# Patient Record
Sex: FEMALE | Race: WHITE | Employment: OTHER | URBAN - METROPOLITAN AREA
[De-identification: names, ages, dates, MRNs, and addresses within clinical notes are randomized per-mention and may not be internally consistent; named-entity substitution may affect disease eponyms.]

---

## 2017-01-02 ENCOUNTER — HOSPITAL ENCOUNTER (OUTPATIENT)
Dept: RADIATION ONCOLOGY | Age: 67
Discharge: HOME | End: 2017-01-02
Payer: MEDICARE

## 2017-01-02 PROCEDURE — 77385 HC IMRT TRMT DLVR SMPL: CPT

## 2017-01-02 NOTE — PROGRESS NOTES
Patient: Francine Alvarenga MRN: [id number]  SSN: [id number]    YOB: 1950  Age: 74 y.o. Sex: female      DIAGNOSIS: Right breast infiltrating ductal carcinoma, high grade, 100% ER, NM 3% and HER2 was negative by IHC, [id number] with positive paratracheal lymph node. [id number].      PREVIOUS TREATMENT:  1) 4 cycles ddAC followed by 12 weekly taxol cycles. 2) 9/14/16: Mastectomy and axillary dissection. Tissue expanders in place. TREATMENT SITE:  Right breast and lymph nodes. DOSE and FRACTIONATION:  24/25 fractions, 4800 cGy of 5000 cGy, 0/5 boost.     INTERVAL HISTORY:  Francine Alvarenga is a 74 y.o. female being treated for breast cancer. She is doing well without complaints through week 2. Week 3 having some dysphagia but better week 4. Also with walking pna and getting abx from PCP. No new issues week 5. OBJECTIVE:  Mild follicular rash. There were no vitals taken for this visit. Lab Results   Component Value Date/Time    Sodium 140 12/19/2016 01:04 PM    Potassium 3.6 12/19/2016 01:04 PM    Chloride 103 12/19/2016 01:04 PM    CO2 30 12/19/2016 01:04 PM    Anion gap 7 12/19/2016 01:04 PM    Glucose 140 12/19/2016 01:04 PM    BUN 12 12/19/2016 01:04 PM    Creatinine 0.74 12/19/2016 01:04 PM    GFR est AA >60 12/19/2016 01:04 PM    GFR est non-AA >60 12/19/2016 01:04 PM    Calcium 8.6 12/19/2016 01:04 PM    Magnesium 2.3 07/29/2016 02:06 PM    Albumin 3.1 12/19/2016 01:04 PM    Protein, total 6.9 12/19/2016 01:04 PM    Globulin 3.8 12/19/2016 01:04 PM    A-G Ratio 0.8 12/19/2016 01:04 PM    AST 18 12/19/2016 01:04 PM    ALT 14 12/19/2016 01:04 PM     Lab Results   Component Value Date/Time    WBC 4.9 12/19/2016 01:04 PM    HGB 11.6 12/19/2016 01:04 PM    HCT 35.4 12/19/2016 01:04 PM    PLATELET 758 29/11/7132 01:04 PM       ASSESSMENT and PLAN:  Francine Alvarenga is tolerating radiation as anticipated for the current dose and fraction. Aquaphor week 1. Abx from PCP for PNA.   We will continue on as planned with another treatment visit anticipated next week.         Marquetta Bernheim, MD   January 2, 2017

## 2017-01-03 ENCOUNTER — HOSPITAL ENCOUNTER (OUTPATIENT)
Dept: RADIATION ONCOLOGY | Age: 67
Discharge: HOME | End: 2017-01-03
Payer: MEDICARE

## 2017-01-03 PROCEDURE — 77300 RADIATION THERAPY DOSE PLAN: CPT

## 2017-01-03 PROCEDURE — 77385 HC IMRT TRMT DLVR SMPL: CPT

## 2017-01-03 PROCEDURE — 77280 THER RAD SIMULAJ FIELD SMPL: CPT

## 2017-01-03 PROCEDURE — 77336 RADIATION PHYSICS CONSULT: CPT

## 2017-01-04 ENCOUNTER — HOSPITAL ENCOUNTER (OUTPATIENT)
Dept: RADIATION ONCOLOGY | Age: 67
Discharge: HOME | End: 2017-01-04
Payer: MEDICARE

## 2017-01-04 PROCEDURE — 77385 HC IMRT TRMT DLVR SMPL: CPT

## 2017-01-05 ENCOUNTER — HOSPITAL ENCOUNTER (OUTPATIENT)
Dept: RADIATION ONCOLOGY | Age: 67
Discharge: HOME | End: 2017-01-05
Payer: MEDICARE

## 2017-01-05 PROCEDURE — 77385 HC IMRT TRMT DLVR SMPL: CPT

## 2017-01-06 ENCOUNTER — HOSPITAL ENCOUNTER (OUTPATIENT)
Dept: RADIATION ONCOLOGY | Age: 67
Discharge: HOME | End: 2017-01-06
Payer: MEDICARE

## 2017-01-06 PROCEDURE — 77385 HC IMRT TRMT DLVR SMPL: CPT

## 2017-01-09 ENCOUNTER — HOSPITAL ENCOUNTER (OUTPATIENT)
Dept: RADIATION ONCOLOGY | Age: 67
Discharge: HOME | End: 2017-01-09
Payer: MEDICARE

## 2017-01-09 PROCEDURE — 77385 HC IMRT TRMT DLVR SMPL: CPT

## 2017-01-09 NOTE — PROGRESS NOTES
Patient: Francine Alvarenga MRN: [id number]  SSN: [id number]    YOB: 1950  Age: 74 y.o. Sex: female      DIAGNOSIS: Right breast infiltrating ductal carcinoma, high grade, 100% ER, [place] 3% and HER2 was negative by IHC, [id number] with positive paratracheal lymph node. [id number].      PREVIOUS TREATMENT:  1) 4 cycles ddAC followed by 12 weekly taxol cycles. 2) 9/14/16: Mastectomy and axillary dissection. Tissue expanders in place. TREATMENT SITE:  Right breast and lymph nodes. DOSE and FRACTIONATION:  25/25 fractions, 5000 cGy of 5000 cGy, 4/5 boost, 800 cGy of 1000 cGy planned. .     INTERVAL HISTORY:  Francine Alvaernga is a 74 y.o. female being treated for breast cancer. She is doing well without complaints through week 2. Week 3 having some dysphagia but better week 4. Also with walking pna and getting abx from PCP. No new issues week 5-6    OBJECTIVE:  Mild follicular rash through week 4, final week with brisk erythema. There were no vitals taken for this visit.     Lab Results   Component Value Date/Time    Sodium 140 12/19/2016 01:04 PM    Potassium 3.6 12/19/2016 01:04 PM    Chloride 103 12/19/2016 01:04 PM    CO2 30 12/19/2016 01:04 PM    Anion gap 7 12/19/2016 01:04 PM    Glucose 140 12/19/2016 01:04 PM    BUN 12 12/19/2016 01:04 PM    Creatinine 0.74 12/19/2016 01:04 PM    GFR est AA >60 12/19/2016 01:04 PM    GFR est non-AA >60 12/19/2016 01:04 PM    Calcium 8.6 12/19/2016 01:04 PM    Magnesium 2.3 07/29/2016 02:06 PM    Albumin 3.1 12/19/2016 01:04 PM    Protein, total 6.9 12/19/2016 01:04 PM    Globulin 3.8 12/19/2016 01:04 PM    A-G Ratio 0.8 12/19/2016 01:04 PM    AST 18 12/19/2016 01:04 PM    ALT 14 12/19/2016 01:04 PM     Lab Results   Component Value Date/Time    WBC 4.9 12/19/2016 01:04 PM    HGB 11.6 12/19/2016 01:04 PM    HCT 35.4 12/19/2016 01:04 PM    PLATELET 697 43/40/9995 01:04 PM       ASSESSMENT and PLAN:  Francine Alvarenga is tolerating radiation as anticipated for the current dose and fraction. Aquaphor week 1. Abx from PCP for PNA. We will continue on as planned with with treatment to be completed tomorrow. Will plan for 1 month follow up.        Floyd Gan MD   January 9, 2017

## 2017-01-10 ENCOUNTER — HOSPITAL ENCOUNTER (OUTPATIENT)
Dept: RADIATION ONCOLOGY | Age: 67
Discharge: HOME | End: 2017-01-10
Payer: MEDICARE

## 2017-01-10 PROCEDURE — 77336 RADIATION PHYSICS CONSULT: CPT

## 2017-01-10 PROCEDURE — 77385 HC IMRT TRMT DLVR SMPL: CPT

## 2017-02-09 ENCOUNTER — HOSPITAL ENCOUNTER (OUTPATIENT)
Dept: RADIATION ONCOLOGY | Age: 67
Discharge: HOME | End: 2017-02-09
Payer: MEDICARE

## 2017-02-09 VITALS
WEIGHT: 132.6 LBS | BODY MASS INDEX: 24.25 KG/M2 | DIASTOLIC BLOOD PRESSURE: 64 MMHG | OXYGEN SATURATION: 97 % | SYSTOLIC BLOOD PRESSURE: 135 MMHG | HEART RATE: 102 BPM

## 2017-02-09 DIAGNOSIS — C50.911 MALIGNANT NEOPLASM OF RIGHT FEMALE BREAST, UNSPECIFIED SITE OF BREAST: Primary | ICD-10-CM

## 2017-02-09 PROCEDURE — 99211 OFF/OP EST MAY X REQ PHY/QHP: CPT

## 2017-02-09 NOTE — PROGRESS NOTES
Patient: Cj Verdin MRN: [id number]  SSN: [id number]    YOB: 1950  Age: 74 y.o. Sex: female      Other Providers:  Modesto Polanco MD    CHIEF COMPLAINT: Breast cancer    DIAGNOSIS: Right breast infiltrating ductal carcinoma, high grade, 100% ER, MI 3% and HER2 was negative by IHC, [id number] with positive paratracheal lymph node. [id number]. PREVIOUS TREATMENT:  1) 4 cycles ddAC followed by 12 weekly taxol cycles. 2) 9/14/16:  Mastectomy and axillary dissection. Tissue expanders in place. 3) 01/10/17: Radiation completed. She received treatment to the right breast and lymph nodes with 25 fractions of 5000     cGy, 5 boost, of 1000 cGy planned. 4) Arimidex    HISTORY OF PRESENT ILLNESS:  Cj Verdin is a 74 y.o. female seen by Dr. Mackenzie Nj on 10/20/2016 at the request of Dr. Anna Marie Saenz. She originally presented in December 2015 when she noted an increased redness and swelling in her right breast, with a masslike density. She had underwent a clinical breast exam in September that was normal.  Mammogram and ultrasound showed a mass that was indistinct but suspicious, and skin thickening also suspicious for malignant involvement. She went for biopsy which showed infiltrating ductal carcinoma, high grade, but with 100% ER expression, MI 3% and HER2 was negative by IHC. MRI showed the tumor to be extensive, with enhancement spanning over 12 cm, with confirmation of skin thickening and a low axillary lymph node that was not identified by ultrasound. She was referred to Dr. Ryan Hawley who felt that she would be appropriate for neoadjuvant chemotherapy due to the large size and the skin involvement. Dr. Anna Marie Saenz recommended PET/CT and port placement prior to potentially starting dose dense AC followed by weekly Taxol. Unfortunately, in addition to the locally advanced disease, there was also evidence of a paratracheal lymph node which was indeterminate by imaging.  Chemotherapy was postponed and EBUS for diagnostic purposes was performed which returned as positive for malignant cells, also ER/OH positive and HER2 negative confirming stage IV disease. With such a small amount of oligometastasis, Dr. Leilani Cintron recommended that she proceed with chemotherapy. She completed 4 cycles of ddAC with excellent disease response, tolerated 12 cycles of weekly Taxol exceptionally well according to the record. Imaging post-chemotherapy showed at least partial response in the breast and complete response in the axillary and mediastinal lymph nodes. Her case was discussed in tumor board who recommended mastectomy followed by chest wall and axillary radiotherapy. Her tumor locally had a nice response with only a 1.5 cm primary noted, but there were 10 positive lymph nodes with TULIO present.   She is getting serial expansions with Dr. Ana Allan. INTERVAL HISTORY: She returns today for follow up, one month after competing radiation therapy for her right breast cancer. Overall she tolerated treatment well. She had moderate erythema which has since healed. She has good energy. She is recovering from pneumonia and is doing well. Her cough is much improved. She is scheduled for breast reconstruction in June or July. She started the Arimidex and is tolerating fairly well. OBSTETRIC HISTORY:    Menarche at the age of 13.    G4,P2. Oral Contraceptive Pills:  5 years in her 17's. Hormone Replacement Therapy:  Premarin for 15 years    PAST MEDICAL HISTORY:    Past Medical History   Diagnosis Date    Abnormal CT of the chest     Adverse effect of anesthesia      bp dropped with dual lung/gallbladder surgery    Asthma       no acute attacks recently.  uses advair daily    Breast cancer ([facility] [address] 2016     R breast, s/p mastectomy, chemo, reconstruction    Depression      Hypercholesteremia     Hypothyroidism        The patient denies history of collagen vascular diseases, pacemaker insertion, prior radiation or prior chemotherapy predating her breast cancer diagnosis. PAST SURGICAL HISTORY:   Past Surgical History   Procedure Laterality Date    Hx cholecystectomy      Hx bladder repair      Hx rectocele repair      Hx cystocele repair      Hx breast biopsy Right 2/5/2016     SKIN BIOPSY RIGHT BREAST performed by Re Burnett MD at [address] [place] [address] Pr chest surgery procedure unlisted       R lung biopsy along with cholecystectomy    Hx vascular access       port left chest wall    Hx breast reconstruction Bilateral 9/14/2016     BILATERAL BREAST RECONSTRUCTION performed by Sudha Mcdonough MD at [address] [place] [address] Hx breast reconstruction Bilateral 9/14/2016     BILATERAL BREAST TISSUE EXPANDER INSERTION performed by Sudha Mcdonough MD at [address] [place] [address] Hx mastectomy      Hx mastectomy      Hx hysterectomy       fibroids    Hx colonoscopy       2006       MEDICATIONS:     Current Outpatient Prescriptions:     letrozole (FEMARA) 2.5 mg tablet, Take 1 Tab by mouth daily. , Disp: 30 Tab, Rfl: 2    amoxicillin-clavulanate (AUGMENTIN) 500-125 mg per tablet, 1 bid, Disp: 14 Tab, Rfl: 0    palbociclib (IBRANCE) 125 mg cap, Take 125 mg by mouth daily. Take daily for 3 weeks then take one week off., Disp: 21 Tab, Rfl: 5    doxycycline (ADOXA) 100 mg tablet, Take 100 mg by mouth two (2) times a day., Disp: , Rfl:     ALPRAZolam (XANAX) 0.5 mg tablet, 1 bid to qid prn  Indications: ANXIETY, sleep, Disp: 120 Tab, Rfl: 5    fluticasone-salmeterol (ADVAIR DISKUS) 250-50 mcg/dose diskus inhaler, Take 1 Puff by inhalation every twelve (12) hours. Indications: INTRINSIC ASTHMA, Disp: 1 Inhaler, Rfl: 12    DULoxetine (CYMBALTA) 60 mg capsule, Take 1 Cap by mouth daily. , Disp: 30 Cap, Rfl: 12    levothyroxine (SYNTHROID) 75 mcg tablet, Take 1 Tab by mouth Daily (before breakfast).  Indications: hypothyroidism, Disp: 30 Tab, Rfl: 12    albuterol (VENTOLIN) 90 mcg/Actuation inhaler, Take 2 Puffs by inhalation every six (6) hours as needed. Indications: bring on the dos., Disp: , Rfl:     ALLERGIES:   Allergies   Allergen Reactions    Prednisone Unknown (comments)     \"psychological reaction\"       SOCIAL HISTORY:   Social History     Social History    Marital status:      Spouse name: N/A    Number of children: N/A    Years of education: N/A     Occupational History    Not on file.      Social History Main Topics    Smoking status: Never Smoker    Smokeless tobacco: Never Used    Alcohol use No    Drug use: No    Sexual activity: Not on file     Other Topics Concern    Not on file     Social History Narrative       FAMILY HISTORY:   Family History   Problem Relation Age of Onset    Hypertension Mother     Cancer Mother 79     Breast cancer at age 79    Diabetes Mother      Type 2    Other Father      alzheimers    Cancer Maternal Aunt      Breast cancer after age 72    Cancer Maternal Aunt      Breast cancer at age 39   Miranda Jefferson Allergic Rhinitis Neg Hx     Allergy-severe Neg Hx     Amblyopia Neg Hx     Anemia Neg Hx     Anesth Problems Neg Hx     Angioedema Neg Hx     Anxiety Neg Hx     Arrhythmia Neg Hx     Arthritis-rheumatoid Neg Hx     Ataxia Neg Hx     Atopy Neg Hx     Bipolar Disorder Neg Hx     Blindness Neg Hx     Breast Cancer Neg Hx     Breast Problems Neg Hx     Broken Bones Neg Hx     Cataract Neg Hx     Celiac Disease Neg Hx     Childhood heart surgery Neg Hx     Childhood resp disease Neg Hx     Chorea Neg Hx     Clotting Disorder Neg Hx     Collagen Dis Neg Hx     Colon Cancer Neg Hx     Colon Polyps Neg Hx     COPD Neg Hx     Coronary Artery Disease Neg Hx     Crohn's Disease Neg Hx     Cystic Fibrosis Neg Hx     Delayed Awakening Neg Hx     Dementia Neg Hx     Depression Neg Hx     Dislocations Neg Hx     Downs Syndrome Neg Hx     Drug Abuse Neg Hx     Eclampsia Neg Hx     Eczema Neg Hx     Emergence Delirium Neg Hx     Emphysema Neg Hx     Fainting Neg Hx     Genitourinary () Neg Hx     Glaucoma Neg Hx     Heart Attack Neg Hx     Heart defect Neg Hx     Heart Failure Neg Hx     Heart Surgery Neg Hx     Hemachromatosis Neg Hx     Herpes Neg Hx     High Cholesterol Neg Hx     HIV/AIDS Neg Hx     Immunodeficiency Neg Hx     Infertility Neg Hx     Inflammatory Bowel Dz Neg Hx     Kidney Disease Neg Hx     Liver Disease Neg Hx     Macular Degen Neg Hx     Malignant Hyperthermia Neg Hx     MS Neg Hx     Neuropathy Neg Hx     NF Neg Hx     Osteoporosis Neg Hx     Ovarian Cancer Neg Hx     Pacemaker Neg Hx     Panic disorder Neg Hx     Parkinsonism Neg Hx     Post-op Cognitive Dysfunction Neg Hx     Post-op Nausea/Vomiting Neg Hx     Prematurity Neg Hx      Labor Neg Hx     Pseudocholinesterase Deficiency Neg Hx     Psoriasis Neg Hx     Psychotic Disorder Neg Hx     Rashes/Skin Problems Neg Hx     Retinal Detachment Neg Hx     Rh Incompatibility Neg Hx     Schizophrenia Neg Hx     Seizures Neg Hx     Severe Sprains Neg Hx     Sickle Cell Anemia Neg Hx     Sickle Cell Trait Neg Hx     SIDS Neg Hx     SLE Neg Hx     Spont Ab Neg Hx     Stomach Cancer Neg Hx     Strabismus Neg Hx     Substance Abuse Neg Hx     Sudden Death Neg Hx     Suicide Neg Hx     Thyroid Disease Neg Hx     Tuberculosis Neg Hx     Ulcerative Colitis Neg Hx     Urticaria Neg Hx     Lang's Disease Neg Hx        REVIEW OF SYSTEMS: Please see the completed review of systems sheet in the chart that I have reviewed today. PHYSICAL EXAMINATION:   ECOG Performance status 0  VITAL SIGNS:   Visit Vitals    /64 (BP 1 Location: Left arm, BP Patient Position: Sitting)    Pulse (!) 102    Wt 132 lb 9.6 oz (60.1 kg)    SpO2 97%    BMI 24.25 kg/m2        GENERAL: The patient is well-developed, ambulatory, alert and in no acute distress. HEENT: Head is normocephalic, atraumatic. NECK: Neck is supple with no masses.  CARDIOVASCULAR: Heart is regular rate and rhythm. RESPIRATORY: Lungs are clear to auscultation. There is normal respiratory effort. LYMPHATIC: There is no cervical, supraclavicular or axillary lymphadenopathy bilaterally. BREASTS: Examination of the unaffected breast reveals no dominant nodules or masses. Reconstructed right breast. Skin changes secondary to radiation have healed    PATHOLOGY:  2/18/16:  DIAGNOSIS  2R Lymph Node, Fine Needle Aspiration:  MALIGNANT CELLS PRESENT. Cell block: Rare malignant cells. 9/14/16:     DIAGNOSIS   A: ADDITIONAL AXILLARY CONTENTS: MACROMETASTATIC CARCINOMA TO   9 OF 11 LYMPH NODES WITH EXTRACAPSULAR EXTENSION AND EXTENSIVE LYMPHOVASCULAR INVASION. B: RIGHT BREAST: MICROSCOPIC FOCI OF INFILTRATING DUCT CARCINOMA, INTERMEDIATE GRADE (MODERATELY DIFFERENTIATED), THE MAJORITY OF WHICH APPEAR TO REPRESENT LYMPHOVASCULAR INVASION. SIZE OF TUMOR IS INDETERMINATE BUT IS UP TO 1.5 X 0.8 CM ON SLIDE ON MULTIPLE SLIDES. MARGINS OF RESECTION ARE NEGATIVE FOR MALIGNANT TUMOR. IN SITU COMPONENT IS NOT IDENTIFIED. SEE COMMENT. C: RIGHT AXILLA: MACROMETASTATIC CARCINOMA TO 1 OF 1 LYMPH NODES WITH EXTRACAPSULAR EXTENSION AND EXTENSIVE LYMPHOVASCULAR INVASION. Comment   The morphology of tumor in this specimen is similar to treated carcinoma in prior biopsy, , which has estrogen receptors of 100%, progesterone receptors of 3% and Her-2 negative 1+. If clinically indicated, receptor studies can be repeated in this material (see also [id number])     LABORATORY: none today    RADIOLOGY:  I have personally reviewed the imaging and agree with the reports below. NUCLEAR MEDICINE WHOLE BODY CT / PET- FDG Study.     INDICATION: Malignant neoplasm of the right breast, extensive infiltrating  ductal carcinoma, initial staging exam.     COMPARISONS: None.      TECHNIQUE: Radiopharmaceutical: 17.1 mCi F18-FDG IV. This is a whole-body  PET- FDG study performed on a dedicated full- ring scanner.  Low dose,  nondiagnostic CT axial source images are also acquired for PET attenuation  correction and are utilized for PET-CT fusion. The patient underwent adequate  fasting of at least 4 hours. Blood glucose at the time of tracer administration  is 88 mg/dl, which is adequate for imaging. Approximately 90 minutes after  administration of the radiopharmaceutical, imaging was initiated covering the  skull to the thighs.      FINDINGS:   HEAD AND NECK: No abnormal hypermetabolic activity. Normal physiologic activity  in the brain and muscular activity in the tongue and symmetric activity in the  larynx, probably from combination.     CHEST: Small hypermetabolic right axillary lymph nodes are present. Ill-defined  stranding mass in the right breast is hypermetabolic. This extends to the  subareolar focus. There are hypermetabolic subpectoral nodes. The larger mass  SUV max is 6.5. There is a hypermetabolic right paratracheal lymph node. No  hypermetabolic pulmonary nodules. There is some scarring and micronodules in the  lungs which are not hypermetabolic.     ABDOMEN: Normal physiologic activity in the GI tract and  tract. No abnormal  hypermetabolic activity. Cholecystectomy clips are present.     PELVIS: No hypermetabolic adenopathy. No hypermetabolic bony lesions; mild DJD. Patient is status post hysterectomy.     IMPRESSION: Hypermetabolic right breast mass which includes a hypermetabolic  subareolar focus, hypermetabolic right axillary and subpectoral lymph nodes. There is a hypermetabolic paratracheal node on the right. No hypermetabolic  disease outside the chest.  Mri Breast Bi W Wo Cont    Result Date: 7/28/2016  MRI BILATERAL BREASTS WITHOUT AND WITH CONTRAST, 7/28/2016. CLINICAL HISTORY:  Restaging breast cancer status post chemotherapy.  Technique: Multiplanar multisequence imaging of the breast were performed prior to and following the uneventful intravenous administration of 12 mL of Magnevist. Postcontrast imaging was performed using a dynamic technique. Images were reviewed using the [employer]. Sequences obtained: Sagittal T2, axial STIR, axial T1, and axial fat-saturated T1-weighted images prior to and following administration of contrast. Comparison studies: Breast MRI 1/19/2016. FINDINGS: Susceptibility artifact from what appears to be venous port is now seen in the upper, posterior left breast. The breasts are composed of heterogeneous fibroglandular elements. No enlarged axillary lymph nodes are seen. That the prior enlarged right lower axillary lymph node is felt to be seen on image 142 significantly decreased in size now measuring 0.6 cm when measured using a similar technique as the prior MRI study. No enlarged internal mammary lymph nodes are seen. Evaluation of the lungs is limited by  MRI imaging. However, no obvious pulmonary masses are seen. No significant pleural effusions are seen. The liver is obscured by cardiac motion artifact and only partially visualized. However, no focal signal abnormalities are seen prior to, or following administration of contrast to suggest a possible hepatic lesion. Following the administration of intravenous contrast, normal enhancement is seen of the heart and vascular structures of the breasts. The prior abnormal enhancement throughout the outer right breast is significantly improved. Minimal residual nonmasslike enhancement persists in the outer right breast best appreciated on axial subtraction image 210, and sagittal reconstructed postcontrast image 45. Which measures 6 cm AP x 2.6 cm wide x 4.3 cm craniocaudal. The additional enhancement of the right nipple is no longer appreciated. However, some minimal residual asymmetric enhancement of the right pectoralis muscle is once again seen best appreciated on axial subtraction image 225.  Moderate right breast skin thickening is seen which is not felt to be significantly changed from the prior study. IMPRESSION: 1. Response to treatment given that there is significant improvement in the outer right breast masslike enhancement, decreased size of the right axillary lymph node, resolution of the right nipple enhancement, and decreased enhancement of the right pectoralis muscle. Given the persistent enhancement in the outer right breast as well as involving the right pectoralis muscle, some residual active tumor at these levels cannot be excluded. No evidence for disease progression is seen. BI-RADS Assessment Category 6: Known Biopsy Proven Malignancy     F-18 FDG PET/CT Imaging.      Indication: restaging breast cancer, chemo completed, 77 years Female restaging  right breast cancer diagnosed in February, chemotherapy 1 week ago, history of  cholecystectomy and hysterectomy     Comparison: Whole-body PET/CT February 04, 2016     Radiopharmaceutical: 17.2 mCi of F-18 FDG.    Technique: Delayed PET post oral contrast CT images from skull base to mid  thighs were performed. Patient received 10 cc of Gastroview for oral contrast.  F-18 FDG PET/CT has limited sensitivity for low grade malignancies, small tumor  deposits, mucin producing neoplasms.     Findings: There has been interval significant decrease in soft tissue density in  the lateral right breast and decreased FDG uptake, now maximum SUV of 1.4,  consistent with treatment response. There has also been interval resolution of  the previously visualized right axillary sagar uptake. Interval resolution of  previously visualized mediastinal sagar FDG uptake, consistent with treatment  response. Scattered small bilateral peripheral pulmonary nodules measuring up  to 4 mm in the left upper lobe and 5 mm in the right middle lobe appear  unchanged, nonspecific.  Persistent small dermal based lesion in the midline  posterior back at the T9 level posterior to the T9 spinous process, associated  with new mild FDG uptake, maximum SUV of 1.9, this may represent new  inflammation or infection of a dermal or epidermal lesion. There is physiologic  distribution of FDG in head and neck region, abdomen and pelvis. No focal  uptake identified in the bone. Non diagnostic CT demonstrates grossly  unremarkable heart and mediastinum as well as liver, spleen, adrenals, kidneys,  pancreas and bowel.     Impression:   1. Overall findings consistent with interval treatment response with decreased  soft tissue density and FDG uptake in the lateral right breast, and resolution  of right axillary and mediastinal sagar FDG uptake. 2. New mild FDG uptake in a small dermal-based lesion in the midline posterior  back, may represent new inflammation or infection of a dermal or epidermal  lesion. 3. Other chronic nonspecific findings as above. IMPRESSION:  Leonard Rosario is a 74 y.o. female with right breast infiltrating ductal carcinoma, high grade, 100% ER, [place] 3% and HER2 was negative by IHC, [id number] with positive paratracheal lymph node. [id number]. Stage IV breast cancer by virtue of a single paratracheal lymph node which was confirmed pathologically. She is s/p right breast mastectomy and axillary dissection with tissue expanders in place. She is scheduled for reconstruction in June or July. She is currently on Arimidex and to add Ibrance under the direction of Dr. Hilda Saucedo. She is recovering as anticipated from radiation of her right chest wall and lymph nodes. Plan:  1)  Schedule left breast mammogram for July 2017. We will see her shortly there after  2)  AI under the direction of Dr. Hilda Saucedo. Continue close follow up  3) I spent 25 minutes in the care of Ms. Jhonatan Jerez today, over 50% of which was in direct counseling and ongoing management of her breast cancer. All questions were answered to the best of my ability.       Yoselyn Kidd NP  02/09/17      Portions of this note were copied from prior encounters and reviewed for accuracy, currency, and represent documentation and tasks completed during this encounter. I verify and attest these portions to be unchanged from prior visits.

## 2017-02-09 NOTE — NURSE NAVIGATOR
Pt here for one month f/u for right breast cancer. S/p right mastectomy on 9-14-16. S/p chemo, RT.  RT end 1-10-17. Taking Femara. F/u with Dr. Claudia Farias on 2-14-17.     Kristyn Gil RN

## 2017-02-14 ENCOUNTER — HOSPITAL ENCOUNTER (OUTPATIENT)
Dept: LAB | Age: 67
Discharge: HOME | End: 2017-02-14
Payer: MEDICARE

## 2017-02-14 DIAGNOSIS — C50.911 MALIGNANT NEOPLASM OF RIGHT FEMALE BREAST, UNSPECIFIED SITE OF BREAST: ICD-10-CM

## 2017-02-14 LAB
ALBUMIN SERPL BCP-MCNC: 3.2 G/DL (ref 3.2–4.6)
ALBUMIN/GLOB SERPL: 0.8 {RATIO} (ref 1.2–3.5)
ALP SERPL-CCNC: 177 U/L (ref 50–136)
ALT SERPL-CCNC: 18 U/L (ref 12–65)
ANION GAP BLD CALC-SCNC: 8 MMOL/L (ref 7–16)
AST SERPL W P-5'-P-CCNC: 21 U/L (ref 15–37)
BASOPHILS # BLD AUTO: 0 K/UL (ref 0–0.2)
BASOPHILS # BLD: 1 % (ref 0–2)
BILIRUB SERPL-MCNC: 0.5 MG/DL (ref 0.2–1.1)
BUN SERPL-MCNC: 9 MG/DL (ref 8–23)
CALCIUM SERPL-MCNC: 8.8 MG/DL (ref 8.3–10.4)
CHLORIDE SERPL-SCNC: 100 MMOL/L (ref 98–107)
CO2 SERPL-SCNC: 29 MMOL/L (ref 23–32)
CREAT SERPL-MCNC: 0.76 MG/DL (ref 0.6–1)
DIFFERENTIAL METHOD BLD: ABNORMAL
EOSINOPHIL # BLD: 0.2 K/UL (ref 0–0.8)
EOSINOPHIL NFR BLD: 3 % (ref 0.5–7.8)
ERYTHROCYTE [DISTWIDTH] IN BLOOD BY AUTOMATED COUNT: 13.9 % (ref 11.9–14.6)
GLOBULIN SER CALC-MCNC: 4.2 G/DL (ref 2.3–3.5)
GLUCOSE SERPL-MCNC: 74 MG/DL (ref 65–100)
HCT VFR BLD AUTO: 37.2 % (ref 35.8–46.3)
HGB BLD-MCNC: 12.5 G/DL (ref 11.7–15.4)
LYMPHOCYTES # BLD AUTO: 26 % (ref 13–44)
LYMPHOCYTES # BLD: 2.1 K/UL (ref 0.5–4.6)
MCH RBC QN AUTO: 31.6 PG (ref 26.1–32.9)
MCHC RBC AUTO-ENTMCNC: 33.6 G/DL (ref 31.4–35)
MCV RBC AUTO: 94.2 FL (ref 79.6–97.8)
MONOCYTES # BLD: 0.8 K/UL (ref 0.1–1.3)
MONOCYTES NFR BLD AUTO: 10 % (ref 4–12)
NEUTS SEG # BLD: 4.9 K/UL (ref 1.7–8.2)
NEUTS SEG NFR BLD AUTO: 60 % (ref 43–78)
NRBC # BLD: 0 K/UL (ref 0–0.2)
NRBC BLD-RTO: 0 PER 100 WBC (ref 0–2)
PLATELET # BLD AUTO: 197 K/UL (ref 150–450)
PMV BLD AUTO: 9.4 FL (ref 10.8–14.1)
POTASSIUM SERPL-SCNC: 3.7 MMOL/L (ref 3.5–5.1)
PROT SERPL-MCNC: 7.4 G/DL (ref 6.3–8.2)
RBC # BLD AUTO: 3.95 M/UL (ref 4.05–5.25)
SODIUM SERPL-SCNC: 137 MMOL/L (ref 136–145)
WBC # BLD AUTO: 8 K/UL (ref 4.3–11.1)

## 2017-02-14 PROCEDURE — 85025 COMPLETE CBC W/AUTO DIFF WBC: CPT | Performed by: INTERNAL MEDICINE

## 2017-02-14 PROCEDURE — 80053 COMPREHEN METABOLIC PANEL: CPT | Performed by: INTERNAL MEDICINE

## 2017-04-04 ENCOUNTER — HOSPITAL ENCOUNTER (OUTPATIENT)
Dept: INFUSION THERAPY | Age: 67
Discharge: HOME | End: 2017-04-04
Payer: MEDICARE

## 2017-04-04 ENCOUNTER — PATIENT OUTREACH (OUTPATIENT)
Dept: CASE MANAGEMENT | Age: 67
End: 2017-04-04

## 2017-04-04 ENCOUNTER — HOSPITAL ENCOUNTER (OUTPATIENT)
Dept: GENERAL RADIOLOGY | Age: 67
Discharge: HOME | DRG: 193 | End: 2017-04-04
Payer: MEDICARE

## 2017-04-04 ENCOUNTER — HOSPITAL ENCOUNTER (OUTPATIENT)
Dept: LAB | Age: 67
Discharge: HOME | DRG: 193 | End: 2017-04-04
Payer: MEDICARE

## 2017-04-04 VITALS
HEART RATE: 77 BPM | OXYGEN SATURATION: 96 % | RESPIRATION RATE: 18 BRPM | SYSTOLIC BLOOD PRESSURE: 107 MMHG | DIASTOLIC BLOOD PRESSURE: 64 MMHG

## 2017-04-04 DIAGNOSIS — R59.0 MEDIASTINAL ADENOPATHY: ICD-10-CM

## 2017-04-04 DIAGNOSIS — R05.9 COUGH: ICD-10-CM

## 2017-04-04 DIAGNOSIS — C50.911 MALIGNANT NEOPLASM OF RIGHT FEMALE BREAST, UNSPECIFIED SITE OF BREAST: ICD-10-CM

## 2017-04-04 DIAGNOSIS — R06.02 SHORTNESS OF BREATH: ICD-10-CM

## 2017-04-04 LAB
ALBUMIN SERPL BCP-MCNC: 2.4 G/DL (ref 3.2–4.6)
ALBUMIN/GLOB SERPL: 0.5 {RATIO} (ref 1.2–3.5)
ALP SERPL-CCNC: 189 U/L (ref 50–136)
ALT SERPL-CCNC: 38 U/L (ref 12–65)
ANION GAP BLD CALC-SCNC: 11 MMOL/L (ref 7–16)
AST SERPL W P-5'-P-CCNC: 65 U/L (ref 15–37)
BASOPHILS # BLD AUTO: 0 K/UL (ref 0–0.2)
BASOPHILS # BLD: 0 % (ref 0–2)
BILIRUB SERPL-MCNC: 0.4 MG/DL (ref 0.2–1.1)
BUN SERPL-MCNC: 11 MG/DL (ref 8–23)
CALCIUM SERPL-MCNC: 8.6 MG/DL (ref 8.3–10.4)
CHLORIDE SERPL-SCNC: 99 MMOL/L (ref 98–107)
CO2 SERPL-SCNC: 26 MMOL/L (ref 23–32)
CREAT SERPL-MCNC: 1.1 MG/DL (ref 0.6–1)
DIFFERENTIAL METHOD BLD: ABNORMAL
EOSINOPHIL # BLD: 0 K/UL (ref 0–0.8)
EOSINOPHIL NFR BLD: 2 % (ref 0.5–7.8)
ERYTHROCYTE [DISTWIDTH] IN BLOOD BY AUTOMATED COUNT: 16.6 % (ref 11.9–14.6)
GLOBULIN SER CALC-MCNC: 4.5 G/DL (ref 2.3–3.5)
GLUCOSE SERPL-MCNC: 163 MG/DL (ref 65–100)
HCT VFR BLD AUTO: 30.7 % (ref 35.8–46.3)
HGB BLD-MCNC: 10.4 G/DL (ref 11.7–15.4)
LYMPHOCYTES # BLD AUTO: 46 % (ref 13–44)
LYMPHOCYTES # BLD: 0.6 K/UL (ref 0.5–4.6)
MCH RBC QN AUTO: 30.5 PG (ref 26.1–32.9)
MCHC RBC AUTO-ENTMCNC: 33.9 G/DL (ref 31.4–35)
MCV RBC AUTO: 90 FL (ref 79.6–97.8)
MONOCYTES # BLD: 0.1 K/UL (ref 0.1–1.3)
MONOCYTES NFR BLD AUTO: 5 % (ref 4–12)
NEUTS SEG # BLD: 0.6 K/UL (ref 1.7–8.2)
NEUTS SEG NFR BLD AUTO: 47 % (ref 43–78)
NRBC # BLD: 0.01 K/UL (ref 0–0.2)
PLATELET # BLD AUTO: 34 K/UL (ref 150–450)
PLATELET COMMENTS,PCOM: ABNORMAL
PMV BLD AUTO: 11 FL (ref 10.8–14.1)
POTASSIUM SERPL-SCNC: 3.6 MMOL/L (ref 3.5–5.1)
PROT SERPL-MCNC: 6.9 G/DL (ref 6.3–8.2)
RBC # BLD AUTO: 3.41 M/UL (ref 4.05–5.25)
RBC MORPH BLD: ABNORMAL
SODIUM SERPL-SCNC: 136 MMOL/L (ref 136–145)
WBC # BLD AUTO: 1.3 K/UL (ref 4.3–11.1)
WBC MORPH BLD: ABNORMAL

## 2017-04-04 PROCEDURE — 96360 HYDRATION IV INFUSION INIT: CPT

## 2017-04-04 PROCEDURE — 74011250636 HC RX REV CODE- 250/636: Performed by: INTERNAL MEDICINE

## 2017-04-04 PROCEDURE — 77030003560 HC NDL HUBR BARD -A

## 2017-04-04 RX ORDER — HEPARIN 100 UNIT/ML
500 SYRINGE INTRAVENOUS AS NEEDED
Status: DISPENSED | OUTPATIENT
Start: 2017-04-04 | End: 2017-04-05

## 2017-04-04 RX ORDER — SODIUM CHLORIDE 0.9 % (FLUSH) 0.9 %
10 SYRINGE (ML) INJECTION EVERY 8 HOURS
Status: DISCONTINUED | OUTPATIENT
Start: 2017-04-04 | End: 2017-04-08 | Stop reason: HOSPADM

## 2017-04-04 RX ADMIN — Medication 10 ML: at 14:50

## 2017-04-04 RX ADMIN — SODIUM CHLORIDE, PRESERVATIVE FREE 500 UNITS: 5 INJECTION INTRAVENOUS at 16:18

## 2017-04-04 RX ADMIN — SODIUM CHLORIDE 2000 ML: 900 INJECTION, SOLUTION INTRAVENOUS at 14:55

## 2017-04-04 RX ADMIN — Medication 10 ML: at 16:18

## 2017-04-04 NOTE — ACP (ADVANCE CARE PLANNING)
4/4/2017 Saw the patient with Dr Shari Del Valle. She has taken one 21 day cycle of Ibrance. She appears very pale, she has fallen and her blood counts are quite low. She has had two episodes, one being this morning, of \"blacking out\". She states she has no warning when this will happen and she wakes up when she \"hits the floor\". We will lower her Ibrance to 75 mg and ask her to wait until she is feeling better to restart. We will give a liter of IVF and do a CXR today as she does have abnormal lung sounds upon physical exam.  We will have her return on Friday for a recheck. Will continue to follow.

## 2017-04-04 NOTE — PROGRESS NOTES
Arrived to the [facility]. 1 liter NS completed. Any issues or concerns during appointment: After 1 liter NS, pt coughing more, wheezes and crackles noted in bases of lungs. Received ok to hold second liter and send pt on for scheduled CXR. Patient aware of next MD appointment on 4/7 at 1315. Discharged in [place] [address]. Raf Dixon

## 2017-04-07 ENCOUNTER — PATIENT OUTREACH (OUTPATIENT)
Dept: CASE MANAGEMENT | Age: 67
End: 2017-04-07

## 2017-04-07 ENCOUNTER — APPOINTMENT (OUTPATIENT)
Dept: GENERAL RADIOLOGY | Age: 67
DRG: 193 | End: 2017-04-07
Attending: NURSE PRACTITIONER
Payer: MEDICARE

## 2017-04-07 ENCOUNTER — HOSPITAL ENCOUNTER (OUTPATIENT)
Dept: LAB | Age: 67
Discharge: HOME | DRG: 193 | End: 2017-04-07
Payer: MEDICARE

## 2017-04-07 ENCOUNTER — HOSPITAL ENCOUNTER (INPATIENT)
Age: 67
LOS: 4 days | Discharge: HOME | DRG: 193 | End: 2017-04-11
Attending: INTERNAL MEDICINE | Admitting: INTERNAL MEDICINE
Payer: MEDICARE

## 2017-04-07 DIAGNOSIS — C50.911 MALIGNANT NEOPLASM OF RIGHT FEMALE BREAST, UNSPECIFIED SITE OF BREAST: ICD-10-CM

## 2017-04-07 PROBLEM — R00.0 TACHYCARDIA: Status: ACTIVE | Noted: 2017-04-07

## 2017-04-07 PROBLEM — I95.0 IDIOPATHIC HYPOTENSION: Status: ACTIVE | Noted: 2017-04-07

## 2017-04-07 PROBLEM — R06.02 SHORTNESS OF BREATH: Status: ACTIVE | Noted: 2017-04-07

## 2017-04-07 PROBLEM — R65.10 SIRS (SYSTEMIC INFLAMMATORY RESPONSE SYNDROME) (HCC): Status: ACTIVE | Noted: 2017-04-07

## 2017-04-07 PROBLEM — D61.810 PANCYTOPENIA DUE TO CHEMOTHERAPY (HCC): Status: ACTIVE | Noted: 2017-04-07

## 2017-04-07 PROBLEM — R05.9 COUGH: Status: ACTIVE | Noted: 2017-04-07

## 2017-04-07 PROBLEM — E87.6 HYPOKALEMIA: Status: ACTIVE | Noted: 2017-04-07

## 2017-04-07 LAB
ALBUMIN SERPL BCP-MCNC: 2.4 G/DL (ref 3.2–4.6)
ALBUMIN/GLOB SERPL: 0.6 {RATIO} (ref 1.2–3.5)
ALP SERPL-CCNC: 180 U/L (ref 50–136)
ALT SERPL-CCNC: 27 U/L (ref 12–65)
ANION GAP BLD CALC-SCNC: 10 MMOL/L (ref 7–16)
AST SERPL W P-5'-P-CCNC: 36 U/L (ref 15–37)
BASOPHILS # BLD AUTO: 0 K/UL (ref 0–0.2)
BASOPHILS # BLD: 1 % (ref 0–2)
BILIRUB SERPL-MCNC: 0.5 MG/DL (ref 0.2–1.1)
BUN SERPL-MCNC: 9 MG/DL (ref 8–23)
CALCIUM SERPL-MCNC: 8.5 MG/DL (ref 8.3–10.4)
CHLORIDE SERPL-SCNC: 100 MMOL/L (ref 98–107)
CO2 SERPL-SCNC: 26 MMOL/L (ref 23–32)
CREAT SERPL-MCNC: 0.9 MG/DL (ref 0.6–1)
DIFFERENTIAL METHOD BLD: ABNORMAL
EOSINOPHIL # BLD: 0 K/UL (ref 0–0.8)
EOSINOPHIL NFR BLD: 1 % (ref 0.5–7.8)
ERYTHROCYTE [DISTWIDTH] IN BLOOD BY AUTOMATED COUNT: 16.7 % (ref 11.9–14.6)
GLOBULIN SER CALC-MCNC: 4.3 G/DL (ref 2.3–3.5)
GLUCOSE SERPL-MCNC: 113 MG/DL (ref 65–100)
HCT VFR BLD AUTO: 29.4 % (ref 35.8–46.3)
HGB BLD-MCNC: 10 G/DL (ref 11.7–15.4)
LACTATE SERPL-SCNC: 2.9 MMOL/L (ref 0.4–2)
LYMPHOCYTES # BLD AUTO: 50 % (ref 13–44)
LYMPHOCYTES # BLD: 1.1 K/UL (ref 0.5–4.6)
MCH RBC QN AUTO: 30.6 PG (ref 26.1–32.9)
MCHC RBC AUTO-ENTMCNC: 34 G/DL (ref 31.4–35)
MCV RBC AUTO: 89.9 FL (ref 79.6–97.8)
MONOCYTES # BLD: 0.2 K/UL (ref 0.1–1.3)
MONOCYTES NFR BLD AUTO: 10 % (ref 4–12)
NEUTS SEG # BLD: 0.8 K/UL (ref 1.7–8.2)
NEUTS SEG NFR BLD AUTO: 38 % (ref 43–78)
NRBC # BLD: 0.01 K/UL (ref 0–0.2)
PLATELET # BLD AUTO: 49 K/UL (ref 150–450)
PMV BLD AUTO: 11.1 FL (ref 10.8–14.1)
POTASSIUM SERPL-SCNC: 3.3 MMOL/L (ref 3.5–5.1)
PROCALCITONIN SERPL-MCNC: 0.1 NG/ML
PROT SERPL-MCNC: 6.7 G/DL (ref 6.3–8.2)
RBC # BLD AUTO: 3.27 M/UL (ref 4.05–5.25)
SODIUM SERPL-SCNC: 136 MMOL/L (ref 136–145)
WBC # BLD AUTO: 2.1 K/UL (ref 4.3–11.1)

## 2017-04-07 PROCEDURE — 94640 AIRWAY INHALATION TREATMENT: CPT

## 2017-04-07 PROCEDURE — 74011000250 HC RX REV CODE- 250: Performed by: NURSE PRACTITIONER

## 2017-04-07 PROCEDURE — 94760 N-INVAS EAR/PLS OXIMETRY 1: CPT

## 2017-04-07 PROCEDURE — 83605 ASSAY OF LACTIC ACID: CPT | Performed by: NURSE PRACTITIONER

## 2017-04-07 PROCEDURE — 36415 COLL VENOUS BLD VENIPUNCTURE: CPT | Performed by: NURSE PRACTITIONER

## 2017-04-07 PROCEDURE — 87086 URINE CULTURE/COLONY COUNT: CPT | Performed by: NURSE PRACTITIONER

## 2017-04-07 PROCEDURE — 65270000029 HC RM PRIVATE

## 2017-04-07 PROCEDURE — 36591 DRAW BLOOD OFF VENOUS DEVICE: CPT

## 2017-04-07 PROCEDURE — 87040 BLOOD CULTURE FOR BACTERIA: CPT | Performed by: NURSE PRACTITIONER

## 2017-04-07 PROCEDURE — 84145 PROCALCITONIN (PCT): CPT | Performed by: NURSE PRACTITIONER

## 2017-04-07 PROCEDURE — 74011250636 HC RX REV CODE- 250/636: Performed by: INTERNAL MEDICINE

## 2017-04-07 PROCEDURE — 71010 XR CHEST PORT: CPT

## 2017-04-07 PROCEDURE — 74011000258 HC RX REV CODE- 258: Performed by: INTERNAL MEDICINE

## 2017-04-07 PROCEDURE — 74011250636 HC RX REV CODE- 250/636: Performed by: NURSE PRACTITIONER

## 2017-04-07 RX ORDER — LETROZOLE 2.5 MG/1
2.5 TABLET, FILM COATED ORAL DAILY
Status: DISCONTINUED | OUTPATIENT
Start: 2017-04-08 | End: 2017-04-11 | Stop reason: HOSPADM

## 2017-04-07 RX ORDER — VANCOMYCIN HYDROCHLORIDE
1250 EVERY 24 HOURS
Status: DISCONTINUED | OUTPATIENT
Start: 2017-04-07 | End: 2017-04-11

## 2017-04-07 RX ORDER — POTASSIUM CHLORIDE 29.8 MG/ML
40 INJECTION INTRAVENOUS ONCE
Status: COMPLETED | OUTPATIENT
Start: 2017-04-07 | End: 2017-04-07

## 2017-04-07 RX ORDER — SODIUM CHLORIDE 9 MG/ML
75 INJECTION, SOLUTION INTRAVENOUS CONTINUOUS
Status: DISCONTINUED | OUTPATIENT
Start: 2017-04-07 | End: 2017-04-11 | Stop reason: HOSPADM

## 2017-04-07 RX ORDER — LEVOTHYROXINE SODIUM 75 UG/1
75 TABLET ORAL
Status: DISCONTINUED | OUTPATIENT
Start: 2017-04-08 | End: 2017-04-11 | Stop reason: HOSPADM

## 2017-04-07 RX ORDER — ENOXAPARIN SODIUM 100 MG/ML
40 INJECTION SUBCUTANEOUS EVERY 24 HOURS
Status: DISCONTINUED | OUTPATIENT
Start: 2017-04-07 | End: 2017-04-07

## 2017-04-07 RX ORDER — BUDESONIDE 0.5 MG/2ML
500 INHALANT ORAL
Status: DISCONTINUED | OUTPATIENT
Start: 2017-04-07 | End: 2017-04-11 | Stop reason: HOSPADM

## 2017-04-07 RX ORDER — SODIUM CHLORIDE 9 MG/ML
1000 INJECTION, SOLUTION INTRAVENOUS ONCE
Status: COMPLETED | OUTPATIENT
Start: 2017-04-07 | End: 2017-04-07

## 2017-04-07 RX ORDER — DULOXETIN HYDROCHLORIDE 30 MG/1
60 CAPSULE, DELAYED RELEASE ORAL DAILY
Status: DISCONTINUED | OUTPATIENT
Start: 2017-04-08 | End: 2017-04-11 | Stop reason: HOSPADM

## 2017-04-07 RX ORDER — IPRATROPIUM BROMIDE AND ALBUTEROL SULFATE 2.5; .5 MG/3ML; MG/3ML
3 SOLUTION RESPIRATORY (INHALATION)
Status: DISCONTINUED | OUTPATIENT
Start: 2017-04-07 | End: 2017-04-09

## 2017-04-07 RX ORDER — ALPRAZOLAM 0.5 MG/1
0.5 TABLET ORAL
Status: DISCONTINUED | OUTPATIENT
Start: 2017-04-07 | End: 2017-04-11 | Stop reason: HOSPADM

## 2017-04-07 RX ADMIN — BUDESONIDE 500 MCG: 0.5 INHALANT RESPIRATORY (INHALATION) at 21:07

## 2017-04-07 RX ADMIN — IPRATROPIUM BROMIDE AND ALBUTEROL SULFATE 3 ML: 2.5; .5 SOLUTION RESPIRATORY (INHALATION) at 21:07

## 2017-04-07 RX ADMIN — POTASSIUM CHLORIDE 40 MEQ: 29.8 INJECTION, SOLUTION INTRAVENOUS at 18:14

## 2017-04-07 RX ADMIN — IPRATROPIUM BROMIDE AND ALBUTEROL SULFATE 3 ML: 2.5; .5 SOLUTION RESPIRATORY (INHALATION) at 16:04

## 2017-04-07 RX ADMIN — VANCOMYCIN HYDROCHLORIDE 1250 MG: 10 INJECTION, POWDER, LYOPHILIZED, FOR SOLUTION INTRAVENOUS at 16:37

## 2017-04-07 RX ADMIN — PIPERACILLIN SODIUM,TAZOBACTAM SODIUM 4.5 G: 4; .5 INJECTION, POWDER, FOR SOLUTION INTRAVENOUS at 16:37

## 2017-04-07 RX ADMIN — SODIUM CHLORIDE 1000 ML: 900 INJECTION, SOLUTION INTRAVENOUS at 16:13

## 2017-04-07 RX ADMIN — PIPERACILLIN SODIUM,TAZOBACTAM SODIUM 4.5 G: 4; .5 INJECTION, POWDER, FOR SOLUTION INTRAVENOUS at 23:37

## 2017-04-07 RX ADMIN — IPRATROPIUM BROMIDE AND ALBUTEROL SULFATE 3 ML: 2.5; .5 SOLUTION RESPIRATORY (INHALATION) at 23:44

## 2017-04-07 NOTE — PROGRESS NOTES
Pt a direct admit from [facility]. Report received from [address], [facility], [address] [place] [address]. Dual skin assessment performed by Susu Serrano RN and this nurse. Healing bruise to left shin s/t recent fall. No breakdown noted.

## 2017-04-07 NOTE — PROGRESS NOTES
Pharmacokinetic Consult to Pharmacist    Jr Hughes is a 74 y.o. female being treated for sepsis likely due to HCAP with vancomycin and pip-tazo. Lab Results   Component Value Date/Time    BUN 9 04/07/2017 12:51 PM    Creatinine 0.90 04/07/2017 12:51 PM    WBC 2.1 04/07/2017 12:51 PM      Estimated Creatinine Clearance: 47.3 mL/min (based on Cr of 0.9). CULTURES:  4/7  Urine Cx:  - Pending    Blood Cx:  - Pending    Day 1 of vancomycin. Goal trough is 15-20. Vancomycin dose initiated at 1250 mg IV q24h. Plan to check a steady state level as indicated. Pharmacy will continue to follow. Please call with any questions.     Thank you,  Homero Salas, PharmD  Clinical Pharmacist  [phone]

## 2017-04-07 NOTE — PROGRESS NOTES
END OF SHIFT NOTE:    Intake/Output      Voiding: YES  Catheter: NO  Drain:   Shant-Plummer Drain 09/14/16 Right; Inner Breast (Active)       Shant-Plummer Drain 09/14/16 Right; Outer Breast (Active)               Stool:  0 occurrences. Stool Assessment  Stool Color: Kam Hem (04/07/17 1911)  Stool Appearance: Formed (04/07/17 1911)  Stool Amount: Small (04/07/17 1911)  Stool Source/Status: Rectum (04/07/17 1911)    Emesis:  0 occurrences. VITAL SIGNS  Patient Vitals for the past 12 hrs:   Temp Pulse Resp BP SpO2   04/07/17 1942 99 °F (37.2 °C) (!) 103 18 105/49 94 %   04/07/17 1607 - - - - 100 %   04/07/17 1452 97.5 °F (36.4 °C) 78 20 136/69 99 %       Pain Assessment  Pain 1  Pain Scale 1: Numeric (0 - 10) (04/07/17 1549)  Pain Intensity 1: 0 (04/07/17 1549)  Patient Stated Pain Goal: 0 (04/07/17 1549)    Ambulating  Yes    Additional Information: n/a. Shift report given to oncoming nurse at the bedside.     Valdez Arce RN

## 2017-04-07 NOTE — IP AVS SNAPSHOT
[facility] 
 
 
 [address] [place] [address] Road [address] [place] [address] 
[phone] Patient: Reinaldo Mcnamara MRN: [id number] :1950 You are allergic to the following Allergen Reactions Prednisone Unknown (comments) \"psychological reaction\" Recent Documentation Weight Breastfeeding? BMI OB Status Smoking Status 57.2 kg No 23.05 kg/m2 Hysterectomy Never Smoker Unresulted Labs Order Current Status CULTURE, BLOOD Preliminary result CULTURE, BLOOD Preliminary result Emergency Contacts Name Discharge Info Relation Home Work Mobile Dol Rojelio DISCHARGE CAREGIVER [3] Spouse [3] 31 [phone] 29 About your hospitalization You were admitted on:  2017 You last received care in the:  [address] You were discharged on:  2017 Unit phone number:  [phone] Why you were hospitalized Your primary diagnosis was:  Sirs (Systemic Inflammatory Response Syndrome) (Hcc) Your diagnoses also included: Malignant Neoplasm Of Right Female Breast (Hcc), Idiopathic Hypotension, Tachycardia, Shortness Of Breath, Hypokalemia, Cough, Pancytopenia Due To Chemotherapy (Hcc) Providers Seen During Your Hospitalizations Provider Role Specialty Primary office phone Alessandra Galvez MD Attending Provider Hematology and Oncology [phone] Your Primary Care Physician (PCP) Primary Care Physician Office Phone Office Fax Janet Harrison [phone] Follow-up Information Follow up With Details Comments Contact Info Elzbieta Thapa MD   [address] [place] [id number] 
[phone] Alessandra Galvez MD On 2017  patient will see np, per xiao. ann marie labs 9:45 and appt 10:15 [id number]  [facility] [id number] [phone] Your Appointments Monday April 17, 2017  9:45 AM EDT  
LAB with [address]  
18[address] [facility] OUTREACH INSURANCE [facility] [phone] [facility]  
[phone] Monday April 17, 2017 10:15 AM EDT HOSPITAL FOLLOW-UP with RENAE VILLASEÑOR NP1 Waqar [facility] CHERRI/ Cj Venegasesias [address] [place] [id number]  
[phone] Current Discharge Medication List  
  
START taking these medications Dose & Instructions Dispensing Information Comments Morning Noon Evening Bedtime  
 levoFLOXacin 750 mg tablet Commonly known as:  Rella Gene Dose:  750 mg Take 1 Tab by mouth daily for 7 days. Quantity:  7 Tab Refills:  0 CONTINUE these medications which have NOT CHANGED Dose & Instructions Dispensing Information Comments Morning Noon Evening Bedtime ALPRAZolam 0.5 mg tablet Commonly known as:  Cristina Gil Notes to Patient:  As needed and directed 1 bid to qid prn  Indications: ANXIETY, sleep Quantity:  120 Tab Refills:  5 DULoxetine 60 mg capsule Commonly known as:  CYMBALTA Dose:  60 mg Take 1 Cap by mouth daily. Quantity:  30 Cap Refills:  12  
     
  
   
   
   
  
 fluticasone-salmeterol 250-50 mcg/dose diskus inhaler Commonly known as:  ADVAIR DISKUS Dose:  1 Puff Take 1 Puff by inhalation every twelve (12) hours. Indications: INTRINSIC ASTHMA Quantity:  1 Inhaler Refills:  12  
     
  
   
   
  
   
  
 letrozole 2.5 mg tablet Commonly known as:  [facility] Dose:  2.5 mg Take 1 Tab by mouth daily. Quantity:  30 Tab Refills:  2  
     
  
   
   
   
  
 levothyroxine 75 mcg tablet Commonly known as:  SYNTHROID Dose:  75 mcg Take 1 Tab by mouth Daily (before breakfast). Indications: hypothyroidism Quantity:  30 Tab Refills:  12 VENTOLIN 90 mcg/actuation inhaler Generic drug:  albuterol Notes to Patient:  As needed and directed Dose:  2 Puff Take 2 Puffs by inhalation every six (6) hours as needed. Indications: bring on the dos. Refills:  0 STOP taking these medications   
 azithromycin 250 mg tablet Commonly known as:  ZITHROMAX  
   
  
 doxycycline 100 mg tablet Commonly known as:  ADOXA  
   
  
 palbociclib 125 mg Cap Commonly known as:  Lourena Iha your doctor about these medications Dose & Instructions Dispensing Information Comments Morning Noon Evening Bedtime  
 amoxicillin-clavulanate 500-125 mg per tablet Commonly known as:  AUGMENTIN  
   
 1 bid Quantity:  14 Tab Refills:  0 Where to Get Your Medications These medications were sent to [address] [place], [place] [address]e, [address] [facility] [address] Phone:  [phone]  
  levoFLOXacin 750 mg tablet Discharge Instructions DISCHARGE SUMMARY from Nurse The following personal items are in your possession at time of discharge: 
 
Dental Appliances: None Visual Aid: Glasses, With patient Home Medications: None Jewelry: Earrings, Deangelo Mateo, Watch Clothing: Shirt, Sweater, Pants, Undergarments, Footwear Other Valuables: William Bess, Sent home PATIENT INSTRUCTIONS: 
 
After general anesthesia or intravenous sedation, for 24 hours or while taking prescription Narcotics: · Limit your activities · Do not drive and operate hazardous machinery · Do not make important personal or business decisions · Do  not drink alcoholic beverages · If you have not urinated within 8 hours after discharge, please contact your surgeon on call. Report the following to your surgeon: 
· Excessive pain, swelling, redness or odor of or around the surgical area · Temperature over 100.5 · Nausea and vomiting lasting longer than 4 hours or if unable to take medications · Any signs of decreased circulation or nerve impairment to extremity: change in color, persistent  numbness, tingling, coldness or increase pain · Any questions What to do at Home: 
Recommended activity: Activity as tolerated, per MD instructions If you experience any of the following symptoms fever > 100.5, nausea, vomiting, pain, chest pain, shortness of breath please follow up with MD. 
 
 
*  Please give a list of your current medications to your Primary Care Provider. *  Please update this list whenever your medications are discontinued, doses are 
    changed, or new medications (including over-the-counter products) are added. *  Please carry medication information at all times in case of emergency situations. These are general instructions for a healthy lifestyle: No smoking/ No tobacco products/ Avoid exposure to second hand smoke Surgeon General's Warning:  Quitting smoking now greatly reduces serious risk to your health. Obesity, smoking, and sedentary lifestyle greatly increases your risk for illness A healthy diet, regular physical exercise & weight monitoring are important for maintaining a healthy lifestyle You may be retaining fluid if you have a history of heart failure or if you experience any of the following symptoms:  Weight gain of 3 pounds or more overnight or 5 pounds in a week, increased swelling in our hands or feet or shortness of breath while lying flat in bed. Please call your doctor as soon as you notice any of these symptoms; do not wait until your next office visit. Recognize signs and symptoms of STROKE: 
 
F-face looks uneven A-arms unable to move or move unevenly S-speech slurred or non-existent T-time-call 911 as soon as signs and symptoms begin-DO NOT go Back to bed or wait to see if you get better-TIME IS BRAIN. Warning Signs of HEART ATTACK Call 911 if you have these symptoms: 
? Chest discomfort. Most heart attacks involve discomfort in the center of the chest that lasts more than a few minutes, or that goes away and comes back. It can feel like uncomfortable pressure, squeezing, fullness, or pain. ? Discomfort in other areas of the upper body. Symptoms can include pain or discomfort in one or both arms, the back, neck, jaw, or stomach. ? Shortness of breath with or without chest discomfort. ? Other signs may include breaking out in a cold sweat, nausea, or lightheadedness. Don't wait more than five minutes to call [address]! Fast action can save your life. Calling 911 is almost always the fastest way to get lifesaving treatment. Emergency Medical Services staff can begin treatment when they arrive  up to an hour sooner than if someone gets to the hospital by car. The discharge information has been reviewed with the patient. The patient verbalized understanding. Discharge medications reviewed with the patient and appropriate educational materials and side effects teaching were provided. Sepsis: Care Instructions Your Care Instructions Sepsis is an infection that has spread throughout your body. It is a life-threatening condition and often causes extremely low blood pressure. This can lead to problems with many different organs. The cause of sepsis is not always clear, but it can happen as part of a long-term or sudden illness. Sometimes even a mild illness can lead to sepsis. Follow-up care is a key part of your treatment and safety. Be sure to make and go to all appointments, and call your doctor if you are having problems. Its also a good idea to know your test results and keep a list of the medicines you take. How can you care for yourself at home? · If your doctor prescribed antibiotics, take them as directed.  Do not stop taking them just because you feel better. You need to take the full course of antibiotics. · Drink plenty of fluids, enough so that your urine is light yellow or clear like water. Choose water or caffeine-free clear liquids until you feel better. If you have kidney, heart, or liver disease and have to limit fluids, talk with your doctor before you increase your fluid intake. You can try rehydration drinks, such as Gatorade or Powerade. · Do not drink alcohol. · Eat a healthy diet. Include fruits, vegetables, and whole grains in your diet every day. · Walking is an easy way to get exercise. Gradually increase the amount you walk every day. Make sure your doctor knows that you are starting an exercise program. 
· Do not smoke or use other tobacco products. If you need help quitting, talk to your doctor about stop-smoking programs and medicines. These can increase your chances of quitting for good. When should you call for help? Call 911 anytime you think you may need emergency care. For example, call if: 
· You passed out (lost consciousness). Call your doctor now or seek immediate medical care if: 
· You have a fever or chills. · You have cool, pale, or clammy skin. · You are dizzy or lightheaded, or you feel like you may faint. · You have any new symptoms, such as a cough, pain in one part of your body, or urinary problems. Watch closely for changes in your health, and be sure to contact your doctor if: 
· You do not get better as expected. Where can you learn more? Go to http://edyta-tremaine.info/. Enter X381 in the search box to learn more about \"Sepsis: Care Instructions. \" Current as of: May 27, 2016 Content Version: 11.2 © 3603-7686 [employer]. Care instructions adapted under license by [employer] (which disclaims liability or warranty for this information).  If you have questions about a medical condition or this instruction, always ask your healthcare professional. [place] [address] any warranty or liability for your use of this information. Discharge Orders None ACO Transitions of Care Introducing [employer] [address] Teodora Rincon offers a voluntary care coordination program to provide high quality service and care to [facility] fee-for-service beneficiaries. Margarita Tiwari was designed to help you enhance your health and well-being through the following services: ? Transitions of Care  support for individuals who are transitioning from one care setting to another (example: Hospital to home). ? Chronic and Complex Care Coordination  support for individuals and caregivers of those with serious or chronic illnesses or with more than one chronic (ongoing) condition and those who take a number of different medications. If you meet specific medical criteria, a [address] [place] [address] Rd may call you directly to coordinate your care with your primary care physician and your other care providers. For questions about the [facility] programs, please, contact your physicians office. For general questions or additional information about Accountable Care Organizations: 
Please visit www.medicare.gov/acos. html or call 1-800-MEDICARE (3-829.893.9661) TTY users should call 8-787.620.5708. [employer] Announcement We are excited to announce that we are making your provider's discharge notes available to you in [employer]. You will see these notes when they are completed and signed by the physician that discharged you from your recent hospital stay. If you have any questions or concerns about any information you see in [employer], please call the Health Information Department where you were seen or reach out to your Primary Care Provider for more information about your plan of care. Introducing [facility] HE[facility] SERVICES! Dear Deloise Schwab: 
Thank you for requesting a [employer] account. Our records indicate that you already have an active [employer] account. You can access your account anytime at [email or web address] Did you know that you can access your hospital and ER discharge instructions at any time in [employer]? You can also review all of your test results from your hospital stay or ER visit. Additional Information If you have questions, please visit the Frequently Asked Questions section of the [employer] website at [email or web address] Remember, [employer] is NOT to be used for urgent needs. For medical emergencies, dial 911. Now available from your iPhone and Android! General Information Please provide this summary of care documentation to your next provider. Patient Signature:  ____________________________________________________________ Date:  ____________________________________________________________  
  
Greg Cons Provider Signature:  ____________________________________________________________ Date:  ____________________________________________________________

## 2017-04-07 NOTE — H&P
Inpatient Hematology / Oncology History and Physical    Reason for Asmission:  breast cancer  pneumonia  SIRS    History of Present Illness:  Ms. Oscar Myers is a 74 y.o. female admitted on 04/07/17 with a primary diagnosis of SIRS (likely PNA). She has a history of metastatic breast cancer: high grade, %, ER 3%, HER2 negative. She completed 4 cycles of ddAC and 12 cycles of Taxol with excellent tolerance. Imaging showed partial response in the breast and complete response in the axillary and mediastinal lymph nodes. She was referred for mastectomy, which showed clear partial response with tumor only 1.5 cm in the breast, but axillary dissection still showed 10 of 12 lymph nodes involved with disease. She then was recommended to undergo chest wall and axillary radiotherapy which she completed. Most recently she started [place]. She was in last week for follow up regarding her first cycle of Ibrance and had increased fatigue, nausea, poor taste, cough, dyspnea, and orthostasis - she passed out 3 times when going from lying to standing. She had profound neutropenia and thrombocytopenia. We decided to hold Ibrance. for recovery. Chest xray was negative for pneumonia but she has continued to worsen over the past week despite being on azithromycin. She is hypotensive, tachycardic, and extremely dyspneic. CURB 65 score of 3 which suggests hospital admission. Review of Systems:  Constitutional + chills, decreased appetite, fatigue, weakness. Denies fever, weight loss, night sweats. HEENT Denies trauma, blurry vision, hearing loss, ear pain, nosebleeds, sore throat, neck pain and ear discharge. Skin Denies lesions or rashes. Lungs + cough, dyspnea, sputum production, wheezing. Denies hemoptysis. Cardiovascular Denies chest pain, palpitations, or lower extremity edema. Gastrointestinal Denies nausea, vomiting, changes in bowel habits, bloody or black stools, abdominal pain.  Denies dysuria, frequency or hesitancy of urination. Neuro + dizziness. Denies headaches, visual changes or ataxia. Denies tingling, tremors, sensory change, speech change, focal weakness or headaches. Hematology Denies easy bruising or bleeding, denies gingival bleeding or epistaxis. Endo Denies heat/cold intolerance, denies diabetes or thyroid abnormalities. MSK Denies back pain, arthralgias, myalgias or frequent falls. Psychiatric/Behavioral Denies depression and substance abuse. The patient is not nervous/anxious. Allergies   Allergen Reactions    Prednisone Unknown (comments)     \"psychological reaction\"     Past Medical History:   Diagnosis Date    Abnormal CT of the chest     Adverse effect of anesthesia     bp dropped with dual lung/gallbladder surgery    Asthma      no acute attacks recently.  uses advair daily    Breast cancer ([facility] [address] 2016    R breast, s/p mastectomy, chemo, reconstruction    Depression      Hypercholesteremia     Hypothyroidism      Past Surgical History:   Procedure Laterality Date    CHEST SURGERY PROCEDURE UNLISTED      R lung biopsy along with cholecystectomy    HX BLADDER REPAIR      HX BREAST BIOPSY Right 2/5/2016    SKIN BIOPSY RIGHT BREAST performed by Amada Osorio MD at [address] [place] [address] HX BREAST RECONSTRUCTION Bilateral 9/14/2016    BILATERAL BREAST RECONSTRUCTION performed by Avila Lin MD at [facility] MAIN OR    HX BREAST RECONSTRUCTION Bilateral 9/14/2016    BILATERAL BREAST TISSUE EXPANDER INSERTION performed by Avila Lin MD at [facility] MAIN OR    HX CHOLECYSTECTOMY      HX COLONOSCOPY      2006    HX CYSTOCELE REPAIR      HX HYSTERECTOMY      fibroids    HX MASTECTOMY      HX MASTECTOMY      HX RECTOCELE REPAIR      HX VASCULAR ACCESS      port left chest wall     Family History   Problem Relation Age of Onset    Hypertension Mother     Cancer Mother 79     Breast cancer at age 79    Diabetes Mother      Type 2    Other Father      alzheimers    Cancer Maternal Aunt      Breast cancer after age 72    Cancer Maternal Aunt      Breast cancer at age 39   [facility] Allergic Rhinitis Neg Hx     Allergy-severe Neg Hx     Amblyopia Neg Hx     Anemia Neg Hx     Anesth Problems Neg Hx     Angioedema Neg Hx     Anxiety Neg Hx     Arrhythmia Neg Hx     Arthritis-rheumatoid Neg Hx     Ataxia Neg Hx     Atopy Neg Hx     Bipolar Disorder Neg Hx     Blindness Neg Hx     Breast Cancer Neg Hx     Breast Problems Neg Hx     Broken Bones Neg Hx     Cataract Neg Hx     Celiac Disease Neg Hx     Childhood heart surgery Neg Hx     Childhood resp disease Neg Hx     Chorea Neg Hx     Clotting Disorder Neg Hx     Collagen Dis Neg Hx     Colon Cancer Neg Hx     Colon Polyps Neg Hx     COPD Neg Hx     Coronary Artery Disease Neg Hx     Crohn's Disease Neg Hx     Cystic Fibrosis Neg Hx     Delayed Awakening Neg Hx     Dementia Neg Hx     Depression Neg Hx     Dislocations Neg Hx     Downs Syndrome Neg Hx     Drug Abuse Neg Hx     Eclampsia Neg Hx     Eczema Neg Hx     Emergence Delirium Neg Hx     Emphysema Neg Hx     Fainting Neg Hx     Genitourinary () Neg Hx     Glaucoma Neg Hx     Heart Attack Neg Hx     Heart defect Neg Hx     Heart Failure Neg Hx     Heart Surgery Neg Hx     Hemachromatosis Neg Hx     Herpes Neg Hx     High Cholesterol Neg Hx     HIV/AIDS Neg Hx     Immunodeficiency Neg Hx     Infertility Neg Hx     Inflammatory Bowel Dz Neg Hx     Kidney Disease Neg Hx     Liver Disease Neg Hx     Macular Degen Neg Hx     Malignant Hyperthermia Neg Hx     MS Neg Hx     Neuropathy Neg Hx     NF Neg Hx     Osteoporosis Neg Hx     Ovarian Cancer Neg Hx     Pacemaker Neg Hx     Panic disorder Neg Hx     Parkinsonism Neg Hx     Post-op Cognitive Dysfunction Neg Hx     Post-op Nausea/Vomiting Neg Hx     Prematurity Neg Hx      Labor Neg Hx     Pseudocholinesterase Deficiency Neg Hx     Psoriasis Neg Hx     Psychotic Disorder Neg Hx     Rashes/Skin Problems Neg Hx     Retinal Detachment Neg Hx     Rh Incompatibility Neg Hx     Schizophrenia Neg Hx     Seizures Neg Hx     Severe Sprains Neg Hx     Sickle Cell Anemia Neg Hx     Sickle Cell Trait Neg Hx     SIDS Neg Hx     SLE Neg Hx     Spont Ab Neg Hx     Stomach Cancer Neg Hx     Strabismus Neg Hx     Substance Abuse Neg Hx     Sudden Death Neg Hx     Suicide Neg Hx     Thyroid Disease Neg Hx     Tuberculosis Neg Hx     Ulcerative Colitis Neg Hx     Urticaria Neg Hx     Lang's Disease Neg Hx      Social History     Social History    Marital status:      Spouse name: N/A    Number of children: N/A    Years of education: N/A     Occupational History    Not on file. Social History Main Topics    Smoking status: Never Smoker    Smokeless tobacco: Never Used    Alcohol use No    Drug use: No    Sexual activity: Not on file     Other Topics Concern    Not on file     Social History Narrative     Current Outpatient Prescriptions   Medication Sig Dispense Refill    azithromycin (ZITHROMAX) 250 mg tablet Take 1 Tab by mouth See Admin Instructions for 5 days. 6 Tab 0    letrozole (FEMARA) 2.5 mg tablet Take 1 Tab by mouth daily. 30 Tab 2    amoxicillin-clavulanate (AUGMENTIN) 500-125 mg per tablet 1 bid 14 Tab 0    palbociclib (IBRANCE) 125 mg cap Take 125 mg by mouth daily. Take daily for 3 weeks then take one week off. 21 Tab 5    doxycycline (ADOXA) 100 mg tablet Take 100 mg by mouth two (2) times a day.  ALPRAZolam (XANAX) 0.5 mg tablet 1 bid to qid prn  Indications: ANXIETY, sleep 120 Tab 5    fluticasone-salmeterol (ADVAIR DISKUS) 250-50 mcg/dose diskus inhaler Take 1 Puff by inhalation every twelve (12) hours. Indications: INTRINSIC ASTHMA 1 Inhaler 12    DULoxetine (CYMBALTA) 60 mg capsule Take 1 Cap by mouth daily.  30 Cap 12    levothyroxine (SYNTHROID) 75 mcg tablet Take 1 Tab by mouth Daily (before breakfast). Indications: hypothyroidism 30 Tab 12    albuterol (VENTOLIN) 90 mcg/Actuation inhaler Take 2 Puffs by inhalation every six (6) hours as needed. Indications: bring on the dos. Facility-Administered Medications Ordered in Other Encounters   Medication Dose Route Frequency Provider Last Rate Last Dose    central line flush (saline) syringe 10 mL  10 mL InterCATHeter Q8H Mary Alice Kelley MD   10 mL at 04/04/17 1618       OBJECTIVE:  BP sitting 93/55, P 103, R 36, T 97.5  BP standing 89/48, P 119        Physical Exam:  Constitutional: Well developed, well nourished female in mild respiratory distress, sitting comfortably on the exam table. HEENT: Normocephalic and atraumatic. Oropharynx is clear, mucous membranes are moist.  Extraocular muscles are intact. Sclerae anicteric. Neck supplet. Lymph node   No palpable submandibular, cervical, supraclavicular, axillary lymph nodes. Skin Warm and dry. No bruising and no rash noted. No erythema. + pallor. Respiratory Lungs with rales/wheezes bilaterally. Dyspnea with some use of  accessory muscle use. CVS Tachycardic rate, regular rhythm and normal S1 and S2. No murmurs, gallops, or rubs. Abdomen Soft, nontender and nondistended, normoactive bowel sounds. No palpable mass. No hepatosplenomegaly. Neuro Grossly nonfocal with no obvious sensory or motor deficits. MSK Normal range of motion in general.  No edema and no tenderness. Psych Fatiuged mood and affect.         Labs:    Recent Results (from the past 24 hour(s))   CBC WITH AUTOMATED DIFF    Collection Time: 04/07/17 12:51 PM   Result Value Ref Range    WBC 2.1 (L) 4.3 - 11.1 K/uL    RBC 3.27 (L) 4.05 - 5.25 M/uL    HGB 10.0 (L) 11.7 - 15.4 g/dL    HCT 29.4 (L) 35.8 - 46.3 %    MCV 89.9 79.6 - 97.8 FL    MCH 30.6 26.1 - 32.9 PG    MCHC 34.0 31.4 - 35.0 g/dL    RDW 16.7 (H) 11.9 - 14.6 %    PLATELET 49 (L) 428 - 450 K/uL    MPV 11.1 10.8 - 14.1 FL    ABSOLUTE NRBC 0.01 0.0 - 0.2 K/uL    DF AUTOMATED      NEUTROPHILS 38 (L) 43 - 78 %    LYMPHOCYTES 50 (H) 13 - 44 %    MONOCYTES 10 4.0 - 12.0 %    EOSINOPHILS 1 0.5 - 7.8 %    BASOPHILS 1 0.0 - 2.0 %    ABS. NEUTROPHILS 0.8 (L) 1.7 - 8.2 K/UL    ABS. LYMPHOCYTES 1.1 0.5 - 4.6 K/UL    ABS. MONOCYTES 0.2 0.1 - 1.3 K/UL    ABS. EOSINOPHILS 0.0 0.0 - 0.8 K/UL    ABS. BASOPHILS 0.0 0.0 - 0.2 K/UL   METABOLIC PANEL, COMPREHENSIVE    Collection Time: 04/07/17 12:51 PM   Result Value Ref Range    Sodium 136 136 - 145 mmol/L    Potassium 3.3 (L) 3.5 - 5.1 mmol/L    Chloride 100 98 - 107 mmol/L    CO2 26 23 - 32 mmol/L    Anion gap 10 7 - 16 mmol/L    Glucose 113 (H) 65 - 100 mg/dL    BUN 9 8 - 23 MG/DL    Creatinine 0.90 0.6 - 1.0 MG/DL    GFR est AA >60 >60 ml/min/1.73m2    GFR est non-AA >60 >60 ml/min/1.73m2    Calcium 8.5 8.3 - 10.4 MG/DL    Bilirubin, total 0.5 0.2 - 1.1 MG/DL    ALT (SGPT) 27 12 - 65 U/L    AST (SGOT) 36 15 - 37 U/L    Alk.  phosphatase 180 (H) 50 - 136 U/L    Protein, total 6.7 6.3 - 8.2 g/dL    Albumin 2.4 (L) 3.2 - 4.6 g/dL    Globulin 4.3 (H) 2.3 - 3.5 g/dL    A-G Ratio 0.6 (L) 1.2 - 3.5         Imaging:  pending    ASSESSMENT:  Problem List  Date Reviewed: 4/4/2017          Codes Class Noted    * (Principal)SIRS (systemic inflammatory response syndrome) ([facility] ICD-10-CM: R65.10  ICD-9-CM: 995.90  4/7/2017        Idiopathic hypotension ICD-10-CM: I95.0  ICD-9-CM: 458.9  4/7/2017        Tachycardia ICD-10-CM: R00.0  ICD-9-CM: 785.0  4/7/2017        Shortness of breath ICD-10-CM: R06.02  ICD-9-CM: 786.05  4/7/2017        Hypokalemia ICD-10-CM: E87.6  ICD-9-CM: 276.8  4/7/2017        Cough ICD-10-CM: R05  ICD-9-CM: 786.2  4/7/2017        Pancytopenia due to chemotherapy [facility]) ICD-10-CM: Q77.447  ICD-9-CM: 284.11  4/7/2017        Status post right mastectomy ICD-10-CM: Z90.11  ICD-9-CM: V45.71  9/14/2016        S/P breast reconstruction, bilateral ICD-10-CM: R58.324  ICD-9-CM: V43.82  9/14/2016 Mediastinal adenopathy ICD-10-CM: R59.0  ICD-9-CM: 785.6  2/18/2016        Malignant neoplasm of right female breast [facility]) ICD-10-CM: C50.911  ICD-9-CM: 174.9  1/25/2016        Acquired hypothyroidism ICD-10-CM: E03.9  ICD-9-CM: 244.9  11/4/2015        Asthma ICD-10-CM: J45.909  ICD-9-CM: 493.90  11/4/2015        Insomnia ICD-10-CM: G47.00  ICD-9-CM: 780.52  11/4/2015        Depression ICD-10-CM: F32.9  ICD-9-CM: 974  11/4/2015                PLAN:  - Metastatic Breast Cancer  * Hold Ibrance. Continue Femara. - SIRS (likely PNA)  * Procalcitonin, lactic acid, pan-culture, chest xray  * Zosyn and Vancomycin   * Duo-nebs every 4 hours for wheezing/cough     - Pancytopenia related to Chemotherapy  * Hold off on G-CSF. Counts should recover on it's own. - Hypokalemia  * Give 40 meq potassium chloride IV       Lab studies and imaging studies  were personally reviewed. Pertinent old records were reviewed.           Hardy Rincon NP   [facility] Hematology Oncology  [address] [place] [address]  Office : [phone]  Fax : [phone]

## 2017-04-08 LAB
BASOPHILS # BLD AUTO: 0 K/UL (ref 0–0.2)
BASOPHILS # BLD: 1 % (ref 0–2)
DIFFERENTIAL METHOD BLD: ABNORMAL
EOSINOPHIL # BLD: 0 K/UL (ref 0–0.8)
EOSINOPHIL NFR BLD: 1 % (ref 0.5–7.8)
ERYTHROCYTE [DISTWIDTH] IN BLOOD BY AUTOMATED COUNT: 17.1 % (ref 11.9–14.6)
HCT VFR BLD AUTO: 24.3 % (ref 35.8–46.3)
HGB BLD-MCNC: 8.3 G/DL (ref 11.7–15.4)
IMM GRANULOCYTES # BLD: 0 K/UL (ref 0–0.5)
IMM GRANULOCYTES NFR BLD AUTO: 0.6 % (ref 0–5)
LACTATE SERPL-SCNC: 1.4 MMOL/L (ref 0.4–2)
LYMPHOCYTES # BLD AUTO: 31 % (ref 13–44)
LYMPHOCYTES # BLD: 0.5 K/UL (ref 0.5–4.6)
MCH RBC QN AUTO: 30.7 PG (ref 26.1–32.9)
MCHC RBC AUTO-ENTMCNC: 34.2 G/DL (ref 31.4–35)
MCV RBC AUTO: 90 FL (ref 79.6–97.8)
MONOCYTES # BLD: 0.3 K/UL (ref 0.1–1.3)
MONOCYTES NFR BLD AUTO: 15 % (ref 4–12)
NEUTS SEG # BLD: 0.9 K/UL (ref 1.7–8.2)
NEUTS SEG NFR BLD AUTO: 51 % (ref 43–78)
PLATELET # BLD AUTO: 40 K/UL (ref 150–450)
PMV BLD AUTO: 10.3 FL (ref 10.8–14.1)
POTASSIUM SERPL-SCNC: 3.9 MMOL/L (ref 3.5–5.1)
RBC # BLD AUTO: 2.7 M/UL (ref 4.05–5.25)
WBC # BLD AUTO: 1.7 K/UL (ref 4.3–11.1)

## 2017-04-08 PROCEDURE — 94760 N-INVAS EAR/PLS OXIMETRY 1: CPT

## 2017-04-08 PROCEDURE — 65270000029 HC RM PRIVATE

## 2017-04-08 PROCEDURE — 94640 AIRWAY INHALATION TREATMENT: CPT

## 2017-04-08 PROCEDURE — 84132 ASSAY OF SERUM POTASSIUM: CPT | Performed by: NURSE PRACTITIONER

## 2017-04-08 PROCEDURE — 74011250636 HC RX REV CODE- 250/636: Performed by: NURSE PRACTITIONER

## 2017-04-08 PROCEDURE — 74011250637 HC RX REV CODE- 250/637: Performed by: NURSE PRACTITIONER

## 2017-04-08 PROCEDURE — 85025 COMPLETE CBC W/AUTO DIFF WBC: CPT | Performed by: NURSE PRACTITIONER

## 2017-04-08 PROCEDURE — 83605 ASSAY OF LACTIC ACID: CPT | Performed by: NURSE PRACTITIONER

## 2017-04-08 PROCEDURE — 74011000258 HC RX REV CODE- 258: Performed by: INTERNAL MEDICINE

## 2017-04-08 PROCEDURE — 74011250636 HC RX REV CODE- 250/636: Performed by: INTERNAL MEDICINE

## 2017-04-08 PROCEDURE — 74011000250 HC RX REV CODE- 250: Performed by: NURSE PRACTITIONER

## 2017-04-08 RX ADMIN — IPRATROPIUM BROMIDE AND ALBUTEROL SULFATE 3 ML: 2.5; .5 SOLUTION RESPIRATORY (INHALATION) at 04:28

## 2017-04-08 RX ADMIN — DULOXETINE HYDROCHLORIDE 60 MG: 30 CAPSULE, DELAYED RELEASE ORAL at 08:00

## 2017-04-08 RX ADMIN — VANCOMYCIN HYDROCHLORIDE 1250 MG: 10 INJECTION, POWDER, LYOPHILIZED, FOR SOLUTION INTRAVENOUS at 16:05

## 2017-04-08 RX ADMIN — BUDESONIDE 500 MCG: 0.5 INHALANT RESPIRATORY (INHALATION) at 21:00

## 2017-04-08 RX ADMIN — BUDESONIDE 500 MCG: 0.5 INHALANT RESPIRATORY (INHALATION) at 08:34

## 2017-04-08 RX ADMIN — IPRATROPIUM BROMIDE AND ALBUTEROL SULFATE 3 ML: 2.5; .5 SOLUTION RESPIRATORY (INHALATION) at 08:34

## 2017-04-08 RX ADMIN — PIPERACILLIN SODIUM,TAZOBACTAM SODIUM 4.5 G: 4; .5 INJECTION, POWDER, FOR SOLUTION INTRAVENOUS at 23:36

## 2017-04-08 RX ADMIN — LEVOTHYROXINE SODIUM 75 MCG: 75 TABLET ORAL at 05:34

## 2017-04-08 RX ADMIN — PIPERACILLIN SODIUM,TAZOBACTAM SODIUM 4.5 G: 4; .5 INJECTION, POWDER, FOR SOLUTION INTRAVENOUS at 16:03

## 2017-04-08 RX ADMIN — PIPERACILLIN SODIUM,TAZOBACTAM SODIUM 4.5 G: 4; .5 INJECTION, POWDER, FOR SOLUTION INTRAVENOUS at 08:01

## 2017-04-08 RX ADMIN — SODIUM CHLORIDE 75 ML/HR: 900 INJECTION, SOLUTION INTRAVENOUS at 13:49

## 2017-04-08 RX ADMIN — LETROZOLE 2.5 MG: 2.5 TABLET, FILM COATED ORAL at 10:13

## 2017-04-08 RX ADMIN — IPRATROPIUM BROMIDE AND ALBUTEROL SULFATE 3 ML: 2.5; .5 SOLUTION RESPIRATORY (INHALATION) at 21:00

## 2017-04-08 NOTE — PROGRESS NOTES
Inpatient Hematology / Oncology Progress Note      Admission Date: 2017  2:55 PM  Reason for Admission/Hospital Course: breast cancer  pneumonia  SIRS  SIRS (systemic inflammatory response syndrome) (HCC)      24 Hour Events:  SOB improving  Afebrile  VS stable  On nebs  Still wheezing      ROS:  Constitutional: egative for fever, chills, +weakness, +malaise, +fatigue. CV: negative for chest pain, palpitations, edema. Respiratory: Positive for dyspnea, cough, wheezing. GI: Negative for nausea, abdominal pain, diarrhea. 10 point review of systems is otherwise negative with the exception of the elements mentioned above in the HPI. Allergies   Allergen Reactions    Prednisone Unknown (comments)     \"psychological reaction\"       OBJECTIVE:  Patient Vitals for the past 8 hrs:   BP Temp Pulse Resp SpO2 Weight   17 0835 - - - - 95 % -   17 0527 - - - - - 126 lb (57.2 kg)   17 0429 - - - - 92 % -   17 0336 111/51 99.2 °F (37.3 °C) (!) 108 18 92 % -     Temp (24hrs), Av.3 °F (36.8 °C), Min:97.5 °F (36.4 °C), Max:99.2 °F (37.3 °C)         Physical Exam:  Constitutional: Well developed, well nourished female in no acute distress, sitting comfortably in the hospital bed. HEENT: Normocephalic and atraumatic. Oropharynx is clear, mucous membranes are moist.. Extraocular muscles are intact. Sclerae anicteric. Neck supple. Lymph node   No palpable submandibular, cervical, supraclavicular, axillary or inguinal lymph nodes. Skin Warm and dry. No bruising and no rash noted. No erythema. No pallor. Respiratory Lungs with rales/wheezes bilaterally. Dyspnea with some use of  accessory muscle use. CVS Normal rate, regular rhythm and normal S1 and S2. No murmurs, gallops, or rubs. Abdomen Soft, nontender and nondistended, normoactive bowel sounds. No palpable mass. No hepatosplenomegaly. Neuro Grossly nonfocal with no obvious sensory or motor deficits.    MSK Normal range of motion in general.  No edema and no tenderness. Psych Appropriate mood and affect.         Labs:    Recent Labs      04/08/17   0340  04/07/17   1251   WBC  1.7*  2.1*   RBC  2.70*  3.27*   HGB  8.3*  10.0*   HCT  24.3*  29.4*   MCV  90.0  89.9   MCH  30.7  30.6   MCHC  34.2  34.0   RDW  17.1*  16.7*   PLT  40*  49*   GRANS  51  38*   LYMPH  31  50*   MONOS  15*  10   EOS  1  1   BASOS  1  1   IG  0.6   --    DF  AUTOMATED  AUTOMATED   ANEU  0.9*  0.8*   ABL  0.5  1.1   ABM  0.3  0.2   EMI  0.0  0.0   ABB  0.0  0.0   AIG  0.0   --       Recent Labs      04/08/17   0340  04/07/17   1251   NA   --   136   K  3.9  3.3*   CL   --   100   CO2   --   26   AGAP   --   10   GLU   --   113*   BUN   --   9   CREA   --   0.90   GFRAA   --   >60   GFRNA   --   >60   CA   --   8.5   SGOT   --   36   AP   --   180*   TP   --   6.7   ALB   --   2.4*   GLOB   --   4.3*   AGRAT   --   0.6*         Imaging:      ASSESSMENT:    Problem List  Date Reviewed: 4/4/2017          Codes Class Noted    * (Principal)SIRS (systemic inflammatory response syndrome) (HCC) ICD-10-CM: R65.10  ICD-9-CM: 995.90  4/7/2017        Idiopathic hypotension ICD-10-CM: I95.0  ICD-9-CM: 458.9  4/7/2017        Tachycardia ICD-10-CM: R00.0  ICD-9-CM: 785.0  4/7/2017        Shortness of breath ICD-10-CM: R06.02  ICD-9-CM: 786.05  4/7/2017        Hypokalemia ICD-10-CM: E87.6  ICD-9-CM: 276.8  4/7/2017        Cough ICD-10-CM: R05  ICD-9-CM: 786.2  4/7/2017        Pancytopenia due to chemotherapy [facility]) ICD-10-CM: Z72.820  ICD-9-CM: 284.11  4/7/2017        Status post right mastectomy ICD-10-CM: Z90.11  ICD-9-CM: V45.71  9/14/2016        S/P breast reconstruction, bilateral ICD-10-CM: Z98.890  ICD-9-CM: V43.82  9/14/2016        Mediastinal adenopathy ICD-10-CM: R59.0  ICD-9-CM: 785.6  2/18/2016        Malignant neoplasm of right female breast [facility]) ICD-10-CM: C50.911  ICD-9-CM: 174.9  1/25/2016        Acquired hypothyroidism ICD-10-CM: E03.9  ICD-9-CM: 244.9  11/4/2015        Asthma ICD-10-CM: J45.909  ICD-9-CM: 493.90  11/4/2015        Insomnia ICD-10-CM: G47.00  ICD-9-CM: 780.52  11/4/2015        Depression ICD-10-CM: F32.9  ICD-9-CM: 347  11/4/2015                PLAN:    - Metastatic Breast Cancer  * Hold Ibrance. Continue Femara.      - SIRS (likely PNA)  * Procalcitonin, lactic acid, pan-culture, chest xray  * Zosyn and Vancomycin   * Duo-nebs every 4 hours for wheezing/cough   4/8 SOB improving     - Pancytopenia related to Chemotherapy  * Hold off on G-CSF. Counts should recover on it's own. 4/8 ANC 0.9     - Hypokalemia  * Give 40 meq potassium chloride IV   4/8 K+ 3.9              Jaylin Hernandez NP   [address]  Office : [phone]  Fax : [phone]       Attending Addendum:  I personally evaluated the patient with Jaylin Pipes N.P.,  and agree with the assessment, findings and plan as documented. Appears stable, heart regular without murmur, lungs clear, abdomen benign. Clinically feels much improved. Continue current care as outlined above. ANC improving.                María Lea MD  [address]  Office : [phone]  Fax : [phone]

## 2017-04-08 NOTE — PROGRESS NOTES
Pt. Alert and oriented x4. Able to make needs known. Family present. No unresolved concerns at this time.

## 2017-04-09 LAB
BACTERIA SPEC CULT: NORMAL
BASOPHILS # BLD AUTO: 0 K/UL (ref 0–0.2)
BASOPHILS # BLD: 1 % (ref 0–2)
DIFFERENTIAL METHOD BLD: ABNORMAL
EOSINOPHIL # BLD: 0 K/UL (ref 0–0.8)
EOSINOPHIL NFR BLD: 1 % (ref 0.5–7.8)
ERYTHROCYTE [DISTWIDTH] IN BLOOD BY AUTOMATED COUNT: 17.6 % (ref 11.9–14.6)
HCT VFR BLD AUTO: 24.2 % (ref 35.8–46.3)
HGB BLD-MCNC: 8 G/DL (ref 11.7–15.4)
IMM GRANULOCYTES # BLD: 0 K/UL (ref 0–0.5)
IMM GRANULOCYTES NFR BLD AUTO: 0 % (ref 0–5)
LYMPHOCYTES # BLD AUTO: 26 % (ref 13–44)
LYMPHOCYTES # BLD: 0.4 K/UL (ref 0.5–4.6)
MCH RBC QN AUTO: 30.2 PG (ref 26.1–32.9)
MCHC RBC AUTO-ENTMCNC: 33.1 G/DL (ref 31.4–35)
MCV RBC AUTO: 91.3 FL (ref 79.6–97.8)
MONOCYTES # BLD: 0.4 K/UL (ref 0.1–1.3)
MONOCYTES NFR BLD AUTO: 22 % (ref 4–12)
NEUTS SEG # BLD: 0.9 K/UL (ref 1.7–8.2)
NEUTS SEG NFR BLD AUTO: 50 % (ref 43–78)
PLATELET # BLD AUTO: 52 K/UL (ref 150–450)
PMV BLD AUTO: 11.1 FL (ref 10.8–14.1)
RBC # BLD AUTO: 2.65 M/UL (ref 4.05–5.25)
SERVICE CMNT-IMP: NORMAL
WBC # BLD AUTO: 1.7 K/UL (ref 4.3–11.1)

## 2017-04-09 PROCEDURE — 74011000250 HC RX REV CODE- 250: Performed by: NURSE PRACTITIONER

## 2017-04-09 PROCEDURE — 74011250637 HC RX REV CODE- 250/637: Performed by: NURSE PRACTITIONER

## 2017-04-09 PROCEDURE — 74011000250 HC RX REV CODE- 250: Performed by: INTERNAL MEDICINE

## 2017-04-09 PROCEDURE — 94760 N-INVAS EAR/PLS OXIMETRY 1: CPT

## 2017-04-09 PROCEDURE — 74011000258 HC RX REV CODE- 258: Performed by: INTERNAL MEDICINE

## 2017-04-09 PROCEDURE — 74011250636 HC RX REV CODE- 250/636: Performed by: NURSE PRACTITIONER

## 2017-04-09 PROCEDURE — 85025 COMPLETE CBC W/AUTO DIFF WBC: CPT | Performed by: NURSE PRACTITIONER

## 2017-04-09 PROCEDURE — 94640 AIRWAY INHALATION TREATMENT: CPT

## 2017-04-09 PROCEDURE — 65270000029 HC RM PRIVATE

## 2017-04-09 PROCEDURE — 74011250636 HC RX REV CODE- 250/636: Performed by: INTERNAL MEDICINE

## 2017-04-09 RX ORDER — IPRATROPIUM BROMIDE AND ALBUTEROL SULFATE 2.5; .5 MG/3ML; MG/3ML
3 SOLUTION RESPIRATORY (INHALATION)
Status: DISCONTINUED | OUTPATIENT
Start: 2017-04-09 | End: 2017-04-11 | Stop reason: HOSPADM

## 2017-04-09 RX ORDER — GUAIFENESIN 600 MG/1
1200 TABLET, EXTENDED RELEASE ORAL EVERY 12 HOURS
Status: DISCONTINUED | OUTPATIENT
Start: 2017-04-09 | End: 2017-04-11 | Stop reason: HOSPADM

## 2017-04-09 RX ADMIN — DULOXETINE HYDROCHLORIDE 60 MG: 30 CAPSULE, DELAYED RELEASE ORAL at 09:16

## 2017-04-09 RX ADMIN — LETROZOLE 2.5 MG: 2.5 TABLET, FILM COATED ORAL at 09:32

## 2017-04-09 RX ADMIN — BUDESONIDE 500 MCG: 0.5 INHALANT RESPIRATORY (INHALATION) at 19:51

## 2017-04-09 RX ADMIN — GUAIFENESIN 1200 MG: 600 TABLET, EXTENDED RELEASE ORAL at 13:01

## 2017-04-09 RX ADMIN — SODIUM CHLORIDE 75 ML/HR: 900 INJECTION, SOLUTION INTRAVENOUS at 10:34

## 2017-04-09 RX ADMIN — PIPERACILLIN SODIUM,TAZOBACTAM SODIUM 4.5 G: 4; .5 INJECTION, POWDER, FOR SOLUTION INTRAVENOUS at 07:45

## 2017-04-09 RX ADMIN — VANCOMYCIN HYDROCHLORIDE 1250 MG: 10 INJECTION, POWDER, LYOPHILIZED, FOR SOLUTION INTRAVENOUS at 15:59

## 2017-04-09 RX ADMIN — IPRATROPIUM BROMIDE AND ALBUTEROL SULFATE 3 ML: 2.5; .5 SOLUTION RESPIRATORY (INHALATION) at 09:12

## 2017-04-09 RX ADMIN — BUDESONIDE 500 MCG: 0.5 INHALANT RESPIRATORY (INHALATION) at 09:13

## 2017-04-09 RX ADMIN — IPRATROPIUM BROMIDE AND ALBUTEROL SULFATE 3 ML: 2.5; .5 SOLUTION RESPIRATORY (INHALATION) at 19:51

## 2017-04-09 RX ADMIN — PIPERACILLIN SODIUM,TAZOBACTAM SODIUM 4.5 G: 4; .5 INJECTION, POWDER, FOR SOLUTION INTRAVENOUS at 15:59

## 2017-04-09 RX ADMIN — IPRATROPIUM BROMIDE AND ALBUTEROL SULFATE 3 ML: 2.5; .5 SOLUTION RESPIRATORY (INHALATION) at 01:57

## 2017-04-09 RX ADMIN — GUAIFENESIN 1200 MG: 600 TABLET, EXTENDED RELEASE ORAL at 20:51

## 2017-04-09 RX ADMIN — SODIUM CHLORIDE 75 ML/HR: 900 INJECTION, SOLUTION INTRAVENOUS at 22:01

## 2017-04-09 RX ADMIN — LEVOTHYROXINE SODIUM 75 MCG: 75 TABLET ORAL at 06:37

## 2017-04-09 NOTE — PROGRESS NOTES
Problem: Nutrition Deficit  Goal: *Optimize nutritional status  Nutrition  Reason for assessment: Referral received from nursing admission Malnutrition Screening Tool for recently lost 14-23# without trying and eating poorly due to decreased appetite. Assessment:   Diet order(s): 4-7:Regular, 4-8:Ensure Enlive tid  Food/Nutrition Patient History:  Pt seen in company of . Pt reprtos she weighed 141 prior to breast cancer diagnosis and treatment. She started had chemo March-June 2016, then XRT in June, then surgery in September and lost about 10# during those treatments. She started on Ibrance in February and was on it for 21 days and has been off it for almost 2 weeks now. She had nausea and decreased appetite with this treatment and lost another 7#, down to 124#. She was eating 25-50% of her usual intake during that time. Since admission she says her appetite has returned and she has gained 3#. She received her first bottle of Ensure Enlive at lunch today but di not open it as she does not feel the need for a nutrtional supplement now that her appetite has returned. She says she has been eating between 1/3 and 100% of the meals since admission. Anthropometrics: height 5'2\",  Weight: 57.7 kg (127 lb 3.2 oz), Weight Source: Standing scale (comment), Body mass index is 23.27 kg/(m^2). BMI class of normal weight.  Weight hx per EMR ( Based on connect care functionality, RD cannot know if these weight are actual versus stated):    WT / BMI 4/4/2017 2/14/2017 2/9/2017 1/18/2017 1/11/2017   WEIGHT 124 lb 131 lb 132 lb 9.6 oz 134 lb 139 lb       WT / BMI 12/19/2016 11/3/2016 10/20/2016 10/17/2016 9/22/2016   WEIGHT 137 lb 138 lb 136 lb 8 oz 138 lb 135 lb       WT / BMI 9/14/2016 9/7/2016 8/4/2016 7/29/2016 7/28/2016   WEIGHT 135 lb 7 oz 135 lb 7 oz 133 lb 133 lb 132 lb       WT / BMI 7/12/2016 7/12/2016 7/5/2016 6/28/2016 6/21/2016   WEIGHT 131 lb 131 lb 133 lb 11.2 oz 134 lb 8 oz 130 lb       WT / BMI 6/14/2016 6/7/2016 5/31/2016 5/24/2016 5/17/2016   WEIGHT 138 lb 138 lb 12.8 oz 137 lb 1.6 oz 136 lb 138 lb 9.6 oz       WT / BMI 5/10/2016 5/10/2016 5/4/2016 5/3/2016 5/3/2016   WEIGHT 138 lb 138 lb 137 lb 9.6 oz 137 lb 137 lb       WT / BMI 4/26/2016 4/12/2016 3/29/2016 3/15/2016   WEIGHT 138 lb 138 lb 14.4 oz 139 lb 137 lb 12.8 oz       WT / BMI 3/15/2016 3/8/2016 3/1/2016 2/18/2016   WEIGHT 137 lb 12.8 oz 136 lb 11.2 oz 137 lb 12.8 oz 136 lb   9 % change in wt within 1 year and 3.1% change within ~ 1 month both c/w clinically insignicant change. Macronutrient needs:  EER:  2901-9807 kcal /day (25-30 kcal/kg ABW)  EPR:  60-75 grams protein/day (1.2-1.5 grams/kg IBW)  Intake/Comparative Standards: Average intake for past 2 day(s)/2 recorded meal(s): 38%. This potentially meets ~59% of kcal and ~60% of protein needs. Based on pt sated intake of average of ~65% of meals, this meets ~100% of kcal and 100% of protein needs     Nutrition Diagnosis: No diagnosis at this time     Intervention:  Meals and snacks: Continue current diet. Nutrition Supplement Therapy: Discontinue Ensure Enlive.       Sawyer Lopes, [address]

## 2017-04-09 NOTE — PROGRESS NOTES
END OF SHIFT NOTE:    Intake/Output  04/09 0701 - 04/09 1900  In: 3162 [I.V.:1441]  Out: 1600 [Urine:1600]   Voiding: YES  Catheter: NO  Drain:   Shant-Plummer Drain 09/14/16 Right; Inner Breast (Active)       Shant-Plummer Drain 09/14/16 Right; Outer Breast (Active)               Stool:  0 occurrences. Stool Assessment  Stool Color: Messi Matsu (04/07/17 1911)  Stool Appearance: Formed (04/07/17 1911)  Stool Amount: Small (04/07/17 1911)  Stool Source/Status: Rectum (04/07/17 1911)    Emesis:  0 occurrences. VITAL SIGNS  Patient Vitals for the past 12 hrs:   Temp Pulse Resp BP SpO2   04/09/17 1535 98.1 °F (36.7 °C) (!) 105 18 134/79 95 %   04/09/17 1137 98.1 °F (36.7 °C) 98 18 108/48 92 %   04/09/17 0913 - - - - 96 %   04/09/17 0818 98.6 °F (37 °C) 93 18 119/55 93 %       Pain Assessment  Pain 1  Pain Scale 1: Numeric (0 - 10) (04/09/17 0730)  Pain Intensity 1: 0 (04/09/17 0730)  Patient Stated Pain Goal: 0 (04/08/17 0730)    Ambulating  Yes    Additional Information:     Shift report will be given to oncoming nurse at the bedside.     Jenn Espinosa RN

## 2017-04-09 NOTE — PROGRESS NOTES
Pt decided to take nebulizer treatment at this time. Pt states she no longer going to bring her home inhalers and will take hospital breathing treatments instead.

## 2017-04-10 LAB
ALBUMIN SERPL BCP-MCNC: 2 G/DL (ref 3.2–4.6)
ALBUMIN/GLOB SERPL: 0.5 {RATIO} (ref 1.2–3.5)
ALP SERPL-CCNC: 166 U/L (ref 50–136)
ALT SERPL-CCNC: 37 U/L (ref 12–65)
ANION GAP BLD CALC-SCNC: 9 MMOL/L (ref 7–16)
AST SERPL W P-5'-P-CCNC: 54 U/L (ref 15–37)
BASOPHILS # BLD AUTO: 0 K/UL (ref 0–0.2)
BASOPHILS # BLD: 2 % (ref 0–2)
BILIRUB SERPL-MCNC: 0.5 MG/DL (ref 0.2–1.1)
BUN SERPL-MCNC: 5 MG/DL (ref 8–23)
CALCIUM SERPL-MCNC: 8.1 MG/DL (ref 8.3–10.4)
CHLORIDE SERPL-SCNC: 106 MMOL/L (ref 98–107)
CO2 SERPL-SCNC: 27 MMOL/L (ref 21–32)
CREAT SERPL-MCNC: 0.59 MG/DL (ref 0.6–1)
DIFFERENTIAL METHOD BLD: ABNORMAL
EOSINOPHIL # BLD: 0 K/UL (ref 0–0.8)
EOSINOPHIL NFR BLD: 1 % (ref 0.5–7.8)
ERYTHROCYTE [DISTWIDTH] IN BLOOD BY AUTOMATED COUNT: 18.1 % (ref 11.9–14.6)
GLOBULIN SER CALC-MCNC: 3.8 G/DL (ref 2.3–3.5)
GLUCOSE SERPL-MCNC: 87 MG/DL (ref 65–100)
HCT VFR BLD AUTO: 25.2 % (ref 35.8–46.3)
HGB BLD-MCNC: 8.3 G/DL (ref 11.7–15.4)
IMM GRANULOCYTES # BLD: 0 K/UL (ref 0–0.5)
IMM GRANULOCYTES NFR BLD AUTO: 0.5 % (ref 0–5)
LYMPHOCYTES # BLD AUTO: 25 % (ref 13–44)
LYMPHOCYTES # BLD: 0.5 K/UL (ref 0.5–4.6)
MCH RBC QN AUTO: 30.1 PG (ref 26.1–32.9)
MCHC RBC AUTO-ENTMCNC: 32.9 G/DL (ref 31.4–35)
MCV RBC AUTO: 91.3 FL (ref 79.6–97.8)
MONOCYTES # BLD: 0.4 K/UL (ref 0.1–1.3)
MONOCYTES NFR BLD AUTO: 20 % (ref 4–12)
NEUTS SEG # BLD: 1 K/UL (ref 1.7–8.2)
NEUTS SEG NFR BLD AUTO: 52 % (ref 43–78)
PLATELET # BLD AUTO: 57 K/UL (ref 150–450)
PMV BLD AUTO: 10.4 FL (ref 10.8–14.1)
POTASSIUM SERPL-SCNC: 3.8 MMOL/L (ref 3.5–5.1)
PROT SERPL-MCNC: 5.8 G/DL (ref 6.3–8.2)
RBC # BLD AUTO: 2.76 M/UL (ref 4.05–5.25)
SODIUM SERPL-SCNC: 142 MMOL/L (ref 136–145)
VANCOMYCIN TROUGH SERPL-MCNC: 6.1 UG/ML (ref 5–20)
WBC # BLD AUTO: 2.1 K/UL (ref 4.3–11.1)

## 2017-04-10 PROCEDURE — 74011000250 HC RX REV CODE- 250: Performed by: NURSE PRACTITIONER

## 2017-04-10 PROCEDURE — 74011250637 HC RX REV CODE- 250/637: Performed by: NURSE PRACTITIONER

## 2017-04-10 PROCEDURE — 36591 DRAW BLOOD OFF VENOUS DEVICE: CPT

## 2017-04-10 PROCEDURE — 74011250636 HC RX REV CODE- 250/636: Performed by: INTERNAL MEDICINE

## 2017-04-10 PROCEDURE — 94640 AIRWAY INHALATION TREATMENT: CPT

## 2017-04-10 PROCEDURE — 85025 COMPLETE CBC W/AUTO DIFF WBC: CPT | Performed by: NURSE PRACTITIONER

## 2017-04-10 PROCEDURE — 65270000029 HC RM PRIVATE

## 2017-04-10 PROCEDURE — 80053 COMPREHEN METABOLIC PANEL: CPT | Performed by: NURSE PRACTITIONER

## 2017-04-10 PROCEDURE — 74011000250 HC RX REV CODE- 250: Performed by: INTERNAL MEDICINE

## 2017-04-10 PROCEDURE — 77010033678 HC OXYGEN DAILY

## 2017-04-10 PROCEDURE — 94760 N-INVAS EAR/PLS OXIMETRY 1: CPT

## 2017-04-10 PROCEDURE — 74011250636 HC RX REV CODE- 250/636: Performed by: NURSE PRACTITIONER

## 2017-04-10 PROCEDURE — 80202 ASSAY OF VANCOMYCIN: CPT | Performed by: INTERNAL MEDICINE

## 2017-04-10 PROCEDURE — 74011000258 HC RX REV CODE- 258: Performed by: INTERNAL MEDICINE

## 2017-04-10 RX ADMIN — GUAIFENESIN 1200 MG: 600 TABLET, EXTENDED RELEASE ORAL at 09:19

## 2017-04-10 RX ADMIN — PIPERACILLIN SODIUM,TAZOBACTAM SODIUM 4.5 G: 4; .5 INJECTION, POWDER, FOR SOLUTION INTRAVENOUS at 23:35

## 2017-04-10 RX ADMIN — IPRATROPIUM BROMIDE AND ALBUTEROL SULFATE 3 ML: 2.5; .5 SOLUTION RESPIRATORY (INHALATION) at 07:09

## 2017-04-10 RX ADMIN — BUDESONIDE 500 MCG: 0.5 INHALANT RESPIRATORY (INHALATION) at 21:06

## 2017-04-10 RX ADMIN — PIPERACILLIN SODIUM,TAZOBACTAM SODIUM 4.5 G: 4; .5 INJECTION, POWDER, FOR SOLUTION INTRAVENOUS at 00:27

## 2017-04-10 RX ADMIN — SODIUM CHLORIDE 75 ML/HR: 900 INJECTION, SOLUTION INTRAVENOUS at 11:59

## 2017-04-10 RX ADMIN — DULOXETINE HYDROCHLORIDE 60 MG: 30 CAPSULE, DELAYED RELEASE ORAL at 09:19

## 2017-04-10 RX ADMIN — SODIUM CHLORIDE 75 ML/HR: 900 INJECTION, SOLUTION INTRAVENOUS at 20:18

## 2017-04-10 RX ADMIN — IPRATROPIUM BROMIDE AND ALBUTEROL SULFATE 3 ML: 2.5; .5 SOLUTION RESPIRATORY (INHALATION) at 21:06

## 2017-04-10 RX ADMIN — VANCOMYCIN HYDROCHLORIDE 1250 MG: 10 INJECTION, POWDER, LYOPHILIZED, FOR SOLUTION INTRAVENOUS at 15:35

## 2017-04-10 RX ADMIN — BUDESONIDE 500 MCG: 0.5 INHALANT RESPIRATORY (INHALATION) at 07:09

## 2017-04-10 RX ADMIN — LETROZOLE 2.5 MG: 2.5 TABLET, FILM COATED ORAL at 08:31

## 2017-04-10 RX ADMIN — GUAIFENESIN 1200 MG: 600 TABLET, EXTENDED RELEASE ORAL at 21:55

## 2017-04-10 RX ADMIN — PIPERACILLIN SODIUM,TAZOBACTAM SODIUM 4.5 G: 4; .5 INJECTION, POWDER, FOR SOLUTION INTRAVENOUS at 07:37

## 2017-04-10 RX ADMIN — PIPERACILLIN SODIUM,TAZOBACTAM SODIUM 4.5 G: 4; .5 INJECTION, POWDER, FOR SOLUTION INTRAVENOUS at 15:34

## 2017-04-10 RX ADMIN — LEVOTHYROXINE SODIUM 75 MCG: 75 TABLET ORAL at 05:25

## 2017-04-10 NOTE — ACP (ADVANCE CARE PLANNING)
Presented paperwork to patient and . Jesse Peer she is going home tomorrow. Will review information. Contact information provided.   Signed by chaplain Jelani

## 2017-04-10 NOTE — PROGRESS NOTES
END OF SHIFT NOTE:    Intake/Output  04/10 0701 - 04/10 1900  In: 8010 [P.O.:120; I.V.:1366]  Out: 1300 [Urine:1300]   Voiding: YES  Catheter: NO  Drain:   Shant-Plummer Drain 09/14/16 Right; Inner Breast (Active)       Shant-Plummer Drain 09/14/16 Right; Outer Breast (Active)               Stool:  0 occurrences. Stool Assessment  Stool Color: Veronia Stowell (04/07/17 1911)  Stool Appearance: Formed (04/07/17 1911)  Stool Amount: Small (04/07/17 1911)  Stool Source/Status: Rectum (04/07/17 1911)    Emesis:  0 occurrences. VITAL SIGNS  Patient Vitals for the past 12 hrs:   Temp Pulse Resp BP SpO2   04/10/17 1622 98.1 °F (36.7 °C) 95 18 147/72 98 %   04/10/17 1101 98.2 °F (36.8 °C) 99 18 106/64 93 %   04/10/17 0820 98.6 °F (37 °C) (!) 114 18 120/63 95 %   04/10/17 0711 - - - - 93 %       Pain Assessment  Pain 1  Pain Scale 1: Numeric (0 - 10) (04/10/17 1622)  Pain Intensity 1: 0 (04/10/17 1622)  Patient Stated Pain Goal: 0 (04/10/17 1622)    Ambulating  Yes    Additional Information:     Shift report will be given to oncoming nurse at the bedside.     Yao Arias RN

## 2017-04-10 NOTE — PROGRESS NOTES
Inpatient Hematology / Oncology Progress Note      Admission Date: 2017  2:55 PM  Reason for Admission/Hospital Course: breast cancer  pneumonia  SIRS  SIRS (systemic inflammatory response syndrome) ([facility])      24 Hour Events:  SOB improving  Afebrile, VSS  Cough continues-nonproductive  Hopefully discharge tomorrow      ROS:  Constitutional: egative for fever, chills, +weakness, +malaise, +fatigue. CV: negative for chest pain, palpitations, edema. Respiratory: Positive for dyspnea, cough, wheezing. GI: Negative for nausea, abdominal pain, diarrhea. 10 point review of systems is otherwise negative with the exception of the elements mentioned above in the HPI. Allergies   Allergen Reactions    Prednisone Unknown (comments)     \"psychological reaction\"       OBJECTIVE:  Patient Vitals for the past 8 hrs:   BP Temp Pulse Resp SpO2   04/10/17 1101 106/64 98.2 °F (36.8 °C) 99 18 93 %   04/10/17 0820 120/63 98.6 °F (37 °C) (!) 114 18 95 %   04/10/17 0711 - - - - 93 %     Temp (24hrs), Av.6 °F (37 °C), Min:98.1 °F (36.7 °C), Max:99 °F (37.2 °C)    04/10 0701 - 04/10 1900  In: 376 [P.O.:120; I.V.:256]  Out: 800 [Urine:800]    Physical Exam:  Constitutional: Well developed, well nourished female in no acute distress, sitting comfortably in the bedside chair. HEENT: Normocephalic and atraumatic. Oropharynx is clear, mucous membranes are moist.. Extraocular muscles are intact. Sclerae anicteric. Lymph node   Deferred. Skin Warm and dry. No bruising and no rash noted. No erythema. No pallor. Respiratory Lungs are congested bilaterally. No use of accessory muscle use. CVS Normal rate, regular rhythm and normal S1 and S2. No murmurs, gallops, or rubs. Abdomen Soft, nontender and nondistended, normoactive bowel sounds. Neuro Grossly nonfocal with no obvious sensory or motor deficits. MSK Normal range of motion in general.  No edema and no tenderness.    Psych Appropriate mood and affect.         Labs:      Recent Labs      04/10/17   0353  04/09/17   0342  04/08/17   0340   WBC  2.1*  1.7*  1.7*   RBC  2.76*  2.65*  2.70*   HGB  8.3*  8.0*  8.3*   HCT  25.2*  24.2*  24.3*   MCV  91.3  91.3  90.0   MCH  30.1  30.2  30.7   MCHC  32.9  33.1  34.2   RDW  18.1*  17.6*  17.1*   PLT  57*  52*  40*   GRANS  52  50  51   LYMPH  25  26  31   MONOS  20*  22*  15*   EOS  1  1  1   BASOS  2  1  1   IG  0.5  0.0  0.6   DF  AUTOMATED  AUTOMATED  AUTOMATED   ANEU  1.0*  0.9*  0.9*   ABL  0.5  0.4*  0.5   ABM  0.4  0.4  0.3   EMI  0.0  0.0  0.0   ABB  0.0  0.0  0.0   AIG  0.0  0.0  0.0        Recent Labs      04/10/17   0353  04/08/17   0340  04/07/17   1251   NA  142   --   136   K  3.8  3.9  3.3*   CL  106   --   100   CO2  27   --   26   AGAP  9   --   10   GLU  87   --   113*   BUN  5*   --   9   CREA  0.59*   --   0.90   GFRAA  >60   --   >60   GFRNA  >60   --   >60   CA  8.1*   --   8.5   SGOT  54*   --   36   AP  166*   --   180*   TP  5.8*   --   6.7   ALB  2.0*   --   2.4*   GLOB  3.8*   --   4.3*   AGRAT  0.5*   --   0.6*         Imaging:      ASSESSMENT:    Problem List  Date Reviewed: 4/4/2017          Codes Class Noted    * (Principal)SIRS (systemic inflammatory response syndrome) (HCC) ICD-10-CM: R65.10  ICD-9-CM: 995.90  4/7/2017        Idiopathic hypotension ICD-10-CM: I95.0  ICD-9-CM: 458.9  4/7/2017        Tachycardia ICD-10-CM: R00.0  ICD-9-CM: 785.0  4/7/2017        Shortness of breath ICD-10-CM: R06.02  ICD-9-CM: 786.05  4/7/2017        Hypokalemia ICD-10-CM: E87.6  ICD-9-CM: 276.8  4/7/2017        Cough ICD-10-CM: R05  ICD-9-CM: 786.2  4/7/2017        Pancytopenia due to chemotherapy [facility]) ICD-10-CM: P05.810  ICD-9-CM: 284.11  4/7/2017        Status post right mastectomy ICD-10-CM: Z90.11  ICD-9-CM: V45.71  9/14/2016        S/P breast reconstruction, bilateral ICD-10-CM: Z98.890  ICD-9-CM: V43.82  9/14/2016        Mediastinal adenopathy ICD-10-CM: R59.0  ICD-9-CM: 785.6  2/18/2016 Malignant neoplasm of right female breast [facility]) ICD-10-CM: C50.911  ICD-9-CM: 174.9  1/25/2016        Acquired hypothyroidism ICD-10-CM: E03.9  ICD-9-CM: 244.9  11/4/2015        Asthma ICD-10-CM: J45.909  ICD-9-CM: 493.90  11/4/2015        Insomnia ICD-10-CM: G47.00  ICD-9-CM: 780.52  11/4/2015        Depression ICD-10-CM: F32.9  ICD-9-CM: 613  11/4/2015                PLAN:    - Metastatic Breast Cancer  * Hold Ibrance. Continue Femara.      - SIRS (likely PNA)  * Procalcitonin, lactic acid, pan-culture, chest xray  * Zosyn and Vancomycin   * Duo-nebs every 4 hours for wheezing/cough   4/8 SOB improving  4/9 add mucinex for cough, continue zosyn, vanc, nebs.    - Pancytopenia related to Chemotherapy  * Hold off on G-CSF. Counts should recover on it's own. 4/8 ANC 0.9  4/9 ANC 1.0  Hgb 8.3, Plt 537K     - Hypokalemia  * Give 40 meq potassium chloride IV   4/10 K+ 3.8    Hopefully she will be able to be discharged tomorrow. She may benefit from home nebulizer as well as allergy medication. [facility]  [address] [place] [address]  Office : [phone]  Fax : [phone]       Attending Addendum:  I personally evaluated the patient with Marcy Calvo, N.P.,  and agree with the assessment, findings and plan as documented. She may be able to go home tomorrow.               Brigid Pillai MD  [address]  Office : [phone]  Fax : [phone]

## 2017-04-11 VITALS
WEIGHT: 126 LBS | RESPIRATION RATE: 20 BRPM | OXYGEN SATURATION: 94 % | TEMPERATURE: 98 F | BODY MASS INDEX: 23.05 KG/M2 | SYSTOLIC BLOOD PRESSURE: 126 MMHG | DIASTOLIC BLOOD PRESSURE: 55 MMHG | HEART RATE: 79 BPM

## 2017-04-11 LAB
BASOPHILS # BLD AUTO: 0 K/UL (ref 0–0.2)
BASOPHILS # BLD: 1 % (ref 0–2)
DIFFERENTIAL METHOD BLD: ABNORMAL
EOSINOPHIL # BLD: 0 K/UL (ref 0–0.8)
EOSINOPHIL NFR BLD: 1 % (ref 0.5–7.8)
ERYTHROCYTE [DISTWIDTH] IN BLOOD BY AUTOMATED COUNT: 18.5 % (ref 11.9–14.6)
HCT VFR BLD AUTO: 29.3 % (ref 35.8–46.3)
HGB BLD-MCNC: 9.8 G/DL (ref 11.7–15.4)
IMM GRANULOCYTES # BLD: 0 K/UL (ref 0–0.5)
IMM GRANULOCYTES NFR BLD AUTO: 0.6 % (ref 0–5)
LYMPHOCYTES # BLD AUTO: 27 % (ref 13–44)
LYMPHOCYTES # BLD: 0.5 K/UL (ref 0.5–4.6)
MCH RBC QN AUTO: 30.7 PG (ref 26.1–32.9)
MCHC RBC AUTO-ENTMCNC: 33.4 G/DL (ref 31.4–35)
MCV RBC AUTO: 91.8 FL (ref 79.6–97.8)
MONOCYTES # BLD: 0.3 K/UL (ref 0.1–1.3)
MONOCYTES NFR BLD AUTO: 19 % (ref 4–12)
NEUTS SEG # BLD: 0.9 K/UL (ref 1.7–8.2)
NEUTS SEG NFR BLD AUTO: 51 % (ref 43–78)
PLATELET # BLD AUTO: 67 K/UL (ref 150–450)
PMV BLD AUTO: 11.7 FL (ref 10.8–14.1)
RBC # BLD AUTO: 3.19 M/UL (ref 4.05–5.25)
WBC # BLD AUTO: 2.1 K/UL (ref 4.3–11.1)

## 2017-04-11 PROCEDURE — 94760 N-INVAS EAR/PLS OXIMETRY 1: CPT

## 2017-04-11 PROCEDURE — 74011000250 HC RX REV CODE- 250: Performed by: INTERNAL MEDICINE

## 2017-04-11 PROCEDURE — 74011250637 HC RX REV CODE- 250/637: Performed by: NURSE PRACTITIONER

## 2017-04-11 PROCEDURE — 74011000250 HC RX REV CODE- 250: Performed by: NURSE PRACTITIONER

## 2017-04-11 PROCEDURE — 74011000258 HC RX REV CODE- 258: Performed by: INTERNAL MEDICINE

## 2017-04-11 PROCEDURE — 74011250636 HC RX REV CODE- 250/636: Performed by: INTERNAL MEDICINE

## 2017-04-11 PROCEDURE — 85025 COMPLETE CBC W/AUTO DIFF WBC: CPT | Performed by: NURSE PRACTITIONER

## 2017-04-11 PROCEDURE — 74011250636 HC RX REV CODE- 250/636: Performed by: NURSE PRACTITIONER

## 2017-04-11 PROCEDURE — 94640 AIRWAY INHALATION TREATMENT: CPT

## 2017-04-11 RX ORDER — HEPARIN 100 UNIT/ML
300 SYRINGE INTRAVENOUS
Status: COMPLETED | OUTPATIENT
Start: 2017-04-11 | End: 2017-04-11

## 2017-04-11 RX ORDER — LEVOFLOXACIN 750 MG/1
750 TABLET ORAL DAILY
Qty: 7 TAB | Refills: 0 | Status: SHIPPED | OUTPATIENT
Start: 2017-04-11 | End: 2017-04-18

## 2017-04-11 RX ADMIN — GUAIFENESIN 1200 MG: 600 TABLET, EXTENDED RELEASE ORAL at 08:59

## 2017-04-11 RX ADMIN — BUDESONIDE 500 MCG: 0.5 INHALANT RESPIRATORY (INHALATION) at 07:14

## 2017-04-11 RX ADMIN — Medication 300 UNITS: at 14:00

## 2017-04-11 RX ADMIN — DULOXETINE HYDROCHLORIDE 60 MG: 30 CAPSULE, DELAYED RELEASE ORAL at 08:59

## 2017-04-11 RX ADMIN — VANCOMYCIN HYDROCHLORIDE 1000 MG: 1 INJECTION, POWDER, LYOPHILIZED, FOR SOLUTION INTRAVENOUS at 03:40

## 2017-04-11 RX ADMIN — PIPERACILLIN SODIUM,TAZOBACTAM SODIUM 4.5 G: 4; .5 INJECTION, POWDER, FOR SOLUTION INTRAVENOUS at 07:30

## 2017-04-11 RX ADMIN — LEVOTHYROXINE SODIUM 75 MCG: 75 TABLET ORAL at 05:39

## 2017-04-11 RX ADMIN — TBO-FILGRASTIM 300 MCG: 300 INJECTION, SOLUTION SUBCUTANEOUS at 13:34

## 2017-04-11 RX ADMIN — IPRATROPIUM BROMIDE AND ALBUTEROL SULFATE 3 ML: 2.5; .5 SOLUTION RESPIRATORY (INHALATION) at 07:14

## 2017-04-11 RX ADMIN — LETROZOLE 2.5 MG: 2.5 TABLET, FILM COATED ORAL at 10:15

## 2017-04-11 NOTE — DISCHARGE INSTRUCTIONS
DISCHARGE SUMMARY from Nurse    The following personal items are in your possession at time of discharge:    Dental Appliances: None  Visual Aid: Glasses, With patient     Home Medications: None  Jewelry: Earrings, Ring, Watch  Clothing: Shirt, Sweater, Pants, Undergarments, Footwear  Other Valuables: Purse, Cell Phone, Sent home             PATIENT INSTRUCTIONS:    After general anesthesia or intravenous sedation, for 24 hours or while taking prescription Narcotics:  · Limit your activities  · Do not drive and operate hazardous machinery  · Do not make important personal or business decisions  · Do  not drink alcoholic beverages  · If you have not urinated within 8 hours after discharge, please contact your surgeon on call. Report the following to your surgeon:  · Excessive pain, swelling, redness or odor of or around the surgical area  · Temperature over 100.5  · Nausea and vomiting lasting longer than 4 hours or if unable to take medications  · Any signs of decreased circulation or nerve impairment to extremity: change in color, persistent  numbness, tingling, coldness or increase pain  · Any questions        What to do at Home:  Recommended activity: Activity as tolerated, per MD instructions    If you experience any of the following symptoms fever > 100.5, nausea, vomiting, pain, chest pain, shortness of breath please follow up with MD.      *  Please give a list of your current medications to your Primary Care Provider. *  Please update this list whenever your medications are discontinued, doses are      changed, or new medications (including over-the-counter products) are added. *  Please carry medication information at all times in case of emergency situations.           These are general instructions for a healthy lifestyle:    No smoking/ No tobacco products/ Avoid exposure to second hand smoke    Surgeon General's Warning:  Quitting smoking now greatly reduces serious risk to your health. Obesity, smoking, and sedentary lifestyle greatly increases your risk for illness    A healthy diet, regular physical exercise & weight monitoring are important for maintaining a healthy lifestyle    You may be retaining fluid if you have a history of heart failure or if you experience any of the following symptoms:  Weight gain of 3 pounds or more overnight or 5 pounds in a week, increased swelling in our hands or feet or shortness of breath while lying flat in bed. Please call your doctor as soon as you notice any of these symptoms; do not wait until your next office visit. Recognize signs and symptoms of STROKE:    F-face looks uneven    A-arms unable to move or move unevenly    S-speech slurred or non-existent    T-time-call 911 as soon as signs and symptoms begin-DO NOT go       Back to bed or wait to see if you get better-TIME IS BRAIN. Warning Signs of HEART ATTACK     Call 911 if you have these symptoms:   Chest discomfort. Most heart attacks involve discomfort in the center of the chest that lasts more than a few minutes, or that goes away and comes back. It can feel like uncomfortable pressure, squeezing, fullness, or pain.  Discomfort in other areas of the upper body. Symptoms can include pain or discomfort in one or both arms, the back, neck, jaw, or stomach.  Shortness of breath with or without chest discomfort.  Other signs may include breaking out in a cold sweat, nausea, or lightheadedness. Don't wait more than five minutes to call 911 - MINUTES MATTER! Fast action can save your life. Calling 911 is almost always the fastest way to get lifesaving treatment. Emergency Medical Services staff can begin treatment when they arrive -- up to an hour sooner than if someone gets to the hospital by car. The discharge information has been reviewed with the patient. The patient verbalized understanding.     Discharge medications reviewed with the patient and appropriate educational materials and side effects teaching were provided. Sepsis: Care Instructions  Your Care Instructions  Sepsis is an infection that has spread throughout your body. It is a life-threatening condition and often causes extremely low blood pressure. This can lead to problems with many different organs. The cause of sepsis is not always clear, but it can happen as part of a long-term or sudden illness. Sometimes even a mild illness can lead to sepsis. Follow-up care is a key part of your treatment and safety. Be sure to make and go to all appointments, and call your doctor if you are having problems. Its also a good idea to know your test results and keep a list of the medicines you take. How can you care for yourself at home? · If your doctor prescribed antibiotics, take them as directed. Do not stop taking them just because you feel better. You need to take the full course of antibiotics. · Drink plenty of fluids, enough so that your urine is light yellow or clear like water. Choose water or caffeine-free clear liquids until you feel better. If you have kidney, heart, or liver disease and have to limit fluids, talk with your doctor before you increase your fluid intake. You can try rehydration drinks, such as Gatorade or Powerade. · Do not drink alcohol. · Eat a healthy diet. Include fruits, vegetables, and whole grains in your diet every day. · Walking is an easy way to get exercise. Gradually increase the amount you walk every day. Make sure your doctor knows that you are starting an exercise program.  · Do not smoke or use other tobacco products. If you need help quitting, talk to your doctor about stop-smoking programs and medicines. These can increase your chances of quitting for good. When should you call for help? Call 911 anytime you think you may need emergency care. For example, call if:  · You passed out (lost consciousness).   Call your doctor now or seek immediate medical care if:  · You have a fever or chills. · You have cool, pale, or clammy skin. · You are dizzy or lightheaded, or you feel like you may faint. · You have any new symptoms, such as a cough, pain in one part of your body, or urinary problems. Watch closely for changes in your health, and be sure to contact your doctor if:  · You do not get better as expected. Where can you learn more? Go to http://edyta-tremaine.info/. Enter L991 in the search box to learn more about \"Sepsis: Care Instructions. \"  Current as of: May 27, 2016  Content Version: 11.2  © 5925-8630 [employer]. Care instructions adapted under license by [employer] (which disclaims liability or warranty for this information). If you have questions about a medical condition or this instruction, always ask your healthcare professional. [address] any warranty or liability for your use of this information.

## 2017-04-11 NOTE — PROGRESS NOTES
Pharmacokinetic Consult to Pharmacist    Jr Hughes is a 74 y.o. female being treated for sepsis likely due to HCAP with vancomycin and pip-tazo. Weight: 57.2 kg (126 lb)  Lab Results   Component Value Date/Time    BUN 5 04/10/2017 03:53 AM    Creatinine 0.59 04/10/2017 03:53 AM    WBC 2.1 04/11/2017 03:45 AM    Procalcitonin 0.1 04/07/2017 04:10 PM      Estimated Creatinine Clearance: 72.2 mL/min (based on Cr of 0.59). Lab Results   Component Value Date/Time    Vancomycin,trough 6.1 04/10/2017 03:15 PM       Day 5 of vancomycin. Goal trough is 15-20. Dosing increased to 1g q12h. Will continue to follow patient. Thank you,  Vinh Williamson, Pharm. D.   Clinical Pharmacist  [phone]

## 2017-04-11 NOTE — DISCHARGE SUMMARY
[facility]: In Patient Hematology / Oncology Discharge Summary Note    Patient ID:  Beverly Lin  [id number]  47 y.o.  1950    Admit Date: 4/7/2017    Discharge Date: 4/11/2017    Admission Diagnoses: breast cancer  pneumonia  SIRS  SIRS (systemic inflammatory response syndrome) ([facility]    Discharge Diagnoses:  Principal Diagnosis: SIRS (systemic inflammatory response syndrome) ([facility]  Principal Problem:    SIRS (systemic inflammatory response syndrome) ([facility] (4/7/2017)    Active Problems:    Malignant neoplasm of right female breast ([facility] (1/25/2016)      Idiopathic hypotension (4/7/2017)      Tachycardia (4/7/2017)      Shortness of breath (4/7/2017)      Hypokalemia (4/7/2017)      Cough (4/7/2017)      Pancytopenia due to chemotherapy [facility]) (4/7/2017)        Hospital Course:  Ms. Ana Miller is a 74 y.o. female was admitted on 04/07/17 with a primary diagnosis of SIRS (likely PNA).    She has a history of metastatic breast cancer: high grade, %, ER 3%, HER2 negative. She completed 4 cycles of ddAC and 12 cycles of Taxol with excellent tolerance. Imaging showed partial response in the breast and complete response in the axillary and mediastinal lymph nodes. She was referred for mastectomy, which showed clear partial response with tumor only 1.5 cm in the breast, but axillary dissection still showed 10 of 12 lymph nodes involved with disease. She then was recommended to undergo chest wall and axillary radiotherapy which she completed. Most recently she started [place].     She was in clinic last week for follow up regarding her first cycle of Ibrance and had increased fatigue, nausea, poor taste, cough, dyspnea, and orthostasis - she passed out 3 times when going from lying to standing. She had profound neutropenia and thrombocytopenia. We decided to hold Ibrance. for recovery.  Chest xray was negative for pneumonia but she continued to worsen over the past week despite being on azithromycin. She is hypotensive, tachycardic, and extremely dyspneic. CURB 65 score of 3 which suggested hospital admission. She was started on Zosyn and Vanc, Duo-nebs q 4 hours, and mucinex. She is feeling much better and is ready for discharge. She will need follow up with Dr. Bernie Cervantes.   Oscar Later:    None    Significant Diagnostic Studies:     Allergies   Allergen Reactions    Prednisone Unknown (comments)     \"psychological reaction\"       OBJECTIVE:  Patient Vitals for the past 8 hrs:   BP Temp Pulse Resp SpO2 Weight   17 0720 125/56 98.2 °F (36.8 °C) 76 20 95 % -   17 0715 - - - - 95 % -   17 0348 - - - - - 126 lb (57.2 kg)   17 034 114/63 98.3 °F (36.8 °C) (!) 103 20 94 % -     Temp (24hrs), Av.6 °F (37 °C), Min:98.1 °F (36.7 °C), Max:99.3 °F (37.4 °C)         Physical Exam:  Constitutional: Oriented to person, place, and time. Well-developed and well-nourished. HEENT: Normocephalic and atraumatic. Oropharynx is clear and moist.   Conjunctivae and EOM are normal. No scleral icterus. Lymph node   Deferred   Skin Warm and dry. No bruising and no rash noted. No erythema. No pallor. Respiratory Effort normal and breath sounds normal.  No respiratory distress. No wheezes. No rales. No tenderness. CVS Normal rate, regular rhythm and normal heart sounds. Exam reveals no gallop, no friction and no rub. No murmur heard. Abdomen Soft. Bowel sounds are normal. Exhibits no distension. There is no tenderness. There is no rebound and no guarding. Neuro  Exhibits normal muscle tone   MSK Normal range of motion. No edema and no tenderness.    Psych Normal mood, affect, behavior, judgment and thought content      Labs:  Recent Labs      17   0345  04/10/17   0353  17   0342   WBC  2.1*  2.1*  1.7*   RBC  3.19*  2.76*  2.65*   HGB  9.8*  8.3*  8.0*   HCT  29.3*  25.2*  24.2*   MCV  91.8  91.3  91.3   MCH  30.7  30.1  30.2   MCHC  33.4  32.9  33.1   RDW 18.5*  18.1*  17.6*   PLT  67*  57*  52*   GRANS  51  52  50   LYMPH  27  25  26   MONOS  19*  20*  22*   EOS  1  1  1   BASOS  1  2  1   IG  0.6  0.5  0.0   DF  AUTOMATED  AUTOMATED  AUTOMATED   ANEU  0.9*  1.0*  0.9*   ABL  0.5  0.5  0.4*   ABM  0.3  0.4  0.4   EMI  0.0  0.0  0.0   ABB  0.0  0.0  0.0   AIG  0.0  0.0  0.0    Recent Labs      04/10/17   0353   NA  142   K  3.8   CL  106   CO2  27   AGAP  9   GLU  87   BUN  5*   CREA  0.59*   GFRAA  >60   GFRNA  >60   CA  8.1*   SGOT  54*   AP  166*   TP  5.8*   ALB  2.0*   GLOB  3.8*   AGRAT  0.5*       ASSESSMENT:    Principal Problem:    SIRS (systemic inflammatory response syndrome) (HCC) (4/7/2017)    Active Problems:    [facility] ([facility] (1/25/2016)      Idiopathic hypotension (4/7/2017)      Tachycardia (4/7/2017)      Shortness of breath (4/7/2017)      Hypokalemia (4/7/2017)      Cough (4/7/2017)      Pancytopenia due to chemotherapy ([facility] (4/7/2017)      Current Discharge Medication List      START taking these medications    Details   levoFLOXacin (LEVAQUIN) 750 mg tablet Take 1 Tab by mouth daily for 7 days. Qty: 7 Tab, Refills: 0         CONTINUE these medications which have NOT CHANGED    Details   letrozole (FEMARA) 2.5 mg tablet Take 1 Tab by mouth daily. Qty: 30 Tab, Refills: 2      ALPRAZolam (XANAX) 0.5 mg tablet 1 bid to qid prn  Indications: ANXIETY, sleep  Qty: 120 Tab, Refills: 5      fluticasone-salmeterol (ADVAIR DISKUS) 250-50 mcg/dose diskus inhaler Take 1 Puff by inhalation every twelve (12) hours. Indications: INTRINSIC ASTHMA  Qty: 1 Inhaler, Refills: 12      DULoxetine (CYMBALTA) 60 mg capsule Take 1 Cap by mouth daily. Qty: 30 Cap, Refills: 12      levothyroxine (SYNTHROID) 75 mcg tablet Take 1 Tab by mouth Daily (before breakfast). Indications: hypothyroidism  Qty: 30 Tab, Refills: 12      albuterol (VENTOLIN) 90 mcg/Actuation inhaler Take 2 Puffs by inhalation every six (6) hours as needed. Indications: bring on the dos. STOP taking these medications       azithromycin (ZITHROMAX) 250 mg tablet Comments:   Reason for Stopping:         palbociclib (IBRANCE) 125 mg cap Comments:   Reason for Stopping:         doxycycline (ADOXA) 100 mg tablet Comments:   Reason for Stopping:               PLAN:    Follow-up Appointments   Procedures    FOLLOW UP VISIT Appointment in: 3 - 5 Days Please make follow up appointment with Dr. Destiny Dunn. Please make follow up appointment with Dr. Destiny Dunn. Standing Status:   Standing     Number of Occurrences:   1     Order Specific Question:   Appointment in     Answer:   3 - 5 Days     Time spent discharging the patient was 33 minutes. Antonio Valencia NP  [facility] [address]  Office : [phone]  Fax : [phone]        Attending Addendum:  I personally evaluated the patient with Antonio Valencia, N.P.,  and agree with the assessment, findings and plan as documented. Appears stable, she will be discharged on PO ABX.               Manuela Aragon MD  [address] [place] [address]  Office : [phone]  Fax : [phone]

## 2017-04-12 ENCOUNTER — PATIENT OUTREACH (OUTPATIENT)
Dept: CASE MANAGEMENT | Age: 67
End: 2017-04-12

## 2017-04-12 LAB
BACTERIA SPEC CULT: NORMAL
BACTERIA SPEC CULT: NORMAL
SERVICE CMNT-IMP: NORMAL
SERVICE CMNT-IMP: NORMAL

## 2017-04-13 ENCOUNTER — PATIENT OUTREACH (OUTPATIENT)
Dept: CASE MANAGEMENT | Age: 67
End: 2017-04-13

## 2017-04-20 ENCOUNTER — PATIENT OUTREACH (OUTPATIENT)
Dept: CASE MANAGEMENT | Age: 67
End: 2017-04-20

## 2017-04-21 ENCOUNTER — HOSPITAL ENCOUNTER (OUTPATIENT)
Dept: LAB | Age: 67
Discharge: HOME | End: 2017-04-21
Payer: MEDICARE

## 2017-04-21 DIAGNOSIS — C50.911 MALIGNANT NEOPLASM OF RIGHT FEMALE BREAST, UNSPECIFIED SITE OF BREAST: ICD-10-CM

## 2017-04-21 LAB
ALBUMIN SERPL BCP-MCNC: 2.6 G/DL (ref 3.2–4.6)
ALBUMIN/GLOB SERPL: 0.7 {RATIO} (ref 1.2–3.5)
ALP SERPL-CCNC: 147 U/L (ref 50–136)
ALT SERPL-CCNC: 22 U/L (ref 12–65)
ANION GAP BLD CALC-SCNC: 7 MMOL/L (ref 7–16)
AST SERPL W P-5'-P-CCNC: 38 U/L (ref 15–37)
BASOPHILS # BLD AUTO: 0.1 K/UL (ref 0–0.2)
BASOPHILS # BLD: 1 % (ref 0–2)
BILIRUB SERPL-MCNC: 0.2 MG/DL (ref 0.2–1.1)
BUN SERPL-MCNC: 9 MG/DL (ref 8–23)
CALCIUM SERPL-MCNC: 8.3 MG/DL (ref 8.3–10.4)
CHLORIDE SERPL-SCNC: 105 MMOL/L (ref 98–107)
CO2 SERPL-SCNC: 27 MMOL/L (ref 21–32)
CREAT SERPL-MCNC: 0.74 MG/DL (ref 0.6–1)
DIFFERENTIAL METHOD BLD: ABNORMAL
EOSINOPHIL # BLD: 0.1 K/UL (ref 0–0.8)
EOSINOPHIL NFR BLD: 2 % (ref 0.5–7.8)
ERYTHROCYTE [DISTWIDTH] IN BLOOD BY AUTOMATED COUNT: 21.2 % (ref 11.9–14.6)
GLOBULIN SER CALC-MCNC: 3.8 G/DL (ref 2.3–3.5)
GLUCOSE SERPL-MCNC: 114 MG/DL (ref 65–100)
HCT VFR BLD AUTO: 28.8 % (ref 35.8–46.3)
HGB BLD-MCNC: 9.3 G/DL (ref 11.7–15.4)
LYMPHOCYTES # BLD AUTO: 20 % (ref 13–44)
LYMPHOCYTES # BLD: 1.5 K/UL (ref 0.5–4.6)
MCH RBC QN AUTO: 31.6 PG (ref 26.1–32.9)
MCHC RBC AUTO-ENTMCNC: 32.3 G/DL (ref 31.4–35)
MCV RBC AUTO: 98 FL (ref 79.6–97.8)
MONOCYTES # BLD: 0.5 K/UL (ref 0.1–1.3)
MONOCYTES NFR BLD AUTO: 8 % (ref 4–12)
NEUTS SEG # BLD: 5.1 K/UL (ref 1.7–8.2)
NEUTS SEG NFR BLD AUTO: 70 % (ref 43–78)
NRBC # BLD: 0 K/UL (ref 0–0.2)
PLATELET # BLD AUTO: 250 K/UL (ref 150–450)
PMV BLD AUTO: 9.9 FL (ref 10.8–14.1)
POTASSIUM SERPL-SCNC: 4.1 MMOL/L (ref 3.5–5.1)
PROT SERPL-MCNC: 6.4 G/DL (ref 6.3–8.2)
RBC # BLD AUTO: 2.94 M/UL (ref 4.05–5.25)
SODIUM SERPL-SCNC: 139 MMOL/L (ref 136–145)
WBC # BLD AUTO: 7.2 K/UL (ref 4.3–11.1)

## 2017-04-21 PROCEDURE — 85025 COMPLETE CBC W/AUTO DIFF WBC: CPT | Performed by: NURSE PRACTITIONER

## 2017-04-21 PROCEDURE — 80053 COMPREHEN METABOLIC PANEL: CPT | Performed by: NURSE PRACTITIONER

## 2017-05-05 ENCOUNTER — HOSPITAL ENCOUNTER (OUTPATIENT)
Dept: LAB | Age: 67
Discharge: HOME | End: 2017-05-05
Payer: MEDICARE

## 2017-05-05 ENCOUNTER — PATIENT OUTREACH (OUTPATIENT)
Dept: CASE MANAGEMENT | Age: 67
End: 2017-05-05

## 2017-05-05 DIAGNOSIS — C50.911 MALIGNANT NEOPLASM OF RIGHT FEMALE BREAST, UNSPECIFIED SITE OF BREAST: ICD-10-CM

## 2017-05-05 LAB
ALBUMIN SERPL BCP-MCNC: 3.1 G/DL (ref 3.2–4.6)
ALBUMIN/GLOB SERPL: 0.7 {RATIO} (ref 1.2–3.5)
ALP SERPL-CCNC: 150 U/L (ref 50–136)
ALT SERPL-CCNC: 22 U/L (ref 12–65)
ANION GAP BLD CALC-SCNC: 7 MMOL/L (ref 7–16)
AST SERPL W P-5'-P-CCNC: 32 U/L (ref 15–37)
BASOPHILS # BLD AUTO: 0.1 K/UL (ref 0–0.2)
BASOPHILS # BLD: 1 % (ref 0–2)
BILIRUB SERPL-MCNC: 0.4 MG/DL (ref 0.2–1.1)
BUN SERPL-MCNC: 11 MG/DL (ref 8–23)
CALCIUM SERPL-MCNC: 9.2 MG/DL (ref 8.3–10.4)
CHLORIDE SERPL-SCNC: 103 MMOL/L (ref 98–107)
CO2 SERPL-SCNC: 28 MMOL/L (ref 21–32)
CREAT SERPL-MCNC: 0.76 MG/DL (ref 0.6–1)
DIFFERENTIAL METHOD BLD: ABNORMAL
EOSINOPHIL # BLD: 0.2 K/UL (ref 0–0.8)
EOSINOPHIL NFR BLD: 3 % (ref 0.5–7.8)
ERYTHROCYTE [DISTWIDTH] IN BLOOD BY AUTOMATED COUNT: 18.2 % (ref 11.9–14.6)
GLOBULIN SER CALC-MCNC: 4.2 G/DL (ref 2.3–3.5)
GLUCOSE SERPL-MCNC: 105 MG/DL (ref 65–100)
HCT VFR BLD AUTO: 36.6 % (ref 35.8–46.3)
HGB BLD-MCNC: 12 G/DL (ref 11.7–15.4)
LYMPHOCYTES # BLD AUTO: 19 % (ref 13–44)
LYMPHOCYTES # BLD: 1.4 K/UL (ref 0.5–4.6)
MCH RBC QN AUTO: 31.5 PG (ref 26.1–32.9)
MCHC RBC AUTO-ENTMCNC: 32.8 G/DL (ref 31.4–35)
MCV RBC AUTO: 96.1 FL (ref 79.6–97.8)
MONOCYTES # BLD: 0.9 K/UL (ref 0.1–1.3)
MONOCYTES NFR BLD AUTO: 12 % (ref 4–12)
NEUTS SEG # BLD: 5 K/UL (ref 1.7–8.2)
NEUTS SEG NFR BLD AUTO: 66 % (ref 43–78)
NRBC # BLD: 0 K/UL (ref 0–0.2)
PLATELET # BLD AUTO: 233 K/UL (ref 150–450)
PMV BLD AUTO: 9.9 FL (ref 10.8–14.1)
POTASSIUM SERPL-SCNC: 4.1 MMOL/L (ref 3.5–5.1)
PROT SERPL-MCNC: 7.3 G/DL (ref 6.3–8.2)
RBC # BLD AUTO: 3.81 M/UL (ref 4.05–5.25)
SODIUM SERPL-SCNC: 138 MMOL/L (ref 136–145)
WBC # BLD AUTO: 7.6 K/UL (ref 4.3–11.1)

## 2017-05-05 PROCEDURE — 85025 COMPLETE CBC W/AUTO DIFF WBC: CPT | Performed by: NURSE PRACTITIONER

## 2017-05-05 PROCEDURE — 80053 COMPREHEN METABOLIC PANEL: CPT | Performed by: NURSE PRACTITIONER

## 2017-05-05 NOTE — PROGRESS NOTES
5/5/2017 Saw the patient with Ronnie Mcdaniel NP. She is doing well and ready to start Ibrance back at 75mg. Will continue to follow.

## 2017-06-12 ENCOUNTER — HOSPITAL ENCOUNTER (OUTPATIENT)
Dept: LAB | Age: 67
Discharge: HOME | End: 2017-06-12
Payer: MEDICARE

## 2017-06-12 PROBLEM — C50.211 MALIGNANT NEOPLASM OF UPPER-INNER QUADRANT OF RIGHT FEMALE BREAST (HCC): Status: ACTIVE | Noted: 2017-06-12

## 2017-06-12 LAB
ALBUMIN SERPL BCP-MCNC: 2.9 G/DL (ref 3.2–4.6)
ALBUMIN/GLOB SERPL: 0.6 {RATIO} (ref 1.2–3.5)
ALP SERPL-CCNC: 144 U/L (ref 50–136)
ALT SERPL-CCNC: 22 U/L (ref 12–65)
ANION GAP BLD CALC-SCNC: 9 MMOL/L (ref 7–16)
AST SERPL W P-5'-P-CCNC: 29 U/L (ref 15–37)
BASOPHILS # BLD AUTO: 0 K/UL (ref 0–0.2)
BASOPHILS # BLD: 1 % (ref 0–2)
BILIRUB SERPL-MCNC: 0.2 MG/DL (ref 0.2–1.1)
BUN SERPL-MCNC: 14 MG/DL (ref 8–23)
CALCIUM SERPL-MCNC: 9 MG/DL (ref 8.3–10.4)
CHLORIDE SERPL-SCNC: 105 MMOL/L (ref 98–107)
CO2 SERPL-SCNC: 25 MMOL/L (ref 21–32)
CREAT SERPL-MCNC: 0.57 MG/DL (ref 0.6–1)
DIFFERENTIAL METHOD BLD: ABNORMAL
EOSINOPHIL # BLD: 0.2 K/UL (ref 0–0.8)
EOSINOPHIL NFR BLD: 3 % (ref 0.5–7.8)
ERYTHROCYTE [DISTWIDTH] IN BLOOD BY AUTOMATED COUNT: 14.4 % (ref 11.9–14.6)
GLOBULIN SER CALC-MCNC: 4.5 G/DL (ref 2.3–3.5)
GLUCOSE SERPL-MCNC: 90 MG/DL (ref 65–100)
HCT VFR BLD AUTO: 32.3 % (ref 35.8–46.3)
HGB BLD-MCNC: 10.7 G/DL (ref 11.7–15.4)
LYMPHOCYTES # BLD AUTO: 17 % (ref 13–44)
LYMPHOCYTES # BLD: 1.1 K/UL (ref 0.5–4.6)
MCH RBC QN AUTO: 31 PG (ref 26.1–32.9)
MCHC RBC AUTO-ENTMCNC: 33.1 G/DL (ref 31.4–35)
MCV RBC AUTO: 93.6 FL (ref 79.6–97.8)
MONOCYTES # BLD: 0.4 K/UL (ref 0.1–1.3)
MONOCYTES NFR BLD AUTO: 6 % (ref 4–12)
NEUTS SEG # BLD: 4.8 K/UL (ref 1.7–8.2)
NEUTS SEG NFR BLD AUTO: 73 % (ref 43–78)
NRBC # BLD: 0 K/UL (ref 0–0.2)
PLATELET # BLD AUTO: 220 K/UL (ref 150–450)
PMV BLD AUTO: 9.4 FL (ref 10.8–14.1)
POTASSIUM SERPL-SCNC: 4.2 MMOL/L (ref 3.5–5.1)
PROT SERPL-MCNC: 7.4 G/DL (ref 6.3–8.2)
RBC # BLD AUTO: 3.45 M/UL (ref 4.05–5.25)
SODIUM SERPL-SCNC: 139 MMOL/L (ref 136–145)
WBC # BLD AUTO: 6.6 K/UL (ref 4.3–11.1)

## 2017-06-12 PROCEDURE — 80053 COMPREHEN METABOLIC PANEL: CPT | Performed by: INTERNAL MEDICINE

## 2017-06-12 PROCEDURE — 85025 COMPLETE CBC W/AUTO DIFF WBC: CPT | Performed by: INTERNAL MEDICINE

## 2017-06-13 ENCOUNTER — PATIENT OUTREACH (OUTPATIENT)
Dept: CASE MANAGEMENT | Age: 67
End: 2017-06-13

## 2017-06-13 PROBLEM — R22.31 LUMP OF SKIN OF RIGHT UPPER EXTREMITY: Status: ACTIVE | Noted: 2017-06-13

## 2017-06-13 NOTE — PROGRESS NOTES
I saw pt on 6-12-17 with Dr Woody Quinonez. She has not started Jennifer because chemo company has called with wrong 125mg dose instead of 75mg dose. We have resent corrected 75mg dose. Pt will also receive Ultrasound of right arm for nodules.  Pt will return in 2-3 weeks-nurse navigation will follow

## 2017-06-15 ENCOUNTER — HOSPITAL ENCOUNTER (OUTPATIENT)
Dept: ULTRASOUND IMAGING | Age: 67
Discharge: HOME | End: 2017-06-15
Attending: INTERNAL MEDICINE
Payer: MEDICARE

## 2017-06-15 DIAGNOSIS — C50.211 MALIGNANT NEOPLASM OF UPPER-INNER QUADRANT OF RIGHT FEMALE BREAST (HCC): ICD-10-CM

## 2017-06-15 DIAGNOSIS — R22.31 LUMP OF SKIN OF RIGHT UPPER EXTREMITY: ICD-10-CM

## 2017-06-15 PROCEDURE — 76881 US COMPL JOINT R-T W/IMG: CPT

## 2017-06-28 ENCOUNTER — HOSPITAL ENCOUNTER (OUTPATIENT)
Dept: LAB | Age: 67
Discharge: HOME | End: 2017-06-28
Payer: MEDICARE

## 2017-06-28 DIAGNOSIS — C50.911 MALIGNANT NEOPLASM OF RIGHT FEMALE BREAST, UNSPECIFIED SITE OF BREAST: ICD-10-CM

## 2017-06-28 LAB
ALBUMIN SERPL BCP-MCNC: 2.8 G/DL (ref 3.2–4.6)
ALBUMIN/GLOB SERPL: 0.6 {RATIO} (ref 1.2–3.5)
ALP SERPL-CCNC: 152 U/L (ref 50–136)
ALT SERPL-CCNC: 25 U/L (ref 12–65)
ANION GAP BLD CALC-SCNC: 7 MMOL/L (ref 7–16)
AST SERPL W P-5'-P-CCNC: 31 U/L (ref 15–37)
BASOPHILS # BLD AUTO: 0 K/UL (ref 0–0.2)
BASOPHILS # BLD: 0 % (ref 0–2)
BILIRUB SERPL-MCNC: 0.3 MG/DL (ref 0.2–1.1)
BUN SERPL-MCNC: 17 MG/DL (ref 8–23)
CALCIUM SERPL-MCNC: 8.6 MG/DL (ref 8.3–10.4)
CHLORIDE SERPL-SCNC: 107 MMOL/L (ref 98–107)
CO2 SERPL-SCNC: 27 MMOL/L (ref 21–32)
CREAT SERPL-MCNC: 0.88 MG/DL (ref 0.6–1)
DIFFERENTIAL METHOD BLD: ABNORMAL
EOSINOPHIL # BLD: 0.1 K/UL (ref 0–0.8)
EOSINOPHIL NFR BLD: 1 % (ref 0.5–7.8)
ERYTHROCYTE [DISTWIDTH] IN BLOOD BY AUTOMATED COUNT: 14.2 % (ref 11.9–14.6)
GLOBULIN SER CALC-MCNC: 4.4 G/DL (ref 2.3–3.5)
GLUCOSE SERPL-MCNC: 104 MG/DL (ref 65–100)
HCT VFR BLD AUTO: 31.9 % (ref 35.8–46.3)
HGB BLD-MCNC: 10.5 G/DL (ref 11.7–15.4)
LYMPHOCYTES # BLD AUTO: 21 % (ref 13–44)
LYMPHOCYTES # BLD: 1 K/UL (ref 0.5–4.6)
MCH RBC QN AUTO: 30.7 PG (ref 26.1–32.9)
MCHC RBC AUTO-ENTMCNC: 32.9 G/DL (ref 31.4–35)
MCV RBC AUTO: 93.3 FL (ref 79.6–97.8)
MONOCYTES # BLD: 0.1 K/UL (ref 0.1–1.3)
MONOCYTES NFR BLD AUTO: 2 % (ref 4–12)
NEUTS SEG # BLD: 3.5 K/UL (ref 1.7–8.2)
NEUTS SEG NFR BLD AUTO: 75 % (ref 43–78)
NRBC # BLD: 0 K/UL (ref 0–0.2)
PLATELET # BLD AUTO: 245 K/UL (ref 150–450)
PMV BLD AUTO: 9.5 FL (ref 10.8–14.1)
POTASSIUM SERPL-SCNC: 4.2 MMOL/L (ref 3.5–5.1)
PROT SERPL-MCNC: 7.2 G/DL (ref 6.3–8.2)
RBC # BLD AUTO: 3.42 M/UL (ref 4.05–5.25)
SODIUM SERPL-SCNC: 141 MMOL/L (ref 136–145)
WBC # BLD AUTO: 4.6 K/UL (ref 4.3–11.1)

## 2017-06-28 PROCEDURE — 80053 COMPREHEN METABOLIC PANEL: CPT | Performed by: INTERNAL MEDICINE

## 2017-06-28 PROCEDURE — 85025 COMPLETE CBC W/AUTO DIFF WBC: CPT | Performed by: INTERNAL MEDICINE

## 2017-07-19 ENCOUNTER — HOSPITAL ENCOUNTER (OUTPATIENT)
Dept: MAMMOGRAPHY | Age: 67
Discharge: HOME | End: 2017-07-19
Attending: RADIOLOGY
Payer: MEDICARE

## 2017-07-19 DIAGNOSIS — C50.911 MALIGNANT NEOPLASM OF RIGHT FEMALE BREAST, UNSPECIFIED SITE OF BREAST: ICD-10-CM

## 2017-07-19 PROCEDURE — 77065 DX MAMMO INCL CAD UNI: CPT

## 2017-07-21 ENCOUNTER — HOSPITAL ENCOUNTER (OUTPATIENT)
Dept: RADIATION ONCOLOGY | Age: 67
Discharge: HOME | End: 2017-07-21
Payer: MEDICARE

## 2017-07-21 VITALS
WEIGHT: 129.6 LBS | DIASTOLIC BLOOD PRESSURE: 66 MMHG | OXYGEN SATURATION: 97 % | SYSTOLIC BLOOD PRESSURE: 128 MMHG | RESPIRATION RATE: 18 BRPM | HEART RATE: 107 BPM | TEMPERATURE: 98.8 F | BODY MASS INDEX: 23.7 KG/M2

## 2017-07-21 PROCEDURE — 99211 OFF/OP EST MAY X REQ PHY/QHP: CPT

## 2017-07-21 NOTE — PROGRESS NOTES
Patient: Betsy Renee MRN: [id number]  SSN: [id number]    YOB: 1950  Age: 74 y.o. Sex: female      Other Providers:  Dave Caba MD    CHIEF COMPLAINT: Breast cancer    DIAGNOSIS: Right breast infiltrating ductal carcinoma, high grade, 100% ER, NY 3% and HER2 was negative by IHC, [id number] with positive paratracheal lymph node. [id number]. PREVIOUS TREATMENT:  1) 4 cycles ddAC followed by 12 weekly taxol cycles. 2) 9/14/16:  Mastectomy and axillary dissection. Tissue expanders in place. 3) 01/10/17: Radiation completed. She received treatment to the right breast and lymph nodes with 25 fractions of 5000     cGy, 5 boost, of 1000 cGy planned. 4) Arimidex    HISTORY OF PRESENT ILLNESS:  Betsy Renee is a 74 y.o. female seen by Dr. Teddy Everett on 10/20/2016 at the request of Dr. June Paige. She originally presented in December 2015 when she noted an increased redness and swelling in her right breast, with a masslike density. She had underwent a clinical breast exam in September that was normal.  Mammogram and ultrasound showed a mass that was indistinct but suspicious, and skin thickening also suspicious for malignant involvement. She went for biopsy which showed infiltrating ductal carcinoma, high grade, but with 100% ER expression, NY 3% and HER2 was negative by IHC. MRI showed the tumor to be extensive, with enhancement spanning over 12 cm, with confirmation of skin thickening and a low axillary lymph node that was not identified by ultrasound. She was referred to Dr. John Lam who felt that she would be appropriate for neoadjuvant chemotherapy due to the large size and the skin involvement. Dr. June Paige recommended PET/CT and port placement prior to potentially starting dose dense AC followed by weekly Taxol. Unfortunately, in addition to the locally advanced disease, there was also evidence of a paratracheal lymph node which was indeterminate by imaging.  Chemotherapy was postponed and EBUS for diagnostic purposes was performed which returned as positive for malignant cells, also ER/NV positive and HER2 negative confirming stage IV disease. With such a small amount of oligometastasis, Dr. Christine Menjivar recommended that she proceed with chemotherapy. She completed 4 cycles of ddAC with excellent disease response, tolerated 12 cycles of weekly Taxol exceptionally well according to the record. Imaging post-chemotherapy showed at least partial response in the breast and complete response in the axillary and mediastinal lymph nodes. Her case was discussed in tumor board who recommended mastectomy followed by chest wall and axillary radiotherapy. Her tumor locally had a nice response with only a 1.5 cm primary noted, but there were 10 positive lymph nodes with TULIO present.   She is getting serial expansions with Dr. Drake Mena. INTERVAL HISTORY: She returns today for follow up, 6 months after competing radiation therapy for her right breast cancer. Overall she tolerated treatment well. Mammogram done 07/19/17 was negative for malignancy. She continues with tanning of the skin and occasional \"shooting\" type pains that are not too bothersome. She is scheduled for breast reconstruction in September. Since she was last seen, Julisa Fonseca was added in addition to the [facility]  for the high volume sagar disease still present after chemotherapy, under the direction of medical oncology, Dr. Christine Menjivar. Her energy continues to improve. OBSTETRIC HISTORY:    Menarche at the age of 13.    G4,P2. Oral Contraceptive Pills:  5 years in her 17's. Hormone Replacement Therapy:  Premarin for 15 years    PAST MEDICAL HISTORY:    Past Medical History:   Diagnosis Date    Abnormal CT of the chest     Adverse effect of anesthesia     bp dropped with dual lung/gallbladder surgery    Asthma      no acute attacks recently.  uses advair daily    [facility]    R breast, s/p mastectomy, chemo, reconstruction    Depression  Hypercholesteremia     Hypothyroidism     Ill-defined condition     granular lung disease since 2000       The patient denies history of collagen vascular diseases, pacemaker insertion, prior radiation or prior chemotherapy predating her breast cancer diagnosis. PAST SURGICAL HISTORY:   Past Surgical History:   Procedure Laterality Date    CHEST SURGERY PROCEDURE UNLISTED      R lung biopsy along with cholecystectomy    HX BLADDER REPAIR      HX BREAST BIOPSY Right 2/5/2016    SKIN BIOPSY RIGHT BREAST performed by Aletha Conn MD at [address] [place] [address] HX BREAST RECONSTRUCTION Bilateral 9/14/2016    BILATERAL BREAST RECONSTRUCTION performed by Jenn Hogan MD at [facility] MAIN OR    HX BREAST RECONSTRUCTION Bilateral 9/14/2016    BILATERAL BREAST TISSUE EXPANDER INSERTION performed by Jenn Hogan MD at [facility] MAIN OR    HX CHOLECYSTECTOMY      HX COLONOSCOPY      2006    HX CYSTOCELE REPAIR      HX HYSTERECTOMY      fibroids    HX MASTECTOMY      HX MASTECTOMY      HX RECTOCELE REPAIR      HX VASCULAR ACCESS      port left chest wall       MEDICATIONS:     Current Outpatient Prescriptions:     ALPRAZolam (XANAX) 0.5 mg tablet, 1 bid to qid prn  Indications: anxiety, sleep, Disp: 120 Tab, Rfl: 5    palbociclib (IBRANCE) 75 mg cap, Take 75 mg by mouth daily. For 3 weeks on and 1 week off., Disp: 21 Cap, Rfl: 3    letrozole (FEMARA) 2.5 mg tablet, Take 1 Tab by mouth daily. , Disp: 30 Tab, Rfl: 6    fluticasone-salmeterol (ADVAIR DISKUS) 250-50 mcg/dose diskus inhaler, Take 1 Puff by inhalation every twelve (12) hours. Indications: INTRINSIC ASTHMA, Disp: 1 Inhaler, Rfl: 12    DULoxetine (CYMBALTA) 60 mg capsule, Take 1 Cap by mouth daily. , Disp: 30 Cap, Rfl: 12    levothyroxine (SYNTHROID) 75 mcg tablet, Take 1 Tab by mouth Daily (before breakfast).  Indications: hypothyroidism, Disp: 30 Tab, Rfl: 12    albuterol (VENTOLIN) 90 mcg/Actuation inhaler, Take 2 Puffs by inhalation every six (6) hours as needed. Indications: bring on the dos., Disp: , Rfl:     ALLERGIES:   Allergies   Allergen Reactions    Prednisone Unknown (comments)     \"psychological reaction\"       SOCIAL HISTORY:   Social History     Social History    Marital status:      Spouse name: N/A    Number of children: N/A    Years of education: N/A     Occupational History    Not on file.      Social History Main Topics    Smoking status: Never Smoker    Smokeless tobacco: Never Used    Alcohol use No    Drug use: No    Sexual activity: Not on file     Other Topics Concern    Not on file     Social History Narrative       FAMILY HISTORY:   Family History   Problem Relation Age of Onset    Hypertension Mother     Cancer Mother 79     Breast cancer at age 79    Diabetes Mother      Type 2    Other Father      alzheimers    Cancer Maternal Aunt      Breast cancer after age 72    Cancer Maternal Aunt      Breast cancer at age 39   Altagracia Fare Allergic Rhinitis Neg Hx     Allergy-severe Neg Hx     Amblyopia Neg Hx     Anemia Neg Hx     Anesth Problems Neg Hx     Angioedema Neg Hx     Anxiety Neg Hx     Arrhythmia Neg Hx     Arthritis-rheumatoid Neg Hx     Ataxia Neg Hx     Atopy Neg Hx     Bipolar Disorder Neg Hx     Blindness Neg Hx     Breast Cancer Neg Hx     Breast Problems Neg Hx     Broken Bones Neg Hx     Cataract Neg Hx     Celiac Disease Neg Hx     Childhood heart surgery Neg Hx     Childhood resp disease Neg Hx     Chorea Neg Hx     Clotting Disorder Neg Hx     Collagen Dis Neg Hx     Colon Cancer Neg Hx     Colon Polyps Neg Hx     COPD Neg Hx     Coronary Artery Disease Neg Hx     Crohn's Disease Neg Hx     Cystic Fibrosis Neg Hx     Delayed Awakening Neg Hx     Dementia Neg Hx     Depression Neg Hx     Dislocations Neg Hx     Downs Syndrome Neg Hx     Drug Abuse Neg Hx     Eclampsia Neg Hx     Eczema Neg Hx     Emergence Delirium Neg Hx     Emphysema Neg Hx     Fainting Neg Hx     Genitourinary () Neg Hx     Glaucoma Neg Hx     Heart Attack Neg Hx     Heart defect Neg Hx     Heart Failure Neg Hx     Heart Surgery Neg Hx     Hemachromatosis Neg Hx     Herpes Neg Hx     High Cholesterol Neg Hx     HIV/AIDS Neg Hx     Immunodeficiency Neg Hx     Infertility Neg Hx     Inflammatory Bowel Dz Neg Hx     Kidney Disease Neg Hx     Liver Disease Neg Hx     Macular Degen Neg Hx     Malignant Hyperthermia Neg Hx     MS Neg Hx     Neuropathy Neg Hx     NF Neg Hx     Osteoporosis Neg Hx     Ovarian Cancer Neg Hx     Pacemaker Neg Hx     Panic disorder Neg Hx     Parkinsonism Neg Hx     Post-op Cognitive Dysfunction Neg Hx     Post-op Nausea/Vomiting Neg Hx     Prematurity Neg Hx      Labor Neg Hx     Pseudocholinesterase Deficiency Neg Hx     Psoriasis Neg Hx     Psychotic Disorder Neg Hx     Rashes/Skin Problems Neg Hx     Retinal Detachment Neg Hx     Rh Incompatibility Neg Hx     Schizophrenia Neg Hx     Seizures Neg Hx     Severe Sprains Neg Hx     Sickle Cell Anemia Neg Hx     Sickle Cell Trait Neg Hx     SIDS Neg Hx     SLE Neg Hx     Spont Ab Neg Hx     Stomach Cancer Neg Hx     Strabismus Neg Hx     Substance Abuse Neg Hx     Sudden Death Neg Hx     Suicide Neg Hx     Thyroid Disease Neg Hx     Tuberculosis Neg Hx     Ulcerative Colitis Neg Hx     Urticaria Neg Hx     Lang's Disease Neg Hx        REVIEW OF SYSTEMS: Please see the completed review of systems sheet in the chart that I have reviewed today. PHYSICAL EXAMINATION:   ECOG Performance status 0  VITAL SIGNS:   Visit Vitals    /66    Pulse (!) 107    Temp 98.8 °F (37.1 °C)    Resp 18    Wt 129 lb 9.6 oz (58.8 kg)    SpO2 97%    BMI 23.7 kg/m2        GENERAL: The patient is well-developed, ambulatory, alert and in no acute distress. HEENT: Head is normocephalic, atraumatic. NECK: Neck is supple with no masses.  CARDIOVASCULAR: Heart rate tachy at 108, regular rhythm. RESPIRATORY: Lungs are clear to auscultation. There is normal respiratory effort. LYMPHATIC: There is no cervical, supraclavicular or axillary lymphadenopathy bilaterally. BREASTS: Examination of the reconstructed right breast. Skin with tanning. PATHOLOGY:  2/18/16:  DIAGNOSIS  2R Lymph Node, Fine Needle Aspiration:  MALIGNANT CELLS PRESENT. Cell block: Rare malignant cells      9/14/16:     DIAGNOSIS   A: ADDITIONAL AXILLARY CONTENTS: MACROMETASTATIC CARCINOMA TO   9 OF 11 LYMPH NODES WITH EXTRACAPSULAR EXTENSION AND EXTENSIVE LYMPHOVASCULAR INVASION. B: RIGHT BREAST: MICROSCOPIC FOCI OF INFILTRATING DUCT CARCINOMA, INTERMEDIATE GRADE (MODERATELY DIFFERENTIATED), THE MAJORITY OF WHICH APPEAR TO REPRESENT LYMPHOVASCULAR INVASION. SIZE OF TUMOR IS INDETERMINATE BUT IS UP TO 1.5 X 0.8 CM ON SLIDE ON MULTIPLE SLIDES. MARGINS OF RESECTION ARE NEGATIVE FOR MALIGNANT TUMOR. IN SITU COMPONENT IS NOT IDENTIFIED. SEE COMMENT. C: RIGHT AXILLA: MACROMETASTATIC CARCINOMA TO 1 OF 1 LYMPH NODES WITH EXTRACAPSULAR EXTENSION AND EXTENSIVE LYMPHOVASCULAR INVASION. Comment   The morphology of tumor in this specimen is similar to treated carcinoma in prior biopsy, , which has estrogen receptors of 100%, progesterone receptors of 3% and Her-2 negative 1+. If clinically indicated, receptor studies can be repeated in this material (see also [id number])     LABORATORY: none today    RADIOLOGY:    LEFT BREAST DIAGNOSTIC MAMMOGRAM, 7/19/2017.     CLINICAL HISTORY: History of right breast cancer status post right mastectomy. No current left breast complaints.     COMPARISON STUDY: Left breast screening mammogram 1/8/2016, and 5/22/2008.     FINDINGS:     Straight mediolateral view of the left breast as well as CC, and MLO views of  the left breast are submitted for evaluation. The left breast is composed of  scattered fibroglandular elements.  A left node is partially visualized in the  left axilla which shows no obvious worrisome features as visualized. No evolving  skin thickening, or nipple retraction is seen. No evolving worrisome clustered  calcifications are seen. No evolving asymmetry, mass, or architectural changes  are seen of the left breast.    IMPRESSION:  1. No evolving worrisome changes of the left breast to suggest malignancy. Therefore, routine follow-up is recommended with left breast screening mammogram  in one year unless clinically indicated sooner. A reminder letter will be  scheduled.     BI-RADS Assessment Category 2: Benign finding(s). IMPRESSION:  Gurpreet Wahl is a 74 y.o. female with right breast infiltrating ductal carcinoma, high grade, 100% ER, HI 3% and HER2 was negative by IHC, [id number] with positive paratracheal lymph node. [id number]. Stage IV breast cancer by virtue of a single paratracheal lymph node which was confirmed pathologically. She is s/p right breast mastectomy and axillary dissection with tissue expanders in place. She completed radiation therapy of the right chest wall and draining lymphatics, 01/10/17. She is recovering as anticipated from radiation therapy. She is scheduled for reconstruction in September. Since she was last seen, Hilraia Tariq was added in addition to the [facility]  for the high volume sagar disease still present after chemotherapy, under the direction of medical oncology, Dr. Shaun Muñiz. Mammogram done 07/19/17, is negative for malignancy. Plan:  1)  Annual left breast mammogram, July 2018. 2)  AI under the direction of Dr. Shaun Muñiz. Continue close follow up  3)  I will see her every 6 months x2 years, then yearly generally - follow up 6 months  4) I spent 20 minutes in the care of Ms. Sasha Narayanan today, over 50% of which was in direct counseling and ongoing       management of her breast cancer. All questions were answered to the best of my ability.       Jr Scanlon NP  07/21/17      Portions of this note were copied from prior encounters and reviewed for accuracy, currency, and represent documentation and tasks completed during this encounter. I verify and attest these portions to be unchanged from prior visits.

## 2017-07-21 NOTE — PROGRESS NOTES
Pt here today for FUP post RT to the right breast which ended 1/2017. Pt has no c/o today. Pt is taking Femara as ordered. Her 7/19/17 Mammogram was negative.

## 2017-07-26 ENCOUNTER — HOSPITAL ENCOUNTER (OUTPATIENT)
Dept: LAB | Age: 67
Discharge: HOME | End: 2017-07-26
Payer: MEDICARE

## 2017-07-26 DIAGNOSIS — C50.211 MALIGNANT NEOPLASM OF UPPER-INNER QUADRANT OF RIGHT FEMALE BREAST (HCC): ICD-10-CM

## 2017-07-26 LAB
ALBUMIN SERPL BCP-MCNC: 2.9 G/DL (ref 3.2–4.6)
ALBUMIN/GLOB SERPL: 0.7 {RATIO} (ref 1.2–3.5)
ALP SERPL-CCNC: 153 U/L (ref 50–136)
ALT SERPL-CCNC: 18 U/L (ref 12–65)
ANION GAP BLD CALC-SCNC: 6 MMOL/L (ref 7–16)
AST SERPL W P-5'-P-CCNC: 20 U/L (ref 15–37)
BASOPHILS # BLD AUTO: 0.1 K/UL (ref 0–0.2)
BASOPHILS # BLD: 2 % (ref 0–2)
BILIRUB SERPL-MCNC: 0.2 MG/DL (ref 0.2–1.1)
BUN SERPL-MCNC: 15 MG/DL (ref 8–23)
CALCIUM SERPL-MCNC: 8.4 MG/DL (ref 8.3–10.4)
CHLORIDE SERPL-SCNC: 103 MMOL/L (ref 98–107)
CO2 SERPL-SCNC: 27 MMOL/L (ref 21–32)
CREAT SERPL-MCNC: 1.1 MG/DL (ref 0.6–1)
DIFFERENTIAL METHOD BLD: ABNORMAL
EOSINOPHIL # BLD: 0 K/UL (ref 0–0.8)
EOSINOPHIL NFR BLD: 1 % (ref 0.5–7.8)
ERYTHROCYTE [DISTWIDTH] IN BLOOD BY AUTOMATED COUNT: 16.4 % (ref 11.9–14.6)
GLOBULIN SER CALC-MCNC: 4.4 G/DL (ref 2.3–3.5)
GLUCOSE SERPL-MCNC: 116 MG/DL (ref 65–100)
HCT VFR BLD AUTO: 29.6 % (ref 35.8–46.3)
HGB BLD-MCNC: 10.1 G/DL (ref 11.7–15.4)
LYMPHOCYTES # BLD AUTO: 28 % (ref 13–44)
LYMPHOCYTES # BLD: 1.1 K/UL (ref 0.5–4.6)
MCH RBC QN AUTO: 31.5 PG (ref 26.1–32.9)
MCHC RBC AUTO-ENTMCNC: 34.1 G/DL (ref 31.4–35)
MCV RBC AUTO: 92.2 FL (ref 79.6–97.8)
MONOCYTES # BLD: 0.1 K/UL (ref 0.1–1.3)
MONOCYTES NFR BLD AUTO: 3 % (ref 4–12)
NEUTS SEG # BLD: 2.5 K/UL (ref 1.7–8.2)
NEUTS SEG NFR BLD AUTO: 66 % (ref 43–78)
NRBC # BLD: 0 K/UL (ref 0–0.2)
PLATELET # BLD AUTO: 271 K/UL (ref 150–450)
PMV BLD AUTO: 9.5 FL (ref 10.8–14.1)
POTASSIUM SERPL-SCNC: 3.6 MMOL/L (ref 3.5–5.1)
PROT SERPL-MCNC: 7.3 G/DL (ref 6.3–8.2)
RBC # BLD AUTO: 3.21 M/UL (ref 4.05–5.25)
SODIUM SERPL-SCNC: 136 MMOL/L (ref 136–145)
WBC # BLD AUTO: 3.9 K/UL (ref 4.3–11.1)

## 2017-07-26 PROCEDURE — 80053 COMPREHEN METABOLIC PANEL: CPT | Performed by: INTERNAL MEDICINE

## 2017-07-26 PROCEDURE — 85025 COMPLETE CBC W/AUTO DIFF WBC: CPT | Performed by: INTERNAL MEDICINE

## 2017-08-11 ENCOUNTER — HOSPITAL ENCOUNTER (OUTPATIENT)
Dept: PET IMAGING | Age: 67
Discharge: HOME | End: 2017-08-11
Payer: MEDICARE

## 2017-08-11 DIAGNOSIS — C50.211 MALIGNANT NEOPLASM OF UPPER-INNER QUADRANT OF RIGHT FEMALE BREAST (HCC): ICD-10-CM

## 2017-08-11 PROCEDURE — 74011636320 HC RX REV CODE- 636/320: Performed by: INTERNAL MEDICINE

## 2017-08-11 PROCEDURE — A9552 F18 FDG: HCPCS

## 2017-08-11 RX ADMIN — DIATRIZOATE MEGLUMINE AND DIATRIZOATE SODIUM 10 ML: 660; 100 LIQUID ORAL; RECTAL at 11:29

## 2017-08-23 ENCOUNTER — PATIENT OUTREACH (OUTPATIENT)
Dept: CASE MANAGEMENT | Age: 67
End: 2017-08-23

## 2017-08-23 ENCOUNTER — HOSPITAL ENCOUNTER (OUTPATIENT)
Dept: LAB | Age: 67
Discharge: HOME | End: 2017-08-23
Payer: MEDICARE

## 2017-08-23 DIAGNOSIS — C50.211 MALIGNANT NEOPLASM OF UPPER-INNER QUADRANT OF RIGHT FEMALE BREAST (HCC): ICD-10-CM

## 2017-08-23 LAB
ALBUMIN SERPL-MCNC: 2.9 G/DL (ref 3.2–4.6)
ALBUMIN/GLOB SERPL: 0.7 {RATIO} (ref 1.2–3.5)
ALP SERPL-CCNC: 146 U/L (ref 50–136)
ALT SERPL-CCNC: 18 U/L (ref 12–65)
ANION GAP SERPL CALC-SCNC: 6 MMOL/L (ref 7–16)
AST SERPL-CCNC: 21 U/L (ref 15–37)
BASOPHILS # BLD: 0.1 K/UL (ref 0–0.2)
BASOPHILS NFR BLD: 2 % (ref 0–2)
BILIRUB SERPL-MCNC: 0.3 MG/DL (ref 0.2–1.1)
BUN SERPL-MCNC: 13 MG/DL (ref 8–23)
CALCIUM SERPL-MCNC: 8.4 MG/DL (ref 8.3–10.4)
CHLORIDE SERPL-SCNC: 104 MMOL/L (ref 98–107)
CO2 SERPL-SCNC: 27 MMOL/L (ref 21–32)
CREAT SERPL-MCNC: 0.84 MG/DL (ref 0.6–1)
DIFFERENTIAL METHOD BLD: ABNORMAL
EOSINOPHIL # BLD: 0 K/UL (ref 0–0.8)
EOSINOPHIL NFR BLD: 1 % (ref 0.5–7.8)
ERYTHROCYTE [DISTWIDTH] IN BLOOD BY AUTOMATED COUNT: 19.9 % (ref 11.9–14.6)
GLOBULIN SER CALC-MCNC: 4.4 G/DL (ref 2.3–3.5)
GLUCOSE SERPL-MCNC: 114 MG/DL (ref 65–100)
HCT VFR BLD AUTO: 29.6 % (ref 35.8–46.3)
HGB BLD-MCNC: 10.1 G/DL (ref 11.7–15.4)
LYMPHOCYTES # BLD: 1 K/UL (ref 0.5–4.6)
LYMPHOCYTES NFR BLD: 26 % (ref 13–44)
MCH RBC QN AUTO: 33.1 PG (ref 26.1–32.9)
MCHC RBC AUTO-ENTMCNC: 34.1 G/DL (ref 31.4–35)
MCV RBC AUTO: 97 FL (ref 79.6–97.8)
MONOCYTES # BLD: 0.2 K/UL (ref 0.1–1.3)
MONOCYTES NFR BLD: 5 % (ref 4–12)
NEUTS SEG # BLD: 2.6 K/UL (ref 1.7–8.2)
NEUTS SEG NFR BLD: 67 % (ref 43–78)
NRBC # BLD: 0 K/UL (ref 0–0.2)
PLATELET # BLD AUTO: 214 K/UL (ref 150–450)
PMV BLD AUTO: 10.4 FL (ref 10.8–14.1)
POTASSIUM SERPL-SCNC: 4 MMOL/L (ref 3.5–5.1)
PROT SERPL-MCNC: 7.3 G/DL (ref 6.3–8.2)
RBC # BLD AUTO: 3.05 M/UL (ref 4.05–5.25)
SODIUM SERPL-SCNC: 137 MMOL/L (ref 136–145)
WBC # BLD AUTO: 3.9 K/UL (ref 4.3–11.1)

## 2017-08-23 PROCEDURE — 85025 COMPLETE CBC W/AUTO DIFF WBC: CPT | Performed by: INTERNAL MEDICINE

## 2017-08-23 PROCEDURE — 80053 COMPREHEN METABOLIC PANEL: CPT | Performed by: INTERNAL MEDICINE

## 2017-08-23 NOTE — PROGRESS NOTES
8/23/17 saw pt today with Dr. Bernabe Adma for follow up breast cancer and PET review. PET looks good. Tolerating ibrance well. Will continue current treatment plan. PO intake is good. Follow up in 3 months with NP and 6 months with PET and Dyar. Encouraged to call with any concerns. Navigation will continue to follow.

## 2017-09-06 ENCOUNTER — HOSPITAL ENCOUNTER (OUTPATIENT)
Dept: SURGERY | Age: 67
Discharge: HOME | End: 2017-09-06

## 2017-09-06 VITALS
RESPIRATION RATE: 16 BRPM | TEMPERATURE: 95.7 F | BODY MASS INDEX: 23.85 KG/M2 | OXYGEN SATURATION: 98 % | HEART RATE: 79 BPM | WEIGHT: 129.6 LBS | HEIGHT: 62 IN | DIASTOLIC BLOOD PRESSURE: 71 MMHG | SYSTOLIC BLOOD PRESSURE: 121 MMHG

## 2017-09-06 NOTE — PERIOP NOTES
Patient verified name, , and surgery as listed in [facility]. Type 2 surgery, PAT walk in assessment complete. Labs per surgeon: no orders. Labs per anesthesia protocol: hgb; results in cc from 17 within anesthesia guidelines. EKG: none needed per anesthesia guidelines. Hibiclens and instructions given per hospital policy. Patient provided with and instructed on educational handouts including Guide to Surgery, Pain Management, Hand Hygiene, Blood Transfusion Education, and [facility] Brochure. Patient answered medical/surgical history questions at their best of ability. All prior to admission medications documented in [facility]. Original medication prescription bottle not visualized during patient appointment. Patient instructed to hold all vitamins 7 days prior to surgery and NSAIDS 5 days prior to surgery, patient verbalized understanding. Medications to be held: none    Patient instructed to continue previous medications as prescribed prior to surgery and to take the following medications the day of surgery according to anesthesia guidelines with a small sip of water: inhalers, synthroid, xanax if needed. Patient teach back successful and patient demonstrates knowledge of instructions.

## 2017-09-17 ENCOUNTER — ANESTHESIA EVENT (OUTPATIENT)
Dept: SURGERY | Age: 67
DRG: 585 | End: 2017-09-17
Payer: MEDICARE

## 2017-09-17 RX ORDER — SODIUM CHLORIDE 0.9 % (FLUSH) 0.9 %
5-10 SYRINGE (ML) INJECTION EVERY 8 HOURS
Status: CANCELLED | OUTPATIENT
Start: 2017-09-17

## 2017-09-17 RX ORDER — SODIUM CHLORIDE 0.9 % (FLUSH) 0.9 %
5-10 SYRINGE (ML) INJECTION AS NEEDED
Status: CANCELLED | OUTPATIENT
Start: 2017-09-17

## 2017-09-18 ENCOUNTER — HOSPITAL ENCOUNTER (INPATIENT)
Age: 67
LOS: 1 days | Discharge: HOME | DRG: 585 | End: 2017-09-19
Attending: PLASTIC SURGERY | Admitting: PLASTIC SURGERY
Payer: MEDICARE

## 2017-09-18 ENCOUNTER — ANESTHESIA (OUTPATIENT)
Dept: SURGERY | Age: 67
DRG: 585 | End: 2017-09-18
Payer: MEDICARE

## 2017-09-18 PROBLEM — C50.919 BREAST CANCER (HCC): Status: ACTIVE | Noted: 2017-09-18

## 2017-09-18 PROCEDURE — C1789 PROSTHESIS, BREAST, IMP: HCPCS | Performed by: PLASTIC SURGERY

## 2017-09-18 PROCEDURE — 77030011266 HC ELECTRD BLD INSL COVD -A: Performed by: PLASTIC SURGERY

## 2017-09-18 PROCEDURE — 76010000175 HC OR TIME 4 TO 4.5 HR INTENSV-TIER 1: Performed by: PLASTIC SURGERY

## 2017-09-18 PROCEDURE — 76060000039 HC ANESTHESIA 4 TO 4.5 HR: Performed by: PLASTIC SURGERY

## 2017-09-18 PROCEDURE — 74011000258 HC RX REV CODE- 258: Performed by: PLASTIC SURGERY

## 2017-09-18 PROCEDURE — 74011250636 HC RX REV CODE- 250/636

## 2017-09-18 PROCEDURE — 77030008703 HC TU ET UNCUF COVD -A: Performed by: ANESTHESIOLOGY

## 2017-09-18 PROCEDURE — 77030016570 HC BLNKT BAIR HGGR 3M -B: Performed by: ANESTHESIOLOGY

## 2017-09-18 PROCEDURE — 77030032490 HC SLV COMPR SCD KNE COVD -B

## 2017-09-18 PROCEDURE — 94760 N-INVAS EAR/PLS OXIMETRY 1: CPT

## 2017-09-18 PROCEDURE — 77030034849: Performed by: PLASTIC SURGERY

## 2017-09-18 PROCEDURE — 74011250636 HC RX REV CODE- 250/636: Performed by: ANESTHESIOLOGY

## 2017-09-18 PROCEDURE — 74011250636 HC RX REV CODE- 250/636: Performed by: PLASTIC SURGERY

## 2017-09-18 PROCEDURE — 77030013567 HC DRN WND RESERV BARD -A: Performed by: PLASTIC SURGERY

## 2017-09-18 PROCEDURE — 74011000250 HC RX REV CODE- 250: Performed by: ANESTHESIOLOGY

## 2017-09-18 PROCEDURE — 0HRT0JZ REPLACEMENT OF RIGHT BREAST WITH SYNTHETIC SUBSTITUTE, OPEN APPROACH: ICD-10-PCS | Performed by: PLASTIC SURGERY

## 2017-09-18 PROCEDURE — 74011000250 HC RX REV CODE- 250

## 2017-09-18 PROCEDURE — 99218 HC RM OBSERVATION: CPT

## 2017-09-18 PROCEDURE — 77030031139 HC SUT VCRL2 J&J -A: Performed by: PLASTIC SURGERY

## 2017-09-18 PROCEDURE — 77030011825 HC SUPP SURG PSTOP S2SG -B: Performed by: PLASTIC SURGERY

## 2017-09-18 PROCEDURE — 77030020782 HC GWN BAIR PAWS FLX 3M -B: Performed by: ANESTHESIOLOGY

## 2017-09-18 PROCEDURE — 77030026227 HC FUNL KELLR 2 PCH ALGN -C: Performed by: PLASTIC SURGERY

## 2017-09-18 PROCEDURE — 74011000250 HC RX REV CODE- 250: Performed by: PLASTIC SURGERY

## 2017-09-18 PROCEDURE — 77030008477 HC STYL SATN SLP COVD -A: Performed by: ANESTHESIOLOGY

## 2017-09-18 PROCEDURE — 0KXF0ZZ TRANSFER RIGHT TRUNK MUSCLE, OPEN APPROACH: ICD-10-PCS | Performed by: PLASTIC SURGERY

## 2017-09-18 PROCEDURE — 77030005325: Performed by: PLASTIC SURGERY

## 2017-09-18 PROCEDURE — 77030011640 HC PAD GRND REM COVD -A: Performed by: PLASTIC SURGERY

## 2017-09-18 PROCEDURE — 77030002933 HC SUT MCRYL J&J -A: Performed by: PLASTIC SURGERY

## 2017-09-18 PROCEDURE — 77030002916 HC SUT ETHLN J&J -A: Performed by: PLASTIC SURGERY

## 2017-09-18 PROCEDURE — 77030032490 HC SLV COMPR SCD KNE COVD -B: Performed by: PLASTIC SURGERY

## 2017-09-18 PROCEDURE — 77030012406 HC DRN WND PENRS BARD -A: Performed by: PLASTIC SURGERY

## 2017-09-18 PROCEDURE — 76210000001 HC OR PH I REC 2.5 TO 3 HR: Performed by: PLASTIC SURGERY

## 2017-09-18 PROCEDURE — 0H0U0ZZ ALTERATION OF LEFT BREAST, OPEN APPROACH: ICD-10-PCS | Performed by: PLASTIC SURGERY

## 2017-09-18 PROCEDURE — 74011250637 HC RX REV CODE- 250/637: Performed by: PLASTIC SURGERY

## 2017-09-18 PROCEDURE — 77030008467 HC STPLR SKN COVD -B: Performed by: PLASTIC SURGERY

## 2017-09-18 PROCEDURE — 77010033678 HC OXYGEN DAILY

## 2017-09-18 PROCEDURE — 77030018836 HC SOL IRR NACL ICUM -A: Performed by: PLASTIC SURGERY

## 2017-09-18 PROCEDURE — 0HPT0NZ REMOVAL OF TISSUE EXPANDER FROM RIGHT BREAST, OPEN APPROACH: ICD-10-PCS | Performed by: PLASTIC SURGERY

## 2017-09-18 DEVICE — SMOOTH ROUND ULTRA HIGH PROFILE SILICONE GEL-FILLED BREAST IMPLANT, 430CC  SMOOTH ROUND SILICONE
Type: IMPLANTABLE DEVICE | Site: BREAST | Status: FUNCTIONAL
Brand: MENTOR MEMORYGEL BREAST IMPLANT

## 2017-09-18 RX ORDER — MORPHINE SULFATE 2 MG/ML
2 INJECTION, SOLUTION INTRAMUSCULAR; INTRAVENOUS
Status: DISCONTINUED | OUTPATIENT
Start: 2017-09-18 | End: 2017-09-19 | Stop reason: HOSPADM

## 2017-09-18 RX ORDER — NALOXONE HYDROCHLORIDE 0.4 MG/ML
0.4 INJECTION, SOLUTION INTRAMUSCULAR; INTRAVENOUS; SUBCUTANEOUS AS NEEDED
Status: DISCONTINUED | OUTPATIENT
Start: 2017-09-18 | End: 2017-09-19 | Stop reason: HOSPADM

## 2017-09-18 RX ORDER — BACITRACIN ZINC 500 UNIT/G
OINTMENT (GRAM) TOPICAL AS NEEDED
Status: DISCONTINUED | OUTPATIENT
Start: 2017-09-18 | End: 2017-09-18 | Stop reason: HOSPADM

## 2017-09-18 RX ORDER — BUPIVACAINE HYDROCHLORIDE 5 MG/ML
INJECTION, SOLUTION EPIDURAL; INTRACAUDAL AS NEEDED
Status: DISCONTINUED | OUTPATIENT
Start: 2017-09-18 | End: 2017-09-18 | Stop reason: HOSPADM

## 2017-09-18 RX ORDER — CEFAZOLIN SODIUM IN 0.9 % NACL 2 G/50 ML
2 INTRAVENOUS SOLUTION, PIGGYBACK (ML) INTRAVENOUS ONCE
Status: COMPLETED | OUTPATIENT
Start: 2017-09-18 | End: 2017-09-18

## 2017-09-18 RX ORDER — HYDROMORPHONE HYDROCHLORIDE 2 MG/ML
0.5 INJECTION, SOLUTION INTRAMUSCULAR; INTRAVENOUS; SUBCUTANEOUS
Status: DISCONTINUED | OUTPATIENT
Start: 2017-09-18 | End: 2017-09-18 | Stop reason: HOSPADM

## 2017-09-18 RX ORDER — LEVOTHYROXINE SODIUM 75 UG/1
75 TABLET ORAL
Status: DISCONTINUED | OUTPATIENT
Start: 2017-09-19 | End: 2017-09-19 | Stop reason: HOSPADM

## 2017-09-18 RX ORDER — DEXAMETHASONE SODIUM PHOSPHATE 4 MG/ML
INJECTION, SOLUTION INTRA-ARTICULAR; INTRALESIONAL; INTRAMUSCULAR; INTRAVENOUS; SOFT TISSUE AS NEEDED
Status: DISCONTINUED | OUTPATIENT
Start: 2017-09-18 | End: 2017-09-18 | Stop reason: HOSPADM

## 2017-09-18 RX ORDER — LIDOCAINE HYDROCHLORIDE 20 MG/ML
INJECTION, SOLUTION EPIDURAL; INFILTRATION; INTRACAUDAL; PERINEURAL AS NEEDED
Status: DISCONTINUED | OUTPATIENT
Start: 2017-09-18 | End: 2017-09-18 | Stop reason: HOSPADM

## 2017-09-18 RX ORDER — DIPHENHYDRAMINE HYDROCHLORIDE 50 MG/ML
12.5 INJECTION, SOLUTION INTRAMUSCULAR; INTRAVENOUS
Status: DISCONTINUED | OUTPATIENT
Start: 2017-09-18 | End: 2017-09-19 | Stop reason: HOSPADM

## 2017-09-18 RX ORDER — SODIUM CHLORIDE, SODIUM LACTATE, POTASSIUM CHLORIDE, CALCIUM CHLORIDE 600; 310; 30; 20 MG/100ML; MG/100ML; MG/100ML; MG/100ML
100 INJECTION, SOLUTION INTRAVENOUS CONTINUOUS
Status: DISCONTINUED | OUTPATIENT
Start: 2017-09-18 | End: 2017-09-18 | Stop reason: HOSPADM

## 2017-09-18 RX ORDER — BACITRACIN 50000 [IU]/1
INJECTION, POWDER, FOR SOLUTION INTRAMUSCULAR AS NEEDED
Status: DISCONTINUED | OUTPATIENT
Start: 2017-09-18 | End: 2017-09-18 | Stop reason: HOSPADM

## 2017-09-18 RX ORDER — SODIUM CHLORIDE 0.9 % (FLUSH) 0.9 %
5-10 SYRINGE (ML) INJECTION EVERY 8 HOURS
Status: DISCONTINUED | OUTPATIENT
Start: 2017-09-18 | End: 2017-09-19 | Stop reason: HOSPADM

## 2017-09-18 RX ORDER — HYDROCODONE BITARTRATE AND ACETAMINOPHEN 7.5; 325 MG/1; MG/1
2 TABLET ORAL
Status: DISCONTINUED | OUTPATIENT
Start: 2017-09-18 | End: 2017-09-19 | Stop reason: HOSPADM

## 2017-09-18 RX ORDER — DULOXETIN HYDROCHLORIDE 60 MG/1
60 CAPSULE, DELAYED RELEASE ORAL
Status: DISCONTINUED | OUTPATIENT
Start: 2017-09-18 | End: 2017-09-19 | Stop reason: HOSPADM

## 2017-09-18 RX ORDER — ENOXAPARIN SODIUM 100 MG/ML
40 INJECTION SUBCUTANEOUS EVERY 24 HOURS
Status: DISCONTINUED | OUTPATIENT
Start: 2017-09-18 | End: 2017-09-19 | Stop reason: HOSPADM

## 2017-09-18 RX ORDER — MIDAZOLAM HYDROCHLORIDE 1 MG/ML
2 INJECTION, SOLUTION INTRAMUSCULAR; INTRAVENOUS
Status: DISCONTINUED | OUTPATIENT
Start: 2017-09-18 | End: 2017-09-18 | Stop reason: HOSPADM

## 2017-09-18 RX ORDER — ROCURONIUM BROMIDE 10 MG/ML
INJECTION, SOLUTION INTRAVENOUS AS NEEDED
Status: DISCONTINUED | OUTPATIENT
Start: 2017-09-18 | End: 2017-09-18 | Stop reason: HOSPADM

## 2017-09-18 RX ORDER — DEXTROSE, SODIUM CHLORIDE, AND POTASSIUM CHLORIDE 5; .45; .15 G/100ML; G/100ML; G/100ML
75 INJECTION INTRAVENOUS CONTINUOUS
Status: DISCONTINUED | OUTPATIENT
Start: 2017-09-18 | End: 2017-09-19 | Stop reason: HOSPADM

## 2017-09-18 RX ORDER — ONDANSETRON 2 MG/ML
4 INJECTION INTRAMUSCULAR; INTRAVENOUS
Status: DISCONTINUED | OUTPATIENT
Start: 2017-09-18 | End: 2017-09-19 | Stop reason: HOSPADM

## 2017-09-18 RX ORDER — SODIUM CHLORIDE 0.9 % (FLUSH) 0.9 %
5-10 SYRINGE (ML) INJECTION AS NEEDED
Status: DISCONTINUED | OUTPATIENT
Start: 2017-09-18 | End: 2017-09-19 | Stop reason: HOSPADM

## 2017-09-18 RX ORDER — ONDANSETRON 2 MG/ML
INJECTION INTRAMUSCULAR; INTRAVENOUS AS NEEDED
Status: DISCONTINUED | OUTPATIENT
Start: 2017-09-18 | End: 2017-09-18 | Stop reason: HOSPADM

## 2017-09-18 RX ORDER — ALBUTEROL SULFATE 90 UG/1
2 AEROSOL, METERED RESPIRATORY (INHALATION)
Status: DISCONTINUED | OUTPATIENT
Start: 2017-09-18 | End: 2017-09-19 | Stop reason: HOSPADM

## 2017-09-18 RX ORDER — FLUMAZENIL 0.1 MG/ML
0.2 INJECTION INTRAVENOUS
Status: DISCONTINUED | OUTPATIENT
Start: 2017-09-18 | End: 2017-09-18 | Stop reason: HOSPADM

## 2017-09-18 RX ORDER — CEFAZOLIN SODIUM 1 G/3ML
1 INJECTION, POWDER, FOR SOLUTION INTRAMUSCULAR; INTRAVENOUS EVERY 8 HOURS
Status: DISCONTINUED | OUTPATIENT
Start: 2017-09-18 | End: 2017-09-18 | Stop reason: SDUPTHER

## 2017-09-18 RX ORDER — CYCLOBENZAPRINE HCL 10 MG
10 TABLET ORAL 3 TIMES DAILY
Status: DISCONTINUED | OUTPATIENT
Start: 2017-09-18 | End: 2017-09-19 | Stop reason: HOSPADM

## 2017-09-18 RX ORDER — LIDOCAINE HYDROCHLORIDE 10 MG/ML
0.1 INJECTION INFILTRATION; PERINEURAL AS NEEDED
Status: DISCONTINUED | OUTPATIENT
Start: 2017-09-18 | End: 2017-09-18 | Stop reason: HOSPADM

## 2017-09-18 RX ORDER — NALOXONE HYDROCHLORIDE 0.4 MG/ML
0.1 INJECTION, SOLUTION INTRAMUSCULAR; INTRAVENOUS; SUBCUTANEOUS
Status: DISCONTINUED | OUTPATIENT
Start: 2017-09-18 | End: 2017-09-18 | Stop reason: HOSPADM

## 2017-09-18 RX ORDER — ACETAMINOPHEN 10 MG/ML
INJECTION, SOLUTION INTRAVENOUS AS NEEDED
Status: DISCONTINUED | OUTPATIENT
Start: 2017-09-18 | End: 2017-09-18 | Stop reason: HOSPADM

## 2017-09-18 RX ORDER — PROPOFOL 10 MG/ML
INJECTION, EMULSION INTRAVENOUS AS NEEDED
Status: DISCONTINUED | OUTPATIENT
Start: 2017-09-18 | End: 2017-09-18 | Stop reason: HOSPADM

## 2017-09-18 RX ORDER — NALBUPHINE HYDROCHLORIDE 10 MG/ML
5 INJECTION, SOLUTION INTRAMUSCULAR; INTRAVENOUS; SUBCUTANEOUS
Status: DISCONTINUED | OUTPATIENT
Start: 2017-09-18 | End: 2017-09-18 | Stop reason: HOSPADM

## 2017-09-18 RX ORDER — FENTANYL CITRATE 50 UG/ML
INJECTION, SOLUTION INTRAMUSCULAR; INTRAVENOUS AS NEEDED
Status: DISCONTINUED | OUTPATIENT
Start: 2017-09-18 | End: 2017-09-18 | Stop reason: HOSPADM

## 2017-09-18 RX ORDER — OXYCODONE HYDROCHLORIDE 5 MG/1
5 TABLET ORAL
Status: DISCONTINUED | OUTPATIENT
Start: 2017-09-18 | End: 2017-09-18 | Stop reason: HOSPADM

## 2017-09-18 RX ORDER — SODIUM CHLORIDE 0.9 % (FLUSH) 0.9 %
5-10 SYRINGE (ML) INJECTION AS NEEDED
Status: DISCONTINUED | OUTPATIENT
Start: 2017-09-18 | End: 2017-09-18 | Stop reason: HOSPADM

## 2017-09-18 RX ADMIN — CEFAZOLIN SODIUM 1 G: 1 INJECTION, POWDER, FOR SOLUTION INTRAMUSCULAR; INTRAVENOUS at 18:47

## 2017-09-18 RX ADMIN — FENTANYL CITRATE 50 MCG: 50 INJECTION, SOLUTION INTRAMUSCULAR; INTRAVENOUS at 08:00

## 2017-09-18 RX ADMIN — ROCURONIUM BROMIDE 20 MG: 10 INJECTION, SOLUTION INTRAVENOUS at 09:29

## 2017-09-18 RX ADMIN — DEXTROSE MONOHYDRATE, SODIUM CHLORIDE, AND POTASSIUM CHLORIDE 75 ML/HR: 50; 4.5; 1.49 INJECTION, SOLUTION INTRAVENOUS at 18:36

## 2017-09-18 RX ADMIN — CEFAZOLIN 2 G: 1 INJECTION, POWDER, FOR SOLUTION INTRAMUSCULAR; INTRAVENOUS; PARENTERAL at 07:54

## 2017-09-18 RX ADMIN — ONDANSETRON 4 MG: 2 INJECTION INTRAMUSCULAR; INTRAVENOUS at 08:45

## 2017-09-18 RX ADMIN — FENTANYL CITRATE 25 MCG: 50 INJECTION, SOLUTION INTRAMUSCULAR; INTRAVENOUS at 10:19

## 2017-09-18 RX ADMIN — LIDOCAINE HYDROCHLORIDE 0.1 ML: 10 INJECTION, SOLUTION INFILTRATION; PERINEURAL at 06:53

## 2017-09-18 RX ADMIN — DEXAMETHASONE SODIUM PHOSPHATE 4 MG: 4 INJECTION, SOLUTION INTRA-ARTICULAR; INTRALESIONAL; INTRAMUSCULAR; INTRAVENOUS; SOFT TISSUE at 08:45

## 2017-09-18 RX ADMIN — LIDOCAINE HYDROCHLORIDE 60 MG: 20 INJECTION, SOLUTION EPIDURAL; INFILTRATION; INTRACAUDAL; PERINEURAL at 08:00

## 2017-09-18 RX ADMIN — CEFAZOLIN SODIUM 1 G: 1 INJECTION, POWDER, FOR SOLUTION INTRAMUSCULAR; INTRAVENOUS at 23:49

## 2017-09-18 RX ADMIN — CYCLOBENZAPRINE HYDROCHLORIDE 10 MG: 10 TABLET, FILM COATED ORAL at 18:34

## 2017-09-18 RX ADMIN — ENOXAPARIN SODIUM 40 MG: 40 INJECTION SUBCUTANEOUS at 22:17

## 2017-09-18 RX ADMIN — FENTANYL CITRATE 25 MCG: 50 INJECTION, SOLUTION INTRAMUSCULAR; INTRAVENOUS at 10:55

## 2017-09-18 RX ADMIN — CYCLOBENZAPRINE HYDROCHLORIDE 10 MG: 10 TABLET, FILM COATED ORAL at 22:17

## 2017-09-18 RX ADMIN — FENTANYL CITRATE 25 MCG: 50 INJECTION, SOLUTION INTRAMUSCULAR; INTRAVENOUS at 11:07

## 2017-09-18 RX ADMIN — SODIUM CHLORIDE, SODIUM LACTATE, POTASSIUM CHLORIDE, AND CALCIUM CHLORIDE: 600; 310; 30; 20 INJECTION, SOLUTION INTRAVENOUS at 09:00

## 2017-09-18 RX ADMIN — FENTANYL CITRATE 50 MCG: 50 INJECTION, SOLUTION INTRAMUSCULAR; INTRAVENOUS at 08:10

## 2017-09-18 RX ADMIN — PROPOFOL 150 MG: 10 INJECTION, EMULSION INTRAVENOUS at 08:00

## 2017-09-18 RX ADMIN — Medication 5 ML: at 22:17

## 2017-09-18 RX ADMIN — ACETAMINOPHEN 1000 MG: 10 INJECTION, SOLUTION INTRAVENOUS at 11:34

## 2017-09-18 RX ADMIN — HYDROMORPHONE HYDROCHLORIDE 0.5 MG: 2 INJECTION, SOLUTION INTRAMUSCULAR; INTRAVENOUS; SUBCUTANEOUS at 12:43

## 2017-09-18 RX ADMIN — FENTANYL CITRATE 25 MCG: 50 INJECTION, SOLUTION INTRAMUSCULAR; INTRAVENOUS at 09:29

## 2017-09-18 RX ADMIN — DULOXETINE HYDROCHLORIDE 60 MG: 60 CAPSULE, DELAYED RELEASE ORAL at 22:17

## 2017-09-18 RX ADMIN — SODIUM CHLORIDE, SODIUM LACTATE, POTASSIUM CHLORIDE, AND CALCIUM CHLORIDE 100 ML/HR: 600; 310; 30; 20 INJECTION, SOLUTION INTRAVENOUS at 06:53

## 2017-09-18 RX ADMIN — HYDROMORPHONE HYDROCHLORIDE 0.5 MG: 2 INJECTION, SOLUTION INTRAMUSCULAR; INTRAVENOUS; SUBCUTANEOUS at 12:32

## 2017-09-18 RX ADMIN — ROCURONIUM BROMIDE 50 MG: 10 INJECTION, SOLUTION INTRAVENOUS at 08:00

## 2017-09-18 NOTE — ANESTHESIA POSTPROCEDURE EVALUATION
Post-Anesthesia Evaluation and Assessment    Patient: Edwina Sweeney MRN: [id number]  SSN: [id number]    YOB: 1950  Age: 74 y.o. Sex: female       Cardiovascular Function/Vital Signs  Visit Vitals    /61    Pulse 95    Temp 36.5 °C (97.7 °F)    Resp 16    Ht 5' 2\" (1.575 m)    Wt 58.8 kg (129 lb 9.6 oz)    SpO2 99%    BMI 23.7 kg/m2       Patient is status post general anesthesia for Procedure(s):  RIGHT BREAST RECONSTRUCTION  LATISSIMUS DORSI FLAP  LEFT BREAST MASTOPEXY FOR SYMMETRY  Right  PLACEMENT OF PERMANENT IMPLANTS. Nausea/Vomiting: None    Postoperative hydration reviewed and adequate. Pain:  Pain Scale 1: Visual (09/18/17 1325)  Pain Intensity 1: 0 (09/18/17 1325)   Managed    Neurological Status:   Neuro (WDL): Within Defined Limits (09/18/17 1325)  Neuro  Neurologic State: Alert (09/18/17 1325)  Cognition: Follows commands (09/18/17 1325)  LUE Motor Response: Purposeful (09/18/17 1255)  LLE Motor Response: Purposeful (09/18/17 1255)  RUE Motor Response: Purposeful (09/18/17 1255)  RLE Motor Response: Purposeful (09/18/17 1255)   At baseline    Mental Status and Level of Consciousness: Arousable    Pulmonary Status:   O2 Device: Nasal cannula (09/18/17 1255)   Adequate oxygenation and airway patent    Complications related to anesthesia: None    Post-anesthesia assessment completed.  No concerns    Signed By: Candy Martinez MD     September 18, 2017

## 2017-09-18 NOTE — ANESTHESIA PREPROCEDURE EVALUATION
Anesthetic History   No history of anesthetic complications            Review of Systems / Medical History  Nursing notes reviewed and pertinent labs reviewed    Pulmonary            Asthma : well controlled       Neuro/Psych         Psychiatric history (Depression)     Cardiovascular              Hyperlipidemia (Diet controlled)    Exercise tolerance: >4 METS  Comments: Denies recent CP, SOB or Palpitations    Echo done in Feb 2017 prior to initiation of chemo showed EF 60-65%, mild AI, mild MR.   GI/Hepatic/Renal  Within defined limits              Endo/Other        Cancer (Breast cancer) and anemia     Other Findings            Physical Exam    Airway  Mallampati: II  TM Distance: > 6 cm  Neck ROM: normal range of motion   Mouth opening: Normal     Cardiovascular  Regular rate and rhythm,  S1 and S2 normal,  no murmur, click, rub, or gallop             Dental  No notable dental hx       Pulmonary  Breath sounds clear to auscultation               Abdominal  GI exam deferred       Other Findings            Anesthetic Plan    ASA: 2  Anesthesia type: general          Induction: Intravenous  Anesthetic plan and risks discussed with: Patient

## 2017-09-18 NOTE — PERIOP NOTES
TRANSFER - OUT REPORT:    Verbal report given to [employer], RN on Orly Silence  being transferred to [facility] for routine progression of care       Report consisted of patients Situation, Background, Assessment and   Recommendations(SBAR). Information from the following report(s) SBAR, Kardex, OR Summary, Intake/Output and MAR was reviewed with the receiving nurse. Lines:   Venous Access Device Port 02/05/16 Upper chest (subclavicular area), left (Active)       Peripheral IV 09/18/17 Left Wrist (Active)   Site Assessment Clean, dry, & intact 9/18/2017  1:45 PM   Phlebitis Assessment 0 9/18/2017  1:45 PM   Infiltration Assessment 0 9/18/2017  1:45 PM   Dressing Status Clean, dry, & intact 9/18/2017  1:45 PM   Dressing Type Tape;Transparent 9/18/2017  1:45 PM   Hub Color/Line Status Infusing;Patent 9/18/2017  1:45 PM       Subcutaneous Infusion Line 09/14/16 Right Other (comment) (Active)       Subcutaneous Infusion Line 09/18/17 Right Abdomen (Active)   Site Assessment Clean, dry, & intact 9/18/2017  1:45 PM   Dressing Status Clean, dry, & intact 9/18/2017  9:08 AM   Dressing Type Tape;Other (comments) 9/18/2017  9:08 AM        Opportunity for questions and clarification was provided.       Patient transported with:   O2 @ 3 liters

## 2017-09-18 NOTE — BRIEF OP NOTE
BRIEF OPERATIVE NOTE    Date of Procedure: 9/18/2017   Preoperative Diagnosis: Malignant neoplasm of right female breast, unspecified site of breast ([facility] [C50.911]  Postoperative Diagnosis: Malignant neoplasm of right female breast, unspecified site of [facility] ([facility] [C50.911]    Procedure(s):  RIGHT BREAST RECONSTRUCTION  LATISSIMUS DORSI FLAP  LEFT BREAST MASTOPEXY FOR SYMMETRY  Right  PLACEMENT OF PERMANENT IMPLANTS  Surgeon(s) and Role:     * Dany Willoughby MD - Primary         Assistant Staff:       Surgical Staff:  Circ-1: Valente Butler RN  Circ-Relief: Freddie Blanco RN  Scrub Tech-1: Adrián Marion  Scrub Tech-2: Titus Logan  Event Time In   Incision Start 4638   Incision Close      Anesthesia: General   Estimated Blood Loss: 75cc  Specimens: * No specimens in log *   Findings: none   Complications: none  Implants:   Implant Name Type Inv.  Item Serial No.  Lot No. LRB No. Used Action   IMPL BRST GEL SR ULTR HI 430ML -- COHESIVE I - [id number]   IMPL BRST GEL SR ULTR HI 430ML -- COHESIVE I [id number] [employer] [id number] Right 1 Implanted

## 2017-09-18 NOTE — ROUTINE PROCESS
Report from [facility] RN on Mrs. Blanco Seen who is being admitted to the unit for routine progression of care. ... Report on Situation,Background,Assessment,and Recommendation 9SBAR). .. The following report also given,,,,OR summaery,,,Intake and Output,,,MAR's,,,Accordion. ... Opportunity for questions and for clarifications also provided by reporting nurse. ..... Kris Breen

## 2017-09-18 NOTE — IP AVS SNAPSHOT
Marquise Big 
 
 
 [address] [place] [address] 
[phone] Patient: Ryan Barbour MRN: [id number] :1950 You are allergic to the following Allergen Reactions Prednisone Unknown (comments) \"psychological reaction\" Recent Documentation Height Weight BMI OB Status Smoking Status 1.575 m 58.8 kg 23.7 kg/m2 Hysterectomy Never Smoker Emergency Contacts Name Discharge Info Relation Home Work Mobile Christen Brothers DISCHARGE CAREGIVER [3] Spouse [3] [phone] About your hospitalization You were admitted on:  2017 [facility] You were discharged on:  2017 Unit phone number:  [phone] Why you were hospitalized Your primary diagnosis was:  Not on File Your diagnoses also included:  Breast Cancer (Hcc), S/P Breast Reconstruction, Right Providers Seen During Your Hospitalizations Provider Role Specialty Primary office phone Mt Glasgow MD Attending Provider Plastic Surgery [phone] Your Primary Care Physician (PCP) Primary Care Physician Office Phone Office Fax Harriet Corona [phone] Follow-up Information Follow up With Details Comments Contact Info Cathie Park MD   [address] [place] [address] 
[phone] Mt Glasgow MD On 2017 10:30am [address] [facility] [address] [facility] [address] 
[phone] Current Discharge Medication List  
  
CONTINUE these medications which have CHANGED Dose & Instructions Dispensing Information Comments Morning Noon Evening Bedtime DULoxetine 60 mg capsule Commonly known as:  CYMBALTA What changed:  when to take this Your last dose was:     
   
Your next dose is:    
   
   
 Dose:  60 mg  
 Take 1 Cap by mouth daily. Quantity:  30 Cap Refills:  12  
     
   
   
   
  
 fluticasone-salmeterol 250-50 mcg/dose diskus inhaler Commonly known as:  ADVAIR DISKUS What changed:  when to take this Your last dose was: Your next dose is:    
   
   
 Dose:  1 Puff Take 1 Puff by inhalation every twelve (12) hours. Indications: INTRINSIC ASTHMA Quantity:  1 Inhaler Refills:  12  
     
   
   
   
  
 letrozole 2.5 mg tablet Commonly known as:  [facility] What changed:  when to take this Your last dose was: Your next dose is:    
   
   
 Dose:  2.5 mg Take 1 Tab by mouth daily. Quantity:  30 Tab Refills:  6  
     
   
   
   
  
 palbociclib 75 mg Cap Commonly known as:  [employer] What changed:   
- when to take this 
- additional instructions Your last dose was: Your next dose is:    
   
   
 Dose:  75 mg Take 75 mg by mouth daily. For 3 weeks on and 1 week off. Quantity:  21 Cap Refills:  3 CONTINUE these medications which have NOT CHANGED Dose & Instructions Dispensing Information Comments Morning Noon Evening Bedtime ALPRAZolam 0.5 mg tablet Commonly known as:  Sayra Brown Your last dose was: Your next dose is:    
   
   
 1 bid to qid prn  Indications: anxiety, sleep Quantity:  120 Tab Refills:  5  
     
   
   
   
  
 levothyroxine 75 mcg tablet Commonly known as:  SYNTHROID Your last dose was: Your next dose is:    
   
   
 Dose:  75 mcg Take 1 Tab by mouth Daily (before breakfast). Indications: hypothyroidism Quantity:  30 Tab Refills:  12 VENTOLIN 90 mcg/actuation inhaler Generic drug:  albuterol Your last dose was: Your next dose is:    
   
   
 Dose:  2 Puff Take 2 Puffs by inhalation every six (6) hours as needed. Indications: bring on the dos. Refills:  0 Discharge Instructions DISCHARGE SUMMARY from Nurse The following personal items are in your possession at time of discharge: 
 
Dental Appliances: None Visual Aid: Glasses, At bedside PATIENT INSTRUCTIONS: 
 
After general anesthesia or intravenous sedation, for 24 hours or while taking prescription Narcotics: · Limit your activities · Do not drive and operate hazardous machinery · Do not make important personal or business decisions · Do  not drink alcoholic beverages · If you have not urinated within 8 hours after discharge, please contact your surgeon on call. Report the following to your surgeon: 
· Excessive pain, swelling, redness or odor of or around the surgical area · Temperature over 100.5 · Nausea and vomiting lasting longer than 4 hours or if unable to take medications · Any signs of decreased circulation or nerve impairment to extremity: change in color, persistent  numbness, tingling, coldness or increase pain · Any questions What to do at Home: 
Recommended activity: Activity as tolerated, per MD 
 
If you experience any of the following symptoms fever>101, pain unrelieved with medication, nausea/vomiting, shortness of breath, dizziness/fainting, chest pain. , please follow up with your doctor. *  Please give a list of your current medications to your Primary Care Provider. *  Please update this list whenever your medications are discontinued, doses are 
    changed, or new medications (including over-the-counter products) are added. *  Please carry medication information at all times in case of emergency situations. These are general instructions for a healthy lifestyle: No smoking/ No tobacco products/ Avoid exposure to second hand smoke Surgeon General's Warning:  Quitting smoking now greatly reduces serious risk to your health. Obesity, smoking, and sedentary lifestyle greatly increases your risk for illness A healthy diet, regular physical exercise & weight monitoring are important for maintaining a healthy lifestyle You may be retaining fluid if you have a history of heart failure or if you experience any of the following symptoms:  Weight gain of 3 pounds or more overnight or 5 pounds in a week, increased swelling in our hands or feet or shortness of breath while lying flat in bed. Please call your doctor as soon as you notice any of these symptoms; do not wait until your next office visit. Recognize signs and symptoms of STROKE: 
 
F-face looks uneven A-arms unable to move or move unevenly S-speech slurred or non-existent T-time-call 911 as soon as signs and symptoms begin-DO NOT go Back to bed or wait to see if you get better-TIME IS BRAIN. Warning Signs of HEART ATTACK Call 911 if you have these symptoms: 
? Chest discomfort. Most heart attacks involve discomfort in the center of the chest that lasts more than a few minutes, or that goes away and comes back. It can feel like uncomfortable pressure, squeezing, fullness, or pain. ? Discomfort in other areas of the upper body. Symptoms can include pain or discomfort in one or both arms, the back, neck, jaw, or stomach. ? Shortness of breath with or without chest discomfort. ? Other signs may include breaking out in a cold sweat, nausea, or lightheadedness. Don't wait more than five minutes to call [address]! Fast action can save your life. Calling 911 is almost always the fastest way to get lifesaving treatment. Emergency Medical Services staff can begin treatment when they arrive  up to an hour sooner than if someone gets to the hospital by car. The discharge information has been reviewed with the patient. The patient verbalized understanding. Discharge medications reviewed with the patient and appropriate educational materials and side effects teaching were provided. Preventing Lymphedema After Treatment for Breast Cancer: Care Instructions Your Care Instructions Lymphedema is a buildup of fluid in the soft tissues of the body. It can happen in the arm after breast cancer surgery to remove lymph nodes. If there are few or no lymph nodes, fluid can build up in the arm. It can also happen if the lymph system in an arm has been damaged. Infection, tumors, and scar tissue from radiation therapy to the armpit area also can cause fluid to build up. You may be able to avoid lymphedema or keep it under control by following the tips below. Make sure that you take good care of the skin on your arm and hand. Your skin acts as a barrier to keep out bacteria and prevent infection. It is also important not to overuse the muscles in the arm. And don't expose your arm to very hot or cold temperatures. Lymphedema can happen soon after breast cancer treatment. Or it may happen many years later. It may affect only part of your arm or hand. In some cases, it affects all of the arm. Make sure to follow these precautions even after you finish treatment. Do not ignore tightness or swelling in or around your arm or hand. You are less likely to have long-term problems if you get these symptoms treated right away. Follow-up care is a key part of your treatment and safety. Be sure to make and go to all appointments, and call your doctor if you are having problems. It's also a good idea to know your test results and keep a list of the medicines you take. How can you care for yourself at home? Skin care · Keep your arm, hand, and armpit clean. Use a mild soap that does not dry out your skin. · Moisturize your skin often. · Take good care of the skin around your fingernails. Do not bite or cut your cuticles. · Ask your doctor how to handle any cuts, scratches, insect bites, or other injuries you may get.  
· Use sunscreen and insect repellent outdoors to protect your skin from sunburn and insect bites. · Use an electric razor to shave your armpit. It's less likely to cut or irritate your skin. Activity · Don't wear clothing or jewelry that is tight on your arm or hand. Your doctor may advise you not to wear a watch or rings on the affected hand. · Wear gloves when you do activities that could hurt the skin on your fingers or hand. Wear them when you garden, do yard work, wash dishes, and clean with chemicals. Use oven mitts when you handle hot food. · Do not have blood drawn from the arm on the side of the lymph node surgery. Do not get injections (shots) or have an IV put in the affected arm. · Do not allow a blood pressure cuff to be placed on that arm. If you are in the hospital, make sure you tell your nurse and other hospital staff about your condition. · Do not expose your arm to very hot or very cold temperatures. For example, do not use hot tubs, saunas, or steam rooms. Do not use a heating pad or cold pack on that arm or shoulder. · Rest your arm often when you do repeated movements, such as vacuum, scrub, or mop. · Try to use your other arm to carry heavy things, such as grocery bags. Avoid shoulder straps when you carry a briefcase or purse. Carry your purse on your good arm. · Ask your doctor about wearing a compression sleeve and glove (gauntlet). Your doctor may want you to wear these when you exercise or when you fly in an airplane. They can help keep fluid from pooling in your arm and hand. Exercise · Ask your doctor about exercises for your arm and hand. Your doctor may recommend that you see a physical therapist. This person can teach you how to do self-massage to move fluid out of your arm. · Check with your doctor before you start exercises that use the arm. This includes tennis, rowing, or weight lifting. Your doctor can help you find an activity level that is right for you. When should you call for help? Call your doctor now or seek immediate medical care if: 
· You have signs of infection, such as: 
¨ Increased pain, swelling, warmth, or redness in your arm or hand. ¨ Red streaks leading from the area of lymph node surgery or radiation. ¨ Pus draining from a cut or scrape in your skin on your arm or hand. ¨ A fever. Watch closely for changes in your health, and be sure to contact your doctor if: 
· You have a feeling of tightness or swelling in or around your arm or hand. · You have pain, aching, weakness, or a \"pins and needles\" feeling in your arm or hand. · Your rings, watches, or bracelets feel tight, but you have not gained weight. · You notice that one arm looks larger than the other. · You cannot bend your fingers, wrist, or elbow as much as usual. 
Where can you learn more? Go to http://edyta-tremaine.info/. Enter G546 in the search box to learn more about \"Preventing Lymphedema After Treatment for Breast Cancer: Care Instructions. \" Current as of: July 26, 2016 Content Version: 11.3 © 6379-4483 [employer]. Care instructions adapted under license by [employer] (which disclaims liability or warranty for this information). If you have questions about a medical condition or this instruction, always ask your healthcare professional. [place] [address] any warranty or liability for your use of this information. Discharge Orders None ACO Transitions of Care Introducing [employer] [address] Teodora Sergey offers a voluntary care coordination program to provide high quality service and care to [facility] fee-for-service beneficiaries. Dewey Solomon was designed to help you enhance your health and well-being through the following services: ? Transitions of Care  support for individuals who are transitioning from one care setting to another (example: Hospital to home). ? Chronic and Complex Care Coordination  support for individuals and caregivers of those with serious or chronic illnesses or with more than one chronic (ongoing) condition and those who take a number of different medications. If you meet specific medical criteria, a [facility] [address] may call you directly to coordinate your care with your primary care physician and your other care providers. For questions about the [facility] programs, please, contact your physicians office. For general questions or additional information about Accountable Care Organizations: 
Please visit www.medicare.gov/acos. html or call 1-800-MEDICARE (1-151.241.6205) TTY users should call 2-300.966.7997. Introducing [facility]! Dear Eliel Escalera: 
Thank you for requesting a [employer] account. Our records indicate that you already have an active [employer] account. You can access your account anytime at [email or web address] Did you know that you can access your hospital and ER discharge instructions at any time in [employer]? You can also review all of your test results from your hospital stay or ER visit. Additional Information If you have questions, please visit the Frequently Asked Questions section of the [employer] website at [email or web address] Remember, [employer] is NOT to be used for urgent needs. For medical emergencies, dial 911. Now available from your iPhone and Android! General Information Please provide this summary of care documentation to your next provider. Patient Signature:  ____________________________________________________________ Date:  ____________________________________________________________  
  
Teodoro Waldrop Provider Signature:  ____________________________________________________________ Date:  ____________________________________________________________

## 2017-09-19 VITALS
HEART RATE: 97 BPM | HEIGHT: 62 IN | WEIGHT: 129.6 LBS | OXYGEN SATURATION: 93 % | TEMPERATURE: 99.9 F | SYSTOLIC BLOOD PRESSURE: 119 MMHG | BODY MASS INDEX: 23.85 KG/M2 | RESPIRATION RATE: 18 BRPM | DIASTOLIC BLOOD PRESSURE: 47 MMHG

## 2017-09-19 PROBLEM — Z98.890 S/P BREAST RECONSTRUCTION, RIGHT: Status: ACTIVE | Noted: 2017-09-19

## 2017-09-19 PROCEDURE — 74011250636 HC RX REV CODE- 250/636: Performed by: PLASTIC SURGERY

## 2017-09-19 PROCEDURE — 99218 HC RM OBSERVATION: CPT

## 2017-09-19 PROCEDURE — 65270000029 HC RM PRIVATE

## 2017-09-19 PROCEDURE — 74011000258 HC RX REV CODE- 258: Performed by: PLASTIC SURGERY

## 2017-09-19 PROCEDURE — 74011250637 HC RX REV CODE- 250/637: Performed by: PLASTIC SURGERY

## 2017-09-19 RX ADMIN — CYCLOBENZAPRINE HYDROCHLORIDE 10 MG: 10 TABLET, FILM COATED ORAL at 09:20

## 2017-09-19 RX ADMIN — DEXTROSE MONOHYDRATE, SODIUM CHLORIDE, AND POTASSIUM CHLORIDE 75 ML/HR: 50; 4.5; 1.49 INJECTION, SOLUTION INTRAVENOUS at 05:45

## 2017-09-19 RX ADMIN — Medication 10 ML: at 05:45

## 2017-09-19 RX ADMIN — CEFAZOLIN SODIUM 1 G: 1 INJECTION, POWDER, FOR SOLUTION INTRAMUSCULAR; INTRAVENOUS at 09:20

## 2017-09-19 RX ADMIN — HYDROCODONE BITARTRATE AND ACETAMINOPHEN 2 TABLET: 7.5; 325 TABLET ORAL at 09:26

## 2017-09-19 RX ADMIN — LEVOTHYROXINE SODIUM 75 MCG: 75 TABLET ORAL at 05:45

## 2017-09-19 RX ADMIN — HYDROCODONE BITARTRATE AND ACETAMINOPHEN 2 TABLET: 7.5; 325 TABLET ORAL at 05:45

## 2017-09-19 NOTE — PROGRESS NOTES
Admitted to the unit per hospital bed,,awake,alert,verbally responsive and appears to be in no distress,,assessment comleted,,dressings on chest area supported with surgical bra,,has three TAMMI drains,charged and patent,marked 1-2-3,, number one has about 10 ml.noted,,other drains are empty,,serosanguinous drainage,,also has a pain pouch,,IV line patent on left wrist area,intact and patent,,pain level=0/10 as stated,,family members plus patient oriented to room, equipments,call bell system,and menu ordering. ...

## 2017-09-19 NOTE — OP NOTES
[facility]   OPERATIVE REPORT       Name:  Brad Ash   MR#:  [id number]   :  1950   Account #:  [de-identified]   Date of Adm:  2017       DATE OF PROCEDURE: 2017    PREOPERATIVE DIAGNOSIS: History of right breast cancer. POSTOPERATIVE DIAGNOSIS: History of right breast cancer. PROCEDURE: Right removal of tissue expander and reconstruction   with a latissimus flap and permanent gel implant, and left   breast mastopexy for symmetry. SURGEON: Blake De Leon MD    ANESTHESIA: General.    SPECIMENS: None. ESTIMATED BLOOD LOSS: 75 mL. COMPLICATIONS: None. INDICATIONS: Ms. Sofya Florez [facility] status post mastectomy, as well as   postoperative radiation therapy. She has severe contracture and   thinned, scarred skin, requiring a latissimus flap, in addition   to a permanent implant for reconstruction on that side. DESCRIPTION OF PROCEDURE: After informed consent was obtained   from the patient, she was taken to the operating room and placed   in the left lateral decubitus position. A large skin island was   taken from the back and oriented transversely to follow   approximately her bra line. The lower incision was made first   with a 10 blade, followed by Bovie cautery and a prefascial   dissection was carried out on top of the latissimus muscle. I   then reassessed my upper marks to make sure we could close her   adequately, and then that incision was made as well. The   latissimus muscle was then elevated from inferolateral to   superolateral, carefully teasing the latissimus muscle away from   the surrounding tissue. Once isolated, I dopplered the pedicle,   which had a very good strong signal, and helped guide my fascial   release, as to not injure this pedicle. I next dissected a   tunnel from the back dissection to the anterior breast   dissection underneath the arm.  The flap was then rotated into   that location and two 10 flat TAMMI drains, as well as 2 pain pump   catheters were placed and secured with 4-0 nylon stitches. The   incision was then closed in 3 layers with 2-0 Vicryl in the deep   tissue, 3-0 Vicryl in the subdermal layer, and a running 4-0   Monocryl subcuticular stitch. Wounds were dressed with Xeroform,   bacitracin, gauze, ABD pads. She was then flipped to the supine   position to complete the reconstruction. An elliptical excision   oriented obliquely was then made on the right breast, excising   the radiated, thinned, scarred skin. She did have a seroma   around the expander, and then the expander was deflated and   removed. An incision was made laterally to connect the tunnels   and then the scar tissue, which was quite dense and thick around   it, was also released. A partial capsulectomy was done as well,   again trying to release some of the constricted capsule that was   limiting the shape and reconstruction. Next, then the latissimus flap and muscle were rotated into the   breast area, and a couple of different sizers were used to help   determine the best size and shape for the patient. Ultimately, a   430 mL ultra high profile gel implant from [employer] seemed to fit   the best. This was a smooth round device. The latissimus muscle   was also sewn to the anterior surface of the breast skin,   pectoralis muscle, technically underneath the pectoralis muscle,   and tacked in location for extra padding and shape. Next, then   she was brought to an upright position and tailor tacked on the   left side. This seemed to have an excellent volume match with   the right breast, without using an implant. She was placed back   in the supine position and deepithelialized, and the skin flaps   were elevated medially, laterally, and superiorly, and then she   was re-tacked in the inverted T fashion.  I brought her back to   an upright position and examined her for symmetry, and really   felt she had excellent symmetry without an implant on that left side. She was placed back in the supine position and the left   breast was closed with 3-0 Vicryl and 4-0 Monocryl along the   transverse limb. The nipple-areola complex was marked out about   5 cm above the inframammary fold with a 38 mm cookie cutter, and   then the areola was delivered through that circular incision. She was closed with 4-0 Vicryl and 5-0 Monocryl around the   areola and the vertical limb. On the right side, the pocket was   prepared with antibiotic irrigation, and a 10 flat TAMMI drain was   placed and secured with a 4-0 nylon stitch. Her sizer was   removed and then a permanent implant was placed. This was a 430   mL smooth round ultra high profile gel implant from [employer]. Next, then along the inframammary fold, the latissimus was inset   in 3 layers with 2-0 Vicryl from the muscle to the deep   inframammary tissue and then 3-0 Vicryl in the subdermal layer   and a running 4-0 Monocryl subcuticular stitch. Superiorly, she   had 2 layers of 3-0 Vicryl and 4-0 Monocryl. the patient was   then dressed with Xeroform, bacitracin, gauze, ABD pads, and a   loose fitting bra. She tolerated the procedure very well, was   escorted to Recovery in stable condition.         MD Isaac Smith   D:  09/19/2017   09:57   T:  09/19/2017   10:25   Job #:  [id number]

## 2017-09-19 NOTE — PROGRESS NOTES
Patient is alert and oriented x4. Able to verbalize needs. Resting quietly with no distress noted. Dry dressing to left and right breast and right back side is dry and intact. TAMMI drains x3 patent with serosanguinous drainage noted. Surgical bra in place. Pain pouch in place. Neurovascular and peripheral vascular checks WNL. Voiding clear yellow urine. No c/o pain at this time. Bed low and locked. Call light within reach. Instructed to call for assistance. Patient verbalizes understanding. Will monitor.

## 2017-09-19 NOTE — DISCHARGE INSTRUCTIONS
DISCHARGE SUMMARY from Nurse    The following personal items are in your possession at time of discharge:    Dental Appliances: None  Visual Aid: Glasses, At bedside                            PATIENT INSTRUCTIONS:    After general anesthesia or intravenous sedation, for 24 hours or while taking prescription Narcotics:  · Limit your activities  · Do not drive and operate hazardous machinery  · Do not make important personal or business decisions  · Do  not drink alcoholic beverages  · If you have not urinated within 8 hours after discharge, please contact your surgeon on call. Report the following to your surgeon:  · Excessive pain, swelling, redness or odor of or around the surgical area  · Temperature over 100.5  · Nausea and vomiting lasting longer than 4 hours or if unable to take medications  · Any signs of decreased circulation or nerve impairment to extremity: change in color, persistent  numbness, tingling, coldness or increase pain  · Any questions        What to do at Home:  Recommended activity: Activity as tolerated, per MD    If you experience any of the following symptoms fever>101, pain unrelieved with medication, nausea/vomiting, shortness of breath, dizziness/fainting, chest pain. , please follow up with your doctor. *  Please give a list of your current medications to your Primary Care Provider. *  Please update this list whenever your medications are discontinued, doses are      changed, or new medications (including over-the-counter products) are added. *  Please carry medication information at all times in case of emergency situations. These are general instructions for a healthy lifestyle:    No smoking/ No tobacco products/ Avoid exposure to second hand smoke    Surgeon General's Warning:  Quitting smoking now greatly reduces serious risk to your health.     Obesity, smoking, and sedentary lifestyle greatly increases your risk for illness    A healthy diet, regular physical exercise & weight monitoring are important for maintaining a healthy lifestyle    You may be retaining fluid if you have a history of heart failure or if you experience any of the following symptoms:  Weight gain of 3 pounds or more overnight or 5 pounds in a week, increased swelling in our hands or feet or shortness of breath while lying flat in bed. Please call your doctor as soon as you notice any of these symptoms; do not wait until your next office visit. Recognize signs and symptoms of STROKE:    F-face looks uneven    A-arms unable to move or move unevenly    S-speech slurred or non-existent    T-time-call 911 as soon as signs and symptoms begin-DO NOT go       Back to bed or wait to see if you get better-TIME IS BRAIN. Warning Signs of HEART ATTACK     Call 911 if you have these symptoms:   Chest discomfort. Most heart attacks involve discomfort in the center of the chest that lasts more than a few minutes, or that goes away and comes back. It can feel like uncomfortable pressure, squeezing, fullness, or pain.  Discomfort in other areas of the upper body. Symptoms can include pain or discomfort in one or both arms, the back, neck, jaw, or stomach.  Shortness of breath with or without chest discomfort.  Other signs may include breaking out in a cold sweat, nausea, or lightheadedness. Don't wait more than five minutes to call 911 - MINUTES MATTER! Fast action can save your life. Calling 911 is almost always the fastest way to get lifesaving treatment. Emergency Medical Services staff can begin treatment when they arrive -- up to an hour sooner than if someone gets to the hospital by car. The discharge information has been reviewed with the patient. The patient verbalized understanding. Discharge medications reviewed with the patient and appropriate educational materials and side effects teaching were provided.             Preventing Lymphedema After Treatment for Breast Cancer: Care Instructions  Your Care Instructions    Lymphedema is a buildup of fluid in the soft tissues of the body. It can happen in the arm after breast cancer surgery to remove lymph nodes. If there are few or no lymph nodes, fluid can build up in the arm. It can also happen if the lymph system in an arm has been damaged. Infection, tumors, and scar tissue from radiation therapy to the armpit area also can cause fluid to build up. You may be able to avoid lymphedema or keep it under control by following the tips below. Make sure that you take good care of the skin on your arm and hand. Your skin acts as a barrier to keep out bacteria and prevent infection. It is also important not to overuse the muscles in the arm. And don't expose your arm to very hot or cold temperatures. Lymphedema can happen soon after breast cancer treatment. Or it may happen many years later. It may affect only part of your arm or hand. In some cases, it affects all of the arm. Make sure to follow these precautions even after you finish treatment. Do not ignore tightness or swelling in or around your arm or hand. You are less likely to have long-term problems if you get these symptoms treated right away. Follow-up care is a key part of your treatment and safety. Be sure to make and go to all appointments, and call your doctor if you are having problems. It's also a good idea to know your test results and keep a list of the medicines you take. How can you care for yourself at home? Skin care  · Keep your arm, hand, and armpit clean. Use a mild soap that does not dry out your skin. · Moisturize your skin often. · Take good care of the skin around your fingernails. Do not bite or cut your cuticles. · Ask your doctor how to handle any cuts, scratches, insect bites, or other injuries you may get. · Use sunscreen and insect repellent outdoors to protect your skin from sunburn and insect bites. · Use an electric razor to shave your armpit. It's less likely to cut or irritate your skin. Activity  · Don't wear clothing or jewelry that is tight on your arm or hand. Your doctor may advise you not to wear a watch or rings on the affected hand. · Wear gloves when you do activities that could hurt the skin on your fingers or hand. Wear them when you garden, do yard work, wash dishes, and clean with chemicals. Use oven mitts when you handle hot food. · Do not have blood drawn from the arm on the side of the lymph node surgery. Do not get injections (shots) or have an IV put in the affected arm. · Do not allow a blood pressure cuff to be placed on that arm. If you are in the hospital, make sure you tell your nurse and other hospital staff about your condition. · Do not expose your arm to very hot or very cold temperatures. For example, do not use hot tubs, saunas, or steam rooms. Do not use a heating pad or cold pack on that arm or shoulder. · Rest your arm often when you do repeated movements, such as vacuum, scrub, or mop. · Try to use your other arm to carry heavy things, such as grocery bags. Avoid shoulder straps when you carry a briefcase or purse. Carry your purse on your good arm. · Ask your doctor about wearing a compression sleeve and glove (gauntlet). Your doctor may want you to wear these when you exercise or when you fly in an airplane. They can help keep fluid from pooling in your arm and hand. Exercise  · Ask your doctor about exercises for your arm and hand. Your doctor may recommend that you see a physical therapist. This person can teach you how to do self-massage to move fluid out of your arm. · Check with your doctor before you start exercises that use the arm. This includes tennis, rowing, or weight lifting. Your doctor can help you find an activity level that is right for you. When should you call for help?   Call your doctor now or seek immediate medical care if:  · You have signs of infection, such as:  ¨ Increased pain, swelling, warmth, or redness in your arm or hand. ¨ Red streaks leading from the area of lymph node surgery or radiation. ¨ Pus draining from a cut or scrape in your skin on your arm or hand. ¨ A fever. Watch closely for changes in your health, and be sure to contact your doctor if:  · You have a feeling of tightness or swelling in or around your arm or hand. · You have pain, aching, weakness, or a \"pins and needles\" feeling in your arm or hand. · Your rings, watches, or bracelets feel tight, but you have not gained weight. · You notice that one arm looks larger than the other. · You cannot bend your fingers, wrist, or elbow as much as usual.  Where can you learn more? Go to http://edyta-tremaine.info/. Enter G546 in the search box to learn more about \"Preventing Lymphedema After Treatment for Breast Cancer: Care Instructions. \"  Current as of: July 26, 2016  Content Version: 11.3  © 7098-8072 [employer]. Care instructions adapted under license by [employer] (which disclaims liability or warranty for this information). If you have questions about a medical condition or this instruction, always ask your healthcare professional. [place] [address] any warranty or liability for your use of this information.

## 2017-09-19 NOTE — PROGRESS NOTES
Patient doing great today. Pain well controlled. Eating well. Concerned about ambulation. Flap well vascularized and left breast healing well. Back donor site is dry. Dressing changed. Will ask nurses to ambulate with her--if confident she can be discharged. Follow up my office on Friday.

## 2017-10-11 ENCOUNTER — HOSPITAL ENCOUNTER (INPATIENT)
Age: 67
LOS: 14 days | Discharge: NURSING, REHAB OR LONG-TERM CARE | DRG: 907 | End: 2017-10-25
Attending: EMERGENCY MEDICINE | Admitting: INTERNAL MEDICINE
Payer: MEDICARE

## 2017-10-11 ENCOUNTER — APPOINTMENT (OUTPATIENT)
Dept: GENERAL RADIOLOGY | Age: 67
DRG: 907 | End: 2017-10-11
Attending: EMERGENCY MEDICINE
Payer: MEDICARE

## 2017-10-11 DIAGNOSIS — D61.818 PANCYTOPENIA (HCC): ICD-10-CM

## 2017-10-11 DIAGNOSIS — D69.6 THROMBOCYTOPENIA (HCC): ICD-10-CM

## 2017-10-11 DIAGNOSIS — C50.919 MALIGNANT NEOPLASM OF BREAST IN FEMALE, ESTROGEN RECEPTOR POSITIVE, UNSPECIFIED LATERALITY, UNSPECIFIED SITE OF BREAST (HCC): ICD-10-CM

## 2017-10-11 DIAGNOSIS — R00.0 TACHYCARDIA: ICD-10-CM

## 2017-10-11 DIAGNOSIS — Z17.0 MALIGNANT NEOPLASM OF BREAST IN FEMALE, ESTROGEN RECEPTOR POSITIVE, UNSPECIFIED LATERALITY, UNSPECIFIED SITE OF BREAST (HCC): ICD-10-CM

## 2017-10-11 DIAGNOSIS — A41.9 SEPTIC SHOCK (HCC): Primary | ICD-10-CM

## 2017-10-11 DIAGNOSIS — Z90.13 STATUS POST BILATERAL MASTECTOMY: ICD-10-CM

## 2017-10-11 DIAGNOSIS — N17.9 AKI (ACUTE KIDNEY INJURY) (HCC): ICD-10-CM

## 2017-10-11 DIAGNOSIS — R65.21 SEPTIC SHOCK (HCC): Primary | ICD-10-CM

## 2017-10-11 PROBLEM — R19.7 DIARRHEA: Status: ACTIVE | Noted: 2017-10-11

## 2017-10-11 PROBLEM — E83.42 HYPOMAGNESEMIA: Status: ACTIVE | Noted: 2017-10-11

## 2017-10-11 LAB
ALBUMIN SERPL-MCNC: 1.8 G/DL (ref 3.2–4.6)
ALBUMIN/GLOB SERPL: 0.5 {RATIO} (ref 1.2–3.5)
ALP SERPL-CCNC: 70 U/L (ref 50–136)
ALT SERPL-CCNC: 23 U/L (ref 12–65)
ANION GAP SERPL CALC-SCNC: 12 MMOL/L (ref 7–16)
APPEARANCE UR: ABNORMAL
AST SERPL-CCNC: 37 U/L (ref 15–37)
ATRIAL RATE: 90 BPM
BACTERIA URNS QL MICRO: ABNORMAL /HPF
BILIRUB SERPL-MCNC: 0.4 MG/DL (ref 0.2–1.1)
BILIRUB UR QL: ABNORMAL
BNP SERPL-MCNC: 466 PG/ML
BUN SERPL-MCNC: 33 MG/DL (ref 8–23)
CALCIUM SERPL-MCNC: 6.7 MG/DL (ref 8.3–10.4)
CALCULATED P AXIS, ECG09: 55 DEGREES
CALCULATED R AXIS, ECG10: 1 DEGREES
CALCULATED T AXIS, ECG11: 46 DEGREES
CASTS URNS QL MICRO: ABNORMAL /LPF
CHLORIDE SERPL-SCNC: 95 MMOL/L (ref 98–107)
CO2 SERPL-SCNC: 21 MMOL/L (ref 21–32)
COLOR UR: ABNORMAL
CREAT SERPL-MCNC: 2.31 MG/DL (ref 0.6–1)
CRYSTALS URNS QL MICRO: ABNORMAL /LPF
DIAGNOSIS, 93000: NORMAL
DIFFERENTIAL METHOD BLD: ABNORMAL
EPI CELLS #/AREA URNS HPF: ABNORMAL /HPF
ERYTHROCYTE [DISTWIDTH] IN BLOOD BY AUTOMATED COUNT: 16.4 % (ref 11.9–14.6)
GLOBULIN SER CALC-MCNC: 3.4 G/DL (ref 2.3–3.5)
GLUCOSE SERPL-MCNC: 111 MG/DL (ref 65–100)
GLUCOSE UR STRIP.AUTO-MCNC: NEGATIVE MG/DL
HCT VFR BLD AUTO: 21.4 % (ref 35.8–46.3)
HGB BLD-MCNC: 7.1 G/DL (ref 11.7–15.4)
HGB UR QL STRIP: NEGATIVE
KETONES UR QL STRIP.AUTO: ABNORMAL MG/DL
LACTATE BLD-SCNC: 3.6 MMOL/L (ref 0.5–1.9)
LACTATE BLD-SCNC: 4.5 MMOL/L (ref 0.5–1.9)
LEUKOCYTE ESTERASE UR QL STRIP.AUTO: NEGATIVE
LYMPHOCYTES # BLD: 0.5 K/UL (ref 0.5–4.6)
LYMPHOCYTES NFR BLD MANUAL: 17 % (ref 16–44)
MAGNESIUM SERPL-MCNC: 1.3 MG/DL (ref 1.8–2.4)
MCH RBC QN AUTO: 32.7 PG (ref 26.1–32.9)
MCHC RBC AUTO-ENTMCNC: 33.2 G/DL (ref 31.4–35)
MCV RBC AUTO: 98.6 FL (ref 79.6–97.8)
METAMYELOCYTES NFR BLD MANUAL: 1 %
MONOCYTES # BLD: 0.5 K/UL (ref 0.1–1.3)
MONOCYTES NFR BLD MANUAL: 19 % (ref 3–9)
NEUTS BAND NFR BLD MANUAL: 8 % (ref 0–6)
NEUTS SEG # BLD: 1.8 K/UL (ref 1.7–8.2)
NEUTS SEG NFR BLD MANUAL: 55 % (ref 47–75)
NITRITE UR QL STRIP.AUTO: NEGATIVE
P-R INTERVAL, ECG05: 138 MS
PH UR STRIP: 5.5 [PH] (ref 5–9)
PHOSPHATE SERPL-MCNC: 4.2 MG/DL (ref 2.3–3.7)
PLATELET # BLD AUTO: 78 K/UL (ref 150–450)
PLATELET COMMENTS,PCOM: ABNORMAL
PMV BLD AUTO: 12.4 FL (ref 10.8–14.1)
POTASSIUM SERPL-SCNC: 3.4 MMOL/L (ref 3.5–5.1)
PROCALCITONIN SERPL-MCNC: 25.7 NG/ML
PROT SERPL-MCNC: 5.2 G/DL (ref 6.3–8.2)
PROT UR STRIP-MCNC: 30 MG/DL
Q-T INTERVAL, ECG07: 390 MS
QRS DURATION, ECG06: 82 MS
QTC CALCULATION (BEZET), ECG08: 477 MS
RBC # BLD AUTO: 2.17 M/UL (ref 4.05–5.25)
RBC #/AREA URNS HPF: ABNORMAL /HPF
RBC MORPH BLD: ABNORMAL
SODIUM SERPL-SCNC: 128 MMOL/L (ref 136–145)
SP GR UR REFRACTOMETRY: 1.02 (ref 1–1.02)
TROPONIN I BLD-MCNC: 0 NG/ML (ref 0.02–0.05)
TSH SERPL DL<=0.005 MIU/L-ACNC: 8.29 UIU/ML (ref 0.36–3.74)
UROBILINOGEN UR QL STRIP.AUTO: 1 EU/DL (ref 0.2–1)
VENTRICULAR RATE, ECG03: 90 BPM
WBC # BLD AUTO: 2.8 K/UL (ref 4.3–11.1)
WBC MORPH BLD: ABNORMAL
WBC URNS QL MICRO: ABNORMAL /HPF

## 2017-10-11 PROCEDURE — 71010 XR CHEST PORT: CPT

## 2017-10-11 PROCEDURE — 87205 SMEAR GRAM STAIN: CPT | Performed by: INTERNAL MEDICINE

## 2017-10-11 PROCEDURE — 86900 BLOOD TYPING SEROLOGIC ABO: CPT | Performed by: EMERGENCY MEDICINE

## 2017-10-11 PROCEDURE — 87086 URINE CULTURE/COLONY COUNT: CPT | Performed by: INTERNAL MEDICINE

## 2017-10-11 PROCEDURE — 83735 ASSAY OF MAGNESIUM: CPT | Performed by: EMERGENCY MEDICINE

## 2017-10-11 PROCEDURE — 86923 COMPATIBILITY TEST ELECTRIC: CPT | Performed by: EMERGENCY MEDICINE

## 2017-10-11 PROCEDURE — 83605 ASSAY OF LACTIC ACID: CPT

## 2017-10-11 PROCEDURE — 99285 EMERGENCY DEPT VISIT HI MDM: CPT | Performed by: EMERGENCY MEDICINE

## 2017-10-11 PROCEDURE — 74011250637 HC RX REV CODE- 250/637: Performed by: INTERNAL MEDICINE

## 2017-10-11 PROCEDURE — 84484 ASSAY OF TROPONIN QUANT: CPT

## 2017-10-11 PROCEDURE — 84145 PROCALCITONIN (PCT): CPT | Performed by: EMERGENCY MEDICINE

## 2017-10-11 PROCEDURE — 74011250636 HC RX REV CODE- 250/636: Performed by: EMERGENCY MEDICINE

## 2017-10-11 PROCEDURE — 36430 TRANSFUSION BLD/BLD COMPNT: CPT

## 2017-10-11 PROCEDURE — 74011250636 HC RX REV CODE- 250/636: Performed by: INTERNAL MEDICINE

## 2017-10-11 PROCEDURE — 74011000258 HC RX REV CODE- 258: Performed by: EMERGENCY MEDICINE

## 2017-10-11 PROCEDURE — 65270000029 HC RM PRIVATE

## 2017-10-11 PROCEDURE — 87077 CULTURE AEROBIC IDENTIFY: CPT | Performed by: INTERNAL MEDICINE

## 2017-10-11 PROCEDURE — 74011000258 HC RX REV CODE- 258: Performed by: INTERNAL MEDICINE

## 2017-10-11 PROCEDURE — 77030021907 HC KT URIN FOL O&M -A

## 2017-10-11 PROCEDURE — 77030032490 HC SLV COMPR SCD KNE COVD -B

## 2017-10-11 PROCEDURE — 87040 BLOOD CULTURE FOR BACTERIA: CPT | Performed by: EMERGENCY MEDICINE

## 2017-10-11 PROCEDURE — 0HPT0JZ REMOVAL OF SYNTHETIC SUBSTITUTE FROM RIGHT BREAST, OPEN APPROACH: ICD-10-PCS | Performed by: PLASTIC SURGERY

## 2017-10-11 PROCEDURE — 84443 ASSAY THYROID STIM HORMONE: CPT | Performed by: INTERNAL MEDICINE

## 2017-10-11 PROCEDURE — 85025 COMPLETE CBC W/AUTO DIFF WBC: CPT | Performed by: EMERGENCY MEDICINE

## 2017-10-11 PROCEDURE — 81001 URINALYSIS AUTO W/SCOPE: CPT | Performed by: EMERGENCY MEDICINE

## 2017-10-11 PROCEDURE — 86644 CMV ANTIBODY: CPT | Performed by: EMERGENCY MEDICINE

## 2017-10-11 PROCEDURE — 93005 ELECTROCARDIOGRAM TRACING: CPT | Performed by: EMERGENCY MEDICINE

## 2017-10-11 PROCEDURE — 77030013131 HC IV BLD ST ICUM -A

## 2017-10-11 PROCEDURE — 84100 ASSAY OF PHOSPHORUS: CPT | Performed by: EMERGENCY MEDICINE

## 2017-10-11 PROCEDURE — 96365 THER/PROPH/DIAG IV INF INIT: CPT | Performed by: EMERGENCY MEDICINE

## 2017-10-11 PROCEDURE — 96361 HYDRATE IV INFUSION ADD-ON: CPT | Performed by: EMERGENCY MEDICINE

## 2017-10-11 PROCEDURE — P9016 RBC LEUKOCYTES REDUCED: HCPCS | Performed by: EMERGENCY MEDICINE

## 2017-10-11 PROCEDURE — 77030027138 HC INCENT SPIROMETER -A

## 2017-10-11 PROCEDURE — 80053 COMPREHEN METABOLIC PANEL: CPT | Performed by: EMERGENCY MEDICINE

## 2017-10-11 PROCEDURE — 30243N1 TRANSFUSION OF NONAUTOLOGOUS RED BLOOD CELLS INTO CENTRAL VEIN, PERCUTANEOUS APPROACH: ICD-10-PCS | Performed by: INTERNAL MEDICINE

## 2017-10-11 PROCEDURE — 83880 ASSAY OF NATRIURETIC PEPTIDE: CPT | Performed by: EMERGENCY MEDICINE

## 2017-10-11 PROCEDURE — 87186 SC STD MICRODIL/AGAR DIL: CPT | Performed by: INTERNAL MEDICINE

## 2017-10-11 RX ORDER — LEVOTHYROXINE SODIUM 75 UG/1
75 TABLET ORAL
Status: DISCONTINUED | OUTPATIENT
Start: 2017-10-12 | End: 2017-10-12

## 2017-10-11 RX ORDER — CIPROFLOXACIN 2 MG/ML
400 INJECTION, SOLUTION INTRAVENOUS EVERY 8 HOURS
Status: DISCONTINUED | OUTPATIENT
Start: 2017-10-11 | End: 2017-10-11

## 2017-10-11 RX ORDER — IPRATROPIUM BROMIDE AND ALBUTEROL SULFATE 2.5; .5 MG/3ML; MG/3ML
3 SOLUTION RESPIRATORY (INHALATION)
Status: DISCONTINUED | OUTPATIENT
Start: 2017-10-11 | End: 2017-10-13

## 2017-10-11 RX ORDER — ACETAMINOPHEN 325 MG/1
650 TABLET ORAL
Status: DISCONTINUED | OUTPATIENT
Start: 2017-10-11 | End: 2017-10-25 | Stop reason: HOSPADM

## 2017-10-11 RX ORDER — DULOXETIN HYDROCHLORIDE 60 MG/1
60 CAPSULE, DELAYED RELEASE ORAL
Status: DISCONTINUED | OUTPATIENT
Start: 2017-10-11 | End: 2017-10-25 | Stop reason: HOSPADM

## 2017-10-11 RX ORDER — SODIUM CHLORIDE 0.9 % (FLUSH) 0.9 %
5-10 SYRINGE (ML) INJECTION AS NEEDED
Status: DISCONTINUED | OUTPATIENT
Start: 2017-10-11 | End: 2017-10-25 | Stop reason: HOSPADM

## 2017-10-11 RX ORDER — CIPROFLOXACIN 2 MG/ML
400 INJECTION, SOLUTION INTRAVENOUS EVERY 8 HOURS
Status: DISCONTINUED | OUTPATIENT
Start: 2017-10-11 | End: 2017-10-12

## 2017-10-11 RX ORDER — SODIUM CHLORIDE 0.9 % (FLUSH) 0.9 %
5-10 SYRINGE (ML) INJECTION EVERY 8 HOURS
Status: DISCONTINUED | OUTPATIENT
Start: 2017-10-11 | End: 2017-10-25 | Stop reason: HOSPADM

## 2017-10-11 RX ORDER — ALPRAZOLAM 0.5 MG/1
0.5 TABLET ORAL
Status: DISCONTINUED | OUTPATIENT
Start: 2017-10-11 | End: 2017-10-25 | Stop reason: HOSPADM

## 2017-10-11 RX ORDER — SODIUM CHLORIDE 9 MG/ML
125 INJECTION, SOLUTION INTRAVENOUS CONTINUOUS
Status: DISCONTINUED | OUTPATIENT
Start: 2017-10-11 | End: 2017-10-13

## 2017-10-11 RX ORDER — POTASSIUM CHLORIDE 20MEQ/15ML
20 LIQUID (ML) ORAL DAILY
Status: DISCONTINUED | OUTPATIENT
Start: 2017-10-12 | End: 2017-10-12

## 2017-10-11 RX ORDER — SODIUM CHLORIDE 9 MG/ML
250 INJECTION, SOLUTION INTRAVENOUS AS NEEDED
Status: DISCONTINUED | OUTPATIENT
Start: 2017-10-11 | End: 2017-10-25 | Stop reason: HOSPADM

## 2017-10-11 RX ORDER — VANCOMYCIN HYDROCHLORIDE
1250 ONCE
Status: COMPLETED | OUTPATIENT
Start: 2017-10-11 | End: 2017-10-14

## 2017-10-11 RX ORDER — MAGNESIUM SULFATE HEPTAHYDRATE 40 MG/ML
2 INJECTION, SOLUTION INTRAVENOUS
Status: COMPLETED | OUTPATIENT
Start: 2017-10-11 | End: 2017-10-14

## 2017-10-11 RX ADMIN — SODIUM CHLORIDE 125 ML/HR: 900 INJECTION, SOLUTION INTRAVENOUS at 20:42

## 2017-10-11 RX ADMIN — MAGNESIUM SULFATE HEPTAHYDRATE 2 G: 40 INJECTION, SOLUTION INTRAVENOUS at 18:36

## 2017-10-11 RX ADMIN — CIPROFLOXACIN 400 MG: 2 INJECTION, SOLUTION INTRAVENOUS at 14:10

## 2017-10-11 RX ADMIN — PIPERACILLIN SODIUM,TAZOBACTAM SODIUM 4.5 G: 4; .5 INJECTION, POWDER, FOR SOLUTION INTRAVENOUS at 13:22

## 2017-10-11 RX ADMIN — MAGNESIUM SULFATE HEPTAHYDRATE 2 G: 40 INJECTION, SOLUTION INTRAVENOUS at 17:21

## 2017-10-11 RX ADMIN — SODIUM CHLORIDE 1755 ML: 900 INJECTION, SOLUTION INTRAVENOUS at 11:50

## 2017-10-11 RX ADMIN — Medication 10 ML: at 18:38

## 2017-10-11 RX ADMIN — CIPROFLOXACIN 400 MG: 2 INJECTION, SOLUTION INTRAVENOUS at 21:39

## 2017-10-11 RX ADMIN — DULOXETINE HYDROCHLORIDE 60 MG: 60 CAPSULE, DELAYED RELEASE ORAL at 21:39

## 2017-10-11 RX ADMIN — VANCOMYCIN HYDROCHLORIDE 1250 MG: 10 INJECTION, POWDER, LYOPHILIZED, FOR SOLUTION INTRAVENOUS at 14:10

## 2017-10-11 RX ADMIN — Medication 10 ML: at 21:39

## 2017-10-11 RX ADMIN — PIPERACILLIN SODIUM,TAZOBACTAM SODIUM 4.5 G: 4; .5 INJECTION, POWDER, FOR SOLUTION INTRAVENOUS at 22:46

## 2017-10-11 NOTE — ED PROVIDER NOTES
HPI Comments: 38-year-old female with history of breast cancer status post mastectomy 2 weeks ago [facility] from [place] [address] Surgery w/ hypotension at office. Pt was found to have SBP 60. Pt was being seen in office for seeping wound to right upper back that had been present for ~1 week; Dr. Dimitrios White states seroma and not infected. Per EMS, BP 96/54, HR 86, . Patient states over the past several days she has had generalized weakness and fever of 100.7 F w/ diarrhea at home on Monday. States fever resolved. Denies chest pain, shortness of breath, abdominal pain, nausea, vomiting, constipation, cough, headache, neck pain, dysuria, hematuria.  states that 2 nights ago he noticed patient had intermittent confusion that ultimately resolved after short time period. States she has been eating and drinking at normal baseline. Pt received 1L NS IVF bolus in route. Patient is a 74 y.o. female presenting with hypotension. The history is provided by the patient, the EMS personnel and the spouse. No  was used. Hypotension    This is a new problem. The current episode started 1 to 2 hours ago. The problem has not changed since onset. Associated symptoms include weakness. Pertinent negatives include no somnolence, no seizures, no unresponsiveness, no delusions and no numbness. Mental status baseline is normal.  Her past medical history does not include seizures, CVA, hypertension, COPD, head trauma or heart disease. Past Medical History:   Diagnosis Date    Abnormal CT of the chest     Adverse effect of anesthesia     bp dropped with dual lung/gallbladder surgery    Asthma      no acute attacks recently.  uses advair daily    Breast cancer ([facility] [address] 2016    R breast, s/p mastectomy, chemo, reconstruction    Depression      Hypercholesteremia     Controlled with diet     Hypothyroidism     Ill-defined condition     granular lung disease resolved 2002       Past Surgical History: Procedure Laterality Date    CHEST SURGERY PROCEDURE UNLISTED      R lung biopsy     HX BLADDER REPAIR      HX BREAST BIOPSY Right 2/5/2016    SKIN BIOPSY RIGHT BREAST performed by Oh Croley MD at [facility] MA[facility] OR    HX BREAST RECONSTRUCTION Bilateral 9/14/2016    BILATERAL BREAST RECONSTRUCTION performed by eTresa Gonzalez MD at [facility] MAIN OR    HX BREAST RECONSTRUCTION Bilateral 9/14/2016    BILATERAL BREAST TISSUE EXPANDER INSERTION performed by Teresa Gonzalez MD at [facility] MAIN OR    HX BREAST RECONSTRUCTION Right 9/18/2017    RIGHT BREAST RECONSTRUCTION performed by Teresa Gonzalez MD at [address] HX BREAST RECONSTRUCTION Right 9/18/2017    Right  PLACEMENT OF PERMANENT IMPLANTS [facility] [address] [facility] BREAST REDUCTION Left 9/18/2017    LEFT BREAST MASTOPEXY FOR SYMMETRY [facility] [address] [facility] HX COLONOSCOPY      2006    HX CYSTOCELE REPAIR      HX HYSTERECTOMY      fibroids    HX LAP CHOLECYSTECTOMY  2000    HX MASTECTOMY Right     HX RECTOCELE REPAIR      HX VASCULAR ACCESS      port left chest wall         Family History:   Problem Relation Age of Onset    Hypertension Mother     Cancer Mother 79     Breast cancer at age 79    Diabetes Mother      Type 2    Other Father      alzheimers    Cancer Maternal Aunt      Breast cancer after age 72    Cancer Maternal Aunt      Breast cancer at age 39   [address] [facility] Allergic Rhinitis Neg Hx     Allergy-severe Neg Hx     Amblyopia Neg Hx     Anemia Neg Hx     Anesth Problems Neg Hx     Angioedema Neg Hx     Anxiety Neg Hx     Arrhythmia Neg Hx     Arthritis-rheumatoid Neg Hx     Ataxia Neg Hx     Atopy Neg Hx     Bipolar Disorder Neg Hx     Blindness Neg Hx     Breast Cancer Neg Hx     Breast Problems Neg Hx     Broken Bones Neg Hx     Cataract Neg Hx     Celiac Disease Neg Hx     Childhood heart surgery Neg Hx     Childhood resp disease Neg Hx  Chorea Neg Hx     Clotting Disorder Neg Hx     Collagen Dis Neg Hx     Colon Cancer Neg Hx     Colon Polyps Neg Hx     COPD Neg Hx     Coronary Artery Disease Neg Hx     Crohn's Disease Neg Hx     Cystic Fibrosis Neg Hx     Delayed Awakening Neg Hx     Dementia Neg Hx     Depression Neg Hx     Dislocations Neg Hx     Downs Syndrome Neg Hx     Drug Abuse Neg Hx     Eclampsia Neg Hx     Eczema Neg Hx     Emergence Delirium Neg Hx     Emphysema Neg Hx     Fainting Neg Hx     Genitourinary () Neg Hx     Glaucoma Neg Hx     Heart Attack Neg Hx     Heart defect Neg Hx     Heart Failure Neg Hx     Heart Surgery Neg Hx     Hemachromatosis Neg Hx     Herpes Neg Hx     High Cholesterol Neg Hx     HIV/AIDS Neg Hx     Immunodeficiency Neg Hx     Infertility Neg Hx     Inflammatory Bowel Dz Neg Hx     Kidney Disease Neg Hx     Liver Disease Neg Hx     Macular Degen Neg Hx     Malignant Hyperthermia Neg Hx     MS Neg Hx     Neuropathy Neg Hx     NF Neg Hx     Osteoporosis Neg Hx     Ovarian Cancer Neg Hx     Pacemaker Neg Hx     Panic disorder Neg Hx     Parkinsonism Neg Hx     Post-op Cognitive Dysfunction Neg Hx     Post-op Nausea/Vomiting Neg Hx     Prematurity Neg Hx      Labor Neg Hx     Pseudocholinesterase Deficiency Neg Hx     Psoriasis Neg Hx     Psychotic Disorder Neg Hx     Rashes/Skin Problems Neg Hx     Retinal Detachment Neg Hx     Rh Incompatibility Neg Hx     Schizophrenia Neg Hx     Seizures Neg Hx     Severe Sprains Neg Hx     Sickle Cell Anemia Neg Hx     Sickle Cell Trait Neg Hx     SIDS Neg Hx     SLE Neg Hx     Spont Ab Neg Hx     Stomach Cancer Neg Hx     Strabismus Neg Hx     Substance Abuse Neg Hx     Sudden Death Neg Hx     Suicide Neg Hx     Thyroid Disease Neg Hx     Tuberculosis Neg Hx     Ulcerative Colitis Neg Hx     Urticaria Neg Hx     Lang's Disease Neg Hx        Social History     Social History    Marital status:      Spouse name: N/A    Number of children: N/A    Years of education: N/A     Occupational History    Not on file. Social History Main Topics    Smoking status: Never Smoker    Smokeless tobacco: Never Used    Alcohol use No    Drug use: No    Sexual activity: Not on file     Other Topics Concern    Not on file     Social History Narrative         ALLERGIES: Prednisone    Review of Systems   Constitutional: Positive for fatigue and fever. Negative for chills. HENT: Negative for congestion, rhinorrhea and trouble swallowing. Eyes: Negative for pain and visual disturbance. Respiratory: Negative for cough and shortness of breath. Cardiovascular: Negative for chest pain, palpitations and leg swelling. Gastrointestinal: Positive for diarrhea. Negative for abdominal distention, abdominal pain, anal bleeding, blood in stool, nausea and vomiting. Genitourinary: Negative for dysuria, flank pain, hematuria, pelvic pain and vaginal discharge. Musculoskeletal: Negative for back pain, gait problem, joint swelling, neck pain and neck stiffness. Skin: Positive for wound. Negative for pallor and rash. Neurological: Positive for weakness. Negative for dizziness, seizures, syncope, numbness and headaches. Vitals:    10/11/17 1238 10/11/17 1301 10/11/17 1304 10/11/17 1305   BP: (!) 73/48 (!) 82/48 (!) 84/48 (!) 84/49   Pulse: 92 95 95 96   Resp: 25 21 30 12   Temp:       SpO2: 99% 99% 99% 95%   Weight:       Height:                Physical Exam   Constitutional: She is oriented to person, place, and time. She appears well-developed and well-nourished. No distress. HENT:   Head: Normocephalic and atraumatic. Mouth/Throat: Oropharynx is clear and moist. No oropharyngeal exudate. Eyes: Conjunctivae and EOM are normal. Pupils are equal, round, and reactive to light. Neck: Normal range of motion. No JVD present. No tracheal deviation present.    Cardiovascular: Normal rate, regular rhythm, normal heart sounds and intact distal pulses. No murmur heard. Radial pulses 2+ and equal bilaterally    Pulmonary/Chest: Effort normal and breath sounds normal. No respiratory distress. She has no wheezes. She has no rales. She exhibits no tenderness. Port present to L chest    Abdominal: Soft. Bowel sounds are normal. She exhibits no distension and no mass. There is no tenderness. There is no rebound and no guarding. Musculoskeletal: Normal range of motion. She exhibits no edema, tenderness or deformity. Neurological: She is alert and oriented to person, place, and time. No cranial nerve deficit. Coordination normal.   Strength 5/5 throughout. Normal sensory exam.    Skin: Skin is warm and dry. No rash noted. No erythema. No pallor. Wound present to right scapular region packed with gauze; no surrounding erythema or evidence of cellulitis. Serosanguinous drainage present on gauze. Mastectomy site c/d/i no surrounding cellulitis. Psychiatric: She has a normal mood and affect. Her behavior is normal.   Nursing note and vitals reviewed. MDM  Number of Diagnoses or Management Options  DANNIE (acute kidney injury) [facility]): new and requires workup  Septic shock [facility]): new and requires workup  Status post bilateral mastectomy:    Thrombocytopenia ([facility] [address]): new and requires workup     Amount and/or Complexity of Data Reviewed  Clinical lab tests: ordered and reviewed  Tests in the radiology section of CPT®: ordered and reviewed  Tests in the medicine section of CPT®: ordered and reviewed  Review and summarize past medical records: yes  Discuss the patient with other providers: yes  Independent visualization of images, tracings, or specimens: yes    Risk of Complications, Morbidity, and/or Mortality  Presenting problems: moderate  Diagnostic procedures: moderate  Management options: moderate    Critical Care  Total time providing critical care: 30-74 minutes    Patient Progress  Patient progress: stable    ED Course   Comment By Time   CXR IMPRESSION: Mild bibasilar infiltrates. No dense consolidation. [facility] MD [place] 10/11 4379   Dr. Jaren schmitt. Hospitalist consulted. Request Heme/Onc consultation. Pt given 30 cc/kg IVF bolus. Broad spectrum ABx ordered to cover likely PNA. [address] MD 10/11 7959       EKG  Date/Time: 10/11/2017 12:37 PM  Performed by: Cesar Rob  Authorized by: Janneth Mosley     ECG reviewed by ED Physician in the absence of a cardiologist: yes    Rate:     ECG rate:  90    ECG rate assessment: normal    Rhythm:     Rhythm: sinus rhythm    Ectopy:     Ectopy: none    QRS:     QRS axis:  Normal    QRS intervals:  Normal  Conduction:     Conduction: normal    ST segments:     ST segments:  Normal  T waves:     T waves: normal          ===================================================================  This patient is critically ill and there is a high probability of of imminent or life threatening deterioration in the patient's condition without immediate management. The nature of the patient's clinical problem is: Septic Shock     I have spent 35 minutes in direct patient care, documentation, review of labs/xrays/old records, discussion with Family, Staff, Nursing . The time involved in the performance of separately reportable procedures was not counted toward critical care time.      [facility] MD [place]; 10/11/2017 @1:54 PM  ===================================================================            ===================================================================  CARDIOPULMONARY ASSESSMENT (Lactate >4 or Septic Shock)    Visit Vitals    BP (!) 84/49    Pulse 96    Temp 97.5 °F (36.4 °C)    Resp 12    Ht 5' 2\" (1.575 m)    Wt 58.5 kg (129 lb)    SpO2 95%    BMI 23.59 kg/m2       --Lungs - breath sounds clear and equal bilaterally  --Heart -  Normal Sinus Rhythm   --Capillary Refill - <3 seconds  --Peripheral Pulses (Radial, DP, PT)-  2+  --Skin: Appropriate for Ethnicity  --Skin Exam -- normal, no cyanosis, jaundice, pallor or bruising    1:54 PM, 10/11/2017, Adam Esparza MD  ===================================================================

## 2017-10-11 NOTE — PROGRESS NOTES
TRANSFER - IN REPORT:    Verbal report received from [facility] on [facility]  being received from ED for urgent transfer      Report consisted of patients Situation, Background, Assessment and   Recommendations(SBAR). Information from the following report(s) SBAR, Kardex, Procedure Summary, Intake/Output, MAR, Recent Results, Med Rec Status and Cardiac Rhythm NSR was reviewed with the receiving nurse. Opportunity for questions and clarification was provided. Assessment completed upon patients arrival to unit and care assumed.

## 2017-10-11 NOTE — PROGRESS NOTES
Dual skin assessment per 2 RN's - incisions over both breasts and one on right upper back. No redness on buttocks or heels.

## 2017-10-11 NOTE — ED TRIAGE NOTES
Pt sent over from [facility] Surgery. Had B mastectomy x2 weeks ago and now c/o seeping wound on rt upper back x1 week now. Office staff states BP was 60/-, BP per ems was 96/54, HR 86, , NRS .

## 2017-10-11 NOTE — H&P
HOSPITALIST H&P/CONSULT  NAME:  Juan Berumen   Age:  74 y.o.  :   1950   MRN:   [id number]  PCP: Shasha Coates MD  Consulting MD:  Treatment Team: Attending Provider: Rachelle Ramírez DO  HPI:   Patient 11D with pmhx of breast CA s/p RTX completed in 2017, currently on oral chemo (femara and ibrance) s/p right breast reconstruction with flap and left breast mastectomy on  [facility] for hypotension/ fatigue/ malaise. Was seen in Dr Ibis Tovar office today due to these complaints along with concern for drainage from flap site. Dr Baldo Styles reportedly okay with appearance of op site but was concerned regarding SBP in 60's at office. Brought to ED via EMS. ER workup notable for pancytopenia, Acute renal failure, hypotension (sbp in 80's). Hospitalist called for admission. Patient states that she has been feeling poorly 2 days ago with temp of 101 and profuse diarrhea x 2 days. Diarrhea has since resolved but she still feels light-headed and fatigued. Denies nausea/vomiting, hematochezia, chest pain or pressure. No sick contacts. Complete ROS done and is as stated in HPI or otherwise negative  Past Medical History:   Diagnosis Date    Abnormal CT of the chest     Adverse effect of anesthesia     bp dropped with dual lung/gallbladder surgery    Asthma      no acute attacks recently.  uses advair daily    Breast cancer ([facility] [address]     R breast, s/p mastectomy, chemo, reconstruction    Depression      Hypercholesteremia     Controlled with diet     Hypothyroidism     Ill-defined condition     granular lung disease resolved       Past Surgical History:   Procedure Laterality Date    CHEST SURGERY PROCEDURE UNLISTED      R lung biopsy     HX BLADDER REPAIR      HX BREAST BIOPSY Right 2016    SKIN BIOPSY RIGHT BREAST performed by Ronit Subramanian MD at [address] [place] [address] HX BREAST RECONSTRUCTION Bilateral 2016    BILATERAL BREAST RECONSTRUCTION performed by Greta Hamm Danita Lawson MD at [address] [place] [address] HX BREAST RECONSTRUCTION Bilateral 2016    BILATERAL BREAST TISSUE EXPANDER INSERTION performed by Shmuel Earl MD at [address] [place] [address] HX BREAST RECONSTRUCTION Right 2017    RIGHT BREAST RECONSTRUCTION performed by Shmuel Earl MD at [address] [place] [address] HX BREAST RECONSTRUCTION Right 2017    Right  PLACEMENT OF PERMANENT IMPLANTS performed by Shmuel Earl MD at [address] [place] [address] HX BREAST REDUCTION Left 2017    LEFT BREAST MASTOPEXY FOR SYMMETRY performed by Shmuel Earl MD at [address] [place] [address] HX COLONOSCOPY      2006    HX CYSTOCELE REPAIR      HX HYSTERECTOMY      fibroids    HX LAP CHOLECYSTECTOMY  2000    HX MASTECTOMY Right     HX RECTOCELE REPAIR      HX VASCULAR ACCESS      port left chest wall      Prior to Admission Medications   Prescriptions Last Dose Informant Patient Reported? Taking? ALPRAZolam (XANAX) 0.5 mg tablet   No No   Si bid to qid prn  Indications: anxiety, sleep   DULoxetine (CYMBALTA) 60 mg capsule   No No   Sig: Take 1 Cap by mouth daily. Patient taking differently: Take 60 mg by mouth nightly. IBRANCE 75 mg cap   No No   Sig: TAKE ONE CAPSULE (75 MG) BY MOUTH DAILY WITH FOOD FOR 3 WEEKS ON, THENONE WEEK OFF. SWALLOW WHOLE. DO NOT TAKE IF CAPSULES ARE BROKEN OR CR   albuterol (VENTOLIN) 90 mcg/Actuation inhaler   Yes No   Sig: Take 2 Puffs by inhalation every six (6) hours as needed. Indications: bring on the dos. fluticasone-salmeterol (ADVAIR DISKUS) 250-50 mcg/dose diskus inhaler   No No   Sig: Take 1 Puff by inhalation every twelve (12) hours. Indications: INTRINSIC ASTHMA   Patient taking differently: Take 1 Puff by inhalation daily. Indications: INTRINSIC ASTHMA   letrozole (FEMARA) 2.5 mg tablet   No No   Sig: Take 1 Tab by mouth daily. Patient taking differently: Take 2.5 mg by mouth nightly.    levothyroxine (SYNTHROID) 75 mcg tablet   No No   Sig: Take 1 Tab by mouth Daily (before breakfast).  Indications: hypothyroidism      Facility-Administered Medications: None     Allergies   Allergen Reactions    Prednisone Unknown (comments)     \"psychological reaction\"      Social History   Substance Use Topics    Smoking status: Never Smoker    Smokeless tobacco: Never Used    Alcohol use No      Family History   Problem Relation Age of Onset    Hypertension Mother     Cancer Mother 79     Breast cancer at age 79    Diabetes Mother      Type 2    Other Father      alzheimers    Cancer Maternal Aunt      Breast cancer after age 72    Cancer Maternal Aunt      Breast cancer at age 39   [facility] Allergic Rhinitis Neg Hx     Allergy-severe Neg Hx     Amblyopia Neg Hx     Anemia Neg Hx     Anesth Problems Neg Hx     Angioedema Neg Hx     Anxiety Neg Hx     Arrhythmia Neg Hx     Arthritis-rheumatoid Neg Hx     Ataxia Neg Hx     Atopy Neg Hx     Bipolar Disorder Neg Hx     Blindness Neg Hx     Breast Cancer Neg Hx     Breast Problems Neg Hx     Broken Bones Neg Hx     Cataract Neg Hx     Celiac Disease Neg Hx     Childhood heart surgery Neg Hx     Childhood resp disease Neg Hx     Chorea Neg Hx     Clotting Disorder Neg Hx     Collagen Dis Neg Hx     Colon Cancer Neg Hx     Colon Polyps Neg Hx     COPD Neg Hx     Coronary Artery Disease Neg Hx     Crohn's Disease Neg Hx     Cystic Fibrosis Neg Hx     Delayed Awakening Neg Hx     Dementia Neg Hx     Depression Neg Hx     Dislocations Neg Hx     Downs Syndrome Neg Hx     Drug Abuse Neg Hx     Eclampsia Neg Hx     Eczema Neg Hx     Emergence Delirium Neg Hx     Emphysema Neg Hx     Fainting Neg Hx     Genitourinary () Neg Hx     Glaucoma Neg Hx     Heart Attack Neg Hx     Heart defect Neg Hx     Heart Failure Neg Hx     Heart Surgery Neg Hx     Hemachromatosis Neg Hx     Herpes Neg Hx     High Cholesterol Neg Hx     HIV/AIDS Neg Hx     Immunodeficiency Neg Hx     Infertility Neg Hx     Inflammatory Bowel Dz Neg Hx     Kidney Disease Neg Hx     Liver Disease Neg Hx     Macular Degen Neg Hx     Malignant Hyperthermia Neg Hx     MS Neg Hx     Neuropathy Neg Hx     NF Neg Hx     Osteoporosis Neg Hx     Ovarian Cancer Neg Hx     Pacemaker Neg Hx     Panic disorder Neg Hx     Parkinsonism Neg Hx     Post-op Cognitive Dysfunction Neg Hx     Post-op Nausea/Vomiting Neg Hx     Prematurity Neg Hx      Labor Neg Hx     Pseudocholinesterase Deficiency Neg Hx     Psoriasis Neg Hx     Psychotic Disorder Neg Hx     Rashes/Skin Problems Neg Hx     Retinal Detachment Neg Hx     Rh Incompatibility Neg Hx     Schizophrenia Neg Hx     Seizures Neg Hx     Severe Sprains Neg Hx     Sickle Cell Anemia Neg Hx     Sickle Cell Trait Neg Hx     SIDS Neg Hx     SLE Neg Hx     Spont Ab Neg Hx     Stomach Cancer Neg Hx     Strabismus Neg Hx     Substance Abuse Neg Hx     Sudden Death Neg Hx     Suicide Neg Hx     Thyroid Disease Neg Hx     Tuberculosis Neg Hx     Ulcerative Colitis Neg Hx     Urticaria Neg Hx     Lang's Disease Neg Hx       Objective:     Visit Vitals    BP (!) 81/49    Pulse 92    Temp 97.5 °F (36.4 °C)    Resp 19    Ht 5' 2\" (1.575 m)    Wt 58.5 kg (129 lb)    SpO2 99%    BMI 23.59 kg/m2      Temp (24hrs), Av.5 °F (36.4 °C), Min:97.5 °F (36.4 °C), Max:97.5 °F (36.4 °C)    Oxygen Therapy  O2 Sat (%): 99 % (10/11/17 1458)  Pulse via Oximetry: 93 beats per minute (10/11/17 1458)  O2 Device: Room air (10/11/17 1143)  Physical Exam:  General:    Awake, alert no distress. Lungs:   Clear to auscultation bilaterally. No Wheezing or Rhonchi. No rales. Heart:   Regular rate and rhythm,  no murmur, rub or gallop. Abdomen:   Soft, non-tender. Not distended. Bowel sounds normal.   Extremities: No cyanosis. No edema. No clubbing  Skin:     Texture, turgor normal. No rashes or lesions.   Not Jaundiced  Neurologic: Alert and oriented x 3, no focal deficits Back:  Thin but foul smelling sero-sanguinous drainage from flap site on right upper back  Data Review:   Recent Results (from the past 24 hour(s))   CBC WITH AUTOMATED DIFF    Collection Time: 10/11/17 12:14 PM   Result Value Ref Range    WBC 2.8 (L) 4.3 - 11.1 K/uL    RBC 2.17 (L) 4.05 - 5.25 M/uL    HGB 7.1 (L) 11.7 - 15.4 g/dL    HCT 21.4 (L) 35.8 - 46.3 %    MCV 98.6 (H) 79.6 - 97.8 FL    MCH 32.7 26.1 - 32.9 PG    MCHC 33.2 31.4 - 35.0 g/dL    RDW 16.4 (H) 11.9 - 14.6 %    PLATELET 78 (L) 276 - 450 K/uL    MPV 12.4 10.8 - 14.1 FL    NEUTROPHILS 55 47 - 75 %    BAND NEUTROPHILS 8 (H) 0 - 6 %    LYMPHOCYTES 17 16 - 44 %    MONOCYTES 19 (H) 3 - 9 %    METAMYELOCYTES 1 %    ABS. NEUTROPHILS 1.8 1.7 - 8.2 K/UL    ABS. LYMPHOCYTES 0.5 0.5 - 4.6 K/UL    ABS. MONOCYTES 0.5 0.1 - 1.3 K/UL    RBC COMMENTS SLIGHT  ANISOCYTOSIS + POIKILOCYTOSIS        WBC COMMENTS OCCASIONAL      PLATELET COMMENTS DECREASED      DF MANUAL     PROCALCITONIN    Collection Time: 10/11/17 12:14 PM   Result Value Ref Range    Procalcitonin 01.2 ng/mL   METABOLIC PANEL, COMPREHENSIVE    Collection Time: 10/11/17 12:14 PM   Result Value Ref Range    Sodium 128 (L) 136 - 145 mmol/L    Potassium 3.4 (L) 3.5 - 5.1 mmol/L    Chloride 95 (L) 98 - 107 mmol/L    CO2 21 21 - 32 mmol/L    Anion gap 12 7 - 16 mmol/L    Glucose 111 (H) 65 - 100 mg/dL    BUN 33 (H) 8 - 23 MG/DL    Creatinine 2.31 (H) 0.6 - 1.0 MG/DL    GFR est AA 27 (L) >60 ml/min/1.73m2    GFR est non-AA 22 (L) >60 ml/min/1.73m2    Calcium 6.7 (L) 8.3 - 10.4 MG/DL    Bilirubin, total 0.4 0.2 - 1.1 MG/DL    ALT (SGPT) 23 12 - 65 U/L    AST (SGOT) 37 15 - 37 U/L    Alk.  phosphatase 70 50 - 136 U/L    Protein, total 5.2 (L) 6.3 - 8.2 g/dL    Albumin 1.8 (L) 3.2 - 4.6 g/dL    Globulin 3.4 2.3 - 3.5 g/dL    A-G Ratio 0.5 (L) 1.2 - 3.5     BNP    Collection Time: 10/11/17 12:14 PM   Result Value Ref Range     pg/mL   MAGNESIUM    Collection Time: 10/11/17 12:14 PM   Result Value Ref Range    Magnesium 1.3 (LL) 1.8 - 2.4 mg/dL   PHOSPHORUS    Collection Time: 10/11/17 12:14 PM   Result Value Ref Range    Phosphorus 4.2 (H) 2.3 - 3.7 MG/DL   POC LACTIC ACID    Collection Time: 10/11/17 12:22 PM   Result Value Ref Range    Lactic Acid (POC) 4.5 (H) 0.5 - 1.9 mmol/L   POC TROPONIN-I    Collection Time: 10/11/17 12:22 PM   Result Value Ref Range    Troponin-I (POC) 0 (L) 0.02 - 0.05 ng/ml   EKG, 12 LEAD, INITIAL    Collection Time: 10/11/17 12:30 PM   Result Value Ref Range    Ventricular Rate 90 BPM    Atrial Rate 90 BPM    P-R Interval 138 ms    QRS Duration 82 ms    Q-T Interval 390 ms    QTC Calculation (Bezet) 477 ms    Calculated P Axis 55 degrees    Calculated R Axis 1 degrees    Calculated T Axis 46 degrees    Diagnosis       Normal sinus rhythm  Low voltage QRS  Borderline ECG  When compared with ECG of 11-OCT-2017 10:57,  GA interval has decreased  Questionable change in QRS duration  Confirmed by ELO NEWMAN (), Leni Leo ([id number]) on 10/11/2017 1:58:46 PM     URINALYSIS W/ RFLX MICROSCOPIC    Collection Time: 10/11/17 12:55 PM   Result Value Ref Range    Color RISHI      Appearance TURBID      Specific gravity 1.016 1.001 - 1.023      pH (UA) 5.5 5.0 - 9.0      Protein 30 (A) NEG mg/dL    Glucose NEGATIVE  mg/dL    Ketone TRACE (A) NEG mg/dL    Bilirubin SMALL (A) NEG      Blood NEGATIVE  NEG      Urobilinogen 1.0 0.2 - 1.0 EU/dL    Nitrites NEGATIVE  NEG      Leukocyte Esterase NEGATIVE  NEG      WBC 3-5 0 /hpf    RBC 3-5 0 /hpf    Epithelial cells 0-3 0 /hpf    Bacteria 2+ (H) 0 /hpf    Casts HYALINE 0 /lpf    Crystals, urine AMORPHOUS 0 /LPF   POC LACTIC ACID    Collection Time: 10/11/17  3:35 PM   Result Value Ref Range    Lactic Acid (POC) 3.6 (H) 0.5 - 1.9 mmol/L     Imaging /Procedures /Studies   Final result (Exam End: 10/11/2017 12:21 PM) Open        Study Result      Chest portable     CLINICAL INDICATION: Sepsis; history of breast cancer     COMPARISON: PET 8/11/2017, chest radiography 4/7/2017     TECHNIQUE: single AP portable view chest at 12:20 PM upright      FINDINGS:  There are mild linear and groundglass opacities in the bases, left  slightly greater than right. There is no evidence of dense consolidation,  pneumothorax, pleural effusion or pulmonary edema. The mediastinal and hilar  contours are within normal limits given technique. Again noted are right  axillary surgical clips and right breast implant. The left portacatheter remains  in place.        IMPRESSION  IMPRESSION: Mild bibasilar infiltrates. No dense consolidation. Assessment and Plan: Active Hospital Problems    Diagnosis Date Noted    Acute renal failure (ARF) ([facility] [address] 10/11/2017    Pancytopenia ([facility] [address] 10/11/2017    Hypomagnesemia 10/11/2017    Diarrhea 10/11/2017    Sepsis ([facility] [address] 10/11/2017    Breast cancer ([facility] [address] 09/18/2017    Hypokalemia 04/07/2017    Acquired hypothyroidism 11/04/2015     A/p  - Sepsis - defined by hypotension, elevated lactic acid and very high procal suggesting bacterial source. IVF, IV antbx initiated. Blood/urine cx. CXR reviewed  - Hypotension- large volume IVF, pressors prn MAP >60. Reportedly SBP in low 100's at baselin.  - S/p Reconstructive breast sx- large amnt of foul smelling drainage w/o localized erythema or induration. Will add fluid for culture. Cont atbx coverage.  - Pancytopenia - likely d/t chemotherapy Ibrance (not re-ordered on admit). Consult heme/onc. Transfuse 2 units prbc for hgb 7 and symptomatic  - acute renal failure - monitor with volume repletion  - Hypomag/Hypokalemia - replete. Repeat in Am  - Hypothyroidism - repeat TSH    Code Status: full code    Anticipated discharge: 3-4 days.      Signed By: Jammie Tavares DO     October 11, 2017

## 2017-10-11 NOTE — PROGRESS NOTES
Pharmacokinetic Consult to Pharmacist    Antonina Prajapati is a 74 y.o. female being treated for HCAP with Vancomycin and Cipro. @[facility](96)@  @[facility](02)@  Lab Results   Component Value Date/Time    BUN 33 10/11/2017 12:14 PM    Creatinine 2.31 10/11/2017 12:14 PM    WBC 2.8 10/11/2017 12:14 PM    Procalcitonin 25.7 10/11/2017 12:14 PM    Lactic acid 1.4 04/08/2017 03:40 AM    Lactic Acid (POC) 3.6 10/11/2017 03:35 PM      Estimated Creatinine Clearance: 18.4 mL/min (based on Cr of 2.31). CULTURES:  All Micro Results       Procedure Component Value Units Date/Time      C. DIFFICILE/EPI PCR [[id number]]     Order Status:  Sent Specimen:  Stool     CULTURE, BLOOD [[id number]] Collected:  10/11/17 1214    Order Status:  Completed Specimen:  Blood from Blood Updated:  10/11/17 1233    CULTURE, BLOOD [[id number]] Collected:  10/11/17 1214    Order Status:  Completed Specimen:  Blood from Blood Updated:  10/11/17 1233              Day 1 of vancomycin. Goal trough is 15 - 20. Vancomycin dose initiated at 1250 mg today. Will order daily random levels. Will continue to follow patient.       Thank you,  Louie KeitaD, BCPS

## 2017-10-12 LAB
ANION GAP SERPL CALC-SCNC: 9 MMOL/L (ref 7–16)
BUN SERPL-MCNC: 26 MG/DL (ref 8–23)
CALCIUM SERPL-MCNC: 6.6 MG/DL (ref 8.3–10.4)
CHLORIDE SERPL-SCNC: 97 MMOL/L (ref 98–107)
CO2 SERPL-SCNC: 21 MMOL/L (ref 21–32)
CREAT SERPL-MCNC: 1.58 MG/DL (ref 0.6–1)
ERYTHROCYTE [DISTWIDTH] IN BLOOD BY AUTOMATED COUNT: 17 % (ref 11.9–14.6)
GLUCOSE SERPL-MCNC: 107 MG/DL (ref 65–100)
HCT VFR BLD AUTO: 27 % (ref 35.8–46.3)
HCT VFR BLD AUTO: 30.5 % (ref 35.8–46.3)
HGB BLD-MCNC: 10.4 G/DL (ref 11.7–15.4)
HGB BLD-MCNC: 8.9 G/DL (ref 11.7–15.4)
LACTATE SERPL-SCNC: 2.4 MMOL/L (ref 0.4–2)
MAGNESIUM SERPL-MCNC: 2.9 MG/DL (ref 1.8–2.4)
MCH RBC QN AUTO: 31.6 PG (ref 26.1–32.9)
MCHC RBC AUTO-ENTMCNC: 33 G/DL (ref 31.4–35)
MCV RBC AUTO: 95.7 FL (ref 79.6–97.8)
PLATELET # BLD AUTO: 81 K/UL (ref 150–450)
PMV BLD AUTO: 11.5 FL (ref 10.8–14.1)
POTASSIUM SERPL-SCNC: 3.3 MMOL/L (ref 3.5–5.1)
PROCALCITONIN SERPL-MCNC: 14.8 NG/ML
RBC # BLD AUTO: 2.82 M/UL (ref 4.05–5.25)
SODIUM SERPL-SCNC: 127 MMOL/L (ref 136–145)
WBC # BLD AUTO: 1.4 K/UL (ref 4.3–11.1)

## 2017-10-12 PROCEDURE — 83605 ASSAY OF LACTIC ACID: CPT | Performed by: INTERNAL MEDICINE

## 2017-10-12 PROCEDURE — 74011250636 HC RX REV CODE- 250/636: Performed by: EMERGENCY MEDICINE

## 2017-10-12 PROCEDURE — 84145 PROCALCITONIN (PCT): CPT | Performed by: INTERNAL MEDICINE

## 2017-10-12 PROCEDURE — 94664 DEMO&/EVAL PT USE INHALER: CPT

## 2017-10-12 PROCEDURE — 83735 ASSAY OF MAGNESIUM: CPT | Performed by: INTERNAL MEDICINE

## 2017-10-12 PROCEDURE — 74750000023 HC WOUND THERAPY

## 2017-10-12 PROCEDURE — P9016 RBC LEUKOCYTES REDUCED: HCPCS | Performed by: EMERGENCY MEDICINE

## 2017-10-12 PROCEDURE — 36430 TRANSFUSION BLD/BLD COMPNT: CPT

## 2017-10-12 PROCEDURE — 94640 AIRWAY INHALATION TREATMENT: CPT

## 2017-10-12 PROCEDURE — 74011250637 HC RX REV CODE- 250/637: Performed by: INTERNAL MEDICINE

## 2017-10-12 PROCEDURE — 85027 COMPLETE CBC AUTOMATED: CPT | Performed by: INTERNAL MEDICINE

## 2017-10-12 PROCEDURE — 77030019952 HC CANSTR VAC ASST KCON -B

## 2017-10-12 PROCEDURE — 74011000258 HC RX REV CODE- 258: Performed by: INTERNAL MEDICINE

## 2017-10-12 PROCEDURE — 77030019934 HC DRSG VAC ASST KCON -B

## 2017-10-12 PROCEDURE — 99223 1ST HOSP IP/OBS HIGH 75: CPT | Performed by: INTERNAL MEDICINE

## 2017-10-12 PROCEDURE — 65610000001 HC ROOM ICU GENERAL

## 2017-10-12 PROCEDURE — 80048 BASIC METABOLIC PNL TOTAL CA: CPT | Performed by: INTERNAL MEDICINE

## 2017-10-12 PROCEDURE — 85018 HEMOGLOBIN: CPT | Performed by: INTERNAL MEDICINE

## 2017-10-12 PROCEDURE — 97605 NEG PRS WND THER DME<=50SQCM: CPT

## 2017-10-12 PROCEDURE — 74011000250 HC RX REV CODE- 250: Performed by: INTERNAL MEDICINE

## 2017-10-12 PROCEDURE — 74011250636 HC RX REV CODE- 250/636: Performed by: INTERNAL MEDICINE

## 2017-10-12 PROCEDURE — 77030013131 HC IV BLD ST ICUM -A

## 2017-10-12 RX ORDER — LEVOTHYROXINE SODIUM 100 UG/1
100 TABLET ORAL
Status: DISCONTINUED | OUTPATIENT
Start: 2017-10-13 | End: 2017-10-15

## 2017-10-12 RX ORDER — CIPROFLOXACIN 2 MG/ML
400 INJECTION, SOLUTION INTRAVENOUS EVERY 8 HOURS
Status: DISCONTINUED | OUTPATIENT
Start: 2017-10-12 | End: 2017-10-13

## 2017-10-12 RX ORDER — IPRATROPIUM BROMIDE AND ALBUTEROL SULFATE 2.5; .5 MG/3ML; MG/3ML
3 SOLUTION RESPIRATORY (INHALATION)
Status: DISCONTINUED | OUTPATIENT
Start: 2017-10-12 | End: 2017-10-13

## 2017-10-12 RX ORDER — DIPHENHYDRAMINE HCL 25 MG
25 CAPSULE ORAL ONCE
Status: COMPLETED | OUTPATIENT
Start: 2017-10-12 | End: 2017-10-12

## 2017-10-12 RX ORDER — POTASSIUM CHLORIDE 20 MEQ/1
40 TABLET, EXTENDED RELEASE ORAL EVERY 4 HOURS
Status: COMPLETED | OUTPATIENT
Start: 2017-10-12 | End: 2017-10-12

## 2017-10-12 RX ADMIN — VANCOMYCIN HYDROCHLORIDE 1000 MG: 1 INJECTION, POWDER, LYOPHILIZED, FOR SOLUTION INTRAVENOUS at 13:39

## 2017-10-12 RX ADMIN — ACETAMINOPHEN 650 MG: 325 TABLET, FILM COATED ORAL at 20:10

## 2017-10-12 RX ADMIN — IPRATROPIUM BROMIDE AND ALBUTEROL SULFATE 3 ML: .5; 3 SOLUTION RESPIRATORY (INHALATION) at 13:57

## 2017-10-12 RX ADMIN — CEFEPIME HYDROCHLORIDE 2 G: 2 INJECTION, POWDER, FOR SOLUTION INTRAVENOUS at 16:20

## 2017-10-12 RX ADMIN — SODIUM CHLORIDE 125 ML/HR: 900 INJECTION, SOLUTION INTRAVENOUS at 07:34

## 2017-10-12 RX ADMIN — SODIUM CHLORIDE 125 ML/HR: 900 INJECTION, SOLUTION INTRAVENOUS at 12:00

## 2017-10-12 RX ADMIN — PIPERACILLIN SODIUM,TAZOBACTAM SODIUM 4.5 G: 4; .5 INJECTION, POWDER, FOR SOLUTION INTRAVENOUS at 05:38

## 2017-10-12 RX ADMIN — IPRATROPIUM BROMIDE AND ALBUTEROL SULFATE 3 ML: .5; 3 SOLUTION RESPIRATORY (INHALATION) at 19:32

## 2017-10-12 RX ADMIN — PIPERACILLIN SODIUM,TAZOBACTAM SODIUM 4.5 G: 4; .5 INJECTION, POWDER, FOR SOLUTION INTRAVENOUS at 13:38

## 2017-10-12 RX ADMIN — Medication 10 ML: at 22:52

## 2017-10-12 RX ADMIN — DIPHENHYDRAMINE HYDROCHLORIDE 25 MG: 25 CAPSULE ORAL at 02:00

## 2017-10-12 RX ADMIN — Medication 10 ML: at 13:41

## 2017-10-12 RX ADMIN — DULOXETINE HYDROCHLORIDE 60 MG: 60 CAPSULE, DELAYED RELEASE ORAL at 22:52

## 2017-10-12 RX ADMIN — ACETAMINOPHEN 650 MG: 325 TABLET, FILM COATED ORAL at 00:26

## 2017-10-12 RX ADMIN — POTASSIUM CHLORIDE 40 MEQ: 20 TABLET, EXTENDED RELEASE ORAL at 03:54

## 2017-10-12 RX ADMIN — POTASSIUM CHLORIDE 40 MEQ: 20 TABLET, EXTENDED RELEASE ORAL at 07:34

## 2017-10-12 RX ADMIN — CIPROFLOXACIN 400 MG: 2 INJECTION, SOLUTION INTRAVENOUS at 22:52

## 2017-10-12 RX ADMIN — Medication 10 ML: at 05:39

## 2017-10-12 RX ADMIN — CIPROFLOXACIN 400 MG: 2 INJECTION, SOLUTION INTRAVENOUS at 15:02

## 2017-10-12 RX ADMIN — CIPROFLOXACIN 400 MG: 2 INJECTION, SOLUTION INTRAVENOUS at 05:38

## 2017-10-12 RX ADMIN — LEVOTHYROXINE SODIUM 75 MCG: 75 TABLET ORAL at 07:34

## 2017-10-12 NOTE — PROGRESS NOTES
Pt had a fever post blood transfusion, had a 4 beat run of VTach, and some redness noted to chest. Pt does not complain of any itching or shortness of breath. Pt was covered with numerous blankets and room was warm during transfusion so blankets were removed and room cooled. After 40 minutes the patient still had a temperature of 100.2. Dr. Layla Doe was updated on patient condition and order received to draw morning labs at 1am and do not transfuse 2nd unit of blood until lab results are back. Pt also given prn tylenol. Will continue to monitor pt.

## 2017-10-12 NOTE — WOUND CARE
Patient seen for wound VAC to right back wound. It was pale pink with small loose slough. Noted saturated old dressing serosanguinous. Light pink to area but patient had been laying on her back. Discussed wound VAC therapy with patient and family friend at bedside. Answered all questions. Home VAC clinical form completed and given to case management team. Wound VAC placed without difficulty and tracted to right side to prevent patient from laying on tubing.

## 2017-10-12 NOTE — PROGRESS NOTES
Bedside shift report received from Danica Christine, [facility], [facility]. Pt resting in bed with no visible signs of acute distress. Pt is A&Ox4, eyes focus and track, follows commands. Breath sounds clear and diminished in the bases, symmetrical chest expansion on room air. NSR on monitor, S1/S2 auscultated. Bowel sounds active and abdomen intact. Dual skin assessment completed with Danica Christine RN. Pt has wound under R breast and on R upper back, and incision from bilateral mastectomy. Lines: L Chest port, L AC  Drips: NS @ 125 mL/hr  Drains: sage    Pt educated on incentive spirometer and verbalized understanding. Will continue to monitor pt.

## 2017-10-12 NOTE — PROGRESS NOTES
Pharmacokinetic Consult to Pharmacist    Norma Denice is a 74 y.o. female being treated for possible MDR pneumonia with Vancomycin, Zosyn and Cipro. @[facility](51)@  @[facility](40)@  Lab Results   Component Value Date/Time    BUN 26 10/12/2017 12:55 AM    Creatinine 1.58 10/12/2017 12:55 AM    WBC 1.4 10/12/2017 12:55 AM    Procalcitonin 14.8 10/12/2017 12:55 AM    Lactic acid 2.4 10/12/2017 12:55 AM    Lactic Acid (POC) 3.6 10/11/2017 03:35 PM      Estimated Creatinine Clearance: 30.4 mL/min (based on Cr of 1.58). CULTURES:  All Micro Results       Procedure Component Value Units Date/Time      CULTURE, [place]on De[place] STAIN [[id number]] Collected:  10/11/17 1730    Order Status:  Completed Specimen:  Drainage Updated:  10/12/17 0933     Special Requests: --        RIGHT  UPPER  BACK       GRAM STAIN NO WBCS SEEN                 MODERATE GRAM POSITIVE COCCI     Culture result: NO GROWTH 1 DAY       CULTURE, BLOOD [[id number]] Collected:  10/11/17 1214    Order Status:  Completed Specimen:  Blood from Blood Updated:  10/12/17 0912     Special Requests: PORT        Culture result: NO GROWTH AFTER 20 HOURS       CULTURE, BLOOD [[id number]] Collected:  10/11/17 1214    Order Status:  Completed Specimen:  Blood from Blood Updated:  10/12/17 0912     Special Requests: --        LEFT  Antecubital       Culture result: NO GROWTH AFTER 20 HOURS       CULTURE, URINE [[id number]] Collected:  10/11/17 1735    Order Status:  Completed Specimen:  Urine from Clean catch Updated:  10/12/17 0735     Special Requests: NO SPECIAL REQUESTS        Culture result:         NO GROWTH AFTER SHORT PERIOD OF INCUBATION. FURTHER RESULTS TO FOLLOW AFTER OVERNIGHT INCUBATION.     CULTURE, BODY FLUID Sana Pena [[id number]] Collected:  10/11/17 1735    Order Status:  Canceled Specimen:  Miscellaneous Fluid     CULTURE, BLOOD [[id number]] Collected:  10/11/17 1615    Order Status:  Canceled Specimen:  Blood from Blood     CULTURE, BLOOD [[id number]] Collected:  10/11/17 1615    Order Status:  Canceled Specimen:  Blood from Blood     C. DIFFICILE/EPI PCR [[id number]]     Order Status:  Sent Specimen:  Stool               Lab Results   Component Value Date/Time    Vancomycin,trough 6.1 04/10/2017 03:15 PM       Day 2 of vancomycin. Goal trough is 15 - 20. Will change Vancomycin 1 gram IV every 24 hours. Will continue to follow patient.       Thank you,  Cayden KeitaD, BCPS

## 2017-10-12 NOTE — PROGRESS NOTES
Ate poorly at dinner. Use incentive spirometer up to 1000. Denies pain. Nursing report to [facility].

## 2017-10-12 NOTE — PROGRESS NOTES
Brent reviewed chart and spoke with wound care Rn this am. She informed pt to be placed on a wound vac and will need one for home use. Rn began filling out paperwork. Await Md signature. Order form and clinical to be faxed once signature obtained. Anticipate pt needing home health care for supervision and dressing changes with vac. Ref placed with [address] for Rn.  Brent will cont to follow and assist. Norma Neely

## 2017-10-12 NOTE — CONSULTS
[facility] Hematology & Oncology        Inpatient Hematology / Oncology Consult Note    Reason for Consult:  Acute renal failure (ARF) [facility])  Referring Physician:  Lo Bowles DO    History of Present Illness:  Mrs. Kaley Traylor is a 74year old patient with PMH of breast cancer follow by Dr. Sandi Reyna s/p RTX February 2017, currently on femara and ibrance. She is sp right breast reconstruction with flap and left breast mastectomy on 9/18. She was seen in Dr. Ardath Hodgkins office prior to admission with concerns for hypotension, fatigue and malaise as well as drainage from flap site. Dr Dong Garzon reportedly okay with appearance of op site but was concerned regarding SBP in 60's at office. She was brought to ED via EMS. ER workup notable for pancytopenia, acute renal failure and hypotension. Patient stated that she had been feeling poorly 2 days prior with temp of 101 along with profuse diarrhea x 2 days. Diarrhea has since resolved. We are consulted for pancytopenia. Review of Systems:  Constitutional Denies fever, chills, weight loss, appetite changes, fatigue, night sweats. HEENT Denies trauma, blurry vision, hearing loss, ear pain, nosebleeds, sore throat, neck pain and ear discharge. Skin Denies lesions or rashes. Lungs Denies dyspnea, cough, sputum production or hemoptysis. Cardiovascular Denies chest pain, palpitations, or lower extremity edema. Gastrointestinal Denies nausea, vomiting, changes in bowel habits, bloody or black stools, abdominal pain.  Denies dysuria, frequency or hesitancy of urination. Neuro Denies headaches, visual changes or ataxia. Denies dizziness, tingling, tremors, sensory change, speech change, focal weakness or headaches. Hematology Denies easy bruising or bleeding, denies gingival bleeding or epistaxis. Endo Denies heat/cold intolerance, denies diabetes or thyroid abnormalities. MSK Denies back pain, arthralgias, myalgias or frequent falls. Psychiatric/Behavioral Denies depression and substance abuse. The patient is not nervous/anxious. Allergies   Allergen Reactions    Prednisone Unknown (comments)     \"psychological reaction\"     Past Medical History:   Diagnosis Date    Abnormal CT of the chest     Adverse effect of anesthesia     bp dropped with dual lung/gallbladder surgery    Asthma      no acute attacks recently.  uses advair daily    Breast cancer ([address] 2016    R breast, s/p mastectomy, chemo, reconstruction    Depression      Hypercholesteremia     Controlled with diet     Hypothyroidism     Ill-defined condition     granular lung disease resolved 2002     Past Surgical History:   Procedure Laterality Date    CHEST SURGERY PROCEDURE UNLISTED      R lung biopsy     HX BLADDER REPAIR      HX BREAST BIOPSY Right 2/5/2016    SKIN BIOPSY RIGHT BREAST performed by Henrietta Box MD at [address] HX BREAST RECONSTRUCTION Bilateral 9/14/2016    BILATERAL BREAST RECONSTRUCTION performed by Peterson Rodriguez MD at [address] HX BREAST RECONSTRUCTION Bilateral 9/14/2016    BILATERAL BREAST TISSUE EXPANDER INSERTION performed by Peterson Rodriguez MD at [facility] MAIN OR    HX BREAST RECONSTRUCTION Right 9/18/2017    RIGHT BREAST RECONSTRUCTION performed by Peterson Rodriguez MD at [address] HX BREAST RECONSTRUCTION Right 9/18/2017    Right  PLACEMENT OF PERMANENT IMPLANTS performed by Peterson Rodriguez MD at [address] HX BREAST REDUCTION Left 9/18/2017    LEFT BREAST MASTOPEXY FOR SYMMETRY performed by Peterson Rodriguez MD at [address] HX COLONOSCOPY      2006    HX CYSTOCELE REPAIR      HX HYSTERECTOMY      fibroids    HX LAP CHOLECYSTECTOMY  2000    HX MASTECTOMY Right     HX RECTOCELE REPAIR      HX VASCULAR ACCESS      port left chest wall     Family History   Problem Relation Age of Onset    Hypertension Mother     Cancer Mother 79     Breast cancer at age 79    Diabetes Mother      Type 2    Other Father      alzheimers    Cancer Maternal Aunt      Breast cancer after age 69    Cancer Maternal Aunt      Breast cancer at age 39   Aundria Dadds Allergic Rhinitis Neg Hx     Allergy-severe Neg Hx     Amblyopia Neg Hx     Anemia Neg Hx     Anesth Problems Neg Hx     Angioedema Neg Hx     Anxiety Neg Hx     Arrhythmia Neg Hx     Arthritis-rheumatoid Neg Hx     Ataxia Neg Hx     Atopy Neg Hx     Bipolar Disorder Neg Hx     Blindness Neg Hx     Breast Cancer Neg Hx     Breast Problems Neg Hx     Broken Bones Neg Hx     Cataract Neg Hx     Celiac Disease Neg Hx     Childhood heart surgery Neg Hx     Childhood resp disease Neg Hx     Chorea Neg Hx     Clotting Disorder Neg Hx     Collagen Dis Neg Hx     Colon Cancer Neg Hx     Colon Polyps Neg Hx     COPD Neg Hx     Coronary Artery Disease Neg Hx     Crohn's Disease Neg Hx     Cystic Fibrosis Neg Hx     Delayed Awakening Neg Hx     Dementia Neg Hx     Depression Neg Hx     Dislocations Neg Hx     Downs Syndrome Neg Hx     Drug Abuse Neg Hx     Eclampsia Neg Hx     Eczema Neg Hx     Emergence Delirium Neg Hx     Emphysema Neg Hx     Fainting Neg Hx     Genitourinary () Neg Hx     Glaucoma Neg Hx     Heart Attack Neg Hx     Heart defect Neg Hx     Heart Failure Neg Hx     Heart Surgery Neg Hx     Hemachromatosis Neg Hx     Herpes Neg Hx     High Cholesterol Neg Hx     HIV/AIDS Neg Hx     Immunodeficiency Neg Hx     Infertility Neg Hx     Inflammatory Bowel Dz Neg Hx     Kidney Disease Neg Hx     Liver Disease Neg Hx     Macular Degen Neg Hx     Malignant Hyperthermia Neg Hx     MS Neg Hx     Neuropathy Neg Hx     NF Neg Hx     Osteoporosis Neg Hx     Ovarian Cancer Neg Hx     Pacemaker Neg Hx     Panic disorder Neg Hx     Parkinsonism Neg Hx     Post-op Cognitive Dysfunction Neg Hx     Post-op Nausea/Vomiting Neg Hx     Prematurity Neg Hx      Labor Neg Hx     Pseudocholinesterase Deficiency Neg Hx     Psoriasis Neg Hx     Psychotic Disorder Neg Hx     Rashes/Skin Problems Neg Hx     Retinal Detachment Neg Hx     Rh Incompatibility Neg Hx     Schizophrenia Neg Hx     Seizures Neg Hx     Severe Sprains Neg Hx     Sickle Cell Anemia Neg Hx     Sickle Cell Trait Neg Hx     SIDS Neg Hx     SLE Neg Hx     Spont Ab Neg Hx     Stomach Cancer Neg Hx     Strabismus Neg Hx     Substance Abuse Neg Hx     Sudden Death Neg Hx     Suicide Neg Hx     Thyroid Disease Neg Hx     Tuberculosis Neg Hx     Ulcerative Colitis Neg Hx     Urticaria Neg Hx     Lang's Disease Neg Hx      Social History     Social History    Marital status:      Spouse name: N/A    Number of children: N/A    Years of education: N/A     Occupational History    Not on file.      Social History Main Topics    Smoking status: Never Smoker    Smokeless tobacco: Never Used    Alcohol use No    Drug use: No    Sexual activity: Not on file     Other Topics Concern    Not on file     Social History Narrative     Current Facility-Administered Medications   Medication Dose Route Frequency Provider Last Rate Last Dose    [START ON 10/13/2017] levothyroxine (SYNTHROID) tablet 100 mcg  100 mcg Oral ACB Earnest Cindy, DO        ciprofloxacin (CIPRO) 400 mg IVPB (premix)  400 mg IntraVENous Q8H Earnest Cindy, DO        vancomycin (VANCOCIN) 1,000 mg in 0.9% sodium chloride (MBP/ADV) 250 mL  1,000 mg IntraVENous Q24H Earnest Cindy,  mL/hr at 10/12/17 1339 1,000 mg at 10/12/17 1339    albuterol-ipratropium (DUO-NEB) 2.5 MG-0.5 MG/3 ML  3 mL Nebulization Q6H RT Earnest Cindy, DO        sodium chloride (NS) flush 5-10 mL  5-10 mL IntraVENous PRN Adam Ames MD        ALPRAZolam Kathy Kanner) tablet 0.5 mg  0.5 mg Oral TID PRN Earnest Cindy, DO        DULoxetine (CYMBALTA) capsule 60 mg  60 mg Oral QHS Earnest Cindy, DO   60 mg at 10/11/17 2139    sodium chloride (NS) flush 5-10 mL  5-10 mL IntraVENous Q8H Lo Goo, DO   10 mL at 10/12/17 1341    sodium chloride (NS) flush 5-10 mL  5-10 mL IntraVENous PRN Lo Goo, DO        acetaminophen (TYLENOL) tablet 650 mg  650 mg Oral Q4H PRN Lo Goo, DO   650 mg at 10/12/17 0026    0.9% sodium chloride infusion  125 mL/hr IntraVENous CONTINUOUS Lo Goo,  mL/hr at 10/12/17 1200 125 mL/hr at 10/12/17 1200    albuterol-ipratropium (DUO-NEB) 2.5 MG-0.5 MG/3 ML  3 mL Nebulization Q4H PRN Lo Goo, DO   3 mL at 10/12/17 1357    0.9% sodium chloride infusion 250 mL  250 mL IntraVENous PRN Lo Goo, DO        piperacillin-tazobactam (ZOSYN) 4.5 g in 0.9% sodium chloride (MBP/ADV) 100 mL  4.5 g IntraVENous Q8H Lo Goo, DO 25 mL/hr at 10/12/17 1338 4.5 g at 10/12/17 1338       OBJECTIVE:  Patient Vitals for the past 8 hrs:   BP Temp Pulse Resp SpO2   10/12/17 1357 - - - - 96 %   10/12/17 1223 - - 90 23 96 %   10/12/17 1222 95/53 - - - -   10/12/17 1152 93/53 - 87 (!) 32 96 %   10/12/17 1123 - - 85 18 94 %   10/12/17 1122 94/65 - - - -   10/12/17 1059 - - 91 23 96 %   10/12/17 1053 - - 90 18 95 %   10/12/17 1052 103/53 - - - -   10/12/17 1051 - - 88 14 96 %   10/12/17 1050 - - 92 26 96 %   10/12/17 1022 (!) 89/54 - - - -   10/12/17 1021 - - 91 18 97 %   10/12/17 0952 92/45 - 88 29 92 %   10/12/17 0922 105/50 - 88 23 97 %   10/12/17 0852 119/62 - 94 27 94 %   10/12/17 0822 (!) 86/49 - - - -   10/12/17 0820 - - 89 19 97 %   10/12/17 0752 (!) 89/47 97.6 °F (36.4 °C) 84 23 96 %   10/12/17 0722 (!) 87/49 - 85 22 94 %   10/12/17 0656 (!) 84/50 - 81 18 95 %     Temp (24hrs), Av.2 °F (36.8 °C), Min:97.1 °F (36.2 °C), Max:100.2 °F (37.9 °C)         Physical Exam:  Constitutional: Well developed, well nourished female in no acute distress, sitting comfortably in the hospital bed. HEENT: Normocephalic and atraumatic.  Oropharynx is clear, mucous membranes are moist. Pupils are equal, round, and reactive to light. Extraocular muscles are intact. Sclerae anicteric. Neck supple without JVD. No thyromegaly present. Lymph node   No palpable submandibular, cervical, supraclavicular, axillary or inguinal lymph nodes. Skin Warm and dry. No bruising and no rash noted. No erythema. No pallor. Respiratory Lungs are clear to auscultation bilaterally without wheezes, rales or rhonchi, normal air exchange without accessory muscle use. CVS Normal rate, regular rhythm and normal S1 and S2. No murmurs, gallops, or rubs. Abdomen Soft, nontender and nondistended, normoactive bowel sounds. No palpable mass. No hepatosplenomegaly. Neuro Grossly nonfocal with no obvious sensory or motor deficits. MSK Normal range of motion in general.  No edema and no tenderness. Psych Appropriate mood and affect. Labs:    Recent Results (from the past 24 hour(s))   TYPE & SCREEN    Collection Time: 10/11/17  3:29 PM   Result Value Ref Range    Crossmatch Expiration 10/14/2017     ABO/Rh(D) A POSITIVE     Antibody screen NEG     Unit number [id number]     Blood component type  LR AS5     Unit division 00     Status of unit TRANSFUSED     Crossmatch result Compatible     Unit number [id number]     Blood component type [facility] AS5     Unit division 00     Status of unit ISSUED     Crossmatch result Compatible    POC LACTIC ACID    Collection Time: 10/11/17  3:35 PM   Result Value Ref Range    Lactic Acid (POC) 3.6 (H) 0.5 - 1.9 mmol/L   CULTURE, WOUND W GRAM STAIN    Collection Time: 10/11/17  5:30 PM   Result Value Ref Range    Special Requests: RIGHT  UPPER  BACK        GRAM STAIN NO WBCS SEEN      GRAM STAIN MODERATE GRAM POSITIVE COCCI      Culture result: NO GROWTH 1 DAY     CULTURE, URINE    Collection Time: 10/11/17  5:35 PM   Result Value Ref Range    Special Requests: NO SPECIAL REQUESTS      Culture result:        NO GROWTH AFTER SHORT PERIOD OF INCUBATION.  FURTHER RESULTS TO FOLLOW AFTER OVERNIGHT INCUBATION.    METABOLIC PANEL, BASIC    Collection Time: 10/12/17 12:55 AM   Result Value Ref Range    Sodium 127 (L) 136 - 145 mmol/L    Potassium 3.3 (L) 3.5 - 5.1 mmol/L    Chloride 97 (L) 98 - 107 mmol/L    CO2 21 21 - 32 mmol/L    Anion gap 9 7 - 16 mmol/L    Glucose 107 (H) 65 - 100 mg/dL    BUN 26 (H) 8 - 23 MG/DL    Creatinine 1.58 (H) 0.6 - 1.0 MG/DL    GFR est AA 42 (L) >60 ml/min/1.73m2    GFR est non-AA 35 (L) >60 ml/min/1.73m2    Calcium 6.6 (L) 8.3 - 10.4 MG/DL   CBC W/O DIFF    Collection Time: 10/12/17 12:55 AM   Result Value Ref Range    WBC 1.4 (LL) 4.3 - 11.1 K/uL    RBC 2.82 (L) 4.05 - 5.25 M/uL    HGB 8.9 (L) 11.7 - 15.4 g/dL    HCT 27.0 (L) 35.8 - 46.3 %    MCV 95.7 79.6 - 97.8 FL    MCH 31.6 26.1 - 32.9 PG    MCHC 33.0 31.4 - 35.0 g/dL    RDW 17.0 (H) 11.9 - 14.6 %    PLATELET 81 (L) 246 - 450 K/uL    MPV 11.5 10.8 - 14.1 FL   PROCALCITONIN    Collection Time: 10/12/17 12:55 AM   Result Value Ref Range    Procalcitonin 14.8 ng/mL   LACTIC ACID    Collection Time: 10/12/17 12:55 AM   Result Value Ref Range    Lactic acid 2.4 (HH) 0.4 - 2.0 MMOL/L   MAGNESIUM    Collection Time: 10/12/17 12:55 AM   Result Value Ref Range    Magnesium 2.9 (H) 1.8 - 2.4 mg/dL   HGB & HCT    Collection Time: 10/12/17  9:51 AM   Result Value Ref Range    HGB 10.4 (L) 11.7 - 15.4 g/dL    HCT 30.5 (L) 35.8 - 46.3 %       Imaging:  [unfilled]    ASSESSMENT:  Problem List  Date Reviewed: 7/26/2017          Codes Class Noted    Acute renal failure (ARF) (HCC) ICD-10-CM: N17.9  ICD-9-CM: 584.9  10/11/2017        Pancytopenia ([facility] ICD-10-CM: D78.858  ICD-9-CM: 284.19  10/11/2017        Hypomagnesemia ICD-10-CM: M27.35  ICD-9-CM: 275.2  10/11/2017        Diarrhea ICD-10-CM: R19.7  ICD-9-CM: 787.91  10/11/2017        Sepsis ([facility] ICD-10-CM: A41.9  ICD-9-CM: 038.9, 995.91  10/11/2017        S/P breast reconstruction, right ICD-10-CM: O13.030  ICD-9-CM: V43.82  9/19/2017        Breast cancer [facility]) ICD-10-CM: C50.919  ICD-9-CM: 174.9  9/18/2017        Lump of skin of right upper extremity ICD-10-CM: R22.31  ICD-9-CM: 782.2  6/13/2017        Malignant neoplasm of upper-inner quadrant of right female breast ([facility] ICD-10-CM: C50.211  ICD-9-CM: 174.2  6/12/2017        SIRS (systemic inflammatory response syndrome) ([facility] ICD-10-CM: R65.10  ICD-9-CM: 995.90  4/7/2017        Idiopathic hypotension ICD-10-CM: I95.0  ICD-9-CM: 458.9  4/7/2017        Tachycardia ICD-10-CM: R00.0  ICD-9-CM: 785.0  4/7/2017        Shortness of breath ICD-10-CM: R06.02  ICD-9-CM: 786.05  4/7/2017        Hypokalemia ICD-10-CM: E87.6  ICD-9-CM: 276.8  4/7/2017        Cough ICD-10-CM: R05  ICD-9-CM: 786.2  4/7/2017        Pancytopenia due to chemotherapy [facility]) ICD-10-CM: D61.810  ICD-9-CM: 284.11  4/7/2017        Status post right mastectomy ICD-10-CM: Z90.11  ICD-9-CM: V45.71  9/14/2016        S/P breast reconstruction, bilateral ICD-10-CM: Z98.890  ICD-9-CM: V43.82  9/14/2016        Mediastinal adenopathy ICD-10-CM: R59.0  ICD-9-CM: 785.6  2/18/2016        Malignant neoplasm of right female breast [facility]) ICD-10-CM: C50.911  ICD-9-CM: 174.9  1/25/2016        Acquired hypothyroidism ICD-10-CM: E03.9  ICD-9-CM: 244.9  11/4/2015        Asthma ICD-10-CM: J45.909  ICD-9-CM: 493.90  11/4/2015        Insomnia ICD-10-CM: G47.00  ICD-9-CM: 780.52  11/4/2015        Depression ICD-10-CM: F32.9  ICD-9-CM: 706  11/4/2015            Pancytopenia including neutropenia and thrombocytopenia, likely secondary related to sepsis, as well as worsened possibly by medications such as zosyn    RECOMMENDATIONS:  - Change zosyn to cefepime.   - Peripheral smear  - Hold Ibrance. - Consider daily granix (5mcg/kg) for ANC below 500.   - m\Management of sepsis per primary team.     Please have patient follow up with Dr. Sandi Reyna one week from discharge. Lab studies and imaging studies were personally reviewed. Pertinent old records were reviewed.     Thank you for allowing us to participate in the care of Ms. Kaley Traylor.           Mecca Das NP   [facility] Hematology & Oncology  [address] [place] [address]  Office : [phone]  Fax : [phone]

## 2017-10-12 NOTE — PROGRESS NOTES
[address]  Face to Face Encounter    Patients Name: Marylu Hurtado    YOB: 1950    Ordering Physician: Dr. Angelica Bazzi    Primary Diagnosis: Acute renal failure (ARF) [facility])    Date of Face to Face:   10/12/2017                                  Face to Face Encounter findings are related to primary reason for home care:   yes. 1. I certify that the patient needs intermittent care as follows: skilled nursing care:  wound care    2. I certify that this patient is homebound, that is: 1) patient requires the use of a none device, special transportation, or assistance of another to leave the home; or 2) patient's condition makes leaving the home medically contraindicated; and 3) patient has a normal inability to leave the home and leaving the home requires considerable and taxing effort. Patient may leave the home for infrequent and short duration for medical reasons, and occasional absences for non-medical reasons. Homebound status is due to the following functional limitations: Patient currently under activity restrictions secondary to recent surgical procedure, this hinders their ability to safely leave the home. 3. I certify that this patient is under my care and that I, or a nurse practitioner or  [id number], or clinical nurse specialist, or certified nurse midwife, working with me, had a Face-to-Face Encounter that meets the physician Face-to-Face Encounter requirements. The following are the clinical findings from the [address] [place] [address] encounter that support the need for skilled services and is a summary of the encounter: See medical records    See attached progess note      Patrice Gregg LMSW  10/12/2017      THE FOLLOWING TO BE COMPLETED BY THE COMMUNITY PHYSICIAN:    I concur with the findings described above from the [facility] encounter that this patient is homebound and in need of a skilled service.     Certifying Physician: _____________________________________      Printed Certifying Physician Name: _____________________________________    Date: _________________

## 2017-10-12 NOTE — PROGRESS NOTES
Pt. Wheezing. C/o SOB. Dr. Marisela Sarmiento called. Breathing tx. Ordered. Pt. In sitting position in the bed.

## 2017-10-12 NOTE — PROGRESS NOTES
Hospitalist Progress Note    10/12/2017  Admit Date: 10/11/2017 11:41 AM   NAME: Francine Alvarenga   :  1950   MRN:  [id number]   Attending: Rosemary Sosa DO  PCP:  Rachael Jefferson MD    SUBJECTIVE:   42P with pmhx of breast CA s/p RTX completed in 2017, currently on oral chemo (femara and ibrance) s/p right breast reconstruction with flap and left breast mastectomy on  [facility] for hypotension/ fatigue/ malaise along with 2 days of profuse diarrhea. Was seen in Dr David Mendiola office day of admit due to these complaints along with concern for drainage from flap site. Dr Abraham Hinojosa reportedly okay with appearance of op site but was concerned regarding SBP in 60's at office. Brought to ED via EMS. ER workup notable for pancytopenia, Acute renal failure, hypotension (sbp in 80's). Hospitalist called for admission. 10-12 - thinks she is feeling a little better.  at bedside. Review of Systems negative with exception of pertinent positives noted above  PHYSICAL EXAM     Visit Vitals    BP 92/50    Pulse 85    Temp 97.8 °F (36.6 °C)    Resp 26    Ht 5' 2\" (1.575 m)    Wt 66.3 kg (146 lb 2.6 oz)    SpO2 94%    Breastfeeding No    BMI 26.73 kg/m2      Temp (24hrs), Av.2 °F (36.8 °C), Min:97.1 °F (36.2 °C), Max:100.2 °F (37.9 °C)    Oxygen Therapy  O2 Sat (%): 94 % (10/12/17 0622)  Pulse via Oximetry: 86 beats per minute (10/12/17 06)  O2 Device: Room air (10/11/17 1906)    Intake/Output Summary (Last 24 hours) at 10/12/17 0853  Last data filed at 10/12/17 0611   Gross per 24 hour   Intake           3791.4 ml   Output             1725 ml   Net           2066.4 ml      General: No acute distress    Lungs:  CTA Bilaterally.    Heart:  Regular rate and rhythm,  No murmur, rub, or gallop  Abdomen: Soft, Non distended, Non tender, Positive bowel sounds  Extremities: No cyanosis, clubbing or edema  Neurologic:  No focal deficits  Back  Soaked pad with thin but foul smelling serosanguinous drainage    ASSESSMENT      Active Hospital Problems    Diagnosis Date Noted    Acute renal failure (ARF) ([facility] 10/11/2017    Pancytopenia ([facility] 10/11/2017    Hypomagnesemia 10/11/2017    Diarrhea 10/11/2017    Sepsis ([facility] 10/11/2017    Breast cancer ([facility] 09/18/2017    Hypokalemia 04/07/2017    Acquired hypothyroidism 11/04/2015       A/p  - Sepsis - clinically improved. procal down-trending. Blood/urine cx pending. CXR reviewed  - Hypotension- large volume IVF, pressors prn MAP >60. Reportedly SBP in low 100's at baseline.  - S/p Reconstructive breast sx- large amnt of foul smelling drainage w/o localized erythema or induration. Fluid pending for culture. Cont atbx coverage. Consult wound care/ plastics  - Pancytopenia - likely d/t chemotherapy Ibrance (not re-ordered on admit). Consult heme/onc. S/p 2 units prbc 10/11  - acute renal failure - improved, cont ivf  - Hypothyroidism - tsh high, increase synthroid.  Repeat in 4 weeks       DVT Prophylaxis: scds    Signed By: Lo Bowles DO     October 12, 2017

## 2017-10-13 ENCOUNTER — HOME HEALTH ADMISSION (OUTPATIENT)
Dept: HOME HEALTH SERVICES | Facility: HOME HEALTH | Age: 67
End: 2017-10-13

## 2017-10-13 ENCOUNTER — APPOINTMENT (OUTPATIENT)
Dept: GENERAL RADIOLOGY | Age: 67
DRG: 907 | End: 2017-10-13
Attending: INTERNAL MEDICINE
Payer: MEDICARE

## 2017-10-13 LAB
ABO + RH BLD: NORMAL
ANION GAP SERPL CALC-SCNC: 9 MMOL/L (ref 7–16)
BASOPHILS # BLD: 0 K/UL (ref 0–0.2)
BASOPHILS NFR BLD MANUAL: 1 % (ref 0–2)
BLD PROD TYP BPU: NORMAL
BLD PROD TYP BPU: NORMAL
BLOOD GROUP ANTIBODIES SERPL: NORMAL
BPU ID: NORMAL
BPU ID: NORMAL
BUN SERPL-MCNC: 17 MG/DL (ref 8–23)
CALCIUM SERPL-MCNC: 7.1 MG/DL (ref 8.3–10.4)
CHLORIDE SERPL-SCNC: 110 MMOL/L (ref 98–107)
CO2 SERPL-SCNC: 20 MMOL/L (ref 21–32)
CREAT SERPL-MCNC: 0.99 MG/DL (ref 0.6–1)
CROSSMATCH RESULT,%XM: NORMAL
CROSSMATCH RESULT,%XM: NORMAL
DIFFERENTIAL METHOD BLD: ABNORMAL
EOSINOPHIL # BLD: 0.1 K/UL (ref 0–0.8)
EOSINOPHIL NFR BLD MANUAL: 7 % (ref 1–8)
ERYTHROCYTE [DISTWIDTH] IN BLOOD BY AUTOMATED COUNT: 18.6 % (ref 11.9–14.6)
GLUCOSE SERPL-MCNC: 108 MG/DL (ref 65–100)
HCT VFR BLD AUTO: 28 % (ref 35.8–46.3)
HGB BLD-MCNC: 9.5 G/DL (ref 11.7–15.4)
LACTATE SERPL-SCNC: 3.6 MMOL/L (ref 0.4–2)
LYMPHOCYTES # BLD: 0.4 K/UL (ref 0.5–4.6)
LYMPHOCYTES NFR BLD MANUAL: 21 % (ref 16–44)
MCH RBC QN AUTO: 31.4 PG (ref 26.1–32.9)
MCHC RBC AUTO-ENTMCNC: 33.9 G/DL (ref 31.4–35)
MCV RBC AUTO: 92.4 FL (ref 79.6–97.8)
METAMYELOCYTES NFR BLD MANUAL: 2 %
MONOCYTES # BLD: 0.3 K/UL (ref 0.1–1.3)
MONOCYTES NFR BLD MANUAL: 14 % (ref 3–9)
NEUTS BAND NFR BLD MANUAL: 5 % (ref 0–6)
NEUTS SEG # BLD: 1.2 K/UL (ref 1.7–8.2)
NEUTS SEG NFR BLD MANUAL: 50 % (ref 47–75)
PLATELET # BLD AUTO: 63 K/UL (ref 150–450)
PLATELET COMMENTS,PCOM: ABNORMAL
PMV BLD AUTO: 11.4 FL (ref 10.8–14.1)
POTASSIUM SERPL-SCNC: 3.7 MMOL/L (ref 3.5–5.1)
PROCALCITONIN SERPL-MCNC: 7 NG/ML
RBC # BLD AUTO: 3.03 M/UL (ref 4.05–5.25)
RBC MORPH BLD: ABNORMAL
SODIUM SERPL-SCNC: 139 MMOL/L (ref 136–145)
SPECIMEN EXP DATE BLD: NORMAL
STATUS OF UNIT,%ST: NORMAL
STATUS OF UNIT,%ST: NORMAL
UNIT DIVISION, %UDIV: 0
UNIT DIVISION, %UDIV: 0
WBC # BLD AUTO: 2 K/UL (ref 4.3–11.1)
WBC MORPH BLD: ABNORMAL

## 2017-10-13 PROCEDURE — 85025 COMPLETE CBC W/AUTO DIFF WBC: CPT | Performed by: INTERNAL MEDICINE

## 2017-10-13 PROCEDURE — 74011000258 HC RX REV CODE- 258: Performed by: INTERNAL MEDICINE

## 2017-10-13 PROCEDURE — 77030027138 HC INCENT SPIROMETER -A

## 2017-10-13 PROCEDURE — 86580 TB INTRADERMAL TEST: CPT | Performed by: INTERNAL MEDICINE

## 2017-10-13 PROCEDURE — 77030019952 HC CANSTR VAC ASST KCON -B

## 2017-10-13 PROCEDURE — 74011000250 HC RX REV CODE- 250: Performed by: INTERNAL MEDICINE

## 2017-10-13 PROCEDURE — 97605 NEG PRS WND THER DME<=50SQCM: CPT

## 2017-10-13 PROCEDURE — 65270000029 HC RM PRIVATE

## 2017-10-13 PROCEDURE — 74011250636 HC RX REV CODE- 250/636: Performed by: INTERNAL MEDICINE

## 2017-10-13 PROCEDURE — 74011000302 HC RX REV CODE- 302: Performed by: INTERNAL MEDICINE

## 2017-10-13 PROCEDURE — 71010 XR CHEST SNGL V: CPT

## 2017-10-13 PROCEDURE — 83605 ASSAY OF LACTIC ACID: CPT | Performed by: INTERNAL MEDICINE

## 2017-10-13 PROCEDURE — 74750000023 HC WOUND THERAPY

## 2017-10-13 PROCEDURE — 77030019934 HC DRSG VAC ASST KCON -B

## 2017-10-13 PROCEDURE — 74011250637 HC RX REV CODE- 250/637: Performed by: INTERNAL MEDICINE

## 2017-10-13 PROCEDURE — 84145 PROCALCITONIN (PCT): CPT | Performed by: INTERNAL MEDICINE

## 2017-10-13 PROCEDURE — 80048 BASIC METABOLIC PNL TOTAL CA: CPT | Performed by: INTERNAL MEDICINE

## 2017-10-13 PROCEDURE — 99233 SBSQ HOSP IP/OBS HIGH 50: CPT | Performed by: INTERNAL MEDICINE

## 2017-10-13 PROCEDURE — 94640 AIRWAY INHALATION TREATMENT: CPT

## 2017-10-13 RX ORDER — IPRATROPIUM BROMIDE AND ALBUTEROL SULFATE 2.5; .5 MG/3ML; MG/3ML
3 SOLUTION RESPIRATORY (INHALATION)
Status: DISCONTINUED | OUTPATIENT
Start: 2017-10-13 | End: 2017-10-13 | Stop reason: ALTCHOICE

## 2017-10-13 RX ORDER — ALBUTEROL SULFATE 0.83 MG/ML
2.5 SOLUTION RESPIRATORY (INHALATION)
Status: DISCONTINUED | OUTPATIENT
Start: 2017-10-13 | End: 2017-10-16

## 2017-10-13 RX ORDER — BUDESONIDE 0.5 MG/2ML
500 INHALANT ORAL
Status: DISCONTINUED | OUTPATIENT
Start: 2017-10-13 | End: 2017-10-14

## 2017-10-13 RX ORDER — FUROSEMIDE 10 MG/ML
40 INJECTION INTRAMUSCULAR; INTRAVENOUS ONCE
Status: COMPLETED | OUTPATIENT
Start: 2017-10-13 | End: 2017-10-13

## 2017-10-13 RX ADMIN — TUBERCULIN PURIFIED PROTEIN DERIVATIVE 5 UNITS: 5 INJECTION, SOLUTION INTRADERMAL at 15:09

## 2017-10-13 RX ADMIN — CEFEPIME HYDROCHLORIDE 2 G: 2 INJECTION, POWDER, FOR SOLUTION INTRAVENOUS at 18:18

## 2017-10-13 RX ADMIN — IPRATROPIUM BROMIDE AND ALBUTEROL SULFATE 3 ML: .5; 3 SOLUTION RESPIRATORY (INHALATION) at 01:00

## 2017-10-13 RX ADMIN — LEVOTHYROXINE SODIUM 100 MCG: 100 TABLET ORAL at 08:36

## 2017-10-13 RX ADMIN — CIPROFLOXACIN 400 MG: 2 INJECTION, SOLUTION INTRAVENOUS at 08:32

## 2017-10-13 RX ADMIN — IPRATROPIUM BROMIDE AND ALBUTEROL SULFATE 3 ML: .5; 3 SOLUTION RESPIRATORY (INHALATION) at 08:04

## 2017-10-13 RX ADMIN — Medication 10 ML: at 21:25

## 2017-10-13 RX ADMIN — CEFEPIME HYDROCHLORIDE 2 G: 2 INJECTION, POWDER, FOR SOLUTION INTRAVENOUS at 09:51

## 2017-10-13 RX ADMIN — ALBUTEROL SULFATE 2.5 MG: 2.5 SOLUTION RESPIRATORY (INHALATION) at 15:23

## 2017-10-13 RX ADMIN — SODIUM CHLORIDE 125 ML/HR: 900 INJECTION, SOLUTION INTRAVENOUS at 05:53

## 2017-10-13 RX ADMIN — FUROSEMIDE 40 MG: 10 INJECTION, SOLUTION INTRAMUSCULAR; INTRAVENOUS at 15:12

## 2017-10-13 RX ADMIN — ALBUTEROL SULFATE 2.5 MG: 2.5 SOLUTION RESPIRATORY (INHALATION) at 21:57

## 2017-10-13 RX ADMIN — BUDESONIDE 500 MCG: 0.5 INHALANT RESPIRATORY (INHALATION) at 21:58

## 2017-10-13 RX ADMIN — Medication 10 ML: at 15:18

## 2017-10-13 RX ADMIN — VANCOMYCIN HYDROCHLORIDE 1000 MG: 1 INJECTION, POWDER, LYOPHILIZED, FOR SOLUTION INTRAVENOUS at 15:17

## 2017-10-13 RX ADMIN — METHYLPREDNISOLONE SODIUM SUCCINATE 40 MG: 40 INJECTION, POWDER, FOR SOLUTION INTRAMUSCULAR; INTRAVENOUS at 13:24

## 2017-10-13 RX ADMIN — Medication 10 ML: at 06:00

## 2017-10-13 RX ADMIN — CEFEPIME HYDROCHLORIDE 2 G: 2 INJECTION, POWDER, FOR SOLUTION INTRAVENOUS at 00:15

## 2017-10-13 RX ADMIN — ACETAMINOPHEN 650 MG: 325 TABLET, FILM COATED ORAL at 22:11

## 2017-10-13 NOTE — PROGRESS NOTES
[facility] - INPATIENT  Face to Face Encounter    Patients Name: Hetal Loredo    YOB: 1950    Ordering Physician: Maryellen Garcia    Primary Diagnosis: Acute renal failure (ARF) [facility])    Date of Face to Face:   10/13/2017                                  Face to Face Encounter findings are related to primary reason for home care:   yes. 1. I certify that the patient needs intermittent care as follows: skilled nursing care:  wound care    2. I certify that this patient is homebound, that is: 1) patient requires the use of a none device, special transportation, or assistance of another to leave the home; or 2) patient's condition makes leaving the home medically contraindicated; and 3) patient has a normal inability to leave the home and leaving the home requires considerable and taxing effort. Patient may leave the home for infrequent and short duration for medical reasons, and occasional absences for non-medical reasons. Homebound status is due to the following functional limitations: Patient currently under activity restrictions secondary to recent surgical procedure, this hinders their ability to safely leave the home. 3. I certify that this patient is under my care and that I, or a nurse practitioner or  [id number], or clinical nurse specialist, or certified nurse midwife, working with me, had a Face-to-Face Encounter that meets the physician Face-to-Face Encounter requirements. The following are the clinical findings from the [address] [place] [address] encounter that support the need for skilled services and is a summary of the encounter:progress notes    See attached progess note      NEL Pacheco  10/13/2017      THE FOLLOWING TO BE COMPLETED BY THE COMMUNITY PHYSICIAN:    I concur with the findings described above from the [facility] encounter that this patient is homebound and in need of a skilled service.     Certifying Physician: _____________________________________      Printed Certifying Physician Name: _____________________________________    Date: _________________

## 2017-10-13 NOTE — PROGRESS NOTES
TRANSFER - IN REPORT:    Verbal report received from Walter (name) on Toño Johnson  being received from ICU (unit) for routine progression of care      Report consisted of patients Situation, Background, Assessment and   Recommendations(SBAR). Information from the following report(s) SBAR, Kardex and Intake/Output was reviewed with the receiving nurse. Opportunity for questions and clarification was provided. Assessment completed upon patients arrival to unit and care assumed.

## 2017-10-13 NOTE — PROGRESS NOTES
Pt arrived to the floor and admission assessment completed. Pt alert and oriented X4, denies any pain at this time. Respirations even and unlabored, breath sounds diminished bilaterally. Skin warm and dry, bilateral SCD's on, wound vac in place and draining to right back. Port in place, capped. S1S2 auscultated with regular rhythm. Fountain in place, draining clear, yellow urine. Bed low and locked, call light within reach, pt advised to call if assistance is needed.

## 2017-10-13 NOTE — PROGRESS NOTES
[facility] Pt resting in bed with family at bedside. Pt is A&Ox4, follows commands, eyes track and focus. Breath sounds clear, diminished in bases with some wheezing on R side; symmetrical chest expansion on room air. Sinus tach on monitor, S1/S2 auscultated. Bowel sounds active, abdomen intact and semi-soft. Skin flushed with wound under R breast, incision to L and R breast, and wound vac present on R Upper back. Lines: 20G L AC, L port  Drips: NS @ 125 mL/hr  Drains: sage    Will continue to monitor pt.

## 2017-10-13 NOTE — PROGRESS NOTES
Hospitalist Progress Note    10/13/2017  Admit Date: 10/11/2017 11:41 AM   NAME: Deep Rendon   :  1950   MRN:  [id number]   Attending: Yan Arnold DO  PCP:  Stephanie Hernandez MD    SUBJECTIVE:   8H with pmhx of breast CA s/p RTX completed in 2017, currently on oral chemo (femara and ibrance) s/p right breast reconstruction with flap and left breast mastectomy on  [facility] for hypotension/ fatigue/ malaise along with 2 days of profuse diarrhea. Was seen in Dr Kris Ruelas office day of admit due to these complaints along with concern for drainage from flap site. SBP in office in 60's. Brought to ED via EMS. ER workup notable for pancytopenia, Acute renal failure, hypotension (sbp in 80's). Hospitalist called for admission. Started on broad spectrum IV atbx/IVf. BP improved. Oral chemo held. Oncology consulted for pancytopenia. Plastic recommended wound vac placed 10/12.     10-13 - feeling better today. Had some dyspnea and wheezing o/n and today.        Review of Systems negative with exception of pertinent positives noted above  PHYSICAL EXAM     Visit Vitals    /65    Pulse (!) 101    Temp 98.3 °F (36.8 °C)    Resp 30    Ht 5' 2\" (1.575 m)    Wt 66.3 kg (146 lb 2.6 oz)    SpO2 96%    Breastfeeding No    BMI 26.73 kg/m2      Temp (24hrs), Av.5 °F (36.9 °C), Min:97.9 °F (36.6 °C), Max:99.7 °F (37.6 °C)    Oxygen Therapy  O2 Sat (%): 96 % (10/13/17 1222)  Pulse via Oximetry: 102 beats per minute (10/13/17 1222)  O2 Device: Room air (10/13/17 0804)    Intake/Output Summary (Last 24 hours) at 10/13/17 1355  Last data filed at 10/13/17 1117   Gross per 24 hour   Intake             4985 ml   Output             5450 ml   Net             -465 ml      General: No acute distress    Lungs:  Diffuse wheezing, minimal bibasilar crackles   Heart:  Regular rate and rhythm,  No murmur, rub, or gallop  Abdomen: Soft, Non distended, Non tender, Positive bowel sounds  Extremities: No cyanosis, clubbing or edema  Neurologic:  No focal deficits  Back  Soaked pad with thin but foul smelling serosanguinous drainage    ASSESSMENT      Active Hospital Problems    Diagnosis Date Noted    Acute renal failure (ARF) ([facility] 10/11/2017    Pancytopenia ([facility] [facility] 10/11/2017    Hypomagnesemia 10/11/2017    Diarrhea 10/11/2017    Sepsis ([facility] 10/11/2017    [facility] 09/18/2017    Hypokalemia 04/07/2017    Acquired hypothyroidism 11/04/2015       A/p  - Sepsis - clinically improved. procal down-trending. Blood/urine cx NGTD. Would continue broad spectrum iv atbx today then likely de-escalate tomorrow. - Wheezing - likely asthma exacerbation w/ hx. Cont nebs, add IV steroids  - S/p Reconstructive breast sx- Wound vac placed  - Pancytopenia -  d/t chemotherapy Ibrance (not re-ordered on admit). Heme/onc has seen. S/p 2 units prbc 10/11  - ARF - resolved  - Hypothyroidism - tsh high, increased synthroid. Repeat in 4 weeks       DVT Prophylaxis: scds    Dispo - transfer to floor. Assess with pt/ot. Place ppd.  Cm notified - anticipate need for STR when medically ready (2-3 days?)    Signed By: Cordelia Mayes DO     October 13, 2017

## 2017-10-13 NOTE — PROGRESS NOTES
[facility]: Inpatient Hematology / Oncology Progress Note    Admission Date: 10/11/2017 11:41 AM    SUBJECTIVE:  No significant events overnight. No current complaints other than ongoing fatigue. Afebrile. ROS:  Constitutional: Positive for weakness, malaise, fatigue. CV: Negative for chest pain, palpitations, edema. Respiratory: Negative for dyspnea, cough, wheezing. GI: Negative for nausea, abdominal pain, diarrhea. 10 point review of systems is otherwise negative with the exception of the elements mentioned above in the HPI.      Allergies   Allergen Reactions    Prednisone Unknown (comments)     \"psychological reaction\"       OBJECTIVE:  Patient Vitals for the past 8 hrs:   BP Temp Pulse Resp SpO2   10/13/17 1604 - 97.9 °F (36.6 °C) - - -   10/13/17 1522 132/56 - - - -   10/13/17 1452 117/59 - (!) 106 22 96 %   10/13/17 1430 - - (!) 105 21 96 %   10/13/17 1422 117/53 - (!) 106 26 97 %   10/13/17 1400 - - (!) 111 (!) 37 95 %   10/13/17 1352 121/63 - (!) 111 (!) 33 95 %   10/13/17 1330 - - (!) 108 27 96 %   10/13/17 1322 141/65 - (!) 110 26 97 %   10/13/17 1300 - - (!) 117 (!) 31 96 %   10/13/17 1252 148/57 - (!) 116 28 96 %   10/13/17 1230 - - 99 28 96 %   10/13/17 1222 128/65 - (!) 101 30 96 %   10/13/17 1200 - - 100 25 96 %   10/13/17 1152 122/61 - 100 16 95 %   10/13/17 1130 - - (!) 104 21 94 %   10/13/17 1122 121/64 - (!) 101 (!) 34 95 %   10/13/17 1116 - 98.3 °F (36.8 °C) - - -   10/13/17 1052 124/55 - (!) 104 (!) 36 95 %   10/13/17 1030 - - 99 (!) 34 96 %   10/13/17 1022 116/61 - (!) 104 27 96 %   10/13/17 1000 - - (!) 103 23 97 %   10/13/17 0952 (!) 127/95 - - - -   10/13/17 0922 129/57 - 100 (!) 44 98 %     Temp (24hrs), Av.4 °F (36.9 °C), Min:97.9 °F (36.6 °C), Max:99.7 °F (37.6 °C)    10/13 0701 - 10/13 1900  In: -   Out: 4100 [Urine:4100]    Physical Exam:  Constitutional: Well developed, well nourished female in no acute distress, sitting comfortably in the hospital bed. Appears fatigued. HEENT: Normocephalic and atraumatic. Oropharynx is clear, mucous membranes are moist.  Sclerae anicteric. Neck supple without JVD. No thyromegaly present. Lymph node   No palpable submandibular, cervical, supraclavicular, axillary or inguinal lymph nodes. Skin Warm and dry. No bruising and no rash noted. No erythema. No pallor. Respiratory Lungs are clear to auscultation bilaterally without wheezes, rales or rhonchi, normal air exchange without accessory muscle use. CVS Normal rate, regular rhythm and normal S1 and S2. No murmurs, gallops, or rubs. Abdomen Soft, nontender and nondistended, normoactive bowel sounds. No palpable mass. No hepatosplenomegaly. Neuro Grossly nonfocal with no obvious sensory or motor deficits. MSK Normal range of motion in general.  No edema and no tenderness. Psych Appropriate mood and affect.         Labs:    Recent Labs      10/13/17   0410  10/12/17   0951  10/12/17   0055  10/11/17   1214   WBC  2.0*   --   1.4*  2.8*   RBC  3.03*   --   2.82*  2.17*   HGB  9.5*  10.4*  8.9*  7.1*   HCT  28.0*  30.5*  27.0*  21.4*   MCV  92.4   --   95.7  98.6*   MCH  31.4   --   31.6  32.7   MCHC  33.9   --   33.0  33.2   RDW  18.6*   --   17.0*  16.4*   PLT  63*   --   81*  78*   GRANS  50   --    --   55   LYMPH  21   --    --   17   MONOS  14*   --    --   19*   EOS  7   --    --    --    BASOS  1   --    --    --    DF  AUTOMATED   --    --   MANUAL   ANEU  1.2*   --    --   1.8   ABL  0.4*   --    --   0.5   ABM  0.3   --    --   0.5   EMI  0.1   --    --    --    ABB  0.0   --    --    --       Recent Labs      10/13/17   0410  10/12/17   0055  10/11/17   1214   NA  139  127*  128*   K  3.7  3.3*  3.4*   CL  110*  97*  95*   CO2  20*  21  21   AGAP  9  9  12   GLU  108*  107*  111*   BUN  17  26*  33*   CREA  0.99  1.58*  2.31*   GFRAA  >60  42*  27*   GFRNA  59*  35*  22*   CA  7.1*  6.6*  6.7*   SGOT   --    --   37   AP   --    -- 70   TP   --    --   5.2*   ALB   --    --   1.8*   GLOB   --    --   3.4   AGRAT   --    --   0.5*   MG   --   2.9*  1.3*   PHOS   --    --   4.2*       Imaging:      ASSESSMENT:    Active Problems:    Acquired hypothyroidism (11/4/2015)      Hypokalemia (4/7/2017)      Breast cancer ([facility] (9/18/2017)      Acute renal failure (ARF) ([address] (10/11/2017)      Pancytopenia ([address] (10/11/2017)      Hypomagnesemia (10/11/2017)      Diarrhea (10/11/2017)      Sepsis ([facility] (10/11/2017)        PLAN:    73 y/o with history of stage IV breast cancer, now ISABEL. Completed chemo, radiation, now on endocrine therapy with letrozole/palbociclib. Developed hypotension in plastics office, brought to ED and admitted for DANNIE and sepsis. PCT improved, now afebrile. WBC/ANC 2.0/1.2, not unexpected to be low from palbo but unlikely to be main culprit for sepsis. No indication for G-CSF at this point, but hold palbo. OK to resume letrozole from our standpoint. Continue abx per primary team, wound vac. We will follow with you.            [facility]  [address] [place] [address]  Office : [phone]  Fax : [phone]

## 2017-10-13 NOTE — PROGRESS NOTES
TRANSFER - OUT REPORT:    Verbal report given to Milagros Phillips RN(name) on Cj Verdin  being transferred to [facility](unit) for routine progression of care       Report consisted of patients Situation, Background, Assessment and   Recommendations(SBAR). Information from the following report(s) SBAR, Kardex, Procedure Summary, Intake/Output, MAR and Recent Results was reviewed with the receiving nurse. Lines:   Venous Access Device [place] 10/11/17 Upper chest (subclavicular area), left (Active)   Central Line Being Utilized Yes 10/13/2017  3:40 PM   Criteria for Appropriate Use Hemodynamically unstable, requiring monitoring lines, vasopressors, or volume resuscitation 10/13/2017  3:40 PM   Site Assessment Clean, dry, & intact 10/13/2017  3:40 PM   Date of Last Dressing Change 10/11/17 10/13/2017  3:40 PM   Dressing Status Clean, dry, & intact 10/13/2017  3:40 PM   Dressing Type Disk with Chlorhexadine gluconate (CHG); Transparent 10/13/2017  3:40 PM   Date Accessed (Medial Site) 10/11/17 10/13/2017  3:40 PM   Positive Blood Return (Medial Site) Yes 10/13/2017  3:40 PM       Subcutaneous Infusion Line 09/14/16 Right Other (comment) (Active)       Subcutaneous Infusion Line 09/18/17 Right Abdomen (Active)        Opportunity for questions and clarification was provided.

## 2017-10-13 NOTE — CDMP QUERY
Please clarify if this patient is being treated/managed for these two problems:     #1  Infection following a surgical procedure  in the setting of sepsis with hypotension, heavy staph on wound culture obtained from surgical flap site, being treated with iv antibiotics, wound consult, Plastic surgery consult. =>Other Explanation of clinical findings  =>Unable to Determine (no explanation of clinical findings)    The medical record reflects the following:    Risk Factors: Post op right breast reconstruction with flap and left breast mastectomy     Clinical Indicators: Sepsis, Wound culture from flap  Site with heavy staph    Treatment: IV antibiotics, Wound consult for wound vac, Plastics consult. #2   Hyponatremia in the same setting with Na levels 128, 127 treated with iv fluids. =>Other Explanation of clinical findings  =>Unable to Determine (no explanation of clinical findings)    The medical record reflects the following:    Risk Factors: Postop op patient with Sepsis    Clinical Indicators: Sodium levels 128, 127 on the first two days of admission    Treatment: IV fluids. Please clarify and document your clinical opinion in the progress notes and discharge summary including the definitive and/or presumptive diagnosis, (suspected or probable), related to the above clinical findings. Please include clinical findings supporting your diagnosis.     Thanks,  Dayana Buenrostro RN, [address] Documentation Management Program  [phone]

## 2017-10-13 NOTE — PROGRESS NOTES
Patient wheezing bilaterally. Complaining of SOB. Duoneb ineffective. Dr. Meggan Sarmiento notified. Orders received for IV steroids. Will continue to monitor closely.

## 2017-10-13 NOTE — WOUND CARE
Wound on back, wound vac dressing change, suture in center of wound is loose and no longer tied, removed, wound is 3.5x4x1 with tunnel of 5cm at 3 o'clock, 2cm at 9 o'clock. Wound VAC tract pad bridged to right side to avoid laying on tubing. Wound vac cannister changed. Will monitor.

## 2017-10-14 LAB
ANION GAP SERPL CALC-SCNC: 8 MMOL/L (ref 7–16)
BACTERIA SPEC CULT: ABNORMAL
BACTERIA SPEC CULT: NORMAL
BUN SERPL-MCNC: 16 MG/DL (ref 8–23)
CALCIUM SERPL-MCNC: 8.3 MG/DL (ref 8.3–10.4)
CHLORIDE SERPL-SCNC: 108 MMOL/L (ref 98–107)
CO2 SERPL-SCNC: 25 MMOL/L (ref 21–32)
CREAT SERPL-MCNC: 0.87 MG/DL (ref 0.6–1)
ERYTHROCYTE [DISTWIDTH] IN BLOOD BY AUTOMATED COUNT: 18.5 % (ref 11.9–14.6)
GLUCOSE SERPL-MCNC: 156 MG/DL (ref 65–100)
GRAM STN SPEC: ABNORMAL
GRAM STN SPEC: ABNORMAL
HCT VFR BLD AUTO: 29.4 % (ref 35.8–46.3)
HGB BLD-MCNC: 10.2 G/DL (ref 11.7–15.4)
LACTATE SERPL-SCNC: 1.8 MMOL/L (ref 0.4–2)
MCH RBC QN AUTO: 31.8 PG (ref 26.1–32.9)
MCHC RBC AUTO-ENTMCNC: 34.7 G/DL (ref 31.4–35)
MCV RBC AUTO: 91.6 FL (ref 79.6–97.8)
MM INDURATION POC: 0 MM (ref 0–5)
MM INDURATION POC: 0 MM (ref 0–5)
PATH REV BLD -IMP: NORMAL
PLATELET # BLD AUTO: 72 K/UL (ref 150–450)
PMV BLD AUTO: 12.6 FL (ref 10.8–14.1)
POTASSIUM SERPL-SCNC: 3.7 MMOL/L (ref 3.5–5.1)
PPD POC: NORMAL NEGATIVE
PPD POC: NORMAL NEGATIVE
RBC # BLD AUTO: 3.21 M/UL (ref 4.05–5.25)
SERVICE CMNT-IMP: ABNORMAL
SERVICE CMNT-IMP: NORMAL
SODIUM SERPL-SCNC: 141 MMOL/L (ref 136–145)
WBC # BLD AUTO: 4.4 K/UL (ref 4.3–11.1)

## 2017-10-14 PROCEDURE — 74011000250 HC RX REV CODE- 250: Performed by: INTERNAL MEDICINE

## 2017-10-14 PROCEDURE — 74011000258 HC RX REV CODE- 258: Performed by: INTERNAL MEDICINE

## 2017-10-14 PROCEDURE — 74011250636 HC RX REV CODE- 250/636: Performed by: INTERNAL MEDICINE

## 2017-10-14 PROCEDURE — 74011250637 HC RX REV CODE- 250/637: Performed by: INTERNAL MEDICINE

## 2017-10-14 PROCEDURE — 83605 ASSAY OF LACTIC ACID: CPT | Performed by: INTERNAL MEDICINE

## 2017-10-14 PROCEDURE — 85027 COMPLETE CBC AUTOMATED: CPT | Performed by: INTERNAL MEDICINE

## 2017-10-14 PROCEDURE — 77030011256 HC DRSG MEPILEX <16IN NO BORD MOLN -A

## 2017-10-14 PROCEDURE — 65270000029 HC RM PRIVATE

## 2017-10-14 PROCEDURE — 80048 BASIC METABOLIC PNL TOTAL CA: CPT | Performed by: INTERNAL MEDICINE

## 2017-10-14 PROCEDURE — 94760 N-INVAS EAR/PLS OXIMETRY 1: CPT

## 2017-10-14 PROCEDURE — 94640 AIRWAY INHALATION TREATMENT: CPT

## 2017-10-14 RX ADMIN — VANCOMYCIN HYDROCHLORIDE 1000 MG: 1 INJECTION, POWDER, LYOPHILIZED, FOR SOLUTION INTRAVENOUS at 22:34

## 2017-10-14 RX ADMIN — DULOXETINE HYDROCHLORIDE 60 MG: 60 CAPSULE, DELAYED RELEASE ORAL at 00:56

## 2017-10-14 RX ADMIN — LEVOTHYROXINE SODIUM 100 MCG: 100 TABLET ORAL at 08:43

## 2017-10-14 RX ADMIN — Medication 10 ML: at 14:42

## 2017-10-14 RX ADMIN — DULOXETINE HYDROCHLORIDE 60 MG: 60 CAPSULE, DELAYED RELEASE ORAL at 22:36

## 2017-10-14 RX ADMIN — CEFEPIME HYDROCHLORIDE 2 G: 2 INJECTION, POWDER, FOR SOLUTION INTRAVENOUS at 02:49

## 2017-10-14 RX ADMIN — CEFEPIME HYDROCHLORIDE 2 G: 2 INJECTION, POWDER, FOR SOLUTION INTRAVENOUS at 17:20

## 2017-10-14 RX ADMIN — CEFEPIME HYDROCHLORIDE 2 G: 2 INJECTION, POWDER, FOR SOLUTION INTRAVENOUS at 09:48

## 2017-10-14 RX ADMIN — ACETAMINOPHEN 650 MG: 325 TABLET, FILM COATED ORAL at 23:32

## 2017-10-14 RX ADMIN — ALBUTEROL SULFATE 2.5 MG: 2.5 SOLUTION RESPIRATORY (INHALATION) at 16:53

## 2017-10-14 RX ADMIN — Medication 10 ML: at 04:35

## 2017-10-14 RX ADMIN — Medication 10 ML: at 06:30

## 2017-10-14 RX ADMIN — Medication 10 ML: at 22:40

## 2017-10-14 RX ADMIN — ALBUTEROL SULFATE 2.5 MG: 2.5 SOLUTION RESPIRATORY (INHALATION) at 12:46

## 2017-10-14 RX ADMIN — VANCOMYCIN HYDROCHLORIDE 1000 MG: 1 INJECTION, POWDER, LYOPHILIZED, FOR SOLUTION INTRAVENOUS at 08:53

## 2017-10-14 NOTE — PROGRESS NOTES
Assessment done via doc flow sheet. Pt resting quietly in bed, alert & oriented x3, resp easy & regular, diminished breath sounds, denies pain. Wound vac to back intact, no leaks noted. Bed low & locked, sdie rails x3 up with call light within reach, pt instructed to call for assistance as needed.

## 2017-10-14 NOTE — PROGRESS NOTES
Tylenol 650 mg given PO for c/o pain rated \"3\" with movement. abd dsgs clean, dry, intact. Right upper back wound with dsg clean, dry, intact to wound vac. Assessment noted. Family member at side. AP 88, irregular, lungs sounds with crackles auscultated.

## 2017-10-14 NOTE — PROGRESS NOTES
Resting quietly, resp even, unlab with no c/o during bedside report given to Marie Smith RN. No distress noted.

## 2017-10-14 NOTE — PROGRESS NOTES
Progress Note    Patient: Alison Agarwal MRN: [id number]  SSN: [id number]    YOB: 1950  Age: 69 y.o. Sex: female      Admit Date: 10/11/2017    LOS: 3 days     Subjective:     66F with pmhx of breast CA s/p RTX completed in Feb 2017, currently on oral chemo (femara and ibrance) s/p right breast reconstruction with flap and left breast mastectomy on 9/18 [facility] for hypotension/ fatigue/ malaise along with 2 days of profuse diarrhea.      Was seen in Dr Bennett Ann office day of admit due to these complaints along with concern for drainage from flap site. SBP in office in 60's. Brought to ED via EMS. ER workup notable for pancytopenia, Acute renal failure, hypotension (sbp in 80's). Hospitalist called for admission. Started on broad spectrum IV atbx/IVf. BP improved. Oral chemo held. Oncology consulted for pancytopenia. Plastic recommended wound vac placed 10/12.      10-13 - feeling better today. Had some dyspnea and wheezing o/n and today.    10/14: patient feels well, no shortness of breath, son is at the bedside        Review of Systems negative with exception of pertinent positives noted above    Objective:     Vitals:    10/13/17 2158 10/13/17 2334 10/14/17 0440 10/14/17 0727   BP:  119/67 118/66 138/62   Pulse:  (!) 104 88 70   Resp:  24 20 20   Temp:  97.9 °F (36.6 °C) 97.5 °F (36.4 °C) 96 °F (35.6 °C)   SpO2: 97% 95% 96% 97%   Weight:       Height:            Intake and Output:  Current Shift:    Last three shifts: 10/12 1901 - 10/14 0700  In: 2219 [I.V.:2219]  Out: 7950 [Urine:7950]    Physical Exam:   General appearance: NAD, conversant  Neck: Trachea midline   Lungs: CTA, with normal respiratory effort and no intercostal retractions  CV: S1,S2 present, no added murmurs  Abdomen: Soft, non-tender; no masses   Extremities: No peripheral edema or extremity lymphadenopathy  Skin: Normal temperature, turgor and texture; wound vac in place  Psych: Appropriate affect, alert and oriented to person, place and time    Lab/Data Review: All lab results for the last 24 hours reviewed.      Assessment:     Active Problems:    Acquired hypothyroidism (11/4/2015)      Hypokalemia (4/7/2017)      Breast cancer ([facility] (9/18/2017)      Acute renal failure (ARF) ([facility] (10/11/2017)      Pancytopenia ([facility] (10/11/2017)      Hypomagnesemia (10/11/2017)      Diarrhea (10/11/2017)      Sepsis ([facility] (10/11/2017)        Plan:     A/p    #Sepsis of unkown origin in immunocompromised host  #Asthma exacerbation  #Hx of Breast CA s/p reconstruction, on oral chemo,   #Pan cytopenia  #ARF  #Hypothyroidism    -continue with broad spectrum abx  -asthma exacerbation has resolved  -Onc following for pancytpoenia, lbrance not reordered on admit, s/p 2 unit prbc on 10/11  -ARF resolved  -tsh was elevated, synthroid was increased, will repat in 1 month    DVT Prophylaxis: scds     Dispo - tT/OT follow anticipate SNF palcement, CM aware    Signed By: Fei Hernandez MD     October 14, 2017

## 2017-10-14 NOTE — PROGRESS NOTES
Continues to arouse easily at intervals with no c/o. Bilat breast incisions remain intact, right back wound clean, dry, intact to wound vac at 125 mm suction. Urine clear in sage cath. No distress.

## 2017-10-14 NOTE — PROGRESS NOTES
Resting quietly, resp even, unlab with no c/o during report received from Rubens Argueta RN. No distress noted.

## 2017-10-14 NOTE — PROGRESS NOTES
Abrasions noted to left upper back. Soft silicone square applied. Pt encouraged to turn from side to side. Wound vac to back  intact.

## 2017-10-15 ENCOUNTER — APPOINTMENT (OUTPATIENT)
Dept: CT IMAGING | Age: 67
DRG: 907 | End: 2017-10-15
Attending: HOSPITALIST
Payer: MEDICARE

## 2017-10-15 LAB
ANION GAP SERPL CALC-SCNC: 7 MMOL/L (ref 7–16)
BUN SERPL-MCNC: 16 MG/DL (ref 8–23)
CALCIUM SERPL-MCNC: 7.5 MG/DL (ref 8.3–10.4)
CHLORIDE SERPL-SCNC: 106 MMOL/L (ref 98–107)
CO2 SERPL-SCNC: 25 MMOL/L (ref 21–32)
CREAT SERPL-MCNC: 0.97 MG/DL (ref 0.6–1)
D DIMER PPP FEU-MCNC: 3.1 UG/ML(FEU)
ERYTHROCYTE [DISTWIDTH] IN BLOOD BY AUTOMATED COUNT: 19.1 % (ref 11.9–14.6)
GLUCOSE SERPL-MCNC: 106 MG/DL (ref 65–100)
HCT VFR BLD AUTO: 31.2 % (ref 35.8–46.3)
HGB BLD-MCNC: 10.5 G/DL (ref 11.7–15.4)
MAGNESIUM SERPL-MCNC: 1.5 MG/DL (ref 1.8–2.4)
MCH RBC QN AUTO: 31.4 PG (ref 26.1–32.9)
MCHC RBC AUTO-ENTMCNC: 33.7 G/DL (ref 31.4–35)
MCV RBC AUTO: 93.4 FL (ref 79.6–97.8)
PLATELET # BLD AUTO: 80 K/UL (ref 150–450)
PMV BLD AUTO: 10.7 FL (ref 10.8–14.1)
POTASSIUM SERPL-SCNC: 3.3 MMOL/L (ref 3.5–5.1)
RBC # BLD AUTO: 3.34 M/UL (ref 4.05–5.25)
SODIUM SERPL-SCNC: 138 MMOL/L (ref 136–145)
VANCOMYCIN TROUGH SERPL-MCNC: 16 UG/ML (ref 5–20)
WBC # BLD AUTO: 7.9 K/UL (ref 4.3–11.1)

## 2017-10-15 PROCEDURE — C8929 TTE W OR WO FOL WCON,DOPPLER: HCPCS

## 2017-10-15 PROCEDURE — 74011636320 HC RX REV CODE- 636/320: Performed by: INTERNAL MEDICINE

## 2017-10-15 PROCEDURE — 74011250637 HC RX REV CODE- 250/637: Performed by: HOSPITALIST

## 2017-10-15 PROCEDURE — 94640 AIRWAY INHALATION TREATMENT: CPT

## 2017-10-15 PROCEDURE — 71260 CT THORAX DX C+: CPT

## 2017-10-15 PROCEDURE — 94760 N-INVAS EAR/PLS OXIMETRY 1: CPT

## 2017-10-15 PROCEDURE — 83735 ASSAY OF MAGNESIUM: CPT | Performed by: INTERNAL MEDICINE

## 2017-10-15 PROCEDURE — 74011000250 HC RX REV CODE- 250

## 2017-10-15 PROCEDURE — 74011000258 HC RX REV CODE- 258: Performed by: HOSPITALIST

## 2017-10-15 PROCEDURE — 80202 ASSAY OF VANCOMYCIN: CPT | Performed by: INTERNAL MEDICINE

## 2017-10-15 PROCEDURE — 85027 COMPLETE CBC AUTOMATED: CPT | Performed by: INTERNAL MEDICINE

## 2017-10-15 PROCEDURE — 74011250636 HC RX REV CODE- 250/636: Performed by: INTERNAL MEDICINE

## 2017-10-15 PROCEDURE — 80048 BASIC METABOLIC PNL TOTAL CA: CPT | Performed by: INTERNAL MEDICINE

## 2017-10-15 PROCEDURE — 85379 FIBRIN DEGRADATION QUANT: CPT | Performed by: INTERNAL MEDICINE

## 2017-10-15 PROCEDURE — 99232 SBSQ HOSP IP/OBS MODERATE 35: CPT | Performed by: INTERNAL MEDICINE

## 2017-10-15 PROCEDURE — 74011250636 HC RX REV CODE- 250/636: Performed by: HOSPITALIST

## 2017-10-15 PROCEDURE — 74011250637 HC RX REV CODE- 250/637: Performed by: INTERNAL MEDICINE

## 2017-10-15 PROCEDURE — 74011000250 HC RX REV CODE- 250: Performed by: INTERNAL MEDICINE

## 2017-10-15 PROCEDURE — 74011000258 HC RX REV CODE- 258: Performed by: INTERNAL MEDICINE

## 2017-10-15 PROCEDURE — 65610000001 HC ROOM ICU GENERAL

## 2017-10-15 PROCEDURE — 74011000250 HC RX REV CODE- 250: Performed by: HOSPITALIST

## 2017-10-15 RX ORDER — LEVOTHYROXINE SODIUM 75 UG/1
75 TABLET ORAL
Status: DISCONTINUED | OUTPATIENT
Start: 2017-10-16 | End: 2017-10-17

## 2017-10-15 RX ORDER — METOPROLOL TARTRATE 5 MG/5ML
INJECTION INTRAVENOUS
Status: COMPLETED
Start: 2017-10-15 | End: 2017-10-15

## 2017-10-15 RX ORDER — METOPROLOL TARTRATE 5 MG/5ML
2.5 INJECTION INTRAVENOUS ONCE
Status: COMPLETED | OUTPATIENT
Start: 2017-10-15 | End: 2017-10-15

## 2017-10-15 RX ORDER — POTASSIUM CHLORIDE 20 MEQ/1
40 TABLET, EXTENDED RELEASE ORAL
Status: COMPLETED | OUTPATIENT
Start: 2017-10-15 | End: 2017-10-15

## 2017-10-15 RX ORDER — MAGNESIUM SULFATE 1 G/100ML
1 INJECTION INTRAVENOUS ONCE
Status: COMPLETED | OUTPATIENT
Start: 2017-10-15 | End: 2017-10-19

## 2017-10-15 RX ORDER — HYDROCODONE BITARTRATE AND ACETAMINOPHEN 5; 325 MG/1; MG/1
1 TABLET ORAL
Status: DISCONTINUED | OUTPATIENT
Start: 2017-10-15 | End: 2017-10-25 | Stop reason: HOSPADM

## 2017-10-15 RX ORDER — SODIUM CHLORIDE 0.9 % (FLUSH) 0.9 %
10 SYRINGE (ML) INJECTION
Status: COMPLETED | OUTPATIENT
Start: 2017-10-15 | End: 2017-10-15

## 2017-10-15 RX ORDER — METOPROLOL TARTRATE 5 MG/5ML
5 INJECTION INTRAVENOUS ONCE
Status: COMPLETED | OUTPATIENT
Start: 2017-10-15 | End: 2017-10-15

## 2017-10-15 RX ORDER — METOPROLOL TARTRATE 25 MG/1
25 TABLET, FILM COATED ORAL 2 TIMES DAILY
Status: DISCONTINUED | OUTPATIENT
Start: 2017-10-15 | End: 2017-10-17

## 2017-10-15 RX ORDER — LETROZOLE 2.5 MG/1
2.5 TABLET, FILM COATED ORAL
Status: DISCONTINUED | OUTPATIENT
Start: 2017-10-15 | End: 2017-10-25 | Stop reason: HOSPADM

## 2017-10-15 RX ADMIN — METOPROLOL TARTRATE 5 MG: 5 INJECTION INTRAVENOUS at 15:41

## 2017-10-15 RX ADMIN — LEVOTHYROXINE SODIUM 100 MCG: 100 TABLET ORAL at 07:54

## 2017-10-15 RX ADMIN — CEFEPIME HYDROCHLORIDE 2 G: 2 INJECTION, POWDER, FOR SOLUTION INTRAVENOUS at 18:56

## 2017-10-15 RX ADMIN — MAGNESIUM SULFATE HEPTAHYDRATE 1 G: 1 INJECTION, SOLUTION INTRAVENOUS at 15:58

## 2017-10-15 RX ADMIN — ALPRAZOLAM 0.5 MG: 0.5 TABLET ORAL at 21:12

## 2017-10-15 RX ADMIN — METOPROLOL TARTRATE 2.5 MG: 5 INJECTION INTRAVENOUS at 11:14

## 2017-10-15 RX ADMIN — SODIUM CHLORIDE 100 ML: 900 INJECTION, SOLUTION INTRAVENOUS at 18:42

## 2017-10-15 RX ADMIN — DULOXETINE HYDROCHLORIDE 60 MG: 60 CAPSULE, DELAYED RELEASE ORAL at 21:12

## 2017-10-15 RX ADMIN — POTASSIUM CHLORIDE 40 MEQ: 20 TABLET, EXTENDED RELEASE ORAL at 15:58

## 2017-10-15 RX ADMIN — IOPAMIDOL 100 ML: 755 INJECTION, SOLUTION INTRAVENOUS at 18:42

## 2017-10-15 RX ADMIN — ALBUTEROL SULFATE 2.5 MG: 2.5 SOLUTION RESPIRATORY (INHALATION) at 08:58

## 2017-10-15 RX ADMIN — Medication 10 ML: at 04:28

## 2017-10-15 RX ADMIN — Medication 10 ML: at 18:42

## 2017-10-15 RX ADMIN — SODIUM CHLORIDE 500 ML: 450 INJECTION, SOLUTION INTRAVENOUS at 15:48

## 2017-10-15 RX ADMIN — ALBUTEROL SULFATE 2.5 MG: 2.5 SOLUTION RESPIRATORY (INHALATION) at 20:05

## 2017-10-15 RX ADMIN — VANCOMYCIN HYDROCHLORIDE 1000 MG: 1 INJECTION, POWDER, LYOPHILIZED, FOR SOLUTION INTRAVENOUS at 07:55

## 2017-10-15 RX ADMIN — METOPROLOL TARTRATE 25 MG: 25 TABLET ORAL at 14:16

## 2017-10-15 RX ADMIN — CEFEPIME HYDROCHLORIDE 2 G: 2 INJECTION, POWDER, FOR SOLUTION INTRAVENOUS at 09:14

## 2017-10-15 RX ADMIN — VANCOMYCIN HYDROCHLORIDE 1000 MG: 1 INJECTION, POWDER, LYOPHILIZED, FOR SOLUTION INTRAVENOUS at 21:12

## 2017-10-15 RX ADMIN — METOPROLOL TARTRATE 25 MG: 25 TABLET ORAL at 18:57

## 2017-10-15 RX ADMIN — Medication 10 ML: at 07:55

## 2017-10-15 RX ADMIN — HYDROCODONE BITARTRATE AND ACETAMINOPHEN 1 TABLET: 5; 325 TABLET ORAL at 04:25

## 2017-10-15 RX ADMIN — Medication 10 ML: at 14:17

## 2017-10-15 RX ADMIN — CEFEPIME HYDROCHLORIDE 2 G: 2 INJECTION, POWDER, FOR SOLUTION INTRAVENOUS at 03:51

## 2017-10-15 NOTE — PROGRESS NOTES
5 mg IV lopressor administered one time via telephone orders from Dr. Bola Martinez. Will closely monitor patient's HR and BP. Patient remains asymptomatic and BP stable. 500 ml 1/2NS bolus administered one time per Dr. Yelena Victor.

## 2017-10-15 NOTE — CONSULTS
[facility] Cardiology/Electrophyiology Consult                Date of  Admission: 10/11/2017 11:41 AM         CC/Reason for consult: Tachycardia      Mo Talley is a 69 y.o. female admitted for Acute renal failure (ARF) ([facility] [address] Patient with pmhx of breast CA s/p RTX completed in Feb 2017, currently on oral chemo (femara and ibrance) s/p right breast reconstruction with flap and left breast mastectomy on 9/18 presented with hypotension/ fatigue/ malaise. Was seen in Dr George Ponce office due to these complaints along with concern for drainage from flap site. Dr Eduarda Jacob reportedly okay with appearance of op site but was concerned regarding SBP in 60's at office. Brought to ED via EMS. ER workup notable for pancytopenia, Acute renal failure, hypotension (sbp in 80's). Hospitalist admitted for management. Today on rounds tech noted HR>180bpm. EKG confirmed tachycardia, showed what appeared to be an A. Tach. Was treated with iv lopressor and rate decreased to <100. Patient denies CP, SOB or tachycardia.      Patient Active Problem List   Diagnosis Code    Acquired hypothyroidism E03.9    Asthma J45.909    Insomnia G47.00    Depression F32.9    Malignant neoplasm of right female breast ([facility] [address] C50.911    Mediastinal adenopathy R59.0    Status post right mastectomy Z90.11    S/P breast reconstruction, bilateral Z98.890    SIRS (systemic inflammatory response syndrome) (HCC) R65.10    Idiopathic hypotension I95.0    Tachycardia R00.0    Shortness of breath R06.02    Hypokalemia E87.6    Cough R05    Pancytopenia due to chemotherapy ([facility] [address] D61.810    Malignant neoplasm of upper-inner quadrant of right female breast (HCC) C50.211    Lump of skin of right upper extremity R22.31    Breast cancer (HCC) C50.919    S/P breast reconstruction, right Z98.890    Acute renal failure (ARF) (HCC) N17.9    Pancytopenia (HCC) D61.818    Hypomagnesemia E83.42    Diarrhea R19.7    Sepsis (HCC) A41.9       Past Medical History:   Diagnosis Date    Abnormal CT of the chest     Adverse effect of anesthesia     bp dropped with dual lung/gallbladder surgery    Asthma      no acute attacks recently.  uses advair daily    Breast cancer ([address] 2016    R breast, s/p mastectomy, chemo, reconstruction    Depression      Hypercholesteremia     Controlled with diet     Hypothyroidism     Ill-defined condition     granular lung disease resolved 2002      Past Surgical History:   Procedure Laterality Date    CHEST SURGERY PROCEDURE UNLISTED      R lung biopsy     HX BLADDER REPAIR      HX BREAST BIOPSY Right 2/5/2016    SKIN BIOPSY RIGHT BREAST performed by Elisa Gifford MD at [address] [place] [address] HX BREAST RECONSTRUCTION Bilateral 9/14/2016    BILATERAL BREAST RECONSTRUCTION performed by Alejandra Murray MD at [address] [place] [address] HX BREAST RECONSTRUCTION Bilateral 9/14/2016    BILATERAL BREAST TISSUE EXPANDER INSERTION performed by Alejandra Murray MD at [facility] MAIN OR    HX BREAST RECONSTRUCTION Right 9/18/2017    RIGHT BREAST RECONSTRUCTION performed by Alejandra Murray MD at [address] [place] [address] HX BREAST RECONSTRUCTION Right 9/18/2017    Right  PLACEMENT OF PERMANENT IMPLANTS performed by Alejandra Murray MD at [address] [place] [address] HX BREAST REDUCTION Left 9/18/2017    LEFT BREAST MASTOPEXY FOR SYMMETRY performed by Alejandra Murray MD at [address] [place] [address] HX COLONOSCOPY      2006    HX CYSTOCELE REPAIR      HX HYSTERECTOMY      fibroids    HX LAP CHOLECYSTECTOMY  2000    HX MASTECTOMY Right     HX RECTOCELE REPAIR      HX VASCULAR ACCESS      port left chest wall     Allergies   Allergen Reactions    Prednisone Unknown (comments)     \"psychological reaction\"      Family History   Problem Relation Age of Onset    Hypertension Mother     Cancer Mother 79     Breast cancer at age 79    Diabetes Mother      Type 2    Other Father      alzheimers    Cancer Maternal Aunt      Breast cancer after age 72   [facility] Cancer Maternal Aunt      Breast cancer at age 37   [facility] Allergic Rhinitis Neg Hx     Allergy-severe Neg Hx     Amblyopia Neg Hx     Anemia Neg Hx     Anesth Problems Neg Hx     Angioedema Neg Hx     Anxiety Neg Hx     Arrhythmia Neg Hx     Arthritis-rheumatoid Neg Hx     Ataxia Neg Hx     Atopy Neg Hx     Bipolar Disorder Neg Hx     Blindness Neg Hx     Breast Cancer Neg Hx     Breast Problems Neg Hx     Broken Bones Neg Hx     Cataract Neg Hx     Celiac Disease Neg Hx     Childhood heart surgery Neg Hx     Childhood resp disease Neg Hx     Chorea Neg Hx     Clotting Disorder Neg Hx     Collagen Dis Neg Hx     Colon Cancer Neg Hx     Colon Polyps Neg Hx     COPD Neg Hx     Coronary Artery Disease Neg Hx     Crohn's Disease Neg Hx     Cystic Fibrosis Neg Hx     Delayed Awakening Neg Hx     Dementia Neg Hx     Depression Neg Hx     Dislocations Neg Hx     Downs Syndrome Neg Hx     Drug Abuse Neg Hx     Eclampsia Neg Hx     Eczema Neg Hx     Emergence Delirium Neg Hx     Emphysema Neg Hx     Fainting Neg Hx     Genitourinary () Neg Hx     Glaucoma Neg Hx     Heart Attack Neg Hx     Heart defect Neg Hx     Heart Failure Neg Hx     Heart Surgery Neg Hx     Hemachromatosis Neg Hx     Herpes Neg Hx     High Cholesterol Neg Hx     HIV/AIDS Neg Hx     Immunodeficiency Neg Hx     Infertility Neg Hx     Inflammatory Bowel Dz Neg Hx     Kidney Disease Neg Hx     Liver Disease Neg Hx     Macular Degen Neg Hx     Malignant Hyperthermia Neg Hx     MS Neg Hx     Neuropathy Neg Hx     NF Neg Hx     Osteoporosis Neg Hx     Ovarian Cancer Neg Hx     Pacemaker Neg Hx     Panic disorder Neg Hx     Parkinsonism Neg Hx     Post-op Cognitive Dysfunction Neg Hx     Post-op Nausea/Vomiting Neg Hx     Prematurity Neg Hx      Labor Neg Hx     Pseudocholinesterase Deficiency Neg Hx     Psoriasis Neg Hx     Psychotic Disorder Neg Hx     Rashes/Skin Problems Neg Hx  Retinal Detachment Neg Hx     Rh Incompatibility Neg Hx     Schizophrenia Neg Hx     Seizures Neg Hx     Severe Sprains Neg Hx     Sickle Cell Anemia Neg Hx     Sickle Cell Trait Neg Hx     SIDS Neg Hx     SLE Neg Hx     Spont Ab Neg Hx     Stomach Cancer Neg Hx     Strabismus Neg Hx     Substance Abuse Neg Hx     Sudden Death Neg Hx     Suicide Neg Hx     Thyroid Disease Neg Hx     Tuberculosis Neg Hx     Ulcerative Colitis Neg Hx     Urticaria Neg Hx     Lang's Disease Neg Hx         Current Facility-Administered Medications   Medication Dose Route Frequency    HYDROcodone-acetaminophen (NORCO) 5-325 mg per tablet 1 Tab  1 Tab Oral Q6H PRN    letrozole (FEMARA) tablet 2.5 mg (Patient Supplied)  2.5 mg Oral QHS    VANCOMYCIN INFORMATION NOTE   Other PRN    [START ON 10/16/2017] levothyroxine (SYNTHROID) tablet 75 mcg  75 mcg Oral ACB    vancomycin (VANCOCIN) 1,000 mg in 0.9% sodium chloride (MBP/ADV) 250 mL  1,000 mg IntraVENous Q12H    albuterol (PROVENTIL VENTOLIN) nebulizer solution 2.5 mg  2.5 mg Nebulization Q4H RT    cefepime (MAXIPIME) 2 g in 0.9% sodium chloride (MBP/ADV) 100 mL  2 g IntraVENous Q8H    sodium chloride (NS) flush 5-10 mL  5-10 mL IntraVENous PRN    ALPRAZolam (XANAX) tablet 0.5 mg  0.5 mg Oral TID PRN    DULoxetine (CYMBALTA) capsule 60 mg  60 mg Oral QHS    sodium chloride (NS) flush 5-10 mL  5-10 mL IntraVENous Q8H    sodium chloride (NS) flush 5-10 mL  5-10 mL IntraVENous PRN    acetaminophen (TYLENOL) tablet 650 mg  650 mg Oral Q4H PRN    0.9% sodium chloride infusion 250 mL  250 mL IntraVENous PRN       Review of Symptoms:  General: no recent weight loss/gain, weakness, fatigue, fever or chills  Skin: no rashes, lumps, or other skin changes  HEENT: no headache, dizziness, lightheadedness, vision changes, hearing changes, tinnitus, vertigo, sinus pressure/pain, bleeding gums, sore throat, or hoarseness  Neck: no swollen glands, goiter, pain or stiffness  Respiratory: no cough, sputum, hemoptysis, dyspnea, wheezing  Cardiovascular: no chest pain or discomfort, palpitations, dyspnea, orthopnea, paroxysmal nocturnal dyspnea, peripheral edema  Gastrointestinal: no trouble swallowing, heartburn, change of appetite, nausea, change in bowel habits, pain with defecation, rectal bleeding or black/tarry stools, hemorrhoids, constipation, diarrhea, abdominal pain, jaundice, liver or gallbladder problems  Urinary: no frequency, urgency , hematuria, burning/pain with urination, recent flank pain, polyuria, nocturia, or difficulty urinating  Genital: no vaginal or pelvic infections  Peripheral Vascular: no claudication, leg cramps, prior DVTs, swelling of calves, legs, or feet, color change, or swelling with redness or tenderness  Musculoskeletal: no muscle or joint pain/stiffness, joint swelling, erythema of joints, or back pain  Psychiatric: no depression, mental disorders, or excessive stress  Neurological: no sensory or motor loss, seizures, syncope, tremors, numbness, tingling, no changes in mood, attention, or speech, no changes in orientation, memory, insight, or judgment. no headache, dizziness, vertigo. Hematologic: no anemia, easy bruising or bleeding  Endocrine: no thyroid problems, heat or cold intolerance, excessive sweating, polyuria, polydipsia, or history of diabetes. Physical Exam  Vitals:    10/15/17 1040 10/15/17 1125 10/15/17 1203 10/15/17 1233   BP: 109/71 120/74 126/61 127/61   Pulse: (!) 186 (!) 168 99 95   Resp: 24 20 20 18   Temp: 98.6 °F (37 °C)      SpO2: 94% 96% 98% 98%   Weight:       Height:           Physical Exam:  General appearance - Alert, well appearing, and in no distress   Mental status - Affect appropriate to mood. Eyes - Sclerae anicteric,  ENMT - Hearing grossly normal bilaterally, Dental hygiene good. Neck - Carotids upstroke normal bilaterally, no bruits, no JVD.   Resp - Clear to auscultation, no wheezes, rales or rhonchi, symmetric air entry. Heart - Normal rate, regular rhythm, normal S1, S2, no murmurs, rubs, clicks or gallops. GI - Soft, nontender, nondistended, no masses or organomegaly. Neurological - Grossly intact - normal speech, no focal findings  Musculoskeletal - No joint tenderness, deformity or swelling, no muscular tenderness noted. Extremities - Peripheral pulses normal, no pedal edema, no clubbing or cyanosis. Skin - Normal coloration and turgor. Psych -  oriented to person, place, and time. Labs:   Recent Labs      10/14/17   0624  10/13/17   0410   NA  141  139   K  3.7  3.7   BUN  16  17   CREA  0.87  0.99   GLU  156*  108*   WBC  4.4  2.0*   HGB  10.2*  9.5*   HCT  29.4*  28.0*   PLT  72*  63*        Assessment:      Principal Problem:    Pancytopenia ([address] (10/11/2017)    Active Problems:    Acquired hypothyroidism (11/4/2015)      Hypokalemia (4/7/2017)      Breast cancer ([address] (9/18/2017)      Acute renal failure (ARF) ([address] (10/11/2017)      Hypomagnesemia (10/11/2017)      Diarrhea (10/11/2017)      Sepsis ([address] (10/11/2017)           Plan:   1. Tachycardia - First time episode - Currently, undefined. Appears to be an A. Tach that now has resolved. Patient is now on monitor that should help with further diagnosis. Will start therapy with metoprolol 25mg BID. Will start work up with ECHO and check d-dimer. Thank you very much for this referral. We appreciate the opportunity to participate in this patient's care. We will follow along with above stated plan. Ze Diana. Melissa Frias M.D., F.A.C.C, F.H.R.S.   Cardiology/Electrophysiology

## 2017-10-15 NOTE — PROGRESS NOTES
Assessment complete. Chart reviewed. Patient A&Ox4. Lung sounds diminished with crackles upper and lower bilat, on 2LNC. NSR/ST. BP stable. Radial, pedal, and post tibial pulses palpable and +2. Bowel sounds active. Fountain draining clear yellow urine. Moves all extremities. Small abrasions noted to upper back with allevyns in place, Wound vac to R mid back c/d/i and patent/draining. Scar to right breast from total mastectomy and reconstruction. Small scars to left breast from lift, per patient. VSS.  NAD

## 2017-10-15 NOTE — PROGRESS NOTES
Resting quietly, resp even, unlab. Eyes closed with relaxed facial expression during bedside report received from Aditi Victor RN. No distress noted.

## 2017-10-15 NOTE — PROGRESS NOTES
Patient arrived to ICU, transferred to bed, placed on monitor, and bathed.      Dual skin assessment with this RN and Panchito Hurtado RN: small abrasions noted to upper back with allevyns in place, Wound vac to R mid back c/d/i and patent/draining. Scar to right breast from total mastectomy and reconstruction. Small scars to left breast from lift, per patient.

## 2017-10-15 NOTE — PROGRESS NOTES
Continues to rest quietly, resp even, unlab, awake with no c/o. Assessment noted. Bilat breast incisions clean, dry, intact. Right upper back wound with dsg intact to wound vac to 125 cm suction. Two mepilex dsgs clean, dry to back over abrasions. Head of bed elevated. Right chest port intact, dsg intact, site clear. No distress.

## 2017-10-15 NOTE — PROGRESS NOTES
Continues to rest quietly, resp even, unlab with no distress during bedside report given to Nicolas Montes RN.

## 2017-10-15 NOTE — PROGRESS NOTES
Bedside and Verbal shift change report given to Nj (oncoming nurse) by Tyson Hunt (offgoing nurse). Report included the following information SBAR, Kardex, ED Summary, Procedure Summary, Intake/Output, MAR, Recent Results and Cardiac Rhythm NSR. Patient turned and skin assessed.

## 2017-10-15 NOTE — PROGRESS NOTES
TRANSFER - OUT REPORT:    Verbal report given to Nory SARGENT(name) on Cj Vedrin  being transferred to ICU(unit) for change in patient condition( SVT)       Report consisted of patients Situation, Background, Assessment and   Recommendations(SBAR). Information from the following report(s) SBAR and Kardex was reviewed with the receiving nurse. Lines:   Venous Access Device [place] 10/11/17 Upper chest (subclavicular area), left (Active)   Central Line Being Utilized Yes 10/15/2017  8:01 AM   Criteria for Appropriate Use Hemodynamically unstable, requiring monitoring lines, vasopressors, or volume resuscitation 10/15/2017  8:01 AM   Site Assessment Clean, dry, & intact 10/15/2017  8:01 AM   Date of Last Dressing Change 10/11/17 10/15/2017  8:01 AM   Dressing Status Clean, dry, & intact 10/15/2017  8:01 AM   Dressing Type Disk with Chlorhexadine gluconate (CHG) 10/15/2017  8:01 AM   Date Accessed (Medial Site) 10/11/17 10/15/2017  8:01 AM   Positive Blood Return (Medial Site) Yes 10/15/2017  8:01 AM   Alcohol Cap Used No 10/15/2017  8:01 AM      Patient Vitals for the past 12 hrs:   Temp Pulse Resp BP SpO2   10/15/17 1125 - (!) 168 20 120/74 96 %   10/15/17 1040 98.6 °F (37 °C) (!) 186 24 109/71 94 %   10/15/17 0859 - - - - 93 %   10/15/17 0733 97.9 °F (36.6 °C) 92 24 133/75 94 %   10/15/17 0302 98.9 °F (37.2 °C) 91 20 128/77 94 %     Opportunity for questions and clarification was provided.       Patient transported with:   O2 @ 2 liters

## 2017-10-15 NOTE — PROGRESS NOTES
TRANSFER - IN REPORT:    Verbal report received from [facility] (name) on Buelah Gilford  being received from [facility] (unit) for change in patient condition(SVT/ST)      Report consisted of patients Situation, Background, Assessment and   Recommendations(SBAR). Information from the following report(s) SBAR, Kardex, ED Summary, [facility] and Cardiac Rhythm ST was reviewed with the receiving nurse. Opportunity for questions and clarification was provided. Assessment completed upon patients arrival to unit and care assumed.

## 2017-10-15 NOTE — PROGRESS NOTES
Awoke. Headache continues, reported to Dr. Hira Edmonds, pt's ineffective pain relief; orders received.

## 2017-10-15 NOTE — PROGRESS NOTES
Patient quietly sleeping. Patient woke up and stated that she didn't want her breathing treatment now or \"any other time throughout the night\". She does not want to be woken up at all. Breath sounds clear and unlabored. O2 sat 96% on room air. Patient appears in no distress at this time and urged to call if she awakens and would like a treatment.

## 2017-10-15 NOTE — PROGRESS NOTES
PT eval on hold until tomorrow. Pt tachycardia and the are awaiting results EKG at this time. We will check on patient tomorrow.     Dary Newman, PT

## 2017-10-15 NOTE — PROGRESS NOTES
Assessment done via doc flow sheet. Pt resting quietly in bed, alert & oriented x3, resp easy & regular, diminished breath sounds. Wound vac to back intact, no leaks noted draining serous output @ 110 ml level in canister. Fountain patent, draining clear, yellow urine. Bed low & locked, sdie rails x3 up with call light within reach, pt instructed to call for assistance as needed.

## 2017-10-15 NOTE — PROGRESS NOTES
Patient's HR sustaining 160-170s. Monitor not crossing over vitals to connect care at this time. BP stable, patient asymptomatic. Dr. Edgar uDnn paged.

## 2017-10-15 NOTE — PROGRESS NOTES
MEWS score 5 at this time. Patient resting in bed with heart rate of 168, blood pressure 120/74. Patient seems calm. 02/3l via nasal cannula applied by respiratory. Monitor started. Orders for ICU bed. Staying with patient until we get ICU bed . Patient calling family to let them know of the planned move.

## 2017-10-15 NOTE — PROGRESS NOTES
Progress Note    Patient: Hetal Loredo MRN: [id number]  SSN: [id number]    YOB: 1950  Age: 69 y.o. Sex: female      Admit Date: 10/11/2017    LOS: 4 days     Subjective:     66F with pmhx of breast CA s/p RTX completed in Feb 2017, currently on oral chemo (femara and ibrance) s/p right breast reconstruction with flap and left breast mastectomy on 9/18 [facility] for hypotension/ fatigue/ malaise along with 2 days of profuse diarrhea.      Was seen in Dr Jared Reeves office day of admit due to these complaints along with concern for drainage from flap site. SBP in office in 60's. Brought to ED via EMS. ER workup notable for pancytopenia, Acute renal failure, hypotension (sbp in 80's). Hospitalist called for admission. Started on broad spectrum IV atbx/IVf. BP improved. Oral chemo held. Oncology consulted for pancytopenia. Plastic recommended wound vac placed 10/12.      10-13 - feeling better today. Had some dyspnea and wheezing o/n and today.    10/14: patient feels well, no shortness of breath, son is at the bedside  10-15: pt seen and examined in bed   NAD  About to eat breakfast but states that she fells bad, she is no longer  Neutropenic   plts have increased        Review of Systems negative with exception of pertinent positives noted above    Objective:     Vitals:    10/14/17 2355 10/15/17 0302 10/15/17 0733 10/15/17 0859   BP: 129/73 128/77 133/75    Pulse: 89 91 92    Resp: 20 20 24    Temp: 96.2 °F (35.7 °C) 98.9 °F (37.2 °C) 97.9 °F (36.6 °C)    SpO2: 98% 94% 94% 93%   Weight:       Height:            Intake and Output:  Current Shift: 10/15 0701 - 10/15 1900  In: -   Out: 600 [Urine:600]  Last three shifts: 10/13 1901 - 10/15 0700  In: -   Out: 2450 [Urine:2450]    Physical Exam:   General appearance: NAD, conversant  Neck: Trachea midline   Lungs: CTA, with normal respiratory effort and no intercostal retractions  CV: S1,S2 present, no added murmurs  Abdomen: Soft, non-tender; no masses   Extremities: No peripheral edema or extremity lymphadenopathy  Skin: Normal temperature, turgor and texture; wound vac in place  Psych: Appropriate affect, alert and oriented to person, place and time    Lab/Data Review: All lab results for the last 24 hours reviewed.      Assessment:     Principal Problem:    Pancytopenia ([address] (10/11/2017)    Active Problems:    Acquired hypothyroidism (11/4/2015)      Hypokalemia (4/7/2017)      Breast cancer ([facility] [address] (9/18/2017)      Acute renal failure (ARF) ([facility] [address] (10/11/2017)      Hypomagnesemia (10/11/2017)      Diarrhea (10/11/2017)      Sepsis ([facility] [address] (10/11/2017)        Plan:     A/p    #Sepsis of unkown origin in immunocompromised host  #Asthma exacerbation  #Hx of Breast CA s/p reconstruction, on oral chemo hormone therapy  #ARF  #Hypothyroidism    Pancytopenia improving  -continue with broad spectrum abx for a total dose of 10 days  -asthma exacerbation has resolved  -Onc following for pancytopenia, signed off  lbrance not reordered on admit,   s/p 2 unit prbc on 10/11  -ARF resolved  -tsh was elevated, check  T4 levels will need opt evaluation    1108 called by RN about  Tachycardia   EKG  Obtained showed  HR > 180  Lopressor 2.5 mg ordered   Echocardiogram ordered  Consult cardiology     DVT Prophylaxis: scds     Dispo PT/OT follow anticipate SNF palcement, CM aware        Signed By: Dora Jay MD     October 15, 2017

## 2017-10-15 NOTE — PROGRESS NOTES
[facility]: Inpatient Hematology / Oncology Progress Note    Admission Date: 10/11/2017 11:41 AM    SUBJECTIVE:  No significant events overnight. Wound vac in place. No other complaints other than ongoing fatigue. Afebrile. ROS:  Constitutional: Positive for weakness, malaise, fatigue. CV: Negative for chest pain, palpitations, edema. Respiratory: Negative for dyspnea, cough, wheezing. GI: Negative for nausea, abdominal pain, diarrhea. 10 point review of systems is otherwise negative with the exception of the elements mentioned above in the HPI. Allergies   Allergen Reactions    Prednisone Unknown (comments)     \"psychological reaction\"       OBJECTIVE:  Patient Vitals for the past 8 hrs:   BP Temp Pulse Resp SpO2   10/15/17 0859 - - - - 93 %   10/15/17 0733 133/75 97.9 °F (36.6 °C) 92 24 94 %   10/15/17 0302 128/77 98.9 °F (37.2 °C) 91 20 94 %     Temp (24hrs), Av.7 °F (36.5 °C), Min:96.2 °F (35.7 °C), Max:98.9 °F (37.2 °C)    10/15 0701 - 10/15 1900  In: -   Out: 600 [Urine:600]    Physical Exam:  Constitutional: Well developed, well nourished female in no acute distress, sitting comfortably in the hospital bed. Appears fatigued. HEENT: Normocephalic and atraumatic. Oropharynx is clear, mucous membranes are moist.  Sclerae anicteric. Neck supple without JVD. No thyromegaly present. Lymph node   No palpable submandibular, cervical, supraclavicular lymph nodes. Skin Warm and dry. Wound vac in place over back, wound not examined. No erythema. No pallor. Respiratory Lungs are clear to auscultation bilaterally without wheezes, rales or rhonchi, normal air exchange without accessory muscle use. CVS Normal rate, regular rhythm and normal S1 and S2. No murmurs, gallops, or rubs. Abdomen Soft, nontender and nondistended, normoactive bowel sounds. No palpable mass. No hepatosplenomegaly. Neuro Grossly nonfocal with no obvious sensory or motor deficits. MSK Normal range of motion in general.  No edema and no tenderness. Psych Appropriate mood and affect. Labs:      Recent Labs      10/14/17   0624  10/13/17   0410  10/12/17   0951   WBC  4.4  2.0*   --    RBC  3.21*  3.03*   --    HGB  10.2*  9.5*  10.4*   HCT  29.4*  28.0*  30.5*   MCV  91.6  92.4   --    MCH  31.8  31.4   --    MCHC  34.7  33.9   --    RDW  18.5*  18.6*   --    PLT  72*  63*   --    GRANS   --   50   --    LYMPH   --   21   --    MONOS   --   14*   --    EOS   --   7   --    BASOS   --   1   --    DF   --   AUTOMATED   --    ANEU   --   1.2*   --    ABL   --   0.4*   --    ABM   --   0.3   --    EMI   --   0.1   --    ABB   --   0.0   --         Recent Labs      10/14/17   0624  10/13/17   0410   NA  141  139   K  3.7  3.7   CL  108*  110*   CO2  25  20*   AGAP  8  9   GLU  156*  108*   BUN  16  17   CREA  0.87  0.99   GFRAA  >60  >60   GFRNA  >60  59*   CA  8.3  7.1*       Imaging:      ASSESSMENT:    Active Problems:    Acquired hypothyroidism (11/4/2015)      Hypokalemia (4/7/2017)      Breast cancer ([facility] [address] (9/18/2017)      Acute renal failure (ARF) ([address] (10/11/2017)      Pancytopenia ([address] (10/11/2017)      Hypomagnesemia (10/11/2017)      Diarrhea (10/11/2017)      Sepsis ([facility] [address] (10/11/2017)        PLAN:    75 y/o with history of stage IV breast cancer, now ISABEL. Completed chemo, radiation, now on endocrine therapy with letrozole/palbociclib. Developed hypotension in plastics office, brought to ED and admitted for DANNIE and sepsis. PCT improved, now afebrile. WBC 4.4 improving after stopping palbo. No indication for G-CSF at this point, but continue to hold palbo. Platelets also slowly improving. OK to resume letrozole from our standpoint. Wound culture shows MRSA, blood cultures remain negative. Continue abx per primary team, wound vac. We will follow from a distance, please call with any urgent questions.            MD [facility]  [address] [place] [address]  Office : [phone]  Fax : [phone]

## 2017-10-16 LAB
ANION GAP SERPL CALC-SCNC: 3 MMOL/L (ref 7–16)
BUN SERPL-MCNC: 13 MG/DL (ref 8–23)
CALCIUM SERPL-MCNC: 7.6 MG/DL (ref 8.3–10.4)
CHLORIDE SERPL-SCNC: 101 MMOL/L (ref 98–107)
CO2 SERPL-SCNC: 28 MMOL/L (ref 21–32)
CREAT SERPL-MCNC: 0.76 MG/DL (ref 0.6–1)
DIFFERENTIAL METHOD BLD: ABNORMAL
EOSINOPHIL # BLD: 0.3 K/UL (ref 0–0.8)
EOSINOPHIL NFR BLD MANUAL: 3 % (ref 1–8)
ERYTHROCYTE [DISTWIDTH] IN BLOOD BY AUTOMATED COUNT: 19 % (ref 11.9–14.6)
GLUCOSE SERPL-MCNC: 89 MG/DL (ref 65–100)
HCT VFR BLD AUTO: 33.3 % (ref 35.8–46.3)
HGB BLD-MCNC: 10.9 G/DL (ref 11.7–15.4)
LYMPHOCYTES # BLD: 1.7 K/UL (ref 0.5–4.6)
LYMPHOCYTES NFR BLD MANUAL: 18 % (ref 16–44)
MCH RBC QN AUTO: 31.1 PG (ref 26.1–32.9)
MCHC RBC AUTO-ENTMCNC: 32.7 G/DL (ref 31.4–35)
MCV RBC AUTO: 94.9 FL (ref 79.6–97.8)
MONOCYTES # BLD: 1.1 K/UL (ref 0.1–1.3)
MONOCYTES NFR BLD MANUAL: 12 % (ref 3–9)
NEUTS SEG # BLD: 6.2 K/UL (ref 1.7–8.2)
NEUTS SEG NFR BLD MANUAL: 67 % (ref 47–75)
PLATELET # BLD AUTO: 91 K/UL (ref 150–450)
PLATELET COMMENTS,PCOM: ABNORMAL
PMV BLD AUTO: 12 FL (ref 10.8–14.1)
POTASSIUM SERPL-SCNC: 4.2 MMOL/L (ref 3.5–5.1)
RBC # BLD AUTO: 3.51 M/UL (ref 4.05–5.25)
RBC MORPH BLD: ABNORMAL
SODIUM SERPL-SCNC: 132 MMOL/L (ref 136–145)
T4 FREE SERPL-MCNC: 1.3 NG/DL (ref 0.78–1.46)
WBC # BLD AUTO: 9.3 K/UL (ref 4.3–11.1)
WBC MORPH BLD: ABNORMAL

## 2017-10-16 PROCEDURE — 65610000001 HC ROOM ICU GENERAL

## 2017-10-16 PROCEDURE — 77010033678 HC OXYGEN DAILY

## 2017-10-16 PROCEDURE — 74011000258 HC RX REV CODE- 258: Performed by: INTERNAL MEDICINE

## 2017-10-16 PROCEDURE — 74750000023 HC WOUND THERAPY

## 2017-10-16 PROCEDURE — 74011250637 HC RX REV CODE- 250/637: Performed by: HOSPITALIST

## 2017-10-16 PROCEDURE — 94760 N-INVAS EAR/PLS OXIMETRY 1: CPT

## 2017-10-16 PROCEDURE — 74011250637 HC RX REV CODE- 250/637: Performed by: INTERNAL MEDICINE

## 2017-10-16 PROCEDURE — 85025 COMPLETE CBC W/AUTO DIFF WBC: CPT | Performed by: HOSPITALIST

## 2017-10-16 PROCEDURE — 74011000250 HC RX REV CODE- 250: Performed by: INTERNAL MEDICINE

## 2017-10-16 PROCEDURE — 74011250636 HC RX REV CODE- 250/636: Performed by: HOSPITALIST

## 2017-10-16 PROCEDURE — 74011250636 HC RX REV CODE- 250/636: Performed by: INTERNAL MEDICINE

## 2017-10-16 PROCEDURE — 36415 COLL VENOUS BLD VENIPUNCTURE: CPT | Performed by: HOSPITALIST

## 2017-10-16 PROCEDURE — 77030011256 HC DRSG MEPILEX <16IN NO BORD MOLN -A

## 2017-10-16 PROCEDURE — 77030019934 HC DRSG VAC ASST KCON -B

## 2017-10-16 PROCEDURE — 97162 PT EVAL MOD COMPLEX 30 MIN: CPT

## 2017-10-16 PROCEDURE — 97605 NEG PRS WND THER DME<=50SQCM: CPT

## 2017-10-16 PROCEDURE — 94640 AIRWAY INHALATION TREATMENT: CPT

## 2017-10-16 PROCEDURE — 84439 ASSAY OF FREE THYROXINE: CPT | Performed by: HOSPITALIST

## 2017-10-16 PROCEDURE — 77030019952 HC CANSTR VAC ASST KCON -B

## 2017-10-16 PROCEDURE — 80048 BASIC METABOLIC PNL TOTAL CA: CPT | Performed by: HOSPITALIST

## 2017-10-16 RX ORDER — ALBUTEROL SULFATE 0.83 MG/ML
2.5 SOLUTION RESPIRATORY (INHALATION)
Status: DISCONTINUED | OUTPATIENT
Start: 2017-10-16 | End: 2017-10-17

## 2017-10-16 RX ORDER — FUROSEMIDE 10 MG/ML
20 INJECTION INTRAMUSCULAR; INTRAVENOUS EVERY 12 HOURS
Status: DISCONTINUED | OUTPATIENT
Start: 2017-10-16 | End: 2017-10-17

## 2017-10-16 RX ADMIN — CEFEPIME HYDROCHLORIDE 2 G: 2 INJECTION, POWDER, FOR SOLUTION INTRAVENOUS at 17:46

## 2017-10-16 RX ADMIN — Medication 10 ML: at 22:00

## 2017-10-16 RX ADMIN — ALBUTEROL SULFATE 2.5 MG: 2.5 SOLUTION RESPIRATORY (INHALATION) at 07:39

## 2017-10-16 RX ADMIN — Medication 10 ML: at 14:10

## 2017-10-16 RX ADMIN — DULOXETINE HYDROCHLORIDE 60 MG: 60 CAPSULE, DELAYED RELEASE ORAL at 21:44

## 2017-10-16 RX ADMIN — LEVOTHYROXINE SODIUM 75 MCG: 75 TABLET ORAL at 08:11

## 2017-10-16 RX ADMIN — METOPROLOL TARTRATE 25 MG: 25 TABLET ORAL at 08:11

## 2017-10-16 RX ADMIN — VANCOMYCIN HYDROCHLORIDE 1000 MG: 1 INJECTION, POWDER, LYOPHILIZED, FOR SOLUTION INTRAVENOUS at 21:45

## 2017-10-16 RX ADMIN — VANCOMYCIN HYDROCHLORIDE 1000 MG: 1 INJECTION, POWDER, LYOPHILIZED, FOR SOLUTION INTRAVENOUS at 09:26

## 2017-10-16 RX ADMIN — METOPROLOL TARTRATE 25 MG: 25 TABLET ORAL at 17:46

## 2017-10-16 RX ADMIN — ALBUTEROL SULFATE 2.5 MG: 2.5 SOLUTION RESPIRATORY (INHALATION) at 19:57

## 2017-10-16 RX ADMIN — FUROSEMIDE 20 MG: 10 INJECTION, SOLUTION INTRAMUSCULAR; INTRAVENOUS at 21:45

## 2017-10-16 RX ADMIN — CEFEPIME HYDROCHLORIDE 2 G: 2 INJECTION, POWDER, FOR SOLUTION INTRAVENOUS at 11:12

## 2017-10-16 RX ADMIN — ALBUTEROL SULFATE 2.5 MG: 2.5 SOLUTION RESPIRATORY (INHALATION) at 13:16

## 2017-10-16 RX ADMIN — FUROSEMIDE 20 MG: 10 INJECTION, SOLUTION INTRAMUSCULAR; INTRAVENOUS at 09:26

## 2017-10-16 RX ADMIN — CEFEPIME HYDROCHLORIDE 2 G: 2 INJECTION, POWDER, FOR SOLUTION INTRAVENOUS at 02:06

## 2017-10-16 RX ADMIN — ALBUTEROL SULFATE 2.5 MG: 2.5 SOLUTION RESPIRATORY (INHALATION) at 03:44

## 2017-10-16 NOTE — PROGRESS NOTES
Respiratory Care Services     Policy Number: -[id number]    Title: Patient Care Assessment Program    Effective Date: 01/1999    Revised Date: 05/2014    Reviewed Date: 06/2013/ 03/2015, 03/2016, 06/2017     Overview  In an effort to improve quality and reduce costs of respiratory care at [facility], the Respiratory Department has developed a number of Patient Care Protocols. These protocols have been developed according to Vlad 3 and are utilized for those patients who are ordered respiratory therapy using therapeutic indications and standardized approaches for accomplishing objectives. Patient Care Protocols are intended to improve care by:   Defining the indications and standards of care agreed upon by the Pulmonary Medicine and [address] [place] [address] of [facility].  Training respiratory care practitioners to apply those criteria to individual patients and modify therapy as indicated by the protocols.  Documenting the indication and care plan as part of the initial ordering process.  Tapering or discontinuing treatments once the indication for therapy changes. The Patient Care Protocols shall be universally applied throughout the hospital as determined by   the Pulmonary Medicine and [address] [place] [address].     Rationale for Patient Care Assessment Protocols:   Continuous Quality Improvement   Cost containment   Standardization of care   Enhanced continuity of care   Utilization review   Timely intervention    The following patient care assessment protocols have been developed:   Aerosolized Medication    Bronchial Hygiene    Oxygen Weaning Protocol   Volume Expansion/Secretion Clearance Non-Vented   Ventilator Weaning Protocol   CVRU Fast Track Weaning Protocol   Asthma Treatment Protocol ER   Pediatric Asthma Treatment Protocol ER   Alpha-1 Antitrypsin Deficiency Protocol   Prone Positioning Protocol    The Director of [address] [place] [address] oversees the Patient Care Assessment Program. The Clinical/ is responsible for protocol development and training. The Supervisor is responsible for implementation and  activities. Each patient with an order for respiratory treatments will receive an evaluation. All Registered Respiratory Care Practitioners (RCPs) will perform the evaluations. The same evaluation tool will be utilized for all initial and follow-up assessments. If the patient does not meet criteria for ordered therapy, the therapy will be discontinued. If the patient demonstrates an adverse response to initially ordered therapy, the therapy will be discontinued and the physician will be contacted. Specific physician's orders that deviate from protocols and are deemed \"inappropriate\" or \"unsafe\" will be addressed with ordering physician and/or medical director as required. Patients of [facility] and [facility] will not be assessed for the first 24 hours as the Medical Director of [address] [place] [address] has requested that changes in therapy should not be made during that time frame. Respiratory Patient Care Assessment Protocols                           I.  Policy: In an effort to provide quality patient care and effective utilization of services, physicians who order respiratory therapy will have their patients treated via the protocols established (see attached). All Registered [address] (RCPs) will complete the initial assessment. This assessment will indicate patient needs, and the care plan developed for the patient and will performed within 24 hours of admission. Frequency of the therapy will be set according to the results of the respiratory therapy evaluation and frequency guidelines policy.  Reassessment will be continued done every 48 hours and more frequently as needed for the individual patient. II. Purpose:     A. To provide a process that will allow for ongoing assessment and care plan modification for patients receiving respiratory services based on both objective and or subjective patient responses to interventions. This process of protocol utilization will assist in patient care progression while eliminating the need for the physician to continually update respiratory therapy orders. B. To assure continuity of respiratory care that meets [facility] Clinical Practice Guidelines. III. Initiation:  Implement Respiratory Care Protocols for patients who are ordered by physician          to receive respiratory therapy procedures or for ventilator management. IV. Protocol:  A. Upon receiving an order for therapy the RCP will review the patients EMR (electronic medical record) for all pertinent information includin. Physicians order for therapy  2. Patient history and physical examination  3. Physician progress notes  4. Diagnostic. X-rays, PFTs, arterial blood gases etc.      B. The RCP will perform a respiratory assessment in the following manner:  1. Perform hand hygiene per hospital policy utilizing Standard Precautions for all patients and following transmission-based isolation as indicated per policy. 2. Identify patient via ID bracelet verifying patient name and birth date. 3. General observations: color, pattern and effort of breathing, chest expansion, (symmetrical and bilateral), level of consciousness and the ability to ambulate. 4. The RCP will assess patients cough ability and determine if Nasotracheal suctioning is needed. If patient is unable to produce sputum, at that time, the RCP should question the patient with regard to their sputum: production, color consistency, frequency and amount.   5. Auscultation: Using a stethoscope, the RCP will listen and note quality of breath sounds and presence or absence of adventitious breath sounds in all lung fields, both anteriorly and posteriorly. 6. Upon completing the EMR review and physical assessment, the RCP will document findings in the RT Assessment section of the EMR. The score level will be provided and will be used to determine the frequency of therapy. V. Indications:   A. Indications for Bronchial Hygiene Protocol will include:  1. Potential for or presence of atelectasis. 2. Need for hydration and removal of retained secretions. 3. Need for improvement of cough effectiveness. 4. Presence of conditions associated with disorder of pulmonary clearance:  a. Cystic fibrosis  b. Bronchiectasis  B. Indications for Aerosolized Medication(s) Protocol should include:  1. Treatment of bronchospasm/wheezing  2. Improvement of mucociliary clearance  3. Treatment of stridor  4. History of Asthma or COPD             C.  Indications for Oxygen Therapy Protocol should include:  1. Documented hypoxemia  2. Severe trauma  3. Acute myocardial infarction  4. Short-term therapy (e.g. post anesthesia recovery)    VI. Maintenance:    A. Timely patient assessment is an integral part of this protocol therefore the following will be applied:  1. All non- critical care patients will be evaluated upon receiving initial respiratory care orders within 24 hours and re-evaluated within 48 hours (or more as needed). 2. Orders requesting a Respiratory Consult will be responded to in the following manner:  a. In patient emergency situations, the RCP assigned to the floor will respond immediately to the patient, provide an initial respiratory assessment, and contact the patients physician as necessary for appropriate orders. b. In non-emergent situations, the RCP assigned to the floor will respond to the patient within 90 minutes and provide an initial respiratory assessment and contact patients physician as necessary for appropriate orders. c. An RCP will provide a comprehensive assessment as soon as possible.   3. Upon completion of an evaluation, the RCP will complete documentation in the patients EMR in the RT Assessment section. 4. The RCP who completes the assessment will document orders for therapy in the orders section of the patients EMR selecting new order. Next, per protocol should be selected indicating it is a protocol order and sign orders should be selected to complete the process. 5. The Pharmacy and Therapeutics (P&T) Committee has mandated that the medication Xopenex may be changed to unit dose albuterol without an order, except for those patients receiving Xopenex due to cardiac arrhythmias. The dosage for these patients should be 0.63 mg. and may be changed from 1.25 mg. to 0.63 mg per P & T Committee by the RCP completing the assessment. 6. Patients who are not experiencing cardiac arrhythmias, and are ordered Xopenex and Atrovent may be changed to Duoneb. VII. Safety:        A. The following safety issues shall be monitored:  1. The RCP will perform hand hygiene per hospital policy utilizing Standard Precautions for all patients and following transmission-based isolation as indicated per hospital policy. 2. The RCP must exercise professional judgment in classifying the patient for frequency of therapy. 3. Appropriate classification of the patient will require an evaluation utilizing the Therapy Assessment Protocol Guidelines. 4. The RCP will confer with the physician concerning the care of the patient at any time questions or problems arise. 5. If during therapy, the patient exhibits no improvement or deterioration in clinical status the RCP will notify the physician and the patients nurse. VIII. Interventions:   A. The patients nurse is responsible concerning all items related to his/her care. Ongoing communication with nursing is essential to successful protocol management. B. The RCP recognizes the value of the team approach in meeting the patients needs.  Nursing input regarding the patients pulmonary condition will be sought as needed. IX. Reportable conditions: The RCP will inform the physician if:  1. There are acute changes in patients respiratory status. 2. The therapist is unable to determine appropriate care plan upon assessment. 3. The patient fails to reach therapeutic objective. 4. A change or additional medication is needed. X.  Patient Education:    A. Patient will receive instruction on the followin. The treatment modality, including objectives and proper technique of therapy  2. Respiratory medications  B. Documentation shall occur in the patient education section of the patients EMR. XI. Documentation: Record all findings as described above in the patients EMR. Related Protocols: A. Aerosolized Medication Protocol  B. Bronchial Hygiene  C. Oxygen Protocol   D. Volume Expansion/Secretion Clearance  E. Ventilator Weaning Protocols    References:  N   Joint Commission Consolidated David Standard   L    Respiratory Care Department Policy, Procedure and Protocol Guideline Manual, , OZ Gonzalez. L  Therapist Driven Respiratory Care Protocols  A Practitioners Guide for Criteria-Based Respiratory Care by Jersey Jefferson M.D., and OZ Garza RRT. L  The rationale for therapist-driven protocols: an update. Respiratory Care 1998; [facility] Guidelines.

## 2017-10-16 NOTE — PROGRESS NOTES
Problem: Mobility Impaired (Adult and Pediatric)  Goal: *Acute Goals and Plan of Care (Insert Text)  1 WEEK GOALS :  (1.)Ms. Terri Wright will move from supine to sit and sit to supine with STAND BY ASSIST within 7 day(s). (2.)Ms. Terri Wright will transfer from bed to chair and chair to bed with STAND BY ASSIST using the least restrictive device within 7 day(s). (3.)Ms. Terri Wright will ambulate with STAND BY ASSIST for  feet with the least restrictive device within 7 day(s). 4) Pt independent with UE & LE AROM HEP with written guidelines. ________________________________________________________________________________________________      PHYSICAL THERAPY: INITIAL ASSESSMENT, TREATMENT DAY: DAY OF ASSESSMENT, AM 10/16/2017  INPATIENT: Hospital Day: 6  [place] / Product Type: Medicare /      NAME/AGE/GENDER: Elin Caballero is a 69 y.o. female  PRIMARY DIAGNOSIS: Acute renal failure (ARF) ([facility] [address] Pancytopenia ([facility] [address] Pancytopenia ([facility] [address]        ICD-10: Treatment Diagnosis:       · Generalized Muscle Weakness (M62.81)  · Other lack of cordination (R27.8)  · Difficulty in walking, Not elsewhere classified (R26.2)  · Other abnormalities of gait and mobility (R26.89)   Precaution/Allergies:  Prednisone       ASSESSMENT:      Ms. Terri Wright [facility] severe general weakness & impaired functional mobility. This pt will benefit from follow up therapy to help restore her to a functional level prior to returning home. She will likely need assist at home on discharge, if none available, pt may need an in-pt rehab stay to continue her work toward independence      This section established at most recent assessment   PROBLEM LIST (Impairments causing functional limitations):  1. Decreased Strength  2. Decreased ADL/Functional Activities  3. Decreased Transfer Abilities  4. Decreased Ambulation Ability/Technique  5. Decreased Balance  6. Decreased Activity Tolerance  7.  Increased Shortness of Breath  8. Decreased Flexibility/Joint Mobility  9. Decreased [place] with Home Exercise Program    INTERVENTIONS PLANNED: (Benefits and precautions of physical therapy have been discussed with the patient.)  1. Balance Exercise  2. Bed Mobility  3. Gait Training  4. Therapeutic Activites  5. Therapeutic Exercise/Strengthening  6. Transfer Training      TREATMENT PLAN: Frequency/Duration: daily for duration of hospital stay  Rehabilitation Potential For Stated Goals: GOOD      RECOMMENDED REHABILITATION/EQUIPMENT: (at time of discharge pending progress): Due to the probability of continued deficits (see above) this patient will likely need continued skilled physical therapy after discharge. Equipment:   · to be deterrmined                   HISTORY:   History of Present Injury/Illness (Reason for Referral):  Patient 23S with pmhx of breast CA s/p RTX completed in Feb 2017, currently on oral chemo (femara and ibrance) s/p right breast reconstruction with flap and left breast mastectomy on 9/18 [facility] for hypotension/ fatigue/ malaise. Was seen in Dr Dean Oates office today due to these complaints along with concern for drainage from flap site. Dr Pavel Florian reportedly okay with appearance of op site but was concerned regarding SBP in 60's at office. Brought to ED via EMS. ER workup notable for pancytopenia, Acute renal failure, hypotension (sbp in 80's). Hospitalist called for admission. Patient states that she has been feeling poorly 2 days ago with temp of 101 and profuse diarrhea x 2 days. Diarrhea has since resolved but she still feels light-headed and fatigued. Denies nausea/vomiting, hematochezia, chest pain or pressure. No sick contacts. Past Medical History/Comorbidities:   Ms. Gavin Oro  has a past medical history of Abnormal CT of the chest; Adverse effect of anesthesia; Asthma ( ); Breast cancer ([facility] [address] (2016); Depression ( ); Hypercholesteremia; Hypothyroidism; and Ill-defined condition.  She also has no past medical history of Difficult intubation; Malignant hyperthermia due to anesthesia; Nausea & vomiting; Other ill-defined conditions(799.89); or Pseudocholinesterase deficiency. Ms. Polina Ambrocio  has a past surgical history that includes bladder repair; rectocele repair; cystocele repair; breast biopsy (Right, 2/5/2016); chest surgery procedure unlisted; vascular access; breast reconstruction (Bilateral, 9/14/2016); breast reconstruction (Bilateral, 9/14/2016); mastectomy (Right); hysterectomy; colonoscopy; lap cholecystectomy (2000); breast reconstruction (Right, 9/18/2017); breast reduction (Left, 9/18/2017); and breast reconstruction (Right, 9/18/2017). Social History/Living Environment:   Home Environment: Private residence  # Steps to Enter: 3  Rails to Enter: No  One/Two Story Residence: One story  Living Alone: No (but alone during the day)  Support Systems: Spouse/Significant Other/Partner  Patient Expects to be Discharged to[de-identified] Private residence  Current DME Used/Available at Home:  (none)  Prior Level of Function/Work/Activity:  Pt was independent without an assistive device prior tot his admission  Personal Factors: Other factors that influence how disability is experienced by the patient:  Current & PMH   Number of Personal Factors/Comorbidities that affect the Plan of Care: 3+: HIGH COMPLEXITY   EXAMINATION:   Most Recent Physical Functioning:   Gross Assessment:  AROM: Generally decreased, functional (all limbs & core)  Strength: Generally decreased, functional (all limbs & core)  Coordination: Generally decreased, functional (all limbs & core)               Posture:     Balance:  Sitting: Intact; Without support  Standing: Impaired; With support (walker) Bed Mobility:  Supine to Sit: Minimum assistance  Sit to Supine:  (NT)  Scooting: Minimum assistance  Wheelchair Mobility:     Transfers:  Sit to Stand: Minimum assistance  Stand to Sit: Minimum assistance  Bed to Chair: Minimum assistance (with walker)  Gait:     Speed/Benita: Shuffled; Slow  Step Length: Left shortened;Right shortened  Gait Abnormalities: Decreased step clearance;Shuffling gait;Trunk sway increased  Distance (ft): 10 Feet (ft)  Assistive Device: Walker, rolling  Ambulation - Level of Assistance: Minimal assistance   Functional Mobility:         Gait/Ambulation:  min        Transfers:  min        Bed Mobility:  min   Body Structures Involved:  1. Lungs  2. Metabolic  3. Muscles Body Functions Affected:  1. Movement Related  2. Metobolic/Endocrine Activities and Participation Affected:  1. General Tasks and Demands  2. Mobility   Number of elements that affect the Plan of Care: 4+: HIGH COMPLEXITY   CLINICAL PRESENTATION:   Presentation: Evolving clinical presentation with changing clinical characteristics: MODERATE COMPLEXITY   CLINICAL DECISION MAKIN [facility] Inpatient Short Form  How much difficulty does the patient currently have. .. Unable A Lot A Little None   1. Turning over in bed (including adjusting bedclothes, sheets and blankets)? [ ] 1   [ ] 2   [X] 3   [ ] 4   2. Sitting down on and standing up from a chair with arms ( e.g., wheelchair, bedside commode, etc.)   [ ] 1   [ ] 2   [X] 3   [ ] 4   3. Moving from lying on back to sitting on the side of the bed? [ ] 1   [ ] 2   [X] 3   [ ] 4   How much help from another person does the patient currently need. .. Total A Lot A Little None   4. Moving to and from a bed to a chair (including a wheelchair)? [ ] 1   [ ] 2   [X] 3   [ ] 4   5. Need to walk in hospital room? [ ] 1   [ ] 2   [X] 3   [ ] 4   6. Climbing 3-5 steps with a railing? [X] 1   [ ] 2   [ ] 3   [ ] 4   © , Trustees of [address] [place] [address] [address] [address], under license to [employer].  All rights reserved    Score:  Initial: 16 Most Recent: X (Date: -- )     Interpretation of Tool:  Re[facility] activities that are increasingly more difficult (i.e. Bed mobility, Transfers, Gait).       Score 24 23 22-20 19-15 14-10 9-7 6       Modifier CH CI CJ CK CL CM CN         · Mobility - Walking and Moving Around:               - CURRENT STATUS:    CK - 40%-59% impaired, limited or restricted               - GOAL STATUS:           CJ - 20%-39% impaired, limited or restricted               - D/C STATUS:                       ---------------To be determined---------------  [place] / [place] / Product Type: Medicare /       Medical Necessity:     · Patient is expected to demonstrate progress in strength, range of motion, balance, coordination and functional technique to decrease assistance required with bed mobility, transfers & gait. Reason for Services/Other Comments:  · Patient continues to require skilled intervention due to pt not safe with functional mobility. Use of outcome tool(s) and clinical judgement create a POC that gives a: Questionable prediction of patient's progress: MODERATE COMPLEXITY                 TREATMENT:   (In addition to Assessment/Re-Assessment sessions the following treatments were rendered)   Pre-treatment Symptoms/Complaints:  Feeling weak  Pain: Initial: visual scale  Pain Intensity 1: 0  Post Session:  0/10      Assessment/Reassessment only, no treatment provided today     Braces/Orthotics/Lines/Etc:   · IV  · sage catheter  · icu monitors  · O2 Device: Room air  Treatment/Session Assessment:    · Response to Treatment:  Tolerated well  · Interdisciplinary Collaboration:  · Registered Nurse  · Rehabilitation Attendant  · After treatment position/precautions:  · Up in chair  · Bed/Chair-wheels locked  · Call light within reach  · RN notified  · Compliance with Program/Exercises: Will assess as treatment progresses. · Recommendations/Intent for next treatment session: \"Next visit will focus on reduction in assistance provided\".   Total Treatment Duration:  PT Patient Time In/Time Out  Time In: 0820  Time Out: 408 [facility], PT

## 2017-10-16 NOTE — WOUND CARE
Right back wound dressing change and wound vac reapplied. Noted wound vac with leak over night and wet to dry applied this am secondary to being unable to seal leak. Wound is 3.5x4x1 with tunnel of 5cm 7-10 o'clock, 15cm from 2-4 o'clock, there is slough visible in base this slough is increased from last assessment, concern this wound may need surgical wash out. Will continue to monitor.

## 2017-10-16 NOTE — PROGRESS NOTES
Problem: Nutrition Deficit  Goal: *Optimize nutritional status  Nutrition  Reason for assessment:  Length of stay day 5. Assessment:   Diet order(s): Regular  Food/Nutrition Patient History:  Pt presented to ED with hypotension, fatigue / malaise and diarrhea X 2 days prior to admission. Past medical history notable for breast cancer s/p RXT completed in Feb. 2017, currently on oral chemo. Pt s/p right breast reconstruction with flap and left breast mastectomy 9/18. Pt endorses good po intake prior to admission; usually eats 2 meals per day; breakfast and supper and has a snack for lunch; prepares own meals and goes out to eat. No nutrition risk factors identified on nursing admission malnutrition screening tool. Pt states she had diarrhea today and has been eating \"light\". Pt has wound vac to right back. Anthropometrics:Height: 5' 2\" (157.5 cm),  Weight: 66.3 kg (146 lb 2.6 oz), Weight Source: Bed, Body mass index is 26.73 kg/(m^2). BMI class of normal range for Older American Women. Macronutrient needs:  EER:  1593-9839 kcal /day (25-30 kcal/kg BW)  EPR:  60-75 grams protein/day (1.2-1.5 grams/kg IBW)  Intake/Comparative Standards: 5 recorded intake with average intake of 45%. Pt potentially meets ~ 50% estimated kcal and protein needs. needs     Nutrition Diagnosis: Inadequate oral intake r/t decreased ability to consume sufficient oral intake as evidenced by estimates of insufficient intake of energy or high quality protein from diet when compared to requirements. Intervention:  Meals and snacks: Continue current diet. Nutrition Supplement Therapy: Patient declines at this time. Discharge nutrition plan: Too soon to determine. Coordination of Nutrition Care:  Interdisciplinary Rounds.      Kandace Whitt, [address] [place] [address], [facility] [place] [address], MPH  [phone]

## 2017-10-16 NOTE — PROGRESS NOTES
Pt is being more cooperative and nicer this morning. She took her 4 a.m. Breathing treatment without difficulty and is beginning to sound much better. Wheezing seems to have subsided.

## 2017-10-16 NOTE — PROGRESS NOTES
Pt. Up in chair. Stands with assistance. Having frequent formed bowel movements. Contacted wound care concerning the dressing leaking on night shift.

## 2017-10-16 NOTE — PROGRESS NOTES
[facility] does not have a bed. Pt receives a bed offer to [facility] which pt accepts. Tete aware pt needs a wound vac. Per [facility] pt was approved for wound vac but [facility] will actually deliver vac. RANCHO received a call from [employer] with [address]  ( [phone] ) who states he will deliver vac by noon on Wed. RANCHO spoke with pt who request referral to [facility], [facility] and [facility]. Pt prefers a pvt room. SW made referral via CC. Will await bed offer. SW called [facility] who state referral is still being processed. RANCHO spoke with spouse ( pt asleep) to discuss STR. Spouse agrees pt is very weak and will benefit from STR. Per spouse pt will not be on her oral chemo tx till she recovers from this admission & is stronger. Spouse states pt would be interested in the [facility] for rehab but prefers that I speak with pt on Tues. RANCHO called I to f/u on wound vac order from Friday. Per [facility] they can not provide wound vac due to pt's Medicare coverage and her zip code. Per [facility] we need to fax referral to [facility] ( [phone]  Fax is [phone] ). RANCHO spoke with wound care RN Barrington Solomon who confirmed they do use this company. RANCHO faxed referral to [facility].

## 2017-10-16 NOTE — PROGRESS NOTES
Pt refused aerosol Tx at this time, stating \"Not today\".  at bedside. Pt audibly wheezing. RN notified.

## 2017-10-16 NOTE — PROGRESS NOTES
Bedside report received from [facility]. Wound assessed as well as the function of the wound vac and it is draining approriatly at this time.

## 2017-10-16 NOTE — PROGRESS NOTES
Meds given to pt. She is refusing her Femara stating the oncologist told her to not take this med anymore. She requested xanax 1mg but informed her the order was . 5 mg x3 day. Pt states \"that is not what I take\"  I explained this is what I have and asked if she would like that. Pt took it and stated she has her own with her.   I did ask that she not take any meds without our distributing them to her

## 2017-10-16 NOTE — PROGRESS NOTES
Progress Note    Patient: Som Romero MRN: [id number]  SSN: [id number]    YOB: 1950  Age: 69 y.o. Sex: female      Admit Date: 10/11/2017    LOS: 5 days     Subjective:     66F with pmhx of breast CA s/p RTX completed in Feb 2017, currently on oral chemo (femara and ibrance) s/p right breast reconstruction with flap and left breast mastectomy on 9/18 [facility] for hypotension/ fatigue/ malaise along with 2 days of profuse diarrhea.      Was seen in Dr Barrington Zuleta office day of admit due to these complaints along with concern for drainage from flap site. SBP in office in 60's. Brought to ED via EMS. ER workup notable for pancytopenia, Acute renal failure, hypotension (sbp in 80's). Hospitalist called for admission. Started on broad spectrum IV atbx/IVf. BP improved. Oral chemo held. Oncology consulted for pancytopenia. Plastic recommended wound vac placed 10/12.      10-16: seen at bedside  Able to get from bed to chair with help of PT. \"feeling better. \"  No chest pain, abdominal pain, nausea or vomiting, fever, chills. Redness around right breast and upper chest slightly better.          Review of Systems negative with exception of pertinent positives noted above    Objective:     Vitals:    10/16/17 0344 10/16/17 0404 10/16/17 0504 10/16/17 0604   BP:  133/61 138/61 144/62   Pulse: 95 (!) 101 100 100   Resp:  28 29 28   Temp:       SpO2: 94% 94% 94% 95%   Weight:       Height:            Intake and Output:  Current Shift:    Last three shifts: 10/14 1901 - 10/16 0700  In: 600 [P.O.:250;  I.V.:350]  Out: 6040 [Urine:5950; Drains:90]    Physical Exam:   General appearance: NAD, conversant  Neck: Trachea midline   Lungs: CTA, with normal respiratory effort and no intercostal retractions  CV: S1,S2 present, no added murmurs  Abdomen: Soft, non-tender; no masses   Extremities: No peripheral edema or extremity lymphadenopathy  Skin: Normal temperature, turgor and texture; wound vac in place, erythema of right upper anterior chest and breast.  Psych: Appropriate affect, alert and oriented to person, place and time  CNS: GCS 15, no motor or sensory deficits, CN 2-12 intact    Lab/Data Review: All lab results for the last 24 hours reviewed. Assessment:     Principal Problem:    Pancytopenia ([address] (10/11/2017)    Active Problems:    Acquired hypothyroidism (11/4/2015)      Hypokalemia (4/7/2017)      Breast cancer ([facility] [address] (9/18/2017)      Acute renal failure (ARF) ([facility] [address] (10/11/2017)      Hypomagnesemia (10/11/2017)      Diarrhea (10/11/2017)      Sepsis ([facility] [address] (10/11/2017)        Plan:     A/p  # New systolic congestive HF; compensated with EF 45-50%  #Sepsis of unkown origin in immunocompromised host  #Asthma exacerbation  #Hx of Breast CA s/p reconstruction, on oral chemo hormone therapy  #ARF  #Hypothyroidism    Pancytopenia improving  -continue with broad spectrum abx for a total dose of 10 days  -asthma exacerbation has resolved  -Onc following for pancytopenia, signed off  lbrance not reordered on admit. Continue with letrozole. s/p 2 unit prbc on 10/11 with Hb 10.9  -ARF resolved, creatinine 0.76 (baseline)  - continue levothyroxine.  - in view of systolic CHF, started on IV lasix in view of CT chest finding of bilateral effusions with suspicion of pulmonary edema. Will f/u with Cardiology for further recommendations. Tachycardia slightly better with metoprolol 25 mg BID. High risk with opioids on board.       DVT Prophylaxis: scds     Dispo PT/OT follow anticipate SNF palcement, CM aware        Signed By: Mackenzie Mccall MD     October 16, 2017

## 2017-10-16 NOTE — PROGRESS NOTES
Walked into pt room to draw blood and immediately was chewed out by the patient for disturbing her. \"One goes out another comes in\"  I immediately reminded her she in in ICU for care. I would be in here every hr checking on her and giving meds. I explained to her resp will come to give her treatments and she could refuse all treatment and we could move her back to the floor. I was in need of a vanco level and was going to draw blood. I informed her she has meds timed for varies time during the night and I would try to not bother her but she is in the ICU for a reason.  Pt got quiet and said nothing else

## 2017-10-16 NOTE — PROGRESS NOTES
[facility] CARDIOLOGY PROGRESS NOTE           10/16/2017 7:51 PM    Admit Date: 10/11/2017      Subjective: In SR. Has no complaints. ROS:  Cardiovascular:  As noted above    Objective:      Vitals:    10/16/17 1525 10/16/17 1604 10/16/17 1704 10/16/17 1804   BP: 128/70 125/59 119/62 126/63   Pulse: (!) 102 96 100 96   Resp: (!) 31 15 (!) 34 25   Temp:  98.4 °F (36.9 °C)     SpO2: 95% 94% 95% 93%   Weight:       Height:           Physical Exam:  General-No Acute Distress  Neck- supple, no JVD  CV- regular rate and rhythm no MRG  Lung- clear bilaterally  Abd- soft, nontender, nondistended  Ext- no edema bilaterally. Skin- warm and dry    Data Review:   Recent Labs      10/16/17   0454  10/15/17   1405   NA  132*  138   K  4.2  3.3*   MG   --   1.5*   BUN  13  16   CREA  0.76  0.97   GLU  89  106*   WBC  9.3  7.9   HGB  10.9*  10.5*   HCT  33.3*  31.2*   PLT  91*  80*       Assessment/Plan:     Principal Problem:    Pancytopenia ([facility] [address] (10/11/2017)      Active Problems:    Acquired hypothyroidism (11/4/2015)      Hypokalemia (4/7/2017)      Breast cancer ([facility] [address] (9/18/2017)      Acute renal failure (ARF) ([facility] [address] (10/11/2017)      Hypomagnesemia (10/11/2017)      Diarrhea (10/11/2017)      Sepsis ([facility] [address] (10/11/2017)    Currently in SR. Appears run of atach with no significant sx. Noted K at 3.3/Mg at 1.5 yesterday. Maintain K>4, Mg >2. Echo with mild LV dysfunction but done with HR ~170. Plan limited echo as outpt to reassess EF in SR. If persistent LV dysfunction, will need meds addressed. Continue lopressor.       Adilene Laguna MD  10/16/2017 7:51 PM

## 2017-10-16 NOTE — PROGRESS NOTES
Wound care given-wound vac sponge moistened with normal saline. Pulled out gently. Wound bed red with some slough tissue. Moist 4 x 4 gauze packed into the wound, covered with ABD pad and tape. Pt. Guru. Procedure well.

## 2017-10-16 NOTE — PROGRESS NOTES
Wound vac has not worked this shift. Air leak detected. Supervisor has reinforced dsg still detecting leak . machine turned off due to agitation  Of pt and . Pt wheezing audibly but refuses to allow me to call for her  treatment

## 2017-10-17 LAB
ANION GAP SERPL CALC-SCNC: 3 MMOL/L (ref 7–16)
BACTERIA SPEC CULT: NORMAL
BACTERIA SPEC CULT: NORMAL
BUN SERPL-MCNC: 11 MG/DL (ref 8–23)
CALCIUM SERPL-MCNC: 7.7 MG/DL (ref 8.3–10.4)
CHLORIDE SERPL-SCNC: 98 MMOL/L (ref 98–107)
CO2 SERPL-SCNC: 33 MMOL/L (ref 21–32)
CREAT SERPL-MCNC: 0.84 MG/DL (ref 0.6–1)
ERYTHROCYTE [DISTWIDTH] IN BLOOD BY AUTOMATED COUNT: 18.3 % (ref 11.9–14.6)
GLUCOSE SERPL-MCNC: 120 MG/DL (ref 65–100)
HCT VFR BLD AUTO: 34.3 % (ref 35.8–46.3)
HGB BLD-MCNC: 11.2 G/DL (ref 11.7–15.4)
MAGNESIUM SERPL-MCNC: 1.3 MG/DL (ref 1.8–2.4)
MCH RBC QN AUTO: 31.1 PG (ref 26.1–32.9)
MCHC RBC AUTO-ENTMCNC: 32.7 G/DL (ref 31.4–35)
MCV RBC AUTO: 95.3 FL (ref 79.6–97.8)
PLATELET # BLD AUTO: 115 K/UL (ref 150–450)
PMV BLD AUTO: 11.6 FL (ref 10.8–14.1)
POTASSIUM SERPL-SCNC: 3.4 MMOL/L (ref 3.5–5.1)
RBC # BLD AUTO: 3.6 M/UL (ref 4.05–5.25)
SERVICE CMNT-IMP: NORMAL
SERVICE CMNT-IMP: NORMAL
SODIUM SERPL-SCNC: 134 MMOL/L (ref 136–145)
WBC # BLD AUTO: 10 K/UL (ref 4.3–11.1)

## 2017-10-17 PROCEDURE — 83735 ASSAY OF MAGNESIUM: CPT | Performed by: INTERNAL MEDICINE

## 2017-10-17 PROCEDURE — 74011250637 HC RX REV CODE- 250/637: Performed by: PHYSICIAN ASSISTANT

## 2017-10-17 PROCEDURE — 74011250636 HC RX REV CODE- 250/636: Performed by: HOSPITALIST

## 2017-10-17 PROCEDURE — 74750000023 HC WOUND THERAPY

## 2017-10-17 PROCEDURE — 74011250636 HC RX REV CODE- 250/636: Performed by: INTERNAL MEDICINE

## 2017-10-17 PROCEDURE — 74011250637 HC RX REV CODE- 250/637: Performed by: INTERNAL MEDICINE

## 2017-10-17 PROCEDURE — 74011000258 HC RX REV CODE- 258: Performed by: INTERNAL MEDICINE

## 2017-10-17 PROCEDURE — 74011000258 HC RX REV CODE- 258: Performed by: HOSPITALIST

## 2017-10-17 PROCEDURE — 94760 N-INVAS EAR/PLS OXIMETRY 1: CPT

## 2017-10-17 PROCEDURE — 74011000250 HC RX REV CODE- 250: Performed by: INTERNAL MEDICINE

## 2017-10-17 PROCEDURE — 74011000250 HC RX REV CODE- 250

## 2017-10-17 PROCEDURE — 94640 AIRWAY INHALATION TREATMENT: CPT

## 2017-10-17 PROCEDURE — 74011250637 HC RX REV CODE- 250/637: Performed by: HOSPITALIST

## 2017-10-17 PROCEDURE — 85027 COMPLETE CBC AUTOMATED: CPT | Performed by: HOSPITALIST

## 2017-10-17 PROCEDURE — 80048 BASIC METABOLIC PNL TOTAL CA: CPT | Performed by: HOSPITALIST

## 2017-10-17 PROCEDURE — 65610000001 HC ROOM ICU GENERAL

## 2017-10-17 RX ORDER — LANOLIN ALCOHOL/MO/W.PET/CERES
400 CREAM (GRAM) TOPICAL 2 TIMES DAILY
Status: DISCONTINUED | OUTPATIENT
Start: 2017-10-17 | End: 2017-10-25 | Stop reason: HOSPADM

## 2017-10-17 RX ORDER — METOPROLOL TARTRATE 5 MG/5ML
2.5 INJECTION INTRAVENOUS
Status: DISCONTINUED | OUTPATIENT
Start: 2017-10-17 | End: 2017-10-25 | Stop reason: HOSPADM

## 2017-10-17 RX ORDER — MAGNESIUM SULFATE HEPTAHYDRATE 40 MG/ML
2 INJECTION, SOLUTION INTRAVENOUS ONCE
Status: COMPLETED | OUTPATIENT
Start: 2017-10-17 | End: 2017-10-19

## 2017-10-17 RX ORDER — CLINDAMYCIN HYDROCHLORIDE 150 MG/1
600 CAPSULE ORAL EVERY 8 HOURS
Status: DISCONTINUED | OUTPATIENT
Start: 2017-10-17 | End: 2017-10-19

## 2017-10-17 RX ORDER — ALBUTEROL SULFATE 0.83 MG/ML
2.5 SOLUTION RESPIRATORY (INHALATION)
Status: DISCONTINUED | OUTPATIENT
Start: 2017-10-17 | End: 2017-10-17

## 2017-10-17 RX ORDER — SAME BUTANEDISULFONATE/BETAINE 400-600 MG
500 POWDER IN PACKET (EA) ORAL 2 TIMES DAILY
Status: DISCONTINUED | OUTPATIENT
Start: 2017-10-17 | End: 2017-10-25 | Stop reason: HOSPADM

## 2017-10-17 RX ORDER — METOPROLOL TARTRATE 50 MG/1
50 TABLET ORAL 2 TIMES DAILY
Status: DISCONTINUED | OUTPATIENT
Start: 2017-10-17 | End: 2017-10-25 | Stop reason: HOSPADM

## 2017-10-17 RX ORDER — ALBUTEROL SULFATE 0.83 MG/ML
2.5 SOLUTION RESPIRATORY (INHALATION)
Status: DISCONTINUED | OUTPATIENT
Start: 2017-10-17 | End: 2017-10-25 | Stop reason: HOSPADM

## 2017-10-17 RX ORDER — METOPROLOL TARTRATE 5 MG/5ML
INJECTION INTRAVENOUS
Status: COMPLETED
Start: 2017-10-17 | End: 2017-10-17

## 2017-10-17 RX ORDER — FUROSEMIDE 20 MG/1
20 TABLET ORAL DAILY
Status: DISCONTINUED | OUTPATIENT
Start: 2017-10-17 | End: 2017-10-25 | Stop reason: HOSPADM

## 2017-10-17 RX ORDER — METOPROLOL TARTRATE 25 MG/1
25 TABLET, FILM COATED ORAL ONCE
Status: COMPLETED | OUTPATIENT
Start: 2017-10-17 | End: 2017-10-17

## 2017-10-17 RX ORDER — LEVOTHYROXINE SODIUM 100 UG/1
100 TABLET ORAL
Status: DISCONTINUED | OUTPATIENT
Start: 2017-10-18 | End: 2017-10-25 | Stop reason: HOSPADM

## 2017-10-17 RX ORDER — METOPROLOL TARTRATE 5 MG/5ML
INJECTION INTRAVENOUS
Status: ACTIVE
Start: 2017-10-17 | End: 2017-10-17

## 2017-10-17 RX ORDER — METOPROLOL TARTRATE 5 MG/5ML
5 INJECTION INTRAVENOUS
Status: COMPLETED | OUTPATIENT
Start: 2017-10-17 | End: 2017-10-17

## 2017-10-17 RX ADMIN — LEVOTHYROXINE SODIUM 75 MCG: 75 TABLET ORAL at 09:03

## 2017-10-17 RX ADMIN — METOPROLOL TARTRATE 2.5 MG: 5 INJECTION INTRAVENOUS at 19:20

## 2017-10-17 RX ADMIN — CLINDAMYCIN HYDROCHLORIDE 600 MG: 150 CAPSULE ORAL at 18:01

## 2017-10-17 RX ADMIN — METOPROLOL TARTRATE 25 MG: 25 TABLET ORAL at 11:48

## 2017-10-17 RX ADMIN — MAGNESIUM SULFATE IN WATER 2 G: 40 INJECTION, SOLUTION INTRAVENOUS at 06:15

## 2017-10-17 RX ADMIN — FUROSEMIDE 20 MG: 20 TABLET ORAL at 09:03

## 2017-10-17 RX ADMIN — METOPROLOL TARTRATE 25 MG: 25 TABLET ORAL at 09:04

## 2017-10-17 RX ADMIN — CEFEPIME HYDROCHLORIDE 2 G: 2 INJECTION, POWDER, FOR SOLUTION INTRAVENOUS at 11:48

## 2017-10-17 RX ADMIN — METOPROLOL TARTRATE 50 MG: 50 TABLET ORAL at 20:30

## 2017-10-17 RX ADMIN — METOPROLOL TARTRATE 5 MG: 5 INJECTION INTRAVENOUS at 05:15

## 2017-10-17 RX ADMIN — CEFEPIME HYDROCHLORIDE 2 G: 2 INJECTION, POWDER, FOR SOLUTION INTRAVENOUS at 18:00

## 2017-10-17 RX ADMIN — ALBUTEROL SULFATE 2.5 MG: 2.5 SOLUTION RESPIRATORY (INHALATION) at 08:30

## 2017-10-17 RX ADMIN — CLINDAMYCIN HYDROCHLORIDE 600 MG: 150 CAPSULE ORAL at 09:04

## 2017-10-17 RX ADMIN — MAGNESIUM SULFATE HEPTAHYDRATE 3 G: 500 INJECTION, SOLUTION INTRAMUSCULAR; INTRAVENOUS at 09:49

## 2017-10-17 RX ADMIN — Medication 400 MG: at 09:03

## 2017-10-17 RX ADMIN — Medication 10 ML: at 21:24

## 2017-10-17 RX ADMIN — Medication 500 MG: at 18:01

## 2017-10-17 RX ADMIN — HYDROCODONE BITARTRATE AND ACETAMINOPHEN 1 TABLET: 5; 325 TABLET ORAL at 21:24

## 2017-10-17 RX ADMIN — Medication 500 MG: at 09:24

## 2017-10-17 RX ADMIN — Medication 10 ML: at 09:48

## 2017-10-17 RX ADMIN — Medication 10 ML: at 06:00

## 2017-10-17 RX ADMIN — Medication 400 MG: at 18:01

## 2017-10-17 RX ADMIN — CEFEPIME HYDROCHLORIDE 2 G: 2 INJECTION, POWDER, FOR SOLUTION INTRAVENOUS at 01:54

## 2017-10-17 RX ADMIN — DULOXETINE HYDROCHLORIDE 60 MG: 60 CAPSULE, DELAYED RELEASE ORAL at 21:24

## 2017-10-17 RX ADMIN — Medication 5 ML: at 14:00

## 2017-10-17 NOTE — PROGRESS NOTES
Progress Note    Patient: Mo Talley MRN: [id number]  SSN: [id number]    YOB: 1950  Age: 69 y.o. Sex: female      Admit Date: 10/11/2017    LOS: 6 days     Subjective:     66F with pmhx of breast CA s/p RTX completed in Feb 2017, currently on oral chemo (femara and ibrance) s/p right breast reconstruction with flap and left breast mastectomy on 9/18 [facility] for hypotension/ fatigue/ malaise along with 2 days of profuse diarrhea.      Was seen in Dr George Ponce office day of admit due to these complaints along with concern for drainage from flap site. SBP in office in 60's. Brought to ED via EMS. ER workup notable for pancytopenia, Acute renal failure, hypotension (sbp in 80's). Hospitalist called for admission. Started on broad spectrum IV atbx/IVf. BP improved. Oral chemo held. Oncology consulted for pancytopenia. Plastic recommended wound vac placed 10/12.      10-17:  Feeling better. No SOB, palpitations, fever, pain, nausea or vomiting. Pt's  present at bedside. Discussed about plan of STR on discharge.   Both pt and her  are in agreement with this.          Review of Systems negative with exception of pertinent positives noted above    Objective:     Vitals:    10/17/17 0304 10/17/17 0400 10/17/17 0404 10/17/17 0515   BP: 131/61  132/64 137/73   Pulse: 95  90 (!) 150   Resp: 23  8    Temp:  99.4 °F (37.4 °C)     SpO2: 92%  94%    Weight:       Height:            Intake and Output:  Current Shift:    Last three shifts: 10/15 1901 - 10/17 0700  In: 1660 [P.O.:460; I.V.:1200]  Out: 9400 [Urine:9350; Drains:50]    Physical Exam:   General appearance: NAD, conversant  Neck: Trachea midline   Lungs: CTA, with normal respiratory effort and no intercostal retractions  CV: S1,S2 present, no added murmurs  Abdomen: Soft, non-tender; no masses   Extremities: No peripheral edema or extremity lymphadenopathy  Skin: Normal temperature, turgor and texture; wound vac in place, erythema of right upper anterior chest and breast.  Psych: Appropriate affect, alert and oriented to person, place and time  CNS: GCS 15, no motor or sensory deficits, CN 2-12 intact    Lab/Data Review: All lab results for the last 24 hours reviewed. Assessment:     Principal Problem:    Pancytopenia ([address] (10/11/2017)    Active Problems:    Acquired hypothyroidism (11/4/2015)      Hypokalemia (4/7/2017)      Breast cancer ([facility] (9/18/2017)      Acute renal failure (ARF) ([facility] (10/11/2017)      Hypomagnesemia (10/11/2017)      Diarrhea (10/11/2017)      Sepsis ([facility] (10/11/2017)        Plan:     A/p  # New systolic congestive HF; compensated with EF 45-50%  #Sepsis of unkown origin in immunocompromised host  #Asthma exacerbation  #Hx of Breast CA s/p reconstruction, on oral chemo hormone therapy  #ARF  #Hypothyroidism    - Magnesium replaced, will recheck in AM.   - Vancomycin switched to Clindamycin, continue Cefepime for now. Will complete total of 10 days. -Asthma exacerbation has resolved  -Onc following for pancytopenia, signed off  lbrance not reordered on admit. Continue with letrozole. s/p 2 unit prbc on 10/11 with Hb 10.9. WBC improved from 2.0 to 9.3  -ARF resolved, creatinine 0.76 (baseline)  - Continue levothyroxine, increased dose from 75 to 100 mcg in view of elevated TSH.  - In view of systolic CHF, continue oral lasix in view of CT chest finding of bilateral effusions with suspicion of pulmonary edema.   - HR is still fluctuating, suboptimally controlled. Continue with metoprolol for now. Could also be a side effect of albuterol nebs. - Wound care on board, wound vac in place. 100 cc purulent fluid noted. High risk with opioids on board.       DVT Prophylaxis: scds     Dispo PT/OT follow anticipate SNF palcement, CM aware        Signed By: Serg Simon MD     October 17, 2017

## 2017-10-17 NOTE — PROGRESS NOTES
5mg iv lopressor given slow push and at 5:22 am pt converted back to normal sinus rhythm. Pt was never symptomatic.  /75 HR 81.   Continuing to monitor

## 2017-10-17 NOTE — PROGRESS NOTES
Pt went from HR of 90's to 158 and appeared as atrial tach. Checked bp and was 120/75. Pt showing no symptoms of distress. Dr. Eder Griffith paged for critical mag and this rhythm. Awaiting return call.

## 2017-10-17 NOTE — PROGRESS NOTES
Attempted physical therapy this am.  Pt. Concerned about HR and did not want to get up now. Will check back if time allows.

## 2017-10-17 NOTE — PROGRESS NOTES
[facility] CARDIOLOGY PROGRESS NOTE           10/17/2017 11:14 AM    Admit Date: 10/11/2017         Subjective: Patient denies any chest pain, dyspnea or palpitations. She was unaware of recurrent atrial tachycardia this morning.      ROS:  Cardiovascular:  As noted above    Objective:      Vitals:    10/17/17 0830 10/17/17 0904 10/17/17 1004 10/17/17 1104   BP:  121/58 128/72 123/57   Pulse:  (!) 106 92 90   Resp:  25 24 23   Temp:       SpO2: 97% 94% 93% 97%   Weight:       Height:           On telemetry: sinus rhythm, intermittent atrial tachycardia       Physical Exam:  General: Well Developed, Well Nourished, No Acute Distress, Alert & Oriented x 3, Appropriate mood  Neck: supple, no JVD  Heart: S1S2 with RRR  Lungs: Clear throughout auscultation bilaterally  Abd: soft, nontender, nondistended, with good bowel sounds  Ext: no edema bilaterally  Skin: warm and dry      Data Review:   Recent Labs      10/17/17   0425  10/16/17   0454  10/15/17   1405   NA   --   132*  138   K   --   4.2  3.3*   MG  1.3*   --   1.5*   BUN   --   13  16   CREA   --   0.76  0.97   GLU   --   89  106*   WBC   --   9.3  7.9   HGB   --   10.9*  10.5*   HCT   --   33.3*  31.2*   PLT   --   91*  80*       Assessment/Plan:     Principal Problem:    Pancytopenia (HCC) (10/11/2017)    WBC improved after palbociclib stopped    Active Problems:    Acquired hypothyroidism (11/4/2015)  On synthroid      Hypokalemia (4/7/2017)    resolved      Breast cancer ([address] (9/18/2017)  Completed chemo, radiation, now on endocrine therapy      Acute renal failure (ARF) ([address] (10/11/2017)    resolved      Hypomagnesemia (10/11/2017)  Replacing, monitor      Diarrhea (10/11/2017)    Per primary      Sepsis ([address] (10/11/2017)  Wound culture with MRSA, blood cultures and urine culture neg      Atrial tachycardia  Pt had brief recurrent atrial tachycardia this morning, she was asymptomatic, will increase Lopressor and monitor, continue to replace electrolytes as needed      Carley Pace PA-C  10/17/2017 11:14 AM    Patient seen and examined. Agree with above note. Metoprolol increased to 50 mg BID. Will continue medical therapy for now. She can be moved to regular room tomorrow. She has normal LV function and no history of CAD or cp, CHF. Prognosis is good.         Juanita Zhang MD

## 2017-10-18 ENCOUNTER — APPOINTMENT (OUTPATIENT)
Dept: GENERAL RADIOLOGY | Age: 67
DRG: 907 | End: 2017-10-18
Attending: INTERNAL MEDICINE
Payer: MEDICARE

## 2017-10-18 LAB
ANION GAP SERPL CALC-SCNC: 0 MMOL/L (ref 7–16)
BUN SERPL-MCNC: 17 MG/DL (ref 8–23)
CALCIUM SERPL-MCNC: 7.5 MG/DL (ref 8.3–10.4)
CHLORIDE SERPL-SCNC: 100 MMOL/L (ref 98–107)
CO2 SERPL-SCNC: 34 MMOL/L (ref 21–32)
CREAT SERPL-MCNC: 0.75 MG/DL (ref 0.6–1)
ERYTHROCYTE [DISTWIDTH] IN BLOOD BY AUTOMATED COUNT: 18.3 % (ref 11.9–14.6)
GLUCOSE SERPL-MCNC: 94 MG/DL (ref 65–100)
HCT VFR BLD AUTO: 33 % (ref 35.8–46.3)
HGB BLD-MCNC: 10.8 G/DL (ref 11.7–15.4)
MAGNESIUM SERPL-MCNC: 2.4 MG/DL (ref 1.8–2.4)
MCH RBC QN AUTO: 31.1 PG (ref 26.1–32.9)
MCHC RBC AUTO-ENTMCNC: 32.7 G/DL (ref 31.4–35)
MCV RBC AUTO: 95.1 FL (ref 79.6–97.8)
PLATELET # BLD AUTO: 105 K/UL (ref 150–450)
PMV BLD AUTO: 11.9 FL (ref 10.8–14.1)
POTASSIUM SERPL-SCNC: 3.6 MMOL/L (ref 3.5–5.1)
RBC # BLD AUTO: 3.47 M/UL (ref 4.05–5.25)
SODIUM SERPL-SCNC: 134 MMOL/L (ref 136–145)
WBC # BLD AUTO: 8.4 K/UL (ref 4.3–11.1)

## 2017-10-18 PROCEDURE — 77030032490 HC SLV COMPR SCD KNE COVD -B

## 2017-10-18 PROCEDURE — 83735 ASSAY OF MAGNESIUM: CPT | Performed by: HOSPITALIST

## 2017-10-18 PROCEDURE — 74011250637 HC RX REV CODE- 250/637: Performed by: INTERNAL MEDICINE

## 2017-10-18 PROCEDURE — 74750000023 HC WOUND THERAPY

## 2017-10-18 PROCEDURE — 97530 THERAPEUTIC ACTIVITIES: CPT

## 2017-10-18 PROCEDURE — 85027 COMPLETE CBC AUTOMATED: CPT | Performed by: HOSPITALIST

## 2017-10-18 PROCEDURE — 36592 COLLECT BLOOD FROM PICC: CPT

## 2017-10-18 PROCEDURE — 77030019934 HC DRSG VAC ASST KCON -B

## 2017-10-18 PROCEDURE — 74011250636 HC RX REV CODE- 250/636: Performed by: INTERNAL MEDICINE

## 2017-10-18 PROCEDURE — 74011000258 HC RX REV CODE- 258: Performed by: INTERNAL MEDICINE

## 2017-10-18 PROCEDURE — 80048 BASIC METABOLIC PNL TOTAL CA: CPT | Performed by: HOSPITALIST

## 2017-10-18 PROCEDURE — 65270000029 HC RM PRIVATE

## 2017-10-18 PROCEDURE — 74011250637 HC RX REV CODE- 250/637: Performed by: HOSPITALIST

## 2017-10-18 PROCEDURE — 71010 XR CHEST SNGL V: CPT

## 2017-10-18 PROCEDURE — 97605 NEG PRS WND THER DME<=50SQCM: CPT

## 2017-10-18 PROCEDURE — 74011250637 HC RX REV CODE- 250/637: Performed by: PHYSICIAN ASSISTANT

## 2017-10-18 RX ORDER — DILTIAZEM HYDROCHLORIDE 180 MG/1
180 CAPSULE, COATED, EXTENDED RELEASE ORAL DAILY
Status: DISCONTINUED | OUTPATIENT
Start: 2017-10-18 | End: 2017-10-25 | Stop reason: HOSPADM

## 2017-10-18 RX ADMIN — Medication 400 MG: at 17:40

## 2017-10-18 RX ADMIN — METOPROLOL TARTRATE 50 MG: 50 TABLET ORAL at 09:41

## 2017-10-18 RX ADMIN — ACETAMINOPHEN 650 MG: 325 TABLET, FILM COATED ORAL at 19:20

## 2017-10-18 RX ADMIN — Medication 500 MG: at 17:40

## 2017-10-18 RX ADMIN — CLINDAMYCIN HYDROCHLORIDE 600 MG: 150 CAPSULE ORAL at 17:41

## 2017-10-18 RX ADMIN — Medication 10 ML: at 12:36

## 2017-10-18 RX ADMIN — Medication 400 MG: at 09:42

## 2017-10-18 RX ADMIN — HYDROCODONE BITARTRATE AND ACETAMINOPHEN 1 TABLET: 5; 325 TABLET ORAL at 12:07

## 2017-10-18 RX ADMIN — CLINDAMYCIN HYDROCHLORIDE 600 MG: 150 CAPSULE ORAL at 10:02

## 2017-10-18 RX ADMIN — Medication 500 MG: at 09:41

## 2017-10-18 RX ADMIN — FUROSEMIDE 20 MG: 20 TABLET ORAL at 09:41

## 2017-10-18 RX ADMIN — CLINDAMYCIN HYDROCHLORIDE 600 MG: 150 CAPSULE ORAL at 01:22

## 2017-10-18 RX ADMIN — DULOXETINE HYDROCHLORIDE 60 MG: 60 CAPSULE, DELAYED RELEASE ORAL at 21:58

## 2017-10-18 RX ADMIN — Medication 10 ML: at 06:01

## 2017-10-18 RX ADMIN — CEFEPIME HYDROCHLORIDE 2 G: 2 INJECTION, POWDER, FOR SOLUTION INTRAVENOUS at 06:00

## 2017-10-18 RX ADMIN — Medication 10 ML: at 21:58

## 2017-10-18 RX ADMIN — HYDROCODONE BITARTRATE AND ACETAMINOPHEN 1 TABLET: 5; 325 TABLET ORAL at 04:09

## 2017-10-18 RX ADMIN — LEVOTHYROXINE SODIUM 100 MCG: 100 TABLET ORAL at 07:27

## 2017-10-18 RX ADMIN — DILTIAZEM HYDROCHLORIDE 180 MG: 180 CAPSULE, COATED, EXTENDED RELEASE ORAL at 12:36

## 2017-10-18 NOTE — PROGRESS NOTES
Purposeful hourly rounding completed for each hour pt was in my care. No acute signs of distress noted at any time.

## 2017-10-18 NOTE — INTERDISCIPLINARY ROUNDS
Interdisciplinary team rounds were held 10/18/2017 with the following team members:Nursing, Physician and Respiratory Therapy and the patient. Plan of care discussed. See clinical pathway and/or care plan for interventions and desired outcomes.

## 2017-10-18 NOTE — PROGRESS NOTES
Patients HR sustaining at 160. Minor associated CP. Denies SOB. Alert and appropriate. . Dr. Marguerite Pickard notified of current situation. 2.5 mg Lopressor IVP ordered. Will continue to monitor closely.

## 2017-10-18 NOTE — WOUND CARE
Patient seen for back wound VAC placement. Dressing removed last night and saline soak placed. It however dried enough that drainage was accumulating inside wound tracts and when gauze removed, large liquid yellow drainage came out. Wound base (visible portion) has very thin yellow layer with pink starting to show through. Able to pack tracts of wound with sponge without report of pain. 1 piece packed and 1 layer on top. Patient wanted to have tract pad over wound and not moved to side today. Home vac in room but patient report she will be going to rehab. Discussed with Trilby Cabot case management. Home VAC needs to be picked up by I. Answered all patient and  questions. Will follow up on Friday if she remains in acute care.

## 2017-10-18 NOTE — PROGRESS NOTES
TRANSFER - OUT REPORT:    Verbal report given to Pilar(name) on Marylu Hurtado  being transferred to [facility](unit) for routine progression of care       Report consisted of patients Situation, Background, Assessment and   Recommendations(SBAR). Information from the following report(s) SBAR, Kardex, Procedure Summary, Intake/Output, MAR, Accordion, Recent Results, Med Rec Status, Cardiac Rhythm SR and Alarm Parameters  was reviewed with the receiving nurse. Opportunity for questions and clarification was provided.       Patient transported with:   [employer]

## 2017-10-18 NOTE — PROGRESS NOTES
Received pt from ICU in stable condition. Pt in bed with  at bedside. Wound vac to upper back in place & functional;  draining serous fluid. Resp even & unlabored on room air. Oriented to room, call light & meals. No needs voiced; bed low & locked; will continue to monitor.

## 2017-10-18 NOTE — PROGRESS NOTES
Problem: Mobility Impaired (Adult and Pediatric)  Goal: *Acute Goals and Plan of Care (Insert Text)  1 WEEK GOALS :  (1.)Ms. Sherry Cam will move from supine to sit and sit to supine with STAND BY ASSIST within 7 day(s). (2.)Ms. Sherry Cam will transfer from bed to chair and chair to bed with STAND BY ASSIST using the least restrictive device within 7 day(s). (3.)Ms. Sherry Cam will ambulate with STAND BY ASSIST for  feet with the least restrictive device within 7 day(s). 4) Pt independent with UE & LE AROM HEP with written guidelines. ________________________________________________________________________________________________      PHYSICAL THERAPY: Daily Note, Treatment Day: 1st and AM 10/18/2017  INPATIENT: Hospital Day: 8  [place] / Product Type: Medicare /      NAME/AGE/GENDER: Brady Garcia is a 69 y.o. female  PRIMARY DIAGNOSIS: Acute renal failure (ARF) ([facility] Pancytopenia ([facility] Pancytopenia ([facility]        ICD-10: Treatment Diagnosis:       · Generalized Muscle Weakness (M62.81)  · Other lack of cordination (R27.8)  · Difficulty in walking, Not elsewhere classified (R26.2)  · Other abnormalities of gait and mobility (R26.89)   Precaution/Allergies:  Prednisone       ASSESSMENT:      Ms. Sherry Cam [facility] severe general weakness & impaired functional mobility. This pt will benefit from follow up therapy to help restore her to a functional level prior to returning home. Pt. Reports doing well this am.  Pt. Got up out of bed and took a few steps with walker with assistance to the chair. HR stable throughout. No complaints from patient. No exercises as patient got a phone call and visitors waiting to come in. She plans on rehab. This section established at most recent assessment   PROBLEM LIST (Impairments causing functional limitations):  1. Decreased Strength  2. Decreased ADL/Functional Activities  3. Decreased Transfer Abilities  4.  Decreased Ambulation Ability/Technique  5. Decreased Balance  6. Decreased Activity Tolerance  7. Increased Shortness of Breath  8. Decreased Flexibility/Joint Mobility  9. Decreased [place] with Home Exercise Program    INTERVENTIONS PLANNED: (Benefits and precautions of physical therapy have been discussed with the patient.)  1. Balance Exercise  2. Bed Mobility  3. Gait Training  4. Therapeutic Activites  5. Therapeutic Exercise/Strengthening  6. Transfer Training      TREATMENT PLAN: Frequency/Duration: daily for duration of hospital stay  Rehabilitation Potential For Stated Goals: GOOD      RECOMMENDED REHABILITATION/EQUIPMENT: (at time of discharge pending progress): Due to the probability of continued deficits (see above) this patient will likely need continued skilled physical therapy after discharge. Equipment:   · to be deterrmined                   HISTORY:   History of Present Injury/Illness (Reason for Referral):  Patient 49Z with pmhx of breast CA s/p RTX completed in Feb 2017, currently on oral chemo (femara and ibrance) s/p right breast reconstruction with flap and left breast mastectomy on 9/18 [facility] for hypotension/ fatigue/ malaise. Was seen in Dr Deepali Villaseñor office today due to these complaints along with concern for drainage from flap site. Dr Edy Nielsen reportedly okay with appearance of op site but was concerned regarding SBP in 60's at office. Brought to ED via EMS. ER workup notable for pancytopenia, Acute renal failure, hypotension (sbp in 80's). Hospitalist called for admission. Patient states that she has been feeling poorly 2 days ago with temp of 101 and profuse diarrhea x 2 days. Diarrhea has since resolved but she still feels light-headed and fatigued. Denies nausea/vomiting, hematochezia, chest pain or pressure. No sick contacts. Past Medical History/Comorbidities:   Ms. Vivienne Hu  has a past medical history of Abnormal CT of the chest; Adverse effect of anesthesia;  Asthma ( ); Breast cancer ([facility] [address] (2016); Depression ( ); Hypercholesteremia; Hypothyroidism; and Ill-defined condition. She also has no past medical history of Difficult intubation; Malignant hyperthermia due to anesthesia; Nausea & vomiting; Other ill-defined conditions(799.89); or Pseudocholinesterase deficiency. Ms. Chantell Mathis  has a past surgical history that includes bladder repair; rectocele repair; cystocele repair; breast biopsy (Right, 2/5/2016); chest surgery procedure unlisted; vascular access; breast reconstruction (Bilateral, 9/14/2016); breast reconstruction (Bilateral, 9/14/2016); mastectomy (Right); hysterectomy; colonoscopy; lap cholecystectomy (2000); breast reconstruction (Right, 9/18/2017); breast reduction (Left, 9/18/2017); and breast reconstruction (Right, 9/18/2017). Social History/Living Environment:   Home Environment: Private residence  # Steps to Enter: 3  Rails to Enter: No  One/Two Story Residence: One story  Living Alone: No (but alone during the day)  Support Systems: Spouse/Significant Other/Partner  Patient Expects to be Discharged to[de-identified] Private residence  Current DME Used/Available at Home:  (none)  Prior Level of Function/Work/Activity:  Pt was independent without an assistive device prior tot his admission  Personal Factors: Other factors that influence how disability is experienced by the patient:  Current & PMH   Number of Personal Factors/Comorbidities that affect the Plan of Care: 3+: HIGH COMPLEXITY   EXAMINATION:   Most Recent Physical Functioning:   Gross Assessment:                  Posture:     Balance:  Sitting: Intact  Standing: Pull to stand; With support Bed Mobility:  Supine to Sit: Minimum assistance  Wheelchair Mobility:     Transfers:  Sit to Stand: Minimum assistance  Stand to Sit: Contact guard assistance  Bed to Chair: Minimum assistance  Duration: 15 Minutes  Gait:     Speed/Benita: Shuffled; Slow  Step Length: Left shortened;Right shortened  Gait Abnormalities: Decreased step clearance  Distance (ft): 5 Feet (ft)  Assistive Device: Walker, rolling  Ambulation - Level of Assistance: Contact guard assistance;Minimal assistance   Functional Mobility:         Gait/Ambulation:  min        Transfers:  min        Bed Mobility:  min   Body Structures Involved:  1. Lungs  2. Metabolic  3. Muscles Body Functions Affected:  1. Movement Related  2. Metobolic/Endocrine Activities and Participation Affected:  1. General Tasks and Demands  2. Mobility   Number of elements that affect the Plan of Care: 4+: HIGH COMPLEXITY   CLINICAL PRESENTATION:   Presentation: Evolving clinical presentation with changing clinical characteristics: MODERATE COMPLEXITY   CLINICAL DECISION MAKIN [facility] Mobility Inpatient Short Form  How much difficulty does the patient currently have. .. Unable A Lot A Little None   1. Turning over in bed (including adjusting bedclothes, sheets and blankets)? [ ] 1   [ ] 2   [X] 3   [ ] 4   2. Sitting down on and standing up from a chair with arms ( e.g., wheelchair, bedside commode, etc.)   [ ] 1   [ ] 2   [X] 3   [ ] 4   3. Moving from lying on back to sitting on the side of the bed? [ ] 1   [ ] 2   [X] 3   [ ] 4   How much help from another person does the patient currently need. .. Total A Lot A Little None   4. Moving to and from a bed to a chair (including a wheelchair)? [ ] 1   [ ] 2   [X] 3   [ ] 4   5. Need to walk in hospital room? [ ] 1   [ ] 2   [X] 3   [ ] 4   6. Climbing 3-5 steps with a railing? [X] 1   [ ] 2   [ ] 3   [ ] 4   © , Trustees of [address] [place] [address] [address] [id number], under license to [employer]. All rights reserved    Score:  Initial: 16 Most Recent: X (Date: -- )     Interpretation of Tool:  Re[facility] activities that are increasingly more difficult (i.e. Bed mobility, Transfers, Gait).        Score 24 23 22-20 19-15 14-10 9-7 6       Modifier CH CI CJ CK CL CM CN         · Mobility - Walking and Moving Around:  - CURRENT STATUS:    CK - 40%-59% impaired, limited or restricted               - GOAL STATUS:           CJ - 20%-39% impaired, limited or restricted               - D/C STATUS:                       ---------------To be determined---------------  [place] / Product Type: Medicare /       Medical Necessity:     · Patient is expected to demonstrate progress in strength, range of motion, balance, coordination and functional technique to decrease assistance required with bed mobility, transfers & gait. Reason for Services/Other Comments:  · Patient continues to require skilled intervention due to pt not safe with functional mobility. Use of outcome tool(s) and clinical judgement create a POC that gives a: Questionable prediction of patient's progress: MODERATE COMPLEXITY                 TREATMENT:   (In addition to Assessment/Re-Assessment sessions the following treatments were rendered)   Pre-treatment Symptoms/Complaints:  No complaints  Pain: Initial: visual scale     Post Session:  0/10      Therapeutic Activity: (  15 Minutes ):  Therapeutic activities including Bed transfers, Chair transfers and gait to improve mobility, strength and balance. Required minimal   to promote static and dynamic balance in standing. Braces/Orthotics/Lines/Etc:   · IV, sage catheter and icu monitors  Treatment/Session Assessment:    · Response to Treatment:  Did fine  · Interdisciplinary Collaboration:  · Registered Nurse  · Rehabilitation Attendant  · After treatment position/precautions:  · Up in chair, Bed/Chair-wheels locked, Bed in low position, Call light within reach, RN notified and Visitors at bedside  · Compliance with Program/Exercises: Will assess as treatment progresses. · Recommendations/Intent for next treatment session: \"Next visit will focus on reduction in assistance provided\".   Total Treatment Duration:PT Patient Time In/Time Out  Time In: 1015  Time Out: [address]

## 2017-10-18 NOTE — PROGRESS NOTES
Wound vac therapy continues to read blockage, no kinks or visible blockage in tubing. Contacted [facility] RN and will remove wound vac and change to wet to dry dressing and to notify MD tomorrow. PRN pain medication given pre-dressing change.

## 2017-10-18 NOTE — PROGRESS NOTES
TRANSFER - IN REPORT:    Verbal report received from [address] (name) on Juan Berumen  being received from ICU (unit) for routine progression of care      Report consisted of patients Situation, Background, Assessment and   Recommendations(SBAR). Information from the following report(s) SBAR, Kardex, Intake/Output, MAR and Recent Results was reviewed with the receiving nurse. Opportunity for questions and clarification was provided. Assessment completed upon patients arrival to unit and care assumed.

## 2017-10-18 NOTE — PROGRESS NOTES
Bedside and Verbal shift change report given to [address] (oncoming nurse) by Soham Oropeza (offgoing nurse). Report included the following information SBAR, Kardex, ED Summary, OR Summary, Procedure Summary, Intake/Output, MAR, Accordion, Recent Results, Med Rec Status, Cardiac Rhythm SR and Alarm Parameters .

## 2017-10-18 NOTE — PROGRESS NOTES
Bedside verbal report received from [facility] Tachycardia resolved, now in NSR with no ectopy noted. Wound vac reading obstruction. Will attempt to troubleshoot and resume therapy.

## 2017-10-18 NOTE — PROGRESS NOTES
Bedside and Verbal shift change report given to [address]  (oncoming nurse) by Bharat Matos  (offgoing nurse). Report included the following information SBAR, Kardex, Intake/Output, MAR and Recent Results.

## 2017-10-18 NOTE — PROGRESS NOTES
Progress Note    Patient: Gail Marcum MRN: [id number]  SSN: [id number]    YOB: 1950  Age: 69 y.o. Sex: female      Admit Date: 10/11/2017    LOS: 7 days     Subjective:     66F with pmhx of breast CA s/p RTX completed in Feb 2017, currently on oral chemo (femara and ibrance) s/p right breast reconstruction with flap and left breast mastectomy on 9/18 presented with for hypotension along with 2 days of profuse diarrhea. Upon arrival to ER she was noted with DANNIE. Started on vancomycin and zosyn, wound culture so far growing MRSA. Oral chemo held. Oncology consulted for pancytopenia. Plastic recommended wound vac placed 10/12, but was non-functional and eventually was removed. Developed atrial tachycardia, improved after lopressor was given. Currently NSR, on oral lopressor and started on cardizem po today. Assessed by cardiology already. Her CXR is looking better, no pulmonary edema. Lasix overlapped to oral this morning. On my assessment today she was found alert, in no distress.  next to her, questions answered.  Denied nausea, vomiting, chest pain, sob, abdominal pain, diarrhea, back pain.      Review of Systems negative with exception of pertinent positives noted above    Objective:     Vitals:    10/18/17 1303 10/18/17 1333 10/18/17 1403 10/18/17 1433   BP: 106/43 106/47 102/46 96/44   Pulse: 75 77 77 77   Resp: 19 18 17 17   Temp:       SpO2: 94% 94% 94% 93%   Weight:       Height:            Intake and Output:  Current Shift:    Last three shifts: 10/16 1901 - 10/18 0700  In: 860 [P.O.:360; I.V.:500]  Out: 6250 [Urine:6200; Drains:50]    Physical Exam:   General appearance: NAD, conversant, oriented x 3  Neck: Trachea midline   Lungs: CTA, with normal respiratory effort and no intercostal retractions  CV: S1,S2 present, no added murmurs  Abdomen: Soft, non-tender; no masses   Extremities: No peripheral edema or extremity lymphadenopathy  Skin: Normal temperature, turgor and texture; erythema of right upper posterior chest- abundant secretion noted from gauze. Psych: Appropriate affect, alert and oriented to person, place and time  CNS: GCS 15, no motor or sensory deficits, CN 2-12 intact    Lab/Data Review: All lab results for the last 24 hours reviewed. Assessment:     Principal Problem:    Pancytopenia ([address] (10/11/2017)    Active Problems:    Acquired hypothyroidism (11/4/2015)      Hypokalemia (4/7/2017)      Breast cancer ([address] (9/18/2017)      Acute renal failure (ARF) ([address] (10/11/2017)      Hypomagnesemia (10/11/2017)      Diarrhea (10/11/2017)      Sepsis ([address] (10/11/2017)        Plan:     1. New systolic acute congestive HF with EF 45-50%- signs of congestion resolved-CXR looking better- overlapped to oral lasix today- cardiology on board   2. Sepsis of unkown origin in immunocompromised host - possible related to purulent cellulitis of upper back- continue clindamycin po, discontinue cefepime today since she has MRSA ( to complete a total of 10 days (7/10)- wound care- plastic surgery consult-   3. Atrial tachycardia: possible due to New onset HF- started on cardizem po today-   4. Asthma exacerbation: continue duonebs-   5. Hx of Breast CA s/p reconstruction, on oral chemo hormone therapy-   6. ARF: resolved   7. Hypothyroidism- continue synthroid   8.  Pancytopenia: seen by hematology-oncology- received 2 prbc    DVT Prophylaxis: scds    Transfer to medical floors today      Dispo PT/OT follow anticipate SNF palcement, CM aware        Signed By: Janee Lozano MD     October 18, 2017

## 2017-10-18 NOTE — PROGRESS NOTES
Patient converted to NSR after 2.5 mg Lopressor slow IVP. HR 90. NAD. Will continue to monitor closely.

## 2017-10-18 NOTE — PROGRESS NOTES
[facility] CARDIOLOGY PROGRESS NOTE           10/18/2017 11:45 AM    Admit Date: 10/11/2017      Subjective:   She had another episode of SVT - atrial tachycardia - last evening. Will add Cardizem. She is stable to move out of ICU.     ROS:  Cardiovascular:  As noted above    Objective:      Vitals:    10/18/17 0903 10/18/17 0933 10/18/17 0941 10/18/17 1003   BP: 99/42 93/45  152/66   Pulse: 80 78 80 80   Resp: 13 14  16   Temp:       SpO2: 92% 93%  97%   Weight:       Height:           Physical Exam:  General-No Acute Distress  Neck- supple, no JVD  CV- regular rate and rhythm no MRG  Lung- clear bilaterally  Abd- soft, nontender, nondistended  Ext- no edema bilaterally. Skin- warm and dry    Data Review:   Recent Labs      10/18/17   0417  10/17/17   1050  10/17/17   0425   NA  134*  134*   --    K  3.6  3.4*   --    MG  2.4   --   1.3*   BUN  17  11   --    CREA  0.75  0.84   --    GLU  94  120*   --    WBC  8.4  10.0   --    HGB  10.8*  11.2*   --    HCT  33.0*  34.3*   --    PLT  105*  115*   --        Assessment/Plan:     Principal Problem:    Pancytopenia ([address] (10/11/2017)      Active Problems:    Acquired hypothyroidism (11/4/2015)          Hypokalemia (4/7/2017) - resolved. Breast cancer ([facility] (9/18/2017)        Hypomagnesemia (10/11/2017) - resolved. Diarrhea (10/11/2017)         Sepsis ([address] (10/11/2017)           Continue present meds - Cardizem  mg daily added today. Will see in office for OP F./U. Will be on standby, please call   As needed.       Reuben Sweeney MD  10/18/2017 11:45 AM

## 2017-10-19 ENCOUNTER — APPOINTMENT (OUTPATIENT)
Dept: MAMMOGRAPHY | Age: 67
DRG: 907 | End: 2017-10-19
Attending: INTERNAL MEDICINE
Payer: MEDICARE

## 2017-10-19 ENCOUNTER — ANESTHESIA EVENT (OUTPATIENT)
Dept: SURGERY | Age: 67
DRG: 907 | End: 2017-10-19
Payer: MEDICARE

## 2017-10-19 ENCOUNTER — APPOINTMENT (OUTPATIENT)
Dept: ULTRASOUND IMAGING | Age: 67
DRG: 907 | End: 2017-10-19
Attending: HOSPITALIST
Payer: MEDICARE

## 2017-10-19 LAB
ANION GAP SERPL CALC-SCNC: 4 MMOL/L (ref 7–16)
ATRIAL RATE: 78 BPM
BUN SERPL-MCNC: 15 MG/DL (ref 8–23)
CALCIUM SERPL-MCNC: 7.4 MG/DL (ref 8.3–10.4)
CALCULATED P AXIS, ECG09: 58 DEGREES
CALCULATED R AXIS, ECG10: -35 DEGREES
CALCULATED T AXIS, ECG11: 28 DEGREES
CHLORIDE SERPL-SCNC: 99 MMOL/L (ref 98–107)
CO2 SERPL-SCNC: 32 MMOL/L (ref 21–32)
CREAT SERPL-MCNC: 0.68 MG/DL (ref 0.6–1)
D DIMER PPP FEU-MCNC: 2.8 UG/ML(FEU)
DIAGNOSIS, 93000: NORMAL
ERYTHROCYTE [DISTWIDTH] IN BLOOD BY AUTOMATED COUNT: 17.7 % (ref 11.9–14.6)
GLUCOSE SERPL-MCNC: 92 MG/DL (ref 65–100)
HCT VFR BLD AUTO: 32 % (ref 35.8–46.3)
HGB BLD-MCNC: 10.7 G/DL (ref 11.7–15.4)
MAGNESIUM SERPL-MCNC: 1.9 MG/DL (ref 1.8–2.4)
MCH RBC QN AUTO: 31.7 PG (ref 26.1–32.9)
MCHC RBC AUTO-ENTMCNC: 33.4 G/DL (ref 31.4–35)
MCV RBC AUTO: 94.7 FL (ref 79.6–97.8)
P-R INTERVAL, ECG05: 172 MS
PLATELET # BLD AUTO: 122 K/UL (ref 150–450)
PMV BLD AUTO: 11.7 FL (ref 10.8–14.1)
POTASSIUM SERPL-SCNC: 4.1 MMOL/L (ref 3.5–5.1)
Q-T INTERVAL, ECG07: 382 MS
QRS DURATION, ECG06: 82 MS
QTC CALCULATION (BEZET), ECG08: 435 MS
RBC # BLD AUTO: 3.38 M/UL (ref 4.05–5.25)
SODIUM SERPL-SCNC: 135 MMOL/L (ref 136–145)
TROPONIN I SERPL-MCNC: <0.04 NG/ML (ref 0.02–0.05)
TROPONIN I SERPL-MCNC: <0.04 NG/ML (ref 0.02–0.05)
VENTRICULAR RATE, ECG03: 78 BPM
WBC # BLD AUTO: 7.4 K/UL (ref 4.3–11.1)

## 2017-10-19 PROCEDURE — 99232 SBSQ HOSP IP/OBS MODERATE 35: CPT | Performed by: INTERNAL MEDICINE

## 2017-10-19 PROCEDURE — 77030012935 HC DRSG AQUACEL BMS -B

## 2017-10-19 PROCEDURE — 80048 BASIC METABOLIC PNL TOTAL CA: CPT | Performed by: HOSPITALIST

## 2017-10-19 PROCEDURE — 74750000023 HC WOUND THERAPY

## 2017-10-19 PROCEDURE — 84484 ASSAY OF TROPONIN QUANT: CPT | Performed by: HOSPITALIST

## 2017-10-19 PROCEDURE — 74011000250 HC RX REV CODE- 250: Performed by: INTERNAL MEDICINE

## 2017-10-19 PROCEDURE — 74011250637 HC RX REV CODE- 250/637: Performed by: PHYSICIAN ASSISTANT

## 2017-10-19 PROCEDURE — 85379 FIBRIN DEGRADATION QUANT: CPT | Performed by: HOSPITALIST

## 2017-10-19 PROCEDURE — 74011250637 HC RX REV CODE- 250/637: Performed by: HOSPITALIST

## 2017-10-19 PROCEDURE — 74011250637 HC RX REV CODE- 250/637: Performed by: INTERNAL MEDICINE

## 2017-10-19 PROCEDURE — 76642 ULTRASOUND BREAST LIMITED: CPT

## 2017-10-19 PROCEDURE — 93970 EXTREMITY STUDY: CPT

## 2017-10-19 PROCEDURE — 87040 BLOOD CULTURE FOR BACTERIA: CPT | Performed by: INTERNAL MEDICINE

## 2017-10-19 PROCEDURE — 36415 COLL VENOUS BLD VENIPUNCTURE: CPT | Performed by: HOSPITALIST

## 2017-10-19 PROCEDURE — 74011000258 HC RX REV CODE- 258: Performed by: INTERNAL MEDICINE

## 2017-10-19 PROCEDURE — 87040 BLOOD CULTURE FOR BACTERIA: CPT | Performed by: HOSPITALIST

## 2017-10-19 PROCEDURE — 65270000029 HC RM PRIVATE

## 2017-10-19 PROCEDURE — 74011000250 HC RX REV CODE- 250: Performed by: PLASTIC SURGERY

## 2017-10-19 PROCEDURE — 83735 ASSAY OF MAGNESIUM: CPT | Performed by: HOSPITALIST

## 2017-10-19 PROCEDURE — 93005 ELECTROCARDIOGRAM TRACING: CPT | Performed by: HOSPITALIST

## 2017-10-19 PROCEDURE — 85027 COMPLETE CBC AUTOMATED: CPT | Performed by: HOSPITALIST

## 2017-10-19 PROCEDURE — 74011250636 HC RX REV CODE- 250/636: Performed by: HOSPITALIST

## 2017-10-19 RX ORDER — HYDROMORPHONE HYDROCHLORIDE 1 MG/ML
0.5 INJECTION, SOLUTION INTRAMUSCULAR; INTRAVENOUS; SUBCUTANEOUS
Status: DISCONTINUED | OUTPATIENT
Start: 2017-10-19 | End: 2017-10-25 | Stop reason: HOSPADM

## 2017-10-19 RX ORDER — FAMOTIDINE 20 MG/1
20 TABLET, FILM COATED ORAL DAILY
Status: DISCONTINUED | OUTPATIENT
Start: 2017-10-19 | End: 2017-10-19

## 2017-10-19 RX ORDER — NALOXONE HYDROCHLORIDE 0.4 MG/ML
0.4 INJECTION, SOLUTION INTRAMUSCULAR; INTRAVENOUS; SUBCUTANEOUS AS NEEDED
Status: DISCONTINUED | OUTPATIENT
Start: 2017-10-19 | End: 2017-10-25 | Stop reason: HOSPADM

## 2017-10-19 RX ORDER — HYDROCODONE BITARTRATE AND ACETAMINOPHEN 10; 325 MG/1; MG/1
1 TABLET ORAL
Status: DISCONTINUED | OUTPATIENT
Start: 2017-10-19 | End: 2017-10-19

## 2017-10-19 RX ORDER — SODIUM CHLORIDE 9 MG/ML
75 INJECTION, SOLUTION INTRAVENOUS CONTINUOUS
Status: DISCONTINUED | OUTPATIENT
Start: 2017-10-19 | End: 2017-10-19

## 2017-10-19 RX ORDER — FAMOTIDINE 20 MG/1
20 TABLET, FILM COATED ORAL 2 TIMES DAILY
Status: DISCONTINUED | OUTPATIENT
Start: 2017-10-19 | End: 2017-10-22

## 2017-10-19 RX ORDER — BACITRACIN ZINC 500 UNIT/G
OINTMENT (GRAM) TOPICAL DAILY
Status: DISCONTINUED | OUTPATIENT
Start: 2017-10-19 | End: 2017-10-25 | Stop reason: HOSPADM

## 2017-10-19 RX ADMIN — Medication 500 MG: at 07:50

## 2017-10-19 RX ADMIN — FAMOTIDINE 20 MG: 20 TABLET ORAL at 17:28

## 2017-10-19 RX ADMIN — METOPROLOL TARTRATE 50 MG: 50 TABLET ORAL at 22:35

## 2017-10-19 RX ADMIN — DILTIAZEM HYDROCHLORIDE 180 MG: 180 CAPSULE, COATED, EXTENDED RELEASE ORAL at 07:51

## 2017-10-19 RX ADMIN — Medication 5 ML: at 22:38

## 2017-10-19 RX ADMIN — ALPRAZOLAM 0.5 MG: 0.5 TABLET ORAL at 03:48

## 2017-10-19 RX ADMIN — FAMOTIDINE 20 MG: 20 TABLET ORAL at 04:06

## 2017-10-19 RX ADMIN — Medication 10 ML: at 15:10

## 2017-10-19 RX ADMIN — ALPRAZOLAM 0.5 MG: 0.5 TABLET ORAL at 22:56

## 2017-10-19 RX ADMIN — Medication: at 15:11

## 2017-10-19 RX ADMIN — LEVOTHYROXINE SODIUM 100 MCG: 100 TABLET ORAL at 07:51

## 2017-10-19 RX ADMIN — DULOXETINE HYDROCHLORIDE 60 MG: 60 CAPSULE, DELAYED RELEASE ORAL at 22:35

## 2017-10-19 RX ADMIN — HYDROCODONE BITARTRATE AND ACETAMINOPHEN 1 TABLET: 5; 325 TABLET ORAL at 10:41

## 2017-10-19 RX ADMIN — SODIUM CHLORIDE 75 ML/HR: 900 INJECTION, SOLUTION INTRAVENOUS at 03:48

## 2017-10-19 RX ADMIN — Medication 500 MG: at 17:28

## 2017-10-19 RX ADMIN — Medication 400 MG: at 17:28

## 2017-10-19 RX ADMIN — SODIUM CHLORIDE 600 MG: 900 INJECTION, SOLUTION INTRAVENOUS at 09:27

## 2017-10-19 RX ADMIN — Medication 400 MG: at 07:50

## 2017-10-19 RX ADMIN — FUROSEMIDE 20 MG: 20 TABLET ORAL at 07:49

## 2017-10-19 RX ADMIN — ACETAMINOPHEN 650 MG: 325 TABLET, FILM COATED ORAL at 20:48

## 2017-10-19 RX ADMIN — METOPROLOL TARTRATE 50 MG: 50 TABLET ORAL at 07:51

## 2017-10-19 RX ADMIN — FAMOTIDINE 20 MG: 20 TABLET ORAL at 07:50

## 2017-10-19 RX ADMIN — CLINDAMYCIN HYDROCHLORIDE 600 MG: 150 CAPSULE ORAL at 01:39

## 2017-10-19 RX ADMIN — SODIUM CHLORIDE 600 MG: 900 INJECTION, SOLUTION INTRAVENOUS at 17:28

## 2017-10-19 NOTE — CONSULTS
Infectious Disease Consult    Today's Date: 10/19/2017   Admit Date: 10/11/2017    Impression:   · Breast cancer s/p reconstruction surgery radiation and now oral chemotherapy; on hold  · Post-op flap infection with cx growing MRSA: would not treat with Clindamycin but with Vancomycin   · Fever  · Pancytopenia  · Diarrhea  · Sepsis  · DANNIE resolved     Plan:   · Would recommend discontinuation of Clindamycin and initiation of Vancomycin   · (note original consult canceled after I had already seen patient; will therefore not plan to see patient again or place any orders)    Anti-infectives:     Inpat Anti-Infectives     Start     Ordered Stop    10/17/17 0900  clindamycin (CLEOCIN) capsule 600 mg  600 mg,   Oral,   EVERY 8 HOURS      10/17/17 0824 --    10/17/17 0841  magic mouthwash (EMMETT) oral suspension 10 mL  10 mL,   Swish and Spit,   EVERY 4 HOURS AS NEEDED      10/17/17 0841 --          Subjective:   Date of Consultation:  October 19, 2017  Referring Physician: Dr. Kerrie Strange    Patient is a 69 y.o. female with  Past medical history of breast CA s/p RTX completed in Feb 2017, currently on oral chemo (Femara/Ibrance) s/p right breast reconstruction with flap and left breast mastectomy on 9/18 presented with for hypotension along with 2 days of profuse diarrhea. DANNIE noted on arrival to ER. Started on Vancomycin and Zosyn, wound culture so far growing MRSA. Oral chemo on hold at this time. Oncology consulted for pancytopenia. Plastics recommended wound vac placed 10/12, but was non-functional and eventually was removed. Developed atrial tachycardia, improved after Lopressor. Assessed by cardiology. Her CXR yesterday with no acute changes; no lobar consolidation. Lungs are grossly clear. Right breast implant is present. Left-sided chest port remains. Blood cultures obtained 10/11/17 negative growth. Wd drainage is growing MRSA. Urine cx negative. Low grade fevers noted since admission. DANNIE has now resolved. Currently on oral Clindamycin 600 mg Q 8 hrs; started 10/17/17. She has also been in IV Vancomycin, Zosyn, Cefepime, and Cipro this admission. ID is asked to evaluate this patient for abx recommendations regarding post-op wd infection and fevers in host with immunosuppression. Patient scheduled for implant removal in am 10/20. Patient notes drainage from left back wound since surgery which increased after drain placed.           Patient Active Problem List   Diagnosis Code    Acquired hypothyroidism E03.9    Asthma J45.909    Insomnia G47.00    Depression F32.9    Malignant neoplasm of right female breast ([facility] C50.911    Mediastinal adenopathy R59.0    Status post right mastectomy Z90.11    S/P breast reconstruction, bilateral Z98.890    SIRS (systemic inflammatory response syndrome) ([facility]) R65.10    Idiopathic hypotension I95.0    Tachycardia R00.0    Shortness of breath R06.02    Hypokalemia E87.6    Cough R05    Pancytopenia due to chemotherapy ([facility] D61.810    Malignant neoplasm of upper-inner quadrant of right female breast (HCC) C50.211    Lump of skin of right upper extremity R22.31    Breast cancer (HCC) C50.919    S/P breast reconstruction, right Z98.890    Acute renal failure (ARF) (HCC) N17.9    Pancytopenia (HCC) D61.818    Hypomagnesemia E83.42    Diarrhea R19.7    Sepsis (HCC) A41.9     Past Medical History:   Diagnosis Date    Abnormal CT of the chest     Adverse effect of anesthesia     bp dropped with dual lung/gallbladder surgery    Asthma      no acute attacks recently.  uses advair daily    Breast cancer ([facility] 2016    R breast, s/p mastectomy, chemo, reconstruction    Depression      Hypercholesteremia     Controlled with diet     Hypothyroidism     Ill-defined condition     granular lung disease resolved 2002      Family History   Problem Relation Age of Onset    Hypertension Mother     Cancer Mother 79     Breast cancer at age 79    Diabetes Mother      Type 2    Other Father      alzheimers    Cancer Maternal Aunt      Breast cancer after age 72    Cancer Maternal Aunt      Breast cancer at age 39   Alyne Andry Allergic Rhinitis Neg Hx     Allergy-severe Neg Hx     Amblyopia Neg Hx     Anemia Neg Hx     Anesth Problems Neg Hx     Angioedema Neg Hx     Anxiety Neg Hx     Arrhythmia Neg Hx     Arthritis-rheumatoid Neg Hx     Ataxia Neg Hx     Atopy Neg Hx     Bipolar Disorder Neg Hx     Blindness Neg Hx     Breast Cancer Neg Hx     Breast Problems Neg Hx     Broken Bones Neg Hx     Cataract Neg Hx     Celiac Disease Neg Hx     Childhood heart surgery Neg Hx     Childhood resp disease Neg Hx     Chorea Neg Hx     Clotting Disorder Neg Hx     Collagen Dis Neg Hx     Colon Cancer Neg Hx     Colon Polyps Neg Hx     COPD Neg Hx     Coronary Artery Disease Neg Hx     Crohn's Disease Neg Hx     Cystic Fibrosis Neg Hx     Delayed Awakening Neg Hx     Dementia Neg Hx     Depression Neg Hx     Dislocations Neg Hx     Downs Syndrome Neg Hx     Drug Abuse Neg Hx     Eclampsia Neg Hx     Eczema Neg Hx     Emergence Delirium Neg Hx     Emphysema Neg Hx     Fainting Neg Hx     Genitourinary () Neg Hx     Glaucoma Neg Hx     Heart Attack Neg Hx     Heart defect Neg Hx     Heart Failure Neg Hx     Heart Surgery Neg Hx     Hemachromatosis Neg Hx     Herpes Neg Hx     High Cholesterol Neg Hx     HIV/AIDS Neg Hx     Immunodeficiency Neg Hx     Infertility Neg Hx     Inflammatory Bowel Dz Neg Hx     Kidney Disease Neg Hx     Liver Disease Neg Hx     Macular Degen Neg Hx     Malignant Hyperthermia Neg Hx     MS Neg Hx     Neuropathy Neg Hx     NF Neg Hx     Osteoporosis Neg Hx     Ovarian Cancer Neg Hx     Pacemaker Neg Hx     Panic disorder Neg Hx     Parkinsonism Neg Hx     Post-op Cognitive Dysfunction Neg Hx     Post-op Nausea/Vomiting Neg Hx     Prematurity Neg Hx      Labor Neg Hx     Pseudocholinesterase Deficiency Neg Hx     Psoriasis Neg Hx     Psychotic Disorder Neg Hx     Rashes/Skin Problems Neg Hx     Retinal Detachment Neg Hx     Rh Incompatibility Neg Hx     Schizophrenia Neg Hx     Seizures Neg Hx     Severe Sprains Neg Hx     Sickle Cell Anemia Neg Hx     Sickle Cell Trait Neg Hx     SIDS Neg Hx     SLE Neg Hx     Spont Ab Neg Hx     Stomach Cancer Neg Hx     Strabismus Neg Hx     Substance Abuse Neg Hx     Sudden Death Neg Hx     Suicide Neg Hx     Thyroid Disease Neg Hx     Tuberculosis Neg Hx     Ulcerative Colitis Neg Hx     Urticaria Neg Hx     Lang's Disease Neg Hx       Social History   Substance Use Topics    Smoking status: Never Smoker    Smokeless tobacco: Never Used    Alcohol use No     Past Surgical History:   Procedure Laterality Date    CHEST SURGERY PROCEDURE UNLISTED      R lung biopsy     HX BLADDER REPAIR      HX BREAST BIOPSY Right 2/5/2016    SKIN BIOPSY RIGHT BREAST performed by [facility] Drew [facility] at [facility] MA[facility] OR    HX BREAST RECONSTRUCTION Bilateral 9/14/2016    BILATERAL BREAST RECONSTRUCTION performed by Jaquelin Cano MD at [facility] MAIN OR    HX BREAST RECONSTRUCTION Bilateral 9/14/2016    BILATERAL BREAST TISSUE EXPANDER INSERTION performed by Jaquelin Cano MD at [facility] MAIN OR    HX BREAST RECONSTRUCTION Right 9/18/2017    RIGHT BREAST RECONSTRUCTION performed by Jaquelin Cano MD at [address] HX BREAST RECONSTRUCTION Right 9/18/2017    Right  PLACEMENT OF PERMANENT IMPLANTS performed by Jaquelin Cano MD at [address] HX BREAST REDUCTION Left 9/18/2017    LEFT BREAST MASTOPEXY FOR SYMMETRY [facility] at [facility] MAIN OR    HX COLONOSCOPY      2006    HX CYSTOCELE REPAIR      HX HYSTERECTOMY      fibroids    HX LAP CHOLECYSTECTOMY  2000    HX MASTECTOMY Right     HX RECTOCELE REPAIR      HX VASCULAR ACCESS      port left chest wall      Prior to Admission medications    Medication Sig Start Date End Date Taking? Authorizing Provider   ALPRAZolam Alexandrea Moose) 0.5 mg tablet 1 bid to qid prn  Indications: anxiety, sleep 17  Yes Otoniel Tran MD   fluticasone-salmeterol (ADVAIR DISKUS) 250-50 mcg/dose diskus inhaler Take 1 Puff by inhalation every twelve (12) hours. Indications: INTRINSIC ASTHMA  Patient taking differently: Take 1 Puff by inhalation daily. Indications: INTRINSIC ASTHMA 11/3/16  Yes Otoniel Tran MD   DULoxetine (CYMBALTA) 60 mg capsule Take 1 Cap by mouth daily. Patient taking differently: Take 60 mg by mouth nightly. 11/3/16  Yes Otoniel Tran MD   levothyroxine (SYNTHROID) 75 mcg tablet Take 1 Tab by mouth Daily (before breakfast). Indications: hypothyroidism 11/3/16  Yes Otoniel Tran MD   albuterol (VENTOLIN) 90 mcg/Actuation inhaler Take 2 Puffs by inhalation every six (6) hours as needed. Indications: bring on the dos. Yes Historical Provider   IBRANCE 75 mg cap TAKE ONE CAPSULE (75 MG) BY MOUTH DAILY WITH FOOD FOR 3 WEEKS ON, THENONE WEEK OFF. SWALLOW WHOLE. DO NOT TAKE IF CAPSULES ARE BROKEN OR CR 10/10/17   David Zarate MD   [facility]) 2.5 mg tablet Take 1 Tab by mouth daily. Patient taking differently: Take 2.5 mg by mouth nightly. 17   David Zarate MD       Allergies   Allergen Reactions    Prednisone Unknown (comments)     \"psychological reaction\"        Review of Systems:  A comprehensive review of systems was negative except for that written in the History of Present Illness.     Objective:     Visit Vitals    /54 (BP 1 Location: Left arm, BP Patient Position: At rest;Head of bed elevated (Comment degrees))    Pulse 88    Temp 99.4 °F (37.4 °C)    Resp 18    Ht 5' 2\" (1.575 m)    Wt 68.2 kg (150 lb 5.7 oz)    SpO2 93%    Breastfeeding No    BMI 27.5 kg/m2     Temp (24hrs), Av °F (37.2 °C), Min:98 °F (36.7 °C), Max:100.5 °F (38.1 °C)       Lines:  Central Venous Catheter: left sc port non-tender           Physical Exam:    General:  Alert, cooperative, well noursished, well developed, appears stated age   Eyes:  Sclera anicteric. Pupils equally round and reactive to light. Mouth/Throat: Mucous membranes normal, oral pharynx clear   Neck: Supple   Lungs:   Clear to auscultation bilaterally, good effort   CV:  Regular rate and rhythm,no murmur, click, rub or gallop   Abdomen:   Soft, non-tender. bowel sounds normal. non-distended   Extremities: No cyanosis or edema   Skin: Left breast with erythema, warmth medial half extending from right breast; right breast with dense inflammation and erythema; dressing over central drainage site   Lymph nodes: Cervical and supraclavicular normal   Musculoskeletal: No swelling or deformity   Lines/Devices:  Intact, no erythema, drainage or tenderness   Psych: Alert and oriented, normal mood affect given the setting         Data Review:     CBC:Recent Labs      10/19/17   0318  10/18/17   0417  10/17/17   1050   WBC  7.4  8.4  10.0   HGB  10.7*  10.8*  11.2*   HCT  32.0*  33.0*  34.3*   PLT  122*  105*  115*       BMP:  Recent Labs      10/19/17   0318  10/18/17   0417  10/17/17   1050   CREA  0.68  0.75  0.84   BUN  15  17  11   NA  135*  134*  134*   K  4.1  3.6  3.4*   CL  99  100  98   CO2  32  34*  33*   AGAP  4*  0*  3*   GLU  92  94  120*       LFTS:  No results for input(s): TBILI, ALT, SGOT, AP, TP, ALB in the last 72 hours.     Microbiology:     All Micro Results     Procedure Component Value Units Date/Time    CULTURE, BLOOD [[id number]] Collected:  10/11/17 1214    Order Status:  Completed Specimen:  Blood from Blood Updated:  10/17/17 1010     Special Requests: PORT        Culture result: NO GROWTH 6 DAYS       CULTURE, BLOOD [[id number]] Collected:  10/11/17 1214    Order Status:  Completed Specimen:  Blood from Blood Updated:  10/17/17 1010     Special Requests: --        LEFT  Antecubital       Culture result: NO GROWTH 6 DAYS       CULTURE, WOUND W Verito Antoni STAIN [[id number]]  (Abnormal)  (Susceptibility) Collected:  10/11/17 1730    Order Status:  Completed Specimen:  Drainage Updated:  10/14/17 1013     Special Requests: --        RIGHT  UPPER  BACK       GRAM STAIN NO WBCS SEEN                 MODERATE GRAM POSITIVE COCCI     Culture result:         HEAVY **METHICILLIN RESISTANT STAPHYLOCOCCUS AUREUS** Use Rifampin only in conjunction with another antimicrobial agent. Do not use as Monotherapy. (A)    CULTURE, URINE [[id number]] Collected:  10/11/17 1735    Order Status:  Completed Specimen:  Urine from Clean catch Updated:  10/14/17 0618     Special Requests: NO SPECIAL REQUESTS        Culture result: NO GROWTH 2 DAYS       CULTURE, BODY FLUID Evette Cordia STAIN [[id number]] Collected:  10/11/17 1735    Order Status:  Canceled Specimen:  Miscellaneous Fluid     CULTURE, BLOOD [[id number]] Collected:  10/11/17 1615    Order Status:  Canceled Specimen:  Blood from Blood     CULTURE, BLOOD [[id number]] Collected:  10/11/17 1615    Order Status:  Canceled Specimen:  Blood from Blood     C. DIFFICILE/EPI PCR [[id number]] Collected:  10/11/17 1315    Order Status:  Canceled Specimen:  Stool           Imaging:   CXR (-) change    10/15 CT chest  IMPRESSION:   1. No evidence for pulmonary embolism. 2. Bilateral pleural effusions, right greater than left and superimposed on  previously seen chronic lung changes are bilateral moderately confluent  reticular and hazy, almost groundglass lung densities. This should be correlated  for pulmonary edema versus pneumonia.   2. Right breast/chest post surgery changes evident    Signed By: Heber Caldwell NP     October 19, 2017

## 2017-10-19 NOTE — PROGRESS NOTES
Called by RN because Ms. Leona Bowen complains of middle chest pain for approx 1h. States that she is unable to get a deep expiration. Had some low grade fever overnight 100.5F . Some pain with palpation. In synus rhythm, although on BB and Ca blockers. EKG w/o acute pathology. Will check Trop I. R breast withy erythema. Wound vac in place. CTA chest 10/15 w/o PE. ROSETTA Ryan present as chaperone. Will consider US for evaluation of underlying abscess. Consider ID input.      Evelia Merlin, MD  10/19/17

## 2017-10-19 NOTE — PROGRESS NOTES
Assessment completed and documented. Respirations even and unlabored. Wound vac intact. Bowel sounds hypoactive but abdomen is soft. No complaints of pain. Currently resting in bed. Will continue to monitor with hourly rounding.

## 2017-10-19 NOTE — PROGRESS NOTES
Just examined patient's back and breast wounds. I think the back does not appear infected but still producing a lot of fluid. The right breast is cellulitic and spreading to the left. I think the right implant needs to come out to clear the infection. Patient understands.   Will post her for tomorrow for implant removal.

## 2017-10-19 NOTE — PROGRESS NOTES
Patient complained of chest pain. HR regular and within normal limits. Yisel NEWMAN. Ordered troponin and EKG. Will continue to monitor patient.

## 2017-10-19 NOTE — PROGRESS NOTES
Assessment done via doc flow sheet. PPt resting in bed, resp easy & regular, lungs CTA, denies chest pain at this time. Right breast red and firm, tender to touch. Wound  to upper back with track pad and dressing intact, small amount of serous drainage noted to canister. Wound vac turned on and blockage warning noted. Wound vac turned off. Fountain patent, draining clear, yellow urine. Call light within reach, pt instructed to call for assistance as needed.  at bedside.

## 2017-10-19 NOTE — PROGRESS NOTES
Call placed to Wound Care nurse Fortino Givens  as instructed by MD regarding warning \"Blockaged\" on wound suction. Jewel Olivo will be coming to check on it.

## 2017-10-19 NOTE — PROGRESS NOTES
Progress Note    Patient: Enmanuel Gomez MRN: [id number]  SSN: [id number]    YOB: 1950  Age: 69 y.o. Sex: female      Admit Date: 10/11/2017    LOS: 8 days     Subjective:   Patient assessed this morning, found at bedside. Overnight she complained of right sided chest pain, troponin negative, D- dimer still elevated (elevated since admission ). She has developed right breast cellulitis with purulent secretion, likely source of fever and pain. Plastic surgery contacted and will be coming by later today. US ordered to assess for collection. Wound vac has not been working properly and serial wound care changes may be needed at this moment. Denied nausea, vomiting, abdominal pain, diarrhea.      Review of Systems negative with exception of pertinent positives noted above    Objective:     Vitals:    10/18/17 2140 10/18/17 2355 10/19/17 0239 10/19/17 0713   BP:  101/47 115/61 123/54   Pulse:  74 79 88   Resp:  18 18 18   Temp: 99 °F (37.2 °C) 98 °F (36.7 °C) 98.2 °F (36.8 °C) 99.4 °F (37.4 °C)   SpO2:  93% 93% 93%   Weight:       Height:            Intake and Output:  Current Shift: 10/19 0701 - 10/19 1900  In: -   Out: 600 [Urine:600]  Last three shifts: 10/17 1901 - 10/19 0700  In: 710 [P.O.:360; I.V.:100]  Out: 2800 [Urine:2800]    Physical Exam:   General appearance: NAD, conversant, oriented x 3  Neck: Trachea midline   Lungs: CTA, with normal respiratory effort and no intercostal retractions  CV: S1,S2 present, no added murmurs  Abdomen: Soft, non-tender; no masses   Extremities: No peripheral edema or extremity lymphadenopathy  Skin: Normal temperature, turgor and texture; erythema of right upper posterior chest- abundant secretion noted from gauze. Right breast fluctuation, erythema- pain on palpation   Psych: Appropriate affect, alert and oriented to person, place and time  CNS: GCS 15, no motor or sensory deficits, CN 2-12 intact    Lab/Data Review:   All lab results for the last 24 hours reviewed. Assessment:     Principal Problem:    Pancytopenia ([facility] [address] (10/11/2017)    Active Problems:    Acquired hypothyroidism (11/4/2015)      Hypokalemia (4/7/2017)      Breast cancer ([address] (9/18/2017)      Acute renal failure (ARF) ([address] (10/11/2017)      Hypomagnesemia (10/11/2017)      Diarrhea (10/11/2017)      Sepsis ([facility] [address] (10/11/2017)        Plan:     1. Sepsis in immunocompromised host, likely due to soft tissue infection, purulent cellulitis - Noted with right breast erythema, fever, pain: overlapped to iv clindamycin (8/10)- positive MRSA- wound care- plastic surgery consulted- ID consult- monitor cbc daily- patient may need I&D of right breast- US pending     2. New systolic acute congestive HF with EF 45-50%- signs of congestion resolved-CXR looking better- on oral lasix - cardiology on board     3. Atrial tachycardia: possible due to New onset HF-  on cardizem po-     4. Asthma exacerbation: continue duonebs-     5. Hx of Breast CA s/p reconstruction, on oral chemo hormone therapy-     6. Hypothyroidism- continue synthroid     7.  Pancytopenia: seen by hematology-oncology- received 2 prbc- improving     Remove sage cath today     DVT Prophylaxis: scds    Dispo PT/OT follow anticipate SNF placement        Signed By: Yuriy Jurado MD     October 19, 2017

## 2017-10-19 NOTE — PROGRESS NOTES
Clarified with MD informing him that previously wet to dry dressings have been done when wound vac was malfunctioning and he affirmed that for now he just wanted the wound vac turned off until surgery can come and evaluate patient. Will continue to monitor.

## 2017-10-19 NOTE — PROGRESS NOTES
Patient's wound vac still not working. Yisel NEWMAN and asked him what he wanted done and he said to turn the wound vac off until surgery could see the patient today. Will continue to monitor patient.

## 2017-10-19 NOTE — PROGRESS NOTES
Critical D Dimer received from [facility] of 2.80. MD paged and made aware.

## 2017-10-19 NOTE — PROGRESS NOTES
Noted that patient's right breast was inflamed and hard and had an oozing wound that the patient says was not there before. MD has been made aware and has examined patient. Nursing supervisor is also aware and is coming to help assess the wound vac which is reading blockage and not working correctly. Will continue to monitor patient.

## 2017-10-19 NOTE — PROGRESS NOTES
[facility]: Inpatient Hematology / Oncology Progress Note    Admission Date: 10/11/2017 11:41 AM    SUBJECTIVE:  No significant events overnight. Slowly improving. Wound vac in place. Afebrile. ROS:  Constitutional: Positive for weakness, malaise, fatigue. CV: Negative for chest pain, palpitations, edema. Respiratory: Negative for dyspnea, cough, wheezing. GI: Negative for nausea, abdominal pain, diarrhea. 10 point review of systems is otherwise negative with the exception of the elements mentioned above in the HPI. Allergies   Allergen Reactions    Prednisone Unknown (comments)     \"psychological reaction\"       OBJECTIVE:  Patient Vitals for the past 8 hrs:   BP Temp Pulse Resp SpO2   10/19/17 0713 123/54 99.4 °F (37.4 °C) 88 18 93 %     Temp (24hrs), Av °F (37.2 °C), Min:98 °F (36.7 °C), Max:100.5 °F (38.1 °C)    10/19 0701 - 10/19 1900  In: -   Out: 600 [Urine:600]    Physical Exam:  Constitutional: Well developed, well nourished female in no acute distress, sitting comfortably in the hospital bed. Appears more energetic than last assessment. HEENT: Normocephalic and atraumatic. Oropharynx is clear, mucous membranes are moist.  Sclerae anicteric. Neck supple without JVD. No thyromegaly present. Lymph node   No palpable submandibular, cervical, supraclavicular lymph nodes. Skin Warm and dry. Wound vac in place over back, wound not examined. No erythema. No pallor. Respiratory Lungs are clear to auscultation bilaterally without wheezes, rales or rhonchi, normal air exchange without accessory muscle use. CVS Normal rate, regular rhythm and normal S1 and S2. No murmurs, gallops, or rubs. Abdomen Soft, nontender and nondistended, normoactive bowel sounds. No palpable mass. No hepatosplenomegaly. Neuro Grossly nonfocal with no obvious sensory or motor deficits. MSK Normal range of motion in general.  No edema and no tenderness.    Psych Appropriate mood and affect. Labs:      Recent Labs      10/19/17   0318  10/18/17   0417  10/17/17   1050   WBC  7.4  8.4  10.0   RBC  3.38*  3.47*  3.60*   HGB  10.7*  10.8*  11.2*   HCT  32.0*  33.0*  34.3*   MCV  94.7  95.1  95.3   MCH  31.7  31.1  31.1   MCHC  33.4  32.7  32.7   RDW  17.7*  18.3*  18.3*   PLT  122*  105*  115*        Recent Labs      10/19/17   0318  10/18/17   0417  10/17/17   1050  10/17/17   0425   NA  135*  134*  134*   --    K  4.1  3.6  3.4*   --    CL  99  100  98   --    CO2  32  34*  33*   --    AGAP  4*  0*  3*   --    GLU  92  94  120*   --    BUN  15  17  11   --    CREA  0.68  0.75  0.84   --    GFRAA  >60  >60  >60   --    GFRNA  >60  >60  >60   --    CA  7.4*  7.5*  7.7*   --    MG  1.9  2.4   --   1.3*       Imaging:      ASSESSMENT:    Principal Problem:    Pancytopenia ([facility] [address] [address] (10/11/2017)    Active Problems:    Acquired hypothyroidism (11/4/2015)      Hypokalemia (4/7/2017)      Breast cancer ([facility] [address] (9/18/2017)      Acute renal failure (ARF) ([facility] [address] (10/11/2017)      Hypomagnesemia (10/11/2017)      Diarrhea (10/11/2017)      Sepsis ([facility] [address] (10/11/2017)        PLAN:    75 y/o with history of stage IV breast cancer, now ISABEL. Completed chemo, radiation, now on endocrine therapy with letrozole/palbociclib. Developed hypotension in plastics office, brought to ED and admitted for DANNIE and sepsis. PCT improved, now afebrile. WBC normalized after stopping palbo. No indication for G-CSF at this point. Continue to hold palbo. Platelets also improved. OK to resume letrozole from our standpoint. Continue abx per primary team, wound vac. We will sign off, please call with any urgent questions. Arrange f/u in oncology office after d/c.           MD [facility]  [address] [place] [address]  Office : [phone]  Fax : [phone]

## 2017-10-19 NOTE — WOUND CARE
Patient seen for back wound, her wound VAC again stopped suctioning due to thick drainage (adipose debriding?). This is the 3rd time this week. Removed VAC for now and started Aquacell Ag packing and ABD to help manage drainage. Discussed with patient and primary nurse. Plastic surgeon to see patient for evaluation. Wound team will be available if need further. Answered all questions.

## 2017-10-19 NOTE — PROGRESS NOTES
Physical therapy. Spoke with RN who stated to wait on therapy as patient to get ultrasound to LE's. Will check back as time allows.

## 2017-10-19 NOTE — ANESTHESIA PREPROCEDURE EVALUATION
Anesthetic History   No history of anesthetic complications            Review of Systems / Medical History  Patient summary reviewed, nursing notes reviewed and pertinent labs reviewed    Pulmonary          Smoker  Asthma : well controlled       Neuro/Psych         Psychiatric history (Depression)     Cardiovascular              Hyperlipidemia (Diet controlled)    Exercise tolerance: >4 METS  Comments: Denies recent CP, SOB or Palpitations    Post-chemo ECHO showed EF 45% with mild MR   GI/Hepatic/Renal  Within defined limits              Endo/Other      Hypothyroidism: well controlled  Cancer (Breast cancer) and anemia     Other Findings   Comments: Infection         Physical Exam    Airway  Mallampati: II  TM Distance: > 6 cm  Neck ROM: normal range of motion   Mouth opening: Normal     Cardiovascular  Regular rate and rhythm,  S1 and S2 normal,  no murmur, click, rub, or gallop  Rhythm: regular  Rate: normal         Dental  No notable dental hx       Pulmonary  Breath sounds clear to auscultation               Abdominal  GI exam deferred       Other Findings            Anesthetic Plan    ASA: 3  Anesthesia type: general          Induction: Intravenous  Anesthetic plan and risks discussed with: Patient and Spouse      Patient with sepsis from recent breast implant

## 2017-10-20 ENCOUNTER — ANESTHESIA (OUTPATIENT)
Dept: SURGERY | Age: 67
DRG: 907 | End: 2017-10-20
Payer: MEDICARE

## 2017-10-20 LAB
ANION GAP SERPL CALC-SCNC: 4 MMOL/L (ref 7–16)
BUN SERPL-MCNC: 13 MG/DL (ref 8–23)
CALCIUM SERPL-MCNC: 8 MG/DL (ref 8.3–10.4)
CHLORIDE SERPL-SCNC: 100 MMOL/L (ref 98–107)
CO2 SERPL-SCNC: 32 MMOL/L (ref 21–32)
CREAT SERPL-MCNC: 0.81 MG/DL (ref 0.6–1)
ERYTHROCYTE [DISTWIDTH] IN BLOOD BY AUTOMATED COUNT: 17.7 % (ref 11.9–14.6)
GLUCOSE SERPL-MCNC: 80 MG/DL (ref 65–100)
HCT VFR BLD AUTO: 32.4 % (ref 35.8–46.3)
HGB BLD-MCNC: 10.5 G/DL (ref 11.7–15.4)
MAGNESIUM SERPL-MCNC: 2 MG/DL (ref 1.8–2.4)
MCH RBC QN AUTO: 31.1 PG (ref 26.1–32.9)
MCHC RBC AUTO-ENTMCNC: 32.4 G/DL (ref 31.4–35)
MCV RBC AUTO: 95.9 FL (ref 79.6–97.8)
PLATELET # BLD AUTO: 141 K/UL (ref 150–450)
PMV BLD AUTO: 12.4 FL (ref 10.8–14.1)
POTASSIUM SERPL-SCNC: 4.3 MMOL/L (ref 3.5–5.1)
RBC # BLD AUTO: 3.38 M/UL (ref 4.05–5.25)
SODIUM SERPL-SCNC: 136 MMOL/L (ref 136–145)
WBC # BLD AUTO: 6.6 K/UL (ref 4.3–11.1)

## 2017-10-20 PROCEDURE — 74011250637 HC RX REV CODE- 250/637: Performed by: INTERNAL MEDICINE

## 2017-10-20 PROCEDURE — 77030011266 HC ELECTRD BLD INSL COVD -A: Performed by: PLASTIC SURGERY

## 2017-10-20 PROCEDURE — 77030012406 HC DRN WND PENRS BARD -A: Performed by: PLASTIC SURGERY

## 2017-10-20 PROCEDURE — 77030020782 HC GWN BAIR PAWS FLX 3M -B: Performed by: ANESTHESIOLOGY

## 2017-10-20 PROCEDURE — 65270000029 HC RM PRIVATE

## 2017-10-20 PROCEDURE — 74011250636 HC RX REV CODE- 250/636: Performed by: ANESTHESIOLOGY

## 2017-10-20 PROCEDURE — 74011250637 HC RX REV CODE- 250/637: Performed by: HOSPITALIST

## 2017-10-20 PROCEDURE — 74011250636 HC RX REV CODE- 250/636: Performed by: INTERNAL MEDICINE

## 2017-10-20 PROCEDURE — 77030031139 HC SUT VCRL2 J&J -A: Performed by: PLASTIC SURGERY

## 2017-10-20 PROCEDURE — 87077 CULTURE AEROBIC IDENTIFY: CPT | Performed by: PLASTIC SURGERY

## 2017-10-20 PROCEDURE — 76210000016 HC OR PH I REC 1 TO 1.5 HR: Performed by: PLASTIC SURGERY

## 2017-10-20 PROCEDURE — 77030011256 HC DRSG MEPILEX <16IN NO BORD MOLN -A

## 2017-10-20 PROCEDURE — 87075 CULTR BACTERIA EXCEPT BLOOD: CPT | Performed by: PLASTIC SURGERY

## 2017-10-20 PROCEDURE — 77030011640 HC PAD GRND REM COVD -A: Performed by: PLASTIC SURGERY

## 2017-10-20 PROCEDURE — 74011000250 HC RX REV CODE- 250: Performed by: PLASTIC SURGERY

## 2017-10-20 PROCEDURE — 85027 COMPLETE CBC AUTOMATED: CPT | Performed by: HOSPITALIST

## 2017-10-20 PROCEDURE — 80048 BASIC METABOLIC PNL TOTAL CA: CPT | Performed by: HOSPITALIST

## 2017-10-20 PROCEDURE — 77030011265 HC ELECTRD BLD HEX COVD -A: Performed by: PLASTIC SURGERY

## 2017-10-20 PROCEDURE — 77030008703 HC TU ET UNCUF COVD -A: Performed by: ANESTHESIOLOGY

## 2017-10-20 PROCEDURE — 74011000250 HC RX REV CODE- 250: Performed by: INTERNAL MEDICINE

## 2017-10-20 PROCEDURE — 77030013567 HC DRN WND RESERV BARD -A: Performed by: PLASTIC SURGERY

## 2017-10-20 PROCEDURE — 74011250636 HC RX REV CODE- 250/636

## 2017-10-20 PROCEDURE — 74011000258 HC RX REV CODE- 258: Performed by: INTERNAL MEDICINE

## 2017-10-20 PROCEDURE — 87186 SC STD MICRODIL/AGAR DIL: CPT | Performed by: PLASTIC SURGERY

## 2017-10-20 PROCEDURE — 77030018836 HC SOL IRR NACL ICUM -A: Performed by: PLASTIC SURGERY

## 2017-10-20 PROCEDURE — 77030008477 HC STYL SATN SLP COVD -A: Performed by: ANESTHESIOLOGY

## 2017-10-20 PROCEDURE — 74011250637 HC RX REV CODE- 250/637

## 2017-10-20 PROCEDURE — 87205 SMEAR GRAM STAIN: CPT | Performed by: PLASTIC SURGERY

## 2017-10-20 PROCEDURE — 74011000250 HC RX REV CODE- 250

## 2017-10-20 PROCEDURE — 76010000138 HC OR TIME 0.5 TO 1 HR: Performed by: PLASTIC SURGERY

## 2017-10-20 PROCEDURE — 74011250636 HC RX REV CODE- 250/636: Performed by: PLASTIC SURGERY

## 2017-10-20 PROCEDURE — 83735 ASSAY OF MAGNESIUM: CPT | Performed by: HOSPITALIST

## 2017-10-20 PROCEDURE — 76060000032 HC ANESTHESIA 0.5 TO 1 HR: Performed by: PLASTIC SURGERY

## 2017-10-20 RX ORDER — SODIUM CHLORIDE, SODIUM LACTATE, POTASSIUM CHLORIDE, CALCIUM CHLORIDE 600; 310; 30; 20 MG/100ML; MG/100ML; MG/100ML; MG/100ML
1000 INJECTION, SOLUTION INTRAVENOUS CONTINUOUS
Status: DISCONTINUED | OUTPATIENT
Start: 2017-10-20 | End: 2017-10-20 | Stop reason: HOSPADM

## 2017-10-20 RX ORDER — ALBUTEROL SULFATE 0.83 MG/ML
2.5 SOLUTION RESPIRATORY (INHALATION) AS NEEDED
Status: DISCONTINUED | OUTPATIENT
Start: 2017-10-20 | End: 2017-10-20 | Stop reason: HOSPADM

## 2017-10-20 RX ORDER — MIDAZOLAM HYDROCHLORIDE 1 MG/ML
2 INJECTION, SOLUTION INTRAMUSCULAR; INTRAVENOUS
Status: DISCONTINUED | OUTPATIENT
Start: 2017-10-20 | End: 2017-10-20 | Stop reason: HOSPADM

## 2017-10-20 RX ORDER — ONDANSETRON 2 MG/ML
4 INJECTION INTRAMUSCULAR; INTRAVENOUS
Status: DISCONTINUED | OUTPATIENT
Start: 2017-10-20 | End: 2017-10-20 | Stop reason: HOSPADM

## 2017-10-20 RX ORDER — SODIUM CHLORIDE 0.9 % (FLUSH) 0.9 %
5-10 SYRINGE (ML) INJECTION EVERY 8 HOURS
Status: DISCONTINUED | OUTPATIENT
Start: 2017-10-20 | End: 2017-10-20 | Stop reason: HOSPADM

## 2017-10-20 RX ORDER — FENTANYL CITRATE 50 UG/ML
INJECTION, SOLUTION INTRAMUSCULAR; INTRAVENOUS AS NEEDED
Status: DISCONTINUED | OUTPATIENT
Start: 2017-10-20 | End: 2017-10-20 | Stop reason: HOSPADM

## 2017-10-20 RX ORDER — VANCOMYCIN HYDROCHLORIDE 1 G/20ML
INJECTION, POWDER, LYOPHILIZED, FOR SOLUTION INTRAVENOUS AS NEEDED
Status: DISCONTINUED | OUTPATIENT
Start: 2017-10-20 | End: 2017-10-20 | Stop reason: HOSPADM

## 2017-10-20 RX ORDER — SODIUM CHLORIDE 0.9 % (FLUSH) 0.9 %
5-10 SYRINGE (ML) INJECTION AS NEEDED
Status: DISCONTINUED | OUTPATIENT
Start: 2017-10-20 | End: 2017-10-20 | Stop reason: HOSPADM

## 2017-10-20 RX ORDER — ROCURONIUM BROMIDE 10 MG/ML
INJECTION, SOLUTION INTRAVENOUS AS NEEDED
Status: DISCONTINUED | OUTPATIENT
Start: 2017-10-20 | End: 2017-10-20 | Stop reason: HOSPADM

## 2017-10-20 RX ORDER — LIDOCAINE HYDROCHLORIDE 10 MG/ML
0.1 INJECTION INFILTRATION; PERINEURAL AS NEEDED
Status: DISCONTINUED | OUTPATIENT
Start: 2017-10-20 | End: 2017-10-20 | Stop reason: HOSPADM

## 2017-10-20 RX ORDER — ENOXAPARIN SODIUM 100 MG/ML
40 INJECTION SUBCUTANEOUS EVERY 24 HOURS
Status: DISCONTINUED | OUTPATIENT
Start: 2017-10-21 | End: 2017-10-25 | Stop reason: HOSPADM

## 2017-10-20 RX ORDER — ONDANSETRON 2 MG/ML
INJECTION INTRAMUSCULAR; INTRAVENOUS AS NEEDED
Status: DISCONTINUED | OUTPATIENT
Start: 2017-10-20 | End: 2017-10-20 | Stop reason: HOSPADM

## 2017-10-20 RX ORDER — BACITRACIN ZINC 500 UNIT/G
OINTMENT (GRAM) TOPICAL AS NEEDED
Status: DISCONTINUED | OUTPATIENT
Start: 2017-10-20 | End: 2017-10-20 | Stop reason: HOSPADM

## 2017-10-20 RX ORDER — PROPOFOL 10 MG/ML
INJECTION, EMULSION INTRAVENOUS AS NEEDED
Status: DISCONTINUED | OUTPATIENT
Start: 2017-10-20 | End: 2017-10-20 | Stop reason: HOSPADM

## 2017-10-20 RX ORDER — SUCCINYLCHOLINE CHLORIDE 20 MG/ML
INJECTION INTRAMUSCULAR; INTRAVENOUS AS NEEDED
Status: DISCONTINUED | OUTPATIENT
Start: 2017-10-20 | End: 2017-10-20 | Stop reason: HOSPADM

## 2017-10-20 RX ORDER — HYDROMORPHONE HYDROCHLORIDE 2 MG/ML
0.5 INJECTION, SOLUTION INTRAMUSCULAR; INTRAVENOUS; SUBCUTANEOUS
Status: DISCONTINUED | OUTPATIENT
Start: 2017-10-20 | End: 2017-10-20 | Stop reason: HOSPADM

## 2017-10-20 RX ORDER — LIDOCAINE HYDROCHLORIDE 20 MG/ML
INJECTION, SOLUTION EPIDURAL; INFILTRATION; INTRACAUDAL; PERINEURAL AS NEEDED
Status: DISCONTINUED | OUTPATIENT
Start: 2017-10-20 | End: 2017-10-20 | Stop reason: HOSPADM

## 2017-10-20 RX ORDER — ACETAMINOPHEN 500 MG
1000 TABLET ORAL ONCE
Status: DISCONTINUED | OUTPATIENT
Start: 2017-10-20 | End: 2017-10-20 | Stop reason: HOSPADM

## 2017-10-20 RX ORDER — BUPIVACAINE HYDROCHLORIDE 5 MG/ML
INJECTION, SOLUTION EPIDURAL; INTRACAUDAL AS NEEDED
Status: DISCONTINUED | OUTPATIENT
Start: 2017-10-20 | End: 2017-10-20 | Stop reason: HOSPADM

## 2017-10-20 RX ORDER — ALBUTEROL SULFATE 90 UG/1
AEROSOL, METERED RESPIRATORY (INHALATION) AS NEEDED
Status: DISCONTINUED | OUTPATIENT
Start: 2017-10-20 | End: 2017-10-20 | Stop reason: HOSPADM

## 2017-10-20 RX ADMIN — SODIUM CHLORIDE 600 MG: 900 INJECTION, SOLUTION INTRAVENOUS at 09:15

## 2017-10-20 RX ADMIN — FAMOTIDINE 20 MG: 20 TABLET ORAL at 11:51

## 2017-10-20 RX ADMIN — FAMOTIDINE 20 MG: 20 TABLET ORAL at 09:15

## 2017-10-20 RX ADMIN — HYDROCODONE BITARTRATE AND ACETAMINOPHEN 1 TABLET: 5; 325 TABLET ORAL at 14:36

## 2017-10-20 RX ADMIN — PROPOFOL 150 MG: 10 INJECTION, EMULSION INTRAVENOUS at 12:13

## 2017-10-20 RX ADMIN — Medication 500 MG: at 17:21

## 2017-10-20 RX ADMIN — Medication 10 ML: at 22:26

## 2017-10-20 RX ADMIN — HYDROMORPHONE HYDROCHLORIDE 0.5 MG: 2 INJECTION, SOLUTION INTRAMUSCULAR; INTRAVENOUS; SUBCUTANEOUS at 13:39

## 2017-10-20 RX ADMIN — ONDANSETRON 4 MG: 2 INJECTION INTRAMUSCULAR; INTRAVENOUS at 12:29

## 2017-10-20 RX ADMIN — SODIUM CHLORIDE 600 MG: 900 INJECTION, SOLUTION INTRAVENOUS at 17:21

## 2017-10-20 RX ADMIN — ALBUTEROL SULFATE 3 PUFF: 90 AEROSOL, METERED RESPIRATORY (INHALATION) at 12:38

## 2017-10-20 RX ADMIN — FAMOTIDINE 20 MG: 20 TABLET ORAL at 17:21

## 2017-10-20 RX ADMIN — Medication 10 ML: at 06:00

## 2017-10-20 RX ADMIN — VANCOMYCIN HYDROCHLORIDE 1000 MG: 1 INJECTION, POWDER, LYOPHILIZED, FOR SOLUTION INTRAVENOUS at 22:25

## 2017-10-20 RX ADMIN — ROCURONIUM BROMIDE 5 MG: 10 INJECTION, SOLUTION INTRAVENOUS at 12:13

## 2017-10-20 RX ADMIN — HYDROMORPHONE HYDROCHLORIDE 0.5 MG: 2 INJECTION, SOLUTION INTRAMUSCULAR; INTRAVENOUS; SUBCUTANEOUS at 13:24

## 2017-10-20 RX ADMIN — DILTIAZEM HYDROCHLORIDE 180 MG: 180 CAPSULE, COATED, EXTENDED RELEASE ORAL at 09:15

## 2017-10-20 RX ADMIN — LEVOTHYROXINE SODIUM 100 MCG: 100 TABLET ORAL at 09:15

## 2017-10-20 RX ADMIN — SODIUM CHLORIDE 600 MG: 900 INJECTION, SOLUTION INTRAVENOUS at 01:55

## 2017-10-20 RX ADMIN — SUCCINYLCHOLINE CHLORIDE 160 MG: 20 INJECTION INTRAMUSCULAR; INTRAVENOUS at 12:13

## 2017-10-20 RX ADMIN — LIDOCAINE HYDROCHLORIDE 60 MG: 20 INJECTION, SOLUTION EPIDURAL; INFILTRATION; INTRACAUDAL; PERINEURAL at 12:13

## 2017-10-20 RX ADMIN — FENTANYL CITRATE 100 MCG: 50 INJECTION, SOLUTION INTRAMUSCULAR; INTRAVENOUS at 12:12

## 2017-10-20 RX ADMIN — Medication 400 MG: at 17:21

## 2017-10-20 RX ADMIN — DULOXETINE HYDROCHLORIDE 60 MG: 60 CAPSULE, DELAYED RELEASE ORAL at 22:24

## 2017-10-20 RX ADMIN — ALPRAZOLAM 0.5 MG: 0.5 TABLET ORAL at 22:37

## 2017-10-20 RX ADMIN — SODIUM CHLORIDE, SODIUM LACTATE, POTASSIUM CHLORIDE, AND CALCIUM CHLORIDE: 600; 310; 30; 20 INJECTION, SOLUTION INTRAVENOUS at 10:45

## 2017-10-20 NOTE — PROGRESS NOTES
Bedside report given to [facility] RN. Pt in bed resting no noted distress.

## 2017-10-20 NOTE — ANESTHESIA POSTPROCEDURE EVALUATION
Post-Anesthesia Evaluation and Assessment    Patient: Garrick Hinojosa MRN: [id number]  SSN: [id number]    YOB: 1950  Age: 69 y.o. Sex: female       Cardiovascular Function/Vital Signs  Visit Vitals    /49    Pulse 74    Temp 37.3 °C (99.1 °F)    Resp 16    Ht 5' 2\" (1.575 m)    Wt 68.2 kg (150 lb 5.7 oz)    SpO2 96%    Breastfeeding No    BMI 27.5 kg/m2       Patient is status post general anesthesia for Procedure(s):  BREAST IMPLANT REMOVAL . Nausea/Vomiting: None    Postoperative hydration reviewed and adequate. Pain:  Pain Scale 1: Visual (10/20/17 1347)  Pain Intensity 1: 0 (10/20/17 1347)   Managed    Neurological Status:   Neuro (WDL): Within Defined Limits (10/20/17 1347)  Neuro  Neurologic State: Alert (10/20/17 1347)  Orientation Level: Oriented X4 (10/20/17 1347)  Cognition: Follows commands (10/20/17 0915)  Speech: Clear (10/20/17 0915)  LUE Motor Response: Purposeful;Spontaneous  (10/18/17 0727)  LLE Motor Response: Spontaneous ; Purposeful (10/18/17 0727)  RUE Motor Response: Purposeful;Spontaneous  (10/18/17 0727)  RLE Motor Response: Purposeful;Spontaneous  (10/18/17 0727)   At baseline    Mental Status and Level of Consciousness: Arousable    Pulmonary Status:   O2 Device: Nasal cannula (10/20/17 1347)   Adequate oxygenation and airway patent    Complications related to anesthesia: None    Post-anesthesia assessment completed. No concerns. Doing well.      Signed By: Agustin Marquez MD     October 20, 2017

## 2017-10-20 NOTE — PROGRESS NOTES
Bedside report received from [address] [place] [address]. Pt in bed resting with  at side, no noted distress.

## 2017-10-20 NOTE — BRIEF OP NOTE
BRIEF OPERATIVE NOTE    Date of Procedure: 10/20/2017   Preoperative Diagnosis: History of breast cancer [Z85.3]  Cellulitis of chest wall [L03.313]  Postoperative Diagnosis: History of breast cancer [Z85.3]  Cellulitis of chest wall [L03.313]    Procedure(s):  BREAST IMPLANT REMOVAL , Irrigation and Debridement  Surgeon(s) and Role:     * Marge Garibay MD - Primary         Assistant Staff:       Surgical Staff:  Circ-1: Suyapa Peters RN  Scrub Tech-1: Ellen Gill  Event Time In   Incision Start 1225   Incision Close 1251     Anesthesia: General   Estimated Blood Loss: 20cc  Specimens:   ID Type Source Tests Collected by Time Destination   1 : RIGHT BREAST Wound Breast CULTURE, ANAEROBIC, CULTURE, WOUND W GRAM STAIN Marge Garibay MD 10/20/2017 1229 Microbiology      Findings: see dictation   Complications: see dictation  Implants: * No implants in log *

## 2017-10-20 NOTE — PROGRESS NOTES
Patient's temperature increased again. Gave tylenol and called MD. He stated to order blood cultures and have one drawn peripherally and one off of the port.

## 2017-10-20 NOTE — PROGRESS NOTES
Assessment completed and documented. Dressings intact. Respirations even and unlabored. Alert and oriented. No complaints of pain. Abdomen soft. Will continue to monitor with hourly rounding.

## 2017-10-20 NOTE — PROGRESS NOTES
Pt back from surgery,  at side. Pt with dressing to right breast, TAMMI drain to R breast noted--TAMMI charged and draining. Pt denies needs.

## 2017-10-20 NOTE — WOUND CARE
Noted plastic surgeon evaluation and plan for surgery. Wound team will hold services for now and available if surgeon requests.

## 2017-10-20 NOTE — PROGRESS NOTES
Nurse reports that pt is on bedrest and will be going for surgery later today. PT will check on pt tomorrow.

## 2017-10-20 NOTE — PERIOP NOTES
TRANSFER - OUT REPORT:    Verbal report given to Mehdi Bean RN on Francine lAvarenga  being transferred to [phone] for routine post - op       Report consisted of patients Situation, Background, Assessment and   Recommendations(SBAR). Information from the following report(s) OR Summary, Procedure Summary, Intake/Output and MAR was reviewed with the receiving nurse. Opportunity for questions and clarification was provided.       Patient transported with:   O2 @ 2 liters

## 2017-10-20 NOTE — PROGRESS NOTES
TRANSFER - IN REPORT:    Verbal report received from [employer] RN(name) on Rosalea Velvet  being received from [employer] (unit) for routine post - op      Report consisted of patients Situation, Background, Assessment and   Recommendations(SBAR). Information from the following report(s) SBAR, OR Summary, Procedure Summary, Intake/Output, MAR and Recent Results was reviewed with the receiving nurse. Opportunity for questions and clarification was provided.

## 2017-10-20 NOTE — PERIOP NOTES
TRANSFER - IN REPORT:    Verbal report received from [address] [place] [address] on Christopher Relic  being received from 3rd floor med surg (unit) for routine progression of care      Report consisted of patients Situation, Background, Assessment and   Recommendations(SBAR). Information from the following report(s) SBAR, Procedure Summary, MAR and Recent Results was reviewed with the receiving nurse. Opportunity for questions and clarification was provided. Assessment completed upon patients arrival to unit and care assumed.

## 2017-10-21 LAB
ANION GAP SERPL CALC-SCNC: 5 MMOL/L (ref 7–16)
BUN SERPL-MCNC: 12 MG/DL (ref 8–23)
CALCIUM SERPL-MCNC: 7.7 MG/DL (ref 8.3–10.4)
CHLORIDE SERPL-SCNC: 100 MMOL/L (ref 98–107)
CO2 SERPL-SCNC: 29 MMOL/L (ref 21–32)
CREAT SERPL-MCNC: 0.74 MG/DL (ref 0.6–1)
GLUCOSE SERPL-MCNC: 95 MG/DL (ref 65–100)
MAGNESIUM SERPL-MCNC: 2 MG/DL (ref 1.8–2.4)
POTASSIUM SERPL-SCNC: 4.5 MMOL/L (ref 3.5–5.1)
SODIUM SERPL-SCNC: 134 MMOL/L (ref 136–145)

## 2017-10-21 PROCEDURE — 74011250637 HC RX REV CODE- 250/637: Performed by: PHYSICIAN ASSISTANT

## 2017-10-21 PROCEDURE — 74011250636 HC RX REV CODE- 250/636: Performed by: INTERNAL MEDICINE

## 2017-10-21 PROCEDURE — 80048 BASIC METABOLIC PNL TOTAL CA: CPT | Performed by: INTERNAL MEDICINE

## 2017-10-21 PROCEDURE — 36591 DRAW BLOOD OFF VENOUS DEVICE: CPT

## 2017-10-21 PROCEDURE — 65270000029 HC RM PRIVATE

## 2017-10-21 PROCEDURE — 74011250637 HC RX REV CODE- 250/637: Performed by: INTERNAL MEDICINE

## 2017-10-21 PROCEDURE — 74011250637 HC RX REV CODE- 250/637: Performed by: HOSPITALIST

## 2017-10-21 PROCEDURE — 83735 ASSAY OF MAGNESIUM: CPT | Performed by: PLASTIC SURGERY

## 2017-10-21 RX ADMIN — Medication 400 MG: at 17:11

## 2017-10-21 RX ADMIN — Medication 500 MG: at 08:06

## 2017-10-21 RX ADMIN — Medication 500 MG: at 17:11

## 2017-10-21 RX ADMIN — VANCOMYCIN HYDROCHLORIDE 1000 MG: 1 INJECTION, POWDER, LYOPHILIZED, FOR SOLUTION INTRAVENOUS at 20:45

## 2017-10-21 RX ADMIN — FAMOTIDINE 20 MG: 20 TABLET ORAL at 08:07

## 2017-10-21 RX ADMIN — Medication: at 08:08

## 2017-10-21 RX ADMIN — Medication 10 ML: at 23:01

## 2017-10-21 RX ADMIN — LEVOTHYROXINE SODIUM 100 MCG: 100 TABLET ORAL at 08:07

## 2017-10-21 RX ADMIN — VANCOMYCIN HYDROCHLORIDE 1000 MG: 1 INJECTION, POWDER, LYOPHILIZED, FOR SOLUTION INTRAVENOUS at 08:07

## 2017-10-21 RX ADMIN — Medication 10 ML: at 20:43

## 2017-10-21 RX ADMIN — ENOXAPARIN SODIUM 40 MG: 40 INJECTION SUBCUTANEOUS at 05:46

## 2017-10-21 RX ADMIN — DULOXETINE HYDROCHLORIDE 60 MG: 60 CAPSULE, DELAYED RELEASE ORAL at 22:59

## 2017-10-21 RX ADMIN — DILTIAZEM HYDROCHLORIDE 180 MG: 180 CAPSULE, COATED, EXTENDED RELEASE ORAL at 08:06

## 2017-10-21 RX ADMIN — FUROSEMIDE 20 MG: 20 TABLET ORAL at 08:07

## 2017-10-21 RX ADMIN — Medication 400 MG: at 08:06

## 2017-10-21 RX ADMIN — FAMOTIDINE 20 MG: 20 TABLET ORAL at 17:11

## 2017-10-21 RX ADMIN — Medication 10 ML: at 05:46

## 2017-10-21 RX ADMIN — ALPRAZOLAM 0.5 MG: 0.5 TABLET ORAL at 23:09

## 2017-10-21 RX ADMIN — HYDROCODONE BITARTRATE AND ACETAMINOPHEN 1 TABLET: 5; 325 TABLET ORAL at 17:11

## 2017-10-21 RX ADMIN — HYDROCODONE BITARTRATE AND ACETAMINOPHEN 1 TABLET: 5; 325 TABLET ORAL at 09:03

## 2017-10-21 RX ADMIN — METOPROLOL TARTRATE 50 MG: 50 TABLET ORAL at 08:07

## 2017-10-21 NOTE — PROGRESS NOTES
Continues to rest quietly, resp even, unlab, skin warm, dry. Assessment noted. Right upper back dsg saturated with light pink serosanguinous drainage. Dsg reinforced until all supplies available. Gown, bed pad and linens changed by SARTHAK Cote. Right breast dsg clean, dry, intact. TAMMI intact, charged, with small amt of serosanguinous output. No c/o, no needs, no distress.

## 2017-10-21 NOTE — PROGRESS NOTES
Progress Note    Patient: Ariana Bingham MRN: [id number]  SSN: [id number]    YOB: 1950  Age: 69 y.o. Sex: female      Admit Date: 10/11/2017    LOS: 9 days     Subjective:   Patient examined at bedside. Stated she had a good sleep, did not complain of pain, fever, vomiting, chest pain, abdominal pain, diarrhea. She underwent right breast implant removal and I&D today, no complications. Short term lovenox will be started for thrombosis of great saphenous vein-     Review of Systems negative with exception of pertinent positives noted above    Objective:     Vitals:    10/20/17 1347 10/20/17 1357 10/20/17 1420 10/20/17 1605   BP: 100/49 112/55 102/41 93/52   Pulse: 74 79 81 70   Resp: 16 16 16 18   Temp:   96.7 °F (35.9 °C) 96.2 °F (35.7 °C)   SpO2: 96% 96% 96% 98%   Weight:       Height:            Intake and Output:  Current Shift:    Last three shifts: 10/19 0701 - 10/20 1900  In: 333 [I.V.:333]  Out: 2600 [Urine:2550; Drains:50]    Physical Exam:   General appearance: NAD, conversant, oriented x 3  Neck: Trachea midline   Lungs: CTA, with normal respiratory effort and no intercostal retractions  CV: S1,S2 present, no added murmurs  Abdomen: Soft, non-tender; no masses   Extremities: No peripheral edema or extremity lymphadenopathy  Skin: Normal temperature, turgor and texture; erythema of right upper posterior chest- abundant secretion noted from gauze. Right breast fluctuation, erythema- pain on palpation   Psych: Appropriate affect, alert and oriented to person, place and time  CNS: GCS 15, no motor or sensory deficits, CN 2-12 intact    Lab/Data Review: All lab results for the last 24 hours reviewed.      Assessment:     Principal Problem:    Pancytopenia ([address] (10/11/2017)    Active Problems:    Acquired hypothyroidism (11/4/2015)      Hypokalemia (4/7/2017)      Breast cancer ([address] (9/18/2017)      Acute renal failure (ARF) ([address] (10/11/2017)      Hypomagnesemia (10/11/2017)      Diarrhea (10/11/2017)      Sepsis ([facility] [address] (10/11/2017)        Plan:     1. Sepsis in immunocompromised host, likely due to soft tissue infection, purulent cellulitis - Noted with right breast erythema, fever, pain: as per ID recommendation, discontinue clindamycin and start vancomycin iv (day 1 )- positive MRSA- wound care- plastic surgery f/u-  monitor cbc daily-    3. Left leg great saphenous vein thrombosis in the setting of breast cancer and sepsis: will start on short course of an intermediate sc dose of lovenox 40mg sq day ( reference uptodate-also arch intern med 2003:[id number] (14): 0021. )    2. New systolic acute congestive HF with EF 45-50%- signs of congestion resolved-CXR looking better- on oral lasix -     3. Atrial tachycardia: possible due to New onset HF-  on cardizem po-     4. Asthma exacerbation: continue duonebs-     5. Hx of Breast CA s/p reconstruction, on oral chemo hormone therapy-     6. Hypothyroidism- continue synthroid     7.  Pancytopenia: seen by hematology-oncology- received 2 prbc- improving       DVT Prophylaxis: scds    Dispo PT/OT follow anticipate SNF placement        Signed By: Norberto Obrien MD     October 20, 2017

## 2017-10-21 NOTE — PROGRESS NOTES
Pharmacokinetic Consult to Pharmacist    Kinzadulce Talley is a 69 y.o. female being treated for right breast cellulitis with vancomycin. Height: 5' 2\" (157.5 cm)  Weight: 68.2 kg (150 lb 5.7 oz)  Lab Results   Component Value Date/Time    BUN 13 10/20/2017 05:22 AM    Creatinine 0.81 10/20/2017 05:22 AM    WBC 6.6 10/20/2017 05:22 AM    Procalcitonin 7.0 10/13/2017 04:10 AM    Lactic acid 1.8 10/14/2017 06:24 AM    Lactic Acid (POC) 3.6 10/11/2017 03:35 PM      Estimated Creatinine Clearance: 59.3 mL/min (based on Cr of 0.81). CULTURES:  10/20  WC :In Process  10/19  BC x 2 :NG    Lab Results   Component Value Date/Time    Vancomycin,trough 16.0 10/15/2017 08:21 PM       Day 1 of vancomycin. Goal trough is 15-20. ( above trough results are from a previous order for another indication.)    We will initiate therapy with vancomycin 1g q12h We will continue to follow the patient and adjust the dose as necessary per guidelines.     Thank you,  Emy Salinas, PharmD

## 2017-10-21 NOTE — PROGRESS NOTES
Resting quietly, awake with no c/o during bedside report received from Antonieta Laguna RN. No distress.

## 2017-10-21 NOTE — PROGRESS NOTES
Am assessment completed. Pt is alert and oriented x 3, lungs clear but diminished in bases with scattered crackles. Reagan Tran needs well. Takes medications whole with thin liquids. On bed rest due to blood clot Lt thigh. Abd soft with + bowel sounds. Continent of bowel and bladder. Breast dsg Rt front and back clean, dry and intact. Safety measures in place. Continue to monitor.

## 2017-10-21 NOTE — PROGRESS NOTES
Resting quietly, resp even, unlab. Eyes closed with relaxed facial expression during report given to Karen Quezada RN. No distress noted.

## 2017-10-21 NOTE — PROGRESS NOTES
Pharmacokinetic Consult to Pharmacist    Colfaxadrian Gutiérrez is a 69 y.o. female being treated for cellulitis with Vancomycin. @[id number])@  @GTBS51)@  Lab Results   Component Value Date/Time    BUN 12 10/21/2017 06:06 AM    Creatinine 0.74 10/21/2017 06:06 AM    WBC 6.6 10/20/2017 05:22 AM    Procalcitonin 7.0 10/13/2017 04:10 AM    Lactic acid 1.8 10/14/2017 06:24 AM    Lactic Acid (POC) 3.6 10/11/2017 03:35 PM      Estimated Creatinine Clearance: 64.9 mL/min (based on Cr of 0.74).     CULTURES:  All Micro Results       Procedure Component Value Units Date/Time      CULTURE, BLOOD [[id number]] Collected:  10/19/17 0915    Order Status:  Completed Specimen:  Blood from Blood Updated:  10/21/17 1016     Special Requests: --        LEFT  Antecubital       Culture result: NO GROWTH 2 DAYS       CULTURE, BLOOD [[id number]] Collected:  10/19/17 2237    Order Status:  Completed Specimen:  Blood from Blood Updated:  10/21/17 1016     Special Requests: PORT        Culture result: NO GROWTH 2 DAYS       CULTURE, BLOOD [[id number]] Collected:  10/19/17 0912    Order Status:  Completed Specimen:  Blood from Blood Updated:  10/21/17 1016     Special Requests: --        LEFT  Antecubital       Culture result: NO GROWTH 2 DAYS       CULTURE, [place] STAIN [[id number]] Collected:  10/20/17 1230    Order Status:  Completed Specimen:  Wound from Breast Updated:  10/21/17 0958     Special Requests: --     RT  BREAST       GRAM STAIN PENDING     Culture result: NO GROWTH 1 DAY       CULTURE, ANAEROBIC [[id number]] Collected:  10/20/17 1230    Order Status:  Completed Specimen:  Wound Drainage Updated:  10/20/17 1419    CULTURE, BLOOD [[id number]] Collected:  10/11/17 1214    Order Status:  Completed Specimen:  Blood from Blood Updated:  10/17/17 1010     Special Requests: PORT        Culture result: NO GROWTH 6 DAYS       CULTURE, BLOOD [[id number]] Collected:  10/11/17 1214    Order Status:  Completed Specimen:  Blood from Blood Updated: 10/17/17 1010     Special Requests: --        LEFT  Antecubital       Culture result: NO GROWTH 6 DAYS       CULTURE, WOUND Joann Dayhoff STAIN [[id number]]  (Abnormal)  (Susceptibility) Collected:  10/11/17 1730    Order Status:  Completed Specimen:  Drainage Updated:  10/14/17 1013     Special Requests: --        RIGHT  UPPER  BACK       GRAM STAIN NO WBCS SEEN                 MODERATE GRAM POSITIVE COCCI     Culture result:         HEAVY **METHICILLIN RESISTANT STAPHYLOCOCCUS AUREUS** Use Rifampin only in conjunction with another antimicrobial agent. Do not use as Monotherapy. (A)    CULTURE, URINE [[id number]] Collected:  10/11/17 1735    Order Status:  Completed Specimen:  Urine from Clean catch Updated:  10/14/17 0618     Special Requests: NO SPECIAL REQUESTS        Culture result: NO GROWTH 2 DAYS       CULTURE, BODY FLUID [place] [[id number]] Collected:  10/11/17 1735    Order Status:  Canceled Specimen:  Miscellaneous Fluid     CULTURE, BLOOD [[id number]] Collected:  10/11/17 1615    Order Status:  Canceled Specimen:  Blood from Blood     CULTURE, BLOOD [[id number]] Collected:  10/11/17 1615    Order Status:  Canceled Specimen:  Blood from Blood     C. DIFFICILE/EPI PCR [[id number]] Collected:  10/11/17 1315    Order Status:  Canceled Specimen:  Stool               Lab Results   Component Value Date/Time    Vancomycin,trough 16.0 10/15/2017 08:21 PM       Day 2 of vancomycin. Goal trough is 12 - 18. Will order a trough for 8 am tomorrow (10-22). Please continue Vancomycin 1 gram IV every 12 hours. Will continue to follow patient.       Thank you,  Cayden KeitaD, BCPS

## 2017-10-21 NOTE — PROGRESS NOTES
Progress Note    Patient: Chrsitopher Poole MRN: [id number]  SSN: [id number]    YOB: 1950  Age: 69 y.o. Sex: female      Admit Date: 10/11/2017    LOS: 10 days     Subjective:   Patient examined at bedside. Stated she had a good sleep, did not complain of pain, fever, vomiting, chest pain, abdominal pain, diarrhea. She underwent right breast implant removal and I&D today, no complications. Short term lovenox will be started for thrombosis of great saphenous vein-     Review of Systems negative with exception of pertinent positives noted above    Objective:     Vitals:    10/20/17 2320 10/21/17 0315 10/21/17 0749 10/21/17 1130   BP: 117/54 113/50 117/51 104/51   Pulse: 77 82 89 66   Resp: 18 16 16 22   Temp: 98.3 °F (36.8 °C) 98.2 °F (36.8 °C) 98.9 °F (37.2 °C) 99.5 °F (37.5 °C)   SpO2: 100% 99% 96% 94%   Weight:       Height:            Intake and Output:  Current Shift: 10/21 0701 - 10/21 1900  In: -   Out: 1100 [Urine:1100]  Last three shifts: 10/19 1901 - 10/21 0700  In: 333 [I.V.:333]  Out: 3225 [Urine:3175; Drains:50]    Physical Exam:   General appearance: NAD, conversant, oriented x 3  Neck: Trachea midline   Lungs: CTA, with normal respiratory effort and no intercostal retractions  CV: S1,S2 present, no added murmurs  Abdomen: Soft, non-tender; no masses   Extremities: No peripheral edema or extremity lymphadenopathy  Skin: Normal temperature, turgor and texture; erythema of right upper posterior chest- abundant secretion noted from gauze. Right breast fluctuation, erythema- pain on palpation   Psych: Appropriate affect, alert and oriented to person, place and time  CNS: GCS 15, no motor or sensory deficits, CN 2-12 intact    Lab/Data Review: All lab results for the last 24 hours reviewed.      Assessment:     Principal Problem:    Pancytopenia ([address] (10/11/2017)    Active Problems:    Acquired hypothyroidism (11/4/2015)      Hypokalemia (4/7/2017)      Breast cancer ([facility] (9/18/2017) Acute renal failure (ARF) ([facility] [address] (10/11/2017)      Hypomagnesemia (10/11/2017)      Diarrhea (10/11/2017)      Sepsis ([facility] [address] (10/11/2017)        Plan:     1. Sepsis in immunocompromised host, likely due to soft tissue infection, purulent cellulitis due to MRSA - Noted with right breast erythema, fever, pain: on vancomycin iv (day 1 ), but she used to be on clyndamycin since 10/11/17, so far wound cultures taken yesterday from OR are partial negative, will await complete sensitivity-continue wound care- plastic surgery f/u-  monitor cbc daily-    2. Left leg great saphenous vein thrombosis ( close to femoral junction ) in the setting of breast cancer and sepsis: on short course of an intermediate sc dose of lovenox 40mg sq day ( reference uptodate-also arch intern med 2003:163 (14): 6540. ) to complete 45 days. 3. New systolic acute congestive HF with EF 45-50%- signs of congestion resolved- on oral lasix     4. Atrial tachycardia: possible due to New onset HF-  on cardizem po-     5. Asthma exacerbation: continue duonebs-     6. Hx of Breast CA s/p reconstruction, on oral chemo hormone therapy-     7. Hypothyroidism- continue synthroid     8.  Pancytopenia: seen by hematology-oncology- received 2 prbc- improving       DVT Prophylaxis: scds    Dispo PT/OT follow anticipate SNF placement        Signed By: Norene Mcardle, MD     October 21, 2017

## 2017-10-21 NOTE — PROGRESS NOTES
PT deferred therapy today due to pt on bed rest for a left thigh blood clot. Therapy will follow up 10/22/17 & check pt's status.  Padmini Blancas, PT, PARTHA

## 2017-10-22 LAB
ANION GAP SERPL CALC-SCNC: 1 MMOL/L (ref 7–16)
BUN SERPL-MCNC: 10 MG/DL (ref 8–23)
CALCIUM SERPL-MCNC: 8.1 MG/DL (ref 8.3–10.4)
CHLORIDE SERPL-SCNC: 101 MMOL/L (ref 98–107)
CO2 SERPL-SCNC: 33 MMOL/L (ref 21–32)
CREAT SERPL-MCNC: 0.89 MG/DL (ref 0.6–1)
ERYTHROCYTE [DISTWIDTH] IN BLOOD BY AUTOMATED COUNT: 16.9 % (ref 11.9–14.6)
GLUCOSE SERPL-MCNC: 118 MG/DL (ref 65–100)
HCT VFR BLD AUTO: 34.2 % (ref 35.8–46.3)
HGB BLD-MCNC: 10.6 G/DL (ref 11.7–15.4)
MCH RBC QN AUTO: 30.5 PG (ref 26.1–32.9)
MCHC RBC AUTO-ENTMCNC: 31 G/DL (ref 31.4–35)
MCV RBC AUTO: 98.6 FL (ref 79.6–97.8)
PLATELET # BLD AUTO: 211 K/UL (ref 150–450)
PMV BLD AUTO: 11.4 FL (ref 10.8–14.1)
POTASSIUM SERPL-SCNC: 4.2 MMOL/L (ref 3.5–5.1)
RBC # BLD AUTO: 3.47 M/UL (ref 4.05–5.25)
SODIUM SERPL-SCNC: 135 MMOL/L (ref 136–145)
VANCOMYCIN TROUGH SERPL-MCNC: 17.1 UG/ML (ref 5–20)
WBC # BLD AUTO: 4.7 K/UL (ref 4.3–11.1)

## 2017-10-22 PROCEDURE — 97116 GAIT TRAINING THERAPY: CPT

## 2017-10-22 PROCEDURE — 97110 THERAPEUTIC EXERCISES: CPT

## 2017-10-22 PROCEDURE — 65270000029 HC RM PRIVATE

## 2017-10-22 PROCEDURE — 74011250636 HC RX REV CODE- 250/636: Performed by: INTERNAL MEDICINE

## 2017-10-22 PROCEDURE — 74011250637 HC RX REV CODE- 250/637: Performed by: INTERNAL MEDICINE

## 2017-10-22 PROCEDURE — 85027 COMPLETE CBC AUTOMATED: CPT | Performed by: INTERNAL MEDICINE

## 2017-10-22 PROCEDURE — 74011250637 HC RX REV CODE- 250/637: Performed by: PHYSICIAN ASSISTANT

## 2017-10-22 PROCEDURE — 77030003560 HC NDL HUBR BARD -A

## 2017-10-22 PROCEDURE — 74011250637 HC RX REV CODE- 250/637: Performed by: HOSPITALIST

## 2017-10-22 PROCEDURE — 36415 COLL VENOUS BLD VENIPUNCTURE: CPT | Performed by: INTERNAL MEDICINE

## 2017-10-22 PROCEDURE — 80202 ASSAY OF VANCOMYCIN: CPT | Performed by: INTERNAL MEDICINE

## 2017-10-22 PROCEDURE — 80048 BASIC METABOLIC PNL TOTAL CA: CPT | Performed by: INTERNAL MEDICINE

## 2017-10-22 RX ORDER — FAMOTIDINE 20 MG/1
20 TABLET, FILM COATED ORAL DAILY
Status: DISCONTINUED | OUTPATIENT
Start: 2017-10-23 | End: 2017-10-25 | Stop reason: HOSPADM

## 2017-10-22 RX ORDER — HEPARIN 100 UNIT/ML
300 SYRINGE INTRAVENOUS AS NEEDED
Status: DISCONTINUED | OUTPATIENT
Start: 2017-10-22 | End: 2017-10-25 | Stop reason: HOSPADM

## 2017-10-22 RX ORDER — HEPARIN 100 UNIT/ML
300 SYRINGE INTRAVENOUS EVERY 8 HOURS
Status: DISCONTINUED | OUTPATIENT
Start: 2017-10-22 | End: 2017-10-25 | Stop reason: HOSPADM

## 2017-10-22 RX ADMIN — FAMOTIDINE 20 MG: 20 TABLET ORAL at 09:09

## 2017-10-22 RX ADMIN — Medication 400 MG: at 17:30

## 2017-10-22 RX ADMIN — DULOXETINE HYDROCHLORIDE 60 MG: 60 CAPSULE, DELAYED RELEASE ORAL at 23:13

## 2017-10-22 RX ADMIN — ENOXAPARIN SODIUM 40 MG: 40 INJECTION SUBCUTANEOUS at 06:10

## 2017-10-22 RX ADMIN — SODIUM CHLORIDE, PRESERVATIVE FREE 300 UNITS: 5 INJECTION INTRAVENOUS at 10:33

## 2017-10-22 RX ADMIN — HYDROCODONE BITARTRATE AND ACETAMINOPHEN 1 TABLET: 5; 325 TABLET ORAL at 13:00

## 2017-10-22 RX ADMIN — Medication 10 ML: at 23:22

## 2017-10-22 RX ADMIN — Medication 400 MG: at 09:09

## 2017-10-22 RX ADMIN — Medication 500 MG: at 17:30

## 2017-10-22 RX ADMIN — ALPRAZOLAM 0.5 MG: 0.5 TABLET ORAL at 23:21

## 2017-10-22 RX ADMIN — Medication 10 ML: at 06:25

## 2017-10-22 RX ADMIN — LEVOTHYROXINE SODIUM 100 MCG: 100 TABLET ORAL at 07:40

## 2017-10-22 RX ADMIN — Medication 500 MG: at 09:09

## 2017-10-22 RX ADMIN — VANCOMYCIN HYDROCHLORIDE 1000 MG: 1 INJECTION, POWDER, LYOPHILIZED, FOR SOLUTION INTRAVENOUS at 09:09

## 2017-10-22 RX ADMIN — SODIUM CHLORIDE, PRESERVATIVE FREE 300 UNITS: 5 INJECTION INTRAVENOUS at 14:45

## 2017-10-22 RX ADMIN — VANCOMYCIN HYDROCHLORIDE 1000 MG: 1 INJECTION, POWDER, LYOPHILIZED, FOR SOLUTION INTRAVENOUS at 21:37

## 2017-10-22 RX ADMIN — DILTIAZEM HYDROCHLORIDE 180 MG: 180 CAPSULE, COATED, EXTENDED RELEASE ORAL at 09:09

## 2017-10-22 RX ADMIN — METOPROLOL TARTRATE 50 MG: 50 TABLET ORAL at 09:09

## 2017-10-22 RX ADMIN — Medication: at 10:45

## 2017-10-22 RX ADMIN — Medication 10 ML: at 14:42

## 2017-10-22 RX ADMIN — HYDROCODONE BITARTRATE AND ACETAMINOPHEN 1 TABLET: 5; 325 TABLET ORAL at 06:24

## 2017-10-22 RX ADMIN — Medication 10 ML: at 10:33

## 2017-10-22 RX ADMIN — FUROSEMIDE 20 MG: 20 TABLET ORAL at 09:09

## 2017-10-22 NOTE — PROGRESS NOTES
Continues to rest quietly, resp even, unlab. Assessment noted. Dsgs, right breast and back, clean, dry, intact. TAMMI intact, emptied of 35 ml serosanguinous output, recharged. No c/o. No distress.

## 2017-10-22 NOTE — PROGRESS NOTES
Patient doing much better today. She is POD #2 following removal of right breast implant. Erythema on the right breast has improved a lot. However, tape does appear to be irritating skin. Would recommend using a bra or ace wrap to hold the breast dressing on and just confine the tape to the drain site. I would switch to paper tape on the back, as the tegaderm is really irritating as well. Ultimately, I think the back wound will heal the fastest with a VAC dressing. Latissimus donor sites are very prone to seroma formation and the negative pressure along with the removal of wound drainage would be very beneficial. Will plan to have the patient follow up with me after she finishes rehab.

## 2017-10-22 NOTE — PROGRESS NOTES
Xanax 0.5 mg tab given PO, per pt request. Reports having voided with BM, spouse at side assisted. No c/o. No distress.

## 2017-10-22 NOTE — PROGRESS NOTES
Pharmacokinetic Consult to Pharmacist    Chantedulce Khalil is a 69 y.o. female being treated for cellulitis  with Vancomycin. Height: 5' 2\" (157.5 cm)  Weight: 68.2 kg (150 lb 5.7 oz)  Lab Results   Component Value Date/Time    BUN 10 10/22/2017 08:08 AM    Creatinine 0.89 10/22/2017 08:08 AM    WBC 4.7 10/22/2017 08:08 AM    Procalcitonin 7.0 10/13/2017 04:10 AM    Lactic acid 1.8 10/14/2017 06:24 AM    Lactic Acid (POC) 3.6 10/11/2017 03:35 PM      Estimated Creatinine Clearance: 54 mL/min (based on Cr of 0.89).     CULTURES:  All Micro Results     Procedure Component Value Units Date/Time    CULTURE, Monika Figueroa STAIN [[id number]] Collected:  10/20/17 1230    Order Status:  Completed Specimen:  Wound from Breast Updated:  10/21/17 1209     Special Requests: --     RT  BREAST       GRAM STAIN NO DEFINITE ORGANISM SEEN         0 TO 3 WBC'S PER OIF     Culture result: NO GROWTH 1 DAY       CULTURE, BLOOD [[id number]] Collected:  10/19/17 0915    Order Status:  Completed Specimen:  Blood from Blood Updated:  10/21/17 1016     Special Requests: --        LEFT  Antecubital       Culture result: NO GROWTH 2 DAYS       CULTURE, BLOOD [[id number]] Collected:  10/19/17 2237    Order Status:  Completed Specimen:  Blood from Blood Updated:  10/21/17 1016     Special Requests: PORT        Culture result: NO GROWTH 2 DAYS       CULTURE, BLOOD [[id number]] Collected:  10/19/17 0912    Order Status:  Completed Specimen:  Blood from Blood Updated:  10/21/17 1016     Special Requests: --        LEFT  Antecubital       Culture result: NO GROWTH 2 DAYS       CULTURE, ANAEROBIC [[id number]] Collected:  10/20/17 1230    Order Status:  Completed Specimen:  Wound Drainage Updated:  10/20/17 1419    CULTURE, BLOOD [[id number]] Collected:  10/11/17 1214    Order Status:  Completed Specimen:  Blood from Blood Updated:  10/17/17 1010     Special Requests: PORT        Culture result: NO GROWTH 6 DAYS       CULTURE, BLOOD [[id number]] Collected: 10/11/17 1214    Order Status:  Completed Specimen:  Blood from Blood Updated:  10/17/17 1010     Special Requests: --        LEFT  Antecubital       Culture result: NO GROWTH 6 DAYS       CULTURE, WOUND [place] STAIN [[id number]]  (Abnormal)  (Susceptibility) Collected:  10/11/17 1730    Order Status:  Completed Specimen:  Drainage Updated:  10/14/17 1013     Special Requests: --        RIGHT  UPPER  BACK       GRAM STAIN NO WBCS SEEN                 MODERATE GRAM POSITIVE COCCI     Culture result:         HEAVY **METHICILLIN RESISTANT STAPHYLOCOCCUS AUREUS** Use Rifampin only in conjunction with another antimicrobial agent. Do not use as Monotherapy. (A)    CULTURE, URINE [[id number]] Collected:  10/11/17 1735    Order Status:  Completed Specimen:  Urine from Clean catch Updated:  10/14/17 0618     Special Requests: NO SPECIAL REQUESTS        Culture result: NO GROWTH 2 DAYS       CULTURE, BODY FLUID Pranay Douse [[id number]] Collected:  10/11/17 1735    Order Status:  Canceled Specimen:  Miscellaneous Fluid     CULTURE, BLOOD [[id number]] Collected:  10/11/17 1615    Order Status:  Canceled Specimen:  Blood from Blood     CULTURE, BLOOD [[id number]] Collected:  10/11/17 1615    Order Status:  Canceled Specimen:  Blood from Blood     C. DIFFICILE/EPI PCR [[id number]] Collected:  10/11/17 1315    Order Status:  Canceled Specimen:  Stool             Lab Results   Component Value Date/Time    Vancomycin,trough 17.1 10/22/2017 08:08 AM       Day 3 of vancomycin. Goal trough is 12 - 18. Will continue with Vancomycin 1 grams IV every 12 hours. Will continue to follow patient.       Thank you,  Roberta Medellin PharmD, BCPS

## 2017-10-22 NOTE — PROGRESS NOTES
Progress Note    Patient: Amandeep Cornell MRN: [id number]  SSN: [id number]    YOB: 1950  Age: 69 y.o. Sex: female      Admit Date: 10/11/2017    LOS: 11 days     Subjective:   66F with pmhx of breast CA s/p RTX completed in Feb 2017, currently on oral chemo (femara and ibrance) s/p right breast reconstruction with flap and left breast mastectomy on 9/18 presented with for hypotension along with 2 days of profuse diarrhea on 10/11/17. Upon arrival to ER she was noted with DANNIE. Admitted in ICU initially. Started on vancomycin and zosyn, culture from her back flap wound positive MRSA, eventually antibiotics switched to clindamycin. Oncology consulted for pancytopenia and patient is currently on femara. Plastic surgery recommended wound vac placed 10/12, but was non-functional and eventually was removed. Developed atrial tachycardia while her hospital course but improved after lopressor. Once her clinical condition improved she was transfer to medicine floors to continue her care. On 10/19/17 her right breast was noted tender, with erythema and fluctuation, findings compatible with purulent cellulitis at the implant site. Diagnosis was completed by right breast US positive for abscess. Plastic surgery was re-consulted and patient underwent right breast implant removal on 43/28/55 with no complications. LE US showed left greater saphenous vein thrombosis near the femoral junction, for that reason and also for her been a cancer patient she was started on intermediate LMWH lovenox 40mg sq day to consider a short-course. ID recommendations appreciated and clindamycin was changed to iv vanvomycin- so far patient remains afebrile, no leukocytosis, her pain is better, is walking. She had no complains today.        Review of Systems negative with exception of pertinent positives noted above    Objective:     Vitals:    10/21/17 2325 10/22/17 0255 10/22/17 0745 10/22/17 1025   BP: 106/42 105/44 121/51 106/46   Pulse: 70 70 75 72   Resp: 18 14 18 18   Temp: 96.6 °F (35.9 °C) 96.4 °F (35.8 °C) 98.1 °F (36.7 °C) 96 °F (35.6 °C)   SpO2: 95%  95% 93%   Weight:       Height:            Intake and Output:  Current Shift: 10/22 0701 - 10/22 1900  In: -   Out: 500 [Urine:500]  Last three shifts: 10/20 1901 - 10/22 0700  In: 300 [I.V.:300]  Out: 3325 [Urine:3225; Drains:100]    Physical Exam:   General appearance: NAD, conversant, oriented x 3  Neck: Trachea midline   Lungs: CTA, with normal respiratory effort and no intercostal retractions  CV: S1,S2 present, no added murmurs  Abdomen: Soft, non-tender; no masses   Extremities: No peripheral edema or extremity lymphadenopathy  Skin: Normal temperature, turgor and texture; right breast covered with gauze, keya dacosta draining serous-clear fluid, non tender. Back wound is looking better, still with discharge but much improved. Psych: Appropriate affect, alert and oriented to person, place and time  CNS: GCS 15, no motor or sensory deficits, CN 2-12 intact    Lab/Data Review: All lab results for the last 24 hours reviewed. Assessment:     Principal Problem:    Pancytopenia ([address] (10/11/2017)    Active Problems:    Acquired hypothyroidism (11/4/2015)      Hypokalemia (4/7/2017)      Breast cancer ([address] (9/18/2017)      Acute renal failure (ARF) ([address] (10/11/2017)      Hypomagnesemia (10/11/2017)      Diarrhea (10/11/2017)      Sepsis ([address] (10/11/2017)        Plan:     1. Sepsis in immunocompromised host,  Due to purulent cellulitis due to MRSA - Noted with right breast erythema, fever, pain, s/p right breast implant removal and I&D: on vancomycin iv (day 2 ), but she used to be on clyndamycin since 10/11/17, will await complete sensitivity-continue wound care- plastic surgery f/u-  monitor cbc daily-    2. Left leg great saphenous vein thrombosis ( close to femoral junction ) in the setting of breast cancer and sepsis: on short course of an intermediate sc dose of lovenox 40mg sq day ( reference uptodate-also arch intern med 2003:163 (14): 5551. ) to complete 45 days. 3. New systolic acute congestive HF with EF 45-50%- signs of congestion resolved- on oral lasix     4. Atrial tachycardia: possible due to New onset HF-  on cardizem po-     5. Asthma exacerbation: continue duonebs-     6. Hx of Breast CA s/p reconstruction, on oral chemo hormone therapy-     7. Hypothyroidism- continue synthroid     8. Pancytopenia: seen by hematology-oncology- received 2 prbc- improving       DVT Prophylaxis: scds    Dispo PT/OT follow anticipate STR placement likely Tuesday or Wednesday.          Signed By: Lyanne Lombard, MD     October 22, 2017

## 2017-10-22 NOTE — PROGRESS NOTES
Problem: Mobility Impaired (Adult and Pediatric)  Goal: *Acute Goals and Plan of Care (Insert Text)  1 WEEK GOALS :  (1.)Ms. Vivienne Hu will move from supine to sit and sit to supine with STAND BY ASSIST within 7 day(s). Met 10/22  (2.)Ms. Vivienne Hu will transfer from bed to chair and chair to bed with STAND BY ASSIST using the least restrictive device within 7 day(s). Met 10/22  (3.)Ms. Vivienne Hu will ambulate with STAND BY ASSIST for  feet with the least restrictive device within 7 day(s). Met 10/22  4) Pt independent with UE & LE AROM HEP with written guidelines. ADDENDUM GOALS 10/22/17 : 1) Transfers with supervision. 2) pt ambulating 400 ft with supervision. ________________________________________________________________________________________________      PHYSICAL THERAPY: Daily Note, Treatment Day: 2nd and PM 10/22/2017  INPATIENT: Hospital Day: 12  [place] / Product Type: Medicare /      NAME/AGE/GENDER: Garrick Hinojosa is a 69 y.o. female  PRIMARY DIAGNOSIS: Acute renal failure (ARF) ([facility] [address]  RIGHT BREAST INFECTION Pancytopenia ([facility] [address] Pancytopenia (HCC)  Procedure(s) (LRB):  BREAST IMPLANT REMOVAL  (Right)  2 Days Post-Op  ICD-10: Treatment Diagnosis:       · Generalized Muscle Weakness (M62.81)  · Other lack of cordination (R27.8)  · Difficulty in walking, Not elsewhere classified (R26.2)  · Other abnormalities of gait and mobility (R26.89)   Precaution/Allergies:  Prednisone       ASSESSMENT:      Ms. Vivienne Hu was in good spirits & required less assist with bed mobility, transfers & gait while ambulating increased distance. Excellent progress. Javy Lockett RN cleared with MD that it was safe to work with pt regarding her recent left thigh blood clot. This section established at most recent assessment   PROBLEM LIST (Impairments causing functional limitations):  1. Decreased Strength  2.  Decreased ADL/Functional Activities  3. Decreased Transfer Abilities  4. Decreased Ambulation Ability/Technique  5. Decreased Balance  6. Decreased Activity Tolerance  7. Increased Shortness of Breath  8. Decreased Flexibility/Joint Mobility  9. Decreased [place] with Home Exercise Program    INTERVENTIONS PLANNED: (Benefits and precautions of physical therapy have been discussed with the patient.)  1. Balance Exercise  2. Bed Mobility  3. Gait Training  4. Therapeutic Activites  5. Therapeutic Exercise/Strengthening  6. Transfer Training      TREATMENT PLAN: Frequency/Duration: daily for duration of hospital stay  Rehabilitation Potential For Stated Goals: GOOD      RECOMMENDED REHABILITATION/EQUIPMENT: (at time of discharge pending progress): Due to the probability of continued deficits (see above) this patient will likely need continued skilled physical therapy after discharge. Equipment:   · to be deterrmined                   HISTORY:   History of Present Injury/Illness (Reason for Referral):  Patient 75S with pmhx of breast CA s/p RTX completed in Feb 2017, currently on oral chemo (femara and ibrance) s/p right breast reconstruction with flap and left breast mastectomy on 9/18 [facility] for hypotension/ fatigue/ malaise. Was seen in Dr Melissa Talavera office today due to these complaints along with concern for drainage from flap site. Dr Bello James reportedly okay with appearance of op site but was concerned regarding SBP in 60's at office. Brought to ED via EMS. ER workup notable for pancytopenia, Acute renal failure, hypotension (sbp in 80's). Hospitalist called for admission. Patient states that she has been feeling poorly 2 days ago with temp of 101 and profuse diarrhea x 2 days. Diarrhea has since resolved but she still feels light-headed and fatigued. Denies nausea/vomiting, hematochezia, chest pain or pressure. No sick contacts.    Past Medical History/Comorbidities:   Ms. Giovanna Hernandez  has a past medical history of Abnormal CT of the chest; Adverse effect of anesthesia; Asthma ( ); Breast cancer ([facility] [address] (2016); Depression ( ); Hypercholesteremia; Hypothyroidism; and Ill-defined condition. She also has no past medical history of Difficult intubation; Malignant hyperthermia due to anesthesia; Nausea & vomiting; Other ill-defined conditions(799.89); or Pseudocholinesterase deficiency. Ms. Heavenly Wang  has a past surgical history that includes bladder repair; rectocele repair; cystocele repair; breast biopsy (Right, 2/5/2016); chest surgery procedure unlisted; vascular access; breast reconstruction (Bilateral, 9/14/2016); breast reconstruction (Bilateral, 9/14/2016); mastectomy (Right); hysterectomy; colonoscopy; lap cholecystectomy (2000); breast reconstruction (Right, 9/18/2017); breast reduction (Left, 9/18/2017); and breast reconstruction (Right, 9/18/2017). Social History/Living Environment:   Home Environment: Private residence  # Steps to Enter: 3  Rails to Enter: No  One/Two Story Residence: One story  Living Alone: No (but alone during the day)  Support Systems: Spouse/Significant Other/Partner  Patient Expects to be Discharged to[de-identified] Private residence  Current DME Used/Available at Home:  (none)  Prior Level of Function/Work/Activity:  Pt was independent without an assistive device prior tot his admission  Personal Factors: Other factors that influence how disability is experienced by the patient:  Current & PMH   Number of Personal Factors/Comorbidities that affect the Plan of Care: 3+: HIGH COMPLEXITY   EXAMINATION:   Most Recent Physical Functioning:   Gross Assessment:  AROM: Generally decreased, functional (all limbs & core)  Strength: Generally decreased, functional (all limbs & core)  Coordination: Generally decreased, functional (all limbs & core)                    Balance:  Sitting: Intact; Without support  Standing: Impaired; Without support Bed Mobility:  Supine to Sit: Modified independent  Sit to Supine: Modified independent  Scooting: Independent       Transfers:  Sit to Stand: Stand-by asssistance  Stand to Sit: Stand-by asssistance  Bed to Chair: Stand-by asssistance  Gait:     Speed/Benita: Fluctuations  Step Length:  (equal)  Gait Abnormalities: Decreased step clearance  Distance (ft): 200 Feet (ft)  Assistive Device:  (none)  Ambulation - Level of Assistance: Stand-by asssistance  Interventions: Safety awareness training;Verbal cues  Duration: 12 Minutes   Functional Mobility:         Gait/Ambulation:  min        Transfers:  min        Bed Mobility:  min   Body Structures Involved:  1. Lungs  2. Metabolic  3. Muscles Body Functions Affected:  1. Movement Related  2. Metobolic/Endocrine Activities and Participation Affected:  1. General Tasks and Demands  2. Mobility   Number of elements that affect the Plan of Care: 4+: HIGH COMPLEXITY   CLINICAL PRESENTATION:   Presentation: Evolving clinical presentation with changing clinical characteristics: MODERATE COMPLEXITY   CLINICAL DECISION MAKIN [facility] Mobility Inpatient Short Form  How much difficulty does the patient currently have. .. Unable A Lot A Little None   1. Turning over in bed (including adjusting bedclothes, sheets and blankets)? [ ] 1   [ ] 2   [X] 3   [ ] 4   2. Sitting down on and standing up from a chair with arms ( e.g., wheelchair, bedside commode, etc.)   [ ] 1   [ ] 2   [X] 3   [ ] 4   3. Moving from lying on back to sitting on the side of the bed? [ ] 1   [ ] 2   [X] 3   [ ] 4   How much help from another person does the patient currently need. .. Total A Lot A Little None   4. Moving to and from a bed to a chair (including a wheelchair)? [ ] 1   [ ] 2   [X] 3   [ ] 4   5. Need to walk in hospital room? [ ] 1   [ ] 2   [X] 3   [ ] 4   6. Climbing 3-5 steps with a railing? [X] 1   [ ] 2   [ ] 3   [ ] 4   © , Trustees of [address] [place] [address] [address] [address], under license to [employer].  All rights reserved Score:  Initial: 16 Most Recent: X (Date: -- )     Interpretation of Tool:  Re[facility] activities that are increasingly more difficult (i.e. Bed mobility, Transfers, Gait). Score 24 23 22-20 19-15 14-10 9-7 6       Modifier CH CI CJ CK CL CM CN         · Mobility - Walking and Moving Around:               - CURRENT STATUS:    CK - 40%-59% impaired, limited or restricted               - GOAL STATUS:           CJ - 20%-39% impaired, limited or restricted               - D/C STATUS:                       ---------------To be determined---------------  [place] / Product Type: Medicare /       Medical Necessity:     · Patient is expected to demonstrate progress in strength, range of motion, balance, coordination and functional technique to decrease assistance required with bed mobility, transfers & gait. Reason for Services/Other Comments:  · Patient continues to require skilled intervention due to pt not safe with functional mobility. Use of outcome tool(s) and clinical judgement create a POC that gives a: Questionable prediction of patient's progress: MODERATE COMPLEXITY                 TREATMENT:   (In addition to Assessment/Re-Assessment sessions the following treatments were rendered)   Pre-treatment Symptoms/Complaints: \" I feel much better \"  Pain: Initial: visual scale  Pain Intensity 1: 0  Post Session:  0/10      Gait Training (12 Minutes):  Gait training to improve and/or restore physical functioning as related to mobility, strength, balance, coordination and dynamic movement of leg - bilateral to improve functional gait. Ambulated 200 Feet (ft) with Stand-by asssistance using a  (none) and minimal Safety awareness training;Verbal cues related to their stride length and heel strike to promote proper body alignment, promote proper body posture and promote proper body mechanics.     Therapeutic Exercise: (13 Minutes):  Exercises per grid below to improve mobility and dynamic movement of leg - bilateral to improve functional endurance. Required minimal verbal cues to promote proper body alignment and promote proper body mechanics. Progressed repetitions and complexity of movement as indicated. Date:  10/22 Date:   Date:     Activity/Exercise Parameters Parameters Parameters   Ankle pumps 15     Quad sets 15     SLR hip flex 15     Hip ABD-ADD 15     bridging 15                             Braces/Orthotics/Lines/Etc:   · none  Treatment/Session Assessment:    · Response to Treatment:  No concerns  · Interdisciplinary Collaboration:  · Registered Nurse  · After treatment position/precautions:  · Supine in bed, Bed/Chair-wheels locked, Bed in low position, Caregiver at bedside, Call light within reach, RN notified and Family at bedside  · Compliance with Program/Exercises: Will assess as treatment progresses. · Recommendations/Intent for next treatment session: \"Next visit will focus on reduction in assistance provided\".   Total Treatment Duration:PT Patient Time In/Time Out  Time In: 1600  Time Out: 1625     Tyrone Weir, PT

## 2017-10-22 NOTE — PROGRESS NOTES
Awake during bedside report given to Sahra Mercado RN. Reports pain improving, rated \"2\". No distress.

## 2017-10-22 NOTE — PROGRESS NOTES
Bedside shift change report given to Jr Coleman (oncoming nurse) by Jazmin Nguyen (offgoing nurse). Report included the following information SBAR, Kardex and Intake/Output.

## 2017-10-22 NOTE — PROGRESS NOTES
Shift assessment completed. Pt alert and oriented X4, pt reports pain improved after recent norco admin. Respirations even and unlabored, breath sounds clear but diminished bilaterally, pt on RA. Abdomen soft and non tender, bowel sounds active, pt had BM this morning. Pt reports voiding without difficulty. Skin warm and dry, incision to right breast with dressing, right back with dressing, TAMMI drain to right side charged and draining, port to left chest accessed. Bed low and locked, call light within reach, pt advised to call if assistance is needed.

## 2017-10-22 NOTE — PROGRESS NOTES
Problem: Falls - Risk of  Goal: *Absence of Falls  Document Sanjeev Fall Risk and appropriate interventions in the flowsheet.    Outcome: Progressing Towards Goal  Fall Risk Interventions:  Mobility Interventions: Patient to call before getting OOB    Mentation Interventions: Adequate sleep, hydration, pain control    Medication Interventions: Patient to call before getting OOB    Elimination Interventions: Call light in reach    History of Falls Interventions: Door open when patient unattended

## 2017-10-23 LAB
ANION GAP SERPL CALC-SCNC: 3 MMOL/L (ref 7–16)
BUN SERPL-MCNC: 7 MG/DL (ref 8–23)
CALCIUM SERPL-MCNC: 8.5 MG/DL (ref 8.3–10.4)
CHLORIDE SERPL-SCNC: 100 MMOL/L (ref 98–107)
CO2 SERPL-SCNC: 34 MMOL/L (ref 21–32)
CREAT SERPL-MCNC: 0.75 MG/DL (ref 0.6–1)
ERYTHROCYTE [DISTWIDTH] IN BLOOD BY AUTOMATED COUNT: 17 % (ref 11.9–14.6)
GLUCOSE SERPL-MCNC: 81 MG/DL (ref 65–100)
HCT VFR BLD AUTO: 36.9 % (ref 35.8–46.3)
HGB BLD-MCNC: 11.8 G/DL (ref 11.7–15.4)
MCH RBC QN AUTO: 31 PG (ref 26.1–32.9)
MCHC RBC AUTO-ENTMCNC: 32 G/DL (ref 31.4–35)
MCV RBC AUTO: 96.9 FL (ref 79.6–97.8)
PLATELET # BLD AUTO: 231 K/UL (ref 150–450)
PMV BLD AUTO: 11.3 FL (ref 10.8–14.1)
POTASSIUM SERPL-SCNC: 4.3 MMOL/L (ref 3.5–5.1)
RBC # BLD AUTO: 3.81 M/UL (ref 4.05–5.25)
SODIUM SERPL-SCNC: 137 MMOL/L (ref 136–145)
WBC # BLD AUTO: 4.9 K/UL (ref 4.3–11.1)

## 2017-10-23 PROCEDURE — 36415 COLL VENOUS BLD VENIPUNCTURE: CPT | Performed by: INTERNAL MEDICINE

## 2017-10-23 PROCEDURE — 74011250637 HC RX REV CODE- 250/637: Performed by: INTERNAL MEDICINE

## 2017-10-23 PROCEDURE — 74011250637 HC RX REV CODE- 250/637: Performed by: HOSPITALIST

## 2017-10-23 PROCEDURE — 74011250636 HC RX REV CODE- 250/636: Performed by: INTERNAL MEDICINE

## 2017-10-23 PROCEDURE — 65270000029 HC RM PRIVATE

## 2017-10-23 PROCEDURE — 85027 COMPLETE CBC AUTOMATED: CPT | Performed by: INTERNAL MEDICINE

## 2017-10-23 PROCEDURE — 74011250637 HC RX REV CODE- 250/637: Performed by: PHYSICIAN ASSISTANT

## 2017-10-23 PROCEDURE — 97116 GAIT TRAINING THERAPY: CPT

## 2017-10-23 PROCEDURE — 97110 THERAPEUTIC EXERCISES: CPT

## 2017-10-23 PROCEDURE — 80048 BASIC METABOLIC PNL TOTAL CA: CPT | Performed by: INTERNAL MEDICINE

## 2017-10-23 RX ADMIN — Medication 400 MG: at 17:34

## 2017-10-23 RX ADMIN — Medication 500 MG: at 17:34

## 2017-10-23 RX ADMIN — ENOXAPARIN SODIUM 40 MG: 40 INJECTION SUBCUTANEOUS at 06:51

## 2017-10-23 RX ADMIN — LEVOTHYROXINE SODIUM 100 MCG: 100 TABLET ORAL at 07:23

## 2017-10-23 RX ADMIN — DULOXETINE HYDROCHLORIDE 60 MG: 60 CAPSULE, DELAYED RELEASE ORAL at 21:16

## 2017-10-23 RX ADMIN — Medication 400 MG: at 09:28

## 2017-10-23 RX ADMIN — SODIUM CHLORIDE, PRESERVATIVE FREE 300 UNITS: 5 INJECTION INTRAVENOUS at 21:16

## 2017-10-23 RX ADMIN — Medication 5 ML: at 06:52

## 2017-10-23 RX ADMIN — METOPROLOL TARTRATE 50 MG: 50 TABLET ORAL at 21:30

## 2017-10-23 RX ADMIN — Medication 10 ML: at 21:17

## 2017-10-23 RX ADMIN — HYDROCODONE BITARTRATE AND ACETAMINOPHEN 1 TABLET: 5; 325 TABLET ORAL at 08:09

## 2017-10-23 RX ADMIN — VANCOMYCIN HYDROCHLORIDE 1000 MG: 1 INJECTION, POWDER, LYOPHILIZED, FOR SOLUTION INTRAVENOUS at 09:16

## 2017-10-23 RX ADMIN — VANCOMYCIN HYDROCHLORIDE 1000 MG: 1 INJECTION, POWDER, LYOPHILIZED, FOR SOLUTION INTRAVENOUS at 21:15

## 2017-10-23 RX ADMIN — FAMOTIDINE 20 MG: 20 TABLET, FILM COATED ORAL at 09:28

## 2017-10-23 RX ADMIN — HYDROCODONE BITARTRATE AND ACETAMINOPHEN 1 TABLET: 5; 325 TABLET ORAL at 18:40

## 2017-10-23 RX ADMIN — ALPRAZOLAM 0.5 MG: 0.5 TABLET ORAL at 21:47

## 2017-10-23 RX ADMIN — Medication 500 MG: at 09:28

## 2017-10-23 NOTE — PROGRESS NOTES
Problem: Mobility Impaired (Adult and Pediatric)  Goal: *Acute Goals and Plan of Care (Insert Text)  1 WEEK GOALS :  (1.)Ms. Ginny Martins will move from supine to sit and sit to supine with STAND BY ASSIST within 7 day(s). Met 10/22  (2.)Ms. Ginny Martins will transfer from bed to chair and chair to bed with STAND BY ASSIST using the least restrictive device within 7 day(s). Met 10/22  (3.)Ms. Ginny Martins will ambulate with STAND BY ASSIST for  feet with the least restrictive device within 7 day(s). Met 10/22  4) Pt independent with UE & LE AROM HEP with written guidelines. ADDENDUM GOALS 10/22/17 : 1) Transfers with supervision. 2) pt ambulating 400 ft with supervision. ________________________________________________________________________________________________      PHYSICAL THERAPY: Daily Note, Treatment Day: 3rd and AM 10/23/2017  INPATIENT: Hospital Day: 13  [place] / Product Type: Medicare /      NAME/AGE/GENDER: Gallo Jaime is a 69 y.o. female  PRIMARY DIAGNOSIS: Acute renal failure (ARF) (HCC)  RIGHT BREAST INFECTION Pancytopenia ([facility] [address] Pancytopenia (HCC)  Procedure(s) (LRB):  BREAST IMPLANT REMOVAL  (Right)  3 Days Post-Op  ICD-10: Treatment Diagnosis:       · Generalized Muscle Weakness (M62.81)  · Other lack of cordination (R27.8)  · Difficulty in walking, Not elsewhere classified (R26.2)  · Other abnormalities of gait and mobility (R26.89)   Precaution/Allergies:  Prednisone       ASSESSMENT:      Ms. Ginny Martins is supine upon arrival.  She performs exercises in the bed without any problems. She increase her distance without AD or LOB. She return to supine with call light near. This section established at most recent assessment   PROBLEM LIST (Impairments causing functional limitations):  1. Decreased Strength  2. Decreased ADL/Functional Activities  3. Decreased Transfer Abilities  4.  Decreased Ambulation Ability/Technique  5. Decreased Balance  6. Decreased Activity Tolerance  7. Increased Shortness of Breath  8. Decreased Flexibility/Joint Mobility  9. Decreased [place] with Home Exercise Program    INTERVENTIONS PLANNED: (Benefits and precautions of physical therapy have been discussed with the patient.)  1. Balance Exercise  2. Bed Mobility  3. Gait Training  4. Therapeutic Activites  5. Therapeutic Exercise/Strengthening  6. Transfer Training      TREATMENT PLAN: Frequency/Duration: daily for duration of hospital stay  Rehabilitation Potential For Stated Goals: GOOD      RECOMMENDED REHABILITATION/EQUIPMENT: (at time of discharge pending progress): Due to the probability of continued deficits (see above) this patient will likely need continued skilled physical therapy after discharge. Equipment:   · to be deterrmined                   HISTORY:   History of Present Injury/Illness (Reason for Referral):  Patient 6E with pmhx of breast CA s/p RTX completed in Feb 2017, currently on oral chemo (femara and ibrance) s/p right breast reconstruction with flap and left breast mastectomy on 9/18 [facility] for hypotension/ fatigue/ malaise. Was seen in Dr Sarah Corbett office today due to these complaints along with concern for drainage from flap site. Dr Leilani Monge reportedly okay with appearance of op site but was concerned regarding SBP in 60's at office. Brought to ED via EMS. ER workup notable for pancytopenia, Acute renal failure, hypotension (sbp in 80's). Hospitalist called for admission. Patient states that she has been feeling poorly 2 days ago with temp of 101 and profuse diarrhea x 2 days. Diarrhea has since resolved but she still feels light-headed and fatigued. Denies nausea/vomiting, hematochezia, chest pain or pressure. No sick contacts. Past Medical History/Comorbidities:   Ms. Jessica Reddy  has a past medical history of Abnormal CT of the chest; Adverse effect of anesthesia;  Asthma ( ); Breast cancer ([facility] [address] (2016); Depression ( ); Hypercholesteremia; Hypothyroidism; and Ill-defined condition. She also has no past medical history of Difficult intubation; Malignant hyperthermia due to anesthesia; Nausea & vomiting; Other ill-defined conditions(799.89); or Pseudocholinesterase deficiency. Ms. Ángel Nice  has a past surgical history that includes bladder repair; rectocele repair; cystocele repair; breast biopsy (Right, 2/5/2016); chest surgery procedure unlisted; vascular access; breast reconstruction (Bilateral, 9/14/2016); breast reconstruction (Bilateral, 9/14/2016); mastectomy (Right); hysterectomy; colonoscopy; lap cholecystectomy (2000); breast reconstruction (Right, 9/18/2017); breast reduction (Left, 9/18/2017); and breast reconstruction (Right, 9/18/2017). Social History/Living Environment:   Home Environment: Private residence  # Steps to Enter: 3  Rails to Enter: No  One/Two Story Residence: One story  Living Alone: No (but alone during the day)  Support Systems: Spouse/Significant Other/Partner  Patient Expects to be Discharged to[de-identified] Private residence  Current DME Used/Available at Home:  (none)  Prior Level of Function/Work/Activity:  Pt was independent without an assistive device prior tot his admission  Personal Factors:           Other factors that influence how disability is experienced by the patient:  Current & PMH   Number of Personal Factors/Comorbidities that affect the Plan of Care: 3+: HIGH COMPLEXITY   EXAMINATION:   Most Recent Physical Functioning:   Gross Assessment:                       Balance:    Bed Mobility:  Supine to Sit: Modified independent  Sit to Supine: Modified independent       Transfers:  Sit to Stand: Stand-by asssistance  Stand to Sit: Stand-by asssistance  Bed to Chair: Stand-by asssistance  Gait:     Speed/Benita: Fluctuations  Gait Abnormalities: Decreased step clearance  Distance (ft): 250 Feet (ft)  Assistive Device: Other (comment) (no AD)  Ambulation - Level of Assistance: Stand-by asssistance  Interventions: Safety awareness training  Duration: 15 Minutes   Functional Mobility:         Gait/Ambulation:  min        Transfers:  min        Bed Mobility:  min   Body Structures Involved:  1. Lungs  2. Metabolic  3. Muscles Body Functions Affected:  1. Movement Related  2. Metobolic/Endocrine Activities and Participation Affected:  1. General Tasks and Demands  2. Mobility   Number of elements that affect the Plan of Care: 4+: HIGH COMPLEXITY   CLINICAL PRESENTATION:   Presentation: Evolving clinical presentation with changing clinical characteristics: MODERATE COMPLEXITY   CLINICAL DECISION MAKIN [facility] Inpatient Short Form  How much difficulty does the patient currently have. .. Unable A Lot A Little None   1. Turning over in bed (including adjusting bedclothes, sheets and blankets)? [ ] 1   [ ] 2   [X] 3   [ ] 4   2. Sitting down on and standing up from a chair with arms ( e.g., wheelchair, bedside commode, etc.)   [ ] 1   [ ] 2   [X] 3   [ ] 4   3. Moving from lying on back to sitting on the side of the bed? [ ] 1   [ ] 2   [X] 3   [ ] 4   How much help from another person does the patient currently need. .. Total A Lot A Little None   4. Moving to and from a bed to a chair (including a wheelchair)? [ ] 1   [ ] 2   [X] 3   [ ] 4   5. Need to walk in hospital room? [ ] 1   [ ] 2   [X] 3   [ ] 4   6. Climbing 3-5 steps with a railing? [X] 1   [ ] 2   [ ] 3   [ ] 4   © , Trustees of [address] [place] [address] [address] [address], under license to [employer]. All rights reserved    Score:  Initial: 16 Most Recent: X (Date: -- )     Interpretation of Tool:  Re[facility] activities that are increasingly more difficult (i.e. Bed mobility, Transfers, Gait).        Score 24 23 22-20 19-15 14-10 9-7 6       Modifier CH CI CJ CK CL CM CN         · Mobility - Walking and Moving Around:               - CURRENT STATUS:    CK - 40%-59% impaired, limited or restricted               - GOAL STATUS:           CJ - 20%-39% impaired, limited or restricted               - D/C STATUS:                       ---------------To be determined---------------  [place] / Product Type: Medicare /       Medical Necessity:     · Patient is expected to demonstrate progress in strength, range of motion, balance, coordination and functional technique to decrease assistance required with bed mobility, transfers & gait. Reason for Services/Other Comments:  · Patient continues to require skilled intervention due to pt not safe with functional mobility. Use of outcome tool(s) and clinical judgement create a POC that gives a: Questionable prediction of patient's progress: MODERATE COMPLEXITY                 TREATMENT:   (In addition to Assessment/Re-Assessment sessions the following treatments were rendered)   Pre-treatment Symptoms/Complaints: \" I feel much better \"  Pain: Initial: visual scale  Pain Intensity 1: 0 (0/10 after therapy)  Post Session:       Gait Training (15 Minutes):  Gait training to improve and/or restore physical functioning as related to mobility, strength, balance, coordination and dynamic movement of leg - bilateral to improve functional gait. Ambulated 250 Feet (ft) with Stand-by asssistance using a Other (comment) (no AD) and minimal Safety awareness training related to their stride length and heel strike to promote proper body alignment, promote proper body posture and promote proper body mechanics. Therapeutic Exercise: (15 Minutes):  Exercises per grid below to improve mobility and dynamic movement of leg - bilateral to improve functional endurance. Required minimal verbal cues to promote proper body alignment and promote proper body mechanics. Progressed repetitions and complexity of movement as indicated.      Date:  10/22 Date:  10/23   Date:     Activity/Exercise Parameters Parameters Parameters Ankle pumps 15 12    Quad sets 15 12    SLR hip flex 15 12    Hip ABD-ADD 15 12    bridging 15                             Braces/Orthotics/Lines/Etc:   · none  Treatment/Session Assessment:    · Response to Treatment:  No concerns  · Interdisciplinary Collaboration:  · Registered Nurse  · After treatment position/precautions:  · Supine in bed, Bed/Chair-wheels locked, Bed in low position, Caregiver at bedside, Call light within reach, RN notified and Family at bedside  · Compliance with Program/Exercises: Will assess as treatment progresses. · Recommendations/Intent for next treatment session: \"Next visit will focus on reduction in assistance provided\".   Total Treatment Duration:PT Patient Time In/Time Out  Time In: 5235  Time Out: 7519     Luz Elena Cortes, PTA

## 2017-10-23 NOTE — PROGRESS NOTES
Progress Note    Patient: Layton Smith MRN: [id number]  SSN: [id number]    YOB: 1950  Age: 69 y.o. Sex: female      Admit Date: 10/11/2017    LOS: 12 days     Subjective:   Sign off note:    Ms Mark Arias is a 65F with pmhx of breast CA s/p RTX completed in Feb 2017, currently on oral chemo (femara and ibrance) s/p right breast reconstruction with flap and left breast mastectomy on 9/18 presented with for hypotension along with 2 days of profuse diarrhea on 10/11/17. Upon arrival to ER she was noted with DANINE. Admitted in ICU initially. Started on vancomycin and zosyn, culture from her back flap wound positive MRSA, eventually antibiotics switched to clindamycin. Oncology consulted for pancytopenia and patient is currently on femara. Plastic surgery recommended wound vac placed 10/12, but was non-functional and eventually was removed after 2-3 failed attempts. Developed atrial tachycardia while her hospital course but improved after lopressor. Once her clinical condition improved she was transfer to medicine floors to continue her care. On 10/19/17 her right breast was noted tender, with erythema and fluctuation, findings compatible with purulent cellulitis at the implant site. Diagnosis was completed by right breast US positive for abscess. Plastic surgery was re-consulted and patient underwent right breast implant removal on 12/13/29 with no complications. LE US showed left greater saphenous vein thrombosis near the femoral junction, for that reason and also for her been a cancer patient she was started on intermediate LMWH lovenox 40mg sq day to consider a short-course. ID recommendations appreciated and clindamycin was changed to iv vanvomycin- so far patient remains afebrile, no leukocytosis, her pain is better, is walking. On my assessment this morning she was found alert, I checked her back wound and right breast wound, they are healing well.  I spoke with Ms Mark Arias regarding to try to place a wound vac divide again today as per Dr. Sary Rios recommendations. Wound care team contacted after patient agreed to pursue this procedure. Denied nausea, vomiting, diarrhea, fever, chest pain. Review of Systems negative with exception of pertinent positives noted above    Objective:     Vitals:    10/23/17 0432 10/23/17 0736 10/23/17 0917 10/23/17 1058   BP: 122/55 116/53 112/47 97/52   Pulse: 74 71  73   Resp: 20 21 17   Temp: 98.2 °F (36.8 °C) 98.2 °F (36.8 °C)  98.3 °F (36.8 °C)   SpO2:  92%  93%   Weight:       Height:            Intake and Output:  Current Shift: 10/23 0701 - 10/23 1900  In: 118 [P.O.:118]  Out: 350 [Urine:350]  Last three shifts: 10/21 1901 - 10/23 0700  In: -   Out: 1940 [Urine:1850; Drains:90]    Physical Exam:   General appearance: NAD, conversant, oriented x 3  Neck: Trachea midline   Lungs: CTA, with normal respiratory effort and no intercostal retractions  CV: S1,S2 present, no added murmurs  Abdomen: Soft, non-tender; no masses   Extremities: No peripheral edema or extremity lymphadenopathy  Skin: Normal temperature, turgor and texture; right breast covered with gauze, keya dacosta draining serous-clear fluid, non tender. Back wound is looking better, still with discharge but much improved. Psych: Appropriate affect, alert and oriented to person, place and time  CNS: GCS 15, no motor or sensory deficits, CN 2-12 intact    Lab/Data Review: All lab results for the last 24 hours reviewed. Assessment:     Principal Problem:    Pancytopenia ([address] (10/11/2017)    Active Problems:    Acquired hypothyroidism (11/4/2015)      Hypokalemia (4/7/2017)      Breast cancer ([address] (9/18/2017)      Acute renal failure (ARF) ([address] (10/11/2017)      Hypomagnesemia (10/11/2017)      Diarrhea (10/11/2017)      Sepsis ([address] (10/11/2017)        Plan:     1.  Sepsis in immunocompromised host,  Due to purulent cellulitis due to MRSA - Noted with right breast erythema, fever, pain, s/p right breast implant removal and I&D: on vancomycin iv (day 2 ), but she used to be on clyndamycin since 10/11/17, will await complete sensitivity, and as per results she might need to complete 7-10 days more on this current treatment-continue wound care- plastic surgery f/u-  monitor cbc daily-    2. Left leg great saphenous vein thrombosis ( close to femoral junction ) in the setting of breast cancer and sepsis: on short course of an intermediate sc dose of lovenox 40mg sq day ( reference uptodate-also arch intern med 2003:163 (14): 1724. ) to complete 45 days. 3. New systolic acute congestive HF with EF 45-50%- signs of congestion resolved- on oral lasix     4. Atrial tachycardia: possible due to New onset HF-  on cardizem po-     5. Asthma exacerbation: continue duonebs-     6. Hx of Breast CA s/p reconstruction, on oral chemo hormone therapy-     7. Hypothyroidism- continue synthroid     8. Pancytopenia: seen by hematology-oncology- received 2 prbc- improving       DVT Prophylaxis: scds    Dispo PT/OT follow anticipate STR placement Tomorrow. We still need to wait antibiotic sensitivities to come back in order to determine oral antibiotic therapy upon discharge.          Signed By: George Woods MD     October 23, 2017

## 2017-10-23 NOTE — PROGRESS NOTES
Shift assessment completed. Pt alert and oriented X4, reports some pain a this time, wants to wait for breakfast to take pain meds. Respirations even and unlabored, breath sounds clear but diminished bilaterally, pt on RA. Skin warm and dry, dressing to right back and chest, C/D/I, TAMMI drain charged and draining to right chest, SCD on right side. Abdomen soft and non tender, bowel sounds active, pt reports voiding without difficulty. Bed low and locked, call light within reach, pt advised to call if assistance is needed.

## 2017-10-23 NOTE — PROGRESS NOTES
Spoke with Dr. Jake Carpio regarding /64 S27, advised to hold cardizem, metoprolol and lasix for now.

## 2017-10-23 NOTE — OP NOTES
[address] [facility]   OPERATIVE REPORT       Name:  Carol Lozano   MR#:  [id number]   :  1950   Account #:  [de-identified]   Date of Adm:  10/11/2017       DATE OF SURGERY: 10/11/2017    PREOPERATIVE DIAGNOSIS: Infected right breast implant. POSTOPERATIVE DIAGNOSIS: Infected right breast implant. SURGEON: Denson Ganser, MD    ANESTHESIA: General.    SPECIMENS: Culture and sensitivity. ESTIMATED BLOOD LOSS: Minimal.     PROCEDURE: Removal of right breast implant with skin   debridement, culture and sensitivity. INDICATIONS: The patient [facility] after an extended ICU stay,   now with right breast cellulitis. This is approximately a month   after her second stage reconstruction which was done with a   latissimus flap and a permanent implant. She did have breakdown   of her posterior back wound, which has cultured MRSA and now has   prominent cellulitis that is also starting to spread to the   midline. PROCEDURE: Informed consent was obtained from the patient, she   was taken to the operating room, placed in supine position,   prepped and draped in the usual fashion. A portion of her   latissimus inset was reopened with a 10 blade followed by Bovie   cautery and then the implant was easily removed. The pocket was   immediately cultured and there was no purulent material noted   there. However, the connection from the breast dissection to the   back was open. This was irrigated with PulsaVac irrigation with   vancomycin and then she was closed over a 10 flat TAMMI drain with   deep 3-0 Vicryl suture and 4-0 nylon in the skin. I dressed that   area with Xeroform, bacitracin, gauze, and ABD pads, and then   rolled her to her side where her back dressing was changed, this   had become saturated with PulsaVac, and so this was packed with   dry Kerlix and dressed with an ABD pad. She tolerated the   procedure very well was escorted to recovery in stable   condition.          GOLDIE MD Deniz Valera / Dinah Hsu   D:  10/23/2017   15:51   T:  10/23/2017   19:04   Job #:  [id number]

## 2017-10-23 NOTE — PROGRESS NOTES
Awake with no c/o during bedside report given to Reta Riddle RN. Discussed interest in having additional nursing staff to access chest port and obtain blood return for placement confirmation, with plan to resume use.

## 2017-10-23 NOTE — PROGRESS NOTES
Problem: Falls - Risk of  Goal: *Absence of Falls  Document Sanjeev Fall Risk and appropriate interventions in the flowsheet.    Outcome: Progressing Towards Goal  Fall Risk Interventions:  Mobility Interventions: Patient to call before getting OOB    Mentation Interventions: Adequate sleep, hydration, pain control    Medication Interventions: Patient to call before getting OOB    Elimination Interventions: Call light in reach    History of Falls Interventions: Evaluate medications/consider consulting pharmacy

## 2017-10-23 NOTE — PROGRESS NOTES
Bedside shift change report given to Don Caballero (oncoming nurse) by Brandon Leslie (offgoing nurse). Report included the following information SBAR, Kardex and Intake/Output.

## 2017-10-23 NOTE — PROGRESS NOTES
Problem: Nutrition Deficit  Goal: *Optimize nutritional status  Nutrition  Reason for assessment:  Length of stay day 5. Assessment:   Diet order(s): Regular  Food/Nutrition Patient History:  Pt presented to ED with hypotension, fatigue / malaise and diarrhea X 2 days prior to admission. Past medical history notable for breast cancer s/p RXT completed in Feb. 2017, currently on oral chemo. Pt s/p right breast reconstruction with flap and left breast mastectomy 9/18. Pt endorses good po intake prior to admission; usually eats 2 meals per day; breakfast and supper and has a snack for lunch; prepares own meals and goes out to eat. No nutrition risk factors identified on nursing admission malnutrition screening tool. Pt states she had diarrhea today and has been eating \"light\". Pt has wound vac to right back. Anthropometrics:Height: 5' 2\" (157.5 cm),  Weight: 68.2 kg (150 lb 5.7 oz), Weight Source: Bed, Body mass index is 27.5 kg/(m^2). BMI class of normal range for Older American Women. Macronutrient needs:  EER:  7126-6981 kcal /day (25-30 kcal/kg BW)  EPR:  60-75 grams protein/day (1.2-1.5 grams/kg IBW)  Intake/Comparative Standards: 5 recorded intake with average intake of 45%. Pt potentially meets ~ 50% estimated kcal and protein needs. needs     Nutrition Diagnosis: Inadequate oral intake r/t decreased ability to consume sufficient oral intake as evidenced by estimates of insufficient intake of energy or high quality protein from diet when compared to requirements. Intervention:  Meals and snacks: Continue current diet. Nutrition Supplement Therapy: Patient declines at this time. Discharge nutrition plan: Too soon to determine. Coordination of Nutrition Care:  Interdisciplinary Rounds.      Alphonso Mcgee, [address], [facility] [address], MPH  [phone]

## 2017-10-23 NOTE — PROGRESS NOTES
Problem: Interdisciplinary Rounds  Goal: Interdisciplinary Rounds  Interdisciplinary team rounds were held 10/23/2017 with the following team members:Care Management, Nursing, Nutrition, Pharmacy and Physician. Plan of care discussed. See clinical pathway and/or care plan for interventions and desired outcomes.

## 2017-10-23 NOTE — PROGRESS NOTES
Continues to rest quietly, awake, resp even, unlab. Assessment noted. Right chest dsg clean, dry, intact. Right back dsg with shadowed drainage, reinforced. TAMMI intact, emptied of 30 ml serosanguinous drainage. Left chest port assessed for positive blood return with no success. Chest port de-accessed, with small amount of bleeding noted at side. Site re-accessed to obtain positive blood return to confirm placement with no success. De-accessed, site cleansed and covered with dsg. Pt agrees to allow peripheral IV site to be placed, after nurse was unable to locate additional staff to attempt access of chest port. No c/o. No distress.

## 2017-10-23 NOTE — PROGRESS NOTES
During care rounds MD Kevin Solorzano ) states that Dr. Elan Salamanca is requesting that wound care nurse try wound vac on pt again. SW spoke with Leesa Sellers RN (wound care ) who states can see pt on Tues.

## 2017-10-23 NOTE — PROGRESS NOTES
Problem: Nutrition Deficit  Goal: *Optimize nutritional status  Nutrition Follow Up:  Reason for initial assessment:  Length of stay day 5. (10/16)  Assessment:   Diet order(s): Regular  Pt reports that her appetite has improved since admission; feeling better and eating 100% meals. Anthropometrics:Height: 5' 2\" (157.5 cm),  Weight: 66.3 kg (146 lb 2.6 oz), Weight Source: Bed, Body mass index is 26.73 kg/(m^2). BMI class of normal range for Older American Women. Weight on 10/18:  68.2 kg-reflects an increase of 1.9 kg since admission. Macronutrient needs:  EER:  7875-1534 kcal /day (25-30 kcal/kg BW)  EPR:  60-75 grams protein/day (1.2-1.5 grams/kg IBW)  Intake/Comparative Standards: 5 recorded intake with average intake of 62%. Pt ate 100% of breakfast and lunch today; currently eating supper meal.  Pt potentially meets ~ 85-90% estimated kcal needs and 95% estimated protein needs.   Intervention:  Meals and snacks: Continue current diet.   Discharge nutrition plan: Regular diet  Coordination of Nutrition Care:  Interdisciplinary Rounds.  Kehinde Clinton, [address] [place] [address], [place], MPH  [phone]

## 2017-10-24 LAB
BACTERIA SPEC CULT: ABNORMAL
BACTERIA SPEC CULT: ABNORMAL
BACTERIA SPEC CULT: NORMAL
GRAM STN SPEC: ABNORMAL
GRAM STN SPEC: ABNORMAL
SERVICE CMNT-IMP: ABNORMAL
SERVICE CMNT-IMP: NORMAL

## 2017-10-24 PROCEDURE — 65270000029 HC RM PRIVATE

## 2017-10-24 PROCEDURE — 74011250636 HC RX REV CODE- 250/636: Performed by: INTERNAL MEDICINE

## 2017-10-24 PROCEDURE — 97605 NEG PRS WND THER DME<=50SQCM: CPT

## 2017-10-24 PROCEDURE — 97116 GAIT TRAINING THERAPY: CPT

## 2017-10-24 PROCEDURE — 74011250637 HC RX REV CODE- 250/637: Performed by: HOSPITALIST

## 2017-10-24 PROCEDURE — 74750000023 HC WOUND THERAPY

## 2017-10-24 PROCEDURE — 74011250637 HC RX REV CODE- 250/637: Performed by: INTERNAL MEDICINE

## 2017-10-24 PROCEDURE — 77030019952 HC CANSTR VAC ASST KCON -B

## 2017-10-24 PROCEDURE — 74011250637 HC RX REV CODE- 250/637: Performed by: PHYSICIAN ASSISTANT

## 2017-10-24 PROCEDURE — 77030019934 HC DRSG VAC ASST KCON -B

## 2017-10-24 RX ORDER — SODIUM CHLORIDE 9 MG/ML
100 INJECTION, SOLUTION INTRAVENOUS CONTINUOUS
Status: DISCONTINUED | OUTPATIENT
Start: 2017-10-24 | End: 2017-10-25 | Stop reason: HOSPADM

## 2017-10-24 RX ADMIN — Medication 400 MG: at 09:31

## 2017-10-24 RX ADMIN — Medication 500 MG: at 09:31

## 2017-10-24 RX ADMIN — FUROSEMIDE 20 MG: 20 TABLET ORAL at 09:31

## 2017-10-24 RX ADMIN — FAMOTIDINE 20 MG: 20 TABLET, FILM COATED ORAL at 09:31

## 2017-10-24 RX ADMIN — SODIUM CHLORIDE, PRESERVATIVE FREE 300 UNITS: 5 INJECTION INTRAVENOUS at 05:20

## 2017-10-24 RX ADMIN — SODIUM CHLORIDE 100 ML/HR: 900 INJECTION, SOLUTION INTRAVENOUS at 17:09

## 2017-10-24 RX ADMIN — VANCOMYCIN HYDROCHLORIDE 1000 MG: 1 INJECTION, POWDER, LYOPHILIZED, FOR SOLUTION INTRAVENOUS at 09:32

## 2017-10-24 RX ADMIN — VANCOMYCIN HYDROCHLORIDE 1000 MG: 1 INJECTION, POWDER, LYOPHILIZED, FOR SOLUTION INTRAVENOUS at 20:37

## 2017-10-24 RX ADMIN — HYDROCODONE BITARTRATE AND ACETAMINOPHEN 1 TABLET: 5; 325 TABLET ORAL at 13:04

## 2017-10-24 RX ADMIN — ENOXAPARIN SODIUM 40 MG: 40 INJECTION SUBCUTANEOUS at 05:19

## 2017-10-24 RX ADMIN — METOPROLOL TARTRATE 50 MG: 50 TABLET ORAL at 09:31

## 2017-10-24 RX ADMIN — LEVOTHYROXINE SODIUM 100 MCG: 100 TABLET ORAL at 07:56

## 2017-10-24 RX ADMIN — Medication 400 MG: at 17:09

## 2017-10-24 RX ADMIN — Medication 10 ML: at 15:04

## 2017-10-24 RX ADMIN — SODIUM CHLORIDE, PRESERVATIVE FREE 300 UNITS: 5 INJECTION INTRAVENOUS at 22:12

## 2017-10-24 RX ADMIN — Medication 10 ML: at 22:12

## 2017-10-24 RX ADMIN — DULOXETINE HYDROCHLORIDE 60 MG: 60 CAPSULE, DELAYED RELEASE ORAL at 22:08

## 2017-10-24 RX ADMIN — DILTIAZEM HYDROCHLORIDE 180 MG: 180 CAPSULE, COATED, EXTENDED RELEASE ORAL at 09:31

## 2017-10-24 RX ADMIN — SODIUM CHLORIDE 250 ML: 900 INJECTION, SOLUTION INTRAVENOUS at 15:40

## 2017-10-24 RX ADMIN — ALPRAZOLAM 0.5 MG: 0.5 TABLET ORAL at 22:20

## 2017-10-24 RX ADMIN — Medication 500 MG: at 17:09

## 2017-10-24 RX ADMIN — Medication 10 ML: at 05:13

## 2017-10-24 NOTE — PROGRESS NOTES
Hospitalist Progress Note    10/24/2017  Admit Date: 10/11/2017 11:41 AM   NAME: Cj Verdin   :  1950   MRN:  [id number]   Attending: Susan Fields DO  PCP:  Francine South MD    SUBJECTIVE:    Ms Abigail Varghese is a 21M with pmhx of breast CA s/p RTX completed in 2017, currently on oral chemo (femara and ibrance) s/p right breast reconstruction with flap and left breast mastectomy on  presented with for hypotension along with 2 days of profuse diarrhea on 10/11/17. Upon arrival to ER she was noted with DANNIE. Admitted in ICU initially. Started on vancomycin and zosyn, culture from her back flap wound positive MRSA, eventually antibiotics switched to clindamycin. Oncology consulted for pancytopenia and patient is currently on femara. Plastic surgery recommended wound vac placed 10/12, but was non-functional and eventually was removed after 2-3 failed attempts. Developed atrial tachycardia while her hospital course but improved after lopressor. Once her clinical condition improved she was transfer to medicine floors to continue her care. On 10/19/17 her right breast was noted tender, with erythema and fluctuation, findings compatible with purulent cellulitis at the implant site. Diagnosis was completed by right breast US positive for abscess. Plastic surgery was re-consulted and patient underwent right breast implant removal on  with no complications. LE US showed left greater saphenous vein thrombosis near the femoral junction, for that reason and also for her been a cancer patient she was started on intermediate LMWH lovenox 40mg sq day to consider a short-course. ID recommendations appreciated and clindamycin was changed to iv vanvomycin- so far patient remains afebrile, no leukocytosis, her pain is better, is walking. 10/24:  Awaiting wound vac.   Feeling well and in good spirits       Review of Systems negative with exception of pertinent positives noted above  PHYSICAL EXAM Visit Vitals    /46 (BP 1 Location: Left arm, BP Patient Position: At rest)    Pulse (!) 58    Temp 96.4 °F (35.8 °C)    Resp 18    Ht 5' 2\" (1.575 m)    Wt 68.2 kg (150 lb 5.7 oz)    SpO2 93%    Breastfeeding No    BMI 27.5 kg/m2      Temp (24hrs), Av.5 °F (36.4 °C), Min:96.4 °F (35.8 °C), Max:98.7 °F (37.1 °C)    Oxygen Therapy  O2 Sat (%): 93 % (10/24/17 1430)  Pulse via Oximetry: 78 beats per minute (10/18/17 1433)  O2 Device: Room air (10/23/17 1555)  O2 Flow Rate (L/min): 2 l/min (10/20/17 1357)  FIO2 (%): 21 % (10/16/17 1958)    Intake/Output Summary (Last 24 hours) at 10/24/17 1510  Last data filed at 10/24/17 0759   Gross per 24 hour   Intake                0 ml   Output              950 ml   Net             -950 ml      General: No acute distress    Lungs:  CTA Bilaterally.    Heart:  Regular rate and rhythm,  No murmur, rub, or gallop  Abdomen: Soft, Non distended, Non tender, Positive bowel sounds  Extremities: No cyanosis, clubbing or edema  Neurologic:  No focal deficits    ASSESSMENT      Active Hospital Problems    Diagnosis Date Noted    Acute renal failure (ARF) ([address] 10/11/2017    Pancytopenia ([address] 10/11/2017    Hypomagnesemia 10/11/2017    Diarrhea 10/11/2017    Sepsis ([address] 10/11/2017    Breast [facility] 2017    Hypokalemia 2017    Acquired hypothyroidism 2015     Plan:  · Continue abx  · Wound care to set up wound vac  · arf improved and appears stable  · snf arranged by RANCHO.  Stable for dc once wound vac in place    DVT Prophylaxis: lovenox    Signed By: Sarina Gonzáles MD     2017

## 2017-10-24 NOTE — PROGRESS NOTES
Bedside shift change report given to Ida Richardson (oncoming nurse) by Bart Killian (offgoing nurse). Report included the following information SBAR, Kardex and Intake/Output.

## 2017-10-24 NOTE — PROGRESS NOTES
Shift assessment - Patient is t alert and oriented X4. No complaint of pain at this time. Respirations even and unlabored, breath sounds clear but diminished bilaterally. Patient has a dressing to right back and right chest, C/D/I, Right TAMMI drain charged and draining. Patient has a SCD to right leg only. DVT present in left leg. Abdomen soft and non tender, bowel sounds active x4. Currently resting in a low, locked bed with call light within reach.

## 2017-10-24 NOTE — WOUND CARE
Wound VAC applied to right back wound, wound base is pink, muscle exposed, circumferential undermining if 1-3cm with 4cm tunnel at 8 o'clock and 8.5cm tunnel at 3 o'clock. Wound is 3.4x6. 4x1cm Drainage is yellow, serous without odor, surrounding skin is intact. Will monitor.

## 2017-10-24 NOTE — PROGRESS NOTES
Shift assessment completed. Pt alert and oriented X4, denies pain at this time. Respirations even and unlabored, breath sounds clear but diminished bilaterally, pt on RA, encouraged to use the IS. Abdomen soft and non tender, bowel sounds active, pt reports last BM was yesterday. S1S2 auscultated with regular rhythm. Skin warm and dry, dressing in place to right chest and back C/D/I. SCD on right leg. Pt voiding without difficulty. TAMMI drain charged and draining. Bed low and locked, call light within reach, pt advised to call if assistance is needed.

## 2017-10-24 NOTE — PROGRESS NOTES
Problem: Mobility Impaired (Adult and Pediatric)  Goal: *Acute Goals and Plan of Care (Insert Text)  1 WEEK GOALS :  (1.)Ms. Yani Mosley will move from supine to sit and sit to supine with STAND BY ASSIST within 7 day(s). Met 10/22  (2.)Ms. Yani Mosley will transfer from bed to chair and chair to bed with STAND BY ASSIST using the least restrictive device within 7 day(s). Met 10/22  (3.)Ms. Yani Mosley will ambulate with STAND BY ASSIST for  feet with the least restrictive device within 7 day(s). Met 10/22  4) Pt independent with UE & LE AROM HEP with written guidelines. ADDENDUM GOALS 10/22/17 : 1) Transfers with supervision. 2) pt ambulating 400 ft with supervision. ________________________________________________________________________________________________      PHYSICAL THERAPY: Daily Note, Treatment Day: 3rd and AM 10/24/2017  INPATIENT: Hospital Day: 14  [place] / [place] / Product Type: Medicare /      NAME/AGE/GENDER: Rubi Delgado is a 69 y.o. female  PRIMARY DIAGNOSIS: Acute renal failure (ARF) (HCC)  RIGHT BREAST INFECTION Pancytopenia ([facility] [address] Pancytopenia (HCC)  Procedure(s) (LRB):  BREAST IMPLANT REMOVAL  (Right)  4 Days Post-Op  ICD-10: Treatment Diagnosis:       · Generalized Muscle Weakness (M62.81)  · Other lack of cordination (R27.8)  · Difficulty in walking, Not elsewhere classified (R26.2)  · Other abnormalities of gait and mobility (R26.89)   Precaution/Allergies:  Prednisone       ASSESSMENT:      Ms. Yani Mosley is supine upon arrival.  She had been doing her exercises in the bed before therapist arrived. She went from [id number] with Modified Independent. She walk the whole [place] without AD and no LOB. She return to the bed with call light near. This section established at most recent assessment   PROBLEM LIST (Impairments causing functional limitations):  1. Decreased Strength  2.  Decreased ADL/Functional Activities  3. Decreased Transfer Abilities  4. Decreased Ambulation Ability/Technique  5. Decreased Balance  6. Decreased Activity Tolerance  7. Increased Shortness of Breath  8. Decreased Flexibility/Joint Mobility  9. Decreased [place] with Home Exercise Program    INTERVENTIONS PLANNED: (Benefits and precautions of physical therapy have been discussed with the patient.)  1. Balance Exercise  2. Bed Mobility  3. Gait Training  4. Therapeutic Activites  5. Therapeutic Exercise/Strengthening  6. Transfer Training      TREATMENT PLAN: Frequency/Duration: daily for duration of hospital stay  Rehabilitation Potential For Stated Goals: GOOD      RECOMMENDED REHABILITATION/EQUIPMENT: (at time of discharge pending progress): Due to the probability of continued deficits (see above) this patient will likely need continued skilled physical therapy after discharge. Equipment:   · to be deterrmined                   HISTORY:   History of Present Injury/Illness (Reason for Referral):  Patient 89W with pmhx of breast CA s/p RTX completed in Feb 2017, currently on oral chemo (femara and ibrance) s/p right breast reconstruction with flap and left breast mastectomy on 9/18 [facility] for hypotension/ fatigue/ malaise. Was seen in Dr Mariza Hadley office today due to these complaints along with concern for drainage from flap site. Dr Norbert Johnson reportedly okay with appearance of op site but was concerned regarding SBP in 60's at office. Brought to ED via EMS. ER workup notable for pancytopenia, Acute renal failure, hypotension (sbp in 80's). Hospitalist called for admission. Patient states that she has been feeling poorly 2 days ago with temp of 101 and profuse diarrhea x 2 days. Diarrhea has since resolved but she still feels light-headed and fatigued. Denies nausea/vomiting, hematochezia, chest pain or pressure. No sick contacts.    Past Medical History/Comorbidities:   Ms. Blair Ackerman  has a past medical history of Abnormal CT of the chest; Adverse effect of anesthesia; Asthma ( ); Breast cancer ([facility] [address] (2016); Depression ( ); Hypercholesteremia; Hypothyroidism; and Ill-defined condition. She also has no past medical history of Difficult intubation; Malignant hyperthermia due to anesthesia; Nausea & vomiting; Other ill-defined conditions(799.89); or Pseudocholinesterase deficiency. Ms. Darius Quinonez  has a past surgical history that includes bladder repair; rectocele repair; cystocele repair; breast biopsy (Right, 2/5/2016); chest surgery procedure unlisted; vascular access; breast reconstruction (Bilateral, 9/14/2016); breast reconstruction (Bilateral, 9/14/2016); mastectomy (Right); hysterectomy; colonoscopy; lap cholecystectomy (2000); breast reconstruction (Right, 9/18/2017); breast reduction (Left, 9/18/2017); breast reconstruction (Right, 9/18/2017); and breast augmentation (Right, 10/20/2017). Social History/Living Environment:   Home Environment: Private residence  # Steps to Enter: 3  Rails to Enter: No  One/Two Story Residence: One story  Living Alone: No (but alone during the day)  Support Systems: Spouse/Significant Other/Partner  Patient Expects to be Discharged to[de-identified] Private residence  Current DME Used/Available at Home:  (none)  Prior Level of Function/Work/Activity:  Pt was independent without an assistive device prior tot his admission  Personal Factors:           Other factors that influence how disability is experienced by the patient:  Current & PMH   Number of Personal Factors/Comorbidities that affect the Plan of Care: 3+: HIGH COMPLEXITY   EXAMINATION:   Most Recent Physical Functioning:   Gross Assessment:                       Balance:    Bed Mobility:  Supine to Sit: Modified independent  Sit to Supine: Modified independent       Transfers:  Sit to Stand: Stand-by asssistance  Stand to Sit: Stand-by asssistance  Bed to Chair: Stand-by asssistance  Gait:     Gait Abnormalities: Decreased step clearance  Distance (ft): 650 Feet (ft)  Assistive Device: Other (comment) (no AD)  Ambulation - Level of Assistance: Stand-by asssistance  Interventions: Safety awareness training  Duration: 23 Minutes   Functional Mobility:         Gait/Ambulation:  min        Transfers:  min        Bed Mobility:  min   Body Structures Involved:  1. Lungs  2. Metabolic  3. Muscles Body Functions Affected:  1. Movement Related  2. Metobolic/Endocrine Activities and Participation Affected:  1. General Tasks and Demands  2. Mobility   Number of elements that affect the Plan of Care: 4+: HIGH COMPLEXITY   CLINICAL PRESENTATION:   Presentation: Evolving clinical presentation with changing clinical characteristics: MODERATE COMPLEXITY   CLINICAL DECISION MAKIN [facility] Mobility Inpatient Short Form  How much difficulty does the patient currently have. .. Unable A Lot A Little None   1. Turning over in bed (including adjusting bedclothes, sheets and blankets)? [ ] 1   [ ] 2   [X] 3   [ ] 4   2. Sitting down on and standing up from a chair with arms ( e.g., wheelchair, bedside commode, etc.)   [ ] 1   [ ] 2   [X] 3   [ ] 4   3. Moving from lying on back to sitting on the side of the bed? [ ] 1   [ ] 2   [X] 3   [ ] 4   How much help from another person does the patient currently need. .. Total A Lot A Little None   4. Moving to and from a bed to a chair (including a wheelchair)? [ ] 1   [ ] 2   [X] 3   [ ] 4   5. Need to walk in hospital room? [ ] 1   [ ] 2   [X] 3   [ ] 4   6. Climbing 3-5 steps with a railing? [X] 1   [ ] 2   [ ] 3   [ ] 4   © , Trustees of [address] [place] [id number], under license to [employer]. All rights reserved    Score:  Initial: 16 Most Recent: X (Date: -- )     Interpretation of Tool:  Re[facility] activities that are increasingly more difficult (i.e. Bed mobility, Transfers, Gait).        Score 24 23 22-20 19-15 14-10 9-7 6       Modifier CH CI CJ CK CL CM CN         · Mobility - Walking and Moving Around:               - CURRENT STATUS:    CK - 40%-59% impaired, limited or restricted               - GOAL STATUS:           CJ - 20%-39% impaired, limited or restricted               - D/C STATUS:                       ---------------To be determined---------------  [place] / [place] / Product Type: Medicare /       Medical Necessity:     · Patient is expected to demonstrate progress in strength, range of motion, balance, coordination and functional technique to decrease assistance required with bed mobility, transfers & gait. Reason for Services/Other Comments:  · Patient continues to require skilled intervention due to pt not safe with functional mobility. Use of outcome tool(s) and clinical judgement create a POC that gives a: Questionable prediction of patient's progress: MODERATE COMPLEXITY                 TREATMENT:   (In addition to Assessment/Re-Assessment sessions the following treatments were rendered)   Pre-treatment Symptoms/Complaints: \" I feel much better \"  Pain: Initial: visual scale  Pain Intensity 1: 0 (after therapy the same)  Post Session:       Gait Training (23 Minutes):  Gait training to improve and/or restore physical functioning as related to mobility, strength, balance, coordination and dynamic movement of leg - bilateral to improve functional gait. Ambulated 650 Feet (ft) with Stand-by asssistance using a Other (comment) (no AD) and minimal Safety awareness training related to their stride length and heel strike to promote proper body alignment, promote proper body posture and promote proper body mechanics. Therapeutic Exercise: ( ):  Exercises per grid below to improve mobility and dynamic movement of leg - bilateral to improve functional endurance. Required minimal verbal cues to promote proper body alignment and promote proper body mechanics.   Progressed repetitions and complexity of movement as indicated. Date:  10/22 Date:  10/23   Date:     Activity/Exercise Parameters Parameters Parameters   Ankle pumps 15 12    Quad sets 15 12    SLR hip flex 15 12    Hip ABD-ADD 15 12    bridging 15                             Braces/Orthotics/Lines/Etc:   · none  Treatment/Session Assessment:    · Response to Treatment:  She tolerated gait without LOB. · Interdisciplinary Collaboration:  · Registered Nurse  · After treatment position/precautions:  · Supine in bed, Bed/Chair-wheels locked, Bed in low position, Caregiver at bedside, Call light within reach, RN notified and Family at bedside  · Compliance with Program/Exercises: Will assess as treatment progresses. · Recommendations/Intent for next treatment session: \"Next visit will focus on reduction in assistance provided\".   Total Treatment Duration:PT Patient Time In/Time Out  Time In: 4603  Time Out: [facility] DESIREE Cortes

## 2017-10-24 NOTE — PROGRESS NOTES
Called Dr. Latasha Bourne regarding BP 86/40, received order for 250mL bolus NS now and fluids at 100mL/hr, only 1L to be infused.

## 2017-10-24 NOTE — PROGRESS NOTES
Problem: Falls - Risk of  Goal: *Absence of Falls  Document Sanjeev Fall Risk and appropriate interventions in the flowsheet.    Outcome: Progressing Towards Goal  Fall Risk Interventions:  Mobility Interventions: Patient to call before getting OOB    Mentation Interventions: Family/sitter at bedside    Medication Interventions: Patient to call before getting OOB    Elimination Interventions: Call light in reach    History of Falls Interventions: Bed/chair exit alarm

## 2017-10-25 VITALS
BODY MASS INDEX: 27.67 KG/M2 | SYSTOLIC BLOOD PRESSURE: 108 MMHG | OXYGEN SATURATION: 95 % | WEIGHT: 150.35 LBS | HEIGHT: 62 IN | RESPIRATION RATE: 14 BRPM | HEART RATE: 67 BPM | DIASTOLIC BLOOD PRESSURE: 47 MMHG | TEMPERATURE: 96.3 F

## 2017-10-25 PROBLEM — L02.91 ABSCESS: Status: ACTIVE | Noted: 2017-10-25

## 2017-10-25 PROBLEM — A41.9 SEPSIS (HCC): Status: RESOLVED | Noted: 2017-10-11 | Resolved: 2017-10-25

## 2017-10-25 PROBLEM — C50.919 BREAST CANCER (HCC): Chronic | Status: ACTIVE | Noted: 2017-09-18

## 2017-10-25 PROBLEM — E83.42 HYPOMAGNESEMIA: Status: RESOLVED | Noted: 2017-10-11 | Resolved: 2017-10-25

## 2017-10-25 PROCEDURE — 74750000023 HC WOUND THERAPY

## 2017-10-25 PROCEDURE — 74011250637 HC RX REV CODE- 250/637: Performed by: INTERNAL MEDICINE

## 2017-10-25 PROCEDURE — 74011250636 HC RX REV CODE- 250/636: Performed by: INTERNAL MEDICINE

## 2017-10-25 PROCEDURE — 97116 GAIT TRAINING THERAPY: CPT

## 2017-10-25 PROCEDURE — 74011250637 HC RX REV CODE- 250/637: Performed by: HOSPITALIST

## 2017-10-25 PROCEDURE — 74011250637 HC RX REV CODE- 250/637: Performed by: PHYSICIAN ASSISTANT

## 2017-10-25 RX ORDER — DOXYCYCLINE 100 MG/1
100 CAPSULE ORAL EVERY 12 HOURS
Qty: 20 CAP | Refills: 0 | Status: SHIPPED | OUTPATIENT
Start: 2017-10-25 | End: 2017-11-04

## 2017-10-25 RX ORDER — METOPROLOL TARTRATE 50 MG/1
50 TABLET ORAL 2 TIMES DAILY
Qty: 60 TAB | Refills: 0 | Status: SHIPPED | OUTPATIENT
Start: 2017-10-25 | End: 2017-11-16

## 2017-10-25 RX ORDER — DOXYCYCLINE 100 MG/1
100 CAPSULE ORAL EVERY 12 HOURS
Status: DISCONTINUED | OUTPATIENT
Start: 2017-10-25 | End: 2017-10-25 | Stop reason: HOSPADM

## 2017-10-25 RX ORDER — DILTIAZEM HYDROCHLORIDE 180 MG/1
180 CAPSULE, COATED, EXTENDED RELEASE ORAL DAILY
Qty: 30 CAP | Refills: 0 | Status: SHIPPED | OUTPATIENT
Start: 2017-10-26 | End: 2017-11-16

## 2017-10-25 RX ORDER — ENOXAPARIN SODIUM 100 MG/ML
40 INJECTION SUBCUTANEOUS EVERY 24 HOURS
Qty: 5.6 ML | Refills: 0 | Status: SHIPPED
Start: 2017-10-26 | End: 2017-11-09

## 2017-10-25 RX ORDER — HYDROCODONE BITARTRATE AND ACETAMINOPHEN 5; 325 MG/1; MG/1
1 TABLET ORAL
Qty: 12 TAB | Refills: 0 | Status: SHIPPED | OUTPATIENT
Start: 2017-10-25 | End: 2017-10-28

## 2017-10-25 RX ORDER — LANOLIN ALCOHOL/MO/W.PET/CERES
400 CREAM (GRAM) TOPICAL DAILY
Qty: 14 TAB | Refills: 0 | Status: SHIPPED | OUTPATIENT
Start: 2017-10-25 | End: 2017-11-08

## 2017-10-25 RX ADMIN — METOPROLOL TARTRATE 50 MG: 50 TABLET ORAL at 08:29

## 2017-10-25 RX ADMIN — FUROSEMIDE 20 MG: 20 TABLET ORAL at 08:29

## 2017-10-25 RX ADMIN — HYDROCODONE BITARTRATE AND ACETAMINOPHEN 1 TABLET: 5; 325 TABLET ORAL at 08:28

## 2017-10-25 RX ADMIN — SODIUM CHLORIDE, PRESERVATIVE FREE 300 UNITS: 5 INJECTION INTRAVENOUS at 05:45

## 2017-10-25 RX ADMIN — ENOXAPARIN SODIUM 40 MG: 40 INJECTION SUBCUTANEOUS at 05:44

## 2017-10-25 RX ADMIN — Medication 500 MG: at 08:31

## 2017-10-25 RX ADMIN — Medication: at 09:00

## 2017-10-25 RX ADMIN — FAMOTIDINE 20 MG: 20 TABLET, FILM COATED ORAL at 08:29

## 2017-10-25 RX ADMIN — Medication 400 MG: at 08:29

## 2017-10-25 RX ADMIN — Medication 10 ML: at 05:46

## 2017-10-25 RX ADMIN — DILTIAZEM HYDROCHLORIDE 180 MG: 180 CAPSULE, COATED, EXTENDED RELEASE ORAL at 08:27

## 2017-10-25 RX ADMIN — LEVOTHYROXINE SODIUM 100 MCG: 100 TABLET ORAL at 08:28

## 2017-10-25 RX ADMIN — VANCOMYCIN HYDROCHLORIDE 1000 MG: 1 INJECTION, POWDER, LYOPHILIZED, FOR SOLUTION INTRAVENOUS at 08:33

## 2017-10-25 NOTE — DISCHARGE SUMMARY
Discharge Summary     Patient: Alison Agarwal MRN: [id number]  SSN: [id number]    YOB: 1950  Age: 69 y.o. Sex: female       Admit Date: 10/11/2017    Discharge Date: 10/25/2017      Admission Diagnoses: Acute renal failure (ARF) ([facility]  RIGHT BREAST INFECTION    Discharge Diagnoses:   Problem List as of 10/25/2017  Date Reviewed: 10/20/2017          Codes Class Noted - Resolved    Abscess ICD-10-CM: L02.91  ICD-9-CM: 682.9  10/25/2017 - Present    Overview Signed 10/25/2017 12:30 PM by Trang Perrin DO     Abscess of the right breast implant.              Acute renal failure (ARF) (HCC) ICD-10-CM: N17.9  ICD-9-CM: 584.9  10/11/2017 - Present        * (Principal)Pancytopenia ([facility] ICD-10-CM: B73.715  ICD-9-CM: 284.19  10/11/2017 - Present        Diarrhea ICD-10-CM: R19.7  ICD-9-CM: 787.91  10/11/2017 - Present        S/P breast reconstruction, right ICD-10-CM: W96.489  ICD-9-CM: V43.82  9/19/2017 - Present        Breast cancer ([facility] (Chronic) ICD-10-CM: C50.919  ICD-9-CM: 174.9  9/18/2017 - Present        Lump of skin of right upper extremity ICD-10-CM: R22.31  ICD-9-CM: 782.2  6/13/2017 - Present        Malignant neoplasm of upper-inner quadrant of right female breast ([facility] ICD-10-CM: C50.211  ICD-9-CM: 174.2  6/12/2017 - Present        SIRS (systemic inflammatory response syndrome) ([facility] ICD-10-CM: R65.10  ICD-9-CM: 995.90  4/7/2017 - Present        Idiopathic hypotension ICD-10-CM: I95.0  ICD-9-CM: 458.9  4/7/2017 - Present        Tachycardia ICD-10-CM: R00.0  ICD-9-CM: 785.0  4/7/2017 - Present        Shortness of breath ICD-10-CM: R06.02  ICD-9-CM: 786.05  4/7/2017 - Present        Hypokalemia ICD-10-CM: E87.6  ICD-9-CM: 276.8  4/7/2017 - Present        Cough ICD-10-CM: R05  ICD-9-CM: 786.2  4/7/2017 - Present        Pancytopenia due to chemotherapy [facility]) ICD-10-CM: Z95.064  ICD-9-CM: 284.11  4/7/2017 - Present        Status post right mastectomy ICD-10-CM: Z90.11  ICD-9-CM: V45.71 9/14/2016 - Present        S/P breast reconstruction, bilateral ICD-10-CM: Z98.890  ICD-9-CM: V43.82  9/14/2016 - Present        Mediastinal adenopathy ICD-10-CM: R59.0  ICD-9-CM: 785.6  2/18/2016 - Present        Malignant neoplasm of right female breast [facility]) ICD-10-CM: C50.911  ICD-9-CM: 174.9  1/25/2016 - Present        Acquired hypothyroidism ICD-10-CM: E03.9  ICD-9-CM: 244.9  11/4/2015 - Present        Asthma ICD-10-CM: J45.909  ICD-9-CM: 493.90  11/4/2015 - Present        Insomnia ICD-10-CM: G47.00  ICD-9-CM: 780.52  11/4/2015 - Present        Depression ICD-10-CM: F32.9  ICD-9-CM: 261  11/4/2015 - Present        RESOLVED: Hypomagnesemia ICD-10-CM: E83.42  ICD-9-CM: 275.2  10/11/2017 - 10/25/2017        RESOLVED: Sepsis ([facility] [address] ICD-10-CM: A41.9  ICD-9-CM: 038.9, 995.91  10/11/2017 - 10/25/2017               Discharge Condition: Good    Hospital Course: This 74 you patient with a past medical history of of breast CA s/p RTX completed in Feb 2017, currently on oral chemo (femara and ibrance) s/p right breast reconstruction with flap and left breast mastectomy on 9/18 presented with for hypotension along with 2 days of profuse diarrhea on 10/11/17. Upon arrival to ER she was noted with DANNIE. Admitted in ICU initially. Started on vancomycin and zosyn, culture from her back flap wound positive MRSA, eventually antibiotics switched to clindamycin.  Oncology consulted for pancytopenia and patient is currently on femara. Plastic surgery recommended wound vac placed 10/12, but was non-functional and eventually was removed after 2-3 failed attempts. Developed atrial tachycardia while her hospital course but improved after lopressor. Once her clinical condition improved she was transfer to medicine floors to continue her care. On 10/19/17 her right breast was noted tender, with erythema and fluctuation, findings compatible with purulent cellulitis at the implant site.  Diagnosis was completed by right breast US positive for abscess, culture grew MRSA sensitive to Tetracyclines and Clindamycin. Patient had been placed on Vancomycin by ID prior to positive culture results and I&D. Plastic surgery was re-consulted and patient underwent right breast implant removal on 25/01/13 with no complications. LE US showed left greater saphenous vein thrombosis near the femoral junction, for that reason and also for her been a cancer patient she was started on intermediate LMWH lovenox 40mg sq day to consider a short-course, will be re-assessed post discharge. Wound VAC was placed, patient tolerated well. Antibiotic coverage changed to Doxycycline based on wound culture, will complete a 10-day course of treatment. Patient was placed in a Skilled facility for post hospitalization recovery and rehab. Consults: Cardiology, Infectious Disease and Plastic Surgery    Significant Diagnostic Studies: labs: Standard admission and hospitalization lab , microbiology: wound culture: positive for MRSA    Discharge Physical Exam:  GEN:  This 68 yo white female, in no apparent distress,  family present at time of assessment. Resting comfortably    ENT: Pupils equal, round , reactive to light. Conjunctiva pink/moist, sclera non-icteric, non-injected. PUL:  Clear to ascultation bilaterally, no appreciated rales, rhonchi or wheezes. CARD: Regular rhythm, regular rate, No appreciated LUIS MIGUEL. No carotid bruits. ABD: Non-tender, non-distended, positive bowel sounds in all four quadrants. EXT: No upper extremity or lower extremity edema. Pulses equal at all levels bilaterally.     NEURO: Grossly intact CN II-XII, no overt focal defects on exam    PSYC: Mood appropriate, no anxiety appreciated    Disposition: SNF    Discharge Medications:   Current Discharge Medication List      CONTINUE these medications which have NOT CHANGED    Details   ALPRAZolam (XANAX) 0.5 mg tablet 1 bid to qid prn  Indications: anxiety, sleep  Qty: 120 Tab, Refills: 5 fluticasone-salmeterol (ADVAIR DISKUS) 250-50 mcg/dose diskus inhaler Take 1 Puff by inhalation every twelve (12) hours. Indications: INTRINSIC ASTHMA  Qty: 1 Inhaler, Refills: 12      DULoxetine (CYMBALTA) 60 mg capsule Take 1 Cap by mouth daily. Qty: 30 Cap, Refills: 12      levothyroxine (SYNTHROID) 75 mcg tablet Take 1 Tab by mouth Daily (before breakfast). Indications: hypothyroidism  Qty: 30 Tab, Refills: 12      albuterol (VENTOLIN) 90 mcg/Actuation inhaler Take 2 Puffs by inhalation every six (6) hours as needed. Indications: bring on the dos. IBRANCE 75 mg cap TAKE ONE CAPSULE (75 MG) BY MOUTH DAILY WITH FOOD FOR 3 WEEKS ON, THENONE WEEK OFF. SWALLOW WHOLE. DO NOT TAKE IF CAPSULES ARE BROKEN OR CR  Qty: 21 Cap, Refills: 2    Associated Diagnoses: Malignant neoplasm of right female breast (HCC)      letrozole (FEMARA) 2.5 mg tablet Take 1 Tab by mouth daily. Qty: 30 Tab, Refills: 6             Activity: Activity as tolerated based on PT/OT evaluation of receiving facility, See surgical instructions and Wound care directed therapy. Diet: Cardiac Diet  Wound Care: As directed by wound care, specific guidelines for wound vac management and wound care follow up.     Follow-up Appointments   Procedures    FOLLOW UP VISIT Appointment in: One Month With Terrell Pérez at [address] [place] [address] [phone]     With Terrell Pérez at [facility] Hematology/Oncology [phone]     Standing Status:   Standing     Number of Occurrences:   1     Order Specific Question:   Appointment in     Answer:   One Month       Signed By: Harry Morales DO     October 25, 2017

## 2017-10-25 NOTE — PROGRESS NOTES
Shift assessment - Patient is alert and oriented X4. No complaint of pain at this time.   Respirations even and unlabored, breath sounds clear but diminished bilaterally.  Patient has a dressing to right back and right chest, C/D/I, Right TAMMI drain charged and draining. Patient has a SCD to right leg only. DVT present in left leg.    Abdomen soft and non tender, bowel sounds active x4. Currently resting in a low, locked bed with call light within reach. Family is at bedside.

## 2017-10-25 NOTE — PROGRESS NOTES
Problem: Mobility Impaired (Adult and Pediatric)  Goal: *Acute Goals and Plan of Care (Insert Text)  1 WEEK GOALS :  (1.)Ms. Kaley Traylor will move from supine to sit and sit to supine with STAND BY ASSIST within 7 day(s). Met 10/22  (2.)Ms. Kaley Traylor will transfer from bed to chair and chair to bed with STAND BY ASSIST using the least restrictive device within 7 day(s). Met 10/22  (3.)Ms. Kaley Traylor will ambulate with STAND BY ASSIST for  feet with the least restrictive device within 7 day(s). Met 10/22  4) Pt independent with UE & LE AROM HEP with written guidelines. ADDENDUM GOALS 10/22/17 : 1) Transfers with supervision. 2) pt ambulating 400 ft with supervision. ________________________________________________________________________________________________      PHYSICAL THERAPY: Daily Note, Treatment Day: 4th and AM 10/25/2017  INPATIENT: Hospital Day: 15  [place] / [place] / Product Type: Medicare /      NAME/AGE/GENDER: Buelah Gilford is a 69 y.o. female  PRIMARY DIAGNOSIS: Acute renal failure (ARF) (HCC)  RIGHT BREAST INFECTION Pancytopenia ([facility] [address] Pancytopenia (HCC)  Procedure(s) (LRB):  BREAST IMPLANT REMOVAL  (Right)  5 Days Post-Op  ICD-10: Treatment Diagnosis:       · Generalized Muscle Weakness (M62.81)  · Other lack of cordination (R27.8)  · Difficulty in walking, Not elsewhere classified (R26.2)  · Other abnormalities of gait and mobility (R26.89)   Precaution/Allergies:  Prednisone       ASSESSMENT:      Ms. Kaley Traylor is supine upon arrival.  She is doing well with gait and exercises. She hopes to go to rehab today. This section established at most recent assessment   PROBLEM LIST (Impairments causing functional limitations):  1. Decreased Strength  2. Decreased ADL/Functional Activities  3. Decreased Transfer Abilities  4. Decreased Ambulation Ability/Technique  5. Decreased Balance  6.  Decreased Activity Tolerance  7. Increased Shortness of Breath  8. Decreased Flexibility/Joint Mobility  9. Decreased [place] with Home Exercise Program    INTERVENTIONS PLANNED: (Benefits and precautions of physical therapy have been discussed with the patient.)  1. Balance Exercise  2. Bed Mobility  3. Gait Training  4. Therapeutic Activites  5. Therapeutic Exercise/Strengthening  6. Transfer Training      TREATMENT PLAN: Frequency/Duration: daily for duration of hospital stay  Rehabilitation Potential For Stated Goals: GOOD      RECOMMENDED REHABILITATION/EQUIPMENT: (at time of discharge pending progress): Due to the probability of continued deficits (see above) this patient will likely need continued skilled physical therapy after discharge. Equipment:   · to be deterrmined                   HISTORY:   History of Present Injury/Illness (Reason for Referral):  Patient 31L with pmhx of breast CA s/p RTX completed in Feb 2017, currently on oral chemo (femara and ibrance) s/p right breast reconstruction with flap and left breast mastectomy on 9/18 [facility] for hypotension/ fatigue/ malaise. Was seen in Dr Desiree Correa office today due to these complaints along with concern for drainage from flap site. Dr Ryanne Alcantara reportedly okay with appearance of op site but was concerned regarding SBP in 60's at office. Brought to ED via EMS. ER workup notable for pancytopenia, Acute renal failure, hypotension (sbp in 80's). Hospitalist called for admission. Patient states that she has been feeling poorly 2 days ago with temp of 101 and profuse diarrhea x 2 days. Diarrhea has since resolved but she still feels light-headed and fatigued. Denies nausea/vomiting, hematochezia, chest pain or pressure. No sick contacts. Past Medical History/Comorbidities:   Ms. Bartolo Jacob  has a past medical history of Abnormal CT of the chest; Adverse effect of anesthesia; Asthma ( ); Breast cancer ([facility] [address] (2016); Depression ( ); Hypercholesteremia;  Hypothyroidism; and Ill-defined condition. She also has no past medical history of Difficult intubation; Malignant hyperthermia due to anesthesia; Nausea & vomiting; Other ill-defined conditions(799.89); or Pseudocholinesterase deficiency. Ms. Lexa Swartz  has a past surgical history that includes bladder repair; rectocele repair; cystocele repair; breast biopsy (Right, 2/5/2016); chest surgery procedure unlisted; vascular access; breast reconstruction (Bilateral, 9/14/2016); breast reconstruction (Bilateral, 9/14/2016); mastectomy (Right); hysterectomy; colonoscopy; lap cholecystectomy (2000); breast reconstruction (Right, 9/18/2017); breast reduction (Left, 9/18/2017); breast reconstruction (Right, 9/18/2017); and breast augmentation (Right, 10/20/2017). Social History/Living Environment:   Home Environment: Private residence  # Steps to Enter: 3  Rails to Enter: No  One/Two Story Residence: One story  Living Alone: No (but alone during the day)  Support Systems: Spouse/Significant Other/Partner  Patient Expects to be Discharged to[de-identified] Private residence  Current DME Used/Available at Home:  (none)  Prior Level of Function/Work/Activity:  Pt was independent without an assistive device prior tot his admission  Personal Factors:           Other factors that influence how disability is experienced by the patient:  Current & PMH   Number of Personal Factors/Comorbidities that affect the Plan of Care: 3+: HIGH COMPLEXITY   EXAMINATION:   Most Recent Physical Functioning:   Gross Assessment:                       Balance:    Bed Mobility:  Supine to Sit: Modified independent  Sit to Supine: Modified independent       Transfers:  Sit to Stand: Stand-by asssistance  Stand to Sit: Stand-by asssistance  Bed to Chair: Stand-by asssistance  Gait:     Gait Abnormalities: Decreased step clearance  Distance (ft): 650 Feet (ft)  Assistive Device: Other (comment) (no AD)  Ambulation - Level of Assistance: Stand-by asssistance  Interventions: Safety awareness training  Duration: 23 Minutes   Functional Mobility:         Gait/Ambulation:  min        Transfers:  min        Bed Mobility:  min   Body Structures Involved:  1. Lungs  2. Metabolic  3. Muscles Body Functions Affected:  1. Movement Related  2. Metobolic/Endocrine Activities and Participation Affected:  1. General Tasks and Demands  2. Mobility   Number of elements that affect the Plan of Care: 4+: HIGH COMPLEXITY   CLINICAL PRESENTATION:   Presentation: Evolving clinical presentation with changing clinical characteristics: MODERATE COMPLEXITY   CLINICAL DECISION MAKIN [facility] Inpatient Short Form  How much difficulty does the patient currently have. .. Unable A Lot A Little None   1. Turning over in bed (including adjusting bedclothes, sheets and blankets)? [ ] 1   [ ] 2   [X] 3   [ ] 4   2. Sitting down on and standing up from a chair with arms ( e.g., wheelchair, bedside commode, etc.)   [ ] 1   [ ] 2   [X] 3   [ ] 4   3. Moving from lying on back to sitting on the side of the bed? [ ] 1   [ ] 2   [X] 3   [ ] 4   How much help from another person does the patient currently need. .. Total A Lot A Little None   4. Moving to and from a bed to a chair (including a wheelchair)? [ ] 1   [ ] 2   [X] 3   [ ] 4   5. Need to walk in hospital room? [ ] 1   [ ] 2   [X] 3   [ ] 4   6. Climbing 3-5 steps with a railing? [X] 1   [ ] 2   [ ] 3   [ ] 4   © , Trustees of [address] [place] [address], under license to [employer]. All rights reserved    Score:  Initial: 16 Most Recent: X (Date: -- )     Interpretation of Tool:  Re[facility] activities that are increasingly more difficult (i.e. Bed mobility, Transfers, Gait).        Score 24 23 22-20 19-15 14-10 9-7 6       Modifier CH CI CJ CK CL CM CN         · Mobility - Walking and Moving Around:               - CURRENT STATUS:    CK - 40%-59% impaired, limited or restricted               - GOAL STATUS: CJ - 20%-39% impaired, limited or restricted               - D/C STATUS:                       ---------------To be determined---------------  [place] / [place] / Product Type: Medicare /       Medical Necessity:     · Patient is expected to demonstrate progress in strength, range of motion, balance, coordination and functional technique to decrease assistance required with bed mobility, transfers & gait. Reason for Services/Other Comments:  · Patient continues to require skilled intervention due to pt not safe with functional mobility. Use of outcome tool(s) and clinical judgement create a POC that gives a: Questionable prediction of patient's progress: MODERATE COMPLEXITY                 TREATMENT:   (In addition to Assessment/Re-Assessment sessions the following treatments were rendered)   Pre-treatment Symptoms/Complaints: \" I feel much better \"  Pain: Initial: visual scale  Pain Intensity 1: 1 (same after therapy)  Post Session:       Gait Training (23 Minutes):  Gait training to improve and/or restore physical functioning as related to mobility, strength, balance, coordination and dynamic movement of leg - bilateral to improve functional gait. Ambulated 650 Feet (ft) with Stand-by asssistance using a Other (comment) (no AD) and minimal Safety awareness training related to their stride length and heel strike to promote proper body alignment, promote proper body posture and promote proper body mechanics. Therapeutic Exercise: ( ):  Exercises per grid below to improve mobility and dynamic movement of leg - bilateral to improve functional endurance. Required minimal verbal cues to promote proper body alignment and promote proper body mechanics. Progressed repetitions and complexity of movement as indicated.      Date:  10/22 Date:  10/23   Date:     Activity/Exercise Parameters Parameters Parameters   Ankle pumps 15 12    Quad sets 15 12    SLR hip flex 15 12    Hip ABD-ADD 15 12    bridging 15                             Braces/Orthotics/Lines/Etc:   · none  Treatment/Session Assessment:    · Response to Treatment:  She tolerated session well  · Interdisciplinary Collaboration:  · Registered Nurse  · After treatment position/precautions:  · Supine in bed, Bed/Chair-wheels locked, Bed in low position, Caregiver at bedside, Call light within reach, RN notified and Family at bedside  · Compliance with Program/Exercises: Will assess as treatment progresses. · Recommendations/Intent for next treatment session: \"Next visit will focus on reduction in assistance provided\".   Total Treatment Duration:PT Patient Time In/Time Out  Time In: 0930  Time Out: 176 Ming Hicks, PTA

## 2017-10-25 NOTE — PROGRESS NOTES
Problem: Interdisciplinary Rounds  Goal: Interdisciplinary Rounds  Interdisciplinary team rounds were held 10/25/2017 with the following team members:Care Management, Nursing, Nutrition, Pharmacy and Physician. Plan of care discussed. See clinical pathway and/or care plan for interventions and desired outcomes.

## 2017-10-25 NOTE — PROGRESS NOTES
Chart reviewed. Brent spoke with Md this am. Brent called Tete at [facility] to inform pt is medically stable and ready for discharge. Pt will transfer to [address] [place] [address] today and will have a wound vac there. Brent talked with pt and  and she is glad and ready to get out of hospital since today is two weeks. Pt requests ambulance to transport.  to follow. Rn to call report. Packet prepared. NO new needs iden.  Zulema Root

## 2017-10-25 NOTE — PROGRESS NOTES
Am assessment completed. Pt is alert and oriented x 4, lungs clear but diminished. Verbalizes needs well. Takes pills whole with thin liquids. Ambulates in room and hallway with assist. Voiding without difficulty. Wound vac attached to rt back flap and operational. dsg to front. Pleasant and cooperative. Continue to monitor.

## 2017-10-25 NOTE — PROGRESS NOTES
Exchanged wound vac for pts portable wound vac and placed it in dirty utility.  Report called to Shalini James

## 2017-10-27 LAB
BACTERIA SPEC CULT: NORMAL
SERVICE CMNT-IMP: NORMAL

## 2017-11-16 PROBLEM — F33.41 RECURRENT MAJOR DEPRESSIVE DISORDER, IN PARTIAL REMISSION (HCC): Status: ACTIVE | Noted: 2017-11-16

## 2017-11-16 PROBLEM — R06.02 SHORTNESS OF BREATH: Status: RESOLVED | Noted: 2017-04-07 | Resolved: 2017-11-16

## 2017-11-16 PROBLEM — J45.20 MILD INTERMITTENT ASTHMA WITHOUT COMPLICATION: Status: ACTIVE | Noted: 2017-11-16

## 2017-11-20 ENCOUNTER — PATIENT OUTREACH (OUTPATIENT)
Dept: CASE MANAGEMENT | Age: 67
End: 2017-11-20

## 2017-11-20 ENCOUNTER — HOSPITAL ENCOUNTER (OUTPATIENT)
Dept: LAB | Age: 67
Discharge: HOME | End: 2017-11-20
Payer: MEDICARE

## 2017-11-20 DIAGNOSIS — Z17.0 MALIGNANT NEOPLASM OF BREAST IN FEMALE, ESTROGEN RECEPTOR POSITIVE, UNSPECIFIED LATERALITY, UNSPECIFIED SITE OF BREAST (HCC): Chronic | ICD-10-CM

## 2017-11-20 DIAGNOSIS — C50.919 MALIGNANT NEOPLASM OF BREAST IN FEMALE, ESTROGEN RECEPTOR POSITIVE, UNSPECIFIED LATERALITY, UNSPECIFIED SITE OF BREAST (HCC): Chronic | ICD-10-CM

## 2017-11-20 LAB
ALBUMIN SERPL-MCNC: 2.3 G/DL (ref 3.2–4.6)
ALBUMIN/GLOB SERPL: 0.5 {RATIO} (ref 1.2–3.5)
ALP SERPL-CCNC: 164 U/L (ref 50–136)
ALT SERPL-CCNC: 22 U/L (ref 12–65)
ANION GAP SERPL CALC-SCNC: 9 MMOL/L (ref 7–16)
AST SERPL-CCNC: 29 U/L (ref 15–37)
BASOPHILS # BLD: 0.1 K/UL (ref 0–0.2)
BASOPHILS NFR BLD: 1 % (ref 0–2)
BILIRUB SERPL-MCNC: 0.2 MG/DL (ref 0.2–1.1)
BUN SERPL-MCNC: 9 MG/DL (ref 8–23)
CALCIUM SERPL-MCNC: 8.2 MG/DL (ref 8.3–10.4)
CHLORIDE SERPL-SCNC: 102 MMOL/L (ref 98–107)
CO2 SERPL-SCNC: 29 MMOL/L (ref 21–32)
CREAT SERPL-MCNC: 0.92 MG/DL (ref 0.6–1)
DIFFERENTIAL METHOD BLD: ABNORMAL
EOSINOPHIL # BLD: 0.5 K/UL (ref 0–0.8)
EOSINOPHIL NFR BLD: 5 % (ref 0.5–7.8)
ERYTHROCYTE [DISTWIDTH] IN BLOOD BY AUTOMATED COUNT: 16.9 % (ref 11.9–14.6)
GLOBULIN SER CALC-MCNC: 4.5 G/DL (ref 2.3–3.5)
GLUCOSE SERPL-MCNC: 169 MG/DL (ref 65–100)
HCT VFR BLD AUTO: 32.9 % (ref 35.8–46.3)
HGB BLD-MCNC: 10.6 G/DL (ref 11.7–15.4)
LYMPHOCYTES # BLD: 1.5 K/UL (ref 0.5–4.6)
LYMPHOCYTES NFR BLD: 14 % (ref 13–44)
MAGNESIUM SERPL-MCNC: 2.2 MG/DL (ref 1.8–2.4)
MCH RBC QN AUTO: 31 PG (ref 26.1–32.9)
MCHC RBC AUTO-ENTMCNC: 32.2 G/DL (ref 31.4–35)
MCV RBC AUTO: 96.2 FL (ref 79.6–97.8)
MONOCYTES # BLD: 0.7 K/UL (ref 0.1–1.3)
MONOCYTES NFR BLD: 7 % (ref 4–12)
NEUTS SEG # BLD: 7.6 K/UL (ref 1.7–8.2)
NEUTS SEG NFR BLD: 73 % (ref 43–78)
NRBC # BLD: 0 K/UL (ref 0–0.2)
PLATELET # BLD AUTO: 266 K/UL (ref 150–450)
PMV BLD AUTO: 10.1 FL (ref 10.8–14.1)
POTASSIUM SERPL-SCNC: 3.6 MMOL/L (ref 3.5–5.1)
PROT SERPL-MCNC: 6.8 G/DL (ref 6.3–8.2)
RBC # BLD AUTO: 3.42 M/UL (ref 4.05–5.25)
SODIUM SERPL-SCNC: 140 MMOL/L (ref 136–145)
WBC # BLD AUTO: 10.4 K/UL (ref 4.3–11.1)

## 2017-11-20 PROCEDURE — 85025 COMPLETE CBC W/AUTO DIFF WBC: CPT | Performed by: NURSE PRACTITIONER

## 2017-11-20 PROCEDURE — 80053 COMPREHEN METABOLIC PANEL: CPT | Performed by: NURSE PRACTITIONER

## 2017-11-20 PROCEDURE — 83735 ASSAY OF MAGNESIUM: CPT | Performed by: NURSE PRACTITIONER

## 2017-11-20 NOTE — PROGRESS NOTES
11/20/17 saw pt today with LOVE Gutierrez for follow up breast cancer. She has been off of ibrance since the beginning of September due to complications with reconstruction surgery. She continues on a wound vac. Stated this should be discontinued in about 2 - 3 weeks. Plan is to continue to hold ibrance. PO intake is good. She also has some residual pain from shingles, on neurontin. Follow up in 4 weeks with restaging PET. Encouraged to call with any concerns. Navigation will continue to follow.

## 2017-12-06 ENCOUNTER — PATIENT OUTREACH (OUTPATIENT)
Dept: CASE MANAGEMENT | Age: 67
End: 2017-12-06

## 2017-12-06 PROBLEM — L03.313 CELLULITIS OF CHEST WALL: Status: ACTIVE | Noted: 2017-12-06

## 2017-12-06 NOTE — PROGRESS NOTES
Patient categorized as high risk patient, level 4. Chart reviewed to determine need for CCM services. Patient has established care with Dr. Darian Swain. Patient has established care with oncology team to include RN Navigator. Last admissions related to planned procedures and/or oncology treatments. At this time there is no evidence to suggest that CCM services are needed. This note will not be viewable in [address]

## 2017-12-11 ENCOUNTER — HOSPITAL ENCOUNTER (INPATIENT)
Age: 67
LOS: 6 days | Discharge: HOME | DRG: 580 | End: 2017-12-18
Attending: INTERNAL MEDICINE | Admitting: INTERNAL MEDICINE
Payer: MEDICARE

## 2017-12-11 PROBLEM — L03.90 CELLULITIS: Status: ACTIVE | Noted: 2017-12-11

## 2017-12-11 LAB
ANION GAP SERPL CALC-SCNC: 8 MMOL/L (ref 7–16)
BUN SERPL-MCNC: 22 MG/DL (ref 8–23)
CALCIUM SERPL-MCNC: 8.7 MG/DL (ref 8.3–10.4)
CHLORIDE SERPL-SCNC: 102 MMOL/L (ref 98–107)
CO2 SERPL-SCNC: 28 MMOL/L (ref 21–32)
CREAT SERPL-MCNC: 0.95 MG/DL (ref 0.6–1)
ERYTHROCYTE [DISTWIDTH] IN BLOOD BY AUTOMATED COUNT: 16.9 % (ref 11.9–14.6)
GLUCOSE SERPL-MCNC: 83 MG/DL (ref 65–100)
HCT VFR BLD AUTO: 37.3 % (ref 35.8–46.3)
HGB BLD-MCNC: 11.9 G/DL (ref 11.7–15.4)
MCH RBC QN AUTO: 30.4 PG (ref 26.1–32.9)
MCHC RBC AUTO-ENTMCNC: 31.9 G/DL (ref 31.4–35)
MCV RBC AUTO: 95.2 FL (ref 79.6–97.8)
PLATELET # BLD AUTO: 225 K/UL (ref 150–450)
PMV BLD AUTO: 10.1 FL (ref 10.8–14.1)
POTASSIUM SERPL-SCNC: 4 MMOL/L (ref 3.5–5.1)
RBC # BLD AUTO: 3.92 M/UL (ref 4.05–5.25)
SODIUM SERPL-SCNC: 138 MMOL/L (ref 136–145)
WBC # BLD AUTO: 7.1 K/UL (ref 4.3–11.1)

## 2017-12-11 PROCEDURE — 36415 COLL VENOUS BLD VENIPUNCTURE: CPT | Performed by: INTERNAL MEDICINE

## 2017-12-11 PROCEDURE — 77030003560 HC NDL HUBR BARD -A

## 2017-12-11 PROCEDURE — 74011000258 HC RX REV CODE- 258: Performed by: INTERNAL MEDICINE

## 2017-12-11 PROCEDURE — 74011250636 HC RX REV CODE- 250/636: Performed by: INTERNAL MEDICINE

## 2017-12-11 PROCEDURE — 80048 BASIC METABOLIC PNL TOTAL CA: CPT | Performed by: INTERNAL MEDICINE

## 2017-12-11 PROCEDURE — 85027 COMPLETE CBC AUTOMATED: CPT | Performed by: INTERNAL MEDICINE

## 2017-12-11 PROCEDURE — 99218 HC RM OBSERVATION: CPT

## 2017-12-11 PROCEDURE — 74011250637 HC RX REV CODE- 250/637: Performed by: INTERNAL MEDICINE

## 2017-12-11 RX ORDER — HEPARIN SODIUM 5000 [USP'U]/ML
5000 INJECTION, SOLUTION INTRAVENOUS; SUBCUTANEOUS EVERY 8 HOURS
Status: DISCONTINUED | OUTPATIENT
Start: 2017-12-11 | End: 2017-12-18 | Stop reason: HOSPADM

## 2017-12-11 RX ORDER — FLUTICASONE PROPIONATE AND SALMETEROL 250; 50 UG/1; UG/1
1 POWDER RESPIRATORY (INHALATION)
Status: DISCONTINUED | OUTPATIENT
Start: 2017-12-11 | End: 2017-12-14

## 2017-12-11 RX ORDER — LEVOTHYROXINE SODIUM 75 UG/1
75 TABLET ORAL
Status: DISCONTINUED | OUTPATIENT
Start: 2017-12-12 | End: 2017-12-18 | Stop reason: HOSPADM

## 2017-12-11 RX ORDER — SODIUM CHLORIDE 0.9 % (FLUSH) 0.9 %
5-10 SYRINGE (ML) INJECTION EVERY 8 HOURS
Status: DISCONTINUED | OUTPATIENT
Start: 2017-12-11 | End: 2017-12-18 | Stop reason: HOSPADM

## 2017-12-11 RX ORDER — VANCOMYCIN HYDROCHLORIDE
1250 EVERY 24 HOURS
Status: DISCONTINUED | OUTPATIENT
Start: 2017-12-11 | End: 2017-12-12

## 2017-12-11 RX ORDER — HEPARIN 100 UNIT/ML
100 SYRINGE INTRAVENOUS EVERY 8 HOURS
Status: DISCONTINUED | OUTPATIENT
Start: 2017-12-11 | End: 2017-12-18 | Stop reason: HOSPADM

## 2017-12-11 RX ORDER — DIPHENHYDRAMINE HYDROCHLORIDE 50 MG/ML
12.5 INJECTION, SOLUTION INTRAMUSCULAR; INTRAVENOUS ONCE
Status: COMPLETED | OUTPATIENT
Start: 2017-12-11 | End: 2017-12-11

## 2017-12-11 RX ORDER — ONDANSETRON 2 MG/ML
4 INJECTION INTRAMUSCULAR; INTRAVENOUS
Status: DISCONTINUED | OUTPATIENT
Start: 2017-12-11 | End: 2017-12-18 | Stop reason: HOSPADM

## 2017-12-11 RX ORDER — ALPRAZOLAM 0.5 MG/1
0.5 TABLET ORAL
Status: DISCONTINUED | OUTPATIENT
Start: 2017-12-11 | End: 2017-12-18 | Stop reason: HOSPADM

## 2017-12-11 RX ORDER — DULOXETIN HYDROCHLORIDE 60 MG/1
60 CAPSULE, DELAYED RELEASE ORAL DAILY
Status: DISCONTINUED | OUTPATIENT
Start: 2017-12-12 | End: 2017-12-18 | Stop reason: HOSPADM

## 2017-12-11 RX ORDER — ACETAMINOPHEN 325 MG/1
650 TABLET ORAL
Status: DISCONTINUED | OUTPATIENT
Start: 2017-12-11 | End: 2017-12-18 | Stop reason: HOSPADM

## 2017-12-11 RX ORDER — SODIUM CHLORIDE 0.9 % (FLUSH) 0.9 %
5-10 SYRINGE (ML) INJECTION AS NEEDED
Status: DISCONTINUED | OUTPATIENT
Start: 2017-12-11 | End: 2017-12-18 | Stop reason: HOSPADM

## 2017-12-11 RX ORDER — LETROZOLE 2.5 MG/1
2.5 TABLET, FILM COATED ORAL
Status: DISCONTINUED | OUTPATIENT
Start: 2017-12-11 | End: 2017-12-18 | Stop reason: HOSPADM

## 2017-12-11 RX ORDER — HEPARIN 100 UNIT/ML
100 SYRINGE INTRAVENOUS
Status: DISCONTINUED | OUTPATIENT
Start: 2017-12-11 | End: 2017-12-18 | Stop reason: HOSPADM

## 2017-12-11 RX ADMIN — FLUTICASONE PROPIONATE AND SALMETEROL 1 PUFF: 250; 50 POWDER RESPIRATORY (INHALATION) at 11:00

## 2017-12-11 RX ADMIN — Medication 1 AMPULE: at 21:13

## 2017-12-11 RX ADMIN — VANCOMYCIN HYDROCHLORIDE 1250 MG: 10 INJECTION, POWDER, LYOPHILIZED, FOR SOLUTION INTRAVENOUS at 12:54

## 2017-12-11 RX ADMIN — Medication 100 UNITS: at 15:24

## 2017-12-11 RX ADMIN — SODIUM CHLORIDE 2 G: 900 INJECTION, SOLUTION INTRAVENOUS at 15:23

## 2017-12-11 RX ADMIN — Medication 100 UNITS: at 21:13

## 2017-12-11 RX ADMIN — DIPHENHYDRAMINE HYDROCHLORIDE 12.5 MG: 50 INJECTION, SOLUTION INTRAMUSCULAR; INTRAVENOUS at 14:05

## 2017-12-11 RX ADMIN — Medication 1 AMPULE: at 12:53

## 2017-12-11 RX ADMIN — HEPARIN SODIUM 5000 UNITS: 5000 INJECTION, SOLUTION INTRAVENOUS; SUBCUTANEOUS at 14:05

## 2017-12-11 RX ADMIN — LETROZOLE 2.5 MG: 2.5 TABLET, FILM COATED ORAL at 22:00

## 2017-12-11 RX ADMIN — Medication 10 ML: at 12:58

## 2017-12-11 RX ADMIN — Medication 10 ML: at 15:28

## 2017-12-11 RX ADMIN — Medication 10 ML: at 21:13

## 2017-12-11 RX ADMIN — FLUTICASONE PROPIONATE AND SALMETEROL 1 PUFF: 250; 50 POWDER RESPIRATORY (INHALATION) at 21:07

## 2017-12-11 RX ADMIN — HEPARIN SODIUM 5000 UNITS: 5000 INJECTION, SOLUTION INTRAVENOUS; SUBCUTANEOUS at 21:12

## 2017-12-11 NOTE — PROGRESS NOTES
Assessment complete via flow sheet. Pt A&Ox3. Respirations even and unlabored, CTA. S1 S2 auscultated. Pt on room air. Pt reports pain level of 0 out of 10, states she has intermentant shooting pain around surgery site. Pt upper chest with erythremia, slightly warm, area demarcated at MD office. Bowel sounds active, abdomen soft. Pt reports normal BMs Denies other needs. Bed in lowest position, side rails up 3, call bell in reach. Pt and  oriented to room. Instructed to call for assistance. Pt verbalized understanding. Plan of care reviewed with patient.

## 2017-12-11 NOTE — PROGRESS NOTES
Pharmacokinetic Consult to Pharmacist    Alison Any is a 69 y.o. female being treated for skin and soft tissue infection  with Vancomycin and Cefepime. @NCNA(16)@  @DRGX83)@  Lab Results   Component Value Date/Time    BUN 13 12/06/2017 04:31 PM    Creatinine 0.85 12/06/2017 04:31 PM    WBC 6.8 12/06/2017 04:11 PM    Procalcitonin 7.0 10/13/2017 04:10 AM    Lactic acid 1.8 10/14/2017 06:24 AM    Lactic Acid (POC) 3.6 10/11/2017 03:35 PM      Estimated Creatinine Clearance: 49.4 mL/min (based on Cr of 0.85). CULTURES:  All Micro Results       None              Day 1 of vancomycin. Goal trough is 12 - 18. Vancomycin dose initiated at 1250 mg IV every 24 hours. Will continue to follow patient.       Thank you,  Huang KeitaD, BCPS

## 2017-12-11 NOTE — PROGRESS NOTES
Pt states she recently had wound vac, [facility] home health nurse wants dressing changed daily. Pt states it is a wet to dry dressing, dressing changed, pt tolerated well.     Pt had no c/o itching after restarting Vanco.

## 2017-12-11 NOTE — PROGRESS NOTES
Pt c/o itching to upper chest and back. Vancomycin infusing stopped, line flushed. MD paged, will await call back.

## 2017-12-11 NOTE — H&P
HOSPITALIST H&P/CONSULT  NAME:  Cj Verdin   Age:  69 y.o.  :   1950   MRN:   [id number]  PCP: James Espinal MD  Consulting MD:  Treatment Team: Attending Provider: Susan Fields DO  HPI:   Patient 56J with pmhx of breast ca s/p RTX completed in 2017 on oral chemo (femara) s/p right breast reconstruction flap and left breast mastectomy 2017 with recent hospitalization in October for skin flap infection and right breast implant infection. Underwent right breast implant removal on 10/20 as well as wound vac placement to flap. She was discharged to rehab on oral antibiotics. Reports she had been doing better until a week ago when skin over right breast surgical site became tender, warm and erythematous. Was seen by her plastic surgeon and started on doxycycline. Has taken 5 days and not clinically improved. Her surgeon has asked that she be admitted for recurrent cellulitis requiring IV antibiotics. Patient denies fever, chills, nausea, vomiting. No drainage from right breast.     Complete ROS done and is as stated in HPI or otherwise negative  Past Medical History:   Diagnosis Date    Abnormal CT of the chest     Adverse effect of anesthesia     bp dropped with dual lung/gallbladder surgery    Asthma      no acute attacks recently.  uses advair daily    Breast cancer ([facility] [address]     R breast, s/p mastectomy, chemo, reconstruction    Depression      Hypercholesteremia     Controlled with diet     Hypothyroidism     Ill-defined condition     granular lung disease resolved       Past Surgical History:   Procedure Laterality Date    CHEST SURGERY PROCEDURE UNLISTED      R lung biopsy     HX BLADDER REPAIR      HX BREAST AUGMENTATION Right 10/20/2017    BREAST IMPLANT REMOVAL  performed by Belgica Patricio MD at [address] HX BREAST BIOPSY Right 2016    SKIN BIOPSY RIGHT BREAST performed by Hiral Hunt MD at [facility] Bilateral 2016    BILATERAL BREAST RECONSTRUCTION performed by Natalya House MD at [facility] MAIN OR    HX BREAST RECONSTRUCTION Bilateral 2016    BILATERAL BREAST TISSUE EXPANDER INSERTION performed by Natalya House MD at [address] [place] [address] HX BREAST RECONSTRUCTION Right 2017    RIGHT BREAST RECONSTRUCTION performed by Natalya House MD at [address] [place] [address] HX BREAST RECONSTRUCTION Right 2017    Right  PLACEMENT OF PERMANENT IMPLANTS performed by Natalya House MD at [address] [place] [address] HX BREAST REDUCTION Left 2017    LEFT BREAST MASTOPEXY FOR SYMMETRY performed by Natalya House MD at [address] [place] [address] HX COLONOSCOPY      2006    HX CYSTOCELE REPAIR      HX HYSTERECTOMY      fibroids    HX LAP CHOLECYSTECTOMY  2000    HX MASTECTOMY Right     HX RECTOCELE REPAIR      HX VASCULAR ACCESS      port left chest wall      Prior to Admission Medications   Prescriptions Last Dose Informant Patient Reported? Taking? ALPRAZolam (XANAX) 0.5 mg tablet 12/10/2017 at Unknown time  No Yes   Si bid  prn  Indications: anxiety, sleep   DULoxetine (CYMBALTA) 60 mg capsule 12/10/2017 at Unknown time  No Yes   Sig: Take 1 Cap by mouth daily. albuterol (VENTOLIN) 90 mcg/Actuation inhaler   Yes No   Sig: Take 2 Puffs by inhalation every six (6) hours as needed. Indications: bring on the dos. fluticasone-salmeterol (ADVAIR DISKUS) 250-50 mcg/dose diskus inhaler   No No   Sig: Take 1 Puff by inhalation every twelve (12) hours. Indications: INTRINSIC ASTHMA   heparin, porcine, pf (HEPARIN LOCKFLUSH,PORCINE,,PF,) 100 unit/mL injection Not Taking at Unknown time  No No   Sig: Access port, flush with 20ml Normal Saline, 500 units of Heparin Flush and de access port every 4 weeks. letrozole [facility]) 2.5 mg tablet 12/10/2017 at Unknown time  No Yes   Sig: Take 1 Tab by mouth daily.    levothyroxine (SYNTHROID) 75 mcg tablet 2017 at Unknown time  No Yes   Sig: Take 1 Tab by mouth Daily (before breakfast).  Indications: hypothyroidism      Facility-Administered Medications: None     Allergies   Allergen Reactions    Prednisone Unknown (comments)     \"psychotic reaction\"  To oral and parenteral steroids      Social History   Substance Use Topics    Smoking status: Never Smoker    Smokeless tobacco: Never Used    Alcohol use No      Family History   Problem Relation Age of Onset    Hypertension Mother     Cancer Mother 79     Breast cancer at age 79    Diabetes Mother      Type 2    Other Father      alzheimers    Cancer Maternal Aunt      Breast cancer after age 72    Cancer Maternal Aunt      Breast cancer at age 39   Hershell Francine Allergic Rhinitis Neg Hx     Allergy-severe Neg Hx     Amblyopia Neg Hx     Anemia Neg Hx     Anesth Problems Neg Hx     Angioedema Neg Hx     Anxiety Neg Hx     Arrhythmia Neg Hx     Arthritis-rheumatoid Neg Hx     Ataxia Neg Hx     Atopy Neg Hx     Bipolar Disorder Neg Hx     Blindness Neg Hx     Breast Cancer Neg Hx     Breast Problems Neg Hx     Broken Bones Neg Hx     Cataract Neg Hx     Celiac Disease Neg Hx     Childhood heart surgery Neg Hx     Childhood resp disease Neg Hx     Chorea Neg Hx     Clotting Disorder Neg Hx     Collagen Dis Neg Hx     Colon Cancer Neg Hx     Colon Polyps Neg Hx     COPD Neg Hx     Coronary Artery Disease Neg Hx     Crohn's Disease Neg Hx     Cystic Fibrosis Neg Hx     Delayed Awakening Neg Hx     Dementia Neg Hx     Depression Neg Hx     Dislocations Neg Hx     Downs Syndrome Neg Hx     Drug Abuse Neg Hx     Eclampsia Neg Hx     Eczema Neg Hx     Emergence Delirium Neg Hx     Emphysema Neg Hx     Fainting Neg Hx     Genitourinary () Neg Hx     Glaucoma Neg Hx     Heart Attack Neg Hx     Heart defect Neg Hx     Heart Failure Neg Hx     Heart Surgery Neg Hx     Hemachromatosis Neg Hx     Herpes Neg Hx     High Cholesterol Neg Hx     HIV/AIDS Neg Hx     Immunodeficiency Neg Hx     Infertility Neg Hx     Inflammatory Bowel Dz Neg Hx     Kidney Disease Neg Hx     Liver Disease Neg Hx     Macular Degen Neg Hx     Malignant Hyperthermia Neg Hx     MS Neg Hx     Neuropathy Neg Hx     NF Neg Hx     Osteoporosis Neg Hx     Ovarian Cancer Neg Hx     Pacemaker Neg Hx     Panic disorder Neg Hx     Parkinsonism Neg Hx     Post-op Cognitive Dysfunction Neg Hx     Post-op Nausea/Vomiting Neg Hx     Prematurity Neg Hx      Labor Neg Hx     Pseudocholinesterase Deficiency Neg Hx     Psoriasis Neg Hx     Psychotic Disorder Neg Hx     Rashes/Skin Problems Neg Hx     Retinal Detachment Neg Hx     Rh Incompatibility Neg Hx     Schizophrenia Neg Hx     Seizures Neg Hx     Severe Sprains Neg Hx     Sickle Cell Anemia Neg Hx     Sickle Cell Trait Neg Hx     SIDS Neg Hx     SLE Neg Hx     Spont Ab Neg Hx     Stomach Cancer Neg Hx     Strabismus Neg Hx     Substance Abuse Neg Hx     Sudden Death Neg Hx     Suicide Neg Hx     Thyroid Disease Neg Hx     Tuberculosis Neg Hx     Ulcerative Colitis Neg Hx     Urticaria Neg Hx     Lang's Disease Neg Hx       Objective:     Visit Vitals    BP (!) 141/102 (BP 1 Location: Right arm, BP Patient Position: Sitting)  Comment: RN notified    Pulse (!) 112  Comment: RN notified    Temp 97.6 °F (36.4 °C)    Resp 20    Ht 5' 2\" (1.575 m)    Wt 59.7 kg (131 lb 9.8 oz)    SpO2 94%    Breastfeeding No    BMI 24.07 kg/m2      Temp (24hrs), Av.6 °F (36.4 °C), Min:97.6 °F (36.4 °C), Max:97.6 °F (36.4 °C)    Oxygen Therapy  O2 Sat (%): 94 % (17 1058)  O2 Device: Room air (17 1118)  Physical Exam:  General:    Alert, cooperative, no distress, appears stated age. Head:   Normocephalic, without obvious abnormality, atraumatic. Nose:  Nares normal. No drainage or sinus tenderness. Lungs:   Clear to auscultation bilaterally. No Wheezing or Rhonchi. No rales.   Heart:   Regular rate and rhythm,  no murmur, rub or gallop. Abdomen:   Soft, non-tender. Not distended. Bowel sounds normal.   Extremities: No cyanosis. No edema. No clubbing  Skin:    Scar tissue over right breast healed. Large area of erythema. No discharge. Neurologic: Alert and oriented x 3, no focal deficits   Data Review:   No results found for this or any previous visit (from the past 24 hour(s)). Imaging /Procedures /Studies     Assessment and Plan: Active Hospital Problems    Diagnosis Date Noted    Cellulitis 12/11/2017    Pancytopenia ([facility] [address] 10/11/2017    S/P breast reconstruction, right 09/19/2017    Status post right mastectomy 09/14/2016    Malignant neoplasm of right female breast ([facility] [address] 01/25/2016    Acquired hypothyroidism 11/04/2015       LABS PENDING    A/P   - Cellulitis - start IV vanc/cefepime (zosyn shortage).    - hx of breast CA - aware, cont femara  - Hypothyroidism - synthroid     Code Status: Full code    Anticipated discharge: 2-3 days    Signed By: Jammie Tavares DO     December 11, 2017

## 2017-12-11 NOTE — IP AVS SNAPSHOT
Donny Aparicio [address] [facility] 
[phone] Patient: Hetal Loredo MRN: [id number] :1950 My Medications TAKE these medications as instructed Instructions Each Dose to Equal  
 Morning Noon Evening Bedtime ALPRAZolam 0.5 mg tablet Commonly known as:  Alcus Mariangel Your last dose was: Your next dose is:    
   
   
 1 bid  prn  Indications: anxiety, sleep  
     
   
   
   
  
 benzonatate 100 mg capsule Commonly known as:  TESSALON Your last dose was: Your next dose is: Take 1 Cap by mouth three (3) times daily for 7 days. 100 mg  
    
   
   
   
  
 doxycycline 100 mg tablet Commonly known as:  ADOXA Your last dose was: Your next dose is: Take 1 Tab by mouth two (2) times a day. Indications: chest wall cellulitis amelioration 100 mg DULoxetine 60 mg capsule Commonly known as:  CYMBALTA Your last dose was: Your next dose is: Take 1 Cap by mouth daily. 60 mg  
    
   
   
   
  
 fluticasone-salmeterol 250-50 mcg/dose diskus inhaler Commonly known as:  ADVAIR DISKUS Your last dose was: Your next dose is: Take 1 Puff by inhalation every twelve (12) hours. Indications: INTRINSIC ASTHMA  
 1 Puff  
    
   
   
   
  
 heparin (porcine) pf 100 unit/mL injection Commonly known as:  HEPARIN LOCKFLUSH(PORCINE)(PF)  
   
Your last dose was: Your next dose is: Access port, flush with 20ml Normal Saline, 500 units of Heparin Flush and de access port every 4 weeks. letrozole 2.5 mg tablet Commonly known as:  [facility] Your last dose was: Your next dose is: Take 1 Tab by mouth daily. 2.5 mg  
    
   
   
   
  
 levothyroxine 75 mcg tablet Commonly known as:  SYNTHROID Your last dose was: Your next dose is: Take 1 Tab by mouth Daily (before breakfast). Indications: hypothyroidism 75 mcg  
    
   
   
   
  
 oxyCODONE-acetaminophen 7.5-325 mg per tablet Commonly known as:  PERCOCET 7.5 Your last dose was: Your next dose is: Take 1 Tab by mouth every six (6) hours as needed for up to 5 days. Max Daily Amount: 4 Tabs. 1 Tab VENTOLIN 90 mcg/actuation inhaler Generic drug:  albuterol Your last dose was: Your next dose is: Take 2 Puffs by inhalation every six (6) hours as needed. Indications: bring on the dos. 2 Puff Where to Get Your Medications These medications were sent to [address] [place] [address], [address] [facility] [address] Phone:  [phone]  
  benzonatate 100 mg capsule  
 doxycycline 100 mg tablet Information on where to get these meds will be given to you by the nurse or doctor. ! Ask your nurse or doctor about these medications  
  oxyCODONE-acetaminophen 7.5-325 mg per tablet

## 2017-12-11 NOTE — PROGRESS NOTES
Pt states itching has lessened since stop IV medicine, Benadryl administered, will restart medicine in 15 minutes.

## 2017-12-11 NOTE — IP AVS SNAPSHOT
[address] [place] [address] [facility] 
[phone] Patient: Cj Verdin MRN: [id number] :1950 About your hospitalization You were admitted on:  2017 You last received care in the:  [address] [place] [address] You were discharged on:  2017 Why you were hospitalized Your primary diagnosis was:  Not on File Your diagnoses also included:  Cellulitis, Status Post Right Mastectomy, S/P Breast Reconstruction, Right, Pancytopenia (Hcc), Malignant Neoplasm Of Right Female Breast (Hcc), Acquired Hypothyroidism Things You Need To Do (next 8 weeks) Follow up with James Espinal MD  
  
Phone:  [phone] Where:  [address] [place] [address] Follow up with Belgica Patricio MD in 4 day(s) APPT. Zahida [place]. nd @ 11:00 WITH Oliverio Salena Phone:  [phone] Where:  [address] [place] [address] [address] Friday Dec 22, 2017 NM PET/CT DOSE with [address] NM PET/CT INJ RM at 10:00 AM  
* FOR ALL APPOINTMENTS BEFORE NOON: Nothing to eat or drink after midnight except for water ONLY. * AFTERNOON APPOINTMENTS: May eat a light breakfast, but without carbohydrates or sugars (We have a list of suggested foods). They must be NPO except for water for at least 4 hours before their appointment time. Patient should be well hydrated (at least 24 oz of water). Do not participate in strenuous activity the day before or the morning before afternoon appointments. Diabetic patients should refrain from insulin 4 hours prior to their appointment. No pregnant patients may have a PET/CT exam, breast feeding mothers should pump enough breast milk for 24 hours as they will not be able to breast feed for 1 day after the scan. Contact Nuclear Medicine with any questions. Procedure takes anywhere from 2-3 hours.   If the patient requires pain or anxiety medications, arrangements must be made prior to patient's arrival with their ordering physician. The patient should wait 8 weeks after radiation therapy and 2 weeks after chemotherapy has been completed. * PATIENT ARRIVAL Please report 30 minutes early to check in, except for 7 am patient 7am arrival.  
Where:  [facility] PET [facility]) Tuesday Jan 16, 2018 SHORT with Catalina Wilson MD at 10:45 AM  
Where:  [address] Thursday Jan 25, 2018 [facility] OFFICE VISIT with Coco Miner NP at  1:30 PM  
Where:  [address] ([facility]) Discharge Orders None A check samuel indicates which time of day the medication should be taken. My Medications TAKE these medications as instructed Instructions Each Dose to Equal  
 Morning Noon Evening Bedtime ALPRAZolam 0.5 mg tablet Commonly known as:  [facility] Your last dose was: Your next dose is:    
   
   
 1 bid  prn  Indications: anxiety, sleep  
     
   
   
   
  
 benzonatate 100 mg capsule Commonly known as:  TESSALON Your last dose was: Your next dose is: Take 1 Cap by mouth three (3) times daily for 7 days. 100 mg  
    
   
   
   
  
 doxycycline 100 mg tablet Commonly known as:  ADOXA Your last dose was: Your next dose is: Take 1 Tab by mouth two (2) times a day. Indications: chest wall cellulitis amelioration 100 mg DULoxetine 60 mg capsule Commonly known as:  CYMBALTA Your last dose was: Your next dose is: Take 1 Cap by mouth daily. 60 mg  
    
   
   
   
  
 fluticasone-salmeterol 250-50 mcg/dose diskus inhaler Commonly known as:  ADVAIR DISKUS Your last dose was: Your next dose is: Take 1 Puff by inhalation every twelve (12) hours. Indications: INTRINSIC ASTHMA  
 1 Puff  
    
   
   
   
  
 heparin (porcine) pf 100 unit/mL injection Commonly known as:  HEPARIN LOCKFLUSH(PORCINE)(PF)  
   
Your last dose was: Your next dose is: Access port, flush with 20ml Normal Saline, 500 units of Heparin Flush and de access port every 4 weeks. letrozole 2.5 mg tablet Commonly known as:  [facility] Your last dose was: Your next dose is: Take 1 Tab by mouth daily. 2.5 mg  
    
   
   
   
  
 levothyroxine 75 mcg tablet Commonly known as:  SYNTHROID Your last dose was: Your next dose is: Take 1 Tab by mouth Daily (before breakfast). Indications: hypothyroidism 75 mcg  
    
   
   
   
  
 oxyCODONE-acetaminophen 7.5-325 mg per tablet Commonly known as:  PERCOCET 7.5 Your last dose was: Your next dose is: Take 1 Tab by mouth every six (6) hours as needed for up to 5 days. Max Daily Amount: 4 Tabs. 1 Tab VENTOLIN 90 mcg/actuation inhaler Generic drug:  albuterol Your last dose was: Your next dose is: Take 2 Puffs by inhalation every six (6) hours as needed. Indications: bring on the dos. 2 Puff Where to Get Your Medications These medications were sent to [address] [place] [address], [address] [facility] [address] Phone:  [phone]  
  benzonatate 100 mg capsule  
 doxycycline 100 mg tablet Information on where to get these meds will be given to you by the nurse or doctor. ! Ask your nurse or doctor about these medications  
  oxyCODONE-acetaminophen 7.5-325 mg per tablet Discharge Instructions DISCHARGE SUMMARY from Nurse The following personal items are in your possession at time of discharge: Dental Appliances: None Visual Aid: Glasses Home Medications: None Jewelry: With patient Clothing: At bedside Other Valuables: Cell Phone PATIENT INSTRUCTIONS: 
 
After general anesthesia or intravenous sedation, for 24 hours or while taking prescription Narcotics: · Limit your activities · Do not drive and operate hazardous machinery · Do not make important personal or business decisions · Do  not drink alcoholic beverages · If you have not urinated within 8 hours after discharge, please contact your surgeon on call. Report the following to your surgeon: 
· Excessive pain, swelling, redness or odor of or around the surgical area · Temperature over 100.5 · Nausea and vomiting lasting longer than 4 hours or if unable to take medications · Any signs of decreased circulation or nerve impairment to extremity: change in color, persistent  numbness, tingling, coldness or increase pain · Any questions What to do at Home: 
Recommended activity: Activity as tolerated, per MD 
 
If you experience any of the following symptoms fever>101, pain unrelieved with medication, nausea/vomiting, shortness of breath, dizziness/fainting, chest pain. , please follow up with your doctor. *  Please give a list of your current medications to your Primary Care Provider. *  Please update this list whenever your medications are discontinued, doses are 
    changed, or new medications (including over-the-counter products) are added. *  Please carry medication information at all times in case of emergency situations. These are general instructions for a healthy lifestyle: No smoking/ No tobacco products/ Avoid exposure to second hand smoke Surgeon General's Warning:  Quitting smoking now greatly reduces serious risk to your health. Obesity, smoking, and sedentary lifestyle greatly increases your risk for illness A healthy diet, regular physical exercise & weight monitoring are important for maintaining a healthy lifestyle You may be retaining fluid if you have a history of heart failure or if you experience any of the following symptoms:  Weight gain of 3 pounds or more overnight or 5 pounds in a week, increased swelling in our hands or feet or shortness of breath while lying flat in bed. Please call your doctor as soon as you notice any of these symptoms; do not wait until your next office visit. Recognize signs and symptoms of STROKE: 
 
F-face looks uneven A-arms unable to move or move unevenly S-speech slurred or non-existent T-time-call 911 as soon as signs and symptoms begin-DO NOT go Back to bed or wait to see if you get better-TIME IS BRAIN. Warning Signs of HEART ATTACK Call 911 if you have these symptoms: 
? Chest discomfort. Most heart attacks involve discomfort in the center of the chest that lasts more than a few minutes, or that goes away and comes back. It can feel like uncomfortable pressure, squeezing, fullness, or pain. ? Discomfort in other areas of the upper body. Symptoms can include pain or discomfort in one or both arms, the back, neck, jaw, or stomach. ? Shortness of breath with or without chest discomfort. ? Other signs may include breaking out in a cold sweat, nausea, or lightheadedness. Don't wait more than five minutes to call [address]! Fast action can save your life. Calling 911 is almost always the fastest way to get lifesaving treatment. Emergency Medical Services staff can begin treatment when they arrive  up to an hour sooner than if someone gets to the hospital by car. The discharge information has been reviewed with the patient. The patient verbalized understanding. Discharge medications reviewed with the patient and appropriate educational materials and side effects teaching were provided. Cellulitis: Care Instructions Your Care Instructions Cellulitis is a skin infection. It often occurs after a break in the skin from a scrape, cut, bite, or puncture, or after a rash. The doctor has checked you carefully, but problems can develop later. If you notice any problems or new symptoms, get medical treatment right away. Follow-up care is a key part of your treatment and safety. Be sure to make and go to all appointments, and call your doctor if you are having problems. It's also a good idea to know your test results and keep a list of the medicines you take. How can you care for yourself at home? · Take your antibiotics as directed. Do not stop taking them just because you feel better. You need to take the full course of antibiotics. · Prop up the infected area on pillows to reduce pain and swelling. Try to keep the area above the level of your heart as often as you can. · If your doctor told you how to care for your wound, follow your doctor's instructions. If you did not get instructions, follow this general advice: ¨ Wash the wound with clean water 2 times a day. Don't use hydrogen peroxide or alcohol, which can slow healing. ¨ You may cover the wound with a thin layer of petroleum jelly, such as Vaseline, and a nonstick bandage. ¨ Apply more petroleum jelly and replace the bandage as needed. · Be safe with medicines. Take pain medicines exactly as directed. ¨ If the doctor gave you a prescription medicine for pain, take it as prescribed. ¨ If you are not taking a prescription pain medicine, ask your doctor if you can take an over-the-counter medicine. To prevent cellulitis in the future · Try to prevent cuts, scrapes, or other injuries to your skin. Cellulitis most often occurs where there is a break in the skin. · If you get a scrape, cut, mild burn, or bite, wash the wound with clean water as soon as you can to help avoid infection. Don't use hydrogen peroxide or alcohol, which can slow healing. · If you have swelling in your legs (edema), support stockings and good skin care may help prevent leg sores and cellulitis. · Take care of your feet, especially if you have diabetes or other conditions that increase the risk of infection. Wear shoes and socks. Do not go barefoot. If you have athlete's foot or other skin problems on your feet, talk to your doctor about how to treat them. When should you call for help? Call your doctor now or seek immediate medical care if: 
? · You have signs that your infection is getting worse, such as: 
¨ Increased pain, swelling, warmth, or redness. ¨ Red streaks leading from the area. ¨ Pus draining from the area. ¨ A fever. ? · You get a rash. ? Watch closely for changes in your health, and be sure to contact your doctor if: 
? · You are not getting better after 1 day (24 hours). ? · You do not get better as expected. Where can you learn more? Go to http://edyta-tremaine.info/. Alyson Gardner in the search box to learn more about \"Cellulitis: Care Instructions. \" Current as of: October 13, 2016 Content Version: 11.4 © 8397-4556 Healthwise, [employer]. Care instructions adapted under license by [employer] (which disclaims liability or warranty for this information). If you have questions about a medical condition or this instruction, always ask your healthcare professional. [place] [address] any warranty or liability for your use of this information. ACO Transitions of Care Introducing [employer] [address] Teodora Rincon offers a voluntary care coordination program to provide high quality service and care to [facility] fee-for-service beneficiaries. Yoni Verma was designed to help you enhance your health and well-being through the following services: ? Transitions of Care  support for individuals who are transitioning from one care setting to another (example: Hospital to home). ? Chronic and Complex Care Coordination  support for individuals and caregivers of those with serious or chronic illnesses or with more than one chronic (ongoing) condition and those who take a number of different medications. If you meet specific medical criteria, a [facility] [address] may call you directly to coordinate your care with your primary care physician and your other care providers. For questions about the [facility] programs, please, contact your physicians office. For general questions or additional information about Accountable Care Organizations: 
Please visit www.medicare.gov/acos. html or call 1-800-MEDICARE (3-341.815.4682) arviem [employer] users should call 8-292.234.1081. GPNX Announcement We are excited to announce that we are making your provider's discharge notes available to you in GPNX. You will see these notes when they are completed and signed by the physician that discharged you from your recent hospital stay. If you have any questions or concerns about any information you see in GPNX, please call the Health Information Department where you were seen or reach out to your Primary Care Provider for more information about your plan of care. Introducing [facility] & HEALTH SERVICES! Dear Mac Pierre: 
Thank you for requesting a GPNX account. Our records indicate that you already have an active GPNX account. You can access your account anytime at [email or web address] Did you know that you can access your hospital and ER discharge instructions at any time in GPNX? You can also review all of your test results from your hospital stay or ER visit. Additional Information If you have questions, please visit the Frequently Asked Questions section of the GPNX website at [email or web address] Remember, MyChart is NOT to be used for urgent needs. For medical emergencies, dial 911. Now available from your iPhone and Android! Unresulted Labs-Please follow up with your PCP about these lab tests Order Current Status CULTURE, BLOOD Preliminary result Providers Seen During Your Hospitalization Provider Specialty Primary office phone Susan Donnie, [address] [facility] Internal Medicine [phone] Your Primary Care Physician (PCP) Primary Care Physician Office Phone Office Fax Asaf Bennett [phone] You are allergic to the following Allergen Reactions Prednisone Unknown (comments) \"psychotic reaction\"  To oral and parenteral steroids Recent Documentation Height Weight Breastfeeding? BMI OB Status Smoking Status 1.575 m 59.7 kg No 24.07 kg/m2 Hysterectomy Never Smoker Emergency Contacts Name Discharge Info Relation Home Work Mobile Blayne Gupta DISCHARGE CAREGIVER [3] Spouse [[phone] Patient Belongings The following personal items are in your possession at time of discharge: 
  Dental Appliances: None  Visual Aid: Glasses      Home Medications: None   Jewelry: With patient  Clothing: At bedside    Other Valuables: Cell Phone Please provide this summary of care documentation to your next provider. Signatures-by signing, you are acknowledging that this After Visit Summary has been reviewed with you and you have received a copy. Patient Signature:  ____________________________________________________________ Date:  ____________________________________________________________  
  
Dae Hitchcock Provider Signature:  ____________________________________________________________ Date:  ____________________________________________________________

## 2017-12-12 LAB
ANION GAP SERPL CALC-SCNC: 7 MMOL/L (ref 7–16)
BUN SERPL-MCNC: 18 MG/DL (ref 8–23)
CALCIUM SERPL-MCNC: 8.5 MG/DL (ref 8.3–10.4)
CHLORIDE SERPL-SCNC: 105 MMOL/L (ref 98–107)
CO2 SERPL-SCNC: 29 MMOL/L (ref 21–32)
CREAT SERPL-MCNC: 0.92 MG/DL (ref 0.6–1)
ERYTHROCYTE [DISTWIDTH] IN BLOOD BY AUTOMATED COUNT: 16.6 % (ref 11.9–14.6)
GLUCOSE SERPL-MCNC: 90 MG/DL (ref 65–100)
HCT VFR BLD AUTO: 34.3 % (ref 35.8–46.3)
HGB BLD-MCNC: 10.9 G/DL (ref 11.7–15.4)
MCH RBC QN AUTO: 30.3 PG (ref 26.1–32.9)
MCHC RBC AUTO-ENTMCNC: 31.8 G/DL (ref 31.4–35)
MCV RBC AUTO: 95.3 FL (ref 79.6–97.8)
PLATELET # BLD AUTO: 193 K/UL (ref 150–450)
PMV BLD AUTO: 10.1 FL (ref 10.8–14.1)
POTASSIUM SERPL-SCNC: 4 MMOL/L (ref 3.5–5.1)
PROCALCITONIN SERPL-MCNC: 0.1 NG/ML
RBC # BLD AUTO: 3.6 M/UL (ref 4.05–5.25)
SODIUM SERPL-SCNC: 141 MMOL/L (ref 136–145)
WBC # BLD AUTO: 5.3 K/UL (ref 4.3–11.1)

## 2017-12-12 PROCEDURE — 74011250637 HC RX REV CODE- 250/637: Performed by: INTERNAL MEDICINE

## 2017-12-12 PROCEDURE — 94640 AIRWAY INHALATION TREATMENT: CPT

## 2017-12-12 PROCEDURE — 74011000258 HC RX REV CODE- 258: Performed by: INTERNAL MEDICINE

## 2017-12-12 PROCEDURE — 84145 PROCALCITONIN (PCT): CPT | Performed by: INTERNAL MEDICINE

## 2017-12-12 PROCEDURE — 94760 N-INVAS EAR/PLS OXIMETRY 1: CPT

## 2017-12-12 PROCEDURE — 99218 HC RM OBSERVATION: CPT

## 2017-12-12 PROCEDURE — 77030018836 HC SOL IRR NACL ICUM -A

## 2017-12-12 PROCEDURE — 80048 BASIC METABOLIC PNL TOTAL CA: CPT | Performed by: INTERNAL MEDICINE

## 2017-12-12 PROCEDURE — 85027 COMPLETE CBC AUTOMATED: CPT | Performed by: INTERNAL MEDICINE

## 2017-12-12 PROCEDURE — 65270000029 HC RM PRIVATE

## 2017-12-12 PROCEDURE — 74011250636 HC RX REV CODE- 250/636: Performed by: INTERNAL MEDICINE

## 2017-12-12 RX ORDER — VANCOMYCIN HYDROCHLORIDE
1250 EVERY 24 HOURS
Status: DISCONTINUED | OUTPATIENT
Start: 2017-12-12 | End: 2017-12-14 | Stop reason: SDUPTHER

## 2017-12-12 RX ORDER — DIPHENHYDRAMINE HYDROCHLORIDE 50 MG/ML
12.5 INJECTION, SOLUTION INTRAMUSCULAR; INTRAVENOUS
Status: COMPLETED | OUTPATIENT
Start: 2017-12-12 | End: 2017-12-12

## 2017-12-12 RX ADMIN — Medication 100 UNITS: at 14:00

## 2017-12-12 RX ADMIN — DIPHENHYDRAMINE HYDROCHLORIDE 12.5 MG: 50 INJECTION, SOLUTION INTRAMUSCULAR; INTRAVENOUS at 13:41

## 2017-12-12 RX ADMIN — Medication 1 AMPULE: at 09:00

## 2017-12-12 RX ADMIN — SODIUM CHLORIDE 2 G: 900 INJECTION, SOLUTION INTRAVENOUS at 13:11

## 2017-12-12 RX ADMIN — Medication 100 UNITS: at 21:36

## 2017-12-12 RX ADMIN — Medication 10 ML: at 21:42

## 2017-12-12 RX ADMIN — LETROZOLE 2.5 MG: 2.5 TABLET, FILM COATED ORAL at 22:00

## 2017-12-12 RX ADMIN — Medication 10 ML: at 05:54

## 2017-12-12 RX ADMIN — HEPARIN SODIUM 5000 UNITS: 5000 INJECTION, SOLUTION INTRAVENOUS; SUBCUTANEOUS at 05:52

## 2017-12-12 RX ADMIN — Medication 100 UNITS: at 05:54

## 2017-12-12 RX ADMIN — HEPARIN SODIUM 5000 UNITS: 5000 INJECTION, SOLUTION INTRAVENOUS; SUBCUTANEOUS at 13:12

## 2017-12-12 RX ADMIN — DULOXETINE HYDROCHLORIDE 60 MG: 60 CAPSULE, DELAYED RELEASE ORAL at 09:06

## 2017-12-12 RX ADMIN — FLUTICASONE PROPIONATE AND SALMETEROL 1 PUFF: 250; 50 POWDER RESPIRATORY (INHALATION) at 08:01

## 2017-12-12 RX ADMIN — LEVOTHYROXINE SODIUM 75 MCG: 75 TABLET ORAL at 08:22

## 2017-12-12 RX ADMIN — Medication 1 AMPULE: at 21:39

## 2017-12-12 RX ADMIN — HEPARIN SODIUM 5000 UNITS: 5000 INJECTION, SOLUTION INTRAVENOUS; SUBCUTANEOUS at 21:35

## 2017-12-12 RX ADMIN — FLUTICASONE PROPIONATE AND SALMETEROL 1 PUFF: 250; 50 POWDER RESPIRATORY (INHALATION) at 20:00

## 2017-12-12 RX ADMIN — VANCOMYCIN HYDROCHLORIDE 1250 MG: 10 INJECTION, POWDER, LYOPHILIZED, FOR SOLUTION INTRAVENOUS at 13:00

## 2017-12-12 NOTE — PROGRESS NOTES
Shift assessment complete. Pt alert and oriented x4, respirations present, even and unlabored, HOB elevated, pt denies any SOB at this time, S1&S2 auscultated, HR regular, abd soft, non- tender, Active BS in all 4 quadrants, No pressure ulcers or edema noted, upper chest with erythremia and demarcated area, pt is up ad monroe to the bathroom, voiding, pt denies any pain at this time, pt instructed to call for assistance, pt verbalizes understanding, bed low and locked, side rails x3, call light within reach.

## 2017-12-12 NOTE — PROGRESS NOTES
Problem: Interdisciplinary Rounds  Goal: Interdisciplinary Rounds  Interdisciplinary team rounds were held 12/12/2017 with the following team members:Care Management, Nursing, Nutrition, Pharmacy and Physician and the patient. Plan of care discussed. See clinical pathway and/or care plan for interventions and desired outcomes.

## 2017-12-12 NOTE — PROGRESS NOTES
Am shift assessment. Pt is Alert and oriented x 4  Lungs clear and respirations even and unlabored. S1 & S2 auscultated , heart rate regular . Abdomen is soft and BS active. Rt chest Rodri red noted , incision is closed . Incision to posterior Rt dressing c/d/i .  No c/o pain and call light in reach

## 2017-12-12 NOTE — PROGRESS NOTES
Hospitalist Progress Note    2017  Admit Date: 2017 11:10 AM   NAME: Ruth Johnson   :  1950   MRN:  [id number]   Attending: Aileen Gifford DO  PCP:  Ryan Looney MD    SUBJECTIVE:   Patient 14O with pmhx of breast ca s/p RTX completed in 2017 on oral chemo (femara) s/p right breast reconstruction flap and left breast mastectomy 2017 with recent hospitalization in October for skin flap infection and right breast implant infection. Underwent right breast implant removal on 10/20 as well as wound vac placement to flap. She was discharged to rehab on oral antibiotics. Reports she had been doing better until a week ago when skin over right breast surgical site became tender, warm and erythematous. Was seen by her plastic surgeon and started on doxycycline. Has taken 5 days and not clinically improved. Her surgeon has asked that she be admitted for recurrent cellulitis requiring IV antibiotics. Started on IV vanc/cefepime     - pt had redness and itching after starting on Vanco yesterday, however, this resolved with benadryl. She tolerated vanco when restarted.  Has had 14 doses of vanco perviously this year      Review of Systems negative with exception of pertinent positives noted above  PHYSICAL EXAM     Visit Vitals    /75 (BP 1 Location: Left arm, BP Patient Position: At rest)    Pulse 92    Temp 98.4 °F (36.9 °C)    Resp 16    Ht 5' 2\" (1.575 m)    Wt 59.7 kg (131 lb 9.8 oz)    SpO2 97%    Breastfeeding No    BMI 24.07 kg/m2      Temp (24hrs), Av.4 °F (36.9 °C), Min:97.9 °F (36.6 °C), Max:99 °F (37.2 °C)    Oxygen Therapy  O2 Sat (%): 97 % (17)  Pulse via Oximetry: 92 beats per minute (17)  O2 Device: Room air (17)    Intake/Output Summary (Last 24 hours) at 17 1248  Last data filed at 17 0322   Gross per 24 hour   Intake                0 ml   Output              800 ml   Net             -800 ml General: No acute distress    Lungs:  CTA Bilaterally. Heart:  Regular rate and rhythm,  No murmur, rub, or gallop  Abdomen: Soft, Non distended, Non tender, Positive bowel sounds  Extremities: No cyanosis, clubbing or edema  Neurologic:  No focal deficits  Skin:  Erythema slightly improved over area of right chest. Receeding slowly from skin pen    [address] Problems    Diagnosis Date Noted    Cellulitis 12/11/2017    Pancytopenia ([facility] [address] 10/11/2017    S/P breast reconstruction, right 09/19/2017    Status post right mastectomy 09/14/2016    Malignant neoplasm of right female breast ([facility] [address] 01/25/2016    Acquired hypothyroidism 11/04/2015     A/P     - Cellulitis - start IV vanc/cefepime (zosyn shortage).    - hx of breast CA - aware, cont femara  - Hypothyroidism - synthroid    DVT Prophylaxis: hep sq    Signed By: Ely Villa, DO     December 12, 2017

## 2017-12-12 NOTE — PROGRESS NOTES
Pharmacokinetic Consult to Pharmacist    Toño Johnson is a 69 y.o. female being treated for skin/soft tissue infection with Vancomycin and Cefepime. Height: 5' 2\" (157.5 cm)  Weight: 59.7 kg (131 lb 9.8 oz)  Lab Results   Component Value Date/Time    BUN 18 12/12/2017 06:03 AM    Creatinine 0.92 12/12/2017 06:03 AM    WBC 5.3 12/12/2017 06:03 AM    Procalcitonin 0.1 12/12/2017 06:03 AM    Lactic acid 1.8 10/14/2017 06:24 AM    Lactic Acid (POC) 3.6 10/11/2017 03:35 PM      Estimated Creatinine Clearance: 45.6 mL/min (based on Cr of 0.92). CULTURES:  All Micro Results     None            Lab Results   Component Value Date/Time    Vancomycin,trough 17.1 10/22/2017 08:08 AM       Day 2 of vancomycin. Goal trough is 12 - 18. Will continue with Vancomycin 1250 mg IV every 24 hours. Will continue to follow patient.       Thank you,  Huy Blevins PharmD, BCPS

## 2017-12-13 ENCOUNTER — APPOINTMENT (OUTPATIENT)
Dept: CT IMAGING | Age: 67
DRG: 580 | End: 2017-12-13
Attending: INTERNAL MEDICINE
Payer: MEDICARE

## 2017-12-13 LAB — CREAT SERPL-MCNC: 0.92 MG/DL (ref 0.6–1)

## 2017-12-13 PROCEDURE — 94640 AIRWAY INHALATION TREATMENT: CPT

## 2017-12-13 PROCEDURE — 74011250637 HC RX REV CODE- 250/637: Performed by: INTERNAL MEDICINE

## 2017-12-13 PROCEDURE — 74011636320 HC RX REV CODE- 636/320: Performed by: INTERNAL MEDICINE

## 2017-12-13 PROCEDURE — 71260 CT THORAX DX C+: CPT

## 2017-12-13 PROCEDURE — 94760 N-INVAS EAR/PLS OXIMETRY 1: CPT

## 2017-12-13 PROCEDURE — 65270000029 HC RM PRIVATE

## 2017-12-13 PROCEDURE — 82565 ASSAY OF CREATININE: CPT | Performed by: INTERNAL MEDICINE

## 2017-12-13 PROCEDURE — 74011000258 HC RX REV CODE- 258: Performed by: INTERNAL MEDICINE

## 2017-12-13 PROCEDURE — 74011250636 HC RX REV CODE- 250/636: Performed by: INTERNAL MEDICINE

## 2017-12-13 RX ORDER — SODIUM CHLORIDE 0.9 % (FLUSH) 0.9 %
10 SYRINGE (ML) INJECTION
Status: COMPLETED | OUTPATIENT
Start: 2017-12-13 | End: 2017-12-13

## 2017-12-13 RX ORDER — DIPHENHYDRAMINE HYDROCHLORIDE 50 MG/ML
12.5 INJECTION, SOLUTION INTRAMUSCULAR; INTRAVENOUS
Status: DISCONTINUED | OUTPATIENT
Start: 2017-12-13 | End: 2017-12-18 | Stop reason: HOSPADM

## 2017-12-13 RX ADMIN — Medication 100 UNITS: at 06:05

## 2017-12-13 RX ADMIN — Medication 1 AMPULE: at 08:50

## 2017-12-13 RX ADMIN — Medication 10 ML: at 21:15

## 2017-12-13 RX ADMIN — VANCOMYCIN HYDROCHLORIDE 1250 MG: 10 INJECTION, POWDER, LYOPHILIZED, FOR SOLUTION INTRAVENOUS at 14:27

## 2017-12-13 RX ADMIN — SODIUM CHLORIDE 100 ML: 900 INJECTION, SOLUTION INTRAVENOUS at 21:15

## 2017-12-13 RX ADMIN — DIPHENHYDRAMINE HYDROCHLORIDE 12.5 MG: 50 INJECTION, SOLUTION INTRAMUSCULAR; INTRAVENOUS at 13:53

## 2017-12-13 RX ADMIN — Medication 100 UNITS: at 14:00

## 2017-12-13 RX ADMIN — Medication 10 ML: at 06:05

## 2017-12-13 RX ADMIN — DULOXETINE HYDROCHLORIDE 60 MG: 60 CAPSULE, DELAYED RELEASE ORAL at 08:50

## 2017-12-13 RX ADMIN — HEPARIN SODIUM 5000 UNITS: 5000 INJECTION, SOLUTION INTRAVENOUS; SUBCUTANEOUS at 13:23

## 2017-12-13 RX ADMIN — HEPARIN SODIUM 5000 UNITS: 5000 INJECTION, SOLUTION INTRAVENOUS; SUBCUTANEOUS at 06:04

## 2017-12-13 RX ADMIN — FLUTICASONE PROPIONATE AND SALMETEROL 1 PUFF: 250; 50 POWDER RESPIRATORY (INHALATION) at 08:40

## 2017-12-13 RX ADMIN — FLUTICASONE PROPIONATE AND SALMETEROL 1 PUFF: 250; 50 POWDER RESPIRATORY (INHALATION) at 20:29

## 2017-12-13 RX ADMIN — IOPAMIDOL 100 ML: 755 INJECTION, SOLUTION INTRAVENOUS at 21:14

## 2017-12-13 RX ADMIN — Medication 10 ML: at 21:45

## 2017-12-13 RX ADMIN — LEVOTHYROXINE SODIUM 75 MCG: 75 TABLET ORAL at 07:48

## 2017-12-13 RX ADMIN — SODIUM CHLORIDE 2 G: 900 INJECTION, SOLUTION INTRAVENOUS at 13:23

## 2017-12-13 RX ADMIN — HEPARIN SODIUM 5000 UNITS: 5000 INJECTION, SOLUTION INTRAVENOUS; SUBCUTANEOUS at 21:43

## 2017-12-13 RX ADMIN — Medication 1 AMPULE: at 20:45

## 2017-12-13 RX ADMIN — LETROZOLE 2.5 MG: 2.5 TABLET, FILM COATED ORAL at 21:49

## 2017-12-13 RX ADMIN — Medication 100 UNITS: at 21:49

## 2017-12-13 NOTE — CDMP QUERY
Please clarify if this patient is being treated/managed for:    *12/13 new question    1. Pancytopenia is documented on the Problem List with the current admission date, which makes it appear as a current Diagnosis that is being managed. However WBC 7.1, Hgb 11.9, and Platelet 861 on admission. Please clarify if the Pancytopenia is a current diagnosis or possibly a previous diagnosis that has resolved. Thank you. 2. Other Explanation of clinical findings  3. Unable to Determine (no explanation of clinical findings)    The medical record reflects the following:    Risk Factors: hx of Breast CA, currently on PO Chemo. Clinical Indicators: Admitted with Cellulitis of breast. WBC 7.1, Hgb 11.9, and Platelets 425 on admission. Treatment: monitoring of labs. Please clarify and document your clinical opinion in the progress notes and discharge summary including the definitive and/or presumptive diagnosis, (suspected or probable), related to the above clinical findings. Please include clinical findings supporting your diagnosis. Thank you, Araceli Mayo RN.

## 2017-12-13 NOTE — PROGRESS NOTES
Hospitalist Progress Note    2017  Admit Date: 2017 11:10 AM   NAME: Deana Alcantara   :  1950   MRN:  [id number]   Attending: Wilfredo Olivas DO  PCP:  Markell Shelton MD    SUBJECTIVE:   Patient 52Z with pmhx of breast ca s/p RTX completed in 2017 on oral chemo (femara) s/p right breast reconstruction flap and left breast mastectomy 2017 with recent hospitalization in October for skin flap infection and right breast implant infection. Underwent right breast implant removal on 10/20 as well as wound vac placement to flap. She was discharged to rehab on oral antibiotics. Reports she had been doing better until a week ago when skin over right breast surgical site became tender, warm and erythematous. Was seen by her plastic surgeon and started on doxycycline. Has taken 5 days and not clinically improved. Her surgeon has asked that she be admitted for recurrent cellulitis requiring IV antibiotics. Started on IV vanc/cefepime     - \"Im feeling better\".  at bedside. Area of cellulitis marginally improved. Review of Systems negative with exception of pertinent positives noted above  PHYSICAL EXAM     Visit Vitals    /72 (BP 1 Location: Left arm)    Pulse 99    Temp 97.9 °F (36.6 °C)    Resp 19    Ht 5' 2\" (1.575 m)    Wt 59.7 kg (131 lb 9.8 oz)    SpO2 94%    Breastfeeding No    BMI 24.07 kg/m2      Temp (24hrs), Av.3 °F (36.8 °C), Min:97.6 °F (36.4 °C), Max:98.8 °F (37.1 °C)    Oxygen Therapy  O2 Sat (%): 94 % (17 1056)  Pulse via Oximetry: 96 beats per minute (17 0846)  O2 Device: Room air (17 0846)    Intake/Output Summary (Last 24 hours) at 17 1609  Last data filed at 17 1056   Gross per 24 hour   Intake                0 ml   Output             1450 ml   Net            -1450 ml      General: No acute distress    Lungs:  CTA Bilaterally.    Heart:  Regular rate and rhythm,  No murmur, rub, or gallop  Abdomen: Soft, Non distended, Non tender, Positive bowel sounds  Extremities: No cyanosis, clubbing or edema  Neurologic:  No focal deficits  Skin:  Erythema slightly improved over area of right chest. Receeding slowly from skin pen    [address] Problems    Diagnosis Date Noted    Cellulitis 12/11/2017    Pancytopenia ([facility] [address] 10/11/2017    S/P breast reconstruction, right 09/19/2017    Status post right mastectomy 09/14/2016    Malignant neoplasm of right female breast ([facility] [address] 01/25/2016    Acquired hypothyroidism 11/04/2015     A/P     - Cellulitis -cont IV vanc/cefepime (zosyn shortage). Area very slow to improve. Will order CT scan to r/o underlying abscess.    - hx of breast CA - aware, cont femara  - Hypothyroidism - synthroid    DVT Prophylaxis: hep sq    Signed By: Juan Lizama DO     December 13, 2017

## 2017-12-13 NOTE — PROGRESS NOTES
Problem: Interdisciplinary Rounds  Goal: Interdisciplinary Rounds  Interdisciplinary team rounds were held 12/13/2017 with the following team members:Care Management, Nursing, Nutrition, Pharmacy and Physician. Plan of care discussed. See clinical pathway and/or care plan for interventions and desired outcomes.

## 2017-12-13 NOTE — PROGRESS NOTES
Shift assessment complete. Pt alert and oriented x4, respirations present, even and unlabored, HOB elevated, pt denies any SOB at this time, S1&S2 auscultated, HR regular, abd soft, non- tender, Active BS in all 4 quadrants, No pressure ulcers or edema noted, right chest red, incision from wound vac on back with dressing c/d/i, pt is up ad monroe to the bathroom, voiding, pt denies any pain at this time, pt instructed to call for assistance, pt verbalizes understanding, bed low and locked, side rails x3, call light within reach.

## 2017-12-14 LAB
ANION GAP SERPL CALC-SCNC: 6 MMOL/L (ref 7–16)
BUN SERPL-MCNC: 12 MG/DL (ref 8–23)
CALCIUM SERPL-MCNC: 8.8 MG/DL (ref 8.3–10.4)
CHLORIDE SERPL-SCNC: 103 MMOL/L (ref 98–107)
CO2 SERPL-SCNC: 31 MMOL/L (ref 21–32)
CREAT SERPL-MCNC: 0.86 MG/DL (ref 0.6–1)
ERYTHROCYTE [DISTWIDTH] IN BLOOD BY AUTOMATED COUNT: 16.5 % (ref 11.9–14.6)
GLUCOSE SERPL-MCNC: 86 MG/DL (ref 65–100)
HCT VFR BLD AUTO: 34 % (ref 35.8–46.3)
HGB BLD-MCNC: 10.5 G/DL (ref 11.7–15.4)
MCH RBC QN AUTO: 29.6 PG (ref 26.1–32.9)
MCHC RBC AUTO-ENTMCNC: 30.9 G/DL (ref 31.4–35)
MCV RBC AUTO: 95.8 FL (ref 79.6–97.8)
PLATELET # BLD AUTO: 177 K/UL (ref 150–450)
PMV BLD AUTO: 10.1 FL (ref 10.8–14.1)
POTASSIUM SERPL-SCNC: 4 MMOL/L (ref 3.5–5.1)
RBC # BLD AUTO: 3.55 M/UL (ref 4.05–5.25)
SODIUM SERPL-SCNC: 140 MMOL/L (ref 136–145)
VANCOMYCIN TROUGH SERPL-MCNC: 9.3 UG/ML (ref 5–20)
WBC # BLD AUTO: 5.3 K/UL (ref 4.3–11.1)

## 2017-12-14 PROCEDURE — 94640 AIRWAY INHALATION TREATMENT: CPT

## 2017-12-14 PROCEDURE — 80202 ASSAY OF VANCOMYCIN: CPT | Performed by: INTERNAL MEDICINE

## 2017-12-14 PROCEDURE — 65270000029 HC RM PRIVATE

## 2017-12-14 PROCEDURE — 94760 N-INVAS EAR/PLS OXIMETRY 1: CPT

## 2017-12-14 PROCEDURE — 36415 COLL VENOUS BLD VENIPUNCTURE: CPT | Performed by: INTERNAL MEDICINE

## 2017-12-14 PROCEDURE — 85027 COMPLETE CBC AUTOMATED: CPT | Performed by: INTERNAL MEDICINE

## 2017-12-14 PROCEDURE — 74011250637 HC RX REV CODE- 250/637: Performed by: INTERNAL MEDICINE

## 2017-12-14 PROCEDURE — 80048 BASIC METABOLIC PNL TOTAL CA: CPT | Performed by: INTERNAL MEDICINE

## 2017-12-14 PROCEDURE — 74011250636 HC RX REV CODE- 250/636: Performed by: INTERNAL MEDICINE

## 2017-12-14 PROCEDURE — 74011000258 HC RX REV CODE- 258: Performed by: INTERNAL MEDICINE

## 2017-12-14 PROCEDURE — 87040 BLOOD CULTURE FOR BACTERIA: CPT | Performed by: INTERNAL MEDICINE

## 2017-12-14 PROCEDURE — 74011000250 HC RX REV CODE- 250: Performed by: INTERNAL MEDICINE

## 2017-12-14 RX ORDER — GUAIFENESIN 600 MG/1
600 TABLET, EXTENDED RELEASE ORAL EVERY 12 HOURS
Status: DISCONTINUED | OUTPATIENT
Start: 2017-12-14 | End: 2017-12-18 | Stop reason: HOSPADM

## 2017-12-14 RX ORDER — BENZONATATE 100 MG/1
100 CAPSULE ORAL 3 TIMES DAILY
Status: DISCONTINUED | OUTPATIENT
Start: 2017-12-14 | End: 2017-12-18 | Stop reason: HOSPADM

## 2017-12-14 RX ORDER — IPRATROPIUM BROMIDE AND ALBUTEROL SULFATE 2.5; .5 MG/3ML; MG/3ML
3 SOLUTION RESPIRATORY (INHALATION)
Status: DISCONTINUED | OUTPATIENT
Start: 2017-12-14 | End: 2017-12-18 | Stop reason: HOSPADM

## 2017-12-14 RX ORDER — IPRATROPIUM BROMIDE AND ALBUTEROL SULFATE 2.5; .5 MG/3ML; MG/3ML
3 SOLUTION RESPIRATORY (INHALATION)
Status: DISCONTINUED | OUTPATIENT
Start: 2017-12-14 | End: 2017-12-16

## 2017-12-14 RX ORDER — BUDESONIDE 0.5 MG/2ML
500 INHALANT ORAL
Status: DISCONTINUED | OUTPATIENT
Start: 2017-12-14 | End: 2017-12-16

## 2017-12-14 RX ORDER — IPRATROPIUM BROMIDE AND ALBUTEROL SULFATE 2.5; .5 MG/3ML; MG/3ML
3 SOLUTION RESPIRATORY (INHALATION)
Status: DISCONTINUED | OUTPATIENT
Start: 2017-12-14 | End: 2017-12-14

## 2017-12-14 RX ADMIN — HEPARIN SODIUM 5000 UNITS: 5000 INJECTION, SOLUTION INTRAVENOUS; SUBCUTANEOUS at 21:25

## 2017-12-14 RX ADMIN — IPRATROPIUM BROMIDE AND ALBUTEROL SULFATE 3 ML: 2.5; .5 SOLUTION RESPIRATORY (INHALATION) at 10:42

## 2017-12-14 RX ADMIN — Medication 5 ML: at 06:00

## 2017-12-14 RX ADMIN — LEVOTHYROXINE SODIUM 75 MCG: 75 TABLET ORAL at 10:29

## 2017-12-14 RX ADMIN — BUDESONIDE 500 MCG: 0.5 INHALANT RESPIRATORY (INHALATION) at 10:42

## 2017-12-14 RX ADMIN — Medication 1 AMPULE: at 21:23

## 2017-12-14 RX ADMIN — Medication 100 UNITS: at 15:46

## 2017-12-14 RX ADMIN — HEPARIN SODIUM 5000 UNITS: 5000 INJECTION, SOLUTION INTRAVENOUS; SUBCUTANEOUS at 13:00

## 2017-12-14 RX ADMIN — BENZONATATE 100 MG: 100 CAPSULE ORAL at 12:08

## 2017-12-14 RX ADMIN — HEPARIN SODIUM 5000 UNITS: 5000 INJECTION, SOLUTION INTRAVENOUS; SUBCUTANEOUS at 05:08

## 2017-12-14 RX ADMIN — FLUTICASONE PROPIONATE AND SALMETEROL 1 PUFF: 250; 50 POWDER RESPIRATORY (INHALATION) at 08:12

## 2017-12-14 RX ADMIN — VANCOMYCIN HYDROCHLORIDE 1250 MG: 10 INJECTION, POWDER, LYOPHILIZED, FOR SOLUTION INTRAVENOUS at 13:27

## 2017-12-14 RX ADMIN — LETROZOLE 2.5 MG: 2.5 TABLET, FILM COATED ORAL at 21:30

## 2017-12-14 RX ADMIN — Medication 1 AMPULE: at 09:41

## 2017-12-14 RX ADMIN — GUAIFENESIN 600 MG: 600 TABLET, EXTENDED RELEASE ORAL at 21:24

## 2017-12-14 RX ADMIN — Medication 100 UNITS: at 06:00

## 2017-12-14 RX ADMIN — GUAIFENESIN 600 MG: 600 TABLET, EXTENDED RELEASE ORAL at 12:08

## 2017-12-14 RX ADMIN — Medication 10 ML: at 15:46

## 2017-12-14 RX ADMIN — DULOXETINE HYDROCHLORIDE 60 MG: 60 CAPSULE, DELAYED RELEASE ORAL at 09:40

## 2017-12-14 RX ADMIN — SODIUM CHLORIDE 2 G: 900 INJECTION, SOLUTION INTRAVENOUS at 15:35

## 2017-12-14 RX ADMIN — Medication 5 ML: at 21:30

## 2017-12-14 RX ADMIN — DIPHENHYDRAMINE HYDROCHLORIDE 12.5 MG: 50 INJECTION, SOLUTION INTRAMUSCULAR; INTRAVENOUS at 12:59

## 2017-12-14 RX ADMIN — IPRATROPIUM BROMIDE AND ALBUTEROL SULFATE 3 ML: .5; 3 SOLUTION RESPIRATORY (INHALATION) at 21:06

## 2017-12-14 RX ADMIN — Medication 100 UNITS: at 21:24

## 2017-12-14 RX ADMIN — BUDESONIDE 500 MCG: 0.5 INHALANT RESPIRATORY (INHALATION) at 21:06

## 2017-12-14 RX ADMIN — BENZONATATE 100 MG: 100 CAPSULE ORAL at 15:46

## 2017-12-14 RX ADMIN — BENZONATATE 100 MG: 100 CAPSULE ORAL at 21:24

## 2017-12-14 NOTE — PROGRESS NOTES
Called for breathing treatment. Upon arrival pt SATs 97, reparations unlabored, lungs clear. Pt took home advair this morning. No distress noted.

## 2017-12-14 NOTE — PROGRESS NOTES
Pharmacokinetic Consult to Pharmacist    Alison Any is a 69 y.o. female being treated for cellulitis with Vancomycin and Cefepime. Height: 5' 2\" (157.5 cm)  Weight: 59.7 kg (131 lb 9.8 oz)  Lab Results   Component Value Date/Time    BUN 12 12/14/2017 06:12 AM    Creatinine 0.86 12/14/2017 06:12 AM    WBC 5.3 12/14/2017 06:12 AM    Procalcitonin 0.1 12/12/2017 06:03 AM    Lactic acid 1.8 10/14/2017 06:24 AM    Lactic Acid (POC) 3.6 10/11/2017 03:35 PM      Estimated Creatinine Clearance: 48.8 mL/min (based on Cr of 0.86). CULTURES:  All Micro Results     None            Lab Results   Component Value Date/Time    Vancomycin,trough 9.3 12/14/2017 11:47 AM       Day 4 of vancomycin. Goal trough is 12 - 18. Will increase Vancomycin to 1 gram IV every 12 hours. Will continue to follow patient.       Thank you,  Huang KeitaD, BCPS

## 2017-12-14 NOTE — PROGRESS NOTES
Problem: Interdisciplinary Rounds  Goal: Interdisciplinary Rounds  Interdisciplinary team rounds were held 12/14/2017 with the following team members:Care Management, Nursing, Nutrition, Patient Relations, Pharmacy and Physician and the patient and spouse. Plan of care discussed. See clinical pathway and/or care plan for interventions and desired outcomes.

## 2017-12-14 NOTE — PROGRESS NOTES
Shift assessment completed via doc flow sheet. Patient is alert and oriented x 4. Respirations even and unlabored on room air. Lung sounds CTA bilaterally. Heart sounds S1, S2 auscultated and regular. Abdomen soft and non tender. Bowel sounds active to all 4 quadrants. Dressing to wound on mid back changed. Chest remains red. IV flushed without difficulty. Patient ambulates to bathroom ad monroe. Patient denies pain and other needs at this time. Bed is locked and in low position. Bed rails x 3. Patient is encouraged to call for assistance. Call light within reach.

## 2017-12-14 NOTE — CONSULTS
Infectious Disease Consult    Today's Date: 12/14/2017   Admit Date: 12/11/2017    Impression:   · Recurrent right breast abscess/cellulitis. I strongly suspect that this is a recurrence of MRSA. · History of right breast reconstruction following breast CA  · History of XRT to chest.    Plan:   ·  Continue vancomycin  · Discontinue cefepime  · Check blood cultures  · Plastic surgery to see for abscess I&D    Anti-infectives:     Inpat Anti-Infectives     Start     Ordered Stop    12/15/17 0100  vancomycin (VANCOCIN) 1,000 mg in 0.9% sodium chloride (MBP/ADV) 250 mL  1 g,   IntraVENous,   EVERY 12 HOURS      12/14/17 1511 --    12/14/17 1200  VANCOMYCIN INFORMATION NOTE  Other,   ONCE      12/13/17 1519 12/14/17 2359    12/11/17 1400  cefepime (MAXIPIME) 2 g in 0.9% sodium chloride (MBP/ADV) 100 mL ADV  2 g,   IntraVENous,   EVERY 24 HOURS      12/11/17 1223 --          Subjective:   Date of Consultation:  December 14, 2017  Referring Physician: Atul Nachomarlee    Patient is a 69 y.o. female with past medical history of breast CA s/p XRT completed in Feb 2017, then placed on oral chemo (Femara/Ibrance) s/p right breast reconstruction with flap and left breast mastectomy on 9/17/17. She developed MRSA wound infection. The right breast implant was removed on 10/11/17. He was treated with vancomycin and discharged to complete a course of doxycycline. She has also had a wound from on her back from the latissimus flap that has been treated with wound VAC. That has almost completely healed. She had some fever at home before admission but has been afebrile here. She had erythema, warmth, tenderness return to her right chest and also extend to her left chest/breast.  She was started on doxycycline last week but did not have improvement and was readmitted yesterday. CT scan shows a medial right chest wall abscess.     Patient Active Problem List   Diagnosis Code    Acquired hypothyroidism E03.9    Asthma J45.909    Insomnia G47.00    Depression F32.9    Malignant neoplasm of right female breast ([facility] [address] C50.911    Mediastinal adenopathy R59.0    Status post right mastectomy Z90.11    S/P breast reconstruction, bilateral Z98.890    SIRS (systemic inflammatory response syndrome) (HCC) R65.10    Idiopathic hypotension I95.0    Tachycardia R00.0    Hypokalemia E87.6    Cough R05    Pancytopenia due to chemotherapy (HCC) D61.810    Malignant neoplasm of upper-inner quadrant of right female breast (HCC) C50.211    Lump of skin of right upper extremity R22.31    Breast cancer (HCC) C50.919    S/P breast reconstruction, right Z98.890    Acute renal failure (ARF) (HCC) N17.9    Pancytopenia (HCC) D61.818    Diarrhea R19.7    Abscess L02.91    Recurrent major depressive disorder, in partial remission ([facility]) F33.41    Mild intermittent asthma without complication G22.93    Cellulitis of chest wall L03.313    Cellulitis L03.90     Past Medical History:   Diagnosis Date    Abnormal CT of the chest     Adverse effect of anesthesia     bp dropped with dual lung/gallbladder surgery    Asthma      no acute attacks recently.  uses advair daily    Breast cancer ([facility] [address] 2016    R breast, s/p mastectomy, chemo, reconstruction    Depression      Hypercholesteremia     Controlled with diet     Hypothyroidism     Ill-defined condition     granular lung disease resolved 2002      Family History   Problem Relation Age of Onset    Hypertension Mother     Cancer Mother 79     Breast cancer at age 79    Diabetes Mother      Type 2    Other Father      alzheimers    Cancer Maternal Aunt      Breast cancer after age 69    Cancer Maternal Aunt      Breast cancer at age 39   [facility] Allergic Rhinitis Neg Hx     Allergy-severe Neg Hx     Amblyopia Neg Hx     Anemia Neg Hx     Anesth Problems Neg Hx     Angioedema Neg Hx     Anxiety Neg Hx     Arrhythmia Neg Hx     Arthritis-rheumatoid Neg Hx     Ataxia Neg Hx     Atopy Neg Hx  Bipolar Disorder Neg Hx     Blindness Neg Hx     Breast Cancer Neg Hx     Breast Problems Neg Hx     Broken Bones Neg Hx     Cataract Neg Hx     Celiac Disease Neg Hx     Childhood heart surgery Neg Hx     Childhood resp disease Neg Hx     Chorea Neg Hx     Clotting Disorder Neg Hx     Collagen Dis Neg Hx     Colon Cancer Neg Hx     Colon Polyps Neg Hx     COPD Neg Hx     Coronary Artery Disease Neg Hx     Crohn's Disease Neg Hx     Cystic Fibrosis Neg Hx     Delayed Awakening Neg Hx     Dementia Neg Hx     Depression Neg Hx     Dislocations Neg Hx     Downs Syndrome Neg Hx     Drug Abuse Neg Hx     Eclampsia Neg Hx     Eczema Neg Hx     Emergence Delirium Neg Hx     Emphysema Neg Hx     Fainting Neg Hx     Genitourinary () Neg Hx     Glaucoma Neg Hx     Heart Attack Neg Hx     Heart defect Neg Hx     Heart Failure Neg Hx     Heart Surgery Neg Hx     Hemachromatosis Neg Hx     Herpes Neg Hx     High Cholesterol Neg Hx     HIV/AIDS Neg Hx     Immunodeficiency Neg Hx     Infertility Neg Hx     Inflammatory Bowel Dz Neg Hx     Kidney Disease Neg Hx     Liver Disease Neg Hx     Macular Degen Neg Hx     Malignant Hyperthermia Neg Hx     MS Neg Hx     Neuropathy Neg Hx     NF Neg Hx     Osteoporosis Neg Hx     Ovarian Cancer Neg Hx     Pacemaker Neg Hx     Panic disorder Neg Hx     Parkinsonism Neg Hx     Post-op Cognitive Dysfunction Neg Hx     Post-op Nausea/Vomiting Neg Hx     Prematurity Neg Hx      Labor Neg Hx     Pseudocholinesterase Deficiency Neg Hx     Psoriasis Neg Hx     Psychotic Disorder Neg Hx     Rashes/Skin Problems Neg Hx     Retinal Detachment Neg Hx     Rh Incompatibility Neg Hx     Schizophrenia Neg Hx     Seizures Neg Hx     Severe Sprains Neg Hx     Sickle Cell Anemia Neg Hx     Sickle Cell Trait Neg Hx     SIDS Neg Hx     SLE Neg Hx     Spont Ab Neg Hx     Stomach Cancer Neg Hx     Strabismus Neg Hx     Substance Abuse Neg Hx     Sudden Death Neg Hx     Suicide Neg Hx     Thyroid Disease Neg Hx     Tuberculosis Neg Hx     Ulcerative Colitis Neg Hx     Urticaria Neg Hx     Lang's Disease Neg Hx       Social History   Substance Use Topics    Smoking status: Never Smoker    Smokeless tobacco: Never Used    Alcohol use No     Past Surgical History:   Procedure Laterality Date    CHEST SURGERY PROCEDURE UNLISTED      R lung biopsy     HX BLADDER REPAIR      HX BREAST AUGMENTATION Right 10/20/2017    BREAST IMPLANT REMOVAL  performed by Mark Liz MD at [address] HX BREAST BIOPSY Right 2/5/2016    SKIN BIOPSY RIGHT BREAST performed by Kwesi David [facility] at [facility] MAIN OR    HX BREAST RECONSTRUCTION Bilateral 9/14/2016    BILATERAL BREAST RECONSTRUCTION performed by Mark Liz MD at [address] HX BREAST RECONSTRUCTION Bilateral 9/14/2016    BILATERAL BREAST TISSUE EXPANDER INSERTION performed by Mark Liz MD at [address] HX BREAST RECONSTRUCTION Right 9/18/2017    RIGHT BREAST RECONSTRUCTION performed by Mark Liz MD at [address] HX BREAST RECONSTRUCTION Right 9/18/2017    Right  PLACEMENT OF PERMANENT IMPLANTS performed by Mark Liz MD at [address] HX BREAST REDUCTION Left 9/18/2017    LEFT BREAST MASTOPEXY FOR SYMMETRY performed by Mark Liz MD at [address] HX COLONOSCOPY      2006    HX CYSTOCELE REPAIR      HX HYSTERECTOMY      fibroids    HX LAP CHOLECYSTECTOMY  2000    HX MASTECTOMY Right     HX RECTOCELE REPAIR      HX VASCULAR ACCESS      port left chest wall      Prior to Admission medications    Medication Sig Start Date End Date Taking? Authorizing Provider   letrozole [facility]) 2.5 mg tablet Take 1 Tab by mouth daily.  11/20/17  Yes Keke Wetzel NP   ALPRAZolam [facility]) 0.5 mg tablet 1 bid  prn  Indications: anxiety, sleep 11/16/17  Yes James Spangler MD   DULoxetine (CYMBALTA) 60 mg capsule Take 1 Cap by mouth daily. 17  Yes Herve Love MD   levothyroxine (SYNTHROID) 75 mcg tablet Take 1 Tab by mouth Daily (before breakfast). Indications: hypothyroidism 17  Yes Herve Love MD   fluticasone-salmeterol (ADVAIR DISKUS) 250-50 mcg/dose diskus inhaler Take 1 Puff by inhalation every twelve (12) hours. Indications: INTRINSIC ASTHMA 17   Herve Love MD   heparin, porcine, pf (HEPARIN LOCKFLUSH,PORCINE,,PF,) 100 unit/mL injection Access port, flush with 20ml Normal Saline, 500 units of Heparin Flush and de access port every 4 weeks. 10/26/17   Nga Infante NP   albuterol (VENTOLIN) 90 mcg/Actuation inhaler Take 2 Puffs by inhalation every six (6) hours as needed. Indications: bring on the dos. Historical Provider       Allergies   Allergen Reactions    Prednisone Unknown (comments)     \"psychotic reaction\"  To oral and parenteral steroids        Review of Systems:  A comprehensive review of systems was negative except for that written in the History of Present Illness. Objective:     Visit Vitals    /56 (BP 1 Location: Left arm, BP Patient Position: At rest)    Pulse (!) 104    Temp 97.8 °F (36.6 °C)    Resp 16    Ht 5' 2\" (1.575 m)    Wt 59.7 kg (131 lb 9.8 oz)    SpO2 93%    Breastfeeding No    BMI 24.07 kg/m2     Temp (24hrs), Av.1 °F (36.7 °C), Min:97.8 °F (36.6 °C), Max:98.8 °F (37.1 °C)       Lines:  Left chest port          Physical Exam:    General:  Alert, cooperative, well nourished, well developed, appears stated age   Eyes:  Sclera anicteric. Pupils equally round and reactive to light. Mouth/Throat: Mucous membranes normal, oral pharynx clear   Neck: Supple   Lungs:   Clear to auscultation bilaterally, good effort   CV:  Regular rate and rhythm,no murmur, click, rub or gallop   Abdomen:   Soft, non-tender.  bowel sounds normal. non-distended   Extremities: No cyanosis or edema   Skin: Large area of erythroderma involving the right chest and extending across to the left chest.  It is warm to touch. There is not an obvious abscess when judging by exam, but she does have some increased tenderness with palpating over the right medial chest.   Lymph nodes: Cervical and supraclavicular normal   Musculoskeletal: No swelling or deformity   Lines/Devices:  Intact, no erythema, drainage or tenderness   Psych: Alert and oriented, normal mood affect given the setting         Data Review:     CBC:Recent Labs      12/14/17   0612  12/12/17   0603   WBC  5.3  5.3   HGB  10.5*  10.9*   HCT  34.0*  34.3*   PLT  177  193       BMP:  Recent Labs      12/14/17   0612  12/13/17   1753  12/12/17   0603   CREA  0.86  0.92  0.92   BUN  12   --   18   NA  140   --   141   K  4.0   --   4.0   CL  103   --   105   CO2  31   --   29   AGAP  6*   --   7   GLU  86   --   90       LFTS:  No results for input(s): TBILI, ALT, SGOT, AP, TP, ALB in the last 72 hours. Microbiology:     All Micro Results     None          Imaging:   CT chest (12/13/17)  IMPRESSION:  1. Chest wall cellulitis and pectoralis muscle myositis on the right with a chest wall abscess involving portion of the right pectoralis muscle measuring 4.5 x 1.3 cm. Inflammatory changes/cellulitis extends to the inferior most right chest wall with no additional abscess. 2. Indeterminate 11 mm right upper lobe nodule. Six-month follow-up CT recommended.     Signed By: Monalisa Nobles MD     December 14, 2017

## 2017-12-14 NOTE — PROGRESS NOTES
12/14/17 1428   Oxygen Therapy   O2 Sat (%) 93 %   O2 Device Room air   Pre-Treatment   Breathing Pattern Regular   Cough Non-productive   Breath Sounds Bilateral Clear   Incentive Spirometry Treatment   Actual Volume (ml) 1500 ml   Number of Attempts Greater than 3   Pt demonstrated IS and sat increased to 97%. No distress or sob noted.

## 2017-12-15 ENCOUNTER — ANESTHESIA (OUTPATIENT)
Dept: SURGERY | Age: 67
DRG: 580 | End: 2017-12-15
Payer: MEDICARE

## 2017-12-15 ENCOUNTER — APPOINTMENT (OUTPATIENT)
Dept: GENERAL RADIOLOGY | Age: 67
DRG: 580 | End: 2017-12-15
Attending: INTERNAL MEDICINE
Payer: MEDICARE

## 2017-12-15 ENCOUNTER — ANESTHESIA EVENT (OUTPATIENT)
Dept: SURGERY | Age: 67
DRG: 580 | End: 2017-12-15
Payer: MEDICARE

## 2017-12-15 PROCEDURE — 74011250636 HC RX REV CODE- 250/636: Performed by: PLASTIC SURGERY

## 2017-12-15 PROCEDURE — 65270000029 HC RM PRIVATE

## 2017-12-15 PROCEDURE — 74011000250 HC RX REV CODE- 250: Performed by: PLASTIC SURGERY

## 2017-12-15 PROCEDURE — 77030009845 HC ONTMNT BACITRCN PATT -A: Performed by: PLASTIC SURGERY

## 2017-12-15 PROCEDURE — 77030002916 HC SUT ETHLN J&J -A: Performed by: PLASTIC SURGERY

## 2017-12-15 PROCEDURE — 76210000016 HC OR PH I REC 1 TO 1.5 HR: Performed by: PLASTIC SURGERY

## 2017-12-15 PROCEDURE — 74011250636 HC RX REV CODE- 250/636

## 2017-12-15 PROCEDURE — 76060000032 HC ANESTHESIA 0.5 TO 1 HR: Performed by: PLASTIC SURGERY

## 2017-12-15 PROCEDURE — 77030020143 HC AIRWY LARYN INTUB CGAS -A: Performed by: ANESTHESIOLOGY

## 2017-12-15 PROCEDURE — 76010000138 HC OR TIME 0.5 TO 1 HR: Performed by: PLASTIC SURGERY

## 2017-12-15 PROCEDURE — 77030002933 HC SUT MCRYL J&J -A: Performed by: PLASTIC SURGERY

## 2017-12-15 PROCEDURE — 77030018836 HC SOL IRR NACL ICUM -A

## 2017-12-15 PROCEDURE — 74011250636 HC RX REV CODE- 250/636: Performed by: INTERNAL MEDICINE

## 2017-12-15 PROCEDURE — 77030013567 HC DRN WND RESERV BARD -A: Performed by: PLASTIC SURGERY

## 2017-12-15 PROCEDURE — 77030018836 HC SOL IRR NACL ICUM -A: Performed by: PLASTIC SURGERY

## 2017-12-15 PROCEDURE — 74011250636 HC RX REV CODE- 250/636: Performed by: ANESTHESIOLOGY

## 2017-12-15 PROCEDURE — 77030011283 HC ELECTRD NDL COVD -A: Performed by: PLASTIC SURGERY

## 2017-12-15 PROCEDURE — 77030032490 HC SLV COMPR SCD KNE COVD -B: Performed by: PLASTIC SURGERY

## 2017-12-15 PROCEDURE — 71020 XR CHEST PA LAT: CPT

## 2017-12-15 PROCEDURE — 74011250637 HC RX REV CODE- 250/637: Performed by: INTERNAL MEDICINE

## 2017-12-15 PROCEDURE — 77030011640 HC PAD GRND REM COVD -A: Performed by: PLASTIC SURGERY

## 2017-12-15 PROCEDURE — 0J960ZZ DRAINAGE OF CHEST SUBCUTANEOUS TISSUE AND FASCIA, OPEN APPROACH: ICD-10-PCS | Performed by: PLASTIC SURGERY

## 2017-12-15 PROCEDURE — 77030012406 HC DRN WND PENRS BARD -A: Performed by: PLASTIC SURGERY

## 2017-12-15 PROCEDURE — 77030031139 HC SUT VCRL2 J&J -A: Performed by: PLASTIC SURGERY

## 2017-12-15 RX ORDER — NALOXONE HYDROCHLORIDE 0.4 MG/ML
0.1 INJECTION, SOLUTION INTRAMUSCULAR; INTRAVENOUS; SUBCUTANEOUS
Status: DISCONTINUED | OUTPATIENT
Start: 2017-12-15 | End: 2017-12-15 | Stop reason: HOSPADM

## 2017-12-15 RX ORDER — DEXTROSE, SODIUM CHLORIDE, AND POTASSIUM CHLORIDE 5; .45; .15 G/100ML; G/100ML; G/100ML
50 INJECTION INTRAVENOUS CONTINUOUS
Status: DISPENSED | OUTPATIENT
Start: 2017-12-15 | End: 2017-12-16

## 2017-12-15 RX ORDER — ONDANSETRON 2 MG/ML
4 INJECTION INTRAMUSCULAR; INTRAVENOUS
Status: DISCONTINUED | OUTPATIENT
Start: 2017-12-15 | End: 2017-12-15 | Stop reason: SDUPTHER

## 2017-12-15 RX ORDER — VANCOMYCIN HYDROCHLORIDE 1 G/20ML
INJECTION, POWDER, LYOPHILIZED, FOR SOLUTION INTRAVENOUS AS NEEDED
Status: DISCONTINUED | OUTPATIENT
Start: 2017-12-15 | End: 2017-12-15 | Stop reason: HOSPADM

## 2017-12-15 RX ORDER — PROPOFOL 10 MG/ML
INJECTION, EMULSION INTRAVENOUS AS NEEDED
Status: DISCONTINUED | OUTPATIENT
Start: 2017-12-15 | End: 2017-12-15 | Stop reason: HOSPADM

## 2017-12-15 RX ORDER — MORPHINE SULFATE 2 MG/ML
2 INJECTION, SOLUTION INTRAMUSCULAR; INTRAVENOUS
Status: DISCONTINUED | OUTPATIENT
Start: 2017-12-15 | End: 2017-12-18 | Stop reason: HOSPADM

## 2017-12-15 RX ORDER — DIPHENHYDRAMINE HYDROCHLORIDE 50 MG/ML
12.5 INJECTION, SOLUTION INTRAMUSCULAR; INTRAVENOUS
Status: DISCONTINUED | OUTPATIENT
Start: 2017-12-15 | End: 2017-12-15 | Stop reason: HOSPADM

## 2017-12-15 RX ORDER — SODIUM CHLORIDE, SODIUM LACTATE, POTASSIUM CHLORIDE, CALCIUM CHLORIDE 600; 310; 30; 20 MG/100ML; MG/100ML; MG/100ML; MG/100ML
INJECTION, SOLUTION INTRAVENOUS
Status: DISCONTINUED | OUTPATIENT
Start: 2017-12-15 | End: 2017-12-15 | Stop reason: HOSPADM

## 2017-12-15 RX ORDER — HYDROMORPHONE HYDROCHLORIDE 2 MG/ML
0.5 INJECTION, SOLUTION INTRAMUSCULAR; INTRAVENOUS; SUBCUTANEOUS
Status: DISCONTINUED | OUTPATIENT
Start: 2017-12-15 | End: 2017-12-15 | Stop reason: HOSPADM

## 2017-12-15 RX ORDER — NALOXONE HYDROCHLORIDE 0.4 MG/ML
0.4 INJECTION, SOLUTION INTRAMUSCULAR; INTRAVENOUS; SUBCUTANEOUS AS NEEDED
Status: DISCONTINUED | OUTPATIENT
Start: 2017-12-15 | End: 2017-12-18 | Stop reason: HOSPADM

## 2017-12-15 RX ORDER — BUPIVACAINE HYDROCHLORIDE 2.5 MG/ML
INJECTION, SOLUTION EPIDURAL; INFILTRATION; INTRACAUDAL AS NEEDED
Status: DISCONTINUED | OUTPATIENT
Start: 2017-12-15 | End: 2017-12-15 | Stop reason: HOSPADM

## 2017-12-15 RX ORDER — OXYCODONE HYDROCHLORIDE 5 MG/1
5 TABLET ORAL
Status: DISCONTINUED | OUTPATIENT
Start: 2017-12-15 | End: 2017-12-15 | Stop reason: HOSPADM

## 2017-12-15 RX ORDER — OXYCODONE AND ACETAMINOPHEN 7.5; 325 MG/1; MG/1
1 TABLET ORAL
Status: DISCONTINUED | OUTPATIENT
Start: 2017-12-15 | End: 2017-12-18 | Stop reason: HOSPADM

## 2017-12-15 RX ORDER — SODIUM CHLORIDE, SODIUM LACTATE, POTASSIUM CHLORIDE, CALCIUM CHLORIDE 600; 310; 30; 20 MG/100ML; MG/100ML; MG/100ML; MG/100ML
75 INJECTION, SOLUTION INTRAVENOUS CONTINUOUS
Status: DISCONTINUED | OUTPATIENT
Start: 2017-12-15 | End: 2017-12-15 | Stop reason: HOSPADM

## 2017-12-15 RX ORDER — FENTANYL CITRATE 50 UG/ML
INJECTION, SOLUTION INTRAMUSCULAR; INTRAVENOUS AS NEEDED
Status: DISCONTINUED | OUTPATIENT
Start: 2017-12-15 | End: 2017-12-15 | Stop reason: HOSPADM

## 2017-12-15 RX ORDER — OXYCODONE HYDROCHLORIDE 5 MG/1
10 TABLET ORAL
Status: DISCONTINUED | OUTPATIENT
Start: 2017-12-15 | End: 2017-12-15 | Stop reason: HOSPADM

## 2017-12-15 RX ORDER — ONDANSETRON 2 MG/ML
INJECTION INTRAMUSCULAR; INTRAVENOUS AS NEEDED
Status: DISCONTINUED | OUTPATIENT
Start: 2017-12-15 | End: 2017-12-15 | Stop reason: HOSPADM

## 2017-12-15 RX ORDER — FLUMAZENIL 0.1 MG/ML
0.2 INJECTION INTRAVENOUS AS NEEDED
Status: DISCONTINUED | OUTPATIENT
Start: 2017-12-15 | End: 2017-12-15 | Stop reason: HOSPADM

## 2017-12-15 RX ADMIN — HEPARIN SODIUM 5000 UNITS: 5000 INJECTION, SOLUTION INTRAVENOUS; SUBCUTANEOUS at 22:23

## 2017-12-15 RX ADMIN — BENZONATATE 100 MG: 100 CAPSULE ORAL at 17:52

## 2017-12-15 RX ADMIN — Medication 100 UNITS: at 05:38

## 2017-12-15 RX ADMIN — FENTANYL CITRATE 25 MCG: 50 INJECTION, SOLUTION INTRAMUSCULAR; INTRAVENOUS at 13:42

## 2017-12-15 RX ADMIN — PROPOFOL 150 MG: 10 INJECTION, EMULSION INTRAVENOUS at 13:22

## 2017-12-15 RX ADMIN — LETROZOLE 2.5 MG: 2.5 TABLET, FILM COATED ORAL at 22:27

## 2017-12-15 RX ADMIN — SODIUM CHLORIDE, SODIUM LACTATE, POTASSIUM CHLORIDE, CALCIUM CHLORIDE: 600; 310; 30; 20 INJECTION, SOLUTION INTRAVENOUS at 12:55

## 2017-12-15 RX ADMIN — HEPARIN SODIUM 5000 UNITS: 5000 INJECTION, SOLUTION INTRAVENOUS; SUBCUTANEOUS at 05:36

## 2017-12-15 RX ADMIN — FENTANYL CITRATE 25 MCG: 50 INJECTION, SOLUTION INTRAMUSCULAR; INTRAVENOUS at 13:40

## 2017-12-15 RX ADMIN — Medication 100 UNITS: at 22:22

## 2017-12-15 RX ADMIN — GUAIFENESIN 600 MG: 600 TABLET, EXTENDED RELEASE ORAL at 22:23

## 2017-12-15 RX ADMIN — HYDROMORPHONE HYDROCHLORIDE 0.5 MG: 2 INJECTION, SOLUTION INTRAMUSCULAR; INTRAVENOUS; SUBCUTANEOUS at 14:41

## 2017-12-15 RX ADMIN — ONDANSETRON 4 MG: 2 INJECTION INTRAMUSCULAR; INTRAVENOUS at 13:43

## 2017-12-15 RX ADMIN — Medication 10 ML: at 22:24

## 2017-12-15 RX ADMIN — Medication 1 AMPULE: at 22:21

## 2017-12-15 RX ADMIN — DIPHENHYDRAMINE HYDROCHLORIDE 12.5 MG: 50 INJECTION, SOLUTION INTRAMUSCULAR; INTRAVENOUS at 01:24

## 2017-12-15 RX ADMIN — BENZONATATE 100 MG: 100 CAPSULE ORAL at 22:23

## 2017-12-15 RX ADMIN — DEXTROSE MONOHYDRATE, SODIUM CHLORIDE, AND POTASSIUM CHLORIDE 50 ML/HR: 50; 4.5; 1.49 INJECTION, SOLUTION INTRAVENOUS at 16:00

## 2017-12-15 RX ADMIN — FENTANYL CITRATE 50 MCG: 50 INJECTION, SOLUTION INTRAMUSCULAR; INTRAVENOUS at 13:33

## 2017-12-15 RX ADMIN — Medication 10 ML: at 05:38

## 2017-12-15 RX ADMIN — LEVOTHYROXINE SODIUM 75 MCG: 75 TABLET ORAL at 08:46

## 2017-12-15 RX ADMIN — VANCOMYCIN HYDROCHLORIDE 1000 MG: 1 INJECTION, POWDER, LYOPHILIZED, FOR SOLUTION INTRAVENOUS at 01:18

## 2017-12-15 RX ADMIN — Medication 1 AMPULE: at 08:53

## 2017-12-15 NOTE — PROGRESS NOTES
Infectious Disease Consult    Today's Date: 12/15/2017   Admit Date: 2017    Impression:   · Recurrent right breast abscess/cellulitis. I strongly suspect that this is a recurrence of MRSA. · History of right breast reconstruction following breast CA  · History of XRT to chest.    Plan:   · Continue vancomycin; vancomycin dose was adjusted yesterday based on a nontherapeutic trough. · Followup blood cultures  · Plans noted for I&D this afternoon    Anti-infectives:     Vancomycin (17)    Subjective:   No new complaints. She is still having right sided chest wall tenderness. Allergies   Allergen Reactions    Prednisone Unknown (comments)     \"psychotic reaction\"  To oral and parenteral steroids        Review of Systems:  A comprehensive review of systems was negative except for that written in the History of Present Illness. Objective:     Visit Vitals    /68 (BP 1 Location: Left arm, BP Patient Position: At rest;Head of bed elevated (Comment degrees))    Pulse 97    Temp 98 °F (36.7 °C)    Resp 16    Ht 5' 2\" (1.575 m)    Wt 59.7 kg (131 lb 9.8 oz)    SpO2 94%    Breastfeeding No    BMI 24.07 kg/m2     Temp (24hrs), Av °F (36.7 °C), Min:97.7 °F (36.5 °C), Max:98.7 °F (37.1 °C)       Lines:  Left chest port          Physical Exam:    General:  Alert, cooperative, well nourished, well developed, appears stated age   Eyes:  Sclera anicteric. Pupils equally round and reactive to light. Mouth/Throat: Mucous membranes normal, oral pharynx clear   Neck: Supple   Lungs:   Clear to auscultation bilaterally, good effort   CV:  Regular rate and rhythm,no murmur, click, rub or gallop   Abdomen:   Soft, non-tender. bowel sounds normal. non-distended   Extremities: No cyanosis or edema   Skin: Large area of erythroderma involving the right chest and extending across to the left chest is mostly unchanged. It is warm to touch.   There is not an obvious abscess when judging by exam, but she does have some increased tenderness with palpating over the right medial chest.   Lymph nodes: Cervical and supraclavicular normal   Musculoskeletal: No swelling or deformity   Lines/Devices:  Intact, no erythema, drainage or tenderness   Psych: Alert and oriented, normal mood affect given the setting         Data Review:     CBC:  Recent Labs      12/14/17   0612   WBC  5.3   HGB  10.5*   HCT  34.0*   PLT  177       BMP:  Recent Labs      12/14/17   0612 12/13/17   1753   CREA  0.86  0.92   BUN  12   --    NA  140   --    K  4.0   --    CL  103   --    CO2  31   --    AGAP  6*   --    GLU  86   --        LFTS:  No results for input(s): TBILI, ALT, SGOT, AP, TP, ALB in the last 72 hours. Microbiology:     All Micro Results     Procedure Component Value Units Date/Time    CULTURE, BLOOD [[id number]] Collected:  12/14/17 1713    Order Status:  Completed Specimen:  Blood from Blood Updated:  12/15/17 0742     Special Requests: --        LEFT  ARM       Culture result: NO GROWTH AFTER 14 HOURS       CULTURE, BLOOD [[id number]] Collected:  12/14/17 1630    Order Status:  Canceled Specimen:  Blood           Imaging:   CT chest (12/13/17)  IMPRESSION:  1. Chest wall cellulitis and pectoralis muscle myositis on the right with a chest wall abscess involving portion of the right pectoralis muscle measuring 4.5 x 1.3 cm. Inflammatory changes/cellulitis extends to the inferior most right chest wall with no additional abscess. 2. Indeterminate 11 mm right upper lobe nodule. Six-month follow-up CT recommended.     Signed By: Ramy Foster MD     December 15, 2017

## 2017-12-15 NOTE — PROGRESS NOTES
Hospitalist Progress Note    12/15/2017  Admit Date: 2017 11:10 AM   NAME: Roman Carcamo   :  1950   MRN:  [id number]   Attending: Alisia Pritchard DO  PCP:  Martín Vegas MD    SUBJECTIVE:     Patient 89H with pmhx of breast ca s/p RTX completed in 2017 on oral chemo (femara) s/p right breast reconstruction flap and left breast mastectomy 2017 with recent hospitalization in October for skin flap infection and right breast implant infection. Underwent right breast implant removal on 10/20 as well as wound vac placement to flap. She was discharged to rehab on oral antibiotics. Reports she had been doing better until a week ago when skin over right breast surgical site became tender, warm and erythematous. Was seen by her plastic surgeon and started on doxycycline. Has taken 5 days and not clinically improved. Her surgeon has asked that she be admitted for recurrent cellulitis requiring IV antibiotics. Started on IV vanc/cefepime. Has had very slow clinical improvement. CT chest on  revealing 4.5 x 1.3 cm abcess in right pectoralis muscle    12/15 - Plastic sx consulted yesterday and pt going for right chest wall abscess I&D today. ID following now for atbx. Review of Systems negative with exception of pertinent positives noted above  PHYSICAL EXAM     Visit Vitals    /65    Pulse 93    Temp 97.9 °F (36.6 °C)    Resp 14    Ht 5' 2\" (1.575 m)    Wt 59.7 kg (131 lb 9.8 oz)    SpO2 99%    Breastfeeding No    BMI 24.07 kg/m2      Temp (24hrs), Av.7 °F (36.5 °C), Min:96.3 °F (35.7 °C), Max:98.7 °F (37.1 °C)    Oxygen Therapy  O2 Sat (%): 99 % (12/15/17 1428)  Pulse via Oximetry: 113 beats per minute (17 2106)  O2 Device: Nasal cannula (12/15/17 1418)  O2 Flow Rate (L/min): 2 l/min (12/15/17 1418)  No intake or output data in the 24 hours ending 12/15/17 1433   General: No acute distress    Lungs:  CTA Bilaterally.    Heart:  Regular rate and rhythm,  No murmur, rub, or gallop  Abdomen: Soft, Non distended, Non tender, Positive bowel sounds  Extremities: No cyanosis, clubbing or edema  Neurologic:  No focal deficits    ASSESSMENT      Active Hospital Problems    Diagnosis Date Noted    Cellulitis 12/11/2017    Pancytopenia ([facility] 10/11/2017    S/P breast reconstruction, right 09/19/2017    Status post right mastectomy 09/14/2016    Malignant neoplasm of right female breast ([facility] 01/25/2016    Acquired hypothyroidism 11/04/2015     A/P      - Cellulitis and chest wall abscess -ID/ plastic sx following. cont IV vanc. CT chest with abcess in chest wall muscle. Plans for I&D today  - Asthma - exacerbation. Cont scheduled nebs, mucinex, antitussives. Improved today.   - hx of breast CA - aware, cont femara  - Hypothyroidism - synthroid     DVT Prophylaxis: hep sq    Dispo - anticipate home w/o needs pending culture/atbx response    Signed By: Hermelindo Turner DO     December 15, 2017

## 2017-12-15 NOTE — ANESTHESIA PREPROCEDURE EVALUATION
Anesthetic History   No history of anesthetic complications            Review of Systems / Medical History  Patient summary reviewed, nursing notes reviewed and pertinent labs reviewed    Pulmonary            Asthma : well controlled       Neuro/Psych         Psychiatric history (Depression)     Cardiovascular            Dysrhythmias (history of none now and on no antithrombin therapy) : atrial fibrillation  Hyperlipidemia (Diet controlled)    Exercise tolerance: >4 METS  Comments: Denies recent CP, SOB or Palpitations    Post-chemo ECHO showed EF 45% with mild MR   GI/Hepatic/Renal  Within defined limits              Endo/Other      Hypothyroidism: well controlled  Cancer (Breast cancer) and anemia     Other Findings   Comments: Infection         Physical Exam    Airway  Mallampati: II  TM Distance: > 6 cm  Neck ROM: normal range of motion   Mouth opening: Normal     Cardiovascular  Regular rate and rhythm,  S1 and S2 normal,  no murmur, click, rub, or gallop  Rhythm: regular  Rate: normal         Dental  No notable dental hx       Pulmonary  Breath sounds clear to auscultation               Abdominal  GI exam deferred       Other Findings            Anesthetic Plan    ASA: 3, emergent  Anesthesia type: general          Induction: Intravenous  Anesthetic plan and risks discussed with: Patient and Spouse      Patient with infection from recent breast implant

## 2017-12-15 NOTE — ANESTHESIA POSTPROCEDURE EVALUATION
Post-Anesthesia Evaluation and Assessment    Patient: Neha Moreland MRN: [id number]  SSN: [id number]    YOB: 1950  Age: 69 y.o. Sex: female       Cardiovascular Function/Vital Signs  Visit Vitals    /66    Pulse 91    Temp 36.6 °C (97.9 °F)    Resp 14    Ht 5' 2\" (1.575 m)    Wt 59.7 kg (131 lb 9.8 oz)    SpO2 99%    Breastfeeding No    BMI 24.07 kg/m2       Patient is status post general anesthesia for Procedure(s):  INCISION AND DRAINAGE BREAST . Nausea/Vomiting: None    Postoperative hydration reviewed and adequate. Pain:  Pain Scale 1: Numeric (0 - 10) (12/15/17 1442)  Pain Intensity 1: 3 (12/15/17 1442)   Managed    Neurological Status:   Neuro (WDL): Within Defined Limits (12/15/17 1413)  Neuro  Neurologic State: Alert (12/15/17 1413)   At baseline    Mental Status and Level of Consciousness: Arousable    Pulmonary Status:   O2 Device: Nasal cannula (12/15/17 1418)   Adequate oxygenation and airway patent    Complications related to anesthesia: None    Post-anesthesia assessment completed.  No concerns    Signed By: Ric Mix MD     December 15, 2017

## 2017-12-15 NOTE — PROGRESS NOTES
Problem: Nutrition Deficit  Goal: *Optimize nutritional status  Nutrition  Reason for assessment:  Length of stay day 3   Assessment:   Diet order(s): Regular  Food/Nutrition Patient History:  Pt admitted with recurrent cellulitis right breast.  Past medical / surgical history notable for breast cancer, s/p RXT completed in Feb. 2017, on oral chemo-femara, s/p right breast reconstruction flap and left breast mastectomy in Sept. 2017. Noted on nursing admission malnutrition screening tool that patient was eating poorly r/t decreased appetite prior to admission. Pt endorses good po intake prior to admission; meals prepared by spouse. Anthropometrics:Height: 5' 2\" (157.5 cm),  Weight: 59.7 kg (131 lb 9.8 oz), Weight source not specified, Body mass index is 24.07 kg/(m^2). BMI class of normal range. Macronutrient needs:  EER:  3920-4508 kcal /day (25-30 kcal/kg BW)  EPR:  50-60 grams protein/day (1-1.2 grams/kg IBW)  Intake/Comparative Standards: 2 recorded intake at 100%. Pt potentially meets 100% estimated kcal and protein needs. Nutrition Diagnosis: No nutrition diagnosis at this time. Intervention:  1. Meals and snacks: Continue current diet. 2.  Nutrition Supplement Therapy:  Initiated Ensure Enlive 1 X daily; flavor preference noted   3. Discharge nutrition plan: Too soon to determine.   4.  Coordination of Nutrition Care:  Interdisciplinary Rounds    Juni Solomon, [address], MPH  [phone]

## 2017-12-15 NOTE — PROGRESS NOTES
TRANSFER - IN REPORT:    Verbal report received from [facility] on Izola Jossue  being received from PACU for routine progression of care      Report consisted of patients Situation, Background, Assessment and   Recommendations(SBAR). Information from the following report(s) SBAR, Kardex, OR Summary, Procedure Summary, Intake/Output, MAR, Accordion, Recent Results and Med Rec Status was reviewed with the receiving nurse. Opportunity for questions and clarification was provided. Assessment completed upon patients arrival to unit and care assumed.

## 2017-12-15 NOTE — BRIEF OP NOTE
BRIEF OPERATIVE NOTE    Date of Procedure: 12/15/2017   Preoperative Diagnosis: CHESTWALL ABSCESS = RIGHT  Postoperative Diagnosis: CHESTWALL ABSCESS = RIGHT    Procedure(s):  INCISION AND DRAINAGE Right Chest Wall   Surgeon(s) and Role:     * Malena Zhang MD - Primary         Assistant Staff:       Surgical Staff:  Circ-1: Jennifer Medina RN  Scrub Tech-1: Lucille Ruggiero  Scrub Tech-2: Adelfo Goodrich  Event Time In   Incision Start 1325   Incision Close 1404     Anesthesia: General   Estimated Blood Loss: 25cc  Specimens:   ID Type Source Tests Collected by Time Destination   1 : Right chest abcess Wound Breast CULTURE, ANAEROBIC, CULTURE, WOUND W GRAM STAIN Malena Zhang MD 12/15/2017 1337 Microbiology      Findings: Fat Necrosis, no purulence  Complications: none  Implants: * No implants in log *

## 2017-12-15 NOTE — PERIOP NOTES
TRANSFER - OUT REPORT:    Verbal report given to Gisselle RN(name) on Rubi Delgado  being transferred to [facility](unit) for routine progression of care       Report consisted of patients Situation, Background, Assessment and   Recommendations(SBAR). Information from the following report(s) SBAR, Kardex, OR Summary and Cardiac Rhythm NSR was reviewed with the receiving nurse. Lines:   Venous Access Device power port Upper chest (subclavicular area), left (Active)   Central Line Being Utilized Yes 12/15/2017  2:57 PM   Criteria for Appropriate Use Long term IV/antibiotic administration 12/15/2017  2:57 PM   Site Assessment Clean, dry, & intact 12/15/2017  2:57 PM   Date of Last Dressing Change 12/11/17 12/14/2017  8:38 PM   Dressing Status Clean, dry, & intact 12/15/2017  2:57 PM   Dressing Type Disk with Chlorhexadine gluconate (CHG); Transparent 12/15/2017  2:57 PM   Positive Blood Return (Medial Site) Yes 12/15/2017  2:13 PM        Opportunity for questions and clarification was provided.       Patient transported with:   O2 @ 2 liters

## 2017-12-15 NOTE — PROGRESS NOTES
Spoke with the patient on the phone this morning. I reviewed her CT scan and the chart. Will make her NPO. She is posted for 1:00 today to have I/D Right Chest Wall Abcess.

## 2017-12-15 NOTE — PROGRESS NOTES
Shift assessment completed via doc flow sheet. Patient is alert and oriented x 4. Respirations even and unlabored on room air. Lung sounds diminished with coarse bilaterally. Heart sounds S1, S2 auscultated and regular. Abdomen soft and non tender. Bowel sounds active to all 4 quadrants. Dry dressing to back intact. Chest redness is improving. IV flushed without difficulty. Patient ambulates to bathroom ad [place]. Patient reported right upper chest pain at 4/10 and refused medicine at this time. Bed is locked and in low position. Bed rails x 3. Patient is encouraged to call for assistance. Call light within reach.

## 2017-12-16 LAB
ANION GAP SERPL CALC-SCNC: 3 MMOL/L (ref 7–16)
BUN SERPL-MCNC: 8 MG/DL (ref 8–23)
CALCIUM SERPL-MCNC: 8 MG/DL (ref 8.3–10.4)
CHLORIDE SERPL-SCNC: 103 MMOL/L (ref 98–107)
CO2 SERPL-SCNC: 27 MMOL/L (ref 21–32)
CREAT SERPL-MCNC: 0.89 MG/DL (ref 0.6–1)
ERYTHROCYTE [DISTWIDTH] IN BLOOD BY AUTOMATED COUNT: 17 % (ref 11.9–14.6)
GLUCOSE SERPL-MCNC: 371 MG/DL (ref 65–100)
HCT VFR BLD AUTO: 31.4 % (ref 35.8–46.3)
HGB BLD-MCNC: 8.9 G/DL (ref 11.7–15.4)
MCH RBC QN AUTO: 27.4 PG (ref 26.1–32.9)
MCHC RBC AUTO-ENTMCNC: 28.3 G/DL (ref 31.4–35)
MCV RBC AUTO: 96.6 FL (ref 79.6–97.8)
PLATELET # BLD AUTO: 176 K/UL (ref 150–450)
PMV BLD AUTO: 10.4 FL (ref 10.8–14.1)
POTASSIUM SERPL-SCNC: 5.7 MMOL/L (ref 3.5–5.1)
RBC # BLD AUTO: 3.25 M/UL (ref 4.05–5.25)
SODIUM SERPL-SCNC: 133 MMOL/L (ref 136–145)
VANCOMYCIN TROUGH SERPL-MCNC: 14.5 UG/ML (ref 5–20)
WBC # BLD AUTO: 6.1 K/UL (ref 4.3–11.1)

## 2017-12-16 PROCEDURE — 74011250637 HC RX REV CODE- 250/637: Performed by: INTERNAL MEDICINE

## 2017-12-16 PROCEDURE — 74011250636 HC RX REV CODE- 250/636: Performed by: INTERNAL MEDICINE

## 2017-12-16 PROCEDURE — 65270000029 HC RM PRIVATE

## 2017-12-16 PROCEDURE — 80048 BASIC METABOLIC PNL TOTAL CA: CPT | Performed by: INTERNAL MEDICINE

## 2017-12-16 PROCEDURE — 74011250637 HC RX REV CODE- 250/637: Performed by: PLASTIC SURGERY

## 2017-12-16 PROCEDURE — 83735 ASSAY OF MAGNESIUM: CPT | Performed by: INTERNAL MEDICINE

## 2017-12-16 PROCEDURE — 74011250636 HC RX REV CODE- 250/636: Performed by: PLASTIC SURGERY

## 2017-12-16 PROCEDURE — 85027 COMPLETE CBC AUTOMATED: CPT | Performed by: INTERNAL MEDICINE

## 2017-12-16 PROCEDURE — 80202 ASSAY OF VANCOMYCIN: CPT | Performed by: INTERNAL MEDICINE

## 2017-12-16 RX ADMIN — BENZONATATE 100 MG: 100 CAPSULE ORAL at 09:04

## 2017-12-16 RX ADMIN — BENZONATATE 100 MG: 100 CAPSULE ORAL at 21:14

## 2017-12-16 RX ADMIN — Medication 1 AMPULE: at 21:19

## 2017-12-16 RX ADMIN — BENZONATATE 100 MG: 100 CAPSULE ORAL at 15:51

## 2017-12-16 RX ADMIN — DEXTROSE MONOHYDRATE, SODIUM CHLORIDE, AND POTASSIUM CHLORIDE 50 ML/HR: 50; 4.5; 1.49 INJECTION, SOLUTION INTRAVENOUS at 11:58

## 2017-12-16 RX ADMIN — HEPARIN SODIUM 5000 UNITS: 5000 INJECTION, SOLUTION INTRAVENOUS; SUBCUTANEOUS at 21:14

## 2017-12-16 RX ADMIN — Medication 1 AMPULE: at 09:07

## 2017-12-16 RX ADMIN — OXYCODONE HYDROCHLORIDE AND ACETAMINOPHEN 1 TABLET: 7.5; 325 TABLET ORAL at 09:04

## 2017-12-16 RX ADMIN — HEPARIN SODIUM 5000 UNITS: 5000 INJECTION, SOLUTION INTRAVENOUS; SUBCUTANEOUS at 13:28

## 2017-12-16 RX ADMIN — LETROZOLE 2.5 MG: 2.5 TABLET, FILM COATED ORAL at 22:00

## 2017-12-16 RX ADMIN — GUAIFENESIN 600 MG: 600 TABLET, EXTENDED RELEASE ORAL at 09:04

## 2017-12-16 RX ADMIN — DULOXETINE HYDROCHLORIDE 60 MG: 60 CAPSULE, DELAYED RELEASE ORAL at 09:04

## 2017-12-16 RX ADMIN — OXYCODONE HYDROCHLORIDE AND ACETAMINOPHEN 1 TABLET: 7.5; 325 TABLET ORAL at 21:30

## 2017-12-16 RX ADMIN — Medication 10 ML: at 06:00

## 2017-12-16 RX ADMIN — SODIUM CHLORIDE 1000 MG: 900 INJECTION, SOLUTION INTRAVENOUS at 13:33

## 2017-12-16 RX ADMIN — LEVOTHYROXINE SODIUM 75 MCG: 75 TABLET ORAL at 09:04

## 2017-12-16 RX ADMIN — VANCOMYCIN HYDROCHLORIDE 1000 MG: 1 INJECTION, POWDER, LYOPHILIZED, FOR SOLUTION INTRAVENOUS at 01:23

## 2017-12-16 RX ADMIN — HEPARIN SODIUM 5000 UNITS: 5000 INJECTION, SOLUTION INTRAVENOUS; SUBCUTANEOUS at 05:30

## 2017-12-16 RX ADMIN — Medication 100 UNITS: at 05:59

## 2017-12-16 RX ADMIN — Medication 100 UNITS: at 21:14

## 2017-12-16 RX ADMIN — GUAIFENESIN 600 MG: 600 TABLET, EXTENDED RELEASE ORAL at 21:14

## 2017-12-16 NOTE — PROGRESS NOTES
Patient feels better today. The cellulitis on the left breast has faded significantly. The right side is improved, but not gone. Drain output is clear. Will await culture results. Plan to irrigate her drain with Vancomycin irrigation at the bedside tomorrow.

## 2017-12-16 NOTE — PROGRESS NOTES
Pharmacokinetic Consult to Pharmacist    [place] is a 69 y.o. female being treated for skin and soft tissue infection with vancomycin. Height: 5' 2\" (157.5 cm)  Weight: 59.7 kg (131 lb 9.8 oz)  Lab Results   Component Value Date/Time    BUN 8 12/16/2017 05:41 AM    Creatinine 0.89 12/16/2017 05:41 AM    WBC 6.1 12/16/2017 05:41 AM    Procalcitonin 0.1 12/12/2017 06:03 AM    Lactic acid 1.8 10/14/2017 06:24 AM    Lactic Acid (POC) 3.6 10/11/2017 03:35 PM      Estimated Creatinine Clearance: 47.2 mL/min (based on Cr of 0.89). CULTURES:  12/14 BC x 1 : NGTD    Lab Results   Component Value Date/Time    Vancomycin,trough 14.5 12/16/2017 01:17 PM       Day 5 of vancomycin. Goal trough is 12-18. We will continue the dose of [id number] q12h.  We will continue to follow patient and adjust the dose as necessary per guidelines    Thank you,  Rita Camacho, PharmD

## 2017-12-16 NOTE — PROGRESS NOTES
Hospitalist Progress Note    2017  Admit Date: 2017 11:10 AM   NAME: Norma Galdamez   :  1950   DOS:              17  MRN:  [id number]   Attending: Max Amor MD  PCP:  Shelbi Shannon MD  Treatment Team: Attending Provider: Justina Perera DO; Consulting Provider: Alisa Duque MD; Consulting Provider: Lukas Kemp MD    Full Code     SUBJECTIVE:   As previously documented:    Patient 65F with pmhx of breast ca s/p RTX completed in 2017 on oral chemo (femara) s/p right breast reconstruction flap and left breast mastectomy 2017 with recent hospitalization in October for skin flap infection and right breast implant infection. Underwent right breast implant removal on 10/20 as well as wound vac placement to flap. She was discharged to rehab on oral antibiotics. Reports she had been doing better until a week ago when skin over right breast surgical site became tender, warm and erythematous. Was seen by her plastic surgeon and started on doxycycline. Has taken 5 days and not clinically improved. Her surgeon has asked that she be admitted for recurrent cellulitis requiring IV antibiotics. Started on IV vanc/cefepime. Has had very slow clinical improvement. CT chest on  revealing 4.5 x 1.3 cm abcess in right pectoralis muscle    12/15 - Plastic sx consulted yesterday and pt going for right chest wall abscess I&D today. ID following now for atbx. 17    Norma Galdamez  No new complaints. 10+ ROS reviewed and negative except for positive in HPI.    Allergies   Allergen Reactions    Prednisone Unknown (comments)     \"psychotic reaction\"  To oral and parenteral steroids     Current Facility-Administered Medications   Medication Dose Route Frequency    VANCOMYCIN INFORMATION NOTE   Other PRN    vancomycin (VANCOCIN) 1,000 mg in 0.9% sodium chloride (MBP/ADV) 250 mL adv  1,000 mg IntraVENous Q12H    [START ON 2017] vancomycin (VANCOCIN) 2,000 mg in sodium chloride irrigation 0.9 % 1,000 mL   Irrigation ONCE    oxyCODONE-acetaminophen (PERCOCET 7.5) 7.5-325 mg per tablet 1 Tab  1 Tab Oral Q4H PRN    morphine injection 2 mg  2 mg IntraVENous Q2H PRN    naloxone (NARCAN) injection 0.4 mg  0.4 mg IntraVENous PRN    benzonatate (TESSALON) capsule 100 mg  100 mg Oral TID    guaiFENesin ER (MUCINEX) tablet 600 mg  600 mg Oral Q12H    budesonide (PULMICORT) 500 mcg/2 ml nebulizer suspension  500 mcg Nebulization BID RT    albuterol-ipratropium (DUO-NEB) 2.5 MG-0.5 MG/3 ML  3 mL Nebulization Q6H PRN    albuterol-ipratropium (DUO-NEB) 2.5 MG-0.5 MG/3 ML  3 mL Nebulization Q6H RT    diphenhydrAMINE (BENADRYL) injection 12.5 mg  12.5 mg IntraVENous Q6H PRN    alcohol 62% (NOZIN) nasal  1 Ampule  1 Ampule Topical Q12H    ALPRAZolam (XANAX) tablet 0.5 mg  0.5 mg Oral QHS PRN    DULoxetine (CYMBALTA) capsule 60 mg  60 mg Oral DAILY    levothyroxine (SYNTHROID) tablet 75 mcg  75 mcg Oral ACB    sodium chloride (NS) flush 5-10 mL  5-10 mL IntraVENous Q8H    sodium chloride (NS) flush 5-10 mL  5-10 mL IntraVENous PRN    acetaminophen (TYLENOL) tablet 650 mg  650 mg Oral Q4H PRN    ondansetron (ZOFRAN) injection 4 mg  4 mg IntraVENous Q4H PRN    heparin (porcine) injection 5,000 Units  5,000 Units SubCUTAneous Q8H    letrozole (FEMARA) tablet 2.5 mg (Patient Supplied)  2.5 mg Oral QHS    heparin (porcine) pf 100 Units  100 Units InterCATHeter Q8H    heparin (porcine) pf 100 Units  100 Units InterCATHeter Q8H PRN         Immunization History   Administered Date(s) Administered    Influenza High Dose Vaccine PF 10/12/2015, 11/03/2016, 11/02/2017    Pneumococcal Conjugate (PCV-13) 10/12/2015    Pneumococcal Polysaccharide (PPSV-23) 10/15/2013    TB Skin Test (PPD) Intradermal 10/13/2017    Tdap 11/02/2017     Objective:   Patient Vitals for the past 24 hrs:   Temp Pulse Resp BP SpO2   12/16/17 1535 97.2 °F (36.2 °C) 96 16 113/64 93 %   12/16/17 1005 98.6 °F (37 °C) 96 16 117/53 (!) 87 %   17 0551 99.2 °F (37.3 °C) 88 16 117/70 94 %   17 0310 98.4 °F (36.9 °C) 86 16 119/69 97 %   12/15/17 2306 98.8 °F (37.1 °C) 97 16 118/65 95 %   12/15/17 1811 98 °F (36.7 °C) 98 16 150/55 93 %     Temp (24hrs), Av.4 °F (36.9 °C), Min:97.2 °F (36.2 °C), Max:99.2 °F (37.3 °C)    Oxygen Therapy  O2 Sat (%): 93 % (17 1535)  Pulse via Oximetry: 113 beats per minute (17 2106)  O2 Device: Nasal cannula (12/15/17 1509)  O2 Flow Rate (L/min): 2 l/min (12/15/17 1509)  Oxygen Therapy  O2 Sat (%): 93 % (17 1535)  Pulse via Oximetry: 113 beats per minute (17 2106)  O2 Device: Nasal cannula (12/15/17 1509)  O2 Flow Rate (L/min): 2 l/min (12/15/17 1509)    Physical Exam:  General:         Alert, cooperative, no distress   HEENT:               NCAT. No obvious deformity. Nares normal. No drainage  Lungs:  CTABL. No wheezing/rhonchi/rales  Cardiovascular:   RRR. No m/r/g. No pedal edema b/l. +2 PT/DT pulses b/l. Abdomen:       S/nt/nd. Bowel sounds normal. .   Skin:         No rashes or lesions. Not Jaundiced  Neurologic:    AAOx3. CN II- XII grossly WNL. No gross focal deficit. Moves all extremities. Normal sensory. Normal gait. Psychiatric:         Good mood. Normal affect. Denies any SI/HI.        DIAGNOSTIC STUDIES      Data Review:   Recent Results (from the past 24 hour(s))   METABOLIC PANEL, BASIC    Collection Time: 17  5:41 AM   Result Value Ref Range    Sodium 133 (L) 136 - 145 mmol/L    Potassium 5.7 (H) 3.5 - 5.1 mmol/L    Chloride 103 98 - 107 mmol/L    CO2 27 21 - 32 mmol/L    Anion gap 3 (L) 7 - 16 mmol/L    Glucose 371 (H) 65 - 100 mg/dL    BUN 8 8 - 23 MG/DL    Creatinine 0.89 0.6 - 1.0 MG/DL    GFR est AA >60 >60 ml/min/1.73m2    GFR est non-AA >60 >60 ml/min/1.73m2    Calcium 8.0 (L) 8.3 - 10.4 MG/DL   CBC W/O DIFF    Collection Time: 17  5:41 AM   Result Value Ref Range    WBC 6.1 4.3 - 11.1 K/uL    RBC 3.25 (L) 4.05 - 5.25 M/uL    HGB 8.9 (L) 11.7 - 15.4 g/dL    HCT 31.4 (L) 35.8 - 46.3 %    MCV 96.6 79.6 - 97.8 FL    MCH 27.4 26.1 - 32.9 PG    MCHC 28.3 (L) 31.4 - 35.0 g/dL    RDW 17.0 (H) 11.9 - 14.6 %    PLATELET 839 997 - 744 K/uL    MPV 10.4 (L) 10.8 - 14.1 FL   VANCOMYCIN, TROUGH    Collection Time: 12/16/17  1:17 PM   Result Value Ref Range    Vancomycin,trough 14.5 5 - 20 ug/mL       All Micro Results     Procedure Component Value Units Date/Time    CULTURE, BLOOD [[id number]] Collected:  12/14/17 1713    Order Status:  Completed Specimen:  Blood from Blood Updated:  12/16/17 0832     Special Requests: --        LEFT  ARM       Culture result: NO GROWTH 2 DAYS       CULTURE, BLOOD [[id number]] Collected:  12/14/17 1630    Order Status:  Canceled Specimen:  Blood           Imaging Ileene Frees /Studies:    CXR Results  (Last 48 hours)               12/15/17 0953  XR CHEST PA LAT Final result    Impression:  IMPRESSION: No new confluent infiltrate that suggests a pneumonia. Chronic post   inflammatory scarring persists at the bases. Otherwise stable chest as above. Narrative:  2 View Chest X-Ray 12/15/2017 9:53 AM       INDICATION: Inpatient with abnormal breath sounds, crackles. COMPARISON: 10/18/2017       FINDINGS: Upright PA and Lateral views are submitted. An implanted venous port   remains in place on the left and there are stable right breast surgery changes. Cardiomediastinal silhouette remains within normal limits. Lungs are adequately   inflated. Vascularity is appropriate and there is no new confluent infiltrate or   effusion. Some bandlike/reticular markings are again seen at the bases, left   greater than right. Grossly, the chest wall structures are intact. CT Results  (Last 48 hours)    None        No results found. No results found for this visit on 12/11/17.     Labs and Studies from previous 24 hours have been personally reviewed by myself    ASSESSMENT Active Hospital Problems    Diagnosis Date Noted    Cellulitis 12/11/2017    Pancytopenia ([facility] 10/11/2017    S/P breast reconstruction, right 09/19/2017    Status post right mastectomy 09/14/2016    Malignant neoplasm of right female breast ([facility] 01/25/2016    Acquired hypothyroidism 11/04/2015     Hospital Problems as of 12/16/2017  Date Reviewed: 11/20/2017          Codes Class Noted - Resolved POA    Cellulitis ICD-10-CM: L03.90  ICD-9-CM: 682.9  12/11/2017 - Present Unknown        Pancytopenia ([facility] ICD-10-CM: Y94.053  ICD-9-CM: 284.19  10/11/2017 - Present Yes        S/P breast reconstruction, right ICD-10-CM: Z98.890  ICD-9-CM: V43.82  9/19/2017 - Present Yes        Status post right mastectomy ICD-10-CM: Z90.11  ICD-9-CM: V45.71  9/14/2016 - Present Yes        Malignant neoplasm of right female breast ([facility] [facility] ICD-10-CM: C50.911  ICD-9-CM: 174.9  1/25/2016 - Present Yes        Acquired hypothyroidism (Chronic) ICD-10-CM: E03.9  ICD-9-CM: 244.9  11/4/2015 - Present Yes              Plan:   - Cellulitis and chest wall abscess -ID/ plastic sx following. cont IV vanc. CT chest with abcess in chest wall muscle. - s/p I&D 05/64 w/o complications. - Asthma - exacerbation. Cont scheduled nebs, mucinex, antitussives. Improved today. - hx of breast CA - aware, cont femara  - Hypothyroidism - synthroid    DVT Prophylaxis: hep sq  CODE Status:  Full  Plan of Care Discussed with: patient.  Care team.  Medical Risk: medium  Dispo - anticipate home w/o needs pending culture/atbx response    Stephanie Lawson MD  12/16/17

## 2017-12-16 NOTE — PROGRESS NOTES
Shift assessment completed via doc flow sheet. Patient is alert and oriented x 4. Respirations even and unlabored on room air. Lung sounds noted with crackles on right side. Heart sounds S1, S2 auscultated and regular. Abdomen soft and non tender. Bowel sounds active to all 4 quadrants. Dry dressing with ABD pad to rt upper chest is c/d/i. IV fluids infusing without difficulty. Patient ambulates to bathroom ad [place]. Patient denies pain and other needs at at this time. Bed is locked and in low position. Bed rails x 3. Family at bedside. Patient is encouraged to call for assistance. Call light within reach.

## 2017-12-16 NOTE — PROGRESS NOTES
Pt resting quietly without complaints of pain, respirations are even no distress observed. Report received from offgoing shift RN and care assumed at this time

## 2017-12-16 NOTE — PROGRESS NOTES
Pt. Did not want aerosol treatment . Michele Simran  Will call if she feels that she needs one later

## 2017-12-17 LAB
ANION GAP SERPL CALC-SCNC: 9 MMOL/L (ref 7–16)
BUN SERPL-MCNC: 10 MG/DL (ref 8–23)
CALCIUM SERPL-MCNC: 8.4 MG/DL (ref 8.3–10.4)
CHLORIDE SERPL-SCNC: 104 MMOL/L (ref 98–107)
CO2 SERPL-SCNC: 28 MMOL/L (ref 21–32)
CREAT SERPL-MCNC: 0.74 MG/DL (ref 0.6–1)
ERYTHROCYTE [DISTWIDTH] IN BLOOD BY AUTOMATED COUNT: 17 % (ref 11.9–14.6)
GLUCOSE SERPL-MCNC: 114 MG/DL (ref 65–100)
HCT VFR BLD AUTO: 30 % (ref 35.8–46.3)
HGB BLD-MCNC: 9.4 G/DL (ref 11.7–15.4)
MAGNESIUM SERPL-MCNC: 1.8 MG/DL (ref 1.8–2.4)
MAGNESIUM SERPL-MCNC: 1.8 MG/DL (ref 1.8–2.4)
MCH RBC QN AUTO: 30.6 PG (ref 26.1–32.9)
MCHC RBC AUTO-ENTMCNC: 31.3 G/DL (ref 31.4–35)
MCV RBC AUTO: 97.7 FL (ref 79.6–97.8)
PHOSPHATE SERPL-MCNC: 3.5 MG/DL (ref 2.3–3.7)
PLATELET # BLD AUTO: 162 K/UL (ref 150–450)
PMV BLD AUTO: 10 FL (ref 10.8–14.1)
POTASSIUM SERPL-SCNC: 4.3 MMOL/L (ref 3.5–5.1)
RBC # BLD AUTO: 3.07 M/UL (ref 4.05–5.25)
SODIUM SERPL-SCNC: 141 MMOL/L (ref 136–145)
WBC # BLD AUTO: 5.3 K/UL (ref 4.3–11.1)

## 2017-12-17 PROCEDURE — 74011250637 HC RX REV CODE- 250/637: Performed by: PLASTIC SURGERY

## 2017-12-17 PROCEDURE — 74011250636 HC RX REV CODE- 250/636: Performed by: PLASTIC SURGERY

## 2017-12-17 PROCEDURE — 80048 BASIC METABOLIC PNL TOTAL CA: CPT | Performed by: INTERNAL MEDICINE

## 2017-12-17 PROCEDURE — 65270000029 HC RM PRIVATE

## 2017-12-17 PROCEDURE — 74011250636 HC RX REV CODE- 250/636: Performed by: INTERNAL MEDICINE

## 2017-12-17 PROCEDURE — 83735 ASSAY OF MAGNESIUM: CPT | Performed by: INTERNAL MEDICINE

## 2017-12-17 PROCEDURE — 74011250637 HC RX REV CODE- 250/637: Performed by: INTERNAL MEDICINE

## 2017-12-17 PROCEDURE — 74011000272 HC RX REV CODE- 272: Performed by: PLASTIC SURGERY

## 2017-12-17 PROCEDURE — 77030019605

## 2017-12-17 PROCEDURE — 85027 COMPLETE CBC AUTOMATED: CPT | Performed by: INTERNAL MEDICINE

## 2017-12-17 PROCEDURE — 84100 ASSAY OF PHOSPHORUS: CPT | Performed by: INTERNAL MEDICINE

## 2017-12-17 PROCEDURE — 94760 N-INVAS EAR/PLS OXIMETRY 1: CPT

## 2017-12-17 RX ADMIN — LEVOTHYROXINE SODIUM 75 MCG: 75 TABLET ORAL at 08:46

## 2017-12-17 RX ADMIN — Medication 100 UNITS: at 21:13

## 2017-12-17 RX ADMIN — SODIUM CHLORIDE 1000 MG: 900 INJECTION, SOLUTION INTRAVENOUS at 01:39

## 2017-12-17 RX ADMIN — GUAIFENESIN 600 MG: 600 TABLET, EXTENDED RELEASE ORAL at 08:46

## 2017-12-17 RX ADMIN — Medication 10 ML: at 15:26

## 2017-12-17 RX ADMIN — Medication 1 AMPULE: at 21:14

## 2017-12-17 RX ADMIN — Medication 100 UNITS: at 06:19

## 2017-12-17 RX ADMIN — SODIUM CHLORIDE 1000 MG: 900 INJECTION, SOLUTION INTRAVENOUS at 13:10

## 2017-12-17 RX ADMIN — BENZONATATE 100 MG: 100 CAPSULE ORAL at 08:46

## 2017-12-17 RX ADMIN — BENZONATATE 100 MG: 100 CAPSULE ORAL at 15:36

## 2017-12-17 RX ADMIN — Medication 100 UNITS: at 15:26

## 2017-12-17 RX ADMIN — DULOXETINE HYDROCHLORIDE 60 MG: 60 CAPSULE, DELAYED RELEASE ORAL at 08:46

## 2017-12-17 RX ADMIN — HEPARIN SODIUM 5000 UNITS: 5000 INJECTION, SOLUTION INTRAVENOUS; SUBCUTANEOUS at 13:11

## 2017-12-17 RX ADMIN — LETROZOLE 2.5 MG: 2.5 TABLET, FILM COATED ORAL at 22:00

## 2017-12-17 RX ADMIN — OXYCODONE HYDROCHLORIDE AND ACETAMINOPHEN 1 TABLET: 7.5; 325 TABLET ORAL at 21:14

## 2017-12-17 RX ADMIN — HEPARIN SODIUM 5000 UNITS: 5000 INJECTION, SOLUTION INTRAVENOUS; SUBCUTANEOUS at 06:20

## 2017-12-17 RX ADMIN — BENZONATATE 100 MG: 100 CAPSULE ORAL at 21:13

## 2017-12-17 RX ADMIN — VANCOMYCIN HYDROCHLORIDE: 1 INJECTION, POWDER, LYOPHILIZED, FOR SOLUTION INTRAVENOUS at 09:00

## 2017-12-17 RX ADMIN — Medication 1 AMPULE: at 09:00

## 2017-12-17 RX ADMIN — GUAIFENESIN 600 MG: 600 TABLET, EXTENDED RELEASE ORAL at 21:13

## 2017-12-17 RX ADMIN — HEPARIN SODIUM 5000 UNITS: 5000 INJECTION, SOLUTION INTRAVENOUS; SUBCUTANEOUS at 21:12

## 2017-12-17 NOTE — PROGRESS NOTES
Hospitalist Progress Note    2017  Admit Date: 2017 11:10 AM   NAME: Deep Rendon   :  1950   DOS:              17  MRN:  [id number]   Attending: Vijay Samuel MD  PCP:  Marcos Garcia MD  Treatment Team: Attending Provider: Yan Arnold DO; Consulting Provider: Marge Garibay MD; Consulting Provider: Tito Wills MD    Full Code     SUBJECTIVE:   As previously documented:    Patient 65F with pmhx of breast ca s/p RTX completed in 2017 on oral chemo (femara) s/p right breast reconstruction flap and left breast mastectomy 2017 with recent hospitalization in October for skin flap infection and right breast implant infection. Underwent right breast implant removal on 10/20 as well as wound vac placement to flap. She was discharged to rehab on oral antibiotics. Reports she had been doing better until a week ago when skin over right breast surgical site became tender, warm and erythematous. Was seen by her plastic surgeon and started on doxycycline. Has taken 5 days and not clinically improved. Her surgeon has asked that she be admitted for recurrent cellulitis requiring IV antibiotics. Started on IV vanc/cefepime. Has had very slow clinical improvement. CT chest on  revealing 4.5 x 1.3 cm abcess in right pectoralis muscle    12/15 - Plastic sx consulted yesterday and pt going for right chest wall abscess I&D today. ID following now for atbx. 17    [facility]  No new complaints. 17    [facility]  No new complaints. Plastic surgery plans bedside vancomycin infused irrigation today. 10+ ROS reviewed and negative except for positive in HPI.    Allergies   Allergen Reactions    Prednisone Unknown (comments)     \"psychotic reaction\"  To oral and parenteral steroids     Current Facility-Administered Medications   Medication Dose Route Frequency    [START ON 2017] VANCOMYCIN INFORMATION NOTE   Other ONCE    VANCOMYCIN INFORMATION NOTE   Other PRN    vancomycin (VANCOCIN) 1,000 mg in 0.9% sodium chloride (MBP/ADV) 250 mL adv  1,000 mg IntraVENous Q12H    vancomycin (VANCOCIN) 2,000 mg in sodium chloride irrigation 0.9 % 1,000 mL   Irrigation ONCE    oxyCODONE-acetaminophen (PERCOCET 7.5) 7.5-325 mg per tablet 1 Tab  1 Tab Oral Q4H PRN    morphine injection 2 mg  2 mg IntraVENous Q2H PRN    naloxone (NARCAN) injection 0.4 mg  0.4 mg IntraVENous PRN    benzonatate (TESSALON) capsule 100 mg  100 mg Oral TID    guaiFENesin ER (MUCINEX) tablet 600 mg  600 mg Oral Q12H    albuterol-ipratropium (DUO-NEB) 2.5 MG-0.5 MG/3 ML  3 mL Nebulization Q6H PRN    diphenhydrAMINE (BENADRYL) injection 12.5 mg  12.5 mg IntraVENous Q6H PRN    alcohol 62% (NOZIN) nasal  1 Ampule  1 Ampule Topical Q12H    ALPRAZolam (XANAX) tablet 0.5 mg  0.5 mg Oral QHS PRN    DULoxetine (CYMBALTA) capsule 60 mg  60 mg Oral DAILY    levothyroxine (SYNTHROID) tablet 75 mcg  75 mcg Oral ACB    sodium chloride (NS) flush 5-10 mL  5-10 mL IntraVENous Q8H    sodium chloride (NS) flush 5-10 mL  5-10 mL IntraVENous PRN    acetaminophen (TYLENOL) tablet 650 mg  650 mg Oral Q4H PRN    ondansetron (ZOFRAN) injection 4 mg  4 mg IntraVENous Q4H PRN    heparin (porcine) injection 5,000 Units  5,000 Units SubCUTAneous Q8H    letrozole (FEMARA) tablet 2.5 mg (Patient Supplied)  2.5 mg Oral QHS    heparin (porcine) pf 100 Units  100 Units InterCATHeter Q8H    heparin (porcine) pf 100 Units  100 Units InterCATHeter Q8H PRN         Immunization History   Administered Date(s) Administered    Influenza High Dose Vaccine PF 10/12/2015, 11/03/2016, 11/02/2017    Pneumococcal Conjugate (PCV-13) 10/12/2015    Pneumococcal Polysaccharide (PPSV-23) 10/15/2013    TB Skin Test (PPD) Intradermal 10/13/2017    Tdap 11/02/2017     Objective:     Patient Vitals for the past 24 hrs:   Temp Pulse Resp BP SpO2   12/17/17 0810 96.1 °F (35.6 °C) 90 16 122/61 91 % 17 0341 98 °F (36.7 °C) 90 14 102/54 94 %   17 2323 98.8 °F (37.1 °C) 83 16 124/68 95 %   17 - - - - 96 %   17 1852 99.4 °F (37.4 °C) 100 16 112/65 91 %   17 1535 97.2 °F (36.2 °C) 96 16 113/64 93 %     Temp (24hrs), Av.9 °F (36.6 °C), Min:96.1 °F (35.6 °C), Max:99.4 °F (37.4 °C)    Oxygen Therapy  O2 Sat (%): 91 % (17 0810)  Pulse via Oximetry: 113 beats per minute (17 2106)  O2 Device: Nasal cannula (17)  O2 Flow Rate (L/min): 2 l/min (17)  Oxygen Therapy  O2 Sat (%): 91 % (17 0810)  Pulse via Oximetry: 113 beats per minute (176)  O2 Device: Nasal cannula (17)  O2 Flow Rate (L/min): 2 l/min (17)    Physical Exam:  General:         Alert, cooperative, no distress   HEENT:               NCAT. No obvious deformity. Nares normal. No drainage  Lungs:  CTABL. No wheezing/rhonchi/rales  Cardiovascular:   RRR. No m/r/g. No pedal edema b/l. +2 PT/DT pulses b/l. Abdomen:       S/nt/nd. Bowel sounds normal. .   Skin:         No rashes or lesions. Not Jaundiced  Neurologic:    AAOx3. CN II- XII grossly WNL. No gross focal deficit. Moves all extremities. Normal sensory. Normal gait. Psychiatric:         Good mood. Normal affect. Denies any SI/HI.        DIAGNOSTIC STUDIES      Data Review:   Recent Results (from the past 24 hour(s))   VANCOMYCIN, TROUGH    Collection Time: 17  1:17 PM   Result Value Ref Range    Vancomycin,trough 14.5 5 - 20 ug/mL   MAGNESIUM    Collection Time: 17 11:25 PM   Result Value Ref Range    Magnesium 1.8 1.8 - 2.4 mg/dL   CBC W/O DIFF    Collection Time: 17  6:06 AM   Result Value Ref Range    WBC 5.3 4.3 - 11.1 K/uL    RBC 3.07 (L) 4.05 - 5.25 M/uL    HGB 9.4 (L) 11.7 - 15.4 g/dL    HCT 30.0 (L) 35.8 - 46.3 %    MCV 97.7 79.6 - 97.8 FL    MCH 30.6 26.1 - 32.9 PG    MCHC 31.3 (L) 31.4 - 35.0 g/dL    RDW 17.0 (H) 11.9 - 14.6 %    PLATELET 177 068 - 766 K/uL    MPV 10.0 (L) 10.8 - 67.4 FL   METABOLIC PANEL, BASIC    Collection Time: 12/17/17  6:06 AM   Result Value Ref Range    Sodium 141 136 - 145 mmol/L    Potassium 4.3 3.5 - 5.1 mmol/L    Chloride 104 98 - 107 mmol/L    CO2 28 21 - 32 mmol/L    Anion gap 9 7 - 16 mmol/L    Glucose 114 (H) 65 - 100 mg/dL    BUN 10 8 - 23 MG/DL    Creatinine 0.74 0.6 - 1.0 MG/DL    GFR est AA >60 >60 ml/min/1.73m2    GFR est non-AA >60 >60 ml/min/1.73m2    Calcium 8.4 8.3 - 10.4 MG/DL   PHOSPHORUS    Collection Time: 12/17/17  6:06 AM   Result Value Ref Range    Phosphorus 3.5 2.3 - 3.7 MG/DL   MAGNESIUM    Collection Time: 12/17/17  6:06 AM   Result Value Ref Range    Magnesium 1.8 1.8 - 2.4 mg/dL       All Micro Results     Procedure Component Value Units Date/Time    CULTURE, BLOOD [[id number]] Collected:  12/14/17 1713    Order Status:  Completed Specimen:  Blood from Blood Updated:  12/17/17 0528     Special Requests: --        LEFT  ARM       Culture result: NO GROWTH 3 DAYS       CULTURE, BLOOD [[id number]] Collected:  12/14/17 1630    Order Status:  Canceled Specimen:  Blood           Imaging /Procedures /Studies:    CXR Results  (Last 48 hours)    None        CT Results  (Last 48 hours)    None        No results found. No results found for this visit on 12/11/17.     Labs and Studies from previous 24 hours have been personally reviewed by myself [place] Problems    Diagnosis Date Noted    Cellulitis 12/11/2017    Pancytopenia ([facility] [address] 10/11/2017    S/P breast reconstruction, right 09/19/2017    Status post right mastectomy 09/14/2016    Malignant neoplasm of right female breast ([facility] [address] 01/25/2016    Acquired hypothyroidism 11/04/2015     Hospital Problems as of 12/17/2017  Date Reviewed: 11/20/2017          Codes Class Noted - Resolved POA    Cellulitis ICD-10-CM: L03.90  ICD-9-CM: 682.9  12/11/2017 - Present Unknown        Pancytopenia ([address] ICD-10-CM: M46.066  ICD-9-CM: 284.19  10/11/2017 - Present Yes S/P breast reconstruction, right ICD-10-CM: Y35.195  ICD-9-CM: V43.82  9/19/2017 - Present Yes        Status post right mastectomy ICD-10-CM: Z90.11  ICD-9-CM: V45.71  9/14/2016 - Present Yes        Malignant neoplasm of right female breast [facility]) ICD-10-CM: C50.911  ICD-9-CM: 174.9  1/25/2016 - Present Yes        Acquired hypothyroidism (Chronic) ICD-10-CM: E03.9  ICD-9-CM: 244.9  11/4/2015 - Present Yes              Plan:   - Left breast Cellulitis and  Right chest wall abscess   -ID/ plastic sx following. cont IV vanc. CT chest with abcess in chest wall muscle. - s/p plastic surg right chest wall I&D 59/45 w/o complications. Also s/p 12/14 infection disease consult with continued IV vanco advised. - Asthma - exacerbation. Cont scheduled nebs, mucinex, antitussives. Improved today. - hx of breast CA - aware, cont femara  - Hypothyroidism - synthroid    DVT Prophylaxis: hep sq  CODE Status:  Full  Plan of Care Discussed with: patient. Care team.  Medical Risk: medium  Dispo - anticipate home after plastic surg intervention completed and if ID approves.     Mike Phillip MD  12/17/17

## 2017-12-17 NOTE — PROGRESS NOTES
Assessment completed and documented. Bowel sounds active. Lung sounds coarse with scattered wheezing. Pain completely relieved with oral pain medication. Alert and oriented. Currently resting in bed. Will continue to monitor with hourly rounding.

## 2017-12-17 NOTE — PROGRESS NOTES
Patient is doing well overall. Cellulitis is still present but slightly improved. Today I irrigated the wound at the bedside with Vancomycin irrigation through her drain in a sterile fashion. Will see if that helps. Culture is negative at Day 2 and Gram stain did not show any organisms.

## 2017-12-17 NOTE — ROUTINE PROCESS
Respiratory Care Services     Policy Number: [id number]    Title: Patient Care Assessment Program    Effective Date: 01/1999    Revised Date: 05/2014    Reviewed Date: 06/2013/ 03/2015, 03/2016, 06/2017     Overview  In an effort to improve quality and reduce costs of respiratory care at [facility], the Respiratory Department has developed a number of Patient Care Protocols. These protocols have been developed according to Vlad 3 and are utilized for those patients who are ordered respiratory therapy using therapeutic indications and standardized approaches for accomplishing objectives. Patient Care Protocols are intended to improve care by:   Defining the indications and standards of care agreed upon by the Pulmonary Medicine and [address] [place] [address] of [facility].  Training respiratory care practitioners to apply those criteria to individual patients and modify therapy as indicated by the protocols.  Documenting the indication and care plan as part of the initial ordering process.  Tapering or discontinuing treatments once the indication for therapy changes. The Patient Care Protocols shall be universally applied throughout the hospital as determined by   the Pulmonary Medicine and [address] [place] [address].     Rationale for Patient Care Assessment Protocols:   Continuous Quality Improvement   Cost containment   Standardization of care   Enhanced continuity of care   Utilization review   Timely intervention    The following patient care assessment protocols have been developed:   Aerosolized Medication    Bronchial Hygiene    Oxygen Weaning Protocol   Volume Expansion/Secretion Clearance Non-Vented   Ventilator Weaning Protocol   CVRU Fast Track Weaning Protocol   Asthma Treatment Protocol ER   Pediatric Asthma Treatment Protocol ER   Alpha-1 Antitrypsin Deficiency Protocol   Prone Positioning Protocol    The Director of [address] [place] [address] oversees the Patient Care Assessment Program. The Clinical/ is responsible for protocol development and training. The Supervisor is responsible for implementation and  activities. Each patient with an order for respiratory treatments will receive an evaluation. All Registered Respiratory Care Practitioners (RCPs) will perform the evaluations. The same evaluation tool will be utilized for all initial and follow-up assessments. If the patient does not meet criteria for ordered therapy, the therapy will be discontinued. If the patient demonstrates an adverse response to initially ordered therapy, the therapy will be discontinued and the physician will be contacted. Specific physician's orders that deviate from protocols and are deemed \"inappropriate\" or \"unsafe\" will be addressed with ordering physician and/or medical director as required. Patients of [facility] and [facility] will not be assessed for the first 24 hours as the Medical Director of [address] [place] [address] has requested that changes in therapy should not be made during that time frame. Respiratory Patient Care Assessment Protocols                           I.  Policy: In an effort to provide quality patient care and effective utilization of services, physicians who order respiratory therapy will have their patients treated via the protocols established (see attached). All Registered [address] (RCPs) will complete the initial assessment. This assessment will indicate patient needs, and the care plan developed for the patient and will performed within 24 hours of admission. Frequency of the therapy will be set according to the results of the respiratory therapy evaluation and frequency guidelines policy.  Reassessment will be continued done every 48 hours and more frequently as needed for the individual patient. II. Purpose:     A. To provide a process that will allow for ongoing assessment and care plan modification for patients receiving respiratory services based on both objective and or subjective patient responses to interventions. This process of protocol utilization will assist in patient care progression while eliminating the need for the physician to continually update respiratory therapy orders. B. To assure continuity of respiratory care that meets [facility] Clinical Practice Guidelines. III. Initiation:  Implement Respiratory Care Protocols for patients who are ordered by physician          to receive respiratory therapy procedures or for ventilator management. IV. Protocol:  A. Upon receiving an order for therapy the RCP will review the patients EMR (electronic medical record) for all pertinent information includin. Physicians order for therapy  2. Patient history and physical examination  3. Physician progress notes  4. Diagnostic. X-rays, PFTs, arterial blood gases etc.      B. The RCP will perform a respiratory assessment in the following manner:  1. Perform hand hygiene per hospital policy utilizing Standard Precautions for all patients and following transmission-based isolation as indicated per policy. 2. Identify patient via ID bracelet verifying patient name and birth date. 3. General observations: color, pattern and effort of breathing, chest expansion, (symmetrical and bilateral), level of consciousness and the ability to ambulate. 4. The RCP will assess patients cough ability and determine if Nasotracheal suctioning is needed. If patient is unable to produce sputum, at that time, the RCP should question the patient with regard to their sputum: production, color consistency, frequency and amount.   5. Auscultation: Using a stethoscope, the RCP will listen and note quality of breath sounds and presence or absence of adventitious breath sounds in all lung fields, both anteriorly and posteriorly. 6. Upon completing the EMR review and physical assessment, the RCP will document findings in the RT Assessment section of the EMR. The score level will be provided and will be used to determine the frequency of therapy. V. Indications:   A. Indications for Bronchial Hygiene Protocol will include:  1. Potential for or presence of atelectasis. 2. Need for hydration and removal of retained secretions. 3. Need for improvement of cough effectiveness. 4. Presence of conditions associated with disorder of pulmonary clearance:  a. Cystic fibrosis  b. Bronchiectasis  B. Indications for Aerosolized Medication(s) Protocol should include:  1. Treatment of bronchospasm/wheezing  2. Improvement of mucociliary clearance  3. Treatment of stridor  4. History of Asthma or COPD             C.  Indications for Oxygen Therapy Protocol should include:  1. Documented hypoxemia  2. Severe trauma  3. Acute myocardial infarction  4. Short-term therapy (e.g. post anesthesia recovery)    VI. Maintenance:    A. Timely patient assessment is an integral part of this protocol therefore the following will be applied:  1. All non- critical care patients will be evaluated upon receiving initial respiratory care orders within 24 hours and re-evaluated within 48 hours (or more as needed). 2. Orders requesting a Respiratory Consult will be responded to in the following manner:  a. In patient emergency situations, the RCP assigned to the floor will respond immediately to the patient, provide an initial respiratory assessment, and contact the patients physician as necessary for appropriate orders. b. In non-emergent situations, the RCP assigned to the floor will respond to the patient within 90 minutes and provide an initial respiratory assessment and contact patients physician as necessary for appropriate orders. c. An RCP will provide a comprehensive assessment as soon as possible.   3. Upon completion of an evaluation, the RCP will complete documentation in the patients EMR in the RT Assessment section. 4. The RCP who completes the assessment will document orders for therapy in the orders section of the patients EMR selecting new order. Next, per protocol should be selected indicating it is a protocol order and sign orders should be selected to complete the process. 5. The Pharmacy and Therapeutics (P&T) Committee has mandated that the medication Xopenex may be changed to unit dose albuterol without an order, except for those patients receiving Xopenex due to cardiac arrhythmias. The dosage for these patients should be 0.63 mg. and may be changed from 1.25 mg. to 0.63 mg per P & T Committee by the RCP completing the assessment. 6. Patients who are not experiencing cardiac arrhythmias, and are ordered Xopenex and Atrovent may be changed to Duoneb. VII. Safety:        A. The following safety issues shall be monitored:  1. The RCP will perform hand hygiene per hospital policy utilizing Standard Precautions for all patients and following transmission-based isolation as indicated per hospital policy. 2. The RCP must exercise professional judgment in classifying the patient for frequency of therapy. 3. Appropriate classification of the patient will require an evaluation utilizing the Therapy Assessment Protocol Guidelines. 4. The RCP will confer with the physician concerning the care of the patient at any time questions or problems arise. 5. If during therapy, the patient exhibits no improvement or deterioration in clinical status the RCP will notify the physician and the patients nurse. VIII. Interventions:   A. The patients nurse is responsible concerning all items related to his/her care. Ongoing communication with nursing is essential to successful protocol management. B. The RCP recognizes the value of the team approach in meeting the patients needs.  Nursing input regarding the patients pulmonary condition will be sought as needed. IX. Reportable conditions: The RCP will inform the physician if:  1. There are acute changes in patients respiratory status. 2. The therapist is unable to determine appropriate care plan upon assessment. 3. The patient fails to reach therapeutic objective. 4. A change or additional medication is needed. X.  Patient Education:    A. Patient will receive instruction on the followin. The treatment modality, including objectives and proper technique of therapy  2. Respiratory medications  B. Documentation shall occur in the patient education section of the patients EMR. XI. Documentation: Record all findings as described above in the patients EMR. Related Protocols: A. Aerosolized Medication Protocol  B. Bronchial Hygiene  C. Oxygen Protocol   D. Volume Expansion/Secretion Clearance  E. Ventilator Weaning Protocols    References:  N   Joint Commission Consolidated David Standard   L    Respiratory Care Department Policy, Procedure and Protocol Guideline Manual, , OZ Gonzalez. L  Therapist Driven Respiratory Care Protocols  A Practitioners Guide for Criteria-Based Respiratory Care by Abimbola Ash M.D., and OZ Tapia RRT. L  The rationale for therapist-driven protocols: an update. Respiratory Care 1998; [facility] Guidelines. Respiratory Care Services     Policy Number: -[id number]    Title: Patient Care Assessment Program    Effective Date: 1999    Revised Date: 2014    Reviewed Date: 2013/ 2015, 2016, 2017     Overview  In an effort to improve quality and reduce costs of respiratory care at [facility], the Respiratory Department has developed a number of Patient Care Protocols.  These protocols have been developed according to [place] 3 and are utilized for those patients who are ordered respiratory therapy using therapeutic indications and standardized approaches for accomplishing objectives. Patient Care Protocols are intended to improve care by:   Defining the indications and standards of care agreed upon by the Pulmonary Medicine and [facility].  Training respiratory care practitioners to apply those criteria to individual patients and modify therapy as indicated by the protocols.  Documenting the indication and care plan as part of the initial ordering process.  Tapering or discontinuing treatments once the indication for therapy changes. The Patient Care Protocols shall be universally applied throughout the hospital as determined by   the Pulmonary Medicine and [facility] [place]. Rationale for Patient Care Assessment Protocols:   Continuous Quality Improvement   Cost containment   Standardization of care   Enhanced continuity of care   Utilization review   Timely intervention    The following patient care assessment protocols have been developed:   Aerosolized Medication    Bronchial Hygiene    Oxygen Weaning Protocol   Volume Expansion/Secretion Clearance Non-Vented   Ventilator Weaning Protocol   CVRU Fast Track Weaning Protocol   Asthma Treatment Protocol ER   Pediatric Asthma Treatment Protocol ER   Alpha-1 Antitrypsin Deficiency Protocol   Prone Positioning Protocol    The Director of Respiratory Care Services oversees the Patient Care Assessment Program. The Clinical/ is responsible for protocol development and training. The Supervisor is responsible for implementation and  activities. Each patient with an order for respiratory treatments will receive an evaluation. All Registered Respiratory Care Practitioners (RCPs) will perform the evaluations. The same evaluation tool will be utilized for all initial and follow-up assessments.     If the patient does not meet criteria for ordered therapy, the therapy will be discontinued. If the patient demonstrates an adverse response to initially ordered therapy, the therapy will be discontinued and the physician will be contacted. Specific physician's orders that deviate from protocols and are deemed \"inappropriate\" or \"unsafe\" will be addressed with ordering physician and/or medical director as required. Patients of [facility] and [address] will not be assessed for the first 24 hours as the Medical Director of [address] [place] [address] has requested that changes in therapy should not be made during that time frame. Respiratory Patient Care Assessment Protocols                           I.  Policy: In an effort to provide quality patient care and effective utilization of services, physicians who order respiratory therapy will have their patients treated via the protocols established (see attached). All Registered [address] (RCPs) will complete the initial assessment. This assessment will indicate patient needs, and the care plan developed for the patient and will performed within 24 hours of admission. Frequency of the therapy will be set according to the results of the respiratory therapy evaluation and frequency guidelines policy. Reassessment will be continued done every 48 hours and more frequently as needed for the individual patient. II. Purpose:     C. To provide a process that will allow for ongoing assessment and care plan modification for patients receiving respiratory services based on both objective and or subjective patient responses to interventions. This process of protocol utilization will assist in patient care progression while eliminating the need for the physician to continually update respiratory therapy orders. D. To assure continuity of respiratory care that meets [facility] Clinical Practice Guidelines. III.  Initiation:  Implement Respiratory Care Protocols for patients who are ordered by physician          to receive respiratory therapy procedures or for ventilator management. IV. Protocol:  C. Upon receiving an order for therapy the RCP will review the patients EMR (electronic medical record) for all pertinent information includin. Physicians order for therapy  6. Patient history and physical examination  7. Physician progress notes  8. Diagnostic. X-rays, PFTs, arterial blood gases etc.      D. The RCP will perform a respiratory assessment in the following manner:  7. Perform hand hygiene per hospital policy utilizing Standard Precautions for all patients and following transmission-based isolation as indicated per policy. 8. Identify patient via ID bracelet verifying patient name and birth date. 9. General observations: color, pattern and effort of breathing, chest expansion, (symmetrical and bilateral), level of consciousness and the ability to ambulate. 10. The RCP will assess patients cough ability and determine if Nasotracheal suctioning is needed. If patient is unable to produce sputum, at that time, the RCP should question the patient with regard to their sputum: production, color consistency, frequency and amount. 11. Auscultation: Using a stethoscope, the RCP will listen and note quality of breath sounds and presence or absence of adventitious breath sounds in all lung fields, both anteriorly and posteriorly. 12. Upon completing the EMR review and physical assessment, the RCP will document findings in the RT Assessment section of the EMR. The score level will be provided and will be used to determine the frequency of therapy. V. Indications:   A. Indications for Bronchial Hygiene Protocol will include:  5. Potential for or presence of atelectasis. 6. Need for hydration and removal of retained secretions. 7. Need for improvement of cough effectiveness.   8. Presence of conditions associated with disorder of pulmonary clearance:  c. Cystic fibrosis  d. Bronchiectasis  C. Indications for Aerosolized Medication(s) Protocol should include:  5. Treatment of bronchospasm/wheezing  6. Improvement of mucociliary clearance  7. Treatment of stridor  8. History of Asthma or COPD             C.  Indications for Oxygen Therapy Protocol should include:  5. Documented hypoxemia  6. Severe trauma  7. Acute myocardial infarction  8. Short-term therapy (e.g. post anesthesia recovery)    VI. Maintenance:    B. Timely patient assessment is an integral part of this protocol therefore the following will be applied:  7. All non- critical care patients will be evaluated upon receiving initial respiratory care orders within 24 hours and re-evaluated within 48 hours (or more as needed). 8. Orders requesting a Respiratory Consult will be responded to in the following manner:  d. In patient emergency situations, the RCP assigned to the floor will respond immediately to the patient, provide an initial respiratory assessment, and contact the patients physician as necessary for appropriate orders. e. In non-emergent situations, the RCP assigned to the floor will respond to the patient within 90 minutes and provide an initial respiratory assessment and contact patients physician as necessary for appropriate orders. f. An RCP will provide a comprehensive assessment as soon as possible. 9. Upon completion of an evaluation, the RCP will complete documentation in the patients EMR in the RT Assessment section. 10. The RCP who completes the assessment will document orders for therapy in the orders section of the patients EMR selecting new order. Next, per protocol should be selected indicating it is a protocol order and sign orders should be selected to complete the process. 11.  The Pharmacy and Therapeutics (P&T) Committee has mandated that the medication Xopenex may be changed to unit dose albuterol without an order, except for those patients receiving Xopenex due to cardiac arrhythmias. The dosage for these patients should be 0.63 mg. and may be changed from 1.25 mg. to 0.63 mg per P & T Committee by the RCP completing the assessment. 12. Patients who are not experiencing cardiac arrhythmias, and are ordered Xopenex and Atrovent may be changed to Duoneb. VII. Safety:        B. The following safety issues shall be monitored:  6. The RCP will perform hand hygiene per hospital policy utilizing Standard Precautions for all patients and following transmission-based isolation as indicated per hospital policy. 7. The RCP must exercise professional judgment in classifying the patient for frequency of therapy. 8. Appropriate classification of the patient will require an evaluation utilizing the Therapy Assessment Protocol Guidelines. 9. The RCP will confer with the physician concerning the care of the patient at any time questions or problems arise. 10. If during therapy, the patient exhibits no improvement or deterioration in clinical status the RCP will notify the physician and the patients nurse. VIII. Interventions:   C. The patients nurse is responsible concerning all items related to his/her care. Ongoing communication with nursing is essential to successful protocol management. D. The RCP recognizes the value of the team approach in meeting the patients needs. Nursing input regarding the patients pulmonary condition will be sought as needed. IX. Reportable conditions: The RCP will inform the physician if:  5. There are acute changes in patients respiratory status. 6. The therapist is unable to determine appropriate care plan upon assessment. 7. The patient fails to reach therapeutic objective. 8. A change or additional medication is needed. X.  Patient Education:    C. Patient will receive instruction on the following:  3. The treatment modality, including objectives and proper technique of therapy  4.  Respiratory medications  D. Documentation shall occur in the patient education section of the patients EMR. XI. Documentation: Record all findings as described above in the patients EMR. Related Protocols: A. Aerosolized Medication Protocol  F. Bronchial Hygiene  G. Oxygen Protocol   H. Volume Expansion/Secretion Clearance  I. Ventilator Weaning Protocols    References:  N   Joint Commission National Standard   L    Respiratory Care Department Policy, Procedure and Protocol Guideline Manual, 1995, OZ MANN  Therapist Driven Respiratory Care Protocols  A Practitioners Guide for Criteria-Based Respiratory Care by Severino Hinson M.D., and OZ Mcclellan RRT. L  The rationale for therapist-driven protocols: an update. Respiratory Care 1998; [facility] Guidelines. Respiratory Care Services     Policy Number: -[id number]    Title: Patient Care Assessment Program    Effective Date: 01/1999    Revised Date: 05/2014    Reviewed Date: 06/2013/ 03/2015, 03/2016, 06/2017     Overview  In an effort to improve quality and reduce costs of respiratory care at [facility], the Respiratory Department has developed a number of Patient Care Protocols. These protocols have been developed according to Vlad 3 and are utilized for those patients who are ordered respiratory therapy using therapeutic indications and standardized approaches for accomplishing objectives. Patient Care Protocols are intended to improve care by:   Defining the indications and standards of care agreed upon by the Pulmonary Medicine and [facility].  Training respiratory care practitioners to apply those criteria to individual patients and modify therapy as indicated by the protocols.  Documenting the indication and care plan as part of the initial ordering process.    Tapering or discontinuing treatments once the indication for therapy changes. The Patient Care Protocols shall be universally applied throughout the hospital as determined by   the Pulmonary Medicine and Stefani [place]. Rationale for Patient Care Assessment Protocols:   Continuous Quality Improvement   Cost containment   Standardization of care   Enhanced continuity of care   Utilization review   Timely intervention    The following patient care assessment protocols have been developed:   Aerosolized Medication    Bronchial Hygiene    Oxygen Weaning Protocol   Volume Expansion/Secretion Clearance Non-Vented   Ventilator Weaning Protocol   CVRU Fast Track Weaning Protocol   Asthma Treatment Protocol ER   Pediatric Asthma Treatment Protocol ER   Alpha-1 Antitrypsin Deficiency Protocol   Prone Positioning Protocol    The Director of Respiratory Care Services oversees the Patient Care Assessment Program. The Clinical/ is responsible for protocol development and training. The Supervisor is responsible for implementation and  activities. Each patient with an order for respiratory treatments will receive an evaluation. All Registered Respiratory Care Practitioners (RCPs) will perform the evaluations. The same evaluation tool will be utilized for all initial and follow-up assessments. If the patient does not meet criteria for ordered therapy, the therapy will be discontinued. If the patient demonstrates an adverse response to initially ordered therapy, the therapy will be discontinued and the physician will be contacted. Specific physician's orders that deviate from protocols and are deemed \"inappropriate\" or \"unsafe\" will be addressed with ordering physician and/or medical director as required.     Patients of [facility] and [address] will not be assessed for the first 24 hours as the Medical Director of [facility] has requested that changes in therapy should not be made during that time frame. Respiratory Patient Care Assessment Protocols                           I.  Policy: In an effort to provide quality patient care and effective utilization of services, physicians who order respiratory therapy will have their patients treated via the protocols established (see attached). All Registered [address] (RCPs) will complete the initial assessment. This assessment will indicate patient needs, and the care plan developed for the patient and will performed within 24 hours of admission. Frequency of the therapy will be set according to the results of the respiratory therapy evaluation and frequency guidelines policy. Reassessment will be continued done every 48 hours and more frequently as needed for the individual patient. II. Purpose:     E. To provide a process that will allow for ongoing assessment and care plan modification for patients receiving respiratory services based on both objective and or subjective patient responses to interventions. This process of protocol utilization will assist in patient care progression while eliminating the need for the physician to continually update respiratory therapy orders. F. To assure continuity of respiratory care that meets [facility] Clinical Practice Guidelines. III. Initiation:  Implement Respiratory Care Protocols for patients who are ordered by physician          to receive respiratory therapy procedures or for ventilator management. IV. Protocol:  E. Upon receiving an order for therapy the RCP will review the patients EMR (electronic medical record) for all pertinent information includin. Physicians order for therapy  10. Patient history and physical examination  11. Physician progress notes  12. Diagnostic. X-rays, PFTs, arterial blood gases etc.      F.  The RCP will perform a respiratory assessment in the following manner:  13. Perform hand hygiene per hospital policy utilizing Standard Precautions for all patients and following transmission-based isolation as indicated per policy. 14. Identify patient via ID bracelet verifying patient name and birth date. 15. General observations: color, pattern and effort of breathing, chest expansion, (symmetrical and bilateral), level of consciousness and the ability to ambulate. 16. The RCP will assess patients cough ability and determine if Nasotracheal suctioning is needed. If patient is unable to produce sputum, at that time, the RCP should question the patient with regard to their sputum: production, color consistency, frequency and amount. 17. Auscultation: Using a stethoscope, the RCP will listen and note quality of breath sounds and presence or absence of adventitious breath sounds in all lung fields, both anteriorly and posteriorly. 18. Upon completing the EMR review and physical assessment, the RCP will document findings in the RT Assessment section of the EMR. The score level will be provided and will be used to determine the frequency of therapy. V. Indications:   A. Indications for Bronchial Hygiene Protocol will include:  9. Potential for or presence of atelectasis. 10. Need for hydration and removal of retained secretions. 11. Need for improvement of cough effectiveness. 12. Presence of conditions associated with disorder of pulmonary clearance:  e. Cystic fibrosis  f. Bronchiectasis  D. Indications for Aerosolized Medication(s) Protocol should include:  9. Treatment of bronchospasm/wheezing  10. Improvement of mucociliary clearance  11. Treatment of stridor  12. History of Asthma or COPD             C.  Indications for Oxygen Therapy Protocol should include:  9. Documented hypoxemia  10. Severe trauma  11. Acute myocardial infarction  12. Short-term therapy (e.g. post anesthesia recovery)    VI. Maintenance:    C.  Timely patient assessment is an integral part of this protocol therefore the following will be applied:  13. All non- critical care patients will be evaluated upon receiving initial respiratory care orders within 24 hours and re-evaluated within 48 hours (or more as needed). 14. Orders requesting a Respiratory Consult will be responded to in the following manner:  g. In patient emergency situations, the RCP assigned to the floor will respond immediately to the patient, provide an initial respiratory assessment, and contact the patients physician as necessary for appropriate orders.  h. In non-emergent situations, the RCP assigned to the floor will respond to the patient within 90 minutes and provide an initial respiratory assessment and contact patients physician as necessary for appropriate orders. i. An RCP will provide a comprehensive assessment as soon as possible. 15. Upon completion of an evaluation, the RCP will complete documentation in the patients EMR in the RT Assessment section. 16. The RCP who completes the assessment will document orders for therapy in the orders section of the patients EMR selecting new order. Next, per protocol should be selected indicating it is a protocol order and sign orders should be selected to complete the process. 17. The Pharmacy and Therapeutics (P&T) Committee has mandated that the medication Xopenex may be changed to unit dose albuterol without an order, except for those patients receiving Xopenex due to cardiac arrhythmias. The dosage for these patients should be 0.63 mg. and may be changed from 1.25 mg. to 0.63 mg per P & T Committee by the RCP completing the assessment. 18. Patients who are not experiencing cardiac arrhythmias, and are ordered Xopenex and Atrovent may be changed to Duoneb. VII. Safety:        C. The following safety issues shall be monitored:  11.  The RCP will perform hand hygiene per hospital policy utilizing Standard Precautions for all patients and following transmission-based isolation as indicated per hospital policy. 12. The RCP must exercise professional judgment in classifying the patient for frequency of therapy. 13. Appropriate classification of the patient will require an evaluation utilizing the Therapy Assessment Protocol Guidelines. 14. The RCP will confer with the physician concerning the care of the patient at any time questions or problems arise. 15. If during therapy, the patient exhibits no improvement or deterioration in clinical status the RCP will notify the physician and the patients nurse. VIII. Interventions:   E. The patients nurse is responsible concerning all items related to his/her care. Ongoing communication with nursing is essential to successful protocol management. F. The RCP recognizes the value of the team approach in meeting the patients needs. Nursing input regarding the patients pulmonary condition will be sought as needed. IX. Reportable conditions: The RCP will inform the physician if:  9. There are acute changes in patients respiratory status. 10. The therapist is unable to determine appropriate care plan upon assessment. 11. The patient fails to reach therapeutic objective. 12. A change or additional medication is needed. X.  Patient Education:    E. Patient will receive instruction on the followin. The treatment modality, including objectives and proper technique of therapy  6. Respiratory medications  F. Documentation shall occur in the patient education section of the patients EMR. XI. Documentation: Record all findings as described above in the patients EMR. Related Protocols: A. Aerosolized Medication Protocol  J. Bronchial Hygiene  K. Oxygen Protocol   L. Volume Expansion/Secretion Clearance  M. Ventilator Weaning Protocols    References:  N   Joint Commission Consolidated [place] Standard   L    Respiratory Care Department Policy, Procedure and Protocol Guideline Manual, , OZ Gonzalez.    MACKENZIE Therapist Driven Respiratory Care Protocols  A Practitioners Guide for Criteria-Based Respiratory Care by Jersey Jefferson M.D., and OZ Garza RRT. L  The rationale for therapist-driven protocols: an update. Respiratory Care 1998; [facility] [facility] Guidelines. Respiratory Care Services     Policy Number: -[id number]    Title: Aerosolized Medication Protocol    Effective Date: 10/1998    Revised Date: 06/2013, 03/2016    Reviewed Date: 05/2014/ 03/2015 , 06/2017       I. Policy: The Aerosolized Medication Protocol shall by implemented by Respiratory Care              Practitioners (RCP) for patients with orders to receive aerosol therapy with medication. II. Purpose: To open and maintain obstructed airways, the RCP, will utilize the following   protocol to select the indicated aerosolized medication(s) and determine the most effective method of delivery to the patient. III. Patient Type: All patients who are determined to meet aerosolized medication criteria as          outlined in this protocol. IV. Responsibility: Director, [address], registered Respiratory Care                                                     Practitioners (RCPs) with documented competency in the performance of                                     respiratory therapeutic techniques. V. Equipment needed:  A. Stethoscope  B. Pulse oximeter  C. IPPB machine and circuit  D. Aerosol nebulizer  E. MDI Inhaler     VI. Protocol:   A. The following conditions are accepted indications for aerosolized medication therapy. 1. Bronchospasm/wheezing  2. Impaired mucociliary clearance  3. Tracheobronchial mucosal congestion/and laryngeal stridor  4. Diseases which commonly require aerosolized medication therapy include, but are not limited to:  a. Asthma/reactive airway disease  b. Bronchitis/emphysema (COPD)  c. Cystic fibrosis  d.  Severe laryngitis/tracheitis  e. Bronchiectasis  f. Smoke inhalation or chemical trauma to the lung or upper airway  g. Physical trauma to the upper airway  h. Laryngotracheobronchitis  i. Bronchiolitis  j. Non-specific wheezing              B. Indications for bronchodilator medications will include:  a. Bronchospasm/ wheezing  b. Asthma/reactive airway disease  c. Chronic obstructive pulmonary disease  d. Obstructive defect on pulmonary function testing  C. Administration of medications  1. If a bronchodilator or any other type of respiratory medication is needed, a physician order must be indicated in the medication section in the patients EMR. 2. When the physician specifies the medication and dosage at the time of request, the ordered medication will be used as part of the care plan. D. The following guidelines will be utilized in the evaluation and selection of the         appropriate delivery device for indicated medication(s):  1. IPPB  a. Ventilation is inadequate (rapid shallow breathing)  b. Refractory atelectasis has developed, other forms of therapy are unsuccessful  c. Inability to clear secretions  d. Need to improve lung expansion  2. Unassisted aerosol (UA) is the preferred method of aerosol delivery and indicated if  a. Ventilation is inadequate  b. Patient demonstrates wheezing   c. patient is unable to perform MDI effectively   d. Patient preference  3. Metered Dose Inhaler (MDI)   a. Patient is alert/cooperative  b. Medication(s) available in this delivery method. c. Able to perform 3 second breath hold. d. Patient has demonstrated ability to use MDI effectively  e. Patient has used MDI therapy previously, either at home or in the hospital.  f. Note: The only approved inhalers on formulary are albuterol and Spiriva. VII. Guidelines:   Monitor patients vital signs and evaluate patients clinical status. The need to change medication and/or modality may be indicated by:  1.  A pulse greater than 120 bpm, or if a pulse increase of 20 bpm occurs with bronchodilator medications. 2. Significant worsening of dyspnea or wheezing occurring during or within 30 minutes of discontinuing therapy. 3. Worsening of patients sensorium (e.g. patient becomes confused or obtunded, and unable to follow directions). 4. Worsening of patients chest x-ray. 5. Change in sputum (e.g. increased pulmonary infiltrate, which might indicate need for volume expansion therapy). 6. Patient has difficulty coughing up secretions, which might indicate need for acetylcysteine and/or bronchial hygiene therapy. 7. Call physician immediately if dyspnea worsens and is not responsive to modifications allowed by protocol. VIII. Clinical Responsibility:  A. The therapy assessment guidelines will be used to evaluate all patients receiving aerosolized medications with the exception of critical care areas. 1. RCPs will perform changes in therapy per protocol. 2. It will be the responsibility of RCP to provide instruction regarding respiratory medications, aerosol therapy and proper MDI technique, as well as, spacer usage to patients ordered MDI therapy. 3. Current therapy that is part of a patients home regimen will not be discontinued. IX. Documentation  A. Document assessment findings in the respiratory assessment section of the patients EMR. B. Document changes in therapy per protocol in the respiratory orders section and in the care plan section of the patients EMR. C. Document patient education in the patient education section of the patients EMR. X. Outcome Criteria:  A. Relief of wheezes and obstruction  B. Improved cough and sputum color and consistency  C. Improved chest x-ray  D. Improved arterial oxygen tension and or SaO2  E. Improved Peak Flow on asthmatic patients        XI. Related Protocols:  A. Respiratory Patient Care Protocols  B. Bronchial Hygiene Therapy  C.  Oxygen Protocol    Reference:

## 2017-12-18 VITALS
DIASTOLIC BLOOD PRESSURE: 73 MMHG | BODY MASS INDEX: 24.22 KG/M2 | OXYGEN SATURATION: 98 % | TEMPERATURE: 98.4 F | SYSTOLIC BLOOD PRESSURE: 120 MMHG | RESPIRATION RATE: 16 BRPM | HEART RATE: 77 BPM | WEIGHT: 131.61 LBS | HEIGHT: 62 IN

## 2017-12-18 LAB
ANION GAP SERPL CALC-SCNC: 4 MMOL/L (ref 7–16)
BUN SERPL-MCNC: 11 MG/DL (ref 8–23)
CALCIUM SERPL-MCNC: 8.6 MG/DL (ref 8.3–10.4)
CHLORIDE SERPL-SCNC: 105 MMOL/L (ref 98–107)
CO2 SERPL-SCNC: 31 MMOL/L (ref 21–32)
CREAT SERPL-MCNC: 0.91 MG/DL (ref 0.6–1)
ERYTHROCYTE [DISTWIDTH] IN BLOOD BY AUTOMATED COUNT: 16.9 % (ref 11.9–14.6)
GLUCOSE SERPL-MCNC: 99 MG/DL (ref 65–100)
HCT VFR BLD AUTO: 29.9 % (ref 35.8–46.3)
HGB BLD-MCNC: 9.4 G/DL (ref 11.7–15.4)
MAGNESIUM SERPL-MCNC: 1.8 MG/DL (ref 1.8–2.4)
MCH RBC QN AUTO: 30.6 PG (ref 26.1–32.9)
MCHC RBC AUTO-ENTMCNC: 31.4 G/DL (ref 31.4–35)
MCV RBC AUTO: 97.4 FL (ref 79.6–97.8)
PHOSPHATE SERPL-MCNC: 4.2 MG/DL (ref 2.3–3.7)
PLATELET # BLD AUTO: 173 K/UL (ref 150–450)
PMV BLD AUTO: 10.5 FL (ref 10.8–14.1)
POTASSIUM SERPL-SCNC: 4.2 MMOL/L (ref 3.5–5.1)
RBC # BLD AUTO: 3.07 M/UL (ref 4.05–5.25)
SODIUM SERPL-SCNC: 140 MMOL/L (ref 136–145)
VANCOMYCIN TROUGH SERPL-MCNC: 34.1 UG/ML (ref 5–20)
WBC # BLD AUTO: 4.6 K/UL (ref 4.3–11.1)

## 2017-12-18 PROCEDURE — 80202 ASSAY OF VANCOMYCIN: CPT | Performed by: INTERNAL MEDICINE

## 2017-12-18 PROCEDURE — 94760 N-INVAS EAR/PLS OXIMETRY 1: CPT

## 2017-12-18 PROCEDURE — 74011250636 HC RX REV CODE- 250/636: Performed by: INTERNAL MEDICINE

## 2017-12-18 PROCEDURE — 77010033678 HC OXYGEN DAILY

## 2017-12-18 PROCEDURE — 84100 ASSAY OF PHOSPHORUS: CPT | Performed by: INTERNAL MEDICINE

## 2017-12-18 PROCEDURE — 83735 ASSAY OF MAGNESIUM: CPT | Performed by: INTERNAL MEDICINE

## 2017-12-18 PROCEDURE — 74011250637 HC RX REV CODE- 250/637: Performed by: INTERNAL MEDICINE

## 2017-12-18 PROCEDURE — 80048 BASIC METABOLIC PNL TOTAL CA: CPT | Performed by: INTERNAL MEDICINE

## 2017-12-18 PROCEDURE — 85027 COMPLETE CBC AUTOMATED: CPT | Performed by: INTERNAL MEDICINE

## 2017-12-18 RX ORDER — OXYCODONE AND ACETAMINOPHEN 7.5; 325 MG/1; MG/1
1 TABLET ORAL
Qty: 20 TAB | Refills: 0 | Status: SHIPPED | OUTPATIENT
Start: 2017-12-18 | End: 2017-12-18

## 2017-12-18 RX ORDER — BENZONATATE 100 MG/1
100 CAPSULE ORAL 3 TIMES DAILY
Qty: 21 CAP | Refills: 0 | Status: SHIPPED | OUTPATIENT
Start: 2017-12-18 | End: 2017-12-25

## 2017-12-18 RX ORDER — OXYCODONE AND ACETAMINOPHEN 7.5; 325 MG/1; MG/1
1 TABLET ORAL
Qty: 20 TAB | Refills: 0 | Status: SHIPPED | OUTPATIENT
Start: 2017-12-18 | End: 2017-12-23

## 2017-12-18 RX ORDER — DOXYCYCLINE 100 MG/1
100 TABLET ORAL 2 TIMES DAILY
Qty: 7 TAB | Refills: 15 | Status: SHIPPED | OUTPATIENT
Start: 2017-12-18 | End: 2018-01-01 | Stop reason: ALTCHOICE

## 2017-12-18 RX ADMIN — DULOXETINE HYDROCHLORIDE 60 MG: 60 CAPSULE, DELAYED RELEASE ORAL at 09:47

## 2017-12-18 RX ADMIN — LEVOTHYROXINE SODIUM 75 MCG: 75 TABLET ORAL at 09:47

## 2017-12-18 RX ADMIN — Medication 10 ML: at 05:29

## 2017-12-18 RX ADMIN — Medication 1 AMPULE: at 09:48

## 2017-12-18 RX ADMIN — GUAIFENESIN 600 MG: 600 TABLET, EXTENDED RELEASE ORAL at 09:47

## 2017-12-18 RX ADMIN — Medication 100 UNITS: at 05:29

## 2017-12-18 RX ADMIN — HEPARIN SODIUM 5000 UNITS: 5000 INJECTION, SOLUTION INTRAVENOUS; SUBCUTANEOUS at 05:29

## 2017-12-18 RX ADMIN — BENZONATATE 100 MG: 100 CAPSULE ORAL at 09:47

## 2017-12-18 NOTE — DISCHARGE INSTRUCTIONS
DISCHARGE SUMMARY from Nurse    The following personal items are in your possession at time of discharge:    Dental Appliances: None  Visual Aid: Glasses     Home Medications: None  Jewelry: With patient  Clothing: At bedside  Other Valuables: Cell Phone             PATIENT INSTRUCTIONS:    After general anesthesia or intravenous sedation, for 24 hours or while taking prescription Narcotics:  · Limit your activities  · Do not drive and operate hazardous machinery  · Do not make important personal or business decisions  · Do  not drink alcoholic beverages  · If you have not urinated within 8 hours after discharge, please contact your surgeon on call. Report the following to your surgeon:  · Excessive pain, swelling, redness or odor of or around the surgical area  · Temperature over 100.5  · Nausea and vomiting lasting longer than 4 hours or if unable to take medications  · Any signs of decreased circulation or nerve impairment to extremity: change in color, persistent  numbness, tingling, coldness or increase pain  · Any questions        What to do at Home:  Recommended activity: Activity as tolerated, per MD    If you experience any of the following symptoms fever>101, pain unrelieved with medication, nausea/vomiting, shortness of breath, dizziness/fainting, chest pain. , please follow up with your doctor. *  Please give a list of your current medications to your Primary Care Provider. *  Please update this list whenever your medications are discontinued, doses are      changed, or new medications (including over-the-counter products) are added. *  Please carry medication information at all times in case of emergency situations. These are general instructions for a healthy lifestyle:    No smoking/ No tobacco products/ Avoid exposure to second hand smoke    Surgeon General's Warning:  Quitting smoking now greatly reduces serious risk to your health.     Obesity, smoking, and sedentary lifestyle greatly increases your risk for illness    A healthy diet, regular physical exercise & weight monitoring are important for maintaining a healthy lifestyle    You may be retaining fluid if you have a history of heart failure or if you experience any of the following symptoms:  Weight gain of 3 pounds or more overnight or 5 pounds in a week, increased swelling in our hands or feet or shortness of breath while lying flat in bed. Please call your doctor as soon as you notice any of these symptoms; do not wait until your next office visit. Recognize signs and symptoms of STROKE:    F-face looks uneven    A-arms unable to move or move unevenly    S-speech slurred or non-existent    T-time-call 911 as soon as signs and symptoms begin-DO NOT go       Back to bed or wait to see if you get better-TIME IS BRAIN. Warning Signs of HEART ATTACK     Call 911 if you have these symptoms:   Chest discomfort. Most heart attacks involve discomfort in the center of the chest that lasts more than a few minutes, or that goes away and comes back. It can feel like uncomfortable pressure, squeezing, fullness, or pain.  Discomfort in other areas of the upper body. Symptoms can include pain or discomfort in one or both arms, the back, neck, jaw, or stomach.  Shortness of breath with or without chest discomfort.  Other signs may include breaking out in a cold sweat, nausea, or lightheadedness. Don't wait more than five minutes to call 911 - MINUTES MATTER! Fast action can save your life. Calling 911 is almost always the fastest way to get lifesaving treatment. Emergency Medical Services staff can begin treatment when they arrive -- up to an hour sooner than if someone gets to the hospital by car. The discharge information has been reviewed with the patient. The patient verbalized understanding.     Discharge medications reviewed with the patient and appropriate educational materials and side effects teaching were provided. Cellulitis: Care Instructions  Your Care Instructions    Cellulitis is a skin infection. It often occurs after a break in the skin from a scrape, cut, bite, or puncture, or after a rash. The doctor has checked you carefully, but problems can develop later. If you notice any problems or new symptoms, get medical treatment right away. Follow-up care is a key part of your treatment and safety. Be sure to make and go to all appointments, and call your doctor if you are having problems. It's also a good idea to know your test results and keep a list of the medicines you take. How can you care for yourself at home? · Take your antibiotics as directed. Do not stop taking them just because you feel better. You need to take the full course of antibiotics. · Prop up the infected area on pillows to reduce pain and swelling. Try to keep the area above the level of your heart as often as you can. · If your doctor told you how to care for your wound, follow your doctor's instructions. If you did not get instructions, follow this general advice:  ¨ Wash the wound with clean water 2 times a day. Don't use hydrogen peroxide or alcohol, which can slow healing. ¨ You may cover the wound with a thin layer of petroleum jelly, such as Vaseline, and a nonstick bandage. ¨ Apply more petroleum jelly and replace the bandage as needed. · Be safe with medicines. Take pain medicines exactly as directed. ¨ If the doctor gave you a prescription medicine for pain, take it as prescribed. ¨ If you are not taking a prescription pain medicine, ask your doctor if you can take an over-the-counter medicine. To prevent cellulitis in the future  · Try to prevent cuts, scrapes, or other injuries to your skin. Cellulitis most often occurs where there is a break in the skin. · If you get a scrape, cut, mild burn, or bite, wash the wound with clean water as soon as you can to help avoid infection.  Don't use hydrogen peroxide or alcohol, which can slow healing. · If you have swelling in your legs (edema), support stockings and good skin care may help prevent leg sores and cellulitis. · Take care of your feet, especially if you have diabetes or other conditions that increase the risk of infection. Wear shoes and socks. Do not go barefoot. If you have athlete's foot or other skin problems on your feet, talk to your doctor about how to treat them. When should you call for help? Call your doctor now or seek immediate medical care if:  ? · You have signs that your infection is getting worse, such as:  ¨ Increased pain, swelling, warmth, or redness. ¨ Red streaks leading from the area. ¨ Pus draining from the area. ¨ A fever. ? · You get a rash. ? Watch closely for changes in your health, and be sure to contact your doctor if:  ? · You are not getting better after 1 day (24 hours). ? · You do not get better as expected. Where can you learn more? Go to http://edyta-tremaine.info/. Andrae Pop in the search box to learn more about \"Cellulitis: Care Instructions. \"  Current as of: October 13, 2016  Content Version: 11.4  © 0060-5195 [employer], [employer]. Care instructions adapted under license by [employer] (which disclaims liability or warranty for this information). If you have questions about a medical condition or this instruction, always ask your healthcare professional. [place] [address] any warranty or liability for your use of this information.

## 2017-12-18 NOTE — PROGRESS NOTES
Cellulitis on Right Chest Wall is improved today. Patient feels much better as well. From my standpoint it is okay to discharge. Will plan to have her follow up in my office on Friday. Would like her to do daily dressing changes with bacitracin and gauze. Also, strip and record drain output.

## 2017-12-18 NOTE — PROGRESS NOTES
Assessment completed and documented. Lung sounds diminished on right side. Abdomen soft and non-tender. Alert and oriented. Currently resting in bed. Will continue to monitor with hourly rounding.

## 2017-12-18 NOTE — DISCHARGE SUMMARY
Discharge Summary     Patient: Antonina Prajapati MRN: [id number]  SSN: [id number]    YOB: 1950  Age: 69 y.o. Sex: female       Admit Date: 12/11/2017    Discharge Date: 12/18/2017      Admission Diagnoses: cellulitis   Cellulitis  CHESTWALL ABSCESS = RIGHT    Discharge Diagnoses:   Problem List as of 12/18/2017  Date Reviewed: 11/20/2017          Codes Class Noted - Resolved    Cellulitis ICD-10-CM: L03.90  ICD-9-CM: 682.9  12/11/2017 - Present        Cellulitis of chest wall ICD-10-CM: L03.313  ICD-9-CM: 682.2  12/6/2017 - Present        Recurrent major depressive disorder, in partial remission ([facility] ICD-10-CM: F33.41  ICD-9-CM: 296.35  11/16/2017 - Present        Mild intermittent asthma without complication KWK-16-: H55.21  ICD-9-CM: 493.90  11/16/2017 - Present        Abscess ICD-10-CM: L02.91  ICD-9-CM: 682.9  10/25/2017 - Present    Overview Signed 10/25/2017 12:30 PM by Mari Velasquez DO     Abscess of the right breast implant.              Acute renal failure (ARF) ([facility] ICD-10-CM: N17.9  ICD-9-CM: 584.9  10/11/2017 - Present        Pancytopenia ([facility] ICD-10-CM: V70.431  ICD-9-CM: 284.19  10/11/2017 - Present        Diarrhea ICD-10-CM: R19.7  ICD-9-CM: 787.91  10/11/2017 - Present        S/P breast reconstruction, right ICD-10-CM: R75.525  ICD-9-CM: V43.82  9/19/2017 - Present        Breast cancer ([facility] (Chronic) ICD-10-CM: C50.919  ICD-9-CM: 174.9  9/18/2017 - Present        Lump of skin of right upper extremity ICD-10-CM: R22.31  ICD-9-CM: 782.2  6/13/2017 - Present        Malignant neoplasm of upper-inner quadrant of right female breast ([facility] ICD-10-CM: C50.211  ICD-9-CM: 174.2  6/12/2017 - Present        SIRS (systemic inflammatory response syndrome) ([facility] ICD-10-CM: R65.10  ICD-9-CM: 995.90  4/7/2017 - Present        Idiopathic hypotension ICD-10-CM: I95.0  ICD-9-CM: 458.9  4/7/2017 - Present        Tachycardia ICD-10-CM: R00.0  ICD-9-CM: 785.0  4/7/2017 - Present        Hypokalemia ICD-10-CM: E87.6  ICD-9-CM: 276.8  4/7/2017 - Present        Cough ICD-10-CM: R05  ICD-9-CM: 786.2  4/7/2017 - Present        Pancytopenia due to chemotherapy [facility]) ICD-10-CM: D61.810  ICD-9-CM: 284.11  4/7/2017 - Present        Status post right mastectomy ICD-10-CM: Z90.11  ICD-9-CM: V45.71  9/14/2016 - Present        S/P breast reconstruction, bilateral ICD-10-CM: Z98.890  ICD-9-CM: V43.82  9/14/2016 - Present        Mediastinal adenopathy ICD-10-CM: R59.0  ICD-9-CM: 785.6  2/18/2016 - Present        Malignant neoplasm of right female breast ([facility] ICD-10-CM: C50.911  ICD-9-CM: 174.9  1/25/2016 - Present        Acquired hypothyroidism (Chronic) ICD-10-CM: E03.9  ICD-9-CM: 244.9  11/4/2015 - Present        Asthma ICD-10-CM: J45.909  ICD-9-CM: 493.90  11/4/2015 - Present        Insomnia ICD-10-CM: G47.00  ICD-9-CM: 780.52  11/4/2015 - Present        Depression ICD-10-CM: F32.9  ICD-9-CM: 784  11/4/2015 - Present        RESOLVED: Hypomagnesemia ICD-10-CM: E83.42  ICD-9-CM: 275.2  10/11/2017 - 10/25/2017        RESOLVED: Sepsis ([facility] ICD-10-CM: A41.9  ICD-9-CM: 038.9, 995.91  10/11/2017 - 10/25/2017        RESOLVED: Shortness of breath ICD-10-CM: R06.02  ICD-9-CM: 786.05  4/7/2017 - 11/16/2017               Discharge Condition: Good    HPI  Patient 66F with pmhx of breast ca s/p RTX completed in Feb 2017 on oral chemo (femara) s/p right breast reconstruction flap and left breast mastectomy Sept 2017 with recent hospitalization in October for skin flap infection and right breast implant infection. Underwent right breast implant removal on 10/20 as well as wound vac placement to flap. She was discharged to rehab on oral antibiotics. Reports she had been doing better until a week ago when skin over right breast surgical site became tender, warm and erythematous. Was seen by her plastic surgeon and started on doxycycline. Has taken 5 days and not clinically improved.  Her surgeon has asked that she be admitted for recurrent cellulitis requiring IV antibiotics.     Patient denies fever, chills, nausea, vomiting. No drainage from right breast.     Hospital Course:   Pt admitted to hospital and rapidly diagnosed with left breast cellulitis and right chest wall abscess. Pt initially treated with IV cefepime/vanco on 12/11/17. Pt continued to c/o chest wall pain on IV abx, for CTA chest done to r/o PE.  12/13 CTA chest showed abcess in chest wall muscle. 12/14 ID/ plastic sx consulted by hospitalist due to chest wall abscess noted on CTA chest.  Per ID recommendations, cefepime d/c 12/14, and pt continued on vancomycin therapy. Plastic surgery performed right chest wall  abscess I&D 81/88 w/o complications. 12/17 Plastic surgeon performed vancomycin infused chest wall irrigation. After plastic surgery signed off 12/18, Dr. Johanna Lowery discharged pt home 12/18 on 7 days of doxycycline, with outpatient plastic surgery appointment scheduled for Friday 12/22/17.     Consults: Infectious Disease, Plastic Surgery    Significant Diagnostic Studies:   DIAGNOSTIC STUDIES       Data Review:    Recent Results          Recent Results (from the past 24 hour(s))   [facility]     Collection Time: 12/16/17  1:17 PM   Result Value Ref Range     Vancomycin,trough 14.5 5 - 20 ug/mL   MAGNESIUM     Collection Time: 12/16/17 11:25 PM   Result Value Ref Range     Magnesium 1.8 1.8 - 2.4 mg/dL   CBC W/O DIFF     Collection Time: 12/17/17  6:06 AM   Result Value Ref Range     WBC 5.3 4.3 - 11.1 K/uL     RBC 3.07 (L) 4.05 - 5.25 M/uL     HGB 9.4 (L) 11.7 - 15.4 g/dL     HCT 30.0 (L) 35.8 - 46.3 %     MCV 97.7 79.6 - 97.8 FL     MCH 30.6 26.1 - 32.9 PG     MCHC 31.3 (L) 31.4 - 35.0 g/dL     RDW 17.0 (H) 11.9 - 14.6 %     PLATELET 170 801 - 116 K/uL     MPV 10.0 (L) 10.8 - 37.5 FL   METABOLIC PANEL, BASIC     Collection Time: 12/17/17  6:06 AM   Result Value Ref Range     Sodium 141 136 - 145 mmol/L     Potassium 4.3 3.5 - 5.1 mmol/L     Chloride 104 98 - 107 mmol/L     CO2 28 21 - 32 mmol/L     Anion gap 9 7 - 16 mmol/L     Glucose 114 (H) 65 - 100 mg/dL     BUN 10 8 - 23 MG/DL     Creatinine 0.74 0.6 - 1.0 MG/DL     GFR est AA >60 >60 ml/min/1.73m2     GFR est non-AA >60 >60 ml/min/1.73m2     Calcium 8.4 8.3 - 10.4 MG/DL   PHOSPHORUS     Collection Time: 12/17/17  6:06 AM   Result Value Ref Range     Phosphorus 3.5 2.3 - 3.7 MG/DL   MAGNESIUM     Collection Time: 12/17/17  6:06 AM   Result Value Ref Range     Magnesium 1.8 1.8 - 2.4 mg/dL            All Micro Results     Procedure Component Value Units Date/Time             CULTURE, BLOOD [[id number]] Collected:  12/14/17 1713     Order Status:  Completed Specimen:  Blood from Blood Updated:  12/17/17 0528       Special Requests: --            LEFT  ARM       Culture result: NO GROWTH 3 DAYS          CULTURE, BLOOD [[id number]] Collected:  12/14/17 1630     Order Status:  Canceled Specimen:  Blood              12/13/17 CTA chest  W/ contrast  IMPRESSION:  1. Chest wall cellulitis and pectoralis muscle myositis on the right with a  chest wall abscess involving portion of the right pectoralis muscle measuring  4.5 x 1.3 cm. Inflammatory changes/cellulitis extends to the inferior most right  chest wall with no additional abscess. 2. Indeterminate 11 mm right upper lobe nodule. Six-month follow-up CT  recommended. Disposition: home    Discharge Medications:   Current Discharge Medication List      START taking these medications    Details   benzonatate (TESSALON) 100 mg capsule Take 1 Cap by mouth three (3) times daily for 7 days. Qty: 21 Cap, Refills: 0      doxycycline (ADOXA) 100 mg tablet Take 1 Tab by mouth two (2) times a day. Indications: chest wall cellulitis amelioration  Qty: 7 Tab, Refills: 15      oxyCODONE-acetaminophen (PERCOCET 7.5) 7.5-325 mg per tablet Take 1 Tab by mouth every six (6) hours as needed for up to 5 days. Max Daily Amount: 4 Tabs.   Qty: 20 Tab, Refills: 0         CONTINUE these medications which have NOT CHANGED    Details   letrozole (FEMARA) 2.5 mg tablet Take 1 Tab by mouth daily. Qty: 30 Tab, Refills: 5      ALPRAZolam (XANAX) 0.5 mg tablet 1 bid  prn  Indications: anxiety, sleep  Qty: 60 Tab, Refills: 5      DULoxetine (CYMBALTA) 60 mg capsule Take 1 Cap by mouth daily. Qty: 30 Cap, Refills: 12      levothyroxine (SYNTHROID) 75 mcg tablet Take 1 Tab by mouth Daily (before breakfast). Indications: hypothyroidism  Qty: 30 Tab, Refills: 12      fluticasone-salmeterol (ADVAIR DISKUS) 250-50 mcg/dose diskus inhaler Take 1 Puff by inhalation every twelve (12) hours. Indications: INTRINSIC ASTHMA  Qty: 1 Inhaler, Refills: 12      heparin, porcine, pf (HEPARIN LOCKFLUSH,PORCINE,,PF,) 100 unit/mL injection Access port, flush with 20ml Normal Saline, 500 units of Heparin Flush and de access port every 4 weeks. Qty: 1 Syringe, Refills: 5      albuterol (VENTOLIN) 90 mcg/Actuation inhaler Take 2 Puffs by inhalation every six (6) hours as needed. Indications: bring on the dos. Activity: Activity as tolerated  Diet: Regular Diet  Wound Care: Keep wound clean and dry    Follow-up Appointments   Procedures    FOLLOW UP VISIT Appointment in: One Week (1) with plastic surgery on Friday in her office (2) PCP as previously arranged. (1) with plastic surgery on Friday in her office  (2) PCP as previously arranged. Standing Status:   Standing     Number of Occurrences:   1     Order Specific Question:   Appointment in     Answer:    One Week       Signed By: Roxana Palacios MD     December 18, 2017

## 2017-12-19 ENCOUNTER — PATIENT OUTREACH (OUTPATIENT)
Dept: CASE MANAGEMENT | Age: 67
End: 2017-12-19

## 2017-12-19 NOTE — OP NOTES
[facility]  OPERATIVE REPORT    Kiet Escobar  MR#: [id number]  : 1950  ACCOUNT #: [de-identified]   DATE OF SERVICE: 2017    DATE OF PROCEDURE 2000. PREOPERATIVE DIAGNOSIS:  Right chest wall cellulitis. POSTOPERATIVE DIAGNOSIS:  Right chest wall cellulitis. PROCEDURE PERFORMED:  Irrigation and cultures right chest cellulitis. SURGEON:  Alex Brown MD    ANESTHESIA:  General.    SPECIMENS:  Culture and sensitivity. ESTIMATED BLOOD LOSS:  Minimal.    COMPLICATIONS:  None. INDICATIONS:  The patient [facility] about six weeks status post removal of an infected right breast tissue expander with MRSA. She now has a recurrent cellulitis despite the absence of any prosthetic device that has not responded to antibiotic therapy either by mouth or by IV. She has had a chest CT scan, which showed a small area of potential abscess underneath the skin along the chest wall. PROCEDURE IN DETAIL:  After informed consent was obtained from the patient, she was taken to the operating room and placed in the supine position and prepped and draped in the usual fashion. A portion of her previous transverse incision along her latissimus inset was then reopened with a 10 blade followed by Bovie cautery. Then, a subcutaneous dissection was carried out along the chest wall. No purulence was actually identified. She did have an area of soft, possibly necrotic, fatty tissue medially close to the sternum. This area was cultured and once the dissection was complete and the exploration was complete, then I began irrigating with vancomycin Pulsavac irrigation and 3 liters were used. Once that was completed, then a 10 flat TAMMI drain was placed and secured with a 4-0 nylon stitch. Next then the pocket was inspected for hemostasis and then she was closed in two layers with 3-0 Vicryl and 4-0 nylon horizontal mattress sutures.   The patient was dressed with Xeroform, bacitracin gauze, ABD pads and paper tape. She tolerated the procedure very well and was escorted to recovery in stable condition.       MD Cornelia Haq / Kalpana Hines  D: 12/18/2017 10:42     T: 12/18/2017 19:51  JOB #: [id number]

## 2017-12-19 NOTE — PROGRESS NOTES
This note will not be viewable in [address] Transition of Care Discharge Follow-up Questionnaire     Date/Time of Call:     12/19/2017   1:43pm     What was the patient seen in the hospital for? Cellulitis     Does the patient understand his/her diagnosis and/or treatment and what happened during the hospitalization? Patient voiced understanding of diagnosis and /or treatment. Did the patient receive discharge instructions? Patient voiced understanding of diagnosis and /or treatment. Review any discharge instructions (see notes in [facility]). Ask patient if they have any questions? Care Coordinator and patient reviewed discharge instructions per [facility]. Opportunity for questions and clarification provided. Patient verbalized understanding of instructions. Were home services ordered (nursing, PT, OT, ST, etc.)? No   If so, has the first visit occurred? If not, why? (Assist with coordination of services if necessary.)   n/a   Was any DME ordered? No   If so, has it been received? If not, why?  (Assist with coordination of arranging DME orders if necessary.)   n/a   Complete a review of all medications (new, continued and discontinued meds per the D/C instructions and medication tab in [facility]). [facility]  medication list reviewed  and confirmed as below:    NEW: Tessalon, Doxycycline, Percocet    CHANGED: n/a    STOP: n/a    All other medications continued as usual.   Were all new prescriptions filled? If not, why?  (Assist with obtainment of medications if necessary.) yes     Does the patient understand the purpose and dosing instructions for all medications? (If patient has questions, provide explanation and education.)   Patient voiced understanding. Does the patient have any problems in performing ADLs? (If patient is unable to perform ADLs  what is the limiting factor(s)?   Do they have a support system that can assist? If no support system is present, discuss possible assistance that they may be able to obtain.)     Patient reports patient being independent with ADLs. Does the patient have all follow-up appointments scheduled? 7 day f/up with PCP?    7-14 day f/up with specialist?    If f/up has not been made  what actions has the care coordinator made to accomplish this? Has transportation been arranged? [facility] follow-up should be within 7 days of discharge; all others should have PCP follow-up within 7 days of discharge; follow-ups with other specialists should be within 7-14 days of discharge.)   Patient declines 3 way call with Care Coordinator to schedule hospital follow up appointment with PCP. This Care Coordinator also offered to obtain an appointment on behalf of the patient with a return call with appointment date and time. Patient declines and states she with Dr. Bianca Burrell and Oncology this week. Patient states she will move up appt with Dr. Manuela Bueno    Patient has transportation to/from appointments. Any other questions or concerns expressed by the patient? Opportunity to voice questions or concerns provided. No other needs identified. Schedule next appointment with GUERRERO LYNN Coordinator or refer to RN Case Manager/  per the workflow guidelines. If referred for CCM  what RN care manager was the referral assigned? This Care Coordinator will schedule chart review and/or follow up calls per protocol.      GUILLERMO Call Completed By:   PAULETTE Cox  Care Coordination and Quality

## 2017-12-20 LAB
BACTERIA SPEC CULT: NORMAL
SERVICE CMNT-IMP: NORMAL

## 2017-12-22 ENCOUNTER — HOSPITAL ENCOUNTER (OUTPATIENT)
Dept: PET IMAGING | Age: 67
Discharge: HOME | End: 2017-12-22
Payer: MEDICARE

## 2017-12-22 DIAGNOSIS — Z17.0 MALIGNANT NEOPLASM OF UPPER-INNER QUADRANT OF RIGHT BREAST IN FEMALE, ESTROGEN RECEPTOR POSITIVE (HCC): ICD-10-CM

## 2017-12-22 DIAGNOSIS — C50.211 MALIGNANT NEOPLASM OF UPPER-INNER QUADRANT OF RIGHT BREAST IN FEMALE, ESTROGEN RECEPTOR POSITIVE (HCC): ICD-10-CM

## 2017-12-22 LAB
BACTERIA SPEC CULT: NORMAL
SERVICE CMNT-IMP: NORMAL

## 2017-12-22 PROCEDURE — A9552 F18 FDG: HCPCS

## 2017-12-22 PROCEDURE — 74011636320 HC RX REV CODE- 636/320: Performed by: INTERNAL MEDICINE

## 2017-12-22 RX ADMIN — DIATRIZOATE MEGLUMINE AND DIATRIZOATE SODIUM 10 ML: 660; 100 LIQUID ORAL; RECTAL at 09:42

## 2017-12-26 ENCOUNTER — PATIENT OUTREACH (OUTPATIENT)
Dept: CASE MANAGEMENT | Age: 67
End: 2017-12-26

## 2017-12-27 ENCOUNTER — PATIENT OUTREACH (OUTPATIENT)
Dept: CASE MANAGEMENT | Age: 67
End: 2017-12-27

## 2017-12-27 NOTE — PROGRESS NOTES
12/27/2017 Saw the patient with Dr Gennaro Laura. She recently had a PET scan that does not show any new cancer. We will continue to have her hold Ibrance until she is completely healed. She will continue Femara. Will continue to follow.

## 2018-01-01 ENCOUNTER — HOSPITAL ENCOUNTER (OUTPATIENT)
Dept: LAB | Age: 68
Discharge: HOME | End: 2018-11-20
Payer: MEDICARE

## 2018-01-01 ENCOUNTER — PATIENT OUTREACH (OUTPATIENT)
Dept: CASE MANAGEMENT | Age: 68
End: 2018-01-01

## 2018-01-01 ENCOUNTER — HOSPITAL ENCOUNTER (OUTPATIENT)
Dept: INFUSION THERAPY | Age: 68
Discharge: HOME | End: 2018-11-20
Payer: MEDICARE

## 2018-01-01 ENCOUNTER — HOSPITAL ENCOUNTER (OUTPATIENT)
Dept: INFUSION THERAPY | Age: 68
Discharge: HOME | End: 2018-08-28
Payer: MEDICARE

## 2018-01-01 ENCOUNTER — APPOINTMENT (OUTPATIENT)
Dept: RADIATION ONCOLOGY | Age: 68
End: 2018-01-01
Payer: MEDICARE

## 2018-01-01 ENCOUNTER — HOSPITAL ENCOUNTER (OUTPATIENT)
Dept: LAB | Age: 68
Discharge: HOME | End: 2018-09-25
Payer: MEDICARE

## 2018-01-01 ENCOUNTER — HOSPITAL ENCOUNTER (OUTPATIENT)
Dept: INFUSION THERAPY | Age: 68
Discharge: HOME | End: 2018-09-25
Payer: MEDICARE

## 2018-01-01 ENCOUNTER — HOSPITAL ENCOUNTER (OUTPATIENT)
Dept: LAB | Age: 68
Discharge: HOME | End: 2018-08-28
Payer: MEDICARE

## 2018-01-01 ENCOUNTER — HOSPITAL ENCOUNTER (OUTPATIENT)
Dept: LAB | Age: 68
Discharge: HOME | End: 2018-07-31
Payer: MEDICARE

## 2018-01-01 ENCOUNTER — HOSPITAL ENCOUNTER (OUTPATIENT)
Dept: LAB | Age: 68
Discharge: HOME | End: 2018-10-23
Payer: MEDICARE

## 2018-01-01 ENCOUNTER — HOSPITAL ENCOUNTER (OUTPATIENT)
Dept: LAB | Age: 68
Discharge: HOME | End: 2018-12-18
Payer: MEDICARE

## 2018-01-01 ENCOUNTER — HOSPITAL ENCOUNTER (OUTPATIENT)
Dept: LAB | Age: 68
Discharge: HOME | End: 2018-07-03
Payer: MEDICARE

## 2018-01-01 ENCOUNTER — HOSPITAL ENCOUNTER (OUTPATIENT)
Dept: RADIATION ONCOLOGY | Age: 68
Discharge: HOME | End: 2018-07-27
Payer: MEDICARE

## 2018-01-01 ENCOUNTER — HOSPITAL ENCOUNTER (OUTPATIENT)
Dept: INFUSION THERAPY | Age: 68
Discharge: HOME | End: 2018-07-03
Payer: MEDICARE

## 2018-01-01 ENCOUNTER — HOSPITAL ENCOUNTER (OUTPATIENT)
Dept: INFUSION THERAPY | Age: 68
Discharge: HOME | End: 2018-07-17
Payer: MEDICARE

## 2018-01-01 ENCOUNTER — HOSPITAL ENCOUNTER (OUTPATIENT)
Dept: INFUSION THERAPY | Age: 68
Discharge: HOME | End: 2018-10-23
Payer: MEDICARE

## 2018-01-01 ENCOUNTER — HOSPITAL ENCOUNTER (OUTPATIENT)
Dept: PET IMAGING | Age: 68
Discharge: HOME | End: 2018-10-18
Payer: MEDICARE

## 2018-01-01 ENCOUNTER — HOSPITAL ENCOUNTER (OUTPATIENT)
Dept: INFUSION THERAPY | Age: 68
Discharge: HOME | End: 2018-07-31
Payer: MEDICARE

## 2018-01-01 ENCOUNTER — HOSPITAL ENCOUNTER (OUTPATIENT)
Dept: INFUSION THERAPY | Age: 68
Discharge: HOME | End: 2018-12-18
Payer: MEDICARE

## 2018-01-01 VITALS
OXYGEN SATURATION: 93 % | TEMPERATURE: 98.3 F | WEIGHT: 127.2 LBS | HEART RATE: 75 BPM | BODY MASS INDEX: 23.41 KG/M2 | RESPIRATION RATE: 16 BRPM | HEIGHT: 62 IN | SYSTOLIC BLOOD PRESSURE: 153 MMHG | DIASTOLIC BLOOD PRESSURE: 87 MMHG

## 2018-01-01 VITALS
HEART RATE: 103 BPM | BODY MASS INDEX: 23.16 KG/M2 | DIASTOLIC BLOOD PRESSURE: 90 MMHG | WEIGHT: 126.6 LBS | RESPIRATION RATE: 18 BRPM | TEMPERATURE: 98.7 F | OXYGEN SATURATION: 94 % | SYSTOLIC BLOOD PRESSURE: 120 MMHG

## 2018-01-01 VITALS — OXYGEN SATURATION: 97 %

## 2018-01-01 DIAGNOSIS — C78.1: ICD-10-CM

## 2018-01-01 DIAGNOSIS — C50.211 MALIGNANT NEOPLASM OF UPPER-INNER QUADRANT OF RIGHT BREAST IN FEMALE, ESTROGEN RECEPTOR POSITIVE (HCC): ICD-10-CM

## 2018-01-01 DIAGNOSIS — Z17.0 MALIGNANT NEOPLASM OF UPPER-INNER QUADRANT OF RIGHT BREAST IN FEMALE, ESTROGEN RECEPTOR POSITIVE (HCC): ICD-10-CM

## 2018-01-01 DIAGNOSIS — Z17.0 MALIGNANT NEOPLASM OF RIGHT BREAST IN FEMALE, ESTROGEN RECEPTOR POSITIVE, UNSPECIFIED SITE OF BREAST (HCC): Chronic | ICD-10-CM

## 2018-01-01 DIAGNOSIS — C50.919 MALIGNANT NEOPLASM OF BREAST IN FEMALE, ESTROGEN RECEPTOR POSITIVE, UNSPECIFIED LATERALITY, UNSPECIFIED SITE OF BREAST (HCC): Chronic | ICD-10-CM

## 2018-01-01 DIAGNOSIS — C50.911 MALIGNANT NEOPLASM OF RIGHT FEMALE BREAST, UNSPECIFIED ESTROGEN RECEPTOR STATUS, UNSPECIFIED SITE OF BREAST (HCC): ICD-10-CM

## 2018-01-01 DIAGNOSIS — C50.911 MALIGNANT NEOPLASM OF RIGHT BREAST IN FEMALE, ESTROGEN RECEPTOR POSITIVE, UNSPECIFIED SITE OF BREAST (HCC): ICD-10-CM

## 2018-01-01 DIAGNOSIS — C50.911 MALIGNANT NEOPLASM OF RIGHT BREAST IN FEMALE, ESTROGEN RECEPTOR POSITIVE, UNSPECIFIED SITE OF BREAST (HCC): Chronic | ICD-10-CM

## 2018-01-01 DIAGNOSIS — Z17.0 MALIGNANT NEOPLASM OF RIGHT BREAST IN FEMALE, ESTROGEN RECEPTOR POSITIVE, UNSPECIFIED SITE OF BREAST (HCC): ICD-10-CM

## 2018-01-01 DIAGNOSIS — C78.1: Primary | ICD-10-CM

## 2018-01-01 DIAGNOSIS — Z17.0 MALIGNANT NEOPLASM OF BREAST IN FEMALE, ESTROGEN RECEPTOR POSITIVE, UNSPECIFIED LATERALITY, UNSPECIFIED SITE OF BREAST (HCC): ICD-10-CM

## 2018-01-01 DIAGNOSIS — M81.0 AGE-RELATED OSTEOPOROSIS WITHOUT CURRENT PATHOLOGICAL FRACTURE: Primary | ICD-10-CM

## 2018-01-01 DIAGNOSIS — C50.919 MALIGNANT NEOPLASM OF BREAST IN FEMALE, ESTROGEN RECEPTOR POSITIVE, UNSPECIFIED LATERALITY, UNSPECIFIED SITE OF BREAST (HCC): ICD-10-CM

## 2018-01-01 DIAGNOSIS — Z17.0 MALIGNANT NEOPLASM OF BREAST IN FEMALE, ESTROGEN RECEPTOR POSITIVE, UNSPECIFIED LATERALITY, UNSPECIFIED SITE OF BREAST (HCC): Chronic | ICD-10-CM

## 2018-01-01 DIAGNOSIS — C50.911 MALIGNANT NEOPLASM OF RIGHT FEMALE BREAST, UNSPECIFIED ESTROGEN RECEPTOR STATUS, UNSPECIFIED SITE OF BREAST (HCC): Chronic | ICD-10-CM

## 2018-01-01 DIAGNOSIS — Z17.0 MALIGNANT NEOPLASM OF OVERLAPPING SITES OF RIGHT BREAST IN FEMALE, ESTROGEN RECEPTOR POSITIVE (HCC): ICD-10-CM

## 2018-01-01 DIAGNOSIS — C50.811 MALIGNANT NEOPLASM OF OVERLAPPING SITES OF RIGHT BREAST IN FEMALE, ESTROGEN RECEPTOR POSITIVE (HCC): ICD-10-CM

## 2018-01-01 LAB
ALBUMIN SERPL-MCNC: 3.3 G/DL (ref 3.2–4.6)
ALBUMIN SERPL-MCNC: 3.5 G/DL (ref 3.2–4.6)
ALBUMIN SERPL-MCNC: 3.7 G/DL (ref 3.2–4.6)
ALBUMIN/GLOB SERPL: 0.8 {RATIO} (ref 1.2–3.5)
ALBUMIN/GLOB SERPL: 0.9 {RATIO} (ref 1.2–3.5)
ALBUMIN/GLOB SERPL: 1.1 {RATIO} (ref 1.2–3.5)
ALP SERPL-CCNC: 100 U/L (ref 50–136)
ALP SERPL-CCNC: 105 U/L (ref 50–136)
ALP SERPL-CCNC: 107 U/L (ref 50–136)
ALP SERPL-CCNC: 111 U/L (ref 50–136)
ALP SERPL-CCNC: 89 U/L (ref 50–136)
ALP SERPL-CCNC: 97 U/L (ref 50–136)
ALP SERPL-CCNC: 97 U/L (ref 50–136)
ALT SERPL-CCNC: 17 U/L (ref 12–65)
ALT SERPL-CCNC: 21 U/L (ref 12–65)
ALT SERPL-CCNC: 21 U/L (ref 12–65)
ALT SERPL-CCNC: 22 U/L (ref 12–65)
ALT SERPL-CCNC: 23 U/L (ref 12–65)
ALT SERPL-CCNC: 23 U/L (ref 12–65)
ALT SERPL-CCNC: 24 U/L (ref 12–65)
ANION GAP SERPL CALC-SCNC: 3 MMOL/L (ref 7–16)
ANION GAP SERPL CALC-SCNC: 4 MMOL/L (ref 7–16)
ANION GAP SERPL CALC-SCNC: 5 MMOL/L (ref 7–16)
ANION GAP SERPL CALC-SCNC: 7 MMOL/L (ref 7–16)
ANION GAP SERPL CALC-SCNC: 8 MMOL/L (ref 7–16)
AST SERPL-CCNC: 20 U/L (ref 15–37)
AST SERPL-CCNC: 24 U/L (ref 15–37)
AST SERPL-CCNC: 24 U/L (ref 15–37)
AST SERPL-CCNC: 25 U/L (ref 15–37)
AST SERPL-CCNC: 27 U/L (ref 15–37)
AST SERPL-CCNC: 28 U/L (ref 15–37)
AST SERPL-CCNC: 30 U/L (ref 15–37)
BASOPHILS # BLD: 0 K/UL (ref 0–0.2)
BASOPHILS # BLD: 0.1 K/UL (ref 0–0.2)
BASOPHILS # BLD: 0.1 K/UL (ref 0–0.2)
BASOPHILS NFR BLD: 1 % (ref 0–2)
BASOPHILS NFR BLD: 2 % (ref 0–2)
BILIRUB SERPL-MCNC: 0.5 MG/DL (ref 0.2–1.1)
BILIRUB SERPL-MCNC: 0.6 MG/DL (ref 0.2–1.1)
BILIRUB SERPL-MCNC: 0.6 MG/DL (ref 0.2–1.1)
BILIRUB SERPL-MCNC: 0.7 MG/DL (ref 0.2–1.1)
BILIRUB SERPL-MCNC: 0.7 MG/DL (ref 0.2–1.1)
BUN SERPL-MCNC: 13 MG/DL (ref 8–23)
BUN SERPL-MCNC: 14 MG/DL (ref 8–23)
BUN SERPL-MCNC: 15 MG/DL (ref 8–23)
BUN SERPL-MCNC: 16 MG/DL (ref 8–23)
BUN SERPL-MCNC: 17 MG/DL (ref 8–23)
BUN SERPL-MCNC: 18 MG/DL (ref 8–23)
BUN SERPL-MCNC: 19 MG/DL (ref 8–23)
CALCIUM SERPL-MCNC: 8.6 MG/DL (ref 8.3–10.4)
CALCIUM SERPL-MCNC: 8.7 MG/DL (ref 8.3–10.4)
CALCIUM SERPL-MCNC: 8.7 MG/DL (ref 8.3–10.4)
CALCIUM SERPL-MCNC: 8.9 MG/DL (ref 8.3–10.4)
CALCIUM SERPL-MCNC: 9 MG/DL (ref 8.3–10.4)
CHLORIDE SERPL-SCNC: 103 MMOL/L (ref 98–107)
CHLORIDE SERPL-SCNC: 103 MMOL/L (ref 98–107)
CHLORIDE SERPL-SCNC: 104 MMOL/L (ref 98–107)
CHLORIDE SERPL-SCNC: 105 MMOL/L (ref 98–107)
CHLORIDE SERPL-SCNC: 106 MMOL/L (ref 98–107)
CHLORIDE SERPL-SCNC: 107 MMOL/L (ref 98–107)
CHLORIDE SERPL-SCNC: 107 MMOL/L (ref 98–107)
CO2 SERPL-SCNC: 26 MMOL/L (ref 21–32)
CO2 SERPL-SCNC: 27 MMOL/L (ref 21–32)
CO2 SERPL-SCNC: 28 MMOL/L (ref 21–32)
CO2 SERPL-SCNC: 29 MMOL/L (ref 21–32)
CO2 SERPL-SCNC: 29 MMOL/L (ref 21–32)
CREAT SERPL-MCNC: 0.79 MG/DL (ref 0.6–1)
CREAT SERPL-MCNC: 0.92 MG/DL (ref 0.6–1)
CREAT SERPL-MCNC: 1.01 MG/DL (ref 0.6–1)
CREAT SERPL-MCNC: 1.03 MG/DL (ref 0.6–1)
CREAT SERPL-MCNC: 1.05 MG/DL (ref 0.6–1)
CREAT SERPL-MCNC: 1.05 MG/DL (ref 0.6–1)
CREAT SERPL-MCNC: 1.12 MG/DL (ref 0.6–1)
DIFFERENTIAL METHOD BLD: ABNORMAL
EOSINOPHIL # BLD: 0 K/UL (ref 0–0.8)
EOSINOPHIL # BLD: 0 K/UL (ref 0–0.8)
EOSINOPHIL # BLD: 0.1 K/UL (ref 0–0.8)
EOSINOPHIL NFR BLD: 1 % (ref 0.5–7.8)
EOSINOPHIL NFR BLD: 2 % (ref 0.5–7.8)
EOSINOPHIL NFR BLD: 4 % (ref 0.5–7.8)
ERYTHROCYTE [DISTWIDTH] IN BLOOD BY AUTOMATED COUNT: 12 % (ref 11.9–14.6)
ERYTHROCYTE [DISTWIDTH] IN BLOOD BY AUTOMATED COUNT: 12 % (ref 11.9–14.6)
ERYTHROCYTE [DISTWIDTH] IN BLOOD BY AUTOMATED COUNT: 13.2 % (ref 11.9–14.6)
ERYTHROCYTE [DISTWIDTH] IN BLOOD BY AUTOMATED COUNT: 13.8 % (ref 11.9–14.6)
ERYTHROCYTE [DISTWIDTH] IN BLOOD BY AUTOMATED COUNT: 15.1 % (ref 11.9–14.6)
ERYTHROCYTE [DISTWIDTH] IN BLOOD BY AUTOMATED COUNT: 16.8 % (ref 11.9–14.6)
ERYTHROCYTE [DISTWIDTH] IN BLOOD BY AUTOMATED COUNT: 17.2 % (ref 11.9–14.6)
GLOBULIN SER CALC-MCNC: 3.4 G/DL (ref 2.3–3.5)
GLOBULIN SER CALC-MCNC: 3.7 G/DL (ref 2.3–3.5)
GLOBULIN SER CALC-MCNC: 3.8 G/DL (ref 2.3–3.5)
GLOBULIN SER CALC-MCNC: 3.9 G/DL (ref 2.3–3.5)
GLOBULIN SER CALC-MCNC: 4.1 G/DL (ref 2.3–3.5)
GLOBULIN SER CALC-MCNC: 4.1 G/DL (ref 2.3–3.5)
GLOBULIN SER CALC-MCNC: 4.2 G/DL (ref 2.3–3.5)
GLUCOSE SERPL-MCNC: 106 MG/DL (ref 65–100)
GLUCOSE SERPL-MCNC: 109 MG/DL (ref 65–100)
GLUCOSE SERPL-MCNC: 109 MG/DL (ref 65–100)
GLUCOSE SERPL-MCNC: 79 MG/DL (ref 65–100)
GLUCOSE SERPL-MCNC: 92 MG/DL (ref 65–100)
GLUCOSE SERPL-MCNC: 93 MG/DL (ref 65–100)
GLUCOSE SERPL-MCNC: 93 MG/DL (ref 65–100)
HCT VFR BLD AUTO: 31 % (ref 35.8–46.3)
HCT VFR BLD AUTO: 32.8 % (ref 35.8–46.3)
HCT VFR BLD AUTO: 34.1 % (ref 35.8–46.3)
HCT VFR BLD AUTO: 35 % (ref 35.8–46.3)
HCT VFR BLD AUTO: 35.9 % (ref 35.8–46.3)
HCT VFR BLD AUTO: 36.8 % (ref 35.8–46.3)
HCT VFR BLD AUTO: 37.1 % (ref 35.8–46.3)
HGB BLD-MCNC: 11 G/DL (ref 11.7–15.4)
HGB BLD-MCNC: 11.2 G/DL (ref 11.7–15.4)
HGB BLD-MCNC: 11.8 G/DL (ref 11.7–15.4)
HGB BLD-MCNC: 12.1 G/DL (ref 11.7–15.4)
HGB BLD-MCNC: 12.6 G/DL (ref 11.7–15.4)
HGB BLD-MCNC: 13 G/DL (ref 11.7–15.4)
HGB BLD-MCNC: 13 G/DL (ref 11.7–15.4)
IMM GRANULOCYTES # BLD: 0 K/UL (ref 0–0.5)
IMM GRANULOCYTES NFR BLD AUTO: 0 % (ref 0–5)
IMM GRANULOCYTES NFR BLD AUTO: 1 % (ref 0–5)
IMM GRANULOCYTES NFR BLD AUTO: 1 % (ref 0–5)
LYMPHOCYTES # BLD: 0.5 K/UL (ref 0.5–4.6)
LYMPHOCYTES # BLD: 0.9 K/UL (ref 0.5–4.6)
LYMPHOCYTES # BLD: 0.9 K/UL (ref 0.5–4.6)
LYMPHOCYTES # BLD: 1 K/UL (ref 0.5–4.6)
LYMPHOCYTES # BLD: 1 K/UL (ref 0.5–4.6)
LYMPHOCYTES # BLD: 1.1 K/UL (ref 0.5–4.6)
LYMPHOCYTES # BLD: 1.1 K/UL (ref 0.5–4.6)
LYMPHOCYTES NFR BLD: 24 % (ref 13–44)
LYMPHOCYTES NFR BLD: 26 % (ref 13–44)
LYMPHOCYTES NFR BLD: 29 % (ref 13–44)
LYMPHOCYTES NFR BLD: 31 % (ref 13–44)
LYMPHOCYTES NFR BLD: 33 % (ref 13–44)
LYMPHOCYTES NFR BLD: 36 % (ref 13–44)
LYMPHOCYTES NFR BLD: 42 % (ref 13–44)
MAGNESIUM SERPL-MCNC: 2.2 MG/DL (ref 1.8–2.4)
MAGNESIUM SERPL-MCNC: 2.3 MG/DL (ref 1.8–2.4)
MAGNESIUM SERPL-MCNC: 2.5 MG/DL (ref 1.8–2.4)
MCH RBC QN AUTO: 33 PG (ref 26.1–32.9)
MCH RBC QN AUTO: 33.6 PG (ref 26.1–32.9)
MCH RBC QN AUTO: 35.8 PG (ref 26.1–32.9)
MCH RBC QN AUTO: 35.8 PG (ref 26.1–32.9)
MCH RBC QN AUTO: 36.1 PG (ref 26.1–32.9)
MCH RBC QN AUTO: 36.8 PG (ref 26.1–32.9)
MCH RBC QN AUTO: 37.4 PG (ref 26.1–32.9)
MCHC RBC AUTO-ENTMCNC: 34.1 G/DL (ref 31.4–35)
MCHC RBC AUTO-ENTMCNC: 34.6 G/DL (ref 31.4–35)
MCHC RBC AUTO-ENTMCNC: 34.6 G/DL (ref 31.4–35)
MCHC RBC AUTO-ENTMCNC: 35 G/DL (ref 31.4–35)
MCHC RBC AUTO-ENTMCNC: 35.1 G/DL (ref 31.4–35)
MCHC RBC AUTO-ENTMCNC: 35.3 G/DL (ref 31.4–35)
MCHC RBC AUTO-ENTMCNC: 35.5 G/DL (ref 31.4–35)
MCV RBC AUTO: 101 FL (ref 79.6–97.8)
MCV RBC AUTO: 103.1 FL (ref 79.6–97.8)
MCV RBC AUTO: 104.8 FL (ref 79.6–97.8)
MCV RBC AUTO: 105.7 FL (ref 79.6–97.8)
MCV RBC AUTO: 106.4 FL (ref 79.6–97.8)
MCV RBC AUTO: 94 FL (ref 79.6–97.8)
MCV RBC AUTO: 97.2 FL (ref 79.6–97.8)
MONOCYTES # BLD: 0.1 K/UL (ref 0.1–1.3)
MONOCYTES # BLD: 0.1 K/UL (ref 0.1–1.3)
MONOCYTES # BLD: 0.2 K/UL (ref 0.1–1.3)
MONOCYTES # BLD: 0.3 K/UL (ref 0.1–1.3)
MONOCYTES # BLD: 0.3 K/UL (ref 0.1–1.3)
MONOCYTES NFR BLD: 4 % (ref 4–12)
MONOCYTES NFR BLD: 6 % (ref 4–12)
MONOCYTES NFR BLD: 6 % (ref 4–12)
MONOCYTES NFR BLD: 7 % (ref 4–12)
MONOCYTES NFR BLD: 8 % (ref 4–12)
MONOCYTES NFR BLD: 8 % (ref 4–12)
MONOCYTES NFR BLD: 9 % (ref 4–12)
NEUTS SEG # BLD: 1 K/UL (ref 1.7–8.2)
NEUTS SEG # BLD: 1.2 K/UL (ref 1.7–8.2)
NEUTS SEG # BLD: 1.3 K/UL (ref 1.7–8.2)
NEUTS SEG # BLD: 1.8 K/UL (ref 1.7–8.2)
NEUTS SEG # BLD: 2 K/UL (ref 1.7–8.2)
NEUTS SEG # BLD: 2.2 K/UL (ref 1.7–8.2)
NEUTS SEG # BLD: 2.9 K/UL (ref 1.7–8.2)
NEUTS SEG NFR BLD: 45 % (ref 43–78)
NEUTS SEG NFR BLD: 52 % (ref 43–78)
NEUTS SEG NFR BLD: 58 % (ref 43–78)
NEUTS SEG NFR BLD: 58 % (ref 43–78)
NEUTS SEG NFR BLD: 63 % (ref 43–78)
NEUTS SEG NFR BLD: 64 % (ref 43–78)
NEUTS SEG NFR BLD: 67 % (ref 43–78)
NRBC # BLD: 0 K/UL (ref 0–0.2)
NRBC BLD-RTO: 0 PER 100 WBC (ref 0–2)
PLATELET # BLD AUTO: 113 K/UL (ref 150–450)
PLATELET # BLD AUTO: 114 K/UL (ref 150–450)
PLATELET # BLD AUTO: 118 K/UL (ref 150–450)
PLATELET # BLD AUTO: 128 K/UL (ref 150–450)
PLATELET # BLD AUTO: 130 K/UL (ref 150–450)
PLATELET # BLD AUTO: 172 K/UL (ref 150–450)
PLATELET # BLD AUTO: 95 K/UL (ref 150–450)
PMV BLD AUTO: 10.1 FL (ref 10.8–14.1)
PMV BLD AUTO: 10.2 FL (ref 9.4–12.3)
PMV BLD AUTO: 10.3 FL (ref 9.4–12.3)
PMV BLD AUTO: 11.1 FL (ref 9.4–12.3)
PMV BLD AUTO: 9.9 FL (ref 10.8–14.1)
POTASSIUM SERPL-SCNC: 3.8 MMOL/L (ref 3.5–5.1)
POTASSIUM SERPL-SCNC: 4 MMOL/L (ref 3.5–5.1)
POTASSIUM SERPL-SCNC: 4.1 MMOL/L (ref 3.5–5.1)
POTASSIUM SERPL-SCNC: 4.1 MMOL/L (ref 3.5–5.1)
POTASSIUM SERPL-SCNC: 4.2 MMOL/L (ref 3.5–5.1)
POTASSIUM SERPL-SCNC: 4.2 MMOL/L (ref 3.5–5.1)
POTASSIUM SERPL-SCNC: 4.3 MMOL/L (ref 3.5–5.1)
PROT SERPL-MCNC: 7.1 G/DL (ref 6.3–8.2)
PROT SERPL-MCNC: 7.2 G/DL (ref 6.3–8.2)
PROT SERPL-MCNC: 7.3 G/DL (ref 6.3–8.2)
PROT SERPL-MCNC: 7.4 G/DL (ref 6.3–8.2)
PROT SERPL-MCNC: 7.5 G/DL (ref 6.3–8.2)
RBC # BLD AUTO: 3.07 M/UL (ref 4.05–5.25)
RBC # BLD AUTO: 3.13 M/UL (ref 4.05–5.25)
RBC # BLD AUTO: 3.29 M/UL (ref 4.05–5.25)
RBC # BLD AUTO: 3.48 M/UL (ref 4.05–5.25)
RBC # BLD AUTO: 3.51 M/UL (ref 4.05–5.25)
RBC # BLD AUTO: 3.6 M/UL (ref 4.05–5.25)
RBC # BLD AUTO: 3.82 M/UL (ref 4.05–5.25)
SODIUM SERPL-SCNC: 135 MMOL/L (ref 136–145)
SODIUM SERPL-SCNC: 136 MMOL/L (ref 136–145)
SODIUM SERPL-SCNC: 137 MMOL/L (ref 136–145)
SODIUM SERPL-SCNC: 139 MMOL/L (ref 136–145)
SODIUM SERPL-SCNC: 139 MMOL/L (ref 136–145)
SODIUM SERPL-SCNC: 140 MMOL/L (ref 136–145)
SODIUM SERPL-SCNC: 141 MMOL/L (ref 136–145)
WBC # BLD AUTO: 1.9 K/UL (ref 4.3–11.1)
WBC # BLD AUTO: 2.2 K/UL (ref 4.3–11.1)
WBC # BLD AUTO: 2.5 K/UL (ref 4.3–11.1)
WBC # BLD AUTO: 3.2 K/UL (ref 4.3–11.1)
WBC # BLD AUTO: 3.4 K/UL (ref 4.3–11.1)
WBC # BLD AUTO: 3.5 K/UL (ref 4.3–11.1)
WBC # BLD AUTO: 4.3 K/UL (ref 4.3–11.1)

## 2018-01-01 PROCEDURE — 80053 COMPREHEN METABOLIC PANEL: CPT | Performed by: INTERNAL MEDICINE

## 2018-01-01 PROCEDURE — 85025 COMPLETE CBC W/AUTO DIFF WBC: CPT

## 2018-01-01 PROCEDURE — 96402 CHEMO HORMON ANTINEOPL SQ/IM: CPT

## 2018-01-01 PROCEDURE — 80053 COMPREHEN METABOLIC PANEL: CPT

## 2018-01-01 PROCEDURE — 74011250636 HC RX REV CODE- 250/636: Performed by: INTERNAL MEDICINE

## 2018-01-01 PROCEDURE — 85025 COMPLETE CBC W/AUTO DIFF WBC: CPT | Performed by: INTERNAL MEDICINE

## 2018-01-01 PROCEDURE — 74011250636 HC RX REV CODE- 250/636: Performed by: NURSE PRACTITIONER

## 2018-01-01 PROCEDURE — 83735 ASSAY OF MAGNESIUM: CPT

## 2018-01-01 PROCEDURE — 36415 COLL VENOUS BLD VENIPUNCTURE: CPT

## 2018-01-01 PROCEDURE — 99211 OFF/OP EST MAY X REQ PHY/QHP: CPT

## 2018-01-01 PROCEDURE — 83735 ASSAY OF MAGNESIUM: CPT | Performed by: INTERNAL MEDICINE

## 2018-01-01 PROCEDURE — A9552 F18 FDG: HCPCS

## 2018-01-01 PROCEDURE — 74011636320 HC RX REV CODE- 636/320: Performed by: INTERNAL MEDICINE

## 2018-01-01 RX ORDER — LAMOTRIGINE 25 MG/1
500 TABLET ORAL ONCE
Status: COMPLETED | OUTPATIENT
Start: 2018-01-01 | End: 2018-01-01

## 2018-01-01 RX ORDER — SODIUM CHLORIDE 0.9 % (FLUSH) 0.9 %
5-10 SYRINGE (ML) INJECTION
Status: COMPLETED | OUTPATIENT
Start: 2018-01-01 | End: 2018-01-01

## 2018-01-01 RX ADMIN — FULVESTRANT 500 MG: 50 INJECTION INTRAMUSCULAR at 16:27

## 2018-01-01 RX ADMIN — FULVESTRANT 500 MG: 50 INJECTION INTRAMUSCULAR at 09:08

## 2018-01-01 RX ADMIN — FULVESTRANT 500 MG: 50 INJECTION INTRAMUSCULAR at 14:06

## 2018-01-01 RX ADMIN — FULVESTRANT 500 MG: 50 INJECTION INTRAMUSCULAR at 14:29

## 2018-01-01 RX ADMIN — FULVESTRANT 500 MG: 50 INJECTION INTRAMUSCULAR at 10:17

## 2018-01-01 RX ADMIN — FULVESTRANT 500 MG: 50 INJECTION INTRAMUSCULAR at 15:12

## 2018-01-01 RX ADMIN — FULVESTRANT 500 MG: 50 INJECTION INTRAMUSCULAR at 14:43

## 2018-01-01 RX ADMIN — Medication 10 ML: at 10:28

## 2018-01-01 RX ADMIN — DIATRIZOATE MEGLUMINE AND DIATRIZOATE SODIUM 10 ML: 660; 100 LIQUID ORAL; RECTAL at 10:28

## 2018-01-01 RX ADMIN — FULVESTRANT 500 MG: 50 INJECTION INTRAMUSCULAR at 15:34

## 2018-01-25 ENCOUNTER — HOSPITAL ENCOUNTER (OUTPATIENT)
Dept: RADIATION ONCOLOGY | Age: 68
Discharge: HOME | End: 2018-01-25
Payer: MEDICARE

## 2018-01-25 VITALS
DIASTOLIC BLOOD PRESSURE: 81 MMHG | SYSTOLIC BLOOD PRESSURE: 136 MMHG | BODY MASS INDEX: 24.23 KG/M2 | HEART RATE: 108 BPM | TEMPERATURE: 98.7 F | WEIGHT: 132.5 LBS | OXYGEN SATURATION: 95 %

## 2018-01-25 DIAGNOSIS — C50.811 MALIGNANT NEOPLASM OF OVERLAPPING SITES OF RIGHT BREAST (HCC): Primary | ICD-10-CM

## 2018-01-25 PROCEDURE — 99211 OFF/OP EST MAY X REQ PHY/QHP: CPT

## 2018-01-25 NOTE — PROGRESS NOTES
6 month f/u right breast cancer. S/p chemo, right mastectomy 9-14-16 , RT. RT end 1-10-17. Epi Wetzel being held. Pt had sepsis, MRSA infection. Implant removed right side. States is seeing ID and is on new antibiotic. Taking Femara. Pet scan 12-22-17. Last mammogram 7-19-17. F/u with Dr. Yenifer Bell 2-27-17.     [facility], RN

## 2018-01-25 NOTE — PROGRESS NOTES
Patient: Matthew Santillan MRN: [id number]  SSN: [id number]    YOB: 1950  Age: 69 y.o. Sex: female      Other Providers:  Marcelo Damon MD    CHIEF COMPLAINT: Breast cancer    DIAGNOSIS: Right breast infiltrating ductal carcinoma, high grade, 100% ER, HI 3% and HER2 was negative by IHC, [id number] with positive paratracheal lymph node. [id number]. PREVIOUS TREATMENT:  1) 4 cycles ddAC followed by 12 weekly taxol cycles. 2) 9/14/16:  Mastectomy and axillary dissection. Tissue expanders in place. 3) 01/10/17: Radiation completed. She received treatment to the right breast and lymph nodes with 25 fractions of 5000     cGy, 5 boost, of 1000 cGy planned. 4) Arimidex    HISTORY OF PRESENT ILLNESS:  Matthew Santillan is a 69 y.o. female seen by Dr. Rubi Rodriguez on 10/20/2016 at the request of Dr. Juli Botello. She originally presented in December 2015 when she noted an increased redness and swelling in her right breast, with a masslike density. She had underwent a clinical breast exam in September that was normal.  Mammogram and ultrasound showed a mass that was indistinct but suspicious, and skin thickening also suspicious for malignant involvement. She went for biopsy which showed infiltrating ductal carcinoma, high grade, but with 100% ER expression, HI 3% and HER2 was negative by IHC. MRI showed the tumor to be extensive, with enhancement spanning over 12 cm, with confirmation of skin thickening and a low axillary lymph node that was not identified by ultrasound. She was referred to Dr. Tanner Trimble who felt that she would be appropriate for neoadjuvant chemotherapy due to the large size and the skin involvement. Dr. Juli Botello recommended PET/CT and port placement prior to potentially starting dose dense AC followed by weekly Taxol. Unfortunately, in addition to the locally advanced disease, there was also evidence of a paratracheal lymph node which was indeterminate by imaging.  Chemotherapy was postponed and EBUS for diagnostic purposes was performed which returned as positive for malignant cells, also ER/NV positive and HER2 negative confirming stage IV disease. With such a small amount of oligometastasis, Dr. Dayron Brown recommended that she proceed with chemotherapy. She completed 4 cycles of ddAC with excellent disease response, tolerated 12 cycles of weekly Taxol exceptionally well according to the record. Imaging post-chemotherapy showed at least partial response in the breast and complete response in the axillary and mediastinal lymph nodes. Her case was discussed in tumor board who recommended mastectomy followed by chest wall and axillary radiotherapy. Her tumor locally had a nice response with only a 1.5 cm primary noted, but there were 10 positive lymph nodes with TULIO present.   She is getting serial expansions with Dr. Sandy Soliz. INTERVAL HISTORY: She returns today for follow up, 12 months after competing radiation therapy for her right breast cancer. Overall she tolerated treatment well. Mammogram, 07/19/17 was negative for malignancy. Since last seen, she developed a MRSA infection of the reconstructed right breast. Implant removed and is now followed by ID. She was started on doxycyline yesterday. Her chest wall is erythematous is color. She continues on Femara under the direction of Dr. Dayron Brown. Ibrance on hold allowing time for healing under the direction of Dr. Dayron Brown. OBSTETRIC HISTORY:    Menarche at the age of 13.    G4,P2. Oral Contraceptive Pills:  5 years in her 19's. Hormone Replacement Therapy:  Premarin for 15 years    PAST MEDICAL HISTORY:    Past Medical History:   Diagnosis Date    Abnormal CT of the chest     Adverse effect of anesthesia     bp dropped with dual lung/gallbladder surgery    Asthma      no acute attacks recently.  uses advair daily    Breast cancer ([facility] [address] 2016    R breast, s/p mastectomy, chemo, reconstruction    Depression      Hypercholesteremia     Controlled with diet     Hypothyroidism     Ill-defined condition     granular lung disease resolved 2002       The patient denies history of collagen vascular diseases, pacemaker insertion, prior radiation or prior chemotherapy predating her breast cancer diagnosis. PAST SURGICAL HISTORY:   Past Surgical History:   Procedure Laterality Date    CHEST SURGERY PROCEDURE UNLISTED      R lung biopsy     HX BLADDER REPAIR      HX BREAST AUGMENTATION Right 10/20/2017    BREAST IMPLANT REMOVAL  performed by Jordan Brown MD at [address] HX BREAST BIOPSY Right 2/5/2016    SKIN BIOPSY RIGHT BREAST performed by Justina Lechuga MD at [facility] MAIN OR    HX BREAST RECONSTRUCTION Bilateral 9/14/2016    BILATERAL BREAST RECONSTRUCTION performed by Jordan Brown MD at [address] HX BREAST RECONSTRUCTION Bilateral 9/14/2016    BILATERAL BREAST TISSUE EXPANDER INSERTION performed by Jordan Brown MD at [address] HX BREAST RECONSTRUCTION Right 9/18/2017    RIGHT BREAST RECONSTRUCTION performed by Jordan Brown MD at [address] HX BREAST RECONSTRUCTION Right 9/18/2017    Right  PLACEMENT OF PERMANENT IMPLANTS performed by Jordan Brown MD at [address] HX BREAST REDUCTION Left 9/18/2017    LEFT BREAST MASTOPEXY FOR SYMMETRY performed by Jordan Brown MD at [address] HX COLONOSCOPY      2006    HX CYST INCISION AND DRAINAGE Right 12/15/2017    INCISION AND DRAINAGE BREAST  performed by Jordan Brown MD at [address] HX [address]      HX HYSTERECTOMY      fibroids    HX LAP CHOLECYSTECTOMY  2000    HX MASTECTOMY Right     HX RECTOCELE REPAIR      HX VASCULAR ACCESS      port left chest wall       MEDICATIONS:     Current Outpatient Prescriptions:     doxycycline (ADOXA) 100 mg tablet, Take 1 Tab by mouth two (2) times a day.  Indications: chest wall cellulitis amelioration, Disp: 7 Tab, Rfl: 15    letrozole (FEMARA) 2.5 mg tablet, Take 1 Tab by mouth daily. , Disp: 30 Tab, Rfl: 5    ALPRAZolam (XANAX) 0.5 mg tablet, 1 bid  prn  Indications: anxiety, sleep, Disp: 60 Tab, Rfl: 5    DULoxetine (CYMBALTA) 60 mg capsule, Take 1 Cap by mouth daily. , Disp: 30 Cap, Rfl: 12    fluticasone-salmeterol (ADVAIR DISKUS) 250-50 mcg/dose diskus inhaler, Take 1 Puff by inhalation every twelve (12) hours. Indications: INTRINSIC ASTHMA, Disp: 1 Inhaler, Rfl: 12    levothyroxine (SYNTHROID) 75 mcg tablet, Take 1 Tab by mouth Daily (before breakfast). Indications: hypothyroidism, Disp: 30 Tab, Rfl: 12    heparin, porcine, pf (HEPARIN LOCKFLUSH,PORCINE,,PF,) 100 unit/mL injection, Access port, flush with 20ml Normal Saline, 500 units of Heparin Flush and de access port every 4 weeks. , Disp: 1 Syringe, Rfl: 5    albuterol (VENTOLIN) 90 mcg/Actuation inhaler, Take 2 Puffs by inhalation every six (6) hours as needed. Indications: bring on the dos., Disp: , Rfl:     ALLERGIES:   Allergies   Allergen Reactions    Prednisone Unknown (comments)     \"psychotic reaction\"  To oral and parenteral steroids       SOCIAL HISTORY:   Social History     Social History    Marital status:      Spouse name: N/A    Number of children: N/A    Years of education: N/A     Occupational History    Not on file.      Social History Main Topics    Smoking status: Never Smoker    Smokeless tobacco: Never Used    Alcohol use No    Drug use: No    Sexual activity: Not on file     Other Topics Concern    Not on file     Social History Narrative       FAMILY HISTORY:   Family History   Problem Relation Age of Onset    Hypertension Mother     Cancer Mother 79     Breast cancer at age 79    Diabetes Mother      Type 2    Other Father      alzheimers    Cancer Maternal Aunt      Breast cancer after age 72    Cancer Maternal Aunt      Breast cancer at age 39   [facility] Allergic Rhinitis Neg Hx     Allergy-severe Neg Hx     Amblyopia Neg Hx     Anemia Neg Hx     Anesth Problems Neg Hx     Angioedema Neg Hx     Anxiety Neg Hx     Arrhythmia Neg Hx     Arthritis-rheumatoid Neg Hx     Ataxia Neg Hx     Atopy Neg Hx     Bipolar Disorder Neg Hx     Blindness Neg Hx     Breast Cancer Neg Hx     Breast Problems Neg Hx     Broken Bones Neg Hx     Cataract Neg Hx     Celiac Disease Neg Hx     Childhood heart surgery Neg Hx     Childhood resp disease Neg Hx     Chorea Neg Hx     Clotting Disorder Neg Hx     Collagen Dis Neg Hx     Colon Cancer Neg Hx     Colon Polyps Neg Hx     COPD Neg Hx     Coronary Artery Disease Neg Hx     Crohn's Disease Neg Hx     Cystic Fibrosis Neg Hx     Delayed Awakening Neg Hx     Dementia Neg Hx     Depression Neg Hx     Dislocations Neg Hx     Downs Syndrome Neg Hx     Drug Abuse Neg Hx     Eclampsia Neg Hx     Eczema Neg Hx     Emergence Delirium Neg Hx     Emphysema Neg Hx     Fainting Neg Hx     Genitourinary () Neg Hx     Glaucoma Neg Hx     Heart Attack Neg Hx     Heart defect Neg Hx     Heart Failure Neg Hx     Heart Surgery Neg Hx     Hemachromatosis Neg Hx     Herpes Neg Hx     High Cholesterol Neg Hx     HIV/AIDS Neg Hx     Immunodeficiency Neg Hx     Infertility Neg Hx     Inflammatory Bowel Dz Neg Hx     Kidney Disease Neg Hx     Liver Disease Neg Hx     Macular Degen Neg Hx     Malignant Hyperthermia Neg Hx     MS Neg Hx     Neuropathy Neg Hx     NF Neg Hx     Osteoporosis Neg Hx     Ovarian Cancer Neg Hx     Pacemaker Neg Hx     Panic disorder Neg Hx     Parkinsonism Neg Hx     Post-op Cognitive Dysfunction Neg Hx     Post-op Nausea/Vomiting Neg Hx     Prematurity Neg Hx      Labor Neg Hx     Pseudocholinesterase Deficiency Neg Hx     Psoriasis Neg Hx     Psychotic Disorder Neg Hx     Rashes/Skin Problems Neg Hx     Retinal Detachment Neg Hx     Rh Incompatibility Neg Hx     Schizophrenia Neg Hx     Seizures Neg Hx     Severe Sprains Neg Hx     Sickle Cell Anemia Neg Hx     Sickle Cell Trait Neg Hx     SIDS Neg Hx     SLE Neg Hx     Spont Ab Neg Hx     Stomach Cancer Neg Hx     Strabismus Neg Hx     Substance Abuse Neg Hx     Sudden Death Neg Hx     Suicide Neg Hx     Thyroid Disease Neg Hx     Tuberculosis Neg Hx     Ulcerative Colitis Neg Hx     Urticaria Neg Hx     Lang's Disease Neg Hx        REVIEW OF SYSTEMS: Please see the completed review of systems sheet in the chart that I have reviewed today. PHYSICAL EXAMINATION:   ECOG Performance status 0  VITAL SIGNS:   Visit Vitals    /81 (BP 1 Location: Left arm, BP Patient Position: Sitting)    Pulse (!) 108    Temp 98.7 °F (37.1 °C)    Wt 132 lb 8 oz (60.1 kg)    SpO2 95%    BMI 24.23 kg/m2        GENERAL: The patient is well-developed, ambulatory, alert and in no acute distress. HEENT: Head is normocephalic, atraumatic. NECK: Neck is supple with no masses. CARDIOVASCULAR: Heart rate tachy at 108, regular rhythm. RESPIRATORY: Lungs are clear to auscultation. There is normal respiratory effort. LYMPHATIC: There is no cervical, supraclavicular or axillary lymphadenopathy bilaterally. BREASTS: Examination of right chest wall. Erythematous in color. Healed incision. Non tender      PATHOLOGY:  2/18/16:  DIAGNOSIS  2R Lymph Node, Fine Needle Aspiration:  MALIGNANT CELLS PRESENT. Cell block: Rare malignant cells      9/14/16:     DIAGNOSIS   A: ADDITIONAL AXILLARY CONTENTS: MACROMETASTATIC CARCINOMA TO   9 OF 11 LYMPH NODES WITH EXTRACAPSULAR EXTENSION AND EXTENSIVE LYMPHOVASCULAR INVASION. B: RIGHT BREAST: MICROSCOPIC FOCI OF INFILTRATING DUCT CARCINOMA, INTERMEDIATE GRADE (MODERATELY DIFFERENTIATED), THE MAJORITY OF WHICH APPEAR TO REPRESENT LYMPHOVASCULAR INVASION. SIZE OF TUMOR IS INDETERMINATE BUT IS UP TO 1.5 X 0.8 CM ON SLIDE ON MULTIPLE SLIDES. MARGINS OF RESECTION ARE NEGATIVE FOR MALIGNANT TUMOR. IN SITU COMPONENT IS NOT IDENTIFIED. SEE COMMENT.    C: RIGHT AXILLA: MACROMETASTATIC CARCINOMA TO 1 OF 1 LYMPH NODES WITH EXTRACAPSULAR EXTENSION AND EXTENSIVE LYMPHOVASCULAR INVASION. Comment   The morphology of tumor in this specimen is similar to treated carcinoma in prior biopsy, , which has estrogen receptors of 100%, progesterone receptors of 3% and Her-2 negative 1+. If clinically indicated, receptor studies can be repeated in this material (see also [id number])     LABORATORY: none today    RADIOLOGY:    LEFT BREAST DIAGNOSTIC MAMMOGRAM, 7/19/2017.     CLINICAL HISTORY: History of right breast cancer status post right mastectomy. No current left breast complaints.     COMPARISON STUDY: Left breast screening mammogram 1/8/2016, and 5/22/2008.     FINDINGS:     Straight mediolateral view of the left breast as well as CC, and MLO views of  the left breast are submitted for evaluation. The left breast is composed of  scattered fibroglandular elements. A left node is partially visualized in the  left axilla which shows no obvious worrisome features as visualized. No evolving  skin thickening, or nipple retraction is seen. No evolving worrisome clustered  calcifications are seen. No evolving asymmetry, mass, or architectural changes  are seen of the left breast.    IMPRESSION:  1. No evolving worrisome changes of the left breast to suggest malignancy. Therefore, routine follow-up is recommended with left breast screening mammogram  in one year unless clinically indicated sooner. A reminder letter will be  scheduled.     BI-RADS Assessment Category 2: Benign finding(s). IMPRESSION:  Eugenia Gonzalez is a 69 y.o. female with right breast infiltrating ductal carcinoma, high grade, 100% ER, IL 3% and HER2 was negative by IHC, [id number] with positive paratracheal lymph node. [id number]. Stage IV breast cancer by virtue of a single paratracheal lymph node which was confirmed pathologically. She is s/p right breast mastectomy and axillary dissection with tissue expanders in place.  She completed radiation therapy of the right chest wall and draining lymphatics, 01/10/17. She is recovering as anticipated from radiation therapy. She completed reconstruction in September. Since last seen, she developed a MRSA infection of the reconstructed right breast. Implant removed and is now followed by ID. She was started on doxycyline yesterday. She continues on Femara under the direction of Dr. Yazmin Delgado. Ibrance on hold allowing time for healing under the direction of Dr. Yazmin Delgado. Mammogram, 07/19/17 negative for malignancy. Plan:  1)  Schedule annual left breast mammogram for July 2018  2)  Endocrine therapy under the direction of Dr. Yazmin Delgado. Continue close follow up  3)  I will see her every 6 months x2 years, then yearly generally - follow up 6 months  4) I spent 25 minutes in the care of Ms. Peace Angeles today, over 50% of which was in direct counseling and ongoing       management of her breast cancer. All questions were answered to the best of my ability. Rekha Aceves NP  01/25/18      Portions of this note were copied from prior encounters and reviewed for accuracy, currency, and represent documentation and tasks completed during this encounter. I verify and attest these portions to be unchanged from prior visits.

## 2018-01-26 DIAGNOSIS — C50.211 MALIGNANT NEOPLASM OF UPPER-INNER QUADRANT OF RIGHT FEMALE BREAST, UNSPECIFIED ESTROGEN RECEPTOR STATUS (HCC): Primary | ICD-10-CM

## 2018-02-07 ENCOUNTER — HOSPITAL ENCOUNTER (OUTPATIENT)
Dept: CT IMAGING | Age: 68
Discharge: HOME | End: 2018-02-07
Payer: MEDICARE

## 2018-02-07 DIAGNOSIS — C50.919: ICD-10-CM

## 2018-02-07 DIAGNOSIS — N61.1 BREAST ABSCESS OF FEMALE: ICD-10-CM

## 2018-02-07 DIAGNOSIS — L02.213 ABSCESS OF CHEST WALL: ICD-10-CM

## 2018-02-07 DIAGNOSIS — C77.1: ICD-10-CM

## 2018-02-07 DIAGNOSIS — L03.313 CELLULITIS OF CHEST WALL: ICD-10-CM

## 2018-02-07 LAB — CREAT BLD-MCNC: 0.9 MG/DL (ref 0.8–1.5)

## 2018-02-07 PROCEDURE — 82565 ASSAY OF CREATININE: CPT

## 2018-02-07 PROCEDURE — 74011636320 HC RX REV CODE- 636/320: Performed by: NURSE PRACTITIONER

## 2018-02-07 PROCEDURE — 74011250636 HC RX REV CODE- 250/636: Performed by: NURSE PRACTITIONER

## 2018-02-07 PROCEDURE — 71260 CT THORAX DX C+: CPT

## 2018-02-07 PROCEDURE — 74011000258 HC RX REV CODE- 258: Performed by: NURSE PRACTITIONER

## 2018-02-07 RX ORDER — HEPARIN 100 UNIT/ML
500 SYRINGE INTRAVENOUS AS NEEDED
Status: COMPLETED | OUTPATIENT
Start: 2018-02-07 | End: 2018-02-07

## 2018-02-07 RX ORDER — SODIUM CHLORIDE 0.9 % (FLUSH) 0.9 %
10 SYRINGE (ML) INJECTION
Status: COMPLETED | OUTPATIENT
Start: 2018-02-07 | End: 2018-02-07

## 2018-02-07 RX ADMIN — SODIUM CHLORIDE 100 ML: 900 INJECTION, SOLUTION INTRAVENOUS at 11:44

## 2018-02-07 RX ADMIN — Medication 10 ML: at 11:44

## 2018-02-07 RX ADMIN — SODIUM CHLORIDE, PRESERVATIVE FREE 500 UNITS: 5 INJECTION INTRAVENOUS at 11:55

## 2018-02-07 RX ADMIN — IOPAMIDOL 80 ML: 755 INJECTION, SOLUTION INTRAVENOUS at 11:44

## 2018-02-27 ENCOUNTER — PATIENT OUTREACH (OUTPATIENT)
Dept: CASE MANAGEMENT | Age: 68
End: 2018-02-27

## 2018-02-27 ENCOUNTER — HOSPITAL ENCOUNTER (OUTPATIENT)
Dept: LAB | Age: 68
Discharge: HOME | End: 2018-02-27
Payer: MEDICARE

## 2018-02-27 DIAGNOSIS — Z17.0 MALIGNANT NEOPLASM OF UPPER-INNER QUADRANT OF RIGHT BREAST IN FEMALE, ESTROGEN RECEPTOR POSITIVE (HCC): ICD-10-CM

## 2018-02-27 DIAGNOSIS — C50.211 MALIGNANT NEOPLASM OF UPPER-INNER QUADRANT OF RIGHT BREAST IN FEMALE, ESTROGEN RECEPTOR POSITIVE (HCC): ICD-10-CM

## 2018-02-27 PROBLEM — C50.919 MALIGNANT NEOPLASM OF BREAST IN FEMALE, ESTROGEN RECEPTOR POSITIVE (HCC): Chronic | Status: ACTIVE | Noted: 2018-02-27

## 2018-02-27 LAB
ALBUMIN SERPL-MCNC: 3.4 G/DL (ref 3.2–4.6)
ALBUMIN/GLOB SERPL: 0.8 {RATIO}
ALP SERPL-CCNC: 140 U/L (ref 50–136)
ALT SERPL-CCNC: 24 U/L (ref 12–65)
ANION GAP SERPL CALC-SCNC: 5 MMOL/L
AST SERPL-CCNC: 29 U/L (ref 15–37)
BASOPHILS # BLD: 0 K/UL (ref 0–0.2)
BASOPHILS NFR BLD: 1 % (ref 0–2)
BILIRUB SERPL-MCNC: 0.5 MG/DL (ref 0.2–1.1)
BUN SERPL-MCNC: 18 MG/DL (ref 8–23)
CALCIUM SERPL-MCNC: 8.8 MG/DL (ref 8.3–10.4)
CHLORIDE SERPL-SCNC: 104 MMOL/L (ref 98–107)
CO2 SERPL-SCNC: 30 MMOL/L (ref 21–32)
CREAT SERPL-MCNC: 0.9 MG/DL (ref 0.6–1)
DIFFERENTIAL METHOD BLD: ABNORMAL
EOSINOPHIL # BLD: 0.1 K/UL (ref 0–0.8)
EOSINOPHIL NFR BLD: 2 % (ref 0.5–7.8)
ERYTHROCYTE [DISTWIDTH] IN BLOOD BY AUTOMATED COUNT: 13.6 % (ref 11.9–14.6)
GLOBULIN SER CALC-MCNC: 4.4 G/DL
GLUCOSE SERPL-MCNC: 98 MG/DL (ref 65–100)
HCT VFR BLD AUTO: 39 % (ref 35.8–46.3)
HGB BLD-MCNC: 13.1 G/DL (ref 11.7–15.4)
LYMPHOCYTES # BLD: 1.3 K/UL (ref 0.5–4.6)
LYMPHOCYTES NFR BLD: 21 % (ref 13–44)
MAGNESIUM SERPL-MCNC: 2.4 MG/DL (ref 1.8–2.4)
MCH RBC QN AUTO: 31 PG (ref 26.1–32.9)
MCHC RBC AUTO-ENTMCNC: 33.6 G/DL (ref 31.4–35)
MCV RBC AUTO: 92.4 FL (ref 79.6–97.8)
MONOCYTES # BLD: 0.5 K/UL (ref 0.1–1.3)
MONOCYTES NFR BLD: 8 % (ref 4–12)
NEUTS SEG # BLD: 4.4 K/UL (ref 1.7–8.2)
NEUTS SEG NFR BLD: 69 % (ref 43–78)
NRBC # BLD: 0 K/UL (ref 0–0.2)
PLATELET # BLD AUTO: 160 K/UL (ref 150–450)
PMV BLD AUTO: 10.1 FL (ref 10.8–14.1)
POTASSIUM SERPL-SCNC: 3.9 MMOL/L (ref 3.5–5.1)
PROT SERPL-MCNC: 7.8 G/DL (ref 6.3–8.2)
RBC # BLD AUTO: 4.22 M/UL (ref 4.05–5.25)
SODIUM SERPL-SCNC: 139 MMOL/L (ref 136–145)
WBC # BLD AUTO: 6.4 K/UL (ref 4.3–11.1)

## 2018-02-27 PROCEDURE — 80053 COMPREHEN METABOLIC PANEL: CPT | Performed by: INTERNAL MEDICINE

## 2018-02-27 PROCEDURE — 85025 COMPLETE CBC W/AUTO DIFF WBC: CPT | Performed by: INTERNAL MEDICINE

## 2018-02-27 PROCEDURE — 83735 ASSAY OF MAGNESIUM: CPT | Performed by: INTERNAL MEDICINE

## 2018-02-27 NOTE — PROGRESS NOTES
2/27/2018 Saw the patient with Dr Kecia Ferguson. The most recent CT scan shows the lung nodule to be improved. She is still being seen by ID. She will not resume Ibrance but will continue on Arimidex. Will continue to follow.

## 2018-05-24 ENCOUNTER — HOSPITAL ENCOUNTER (OUTPATIENT)
Dept: PET IMAGING | Age: 68
Discharge: HOME | End: 2018-05-24
Payer: MEDICARE

## 2018-05-24 DIAGNOSIS — C50.211 MALIGNANT NEOPLASM OF UPPER-INNER QUADRANT OF RIGHT BREAST IN FEMALE, ESTROGEN RECEPTOR POSITIVE (HCC): ICD-10-CM

## 2018-05-24 DIAGNOSIS — Z17.0 MALIGNANT NEOPLASM OF UPPER-INNER QUADRANT OF RIGHT BREAST IN FEMALE, ESTROGEN RECEPTOR POSITIVE (HCC): ICD-10-CM

## 2018-05-24 PROCEDURE — A9552 F18 FDG: HCPCS

## 2018-05-24 PROCEDURE — 74011636320 HC RX REV CODE- 636/320: Performed by: INTERNAL MEDICINE

## 2018-05-24 RX ORDER — SODIUM CHLORIDE 0.9 % (FLUSH) 0.9 %
5-10 SYRINGE (ML) INJECTION
Status: COMPLETED | OUTPATIENT
Start: 2018-05-24 | End: 2018-05-24

## 2018-05-24 RX ADMIN — DIATRIZOATE MEGLUMINE AND DIATRIZOATE SODIUM 10 ML: 660; 100 LIQUID ORAL; RECTAL at 14:21

## 2018-05-24 RX ADMIN — Medication 10 ML: at 14:21

## 2018-05-29 ENCOUNTER — PATIENT OUTREACH (OUTPATIENT)
Dept: CASE MANAGEMENT | Age: 68
End: 2018-05-29

## 2018-05-29 ENCOUNTER — HOSPITAL ENCOUNTER (OUTPATIENT)
Dept: LAB | Age: 68
Discharge: HOME | End: 2018-05-29
Payer: MEDICARE

## 2018-05-29 DIAGNOSIS — Z17.0 MALIGNANT NEOPLASM OF BREAST IN FEMALE, ESTROGEN RECEPTOR POSITIVE, UNSPECIFIED LATERALITY, UNSPECIFIED SITE OF BREAST (HCC): Chronic | ICD-10-CM

## 2018-05-29 DIAGNOSIS — C50.919 MALIGNANT NEOPLASM OF BREAST IN FEMALE, ESTROGEN RECEPTOR POSITIVE, UNSPECIFIED LATERALITY, UNSPECIFIED SITE OF BREAST (HCC): Chronic | ICD-10-CM

## 2018-05-29 LAB
ALBUMIN SERPL-MCNC: 3.1 G/DL (ref 3.2–4.6)
ALBUMIN/GLOB SERPL: 0.7 {RATIO} (ref 1.2–3.5)
ALP SERPL-CCNC: 138 U/L (ref 50–136)
ALT SERPL-CCNC: 22 U/L (ref 12–65)
ANION GAP SERPL CALC-SCNC: 4 MMOL/L (ref 7–16)
AST SERPL-CCNC: 25 U/L (ref 15–37)
BASOPHILS # BLD: 0 K/UL (ref 0–0.2)
BASOPHILS NFR BLD: 1 % (ref 0–2)
BILIRUB SERPL-MCNC: 0.3 MG/DL (ref 0.2–1.1)
BUN SERPL-MCNC: 15 MG/DL (ref 8–23)
CALCIUM SERPL-MCNC: 9.1 MG/DL (ref 8.3–10.4)
CHLORIDE SERPL-SCNC: 104 MMOL/L (ref 98–107)
CO2 SERPL-SCNC: 30 MMOL/L (ref 21–32)
CREAT SERPL-MCNC: 0.87 MG/DL (ref 0.6–1)
DIFFERENTIAL METHOD BLD: ABNORMAL
EOSINOPHIL # BLD: 0.2 K/UL (ref 0–0.8)
EOSINOPHIL NFR BLD: 3 % (ref 0.5–7.8)
ERYTHROCYTE [DISTWIDTH] IN BLOOD BY AUTOMATED COUNT: 13 % (ref 11.9–14.6)
GLOBULIN SER CALC-MCNC: 4.4 G/DL (ref 2.3–3.5)
GLUCOSE SERPL-MCNC: 114 MG/DL (ref 65–100)
HCT VFR BLD AUTO: 35.9 % (ref 35.8–46.3)
HGB BLD-MCNC: 12.1 G/DL (ref 11.7–15.4)
LYMPHOCYTES # BLD: 1.1 K/UL (ref 0.5–4.6)
LYMPHOCYTES NFR BLD: 17 % (ref 13–44)
MCH RBC QN AUTO: 31.8 PG (ref 26.1–32.9)
MCHC RBC AUTO-ENTMCNC: 33.7 G/DL (ref 31.4–35)
MCV RBC AUTO: 94.2 FL (ref 79.6–97.8)
MONOCYTES # BLD: 0.4 K/UL (ref 0.1–1.3)
MONOCYTES NFR BLD: 6 % (ref 4–12)
NEUTS SEG # BLD: 4.7 K/UL (ref 1.7–8.2)
NEUTS SEG NFR BLD: 74 % (ref 43–78)
NRBC # BLD: 0 K/UL (ref 0–0.2)
PLATELET # BLD AUTO: 203 K/UL (ref 150–450)
PMV BLD AUTO: 9.7 FL (ref 10.8–14.1)
POTASSIUM SERPL-SCNC: 4 MMOL/L (ref 3.5–5.1)
PROT SERPL-MCNC: 7.5 G/DL (ref 6.3–8.2)
RBC # BLD AUTO: 3.81 M/UL (ref 4.05–5.25)
SODIUM SERPL-SCNC: 138 MMOL/L (ref 136–145)
WBC # BLD AUTO: 6.4 K/UL (ref 4.3–11.1)

## 2018-05-29 PROCEDURE — 85025 COMPLETE CBC W/AUTO DIFF WBC: CPT | Performed by: INTERNAL MEDICINE

## 2018-05-29 PROCEDURE — 80053 COMPREHEN METABOLIC PANEL: CPT | Performed by: INTERNAL MEDICINE

## 2018-05-29 NOTE — PROGRESS NOTES
5/29/2018 Saw the patient with Dr Yasmin Fall. She does have some concerning lymph nodes on her recent PET scan. She was previously on ibrance and Dr Yasmin Fall discussed with her that he would like her to start this back. She is a bit reluctant to start Ibrance and would like to possibly have an EBUS to look at the lymph nodes to ensure it is positive. She does desire EBUS first and we will move forward with scheduling. Dr Yasmin Fall discussed with her that if biopsy is positive she may need to start Ibrance again. I have emailed Arturo Johnson with PP to schedule EBUS. Navigation will continue to follow.

## 2018-06-04 ENCOUNTER — HOSPITAL ENCOUNTER (OUTPATIENT)
Age: 68
Setting detail: OUTPATIENT SURGERY
Discharge: HOME | End: 2018-06-04
Attending: INTERNAL MEDICINE | Admitting: INTERNAL MEDICINE
Payer: MEDICARE

## 2018-06-04 VITALS
BODY MASS INDEX: 23.92 KG/M2 | DIASTOLIC BLOOD PRESSURE: 67 MMHG | SYSTOLIC BLOOD PRESSURE: 158 MMHG | RESPIRATION RATE: 16 BRPM | HEART RATE: 92 BPM | HEIGHT: 62 IN | WEIGHT: 130 LBS | TEMPERATURE: 97.9 F | OXYGEN SATURATION: 97 %

## 2018-06-04 DIAGNOSIS — R59.0 MEDIASTINAL ADENOPATHY: ICD-10-CM

## 2018-06-04 DIAGNOSIS — C78.1: ICD-10-CM

## 2018-06-04 PROCEDURE — 74011250636 HC RX REV CODE- 250/636: Performed by: INTERNAL MEDICINE

## 2018-06-04 PROCEDURE — 74011000250 HC RX REV CODE- 250: Performed by: INTERNAL MEDICINE

## 2018-06-04 PROCEDURE — 88305 TISSUE EXAM BY PATHOLOGIST: CPT | Performed by: INTERNAL MEDICINE

## 2018-06-04 PROCEDURE — 74011250636 HC RX REV CODE- 250/636

## 2018-06-04 PROCEDURE — 77030009046 HC CATH BRNCH BLLN OCOA -B: Performed by: INTERNAL MEDICINE

## 2018-06-04 PROCEDURE — 99153 MOD SED SAME PHYS/QHP EA: CPT | Performed by: INTERNAL MEDICINE

## 2018-06-04 PROCEDURE — 88172 CYTP DX EVAL FNA 1ST EA SITE: CPT | Performed by: INTERNAL MEDICINE

## 2018-06-04 PROCEDURE — 88177 CYTP FNA EVAL EA ADDL: CPT | Performed by: INTERNAL MEDICINE

## 2018-06-04 PROCEDURE — 88173 CYTOPATH EVAL FNA REPORT: CPT | Performed by: INTERNAL MEDICINE

## 2018-06-04 PROCEDURE — 77030003406 HC NDL ASPIR BIOP OCOA -C: Performed by: INTERNAL MEDICINE

## 2018-06-04 PROCEDURE — 77030012699 HC VLV SUC CNTRL OCOA -A: Performed by: INTERNAL MEDICINE

## 2018-06-04 PROCEDURE — 99152 MOD SED SAME PHYS/QHP 5/>YRS: CPT | Performed by: INTERNAL MEDICINE

## 2018-06-04 PROCEDURE — 31652 BRONCH EBUS SAMPLNG 1/2 NODE: CPT | Performed by: INTERNAL MEDICINE

## 2018-06-04 PROCEDURE — 76040000025: Performed by: INTERNAL MEDICINE

## 2018-06-04 RX ORDER — LIDOCAINE HYDROCHLORIDE 20 MG/ML
JELLY TOPICAL AS NEEDED
Status: DISCONTINUED | OUTPATIENT
Start: 2018-06-04 | End: 2018-06-04 | Stop reason: HOSPADM

## 2018-06-04 RX ORDER — FLUMAZENIL 0.1 MG/ML
0.2 INJECTION INTRAVENOUS
Status: DISCONTINUED | OUTPATIENT
Start: 2018-06-04 | End: 2018-06-04 | Stop reason: HOSPADM

## 2018-06-04 RX ORDER — SODIUM CHLORIDE 9 MG/ML
1000 INJECTION, SOLUTION INTRAVENOUS CONTINUOUS
Status: DISCONTINUED | OUTPATIENT
Start: 2018-06-04 | End: 2018-06-04 | Stop reason: HOSPADM

## 2018-06-04 RX ORDER — NALOXONE HYDROCHLORIDE 0.4 MG/ML
0.4 INJECTION, SOLUTION INTRAMUSCULAR; INTRAVENOUS; SUBCUTANEOUS
Status: DISCONTINUED | OUTPATIENT
Start: 2018-06-04 | End: 2018-06-04 | Stop reason: HOSPADM

## 2018-06-04 RX ORDER — HEPARIN 100 UNIT/ML
500 SYRINGE INTRAVENOUS
Status: DISCONTINUED | OUTPATIENT
Start: 2018-06-04 | End: 2018-06-04 | Stop reason: HOSPADM

## 2018-06-04 RX ORDER — HYDRALAZINE HYDROCHLORIDE 20 MG/ML
10 INJECTION INTRAMUSCULAR; INTRAVENOUS ONCE
Status: COMPLETED | OUTPATIENT
Start: 2018-06-04 | End: 2018-06-04

## 2018-06-04 RX ORDER — LIDOCAINE HYDROCHLORIDE 40 MG/ML
SOLUTION TOPICAL AS NEEDED
Status: DISCONTINUED | OUTPATIENT
Start: 2018-06-04 | End: 2018-06-04 | Stop reason: HOSPADM

## 2018-06-04 RX ORDER — MIDAZOLAM HYDROCHLORIDE 1 MG/ML
.25-5 INJECTION, SOLUTION INTRAMUSCULAR; INTRAVENOUS
Status: DISCONTINUED | OUTPATIENT
Start: 2018-06-04 | End: 2018-06-04 | Stop reason: HOSPADM

## 2018-06-04 RX ORDER — FENTANYL CITRATE 50 UG/ML
50 INJECTION, SOLUTION INTRAMUSCULAR; INTRAVENOUS
Status: DISCONTINUED | OUTPATIENT
Start: 2018-06-04 | End: 2018-06-04 | Stop reason: HOSPADM

## 2018-06-04 RX ORDER — LABETALOL HYDROCHLORIDE 5 MG/ML
5 INJECTION, SOLUTION INTRAVENOUS
Status: DISCONTINUED | OUTPATIENT
Start: 2018-06-04 | End: 2018-06-04 | Stop reason: HOSPADM

## 2018-06-04 RX ADMIN — FENTANYL CITRATE 50 MCG: 50 INJECTION, SOLUTION INTRAMUSCULAR; INTRAVENOUS at 08:45

## 2018-06-04 RX ADMIN — SODIUM CHLORIDE 1000 ML: 900 INJECTION, SOLUTION INTRAVENOUS at 08:40

## 2018-06-04 RX ADMIN — LIDOCAINE HYDROCHLORIDE: 40 SOLUTION TOPICAL at 08:43

## 2018-06-04 RX ADMIN — LABETALOL HYDROCHLORIDE 5 MG: 5 INJECTION, SOLUTION INTRAVENOUS at 08:50

## 2018-06-04 RX ADMIN — FENTANYL CITRATE 100 MCG: 50 INJECTION, SOLUTION INTRAMUSCULAR; INTRAVENOUS at 08:40

## 2018-06-04 RX ADMIN — LIDOCAINE HYDROCHLORIDE: 20 JELLY TOPICAL at 08:43

## 2018-06-04 RX ADMIN — MIDAZOLAM HYDROCHLORIDE 2 MG: 1 INJECTION, SOLUTION INTRAMUSCULAR; INTRAVENOUS at 08:40

## 2018-06-04 RX ADMIN — HYDRALAZINE HYDROCHLORIDE 10 MG: 20 INJECTION INTRAMUSCULAR; INTRAVENOUS at 09:37

## 2018-06-04 NOTE — PROCEDURES
PROCEDURE  Bronchoscopy with Endobronchial Ultrasound Guided Fine Needle Aspiration Of Medistinal Lymph nodes and airway inspection. INDICATION   Diagnosis of Mediastinal Lymphadenopathy/suspected breast cancer recurrence        POST OP DIAGNOSIS:  Station 2R 2/3 LN sampled and  were  Positive for malignancy on KJ.  4 dedicated samples were obtained and sent for cell block for IHC    Suspect breast CA recurrence        ANESTHESIA  Concious sedation with: Fentanyl 150 mcg IV; Versed 2 mg IV; Lidocaine 180 mg to tracheo-bronchial tree and vocal cords. AIRWAY INSPECTION  After obtaining informed consent, using a bite block, an Olympus Q 180 viedeo bronchoscope was  introduced into the trachea through the vocal cords without complications. RIGHT  LOCATION NORM/ABNORMAL DESCRIPTION   VOCAL CORDS NL    TRACHEA NL    JOCELYNE NL    RMSB NL    RUL NL    BI NL    RML NL    SUP SEGM RLL NL    MED BASAL NL    ANTERIOR BASAL NL    LATERAL BASAL NL    POSTERIOR BASAL NL                                               LEFT  LOCATION NORMAL/ABNORMAL TYPE   LMSB NL    JUANY NL    LINGULA NL    SUPERIOR DIVISION NL    SUPERIOR SEG LLL NL    EVELYNE-MEDIAL LLL NL    LATERAL LLL NL    POSTERIOR LLL NL                         EBUS  After completing the airway inspection an Olympus  F EBUS bronchoscope was introduced into the trachea through the vocal chords without complication.   The balloon was inflated with saline and a mediastinal inspection commenced:      STATION SIZE IN CM   11R sup Not insp   11R inf Not insp   10R Not insp   7 1.5/1.2   4R 1.0/0.8   11L Not insp   10L Not insp   4L congl 3 LN:  0.3/0.3 each   2L No tgt   2R congl  3LN:  a:0.7/0.5  b:0.8/0.6  c:0.9/0.7         After identifying targets the following samples were obtained:    STATION PASS# LYMPHOCYTES MALIGNANT ATYPICAL GRANULOMA NONDGNSTC   2Ra 1 - - ++ -     2 - - ++ -     3 + - - -             2Rb 1 - ++++ - -     2 - ++++ - -     3 -- -- -- -- In toto for cell block    4 -- -- -- -- \"    5 -- -- -- -- \"    6 -- -- -- -- \"                 The procedure was completed without complication and the patient tolerated the procedure well.     EBL: 5 ml    Dl Tobar MD.

## 2018-06-04 NOTE — INTERVAL H&P NOTE
H&P Update:  Matt Malone was seen and examined. History and physical has been reviewed. The patient has been examined.  There have been no significant clinical changes since the completion of the originally dated History and Physical.    Signed By: Suzanne Chaves MD     June 4, 2018 8:34 AM

## 2018-06-04 NOTE — H&P (VIEW-ONLY)
[facility]: Office Visit Established Patient    Chief Complaint:    Chief Complaint   Patient [facility] with    Follow-up     Malignant neoplasm of upper-inner quadrant of right breast in female, estrogen receptor positive          History of Present Illness:  Ms. Melissa Thibodeaux is a 69 y.o. female who [facility] today for follow-up regarding breast cancer. She was in her usual state of health until December 2015 when she noted an increased redness and swelling in her right breast, with a masslike density that was also new. She had underwent a clinical breast exam in September that was normal.  She was recommended to undergo repeat mammogram and ultrasound which showed a mass that was indistinct but suspicious, and skin thickening also suspicious for malignant involvement. She went for biopsy which showed infiltrating ductal carcinoma, high grade, but with 100% ER expression. IN was only 3% and HER2 was negative by IHC. MRI showed the tumor to be extensive, with enhancement spanning over 12 cm, with confirmation of skin thickening and a low axillary lymph node that was not identified by ultrasound. She was referred to surgery, and Dr. Tonia Ta felt that she would be appropriate for neoadjuvant chemotherapy due to the large size and the skin involvement. We recommended PET/CT and port placement prior to potentially starting dose dense AC followed by weekly Taxol. Unfortunately, in addition to the locally advanced disease, there was also evidence of a paratracheal lymph node which was indeterminate by imaging. Chemotherapy was postponed and EBUS for diagnostic purposes was performed. She returns today for results and to start systemic chemotherapy. Biopsy results returned as positive for malignant cells, also ER/IN positive and HER2 negative. This dramatically changed her prognosis, rendering her stage IV disease.  With such a small amount of oligometastasis, will proceed with aggressive chemotherapeutic management given the dramatic local involvement of her tumor. She completed 4 cycles of ddAC with excellent disease response, tolerated 12 cycles of weekly Taxol exceptionally well. Imaging post-chemotherapy showed at least partial response in the breast and complete response in the axillary and mediastinal lymph nodes. We recommended mastectomy followed by chest wall and axillary radiotherapy. Mastectomy showed partial response with tumor only 1.5 cm in the breast, but axillary dissection still showed 10 of 12 lymph nodes involved with disease. She was presented at the tumor board and the group was in agreement with radiation. I will start her Arimidex now (it was not started at the previous visit) but I would also recommend adding palbociclib after radiation because of the high volume sagar disease still present after chemotherapy. Hospitalized with PNA and failure to thrive during her first cycle of therapy, palbo held temporarily and letrozole continued. When palbo restarted, dose changed to 75 mg. She was then hospitalized for sepsis and DANNIE which led to her right breast implant being removed due to infection that showed scant MRSA. Her Bill High was held at that time. During her stay in rehab she had shingles, and then a recurrence of her cellulitis in the chest wall that required additional debridement/drainage. She returns today for follow-up. Doing better, remains off abx from ID, no recent changes in the chest wall. Remains off Ibrance since the beginning of September. She is just getting over a respiratory infection, completed antibiotics for therapy. No issues with Femara, no other emergent side effects, feeling very well overall. Review of Systems:  Constitutional: Negative. HENT: Negative. Eyes: Negative. Respiratory: Negative. Cardiovascular: Negative. Gastrointestinal: Negative. Genitourinary: Negative. Musculoskeletal: Negative.    Skin: Negative. Neurological: Negative. Endo/Heme/Allergies: Negative. Psychiatric/Behavioral: Negative. All other systems reviewed and are negative. Allergies   Allergen Reactions    Prednisone Unknown (comments)     \"psychotic reaction\"  To oral and parenteral steroids     Past Medical History:   Diagnosis Date    Abnormal CT of the chest     Adverse effect of anesthesia     bp dropped with dual lung/gallbladder surgery    Asthma      no acute attacks recently.  uses advair daily    Breast cancer ([address] 2016    R breast, s/p mastectomy, chemo, reconstruction    Depression      Hypercholesteremia     Controlled with diet     Hypothyroidism     Ill-defined condition     granular lung disease resolved 2002     Past Surgical History:   Procedure Laterality Date    CHEST SURGERY PROCEDURE UNLISTED      R lung biopsy     HX BLADDER REPAIR      HX BREAST AUGMENTATION Right 10/20/2017    BREAST IMPLANT REMOVAL  performed by Dany Willoughby MD at [address] HX BREAST BIOPSY Right 2/5/2016    SKIN BIOPSY RIGHT BREAST performed by Sena Simon MD at [address] HX BREAST RECONSTRUCTION Bilateral 9/14/2016    BILATERAL BREAST RECONSTRUCTION performed by Dany Willoughby MD at [address] HX BREAST RECONSTRUCTION Bilateral 9/14/2016    BILATERAL BREAST TISSUE EXPANDER INSERTION performed by Dany Willoughby MD at [address] HX BREAST RECONSTRUCTION Right 9/18/2017    RIGHT BREAST RECONSTRUCTION performed by Dany Willoughby MD at [address] HX BREAST RECONSTRUCTION Right 9/18/2017    Right  PLACEMENT OF PERMANENT IMPLANTS performed by Dany Willoughby MD at [address] HX BREAST REDUCTION Left 9/18/2017    LEFT BREAST MASTOPEXY FOR SYMMETRY performed by Dany Willoughby MD at [address] HX COLONOSCOPY      2006    HX CYST INCISION AND DRAINAGE Right 12/15/2017    INCISION AND DRAINAGE BREAST  performed by Dany Willoughby MD at [address] HX CYSTOCELE REPAIR      HX HYSTERECTOMY      fibroids    HX LAP CHOLECYSTECTOMY  2000    HX MASTECTOMY Right     HX RECTOCELE REPAIR      HX VASCULAR ACCESS      port left chest wall     Family History   Problem Relation Age of Onset    Hypertension Mother     Cancer Mother 79     Breast cancer at age 79    Diabetes Mother      Type 2    Other Father      alzheimers    Cancer Maternal Aunt      Breast cancer after age 72    Cancer Maternal Aunt      Breast cancer at age 39   [facility] Allergic Rhinitis Neg Hx     Allergy-severe Neg Hx     Amblyopia Neg Hx     Anemia Neg Hx     Anesth Problems Neg Hx     Angioedema Neg Hx     Anxiety Neg Hx     Arrhythmia Neg Hx     Arthritis-rheumatoid Neg Hx     Ataxia Neg Hx     Atopy Neg Hx     Bipolar Disorder Neg Hx     Blindness Neg Hx     Breast Cancer Neg Hx     Breast Problems Neg Hx     Broken Bones Neg Hx     Cataract Neg Hx     Celiac Disease Neg Hx     Childhood heart surgery Neg Hx     Childhood resp disease Neg Hx     Chorea Neg Hx     Clotting Disorder Neg Hx     Collagen Dis Neg Hx     Colon Cancer Neg Hx     Colon Polyps Neg Hx     COPD Neg Hx     Coronary Artery Disease Neg Hx     Crohn's Disease Neg Hx     Cystic Fibrosis Neg Hx     Delayed Awakening Neg Hx     Dementia Neg Hx     Depression Neg Hx     Dislocations Neg Hx     Downs Syndrome Neg Hx     Drug Abuse Neg Hx     Eclampsia Neg Hx     Eczema Neg Hx     Emergence Delirium Neg Hx     Emphysema Neg Hx     Fainting Neg Hx     Genitourinary () Neg Hx     Glaucoma Neg Hx     Heart Attack Neg Hx     Heart defect Neg Hx     Heart Failure Neg Hx     Heart Surgery Neg Hx     Hemachromatosis Neg Hx     Herpes Neg Hx     High Cholesterol Neg Hx     HIV/AIDS Neg Hx     Immunodeficiency Neg Hx     Infertility Neg Hx     Inflammatory Bowel Dz Neg Hx     Kidney Disease Neg Hx     Liver Disease Neg Hx     Macular Degen Neg Hx     Malignant Hyperthermia Neg Hx     MS Neg Hx     Neuropathy Neg Hx     NF Neg Hx     Osteoporosis Neg Hx     Ovarian Cancer Neg Hx     Pacemaker Neg Hx     Panic disorder Neg Hx     Parkinsonism Neg Hx     Post-op Cognitive Dysfunction Neg Hx     Post-op Nausea/Vomiting Neg Hx     Prematurity Neg Hx      Labor Neg Hx     Pseudocholinesterase Deficiency Neg Hx     Psoriasis Neg Hx     Psychotic Disorder Neg Hx     Rashes/Skin Problems Neg Hx     Retinal Detachment Neg Hx     Rh Incompatibility Neg Hx     Schizophrenia Neg Hx     Seizures Neg Hx     Severe Sprains Neg Hx     Sickle Cell Anemia Neg Hx     Sickle Cell Trait Neg Hx     SIDS Neg Hx     SLE Neg Hx     Spont Ab Neg Hx     Stomach Cancer Neg Hx     Strabismus Neg Hx     Substance Abuse Neg Hx     Sudden Death Neg Hx     Suicide Neg Hx     Thyroid Disease Neg Hx     Tuberculosis Neg Hx     Ulcerative Colitis Neg Hx     Urticaria Neg Hx     Lang's Disease Neg Hx      Social History     Social History    Marital status:      Spouse name: N/A    Number of children: N/A    Years of education: N/A     Occupational History    Not on file. Social History Main Topics    Smoking status: Never Smoker    Smokeless tobacco: Never Used    Alcohol use No    Drug use: No    Sexual activity: Not on file     Other Topics Concern    Not on file     Social History Narrative     Current Outpatient Prescriptions   Medication Sig Dispense Refill    letrozole (FEMARA) 2.5 mg tablet Take 1 Tab by mouth daily. 30 Tab 5    palbociclib (IBRANCE) 75 mg cap Take 1 Cap by mouth daily. 30 Cap 0    ALPRAZolam (XANAX) 0.5 mg tablet 1 bid  prn  Indications: anxiety, sleep 60 Tab 5    doxycycline (ADOXA) 100 mg tablet Take 1 Tab by mouth two (2) times a day. Indications: chest wall cellulitis amelioration 7 Tab 15    DULoxetine (CYMBALTA) 60 mg capsule Take 1 Cap by mouth daily.  30 Cap 12    fluticasone-salmeterol (ADVAIR DISKUS) 250-50 mcg/dose diskus inhaler Take 1 Puff by inhalation every twelve (12) hours. Indications: INTRINSIC ASTHMA 1 Inhaler 12    levothyroxine (SYNTHROID) 75 mcg tablet Take 1 Tab by mouth Daily (before breakfast). Indications: hypothyroidism 30 Tab 12    heparin, porcine, pf (HEPARIN LOCKFLUSH,PORCINE,,PF,) 100 unit/mL injection Access port, flush with 20ml Normal Saline, 500 units of Heparin Flush and de access port every 4 weeks. 1 Syringe 5    albuterol (VENTOLIN) 90 mcg/Actuation inhaler Take 2 Puffs by inhalation every six (6) hours as needed. Indications: bring on the dos. OBJECTIVE:  Visit Vitals    /79  Comment: standing    Pulse (!) 106    Temp 97.7 °F (36.5 °C) (Oral)    Resp 23    Ht 5' 2\" (1.575 m)    Wt 131 lb (59.4 kg)    SpO2 96%    BMI 23.96 kg/m2       Physical Exam:  Constitutional: Well developed, well nourished female in no acute distress, sitting comfortably in the exam room chair. HEENT: Normocephalic and atraumatic. Oropharynx is clear, mucous membranes are moist.  Sclerae anicteric. Neck supple without JVD. No thyromegaly present. Skin Warm and dry. No bruising and no rash noted. No erythema. No pallor. Respiratory Lungs are clear to auscultation without wheezes, normal air exchange without accessory muscle use. CVS Normal rate, regular rhythm and normal S1 and S2. No murmurs, gallops, or rubs. Abdomen Soft, nontender and nondistended, normoactive bowel sounds. No palpable mass. No hepatosplenomegaly. Neuro Grossly nonfocal with no obvious sensory or motor deficits. MSK Normal range of motion in general.  No edema and no tenderness. Psych Appropriate mood and affect.                Labs:  Recent Results (from the past 48 hour(s))   CBC WITH AUTOMATED DIFF    Collection Time: 05/29/18 12:46 PM   Result Value Ref Range    WBC 6.4 4.3 - 11.1 K/uL    RBC 3.81 (L) 4.05 - 5.25 M/uL    HGB 12.1 11.7 - 15.4 g/dL    HCT 35.9 35.8 - 46.3 %    MCV 94.2 79.6 - 97.8 FL    MCH 31.8 26.1 - 32.9 PG    MCHC 33.7 31.4 - 35.0 g/dL    RDW 13.0 11.9 - 14.6 %    PLATELET 473 035 - 366 K/uL    MPV 9.7 (L) 10.8 - 14.1 FL    ABSOLUTE NRBC 0.00 0.0 - 0.2 K/uL    DF AUTOMATED      NEUTROPHILS 74 43 - 78 %    LYMPHOCYTES 17 13 - 44 %    MONOCYTES 6 4.0 - 12.0 %    EOSINOPHILS 3 0.5 - 7.8 %    BASOPHILS 1 0.0 - 2.0 %    ABS. NEUTROPHILS 4.7 1.7 - 8.2 K/UL    ABS. LYMPHOCYTES 1.1 0.5 - 4.6 K/UL    ABS. MONOCYTES 0.4 0.1 - 1.3 K/UL    ABS. EOSINOPHILS 0.2 0.0 - 0.8 K/UL    ABS. BASOPHILS 0.0 0.0 - 0.2 K/UL   METABOLIC PANEL, COMPREHENSIVE    Collection Time: 05/29/18 12:46 PM   Result Value Ref Range    Sodium 138 136 - 145 mmol/L    Potassium 4.0 3.5 - 5.1 mmol/L    Chloride 104 98 - 107 mmol/L    CO2 30 21 - 32 mmol/L    Anion gap 4 (L) 7 - 16 mmol/L    Glucose 114 (H) 65 - 100 mg/dL    BUN 15 8 - 23 MG/DL    Creatinine 0.87 0.6 - 1.0 MG/DL    GFR est AA >60 >60 ml/min/1.73m2    GFR est non-AA >60 >60 ml/min/1.73m2    Calcium 9.1 8.3 - 10.4 MG/DL    Bilirubin, total 0.3 0.2 - 1.1 MG/DL    ALT (SGPT) 22 12 - 65 U/L    AST (SGOT) 25 15 - 37 U/L    Alk. phosphatase 138 (H) 50 - 136 U/L    Protein, total 7.5 6.3 - 8.2 g/dL    Albumin 3.1 (L) 3.2 - 4.6 g/dL    Globulin 4.4 (H) 2.3 - 3.5 g/dL    A-G Ratio 0.7 (L) 1.2 - 3.5          Imaging:  Nuclear Medicine Whole Body CT / PET- FDG Study 5/24/2018 3:53 PM     INDICATION: Restaging for right breast cancer      COMPARISON: PET/CT 12/22/2017      TECHNIQUE:   Radiopharmaceutical: 16.36 mCi F18-FDG IV. This is a whole-body  PET- FDG study performed on a dedicated full- ring scanner. Low dose,  nondiagnostic CT axial source images are also acquired for PET attenuation  correction and are utilized for PET-CT fusion with the emission images.     The patient is not a diabetic and underwent adequate fasting of at least 4  hours. Blood glucose at the time of tracer administration is 104 mg/dl, which is  adequate for imaging.  Approximately 60 minutes after administration of the  radiopharmaceutical imaging was initiated covering the skull to the thighs. SUV  max. Calculated from patient body wt. Noncaloric MDGASTROVIEW dilute oral  contrast is given.      FINDINGS:  Examination of the 3D tomographic PET images demonstrates no  abnormal hypermetabolic activity in the head and neck. The right breast region treatment changes have clearly diminished with markedly  decreased FDG uptake in the soft tissues. Some asymmetric muscle activity is  seen but this very low intensity and probably benign. There are however now multiple clearly hypermetabolic right-sided upper and  lower paratracheal lymph nodes, there is a deep AP window lymph node on the left  and there are metabolic subcarinal lymph nodes and a mildly metabolic left hilar  lymph node.     On the prior study there were clearly well evident chronic lung changes and  scarring findings. These are similar to before and a pulmonary nodule in the  right upper lobe is stable as well. There are however additionally new  peripherally located mildly consolidative and reticular lung densities which are  clearly hypermetabolic-otherwise though similar to the others.      There is again somewhat heterogeneous tracer uptake in the central marrow, best  evident in the spinal column. This is similar in appearance to the prior study  and no clear associated bony abnormalities are seen on the CT images that  accompany this. This is most likely benign.     IMPRESSION  IMPRESSION:   1. The right breast surgery changes evident before have clearly regressed. There are however multiple abnormal hypermetabolic mediastinal and left hilar  lymph nodes now which were not present before. Cannot exclude metastatic  disease, as indicated clinically consider biopsy for histologic confirmation.     2.  Well evident infectious/inflammatory type patchy peripherally located lung  densities again, with some new sites showing increased FDG uptake noted on  today's exam. Exclude entities such as aspiration-appearance felt to be not  likely lymphangitic tumor spread. PET/CT       Indication: Malignant neoplasm upper inner quadrant right breast restaging  status post chemoradiation     Radiopharmaceutical: 19.2 mCi F18-FDG, intravenously.     Technique: Imaging was performed from the skull through the proximal thighs  using routine PET/CT acquisition protocol. Imaging was performed approximately  60 minutes post injection. Oral contrast was administered. Radiation dose  reduction techniques were used for this study:  Our CT scanners use one or all  of the following: Automated exposure control, adjustment of the mA and/or kVp  according to patient's size, iterative reconstruction.        Serum glucose: 103 mg/dL prior to injection.     Comparison studies: Chest CT 12/13/2017, 10/15/2017, PET/CT 8/11/2017     Findings:     Head and Neck: No lymphadenopathy or abnormal FDG uptake.     Chest: There is elevated FDG activity at the surgical right chest wall flat  margin with drainage catheter in place. Underlying FDG avid inflammation  favored. Fracture surgical clips. Only minimal gas and fluid remains adjacent to  the catheter. The previously described nodular focus right upper lobe image 52  stable without appreciable elevated FDG activity.     Abdomen/Pelvis: No focal FDG abnormality. No lymphadenopathy. No interval  osseous lesion. No bowel obstruction.     IMPRESSION  IMPRESSION:   1. FDG intensity right chest wall consistent with underlying  infection/inflammation. No significant residual fluid with drain catheter in  place. 2. Previously described right upper lobe nodule without FDG activity. Recommend  noncontrast chest CT in 6 months. Hepatic steatosis.       PET/CT       Indication: Malignant neoplasm upper inner quadrant right femur breast status  post chemotherapy and radiation therapy, restaging     Radiopharmaceutical: 17.9 mCi F18-FDG, intravenously.     Technique: Imaging was performed from the skull through the proximal thighs  using routine PET/CT acquisition protocol. Imaging was performed approximately  60 minutes post injection. Oral contrast was administered. Radiation dose  reduction techniques were used for this study:  Our CT scanners use one or all  of the following: Automated exposure control, adjustment of the mA and/or kVp  according to patient's size, iterative reconstruction.        Serum glucose: 84 mg/dL prior to injection.     Comparison studies: PET/CT 7/19/2016     Findings:     Head and Neck: No lymphadenopathy or abnormal FDG uptake.     Chest: Interval right chest wall reconstruction with breast expander device. No  discrete abnormal focus of FDG activity. Postoperative change right axilla  region. Posterior soft tissue sebaceous cyst at T9 level decreased in size. Peripheral nodularity lung parenchyma similar to previous exams. Chronic volume  loss with mild bronchiectasis at the lingula stable in appearance. No discrete  interval pulmonary mass on nonbreath-hold technique.     Abdomen/Pelvis: No lymphadenopathy or abnormal FDG activity. No bowel  obstruction. No interval osseous lesion.     IMPRESSION  IMPRESSION:   1. No FDG avid disease identified. F-18 FDG PET/CT Imaging.       Indication: restaging breast cancer, chemo completed, 77 years Female restaging  right breast cancer diagnosed in February, chemotherapy 1 week ago, history of  cholecystectomy and hysterectomy     Comparison: Whole-body PET/CT February 04, 2016     Radiopharmaceutical: 17.2 mCi of F-18 FDG.       Technique: Delayed PET post oral contrast CT images from skull base to mid  thighs were performed.  Patient received 10 cc of Gastroview for oral contrast.  F-18 FDG PET/CT has limited sensitivity for low grade malignancies, small tumor  deposits, mucin producing neoplasms.     Findings:  There has been interval significant decrease in soft tissue density in  the lateral right breast and decreased FDG uptake, now maximum SUV of 1.4,  consistent with treatment response.  There has also been interval resolution of  the previously visualized right axillary sagar uptake.  Interval resolution of  previously visualized mediastinal sagar FDG uptake, consistent with treatment  response.  Scattered small bilateral peripheral pulmonary nodules measuring up  to 4 mm in the left upper lobe and 5 mm in the right middle lobe appear  unchanged, nonspecific.  Persistent small dermal based lesion in the midline  posterior back at the T9 level posterior to the T9 spinous process, associated  with new mild FDG uptake, maximum SUV of 1.9, this may represent new  inflammation or infection of a dermal or epidermal lesion. There is physiologic  distribution of FDG in head and neck region, abdomen and pelvis.  No focal  uptake identified in the bone. Non diagnostic CT demonstrates grossly  unremarkable heart and mediastinum as well as liver, spleen, adrenals, kidneys,  pancreas and bowel.     Impression:    1.  Overall findings consistent with interval treatment response with decreased  soft tissue density and FDG uptake in the lateral right breast, and resolution  of right axillary and mediastinal sagar FDG uptake. 2.  New mild FDG uptake in a small dermal-based lesion in the midline posterior  back, may represent new inflammation or infection of a dermal or epidermal  lesion. 3.  Other chronic nonspecific findings as above. MRI BILATERAL BREASTS WITHOUT AND WITH CONTRAST, 7/28/2016.      CLINICAL HISTORY:  Restaging breast cancer status post chemotherapy.     Technique: Multiplanar multisequence imaging of the breast were performed prior  to and following the uneventful intravenous administration of 12 mL of  Magnevist. Postcontrast imaging was performed using a dynamic technique.  Images  were reviewed using the [employer].       Sequences obtained: Sagittal T2, axial STIR, axial T1, and axial fat-saturated  T1-weighted images prior to and following administration of contrast.       Comparison studies: Breast MRI 1/19/2016.      FINDINGS:    Susceptibility artifact from what appears to be venous port is now seen in the  upper, posterior left breast. The breasts are composed of heterogeneous  fibroglandular elements. No enlarged axillary lymph nodes are seen. That the  prior enlarged right lower axillary lymph node is felt to be seen on image 142  significantly decreased in size now measuring 0.6 cm when measured using a  similar technique as the prior MRI study. No enlarged internal mammary lymph  nodes are seen. Evaluation of the lungs is limited by  MRI imaging. However, no  obvious pulmonary masses are seen. No significant pleural effusions are seen. The liver is obscured by cardiac motion artifact and only partially visualized. However, no focal signal abnormalities are seen prior to, or following  administration of contrast to suggest a possible hepatic lesion.            Following the administration of intravenous contrast, normal enhancement is seen  of the heart and vascular structures of the breasts. The prior abnormal  enhancement throughout the outer right breast is significantly improved. Minimal  residual nonmasslike enhancement persists in the outer right breast best  appreciated on axial subtraction image 210, and sagittal reconstructed  postcontrast image 45. Which measures 6 cm AP x 2.6 cm wide x 4.3 cm  craniocaudal. The additional enhancement of the right nipple is no longer  appreciated. However, some minimal residual asymmetric enhancement of the right  pectoralis muscle is once again seen best appreciated on axial subtraction image  225.  Moderate right breast skin thickening is seen which is not felt to be  significantly changed from the prior study.          IMPRESSION:  1.  Response to treatment given that there is significant improvement in the  outer right breast masslike enhancement, decreased size of the right axillary  lymph node, resolution of the right nipple enhancement, and decreased  enhancement of the right pectoralis muscle. Given the persistent enhancement in  the outer right breast as well as involving the right pectoralis muscle, some  residual active tumor at these levels cannot be excluded. No evidence for  disease progression is seen.     BI-RADS Assessment Category 6: Known Biopsy Proven Malignancy         NUCLEAR MEDICINE WHOLE BODY CT / PET- FDG Study. INDICATION: Malignant neoplasm of the right breast, extensive infiltrating  ductal carcinoma, initial staging exam.  COMPARISONS: None. TECHNIQUE: Radiopharmaceutical: 17.1 mCi F18-FDG IV. This is a whole-body  PET- FDG study performed on a dedicated full- ring scanner. Low dose,  nondiagnostic CT axial source images are also acquired for PET attenuation  correction and are utilized for PET-CT fusion. The patient underwent adequate  fasting of at least 4 hours. Blood glucose at the time of tracer administration  is 88 mg/dl, which is adequate for imaging. Approximately 90 minutes after  administration of the radiopharmaceutical, imaging was initiated covering the  skull to the thighs. FINDINGS:   HEAD AND NECK: No abnormal hypermetabolic activity. Normal physiologic activity  in the brain and muscular activity in the tongue and symmetric activity in the  larynx, probably from combination. CHEST: Small hypermetabolic right axillary lymph nodes are present. Ill-defined  stranding mass in the right breast is hypermetabolic. This extends to the  subareolar focus. There are hypermetabolic subpectoral nodes. The larger mass  SUV max is 6.5. There is a hypermetabolic right paratracheal lymph node. No  hypermetabolic pulmonary nodules. There is some scarring and micronodules in the  lungs which are not hypermetabolic.   ABDOMEN: Normal physiologic activity in the GI tract and  tract. No abnormal  hypermetabolic activity. Cholecystectomy clips are present. PELVIS: No hypermetabolic adenopathy. No hypermetabolic bony lesions; mild DJD. Patient is status post hysterectomy. IMPRESSION: Hypermetabolic right breast mass which includes a hypermetabolic  subareolar focus, hypermetabolic right axillary and subpectoral lymph nodes. There is a hypermetabolic paratracheal node on the right. No hypermetabolic  disease outside the chest.      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 TECHNIQUE: Standard axial and sagittal images were obtained before and during  bolus injection of 12 cc of MultiHance IV. CAD was employed. COMPARISON: Multiple prior studies including bilateral mammogram of January 8, 2016 and right ultrasound biopsy of January 12, 2016  FINDINGS: There is mild background parenchymal enhancement bilaterally. Addition to abnormal enhancement surrounding the right posterior 10:00 biopsy  site, tumor is seen to extend throughout the central and lateral right breast  with maximal diameter of approximately 11.5 cm. There is diffuse anterior skin  thickening with focal dermal invasion measuring approximately 8 mm just medial  to the nipple. There is also invasion of the pectoralis extending over  approximately 3.6 cm. There is a 1.5 cm dysmorphic low right axillary lymph  node with several areas of internal enhancement corresponding to the node with  pleomorphic microcalcifications seen only on the mediolateral oblique  mammographic view with no definite sonographic correlate. The liver and lungs  appear unremarkable as imaged. IMPRESSION:   1.  EXTENSIVE INFILTRATING DUCT CARCINOMA THROUGHOUT THE CENTRAL AND LATERAL  RIGHT BREAST WITH FOCAL PERIAREOLAR DERMAL INVASION, PECTORALIS INVASION, AND A  PATHOLOGIC LOW RIGHT AXILLARY LYMPH NODE WHICH ALMOST CERTAINLY REPRESENTS  METASTATIC DISEASE (WHICH WAS ALSO SEEN MAMMOGRAPHICALLY BUT COULD NOT BE  IDENTIFIED BY ULTRASOUND). 2. NO EVIDENCE OF CONTRALATERAL MALIGNANCY. Pathology:              ASSESSMENT:    Problem List  Date Reviewed: 5/29/2018          Codes Class Noted    Malignant neoplasm of breast in female, estrogen receptor positive ([facility] (Chronic) ICD-10-CM: C50.919, Z17.0  ICD-9-CM: 174.9, V86.0  2/27/2018        Cellulitis ICD-10-CM: L03.90  ICD-9-CM: 682.9  12/11/2017        Cellulitis of chest wall ICD-10-CM: L03.313  ICD-9-CM: 682.2  12/6/2017        Recurrent major depressive disorder, in partial remission ([facility].) ICD-10-CM: F33.41  ICD-9-CM: 296.35  11/16/2017        Mild intermittent asthma without complication ACG-37-YW: R24.73  ICD-9-CM: 493.90  11/16/2017        Abscess ICD-10-CM: L02.91  ICD-9-CM: 682.9  10/25/2017    Overview Signed 10/25/2017 12:30 PM by Homero Valera DO     Abscess of the right breast implant.              Acute renal failure (ARF) (HCC) ICD-10-CM: N17.9  ICD-9-CM: 584.9  10/11/2017        Pancytopenia ([facility] ICD-10-CM: U99.843  ICD-9-CM: 284.19  10/11/2017        Diarrhea ICD-10-CM: R19.7  ICD-9-CM: 787.91  10/11/2017        S/P breast reconstruction, right ICD-10-CM: G43.368  ICD-9-CM: V43.82  9/19/2017        Breast cancer ([facility] (Chronic) ICD-10-CM: C50.919  ICD-9-CM: 174.9  9/18/2017        Lump of skin of right upper extremity ICD-10-CM: R22.31  ICD-9-CM: 782.2  6/13/2017        Malignant neoplasm of upper-inner quadrant of right female breast ([facility] ICD-10-CM: C50.211  ICD-9-CM: 174.2  6/12/2017        SIRS (systemic inflammatory response syndrome) ([facility] ICD-10-CM: R65.10  ICD-9-CM: 995.90  4/7/2017        Idiopathic hypotension ICD-10-CM: I95.0  ICD-9-CM: 458.9  4/7/2017        Tachycardia ICD-10-CM: R00.0  ICD-9-CM: 785.0  4/7/2017        Hypokalemia ICD-10-CM: E87.6  ICD-9-CM: 276.8  4/7/2017        Cough ICD-10-CM: R05  ICD-9-CM: 786.2  4/7/2017        Pancytopenia due to chemotherapy [facility]) ICD-10-CM: H51.448  ICD-9-CM: 284.11  4/7/2017        Status post right mastectomy ICD-10-CM: Z90.11  ICD-9-CM: V45.71  9/14/2016        S/P breast reconstruction, bilateral ICD-10-CM: Z98.890  ICD-9-CM: V43.82  9/14/2016        Mediastinal adenopathy ICD-10-CM: R59.0  ICD-9-CM: 785.6  2/18/2016        Malignant neoplasm of right female breast [facility]) ICD-10-CM: C50.911  ICD-9-CM: 174.9  1/25/2016        Acquired hypothyroidism (Chronic) ICD-10-CM: E03.9  ICD-9-CM: 244.9  11/4/2015        Asthma ICD-10-CM: J45.909  ICD-9-CM: 493.90  11/4/2015        Insomnia ICD-10-CM: G47.00  ICD-9-CM: 780.52  11/4/2015        Depression ICD-10-CM: F32.9  ICD-9-CM: 425  11/4/2015                Assessment and Plan:   Breast cancer: high grade, possibly extending as much as 12 cm on MRI with at least one concerning node on MRI imaging. Clinically involved skin as well. %, ER 3%, HER2 negative. R1OC0IE. Given the extent of disease and the possible inflammatory disease, preoperative chemotherapy would be appropriate. However, we recommended PET/CT to ensure there was no distant disease. Unfortunately, in addition to the locally advanced disease, there was also evidence of a paratracheal lymph node which was indeterminate by imaging. Chemotherapy was postponed and EBUS for diagnostic purposes was performed. Biopsy results returned as positive for malignant cells, also ER/WV positive and HER2 negative. This dramatically changed her prognosis, rendering her stage IV disease. With such a small amount of oligometastasis, will proceed with aggressive chemotherapeutic management given the dramatic local involvement of her tumor. Start with dose dense AC for 4 cycles, then weekly Taxol for 12 weeks. Afterward depending on response, tentatively plan for mastectomy, then radiation, then endocrine therapy following cessation of systemic therapy.   She completed 4 cycles of ddAC and 12 cycles of Taxol with excellent tolerance. Imaging showed partial response in the breast and complete response in the axillary and mediastinal lymph nodes. She was referred for mastectomy, which showed clear partial response with tumor only 1.5 cm in the breast, but axillary dissection still shows 10 of 12 lymph nodes involved with disease. Therefore, she was recommended to undergo chest wall and axillary radiotherapy. I would then recommend starting AI but I would also recommend adding palbociclib after radiation because of the high volume sagar disease still present after chemotherapy. Hospitalized with PNA and failure to thrive during her first cycle of therapy, palbo held temporarily and letrozole continued. Ibrance restarted at 75 mg. She was then hospitalized for sepsis and DANNIE which led to her right breast implant being removed due to infection that showed scant MRSA. Her Sharlynn Genre was held at that time. During her stay in rehab she had shingles, and then a recurrence of her cellulitis in the chest wall that required additional debridement/drainage. She returns today for follow-up. Doing better, remains off abx from ID, no recent changes in the chest wall. Remains off Ibrance since the beginning of September. She is just getting over a respiratory infection, completed antibiotics for therapy. No issues with Femara, no other emergent side effects, feeling very well overall. PET/CT personally reviewed, the inflammatory change at the chest wall has improved, but there are now mediastinal nodes that are PET avid that were not present (or at least were not as hypermetabolic) in December. Given her positive EBUS in the mediastinal nodes pre-therapy, my suspicion for disease progression is moderately high. Options include biopsy of a node to confirm, addition of Ibrance to AI, or change to Faslodex plus Ibrance.   She would like to get biopsy prior to starting Ibrance again. We will go ahead and start 75 mg dosing if/when it is needed due to tolerance. If biopsy confirms recurrence, we should also change Femara to Faslodex. All questions were asked and answered to the best of my ability. She will call with any new issues. Return in 1 month for reassessment after biopsy.           Nichole Ivy MD  [facility]  [address] [place] [address]  Office : [phone]  Fax : [phone]

## 2018-06-04 NOTE — DISCHARGE INSTRUCTIONS
RESPIRATORY CARE - BRONCHOSCOPY/EBUS/BIOPSIES - DISCHARGE INSTRUCTIONS      You received a lot of numbing medication for your throat and nose, and you also received medication to make you sleepy during your procedure. Because of this and because the bronchoscopy may have irritated your airways, we ask that you follow these directions:    1. Do not eat or drink until  10AM .   After that, you may have what you please. You may want to try some liquids first, because your throat may be a little sore. 2. Medication may cause drowsiness for several hours, therefore, do not drive or operate machinery for remainder of the day. No alcohol today. 3. You may cough up more mucus than usual and you may see some blood, but this is expected and should subside by the following day. 4. If severe throat irritation, coughing, or bleeding continue, call your doctor. 5.         If you run a fever greater than 102, take tylenol and motrin then call [facility] at [phone]. 6.         Dr. Maria Elena Mckay has asked that you:                A. Call the doctor's office at [phone] for any questions or problems that may occur. Eric Lobe your follow up appointment with Dr. Benoit Ramirez as previously scheduled. Discharge Medications:  Any additional instructions:  Resume all medication as before procedure using caution with any pain medication.   Instructions given to Katia Garland and other family members  Instructions given by:

## 2018-06-04 NOTE — IP AVS SNAPSHOT
[address] 
[phone] Patient: Betsy Renee MRN: [id number] :[id number] About your hospitalization You were admitted on:  2018 You last received care in the:  [facility] You were discharged on:  2018 Why you were hospitalized Your primary diagnosis was:  Not on File Your diagnoses also included:  Metastatic Malignant Neoplasm To Mediastinum (Hcc) Follow-up Information None Your Scheduled Appointments 2018 12:30 PM EDT  
LAB with [address]  
1808 [facility] OUTREACH INSURANCE [address]) Sharifa Mandujano [address] [place] [address]  
[phone] 2018  1:00 PM EDT Follow Up with MD Janneth Clifton [place] [facility] CHERRI/ Cj Oliver [address] [place] [address]  
[phone] 2018  1:30 PM EDT  
SHELLI MAMMO DIAGNOSTIC with [facility] SHELLI BI ROOM 2 [place] [facility] ([facility] [address] [facility] [address]) [address] [place] [address]  
[phone] OUTSIDE FILMS -Are required to be present for all Diagnostic Mammo and Breast US patients-NO EXCEPTIONS. -If the films are not present, the patient will be rescheduled. -Allow 2 days for local and 1 week for out of state films to be recieved. -If the patient has their films reiterate they bring them. PATIENT ARRIVAL - Please report 30 minutes early to check in. GENERAL INSTRUCTIONS -  On the day of your exam do not use any bath powder, deodorant or lotions on the chest or armpit area. Wear two-piece outfit for ease of changing. Allow at least 1 hour for test.  
  
    
[address] [id number]. 2nd floor [facility]    2:00 PM EDT  
 [facility] OFFICE VISIT with Byron Valentin NP  
[address] [facility] [address] [facility] [address] [place] [id number] [phone] Discharge Orders None A check samuel indicates which time of day the medication should be taken. My Medications ASK your doctor about these medications Instructions Each Dose to Equal  
 Morning Noon Evening Bedtime ALPRAZolam 0.5 mg tablet Commonly known as:  Tonia Peppers Your last dose was: Your next dose is:    
   
   
 1 bid  prn  Indications: anxiety, sleep  
     
   
   
   
  
 doxycycline 100 mg tablet Commonly known as:  ADOXA Your last dose was: Your next dose is: Take 1 Tab by mouth two (2) times a day. Indications: chest wall cellulitis amelioration 100 mg DULoxetine 60 mg capsule Commonly known as:  CYMBALTA Your last dose was: Your next dose is: Take 1 Cap by mouth daily. 60 mg  
    
   
   
   
  
 fluticasone-salmeterol 250-50 mcg/dose diskus inhaler Commonly known as:  ADVAIR DISKUS Your last dose was: Your next dose is: Take 1 Puff by inhalation every twelve (12) hours. Indications: INTRINSIC ASTHMA  
 1 Puff  
    
   
   
   
  
 heparin (porcine) pf 100 unit/mL injection Commonly known as:  HEPARIN LOCKFLUSH(PORCINE)(PF)  
   
Your last dose was: Your next dose is: Access port, flush with 20ml Normal Saline, 500 units of Heparin Flush and de access port every 4 weeks. letrozole 2.5 mg tablet Commonly known as:  [facility] Your last dose was: Your next dose is: Take 1 Tab by mouth daily. 2.5 mg  
    
   
   
   
  
 levothyroxine 75 mcg tablet Commonly known as:  SYNTHROID Your last dose was: Your next dose is: Take 1 Tab by mouth Daily (before breakfast). Indications: hypothyroidism 75 mcg  
    
   
   
   
  
 palbociclib 75 mg Cap Commonly known as:  [employer] Your last dose was: Your next dose is: Take 1 Cap by mouth daily. 75 mg VENTOLIN 90 mcg/actuation inhaler Generic drug:  albuterol Your last dose was: Your next dose is: Take 2 Puffs by inhalation every six (6) hours as needed. Indications: bring on the dos. 2 Puff Discharge Instructions RESPIRATORY CARE - BRONCHOSCOPY/EBUS/BIOPSIES - DISCHARGE INSTRUCTIONS You received a lot of numbing medication for your throat and nose, and you also received medication to make you sleepy during your procedure. Because of this and because the bronchoscopy may have irritated your airways, we ask that you follow these directions: 1. Do not eat or drink until  10AM .   After that, you may have what you please. You may want to try some liquids first, because your throat may be a little sore. 2. Medication may cause drowsiness for several hours, therefore, do not drive or operate machinery for remainder of the day. No alcohol today. 3. You may cough up more mucus than usual and you may see some blood, but this is expected and should subside by the following day. 4. If severe throat irritation, coughing, or bleeding continue, call your doctor. 5.         If you run a fever greater than 102, take tylenol and motrin then call [facility] at [phone]. 6.         Dr. Barrington Nicole has asked that you: A. Call the doctor's office at [phone] for any questions or problems that may occur. Andrew Ruffin your follow up appointment with Dr. Godfrey Kuo as previously scheduled.  
 
Discharge Medications: 
Any additional instructions:  Resume all medication as before procedure using caution with any pain medication. Instructions given to Lia Carrasquillo and other family members Instructions given by:   
 
ACO Transitions of Care Introducing [employer] [address] Rincon offers a voluntary care coordination program to provide high quality service and care to [facility] fee-for-service beneficiaries. Ananya Alejo was designed to help you enhance your health and well-being through the following services: ? Transitions of Care  support for individuals who are transitioning from one care setting to another (example: Hospital to home). ? Chronic and Complex Care Coordination  support for individuals and caregivers of those with serious or chronic illnesses or with more than one chronic (ongoing) condition and those who take a number of different medications. If you meet specific medical criteria, a [address] [place] [address] Rd may call you directly to coordinate your care with your primary care physician and your other care providers. For questions about the [facility] programs, please, contact your physicians office. For general questions or additional information about Accountable Care Organizations: 
Please visit www.medicare.gov/acos. html or call 1-800-MEDICARE (9-539.446.1417) TTY users should call 2-867.260.7982. Introducing [facility] & HEALTH SERVICES! Dear Anais Razo: 
Thank you for requesting a [employer] account. Our records indicate that you already have an active [employer] account. You can access your account anytime at [email or web address] [email or web address] Did you know that you can access your hospital and ER discharge instructions at any time in [employer] [place]? You can also review all of your test results from your hospital stay or ER visit. Additional Information If you have questions, please visit the Frequently Asked Questions section of the [employer] website at [email or web address] Remember, [employer] is NOT to be used for urgent needs. For medical emergencies, dial 911. Now available from your iPhone and Android! Introducing Osvaldo Tripp As a [address] [place] [address] patient, I wanted to make you aware of our electronic visit tool called Osvaldo Tripp. [facility] [address] 24/7 allows you to connect within minutes with a medical provider 24 hours a day, seven days a week via a mobile device or tablet or logging into a secure website from your computer. You can access Osvaldo Tripp from anywhere in the [place]. A virtual visit might be right for you when you have a simple condition and feel like you just dont want to get out of bed, or cant get away from work for an appointment, when your regular [address] [place] [address] provider is not available (evenings, weekends or holidays), or when youre out of town and need minor care. Electronic visits cost only $49 and if the [address] [place] [address] 24/7 provider determines a prescription is needed to treat your condition, one can be electronically transmitted to a nearby pharmacy*. Please take a moment to enroll today if you have not already done so. The enrollment process is free and takes just a few minutes. To enroll, please download the [address] 24/7 michael to your tablet or phone, or visit [email or web address] to enroll on your computer. And, as an [address] [place] [address] patient with a [employer] account, the results of your visits will be scanned into your electronic medical record and your primary care provider will be able to view the scanned results. We urge you to continue to see your regular [address] [place] [address] provider for your ongoing medical care. And while your primary care provider may not be the one available when you seek a Osvaldo Tripp virtual visit, the peace of mind you get from getting a real diagnosis real time can be priceless. For more information on Osvaldo Tripp, view our Frequently Asked Questions (FAQs) at [email or web address]. Sincerely, 
 
Jl Vega MD 
Chief Medical Officer [address] *:  certain medications cannot be prescribed via Osvaldo Tripp Providers Seen During Your Hospitalization Provider Specialty Primary office phone Kannan Orlando MD Pulmonary Disease [phone] Your Primary Care Physician (PCP) Primary Care Physician Office Phone Office Fax Delma Subramanian [phone] You are allergic to the following Allergen Reactions Prednisone Unknown (comments) \"psychotic reaction\"  To oral and parenteral steroids Recent Documentation Height Weight Breastfeeding? BMI OB Status Smoking Status 1.575 m 59 kg No 23.78 kg/m2 Hysterectomy Never Smoker Emergency Contacts Name Discharge Info Relation Home Work Mobile Christiano [place] DISCHARGE CAREGIVER [3] Spouse [3] [phone] Patient Belongings The following personal items are in your possession at time of discharge: 
  Dental Appliances: None  Visual Aid: Glasses Please provide this summary of care documentation to your next provider. Signatures-by signing, you are acknowledging that this After Visit Summary has been reviewed with you and you have received a copy. Patient Signature:  ____________________________________________________________ Date:  ____________________________________________________________  
  
Augustine Jakub Provider Signature:  ____________________________________________________________ Date:  ____________________________________________________________

## 2018-07-03 NOTE — PROGRESS NOTES
Arrived to the [facility]. D1C1 Faslodex completed. Patient tolerated without problems.  Stayed for D1 observation without s/sx of reaction  Any issues or concerns during appointment: no  Patient aware of next infusion appointment on 7/17/18 at 1500  Discharged ambulatory with spouse

## 2018-07-17 NOTE — PROGRESS NOTES
Arrived to the [facility]. Faslodex completed - 2 injections. Patient tolerated well  Any issues or concerns during appointment: none  Patient aware of next infusion appointment on 8/31/18 at 10am.   Discharged ambulatory.

## 2018-07-27 NOTE — PROGRESS NOTES
Pt here today for FUP post RT to the right breast which ended 1/10/17. Pt received neoadjuvant chemo, and also had a right mastectomy. On 5/24/18 the PET indicated pt had a new positive lymph node. Pt is now receiving Faslodex and Ibrance. Pt will not have any more FUP in [facility] since she is being followed closely by [facility].

## 2018-07-27 NOTE — PROGRESS NOTES
Patient: Allen Rodgers MRN: [id number]  SSN: [id number] YOB: 1950  Age: 69 y.o. Sex: female Other Providers:  Donovan Cassidy MD 
 
CHIEF COMPLAINT: Breast cancer DIAGNOSIS: Right breast infiltrating ductal carcinoma, high grade, 100% ER, MA 3% and HER2 was negative by IHC, [id number] with positive paratracheal lymph node. [id number]. PREVIOUS TREATMENT: 
1) 4 cycles ddAC followed by 12 weekly taxol cycles. 2) 9/14/16:  Mastectomy and axillary dissection. Tissue expanders in place. 3) 01/10/17: Radiation completed. She received treatment to the right breast and lymph nodes with 25 fractions of 5000     cGy, 5 boost, of 1000 cGy planned. 4) Arimidex HISTORY OF PRESENT ILLNESS:  Allen Rodgers is a 69 y.o. female seen by Dr. Migdalia Montes on 10/20/2016 at the request of Dr. Sung Iglesias. She originally presented in December 2015 when she noted an increased redness and swelling in her right breast, with a masslike density. She had underwent a clinical breast exam in September that was normal.  Mammogram and ultrasound showed a mass that was indistinct but suspicious, and skin thickening also suspicious for malignant involvement. She went for biopsy which showed infiltrating ductal carcinoma, high grade, but with 100% ER expression, MA 3% and HER2 was negative by IHC. MRI showed the tumor to be extensive, with enhancement spanning over 12 cm, with confirmation of skin thickening and a low axillary lymph node that was not identified by ultrasound. She was referred to Dr. Taya Devries who felt that she would be appropriate for neoadjuvant chemotherapy due to the large size and the skin involvement. Dr. Sung Iglesias recommended PET/CT and port placement prior to potentially starting dose dense AC followed by weekly Taxol. Unfortunately, in addition to the locally advanced disease, there was also evidence of a paratracheal lymph node which was indeterminate by imaging.  Chemotherapy was postponed and EBUS for diagnostic purposes was performed which returned as positive for malignant cells, also ER/CO positive and HER2 negative confirming stage IV disease. With such a small amount of oligometastasis, Dr. Haleigh Mcgee recommended that she proceed with chemotherapy. She completed 4 cycles of ddAC with excellent disease response, tolerated 12 cycles of weekly Taxol exceptionally well according to the record. Imaging post-chemotherapy showed at least partial response in the breast and complete response in the axillary and mediastinal lymph nodes. Her case was discussed in tumor board who recommended mastectomy followed by chest wall and axillary radiotherapy. Her tumor locally had a nice response with only a 1.5 cm primary noted, but there were 10 positive lymph nodes with TULIO present.   She is getting serial expansions with Dr. Trent Mcneil. INTERVAL HISTORY: She returns today for follow up, 18 months after competing radiation therapy for her right breast cancer. She is s/p right breast mastectomy and axillary dissection with tissue expanders in place. She completed radiation therapy of the right chest wall and draining lymphatics, 01/10/17. She denies any late side effects due to radiation therapy. She completed reconstruction in September. She then developed a MRSA infection of the reconstructed right breast. Implant removed and treated with doxycyline. PET/CT from May 2018 shows a new mediastinal and hilar nodes. EBUS on 6/4/2018 was positive for malignancy. She was then changed from Femara/Ibrance to Faslodex/Ibrance. OBSTETRIC HISTORY:   
Menarche at the age of 13.   
G4,P2. Oral Contraceptive Pills:  5 years in her 17's. Hormone Replacement Therapy:  Premarin for 15 years PAST MEDICAL HISTORY:   
Past Medical History:  
Diagnosis Date  Abnormal CT of the chest   
 Adverse effect of anesthesia   
 bp dropped with dual lung/gallbladder surgery  Asthma    
 no acute attacks recently.  uses advair daily  Breast cancer ([address] 2016 R breast, s/p mastectomy, chemo, reconstruction  Depression  Hypercholesteremia Controlled with diet  Hypothyroidism  Ill-defined condition   
 granular lung disease resolved 2002 The patient denies history of collagen vascular diseases, pacemaker insertion, prior radiation or prior chemotherapy predating her breast cancer diagnosis. PAST SURGICAL HISTORY:  
Past Surgical History:  
Procedure Laterality Date  CHEST SURGERY PROCEDURE UNLISTED    
 R lung biopsy  HX BLADDER REPAIR    
 HX BREAST AUGMENTATION Right 10/20/2017 BREAST IMPLANT REMOVAL  performed by Carlyn Scott MD at [address] HX BREAST BIOPSY Right 2/5/2016 SKIN BIOPSY RIGHT BREAST performed by Dallas Bernstein MD at [facility]  
 HX BREAST RECONSTRUCTION Bilateral 9/14/2016 BILATERAL BREAST RECONSTRUCTION performed by Carlyn Scott MD at [facility]  
 HX BREAST RECONSTRUCTION Bilateral 9/14/2016 BILATERAL BREAST TISSUE EXPANDER INSERTION performed by Carlyn Scott MD at [facility]  
 HX BREAST RECONSTRUCTION Right 9/18/2017 RIGHT BREAST RECONSTRUCTION performed by Carlyn Scott MD at [address] HX BREAST RECONSTRUCTION Right 9/18/2017 Right  PLACEMENT OF PERMANENT IMPLANTS performed by Carlyn Scott MD at [address] HX BREAST REDUCTION Left 9/18/2017 LEFT BREAST MASTOPEXY FOR SYMMETRY [facility] HX COLONOSCOPY    
 2006  HX CYST INCISION AND DRAINAGE Right 12/15/2017 INCISION AND DRAINAGE BREAST  performed by Carlyn Scott MD at [address] HX CYSTOCELE REPAIR    
 HX HYSTERECTOMY    
 fibroids  HX LAP CHOLECYSTECTOMY  2000  HX MASTECTOMY Right  HX RECTOCELE REPAIR    
 HX VASCULAR ACCESS    
 port left chest wall MEDICATIONS:  
 
Current Outpatient Prescriptions:  
  palbociclib (IBRANCE) 75 mg cap, Take 1 Cap by mouth daily. 1 cap by mouth daily for 21 days, then off for 7 days. , Disp: 21 Cap, Rfl: 5 
  ALPRAZolam (XANAX) 0.5 mg tablet, 1 bid  prn  Indications: anxiety, sleep, Disp: 60 Tab, Rfl: 5 
  letrozole (FEMARA) 2.5 mg tablet, Take 1 Tab by mouth daily. , Disp: 30 Tab, Rfl: 5 
  DULoxetine (CYMBALTA) 60 mg capsule, Take 1 Cap by mouth daily. , Disp: 30 Cap, Rfl: 12 
  fluticasone-salmeterol (ADVAIR DISKUS) 250-50 mcg/dose diskus inhaler, Take 1 Puff by inhalation every twelve (12) hours. Indications: INTRINSIC ASTHMA, Disp: 1 Inhaler, Rfl: 12 
  levothyroxine (SYNTHROID) 75 mcg tablet, Take 1 Tab by mouth Daily (before breakfast). Indications: hypothyroidism, Disp: 30 Tab, Rfl: 12 
  heparin, porcine, pf (HEPARIN LOCKFLUSH,PORCINE,,PF,) 100 unit/mL injection, Access port, flush with 20ml Normal Saline, 500 units of Heparin Flush and de access port every 4 weeks. , Disp: 1 Syringe, Rfl: 5 
  albuterol (VENTOLIN) 90 mcg/Actuation inhaler, Take 2 Puffs by inhalation every six (6) hours as needed. Indications: bring on the dos., Disp: , Rfl: ALLERGIES:  
Allergies Allergen Reactions  Prednisone Unknown (comments) \"psychotic reaction\"  To oral and parenteral steroids SOCIAL HISTORY:  
Social History Social History  Marital status:  Spouse name: N/A  
 Number of children: N/A  
 Years of education: N/A Occupational History  Not on file. Social History Main Topics  Smoking status: Never Smoker  Smokeless tobacco: Never Used  Alcohol use No  
 Drug use: No  
 Sexual activity: Not on file Other Topics Concern  Not on file Social History Narrative FAMILY HISTORY:  
Family History Problem Relation Age of Onset  Hypertension Mother Cande Al Mother 79 Breast cancer at age 79  Diabetes Mother Type 2  
 Other Father   
  alzheimers  Cancer Maternal Aunt Breast cancer after age 72  Cancer Maternal Aunt   Breast cancer at age 39  Allergic Rhinitis Neg Hx  Allergy-severe Neg Hx  Amblyopia Neg Hx  Anemia Neg Hx  Anesth Problems Neg Hx  Angioedema Neg Hx  Anxiety Neg Hx  Arrhythmia Neg Hx  Arthritis-rheumatoid Neg Hx  Ataxia Neg Hx  Atopy Neg Hx  Bipolar Disorder Neg Hx  Blindness Neg Hx  Breast Cancer Neg Hx  Breast Problems Neg Hx  Broken Bones Neg Hx  Cataract Neg Hx  Celiac Disease Neg Hx  Childhood heart surgery Neg Hx  Childhood resp disease Neg Hx  Chorea Neg Hx  Clotting Disorder Neg Hx  Collagen Dis Neg Hx  Colon Cancer Neg Hx  Colon Polyps Neg Hx  COPD Neg Hx  Coronary Artery Disease Neg Hx  Crohn's Disease Neg Hx  Cystic Fibrosis Neg Hx  Delayed Awakening Neg Hx  Dementia Neg Hx  Depression Neg Hx  Dislocations Neg Hx  Downs Syndrome Neg Hx  Drug Abuse Neg Hx  Eclampsia Neg Hx  Eczema Neg Hx  Emergence Delirium Neg Hx  Emphysema Neg Hx  Fainting Neg Hx  Genitourinary () Neg Hx  Glaucoma Neg Hx   
 Heart Attack Neg Hx   
 Heart defect Neg Hx   
 Heart Failure Neg Hx   
 Heart Surgery Neg Hx  Hemachromatosis Neg Hx  Herpes Neg Hx  High Cholesterol Neg Hx   
 HIV/AIDS Neg Hx  Immunodeficiency Neg Hx  Infertility Neg Hx  Inflammatory Bowel Dz Neg Hx  Kidney Disease Neg Hx  Liver Disease Neg Hx  Macular Degen Neg Hx  Malignant Hyperthermia Neg Hx  MS Neg Hx  Neuropathy Neg Hx  NF Neg Hx  Osteoporosis Neg Hx   
 Ovarian Cancer Neg Hx  Pacemaker Neg Hx  Panic disorder Neg Hx  Parkinsonism Neg Hx  Post-op Cognitive Dysfunction Neg Hx  Post-op Nausea/Vomiting Neg Hx  Prematurity Neg Hx   Labor Neg Hx  Pseudocholinesterase Deficiency Neg Hx  Psoriasis Neg Hx  Psychotic Disorder Neg Hx  Rashes/Skin Problems Neg Hx  Retinal Detachment Neg Hx  Rh Incompatibility Neg Hx  Schizophrenia Neg Hx  Seizures Neg Hx  Severe Sprains Neg Hx  Sickle Cell Anemia Neg Hx  Sickle Cell Trait Neg Hx  SIDS Neg Hx  SLE Neg Hx  Spont Ab Neg Hx  Stomach Cancer Neg Hx  Strabismus Neg Hx  Substance Abuse Neg Hx  Sudden Death Neg Hx  Suicide Neg Hx  Thyroid Disease Neg Hx  Tuberculosis Neg Hx  Ulcerative Colitis Neg Hx  Urticaria Neg Hx  Lang's Disease Neg Hx REVIEW OF SYSTEMS: Please see the completed review of systems sheet in the chart that I have reviewed today. PHYSICAL EXAMINATION:  
ECOG Performance status 0 
VITAL SIGNS: blood pressure 153/87  Pulse 75  Temperature 98.3  Weight 127.2 GENERAL: The patient is well-developed, ambulatory, alert and in no acute distress. HEENT: Head is normocephalic, atraumatic. NECK: Neck is supple with no masses. RESPIRATORY: Lungs are clear to auscultation. There is normal respiratory effort. LYMPHATIC: There is no cervical, supraclavicular or axillary lymphadenopathy bilaterally. BREASTS: Examination of right chest wall. Well healed incision. Non tender PATHOLOGY: 
2/18/16: 
DIAGNOSIS 
2R Lymph Node, Fine Needle Aspiration: 
MALIGNANT CELLS PRESENT. Cell block: Rare malignant cells 9/14/16: 
 
 DIAGNOSIS  
A: ADDITIONAL AXILLARY CONTENTS: MACROMETASTATIC CARCINOMA TO  
9 OF 11 LYMPH NODES WITH EXTRACAPSULAR EXTENSION AND EXTENSIVE LYMPHOVASCULAR INVASION. B: RIGHT BREAST: MICROSCOPIC FOCI OF INFILTRATING DUCT CARCINOMA, INTERMEDIATE GRADE (MODERATELY DIFFERENTIATED), THE MAJORITY OF WHICH APPEAR TO REPRESENT LYMPHOVASCULAR INVASION. SIZE OF TUMOR IS INDETERMINATE BUT IS UP TO 1.5 X 0.8 CM ON SLIDE ON MULTIPLE SLIDES. MARGINS OF RESECTION ARE NEGATIVE FOR MALIGNANT TUMOR. IN SITU COMPONENT IS NOT IDENTIFIED. SEE COMMENT. C: RIGHT AXILLA: MACROMETASTATIC CARCINOMA TO 1 OF 1 LYMPH NODES WITH EXTRACAPSULAR EXTENSION AND EXTENSIVE LYMPHOVASCULAR INVASION. Comment The morphology of tumor in this specimen is similar to treated carcinoma in prior biopsy, , which has estrogen receptors of 100%, progesterone receptors of 3% and Her-2 negative 1+. If clinically indicated, receptor studies can be repeated in this material (see also [id number]) LABORATORY: none today RADIOLOGY:   
LEFT BREAST DIAGNOSTIC MAMMOGRAM, 7/19/2017. 
  
CLINICAL HISTORY: History of right breast cancer status post right mastectomy. No current left breast complaints. 
  
COMPARISON STUDY: Left breast screening mammogram 1/8/2016, and 5/22/2008. 
  
FINDINGS: 
  
Straight mediolateral view of the left breast as well as CC, and MLO views of 
the left breast are submitted for evaluation. The left breast is composed of 
scattered fibroglandular elements. A left node is partially visualized in the 
left axilla which shows no obvious worrisome features as visualized. No evolving 
skin thickening, or nipple retraction is seen. No evolving worrisome clustered 
calcifications are seen. No evolving asymmetry, mass, or architectural changes 
are seen of the left breast. 
 
IMPRESSION: 
1. No evolving worrisome changes of the left breast to suggest malignancy. Therefore, routine follow-up is recommended with left breast screening mammogram 
in one year unless clinically indicated sooner. A reminder letter will be 
scheduled. 
  
BI-RADS Assessment Category 2: Benign finding(s). IMPRESSION/PLAN:  Stephany Patino is a 69 y.o. female with right breast infiltrating ductal carcinoma, high grade, 100% ER, ND 3% and HER2 was negative by IHC, [id number] with positive paratracheal lymph node. [id number]. Stage IV breast cancer by virtue of a single paratracheal lymph node which was confirmed pathologically. She is s/p right breast mastectomy and axillary dissection with tissue expanders in place.  She completed radiation therapy of the right chest wall and draining lymphatics, 01/10/17. She completed reconstruction in September. She then developed a MRSA infection of the reconstructed right breast. Implant removed and treated with doxycyline. PET/CT from May 2018 shows a new mediastinal and hilar nodes. EBUS on 6/4/2018 was positive for malignancy. She was then changed from Femara to Faslodex/Ibrance. It has been 18 months since completing radiation therapy. She denies any late side effects due to radiation therapy. It was decided to defer her ongoing management for her breast cancer to solely to Dr. Petersen Friday, Medical Oncology. There are no further interventions from a radiation standpoint. We are available if needed. Nerissa Gao NP 
07/27/18 Portions of this note were copied from prior encounters and reviewed for accuracy, currency, and represent documentation and tasks completed during this encounter. I verify and attest these portions to be unchanged from prior visits.

## 2018-07-31 NOTE — PROGRESS NOTES
7/31/18 saw pt today with LOVE Gutierrez for follow up breast cancer. On ibrance, tolerating well. PO intake is good. Denies any issues. She is going on a trip to [place] tomorrow to visit her son. Follow up in 4 weeks. Encouraged to call with any concerns. Navigation will continue to follow.

## 2018-07-31 NOTE — PROGRESS NOTES
Arrived to the [facility]. Assessment and faslodex injections completed. Patient tolerated well. Any issues or concerns during appointment: left injection performed by Miah Richard RN. Right injection performed by this nurse. Patient aware of next infusion appointment on 08/28/2018 (date) at 56 (time) with infusion center. Discharged ambulatory, with self. Patient advised to call Dr. Francis Villagran office if she has any problems or concerns. Patient verbalized understanding.

## 2018-08-28 NOTE — PROGRESS NOTES
Arrived to the [facility]. Assessment and faslodex injection completed. Patient tolerated well. Any issues or concerns during appointment: none  Patient aware of next infusion appointment on 09/25/2018 (date) at 0 (time) with infusion center. Discharged ambulatory, with self. Patient advised to call Dr. Ana Siegel office if he has any problems or concerns. Patient verbalized understanding.

## 2018-08-28 NOTE — PROGRESS NOTES
8/28/2018 Saw the patient with Dr Monalisa Schroeder. She is at the end of her three weeks on Ibrance, therefore her blood counts are low today. She will continue with the faslodex. She will have restaging scans in October. Will continue to follow.

## 2018-09-25 NOTE — PROGRESS NOTES
Arrived to the [facility]. faslodex completed. Patient tolerated well. Any issues or concerns during appointment: no.  Patient aware of next infusion appointment on 10/23 (date) at 1 (time). Discharged home.

## 2018-09-25 NOTE — PROGRESS NOTES
Arrived to the [facility]. faslodex completed. Patient tolerated well. Any issues or concerns during appointment: no.  Patient aware of next infusion appointment on 10/23 (date) at 2 (time). Discharged home.

## 2018-09-25 NOTE — PROGRESS NOTES
9/25/2018 Saw the patient with Jahaira Kamara NP. She is doing well but fatigued. She continues to struggle with appetite and nothing tastes right. She will continue with Ibrance/Faslodex and will have a scan next month. Will continue to follow.

## 2018-10-23 NOTE — PROGRESS NOTES
Arrived to the [facility]. Faslodex completed in two injections. Patient tolerated well. Any issues or concerns during appointment: none  Patient aware of next infusion appointment on  11/20/18 at 3pm. Discharged ambulatory with .

## 2018-10-23 NOTE — PROGRESS NOTES
10/23/2018 Saw the patient with Dr James Leon. Her most recent PET shows some improvement. She will continue on the Ibrance 75mg along with the faslodex. She will have another scan in 3 months or so. Will continue to follow.

## 2018-11-15 PROBLEM — R19.7 DIARRHEA: Status: RESOLVED | Noted: 2017-10-11 | Resolved: 2018-01-01

## 2018-11-15 PROBLEM — I95.0 IDIOPATHIC HYPOTENSION: Status: RESOLVED | Noted: 2017-04-07 | Resolved: 2018-01-01

## 2018-11-15 PROBLEM — D61.818 PANCYTOPENIA (HCC): Status: RESOLVED | Noted: 2017-10-11 | Resolved: 2018-01-01

## 2018-11-15 PROBLEM — L03.90 CELLULITIS: Status: RESOLVED | Noted: 2017-12-11 | Resolved: 2018-01-01

## 2018-11-15 PROBLEM — R65.10 SIRS (SYSTEMIC INFLAMMATORY RESPONSE SYNDROME) (HCC): Status: RESOLVED | Noted: 2017-04-07 | Resolved: 2018-01-01

## 2018-11-15 PROBLEM — F41.9 ANXIETY: Status: ACTIVE | Noted: 2018-01-01

## 2018-11-15 PROBLEM — L02.91 ABSCESS: Status: RESOLVED | Noted: 2017-10-25 | Resolved: 2018-01-01

## 2018-11-15 PROBLEM — L03.313 CELLULITIS OF CHEST WALL: Status: RESOLVED | Noted: 2017-12-06 | Resolved: 2018-01-01

## 2018-11-15 PROBLEM — E87.6 HYPOKALEMIA: Status: RESOLVED | Noted: 2017-04-07 | Resolved: 2018-01-01

## 2018-11-15 PROBLEM — F33.41 RECURRENT MAJOR DEPRESSIVE DISORDER, IN PARTIAL REMISSION (HCC): Chronic | Status: ACTIVE | Noted: 2017-11-16

## 2018-11-15 PROBLEM — N17.9 ACUTE RENAL FAILURE (ARF) (HCC): Status: RESOLVED | Noted: 2017-10-11 | Resolved: 2018-01-01

## 2018-11-15 PROBLEM — J45.20 MILD INTERMITTENT ASTHMA WITHOUT COMPLICATION: Chronic | Status: ACTIVE | Noted: 2017-11-16

## 2018-11-15 PROBLEM — R05.9 COUGH: Status: RESOLVED | Noted: 2017-04-07 | Resolved: 2018-01-01

## 2018-11-20 NOTE — PROGRESS NOTES
Arrived to infusion. Faslodex completed in 2 injections. No concerns voiced. Next appointment 12/18/18 at 3pm.  Discharged ambulatory with self.

## 2018-12-17 PROBLEM — M81.0 AGE-RELATED OSTEOPOROSIS WITHOUT CURRENT PATHOLOGICAL FRACTURE: Status: ACTIVE | Noted: 2018-01-01

## 2018-12-18 NOTE — PROGRESS NOTES
Arrived to the [facility]. Faslodex injection completed. Patient tolerated well. Any issues or concerns during appointment: none. Patient aware of next infusion appointment on 01.15.2019 (date) at 1400 (time). Discharged ambulatory to shopping trip.

## 2019-01-01 ENCOUNTER — APPOINTMENT (OUTPATIENT)
Dept: INFUSION THERAPY | Age: 69
End: 2019-01-01
Payer: MEDICARE

## 2019-01-01 ENCOUNTER — HOME CARE VISIT (OUTPATIENT)
Dept: HOME HEALTH SERVICES | Facility: HOME HEALTH | Age: 69
End: 2019-01-01
Payer: MEDICARE

## 2019-01-01 ENCOUNTER — HOSPITAL ENCOUNTER (INPATIENT)
Age: 69
LOS: 3 days | DRG: 871 | End: 2019-06-06
Attending: STUDENT IN AN ORGANIZED HEALTH CARE EDUCATION/TRAINING PROGRAM | Admitting: FAMILY MEDICINE
Payer: MEDICARE

## 2019-01-01 ENCOUNTER — APPOINTMENT (OUTPATIENT)
Dept: GENERAL RADIOLOGY | Age: 69
DRG: 167 | End: 2019-01-01
Attending: NURSE PRACTITIONER
Payer: MEDICARE

## 2019-01-01 ENCOUNTER — HOME CARE VISIT (OUTPATIENT)
Dept: SCHEDULING | Facility: HOME HEALTH | Age: 69
End: 2019-01-01
Payer: MEDICARE

## 2019-01-01 ENCOUNTER — PATIENT OUTREACH (OUTPATIENT)
Dept: CASE MANAGEMENT | Age: 69
End: 2019-01-01

## 2019-01-01 ENCOUNTER — HOSPITAL ENCOUNTER (OUTPATIENT)
Dept: GENERAL RADIOLOGY | Age: 69
Discharge: HOME | End: 2019-05-08
Attending: INTERNAL MEDICINE | Admitting: INTERNAL MEDICINE
Payer: MEDICARE

## 2019-01-01 ENCOUNTER — HOME HEALTH ADMISSION (OUTPATIENT)
Dept: HOME HEALTH SERVICES | Facility: HOME HEALTH | Age: 69
End: 2019-01-01
Payer: MEDICARE

## 2019-01-01 ENCOUNTER — APPOINTMENT (OUTPATIENT)
Dept: CT IMAGING | Age: 69
DRG: 871 | End: 2019-01-01
Attending: NURSE PRACTITIONER
Payer: MEDICARE

## 2019-01-01 ENCOUNTER — HOSPITAL ENCOUNTER (OUTPATIENT)
Dept: INFUSION THERAPY | Age: 69
Discharge: HOME | End: 2019-02-12
Payer: MEDICARE

## 2019-01-01 ENCOUNTER — APPOINTMENT (OUTPATIENT)
Dept: INFUSION THERAPY | Age: 69
End: 2019-01-01

## 2019-01-01 ENCOUNTER — HOSPITAL ENCOUNTER (OUTPATIENT)
Dept: GENERAL RADIOLOGY | Age: 69
Discharge: HOME | End: 2019-04-11
Attending: INTERNAL MEDICINE | Admitting: INTERNAL MEDICINE
Payer: MEDICARE

## 2019-01-01 ENCOUNTER — HOSPITAL ENCOUNTER (OUTPATIENT)
Dept: INFUSION THERAPY | Age: 69
Discharge: HOME | End: 2019-05-07
Payer: MEDICARE

## 2019-01-01 ENCOUNTER — HOSPITAL ENCOUNTER (OUTPATIENT)
Dept: INFUSION THERAPY | Age: 69
Discharge: HOME | End: 2019-03-12
Payer: MEDICARE

## 2019-01-01 ENCOUNTER — HOSPITAL ENCOUNTER (OUTPATIENT)
Dept: SURGERY | Age: 69
Discharge: HOME | End: 2019-05-15
Payer: MEDICARE

## 2019-01-01 ENCOUNTER — ANESTHESIA EVENT (OUTPATIENT)
Dept: ENDOSCOPY | Age: 69
DRG: 167 | End: 2019-01-01
Payer: MEDICARE

## 2019-01-01 ENCOUNTER — HOSPICE ADMISSION (OUTPATIENT)
Dept: HOSPICE | Facility: HOSPICE | Age: 69
End: 2019-01-01

## 2019-01-01 ENCOUNTER — HOSPITAL ENCOUNTER (OUTPATIENT)
Dept: GENERAL RADIOLOGY | Age: 69
Discharge: HOME | End: 2019-03-21
Payer: MEDICARE

## 2019-01-01 ENCOUNTER — HOSPITAL ENCOUNTER (INPATIENT)
Age: 69
LOS: 2 days | Discharge: HOME HEALTH | DRG: 167 | End: 2019-05-25
Attending: SURGERY | Admitting: SURGERY
Payer: MEDICARE

## 2019-01-01 ENCOUNTER — HOSPITAL ENCOUNTER (OUTPATIENT)
Age: 69
Setting detail: OUTPATIENT SURGERY
Discharge: HOME | End: 2019-03-22
Attending: INTERNAL MEDICINE | Admitting: INTERNAL MEDICINE
Payer: MEDICARE

## 2019-01-01 ENCOUNTER — APPOINTMENT (OUTPATIENT)
Dept: GENERAL RADIOLOGY | Age: 69
DRG: 871 | End: 2019-01-01
Attending: EMERGENCY MEDICINE
Payer: MEDICARE

## 2019-01-01 ENCOUNTER — HOSPITAL ENCOUNTER (OUTPATIENT)
Dept: INFUSION THERAPY | Age: 69
Discharge: HOME | End: 2019-04-09
Payer: MEDICARE

## 2019-01-01 ENCOUNTER — HOSPITAL ENCOUNTER (OUTPATIENT)
Age: 69
Setting detail: OUTPATIENT SURGERY
Discharge: HOME | End: 2019-02-19
Attending: INTERNAL MEDICINE | Admitting: INTERNAL MEDICINE
Payer: MEDICARE

## 2019-01-01 ENCOUNTER — HOSPITAL ENCOUNTER (OUTPATIENT)
Dept: LAB | Age: 69
Discharge: HOME | End: 2019-01-15
Payer: MEDICARE

## 2019-01-01 ENCOUNTER — HOSPITAL ENCOUNTER (OUTPATIENT)
Age: 69
Setting detail: OBSERVATION
Discharge: HOME HEALTH | End: 2019-05-26
Attending: EMERGENCY MEDICINE | Admitting: FAMILY MEDICINE
Payer: MEDICARE

## 2019-01-01 ENCOUNTER — APPOINTMENT (OUTPATIENT)
Dept: GENERAL RADIOLOGY | Age: 69
DRG: 871 | End: 2019-01-01
Attending: INTERNAL MEDICINE
Payer: MEDICARE

## 2019-01-01 ENCOUNTER — ANESTHESIA (OUTPATIENT)
Dept: ENDOSCOPY | Age: 69
DRG: 167 | End: 2019-01-01
Payer: MEDICARE

## 2019-01-01 ENCOUNTER — APPOINTMENT (OUTPATIENT)
Dept: GENERAL RADIOLOGY | Age: 69
End: 2019-01-01
Attending: EMERGENCY MEDICINE
Payer: MEDICARE

## 2019-01-01 ENCOUNTER — HOSPITAL ENCOUNTER (OUTPATIENT)
Dept: LAB | Age: 69
Discharge: HOME | End: 2019-04-09
Payer: MEDICARE

## 2019-01-01 ENCOUNTER — HOSPITAL ENCOUNTER (OUTPATIENT)
Dept: LAB | Age: 69
Discharge: HOME | End: 2019-03-12
Payer: MEDICARE

## 2019-01-01 ENCOUNTER — ANESTHESIA (OUTPATIENT)
Dept: SURGERY | Age: 69
DRG: 167 | End: 2019-01-01
Payer: MEDICARE

## 2019-01-01 ENCOUNTER — HOSPITAL ENCOUNTER (OUTPATIENT)
Dept: INFUSION THERAPY | Age: 69
Discharge: HOME | End: 2019-01-15
Payer: MEDICARE

## 2019-01-01 ENCOUNTER — HOSPITAL ENCOUNTER (OUTPATIENT)
Dept: LAB | Age: 69
Discharge: HOME | End: 2019-05-07
Payer: COMMERCIAL

## 2019-01-01 ENCOUNTER — HOSPITAL ENCOUNTER (OUTPATIENT)
Dept: LAB | Age: 69
Discharge: HOME | End: 2019-05-28
Payer: MEDICARE

## 2019-01-01 ENCOUNTER — HOSPITAL ENCOUNTER (OUTPATIENT)
Dept: LAB | Age: 69
Discharge: HOME | End: 2019-02-12
Payer: MEDICARE

## 2019-01-01 ENCOUNTER — ANESTHESIA EVENT (OUTPATIENT)
Dept: SURGERY | Age: 69
DRG: 167 | End: 2019-01-01
Payer: MEDICARE

## 2019-01-01 VITALS
WEIGHT: 143.8 LBS | OXYGEN SATURATION: 96 % | RESPIRATION RATE: 18 BRPM | SYSTOLIC BLOOD PRESSURE: 104 MMHG | HEART RATE: 121 BPM | HEIGHT: 62 IN | TEMPERATURE: 97 F | DIASTOLIC BLOOD PRESSURE: 55 MMHG | BODY MASS INDEX: 26.46 KG/M2

## 2019-01-01 VITALS
OXYGEN SATURATION: 98 % | SYSTOLIC BLOOD PRESSURE: 102 MMHG | DIASTOLIC BLOOD PRESSURE: 72 MMHG | HEART RATE: 98 BPM | RESPIRATION RATE: 18 BRPM | TEMPERATURE: 97.2 F

## 2019-01-01 VITALS
RESPIRATION RATE: 16 BRPM | TEMPERATURE: 98.1 F | DIASTOLIC BLOOD PRESSURE: 58 MMHG | WEIGHT: 126 LBS | OXYGEN SATURATION: 97 % | HEIGHT: 62 IN | HEART RATE: 103 BPM | BODY MASS INDEX: 23.19 KG/M2 | SYSTOLIC BLOOD PRESSURE: 126 MMHG

## 2019-01-01 VITALS
OXYGEN SATURATION: 98 % | DIASTOLIC BLOOD PRESSURE: 68 MMHG | BODY MASS INDEX: 21.9 KG/M2 | SYSTOLIC BLOOD PRESSURE: 143 MMHG | WEIGHT: 119 LBS | RESPIRATION RATE: 17 BRPM | HEIGHT: 62 IN | TEMPERATURE: 97.8 F | HEART RATE: 106 BPM

## 2019-01-01 VITALS
HEART RATE: 103 BPM | RESPIRATION RATE: 16 BRPM | WEIGHT: 129 LBS | SYSTOLIC BLOOD PRESSURE: 118 MMHG | TEMPERATURE: 98.4 F | OXYGEN SATURATION: 95 % | BODY MASS INDEX: 23.74 KG/M2 | DIASTOLIC BLOOD PRESSURE: 59 MMHG | HEIGHT: 62 IN

## 2019-01-01 VITALS
WEIGHT: 121.6 LBS | OXYGEN SATURATION: 97 % | HEART RATE: 88 BPM | HEIGHT: 62 IN | DIASTOLIC BLOOD PRESSURE: 72 MMHG | SYSTOLIC BLOOD PRESSURE: 120 MMHG | TEMPERATURE: 97.9 F | RESPIRATION RATE: 18 BRPM | BODY MASS INDEX: 22.38 KG/M2

## 2019-01-01 VITALS
WEIGHT: 119 LBS | HEIGHT: 62 IN | OXYGEN SATURATION: 94 % | BODY MASS INDEX: 21.9 KG/M2 | DIASTOLIC BLOOD PRESSURE: 58 MMHG | TEMPERATURE: 97.7 F | SYSTOLIC BLOOD PRESSURE: 109 MMHG | HEART RATE: 107 BPM | RESPIRATION RATE: 20 BRPM

## 2019-01-01 VITALS
SYSTOLIC BLOOD PRESSURE: 122 MMHG | WEIGHT: 121 LBS | OXYGEN SATURATION: 95 % | DIASTOLIC BLOOD PRESSURE: 62 MMHG | TEMPERATURE: 97.6 F | HEIGHT: 62 IN | BODY MASS INDEX: 22.26 KG/M2 | RESPIRATION RATE: 16 BRPM | HEART RATE: 120 BPM

## 2019-01-01 VITALS
SYSTOLIC BLOOD PRESSURE: 107 MMHG | HEART RATE: 105 BPM | TEMPERATURE: 98.2 F | OXYGEN SATURATION: 96 % | DIASTOLIC BLOOD PRESSURE: 56 MMHG | RESPIRATION RATE: 16 BRPM | BODY MASS INDEX: 22.45 KG/M2 | WEIGHT: 122 LBS | HEIGHT: 62 IN

## 2019-01-01 VITALS
RESPIRATION RATE: 20 BRPM | TEMPERATURE: 97.5 F | SYSTOLIC BLOOD PRESSURE: 112 MMHG | OXYGEN SATURATION: 98 % | HEART RATE: 100 BPM | DIASTOLIC BLOOD PRESSURE: 72 MMHG

## 2019-01-01 DIAGNOSIS — Z17.0 MALIGNANT NEOPLASM OF RIGHT BREAST IN FEMALE, ESTROGEN RECEPTOR POSITIVE, UNSPECIFIED SITE OF BREAST (HCC): Chronic | ICD-10-CM

## 2019-01-01 DIAGNOSIS — R60.0 EDEMA OF BOTH LEGS: ICD-10-CM

## 2019-01-01 DIAGNOSIS — A41.9 SEPSIS, DUE TO UNSPECIFIED ORGANISM: ICD-10-CM

## 2019-01-01 DIAGNOSIS — K11.7 INCREASED OROPHARYNGEAL SECRETIONS: ICD-10-CM

## 2019-01-01 DIAGNOSIS — J90 PLEURAL EFFUSION, RIGHT: ICD-10-CM

## 2019-01-01 DIAGNOSIS — C50.911 MALIGNANT NEOPLASM OF RIGHT BREAST IN FEMALE, ESTROGEN RECEPTOR POSITIVE, UNSPECIFIED SITE OF BREAST (HCC): Chronic | ICD-10-CM

## 2019-01-01 DIAGNOSIS — Z17.0 MALIGNANT NEOPLASM OF RIGHT BREAST IN FEMALE, ESTROGEN RECEPTOR POSITIVE, UNSPECIFIED SITE OF BREAST (HCC): ICD-10-CM

## 2019-01-01 DIAGNOSIS — J18.9 COMMUNITY ACQUIRED PNEUMONIA OF RIGHT LOWER LOBE OF LUNG: ICD-10-CM

## 2019-01-01 DIAGNOSIS — R64 MALIGNANT CACHEXIA (HCC): ICD-10-CM

## 2019-01-01 DIAGNOSIS — J18.9 PNEUMONIA OF RIGHT LUNG DUE TO INFECTIOUS ORGANISM, UNSPECIFIED PART OF LUNG: Primary | ICD-10-CM

## 2019-01-01 DIAGNOSIS — C50.919 MALIGNANT NEOPLASM OF BREAST IN FEMALE, ESTROGEN RECEPTOR POSITIVE, UNSPECIFIED LATERALITY, UNSPECIFIED SITE OF BREAST (HCC): Chronic | ICD-10-CM

## 2019-01-01 DIAGNOSIS — Z51.5 ENCOUNTER FOR PALLIATIVE CARE: ICD-10-CM

## 2019-01-01 DIAGNOSIS — C78.1: Primary | ICD-10-CM

## 2019-01-01 DIAGNOSIS — C50.211 MALIGNANT NEOPLASM OF UPPER-INNER QUADRANT OF RIGHT BREAST IN FEMALE, ESTROGEN RECEPTOR POSITIVE (HCC): ICD-10-CM

## 2019-01-01 DIAGNOSIS — J90 PLEURAL EFFUSION: ICD-10-CM

## 2019-01-01 DIAGNOSIS — C50.911 MALIGNANT NEOPLASM OF RIGHT FEMALE BREAST, UNSPECIFIED ESTROGEN RECEPTOR STATUS, UNSPECIFIED SITE OF BREAST (HCC): Chronic | ICD-10-CM

## 2019-01-01 DIAGNOSIS — E87.1 HYPONATREMIA: ICD-10-CM

## 2019-01-01 DIAGNOSIS — F41.9 ANXIETY: ICD-10-CM

## 2019-01-01 DIAGNOSIS — C78.1: ICD-10-CM

## 2019-01-01 DIAGNOSIS — C50.911 MALIGNANT NEOPLASM OF RIGHT BREAST IN FEMALE, ESTROGEN RECEPTOR POSITIVE, UNSPECIFIED SITE OF BREAST (HCC): ICD-10-CM

## 2019-01-01 DIAGNOSIS — J96.11 CHRONIC HYPOXEMIC RESPIRATORY FAILURE (HCC): Chronic | ICD-10-CM

## 2019-01-01 DIAGNOSIS — Z17.0 MALIGNANT NEOPLASM OF UPPER-INNER QUADRANT OF RIGHT BREAST IN FEMALE, ESTROGEN RECEPTOR POSITIVE (HCC): ICD-10-CM

## 2019-01-01 DIAGNOSIS — Z17.0 MALIGNANT NEOPLASM OF BREAST IN FEMALE, ESTROGEN RECEPTOR POSITIVE, UNSPECIFIED LATERALITY, UNSPECIFIED SITE OF BREAST (HCC): Chronic | ICD-10-CM

## 2019-01-01 DIAGNOSIS — J90 PLEURAL EFFUSION: Primary | ICD-10-CM

## 2019-01-01 DIAGNOSIS — E87.20 LACTIC ACIDOSIS: ICD-10-CM

## 2019-01-01 DIAGNOSIS — R65.20: ICD-10-CM

## 2019-01-01 DIAGNOSIS — R06.02 SOB (SHORTNESS OF BREATH): ICD-10-CM

## 2019-01-01 DIAGNOSIS — R53.81 DEBILITY: ICD-10-CM

## 2019-01-01 DIAGNOSIS — M54.9 BILATERAL BACK PAIN, UNSPECIFIED BACK LOCATION, UNSPECIFIED CHRONICITY: ICD-10-CM

## 2019-01-01 DIAGNOSIS — J96.11 CHRONIC RESPIRATORY FAILURE WITH HYPOXIA (HCC): Primary | ICD-10-CM

## 2019-01-01 DIAGNOSIS — Z71.89 ADVANCED CARE PLANNING/COUNSELING DISCUSSION: ICD-10-CM

## 2019-01-01 DIAGNOSIS — J96.21 ACUTE ON CHRONIC RESPIRATORY FAILURE WITH HYPOXIA (HCC): ICD-10-CM

## 2019-01-01 DIAGNOSIS — A41.9: ICD-10-CM

## 2019-01-01 DIAGNOSIS — R05.3 COUGH, PERSISTENT: ICD-10-CM

## 2019-01-01 DIAGNOSIS — R09.02 HYPOXIA: ICD-10-CM

## 2019-01-01 DIAGNOSIS — R06.00 DYSPNEA, UNSPECIFIED TYPE: ICD-10-CM

## 2019-01-01 LAB
ABO + RH BLD: NORMAL
ACID FAST STN SPEC: NEGATIVE
ALBUMIN SERPL-MCNC: 1.4 G/DL (ref 3.2–4.6)
ALBUMIN SERPL-MCNC: 1.5 G/DL (ref 3.2–4.6)
ALBUMIN SERPL-MCNC: 1.8 G/DL (ref 3.2–4.6)
ALBUMIN SERPL-MCNC: 1.9 G/DL (ref 3.2–4.6)
ALBUMIN SERPL-MCNC: 2.1 G/DL (ref 3.2–4.6)
ALBUMIN SERPL-MCNC: 2.4 G/DL (ref 3.2–4.6)
ALBUMIN SERPL-MCNC: 2.4 G/DL (ref 3.2–4.6)
ALBUMIN SERPL-MCNC: 2.5 G/DL (ref 3.2–4.6)
ALBUMIN SERPL-MCNC: 2.5 G/DL (ref 3.2–4.6)
ALBUMIN SERPL-MCNC: 2.9 G/DL (ref 3.2–4.6)
ALBUMIN SERPL-MCNC: 3.1 G/DL (ref 3.2–4.6)
ALBUMIN SERPL-MCNC: 3.3 G/DL (ref 3.2–4.6)
ALBUMIN/GLOB SERPL: 0.4 {RATIO} (ref 1.2–3.5)
ALBUMIN/GLOB SERPL: 0.5 {RATIO} (ref 1.2–3.5)
ALBUMIN/GLOB SERPL: 0.6 {RATIO} (ref 1.2–3.5)
ALBUMIN/GLOB SERPL: 0.7 {RATIO} (ref 1.2–3.5)
ALBUMIN/GLOB SERPL: 0.8 {RATIO} (ref 1.2–3.5)
ALBUMIN/GLOB SERPL: 0.8 {RATIO} (ref 1.2–3.5)
ALBUMIN/GLOB SERPL: 0.9 {RATIO} (ref 1.2–3.5)
ALP SERPL-CCNC: 1012 U/L (ref 50–136)
ALP SERPL-CCNC: 103 U/L (ref 50–136)
ALP SERPL-CCNC: 120 U/L (ref 50–136)
ALP SERPL-CCNC: 122 U/L (ref 50–136)
ALP SERPL-CCNC: 136 U/L (ref 50–136)
ALP SERPL-CCNC: 162 U/L (ref 50–136)
ALP SERPL-CCNC: 207 U/L (ref 50–136)
ALP SERPL-CCNC: 232 U/L (ref 50–136)
ALP SERPL-CCNC: 240 U/L (ref 50–136)
ALP SERPL-CCNC: 790 U/L (ref 50–136)
ALP SERPL-CCNC: 842 U/L (ref 50–136)
ALP SERPL-CCNC: 912 U/L (ref 50–136)
ALP SERPL-CCNC: 96 U/L (ref 50–136)
ALP SERPL-CCNC: 972 U/L (ref 50–136)
ALT SERPL-CCNC: 105 U/L (ref 12–65)
ALT SERPL-CCNC: 19 U/L (ref 12–65)
ALT SERPL-CCNC: 20 U/L (ref 12–65)
ALT SERPL-CCNC: 22 U/L (ref 12–65)
ALT SERPL-CCNC: 24 U/L (ref 12–65)
ALT SERPL-CCNC: 27 U/L (ref 12–65)
ALT SERPL-CCNC: 30 U/L (ref 12–65)
ALT SERPL-CCNC: 33 U/L (ref 12–65)
ALT SERPL-CCNC: 40 U/L (ref 12–65)
ALT SERPL-CCNC: 44 U/L (ref 12–65)
ALT SERPL-CCNC: 73 U/L (ref 12–65)
ALT SERPL-CCNC: 85 U/L (ref 12–65)
ALT SERPL-CCNC: 92 U/L (ref 12–65)
ALT SERPL-CCNC: 93 U/L (ref 12–65)
ANION GAP SERPL CALC-SCNC: 10 MMOL/L (ref 7–16)
ANION GAP SERPL CALC-SCNC: 14 MMOL/L (ref 7–16)
ANION GAP SERPL CALC-SCNC: 17 MMOL/L (ref 7–16)
ANION GAP SERPL CALC-SCNC: 18 MMOL/L (ref 7–16)
ANION GAP SERPL CALC-SCNC: 3 MMOL/L (ref 7–16)
ANION GAP SERPL CALC-SCNC: 3 MMOL/L (ref 7–16)
ANION GAP SERPL CALC-SCNC: 5 MMOL/L (ref 7–16)
ANION GAP SERPL CALC-SCNC: 5 MMOL/L (ref 7–16)
ANION GAP SERPL CALC-SCNC: 6 MMOL/L (ref 7–16)
ANION GAP SERPL CALC-SCNC: 7 MMOL/L (ref 7–16)
ANION GAP SERPL CALC-SCNC: 8 MMOL/L (ref 7–16)
ANION GAP SERPL CALC-SCNC: 9 MMOL/L (ref 7–16)
APPEARANCE FLD: NORMAL
APPEARANCE UR: ABNORMAL
APTT PPP: 48.8 SEC (ref 24.7–39.8)
ARTERIAL PATENCY WRIST A: ABNORMAL
ARTERIAL PATENCY WRIST A: ABNORMAL
ARTERIAL PATENCY WRIST A: YES
ARTERIAL PATENCY WRIST A: YES
AST SERPL-CCNC: 129 U/L (ref 15–37)
AST SERPL-CCNC: 138 U/L (ref 15–37)
AST SERPL-CCNC: 140 U/L (ref 15–37)
AST SERPL-CCNC: 17 U/L (ref 15–37)
AST SERPL-CCNC: 17 U/L (ref 15–37)
AST SERPL-CCNC: 22 U/L (ref 15–37)
AST SERPL-CCNC: 29 U/L (ref 15–37)
AST SERPL-CCNC: 343 U/L (ref 15–37)
AST SERPL-CCNC: 406 U/L (ref 15–37)
AST SERPL-CCNC: 452 U/L (ref 15–37)
AST SERPL-CCNC: 50 U/L (ref 15–37)
AST SERPL-CCNC: 519 U/L (ref 15–37)
AST SERPL-CCNC: 542 U/L (ref 15–37)
AST SERPL-CCNC: 56 U/L (ref 15–37)
ATRIAL RATE: 101 BPM
ATRIAL RATE: 122 BPM
ATRIAL RATE: 60 BPM
BACTERIA SPEC CULT: NORMAL
BACTERIA SPEC CULT: NORMAL
BACTERIA URNS QL MICRO: 0 /HPF
BASE DEFICIT BLD-SCNC: 1 MMOL/L
BASE EXCESS BLD CALC-SCNC: 0 MMOL/L
BASE EXCESS BLD CALC-SCNC: 1 MMOL/L
BASE EXCESS BLD CALC-SCNC: 1 MMOL/L
BASOPHILS # BLD: 0 K/UL (ref 0–0.2)
BASOPHILS # BLD: 0.1 K/UL (ref 0–0.2)
BASOPHILS NFR BLD: 0 % (ref 0–2)
BASOPHILS NFR BLD: 1 % (ref 0–2)
BASOPHILS NFR BLD: 2 % (ref 0–2)
BDY SITE: ABNORMAL
BILIRUB DIRECT SERPL-MCNC: 0.2 MG/DL
BILIRUB SERPL-MCNC: 0.3 MG/DL (ref 0.2–1.1)
BILIRUB SERPL-MCNC: 0.3 MG/DL (ref 0.2–1.1)
BILIRUB SERPL-MCNC: 0.4 MG/DL (ref 0.2–1.1)
BILIRUB SERPL-MCNC: 0.5 MG/DL (ref 0.2–1.1)
BILIRUB SERPL-MCNC: 0.6 MG/DL (ref 0.2–1.1)
BILIRUB SERPL-MCNC: 0.6 MG/DL (ref 0.2–1.1)
BILIRUB SERPL-MCNC: 0.7 MG/DL (ref 0.2–1.1)
BILIRUB SERPL-MCNC: 1.2 MG/DL (ref 0.2–1.1)
BILIRUB SERPL-MCNC: 1.3 MG/DL (ref 0.2–1.1)
BILIRUB SERPL-MCNC: 1.5 MG/DL (ref 0.2–1.1)
BILIRUB SERPL-MCNC: 1.5 MG/DL (ref 0.2–1.1)
BILIRUB SERPL-MCNC: 1.9 MG/DL (ref 0.2–1.1)
BILIRUB UR QL: ABNORMAL
BLOOD GROUP ANTIBODIES SERPL: NORMAL
BNP SERPL-MCNC: 41 PG/ML
BNP SERPL-MCNC: 49 PG/ML
BNP SERPL-MCNC: 51 PG/ML
BODY TEMPERATURE: 98.6
BUN SERPL-MCNC: 11 MG/DL (ref 8–23)
BUN SERPL-MCNC: 13 MG/DL (ref 8–23)
BUN SERPL-MCNC: 13 MG/DL (ref 8–23)
BUN SERPL-MCNC: 14 MG/DL (ref 8–23)
BUN SERPL-MCNC: 15 MG/DL (ref 8–23)
BUN SERPL-MCNC: 15 MG/DL (ref 8–23)
BUN SERPL-MCNC: 16 MG/DL (ref 8–23)
BUN SERPL-MCNC: 17 MG/DL (ref 8–23)
BUN SERPL-MCNC: 20 MG/DL (ref 8–23)
BUN SERPL-MCNC: 23 MG/DL (ref 8–23)
BUN SERPL-MCNC: 25 MG/DL (ref 8–23)
BUN SERPL-MCNC: 34 MG/DL (ref 8–23)
BUN SERPL-MCNC: 35 MG/DL (ref 8–23)
BUN SERPL-MCNC: 37 MG/DL (ref 8–23)
BUN SERPL-MCNC: 38 MG/DL (ref 8–23)
CA-I BLD-MCNC: 1.1 MMOL/L (ref 1.12–1.32)
CA-I BLD-MCNC: 1.16 MMOL/L (ref 1.12–1.32)
CALCIUM SERPL-MCNC: 7.2 MG/DL (ref 8.3–10.4)
CALCIUM SERPL-MCNC: 7.4 MG/DL (ref 8.3–10.4)
CALCIUM SERPL-MCNC: 7.7 MG/DL (ref 8.3–10.4)
CALCIUM SERPL-MCNC: 7.7 MG/DL (ref 8.3–10.4)
CALCIUM SERPL-MCNC: 7.8 MG/DL (ref 8.3–10.4)
CALCIUM SERPL-MCNC: 7.8 MG/DL (ref 8.3–10.4)
CALCIUM SERPL-MCNC: 7.9 MG/DL (ref 8.3–10.4)
CALCIUM SERPL-MCNC: 8 MG/DL (ref 8.3–10.4)
CALCIUM SERPL-MCNC: 8 MG/DL (ref 8.3–10.4)
CALCIUM SERPL-MCNC: 8.3 MG/DL (ref 8.3–10.4)
CALCIUM SERPL-MCNC: 8.4 MG/DL (ref 8.3–10.4)
CALCIUM SERPL-MCNC: 8.4 MG/DL (ref 8.3–10.4)
CALCIUM SERPL-MCNC: 8.5 MG/DL (ref 8.3–10.4)
CALCIUM SERPL-MCNC: 8.5 MG/DL (ref 8.3–10.4)
CALCIUM SERPL-MCNC: 8.6 MG/DL (ref 8.3–10.4)
CALCIUM SERPL-MCNC: 8.7 MG/DL (ref 8.3–10.4)
CALCIUM SERPL-MCNC: 9.1 MG/DL (ref 8.3–10.4)
CALCULATED P AXIS, ECG09: 58 DEGREES
CALCULATED P AXIS, ECG09: 75 DEGREES
CALCULATED R AXIS, ECG10: -18 DEGREES
CALCULATED R AXIS, ECG10: 3 DEGREES
CALCULATED R AXIS, ECG10: 4 DEGREES
CALCULATED T AXIS, ECG11: 60 DEGREES
CALCULATED T AXIS, ECG11: 67 DEGREES
CALCULATED T AXIS, ECG11: 67 DEGREES
CANCER AG15-3 SERPL-ACNC: 259.5 U/ML (ref 1–35)
CANCER AG27-29 SERPL-ACNC: 359.1 U/ML (ref 0–38.6)
CHLORIDE SERPL-SCNC: 100 MMOL/L (ref 98–107)
CHLORIDE SERPL-SCNC: 102 MMOL/L (ref 98–107)
CHLORIDE SERPL-SCNC: 104 MMOL/L (ref 98–107)
CHLORIDE SERPL-SCNC: 105 MMOL/L (ref 98–107)
CHLORIDE SERPL-SCNC: 106 MMOL/L (ref 98–107)
CHLORIDE SERPL-SCNC: 107 MMOL/L (ref 98–107)
CHLORIDE SERPL-SCNC: 91 MMOL/L (ref 98–107)
CHLORIDE SERPL-SCNC: 92 MMOL/L (ref 98–107)
CHLORIDE SERPL-SCNC: 93 MMOL/L (ref 98–107)
CHLORIDE SERPL-SCNC: 93 MMOL/L (ref 98–107)
CHLORIDE SERPL-SCNC: 95 MMOL/L (ref 98–107)
CHLORIDE SERPL-SCNC: 96 MMOL/L (ref 98–107)
CHLORIDE SERPL-SCNC: 97 MMOL/L (ref 98–107)
CHLORIDE SERPL-SCNC: 98 MMOL/L (ref 98–107)
CHOLESTEROL, FLD, CHOF1L: 67 MG/DL
CHYLOMICRON SCREEN, CHYLMT: NORMAL
CO2 BLD-SCNC: 24 MMOL/L
CO2 BLD-SCNC: 28 MMOL/L
CO2 SERPL-SCNC: 20 MMOL/L (ref 21–32)
CO2 SERPL-SCNC: 20 MMOL/L (ref 21–32)
CO2 SERPL-SCNC: 24 MMOL/L (ref 21–32)
CO2 SERPL-SCNC: 24 MMOL/L (ref 21–32)
CO2 SERPL-SCNC: 25 MMOL/L (ref 21–32)
CO2 SERPL-SCNC: 25 MMOL/L (ref 21–32)
CO2 SERPL-SCNC: 26 MMOL/L (ref 21–32)
CO2 SERPL-SCNC: 26 MMOL/L (ref 21–32)
CO2 SERPL-SCNC: 27 MMOL/L (ref 21–32)
CO2 SERPL-SCNC: 28 MMOL/L (ref 21–32)
CO2 SERPL-SCNC: 29 MMOL/L (ref 21–32)
CO2 SERPL-SCNC: 30 MMOL/L (ref 21–32)
CO2 SERPL-SCNC: 31 MMOL/L (ref 21–32)
COLLECT TIME,HTIME: 1257
COLLECT TIME,HTIME: 1258
COLLECT TIME,HTIME: 2245
COLLECT TIME,HTIME: 842
COLOR FLD: NORMAL
COLOR UR: ABNORMAL
CREAT SERPL-MCNC: 0.65 MG/DL (ref 0.6–1)
CREAT SERPL-MCNC: 0.66 MG/DL (ref 0.6–1)
CREAT SERPL-MCNC: 0.71 MG/DL (ref 0.6–1)
CREAT SERPL-MCNC: 0.72 MG/DL (ref 0.6–1)
CREAT SERPL-MCNC: 0.75 MG/DL (ref 0.6–1)
CREAT SERPL-MCNC: 0.81 MG/DL (ref 0.6–1)
CREAT SERPL-MCNC: 0.82 MG/DL (ref 0.6–1)
CREAT SERPL-MCNC: 0.83 MG/DL (ref 0.6–1)
CREAT SERPL-MCNC: 0.84 MG/DL (ref 0.6–1)
CREAT SERPL-MCNC: 0.87 MG/DL (ref 0.6–1)
CREAT SERPL-MCNC: 0.89 MG/DL (ref 0.6–1)
CREAT SERPL-MCNC: 0.96 MG/DL (ref 0.6–1)
CREAT SERPL-MCNC: 0.98 MG/DL (ref 0.6–1)
CREAT SERPL-MCNC: 1.08 MG/DL (ref 0.6–1)
CREAT SERPL-MCNC: 1.12 MG/DL (ref 0.6–1)
CREAT SERPL-MCNC: 1.18 MG/DL (ref 0.6–1)
CREAT SERPL-MCNC: 1.24 MG/DL (ref 0.6–1)
D DIMER PPP FEU-MCNC: 6.45 UG/ML(FEU)
DIAGNOSIS, 93000: NORMAL
DIFFERENTIAL METHOD BLD: ABNORMAL
EOSINOPHIL # BLD: 0 K/UL (ref 0–0.8)
EOSINOPHIL # BLD: 0.1 K/UL (ref 0–0.8)
EOSINOPHIL NFR BLD: 0 % (ref 0.5–7.8)
EOSINOPHIL NFR BLD: 1 % (ref 0.5–7.8)
EOSINOPHIL NFR BLD: 2 % (ref 0.5–7.8)
ERYTHROCYTE [DISTWIDTH] IN BLOOD BY AUTOMATED COUNT: 12.3 % (ref 11.9–14.6)
ERYTHROCYTE [DISTWIDTH] IN BLOOD BY AUTOMATED COUNT: 13.2 % (ref 11.9–14.6)
ERYTHROCYTE [DISTWIDTH] IN BLOOD BY AUTOMATED COUNT: 13.8 % (ref 11.9–14.6)
ERYTHROCYTE [DISTWIDTH] IN BLOOD BY AUTOMATED COUNT: 15.2 % (ref 11.9–14.6)
ERYTHROCYTE [DISTWIDTH] IN BLOOD BY AUTOMATED COUNT: 17.6 % (ref 11.9–14.6)
ERYTHROCYTE [DISTWIDTH] IN BLOOD BY AUTOMATED COUNT: 17.9 % (ref 11.9–14.6)
ERYTHROCYTE [DISTWIDTH] IN BLOOD BY AUTOMATED COUNT: 19 % (ref 11.9–14.6)
ERYTHROCYTE [DISTWIDTH] IN BLOOD BY AUTOMATED COUNT: 19.3 % (ref 11.9–14.6)
ERYTHROCYTE [DISTWIDTH] IN BLOOD BY AUTOMATED COUNT: 19.4 % (ref 11.9–14.6)
ERYTHROCYTE [DISTWIDTH] IN BLOOD BY AUTOMATED COUNT: 20.1 % (ref 11.9–14.6)
ERYTHROCYTE [DISTWIDTH] IN BLOOD BY AUTOMATED COUNT: 20.7 % (ref 11.9–14.6)
ERYTHROCYTE [DISTWIDTH] IN BLOOD BY AUTOMATED COUNT: 21.1 % (ref 11.9–14.6)
ERYTHROCYTE [DISTWIDTH] IN BLOOD BY AUTOMATED COUNT: 21.2 % (ref 11.9–14.6)
ERYTHROCYTE [DISTWIDTH] IN BLOOD BY AUTOMATED COUNT: 21.2 % (ref 11.9–14.6)
ERYTHROCYTE [DISTWIDTH] IN BLOOD BY AUTOMATED COUNT: 21.3 % (ref 11.9–14.6)
ERYTHROCYTE [DISTWIDTH] IN BLOOD BY AUTOMATED COUNT: 21.4 % (ref 11.9–14.6)
FIBRINOGEN PPP-MCNC: 528 MG/DL (ref 190–501)
FLOW RATE ISTAT,IFRATE: 10 L/MIN
FLOW RATE ISTAT,IFRATE: 3 L/MIN
FLOW RATE ISTAT,IFRATE: 3 L/MIN
FLOW RATE ISTAT,IFRATE: 4 L/MIN
FLUID COMMENTS, FCOM: NORMAL
FUNGUS CULTURE, RFCO2T: NORMAL
FUNGUS SMEAR, RFCO1T: NORMAL
FUNGUS SPEC CULT: NORMAL
FUNGUS STAIN, 188244: NORMAL
GAS FLOW.O2 O2 DELIVERY SYS: ABNORMAL L/MIN
GLOBULIN SER CALC-MCNC: 2.7 G/DL (ref 2.3–3.5)
GLOBULIN SER CALC-MCNC: 3.2 G/DL (ref 2.3–3.5)
GLOBULIN SER CALC-MCNC: 3.3 G/DL (ref 2.3–3.5)
GLOBULIN SER CALC-MCNC: 3.6 G/DL (ref 2.3–3.5)
GLOBULIN SER CALC-MCNC: 3.7 G/DL (ref 2.3–3.5)
GLOBULIN SER CALC-MCNC: 4 G/DL (ref 2.3–3.5)
GLOBULIN SER CALC-MCNC: 4.1 G/DL (ref 2.3–3.5)
GLOBULIN SER CALC-MCNC: 4.6 G/DL (ref 2.3–3.5)
GLOBULIN SER CALC-MCNC: 4.7 G/DL (ref 2.3–3.5)
GLOBULIN SER CALC-MCNC: 4.8 G/DL (ref 2.3–3.5)
GLOBULIN SER CALC-MCNC: 5 G/DL (ref 2.3–3.5)
GLOBULIN SER CALC-MCNC: 5.1 G/DL (ref 2.3–3.5)
GLUCOSE BLD STRIP.AUTO-MCNC: 77 MG/DL (ref 65–100)
GLUCOSE BLD STRIP.AUTO-MCNC: 96 MG/DL (ref 65–100)
GLUCOSE FLD-MCNC: 84 MG/DL
GLUCOSE FLD-MCNC: NORMAL MG/DL
GLUCOSE SERPL-MCNC: 101 MG/DL (ref 65–100)
GLUCOSE SERPL-MCNC: 108 MG/DL (ref 65–100)
GLUCOSE SERPL-MCNC: 109 MG/DL (ref 65–100)
GLUCOSE SERPL-MCNC: 112 MG/DL (ref 65–100)
GLUCOSE SERPL-MCNC: 120 MG/DL (ref 65–100)
GLUCOSE SERPL-MCNC: 123 MG/DL (ref 65–100)
GLUCOSE SERPL-MCNC: 128 MG/DL (ref 65–100)
GLUCOSE SERPL-MCNC: 129 MG/DL (ref 65–100)
GLUCOSE SERPL-MCNC: 164 MG/DL (ref 65–100)
GLUCOSE SERPL-MCNC: 164 MG/DL (ref 65–100)
GLUCOSE SERPL-MCNC: 55 MG/DL (ref 65–100)
GLUCOSE SERPL-MCNC: 62 MG/DL (ref 65–100)
GLUCOSE SERPL-MCNC: 72 MG/DL (ref 65–100)
GLUCOSE SERPL-MCNC: 84 MG/DL (ref 65–100)
GLUCOSE SERPL-MCNC: 87 MG/DL (ref 65–100)
GLUCOSE SERPL-MCNC: 98 MG/DL (ref 65–100)
GLUCOSE SERPL-MCNC: 99 MG/DL (ref 65–100)
GLUCOSE UR STRIP.AUTO-MCNC: NEGATIVE MG/DL
GRAM STN SPEC: NORMAL
HCO3 BLD-SCNC: 23.2 MMOL/L (ref 22–26)
HCO3 BLD-SCNC: 23.8 MMOL/L (ref 22–26)
HCO3 BLD-SCNC: 26.7 MMOL/L (ref 22–26)
HCO3 BLD-SCNC: 26.7 MMOL/L (ref 22–26)
HCT VFR BLD AUTO: 24.4 % (ref 35.8–46.3)
HCT VFR BLD AUTO: 24.9 % (ref 35.8–46.3)
HCT VFR BLD AUTO: 25.7 % (ref 35.8–46.3)
HCT VFR BLD AUTO: 26.5 % (ref 35.8–46.3)
HCT VFR BLD AUTO: 26.6 % (ref 35.8–46.3)
HCT VFR BLD AUTO: 28.6 % (ref 35.8–46.3)
HCT VFR BLD AUTO: 29.2 % (ref 35.8–46.3)
HCT VFR BLD AUTO: 31 % (ref 35.8–46.3)
HCT VFR BLD AUTO: 31.3 % (ref 35.8–46.3)
HCT VFR BLD AUTO: 31.6 % (ref 35.8–46.3)
HCT VFR BLD AUTO: 31.8 % (ref 35.8–46.3)
HCT VFR BLD AUTO: 32.4 % (ref 35.8–46.3)
HCT VFR BLD AUTO: 33 % (ref 35.8–46.3)
HCT VFR BLD AUTO: 34.9 % (ref 35.8–46.3)
HCT VFR BLD AUTO: 34.9 % (ref 35.8–46.3)
HCT VFR BLD AUTO: 35.2 % (ref 35.8–46.3)
HGB BLD-MCNC: 10 G/DL (ref 11.7–15.4)
HGB BLD-MCNC: 10.2 G/DL (ref 11.7–15.4)
HGB BLD-MCNC: 10.3 G/DL (ref 11.7–15.4)
HGB BLD-MCNC: 10.6 G/DL (ref 11.7–15.4)
HGB BLD-MCNC: 10.9 G/DL (ref 11.7–15.4)
HGB BLD-MCNC: 11.8 G/DL (ref 11.7–15.4)
HGB BLD-MCNC: 12.2 G/DL (ref 11.7–15.4)
HGB BLD-MCNC: 12.4 G/DL (ref 11.7–15.4)
HGB BLD-MCNC: 7.8 G/DL (ref 11.7–15.4)
HGB BLD-MCNC: 7.9 G/DL (ref 11.7–15.4)
HGB BLD-MCNC: 8.1 G/DL (ref 11.7–15.4)
HGB BLD-MCNC: 8.3 G/DL (ref 11.7–15.4)
HGB BLD-MCNC: 8.6 G/DL (ref 11.7–15.4)
HGB BLD-MCNC: 9.1 G/DL (ref 11.7–15.4)
HGB BLD-MCNC: 9.2 G/DL (ref 11.7–15.4)
HGB BLD-MCNC: 9.8 G/DL (ref 11.7–15.4)
HGB UR QL STRIP: NEGATIVE
IMM GRANULOCYTES # BLD AUTO: 0 K/UL (ref 0–0.5)
IMM GRANULOCYTES # BLD AUTO: 0.1 K/UL (ref 0–0.5)
IMM GRANULOCYTES # BLD AUTO: 0.2 K/UL (ref 0–0.5)
IMM GRANULOCYTES # BLD AUTO: 0.3 K/UL (ref 0–0.5)
IMM GRANULOCYTES # BLD AUTO: 0.6 K/UL (ref 0–0.5)
IMM GRANULOCYTES # BLD AUTO: 0.7 K/UL (ref 0–0.5)
IMM GRANULOCYTES # BLD AUTO: 0.9 K/UL (ref 0–0.5)
IMM GRANULOCYTES NFR BLD AUTO: 1 % (ref 0–5)
IMM GRANULOCYTES NFR BLD AUTO: 3 % (ref 0–5)
IMM GRANULOCYTES NFR BLD AUTO: 4 % (ref 0–5)
IMM GRANULOCYTES NFR BLD AUTO: 6 % (ref 0–5)
IMM GRANULOCYTES NFR BLD AUTO: 8 % (ref 0–5)
INR PPP: 1.3
INR PPP: 1.7
KETONES UR QL STRIP.AUTO: ABNORMAL MG/DL
LACTATE BLD-SCNC: 3.19 MMOL/L (ref 0.5–1.9)
LACTATE BLD-SCNC: 4.97 MMOL/L (ref 0.5–1.9)
LACTATE BLD-SCNC: 5.38 MMOL/L (ref 0.5–1.9)
LACTATE SERPL-SCNC: 6.2 MMOL/L (ref 0.4–2)
LACTATE SERPL-SCNC: 9.8 MMOL/L (ref 0.4–2)
LDH FLD L TO P-CCNC: 258 U/L
LDH FLD L TO P-CCNC: NORMAL U/L
LEUKOCYTE ESTERASE UR QL STRIP.AUTO: ABNORMAL
LIPASE SERPL-CCNC: 99 U/L (ref 73–393)
LYMPHOCYTES # BLD: 0.5 K/UL (ref 0.5–4.6)
LYMPHOCYTES # BLD: 0.6 K/UL (ref 0.5–4.6)
LYMPHOCYTES # BLD: 0.7 K/UL (ref 0.5–4.6)
LYMPHOCYTES # BLD: 0.7 K/UL (ref 0.5–4.6)
LYMPHOCYTES # BLD: 0.8 K/UL (ref 0.5–4.6)
LYMPHOCYTES # BLD: 0.9 K/UL (ref 0.5–4.6)
LYMPHOCYTES # BLD: 1 K/UL (ref 0.5–4.6)
LYMPHOCYTES # BLD: 1 K/UL (ref 0.5–4.6)
LYMPHOCYTES # BLD: 1.2 K/UL (ref 0.5–4.6)
LYMPHOCYTES NFR BLD: 11 % (ref 13–44)
LYMPHOCYTES NFR BLD: 12 % (ref 13–44)
LYMPHOCYTES NFR BLD: 12 % (ref 13–44)
LYMPHOCYTES NFR BLD: 14 % (ref 13–44)
LYMPHOCYTES NFR BLD: 14 % (ref 13–44)
LYMPHOCYTES NFR BLD: 16 % (ref 13–44)
LYMPHOCYTES NFR BLD: 17 % (ref 13–44)
LYMPHOCYTES NFR BLD: 18 % (ref 13–44)
LYMPHOCYTES NFR BLD: 19 % (ref 13–44)
LYMPHOCYTES NFR BLD: 21 % (ref 13–44)
LYMPHOCYTES NFR BLD: 24 % (ref 13–44)
LYMPHOCYTES NFR BLD: 28 % (ref 13–44)
LYMPHOCYTES NFR BLD: 6 % (ref 13–44)
LYMPHOCYTES NFR BLD: 7 % (ref 13–44)
LYMPHOCYTES NFR BLD: 7 % (ref 13–44)
LYMPHOCYTES NFR BLD: 8 % (ref 13–44)
LYMPHOCYTES NFR BRONCH MANUAL: 40 %
LYMPHOCYTES NFR FLD: 77 %
LYMPHOCYTES NFR FLD: 98 %
MAGNESIUM SERPL-MCNC: 2.1 MG/DL (ref 1.8–2.4)
MAGNESIUM SERPL-MCNC: 2.2 MG/DL (ref 1.8–2.4)
MAGNESIUM SERPL-MCNC: 2.4 MG/DL (ref 1.8–2.4)
MCH RBC QN AUTO: 33.9 PG (ref 26.1–32.9)
MCH RBC QN AUTO: 34.1 PG (ref 26.1–32.9)
MCH RBC QN AUTO: 34.3 PG (ref 26.1–32.9)
MCH RBC QN AUTO: 34.3 PG (ref 26.1–32.9)
MCH RBC QN AUTO: 34.5 PG (ref 26.1–32.9)
MCH RBC QN AUTO: 34.5 PG (ref 26.1–32.9)
MCH RBC QN AUTO: 34.6 PG (ref 26.1–32.9)
MCH RBC QN AUTO: 34.7 PG (ref 26.1–32.9)
MCH RBC QN AUTO: 34.9 PG (ref 26.1–32.9)
MCH RBC QN AUTO: 34.9 PG (ref 26.1–32.9)
MCH RBC QN AUTO: 35 PG (ref 26.1–32.9)
MCH RBC QN AUTO: 35.1 PG (ref 26.1–32.9)
MCH RBC QN AUTO: 35.7 PG (ref 26.1–32.9)
MCH RBC QN AUTO: 35.7 PG (ref 26.1–32.9)
MCH RBC QN AUTO: 36.4 PG (ref 26.1–32.9)
MCH RBC QN AUTO: 37.3 PG (ref 26.1–32.9)
MCHC RBC AUTO-ENTMCNC: 30.7 G/DL (ref 31.4–35)
MCHC RBC AUTO-ENTMCNC: 30.8 G/DL (ref 31.4–35)
MCHC RBC AUTO-ENTMCNC: 31.2 G/DL (ref 31.4–35)
MCHC RBC AUTO-ENTMCNC: 31.5 G/DL (ref 31.4–35)
MCHC RBC AUTO-ENTMCNC: 31.8 G/DL (ref 31.4–35)
MCHC RBC AUTO-ENTMCNC: 32 G/DL (ref 31.4–35)
MCHC RBC AUTO-ENTMCNC: 32.1 G/DL (ref 31.4–35)
MCHC RBC AUTO-ENTMCNC: 32.3 G/DL (ref 31.4–35)
MCHC RBC AUTO-ENTMCNC: 32.5 G/DL (ref 31.4–35)
MCHC RBC AUTO-ENTMCNC: 32.5 G/DL (ref 31.4–35)
MCHC RBC AUTO-ENTMCNC: 32.6 G/DL (ref 31.4–35)
MCHC RBC AUTO-ENTMCNC: 32.6 G/DL (ref 31.4–35)
MCHC RBC AUTO-ENTMCNC: 33.5 G/DL (ref 31.4–35)
MCHC RBC AUTO-ENTMCNC: 33.6 G/DL (ref 31.4–35)
MCHC RBC AUTO-ENTMCNC: 35 G/DL (ref 31.4–35)
MCHC RBC AUTO-ENTMCNC: 35.5 G/DL (ref 31.4–35)
MCV RBC AUTO: 101.9 FL (ref 79.6–97.8)
MCV RBC AUTO: 102.3 FL (ref 79.6–97.8)
MCV RBC AUTO: 103.2 FL (ref 79.6–97.8)
MCV RBC AUTO: 105.1 FL (ref 79.6–97.8)
MCV RBC AUTO: 106.7 FL (ref 79.6–97.8)
MCV RBC AUTO: 106.9 FL (ref 79.6–97.8)
MCV RBC AUTO: 107.1 FL (ref 79.6–97.8)
MCV RBC AUTO: 107.5 FL (ref 79.6–97.8)
MCV RBC AUTO: 107.6 FL (ref 79.6–97.8)
MCV RBC AUTO: 108 FL (ref 79.6–97.8)
MCV RBC AUTO: 109.4 FL (ref 79.6–97.8)
MCV RBC AUTO: 109.7 FL (ref 79.6–97.8)
MCV RBC AUTO: 111.1 FL (ref 79.6–97.8)
MCV RBC AUTO: 111.7 FL (ref 79.6–97.8)
MCV RBC AUTO: 111.8 FL (ref 79.6–97.8)
MCV RBC AUTO: 113.2 FL (ref 79.6–97.8)
MONOCYTES # BLD: 0.1 K/UL (ref 0.1–1.3)
MONOCYTES # BLD: 0.2 K/UL (ref 0.1–1.3)
MONOCYTES # BLD: 0.3 K/UL (ref 0.1–1.3)
MONOCYTES # BLD: 0.3 K/UL (ref 0.1–1.3)
MONOCYTES # BLD: 0.8 K/UL (ref 0.1–1.3)
MONOCYTES # BLD: 1 K/UL (ref 0.1–1.3)
MONOCYTES # BLD: 1 K/UL (ref 0.1–1.3)
MONOCYTES # BLD: 1.1 K/UL (ref 0.1–1.3)
MONOCYTES # BLD: 1.2 K/UL (ref 0.1–1.3)
MONOCYTES # BLD: 1.2 K/UL (ref 0.1–1.3)
MONOCYTES # BLD: 1.3 K/UL (ref 0.1–1.3)
MONOCYTES NFR BLD: 10 % (ref 4–12)
MONOCYTES NFR BLD: 14 % (ref 4–12)
MONOCYTES NFR BLD: 16 % (ref 4–12)
MONOCYTES NFR BLD: 16 % (ref 4–12)
MONOCYTES NFR BLD: 17 % (ref 4–12)
MONOCYTES NFR BLD: 2 % (ref 4–12)
MONOCYTES NFR BLD: 21 % (ref 4–12)
MONOCYTES NFR BLD: 22 % (ref 4–12)
MONOCYTES NFR BLD: 4 % (ref 4–12)
MONOCYTES NFR BLD: 4 % (ref 4–12)
MONOCYTES NFR BLD: 6 % (ref 4–12)
MONOCYTES NFR BLD: 8 % (ref 4–12)
MONOCYTES NFR BLD: 9 % (ref 4–12)
MONOS+MACROS NFR FLD: 1 %
MYCOBACTERIUM SPEC QL CULT: NEGATIVE
NEUTROPHILS NFR BRONCH MANUAL: 60 %
NEUTROPHILS NFR FLD: 1 %
NEUTROPHILS NFR FLD: 23 %
NEUTS SEG # BLD: 11 K/UL (ref 1.7–8.2)
NEUTS SEG # BLD: 11.2 K/UL (ref 1.7–8.2)
NEUTS SEG # BLD: 11.3 K/UL (ref 1.7–8.2)
NEUTS SEG # BLD: 2.1 K/UL (ref 1.7–8.2)
NEUTS SEG # BLD: 2.2 K/UL (ref 1.7–8.2)
NEUTS SEG # BLD: 2.4 K/UL (ref 1.7–8.2)
NEUTS SEG # BLD: 2.4 K/UL (ref 1.7–8.2)
NEUTS SEG # BLD: 2.8 K/UL (ref 1.7–8.2)
NEUTS SEG # BLD: 2.9 K/UL (ref 1.7–8.2)
NEUTS SEG # BLD: 3.1 K/UL (ref 1.7–8.2)
NEUTS SEG # BLD: 3.3 K/UL (ref 1.7–8.2)
NEUTS SEG # BLD: 4.4 K/UL (ref 1.7–8.2)
NEUTS SEG # BLD: 4.5 K/UL (ref 1.7–8.2)
NEUTS SEG # BLD: 5.1 K/UL (ref 1.7–8.2)
NEUTS SEG # BLD: 5.6 K/UL (ref 1.7–8.2)
NEUTS SEG # BLD: 8.5 K/UL (ref 1.7–8.2)
NEUTS SEG NFR BLD: 59 % (ref 43–78)
NEUTS SEG NFR BLD: 61 % (ref 43–78)
NEUTS SEG NFR BLD: 61 % (ref 43–78)
NEUTS SEG NFR BLD: 64 % (ref 43–78)
NEUTS SEG NFR BLD: 64 % (ref 43–78)
NEUTS SEG NFR BLD: 66 % (ref 43–78)
NEUTS SEG NFR BLD: 66 % (ref 43–78)
NEUTS SEG NFR BLD: 67 % (ref 43–78)
NEUTS SEG NFR BLD: 67 % (ref 43–78)
NEUTS SEG NFR BLD: 75 % (ref 43–78)
NEUTS SEG NFR BLD: 77 % (ref 43–78)
NEUTS SEG NFR BLD: 77 % (ref 43–78)
NEUTS SEG NFR BLD: 78 % (ref 43–78)
NEUTS SEG NFR BLD: 78 % (ref 43–78)
NEUTS SEG NFR BLD: 79 % (ref 43–78)
NEUTS SEG NFR BLD: 84 % (ref 43–78)
NITRITE UR QL STRIP.AUTO: NEGATIVE
NRBC # BLD: 0 K/UL (ref 0–0.2)
NRBC # BLD: 0.06 K/UL (ref 0–0.2)
NRBC # BLD: 0.08 K/UL (ref 0–0.2)
NRBC # BLD: 0.1 K/UL (ref 0–0.2)
NRBC # BLD: 0.45 K/UL (ref 0–0.2)
NRBC # BLD: 0.53 K/UL (ref 0–0.2)
NRBC # BLD: 0.57 K/UL (ref 0–0.2)
NRBC # BLD: 0.78 K/UL (ref 0–0.2)
NRBC # BLD: 0.97 K/UL (ref 0–0.2)
NRBC # BLD: 0.98 K/UL (ref 0–0.2)
NRBC # BLD: 1.12 K/UL (ref 0–0.2)
NUC CELL # FLD: 206 /CU MM
NUC CELL # FLD: 431 /CU MM
NUC CELL # FLD: 480 /CU MM
P-R INTERVAL, ECG05: 150 MS
P-R INTERVAL, ECG05: 160 MS
PCO2 BLD: 32.4 MMHG (ref 35–45)
PCO2 BLD: 38.2 MMHG (ref 35–45)
PCO2 BLD: 45.8 MMHG (ref 35–45)
PCO2 BLD: 45.9 MMHG (ref 35–45)
PH BLD: 7.37 [PH] (ref 7.35–7.45)
PH BLD: 7.37 [PH] (ref 7.35–7.45)
PH BLD: 7.4 [PH] (ref 7.35–7.45)
PH BLD: 7.46 [PH] (ref 7.35–7.45)
PH UR STRIP: 5.5 [PH] (ref 5–9)
PHOSPHATE SERPL-MCNC: 1.5 MG/DL (ref 2.3–3.7)
PLATELET # BLD AUTO: 105 K/UL (ref 150–450)
PLATELET # BLD AUTO: 111 K/UL (ref 150–450)
PLATELET # BLD AUTO: 139 K/UL (ref 150–450)
PLATELET # BLD AUTO: 149 K/UL (ref 150–450)
PLATELET # BLD AUTO: 159 K/UL (ref 150–450)
PLATELET # BLD AUTO: 160 K/UL (ref 150–450)
PLATELET # BLD AUTO: 164 K/UL (ref 150–450)
PLATELET # BLD AUTO: 167 K/UL (ref 150–450)
PLATELET # BLD AUTO: 171 K/UL (ref 150–450)
PLATELET # BLD AUTO: 179 K/UL (ref 150–450)
PLATELET # BLD AUTO: 197 K/UL (ref 150–450)
PLATELET # BLD AUTO: 198 K/UL (ref 150–450)
PLATELET # BLD AUTO: 227 K/UL (ref 150–450)
PLATELET # BLD AUTO: 255 K/UL (ref 150–450)
PLATELET # BLD AUTO: 275 K/UL (ref 150–450)
PLATELET # BLD AUTO: 297 K/UL (ref 150–450)
PLATELET COMMENTS,PCOM: ADEQUATE
PMV BLD AUTO: 10 FL (ref 9.4–12.3)
PMV BLD AUTO: 10.1 FL (ref 9.4–12.3)
PMV BLD AUTO: 10.2 FL (ref 9.4–12.3)
PMV BLD AUTO: 10.5 FL (ref 9.4–12.3)
PMV BLD AUTO: 11.3 FL (ref 9.4–12.3)
PMV BLD AUTO: 11.4 FL (ref 9.4–12.3)
PMV BLD AUTO: 11.5 FL (ref 9.4–12.3)
PMV BLD AUTO: 11.7 FL (ref 9.4–12.3)
PMV BLD AUTO: 11.9 FL (ref 9.4–12.3)
PMV BLD AUTO: 11.9 FL (ref 9.4–12.3)
PMV BLD AUTO: 12.1 FL (ref 9.4–12.3)
PMV BLD AUTO: 9.7 FL (ref 9.4–12.3)
PO2 BLD: 134 MMHG (ref 75–100)
PO2 BLD: 147 MMHG (ref 75–100)
PO2 BLD: 77 MMHG (ref 75–100)
PO2 BLD: 83 MMHG (ref 75–100)
POTASSIUM BLD-SCNC: 4.7 MMOL/L (ref 3.5–5.1)
POTASSIUM BLD-SCNC: 5.9 MMOL/L (ref 3.5–5.1)
POTASSIUM SERPL-SCNC: 2.9 MMOL/L (ref 3.5–5.1)
POTASSIUM SERPL-SCNC: 3.9 MMOL/L (ref 3.5–5.1)
POTASSIUM SERPL-SCNC: 4 MMOL/L (ref 3.5–5.1)
POTASSIUM SERPL-SCNC: 4 MMOL/L (ref 3.5–5.1)
POTASSIUM SERPL-SCNC: 4.1 MMOL/L (ref 3.5–5.1)
POTASSIUM SERPL-SCNC: 4.2 MMOL/L (ref 3.5–5.1)
POTASSIUM SERPL-SCNC: 4.4 MMOL/L (ref 3.5–5.1)
POTASSIUM SERPL-SCNC: 4.5 MMOL/L (ref 3.5–5.1)
POTASSIUM SERPL-SCNC: 4.8 MMOL/L (ref 3.5–5.1)
POTASSIUM SERPL-SCNC: 5.4 MMOL/L (ref 3.5–5.1)
POTASSIUM SERPL-SCNC: 5.5 MMOL/L (ref 3.5–5.1)
POTASSIUM SERPL-SCNC: 5.6 MMOL/L (ref 3.5–5.1)
POTASSIUM SERPL-SCNC: 7.5 MMOL/L (ref 3.5–5.1)
PROCALCITONIN SERPL-MCNC: 2.5 NG/ML
PROT FLD-MCNC: 3.8 G/DL
PROT SERPL-MCNC: 4.7 G/DL (ref 6.3–8.2)
PROT SERPL-MCNC: 5.1 G/DL (ref 6.3–8.2)
PROT SERPL-MCNC: 5.2 G/DL (ref 6.3–8.2)
PROT SERPL-MCNC: 5.3 G/DL (ref 6.3–8.2)
PROT SERPL-MCNC: 5.4 G/DL (ref 6.3–8.2)
PROT SERPL-MCNC: 5.5 G/DL (ref 6.3–8.2)
PROT SERPL-MCNC: 5.6 G/DL (ref 6.3–8.2)
PROT SERPL-MCNC: 6.7 G/DL (ref 6.3–8.2)
PROT SERPL-MCNC: 7 G/DL (ref 6.3–8.2)
PROT SERPL-MCNC: 7.1 G/DL (ref 6.3–8.2)
PROT SERPL-MCNC: 7.2 G/DL (ref 6.3–8.2)
PROT SERPL-MCNC: 7.3 G/DL (ref 6.3–8.2)
PROT SERPL-MCNC: 7.6 G/DL (ref 6.3–8.2)
PROT SERPL-MCNC: 7.9 G/DL (ref 6.3–8.2)
PROT UR STRIP-MCNC: 30 MG/DL
PROTHROMBIN TIME: 15.6 SEC (ref 11.7–14.5)
PROTHROMBIN TIME: 20 SEC (ref 11.7–14.5)
Q-T INTERVAL, ECG07: 294 MS
Q-T INTERVAL, ECG07: 316 MS
Q-T INTERVAL, ECG07: 330 MS
QRS DURATION, ECG06: 68 MS
QRS DURATION, ECG06: 74 MS
QRS DURATION, ECG06: 76 MS
QTC CALCULATION (BEZET), ECG08: 406 MS
QTC CALCULATION (BEZET), ECG08: 411 MS
QTC CALCULATION (BEZET), ECG08: 427 MS
RBC # BLD AUTO: 2.26 M/UL (ref 4.05–5.2)
RBC # BLD AUTO: 2.27 M/UL (ref 4.05–5.2)
RBC # BLD AUTO: 2.3 M/UL (ref 4.05–5.2)
RBC # BLD AUTO: 2.38 M/UL (ref 4.05–5.2)
RBC # BLD AUTO: 2.48 M/UL (ref 4.05–5.2)
RBC # BLD AUTO: 2.67 M/UL (ref 4.05–5.2)
RBC # BLD AUTO: 2.67 M/UL (ref 4.05–5.25)
RBC # BLD AUTO: 2.81 M/UL (ref 4.05–5.2)
RBC # BLD AUTO: 2.91 M/UL (ref 4.05–5.2)
RBC # BLD AUTO: 2.94 M/UL (ref 4.05–5.2)
RBC # BLD AUTO: 2.95 M/UL (ref 4.05–5.25)
RBC # BLD AUTO: 2.97 M/UL (ref 4.05–5.2)
RBC # BLD AUTO: 3.18 M/UL (ref 4.05–5.25)
RBC # BLD AUTO: 3.27 M/UL (ref 4.05–5.25)
RBC # BLD AUTO: 3.41 M/UL (ref 4.05–5.25)
RBC # BLD AUTO: 3.41 M/UL (ref 4.05–5.25)
RBC # FLD: 3000 /CU MM
RBC # FLD: NORMAL /CU MM
RBC # FLD: NORMAL /CU MM
RBC MORPH BLD: ABNORMAL
REFLEX TO ID, RFCO3T: NORMAL
SAO2 % BLD: 95 % (ref 95–98)
SAO2 % BLD: 96 % (ref 95–98)
SAO2 % BLD: 99 % (ref 95–98)
SAO2 % BLD: 99 % (ref 95–98)
SERVICE CMNT-IMP: ABNORMAL
SERVICE CMNT-IMP: NORMAL
SERVICE CMNT-IMP: NORMAL
SODIUM BLD-SCNC: 124 MMOL/L (ref 136–145)
SODIUM BLD-SCNC: 134 MMOL/L (ref 136–145)
SODIUM SERPL-SCNC: 126 MMOL/L (ref 136–145)
SODIUM SERPL-SCNC: 129 MMOL/L (ref 136–145)
SODIUM SERPL-SCNC: 130 MMOL/L (ref 136–145)
SODIUM SERPL-SCNC: 131 MMOL/L (ref 136–145)
SODIUM SERPL-SCNC: 132 MMOL/L (ref 136–145)
SODIUM SERPL-SCNC: 132 MMOL/L (ref 136–145)
SODIUM SERPL-SCNC: 133 MMOL/L (ref 136–145)
SODIUM SERPL-SCNC: 134 MMOL/L (ref 136–145)
SODIUM SERPL-SCNC: 135 MMOL/L (ref 136–145)
SODIUM SERPL-SCNC: 135 MMOL/L (ref 136–145)
SODIUM SERPL-SCNC: 136 MMOL/L (ref 136–145)
SODIUM SERPL-SCNC: 137 MMOL/L (ref 136–145)
SODIUM SERPL-SCNC: 137 MMOL/L (ref 136–145)
SODIUM SERPL-SCNC: 138 MMOL/L (ref 136–145)
SODIUM SERPL-SCNC: 139 MMOL/L (ref 136–145)
SP GR UR REFRACTOMETRY: 1.02 (ref 1–1.02)
SPECIMEN EXP DATE BLD: NORMAL
SPECIMEN PREPARATION: NORMAL
SPECIMEN SOURCE FLD: NORMAL
SPECIMEN SOURCE: NORMAL
SPECIMEN TYPE: ABNORMAL
TRIGL FLD-MCNC: 27 MG/DL
UROBILINOGEN UR QL STRIP.AUTO: 1 EU/DL (ref 0.2–1)
VENTRICULAR RATE, ECG03: 101 BPM
VENTRICULAR RATE, ECG03: 102 BPM
VENTRICULAR RATE, ECG03: 115 BPM
WBC # BLD AUTO: 11.1 K/UL (ref 4.3–11.1)
WBC # BLD AUTO: 14.3 K/UL (ref 4.3–11.1)
WBC # BLD AUTO: 14.4 K/UL (ref 4.3–11.1)
WBC # BLD AUTO: 14.5 K/UL (ref 4.3–11.1)
WBC # BLD AUTO: 3.1 K/UL (ref 4.3–11.1)
WBC # BLD AUTO: 3.3 K/UL (ref 4.3–11.1)
WBC # BLD AUTO: 3.4 K/UL (ref 4.3–11.1)
WBC # BLD AUTO: 3.6 K/UL (ref 4.3–11.1)
WBC # BLD AUTO: 4.3 K/UL (ref 4.3–11.1)
WBC # BLD AUTO: 4.6 K/UL (ref 4.3–11.1)
WBC # BLD AUTO: 4.8 K/UL (ref 4.3–11.1)
WBC # BLD AUTO: 4.9 K/UL (ref 4.3–11.1)
WBC # BLD AUTO: 6.6 K/UL (ref 4.3–11.1)
WBC # BLD AUTO: 6.7 K/UL (ref 4.3–11.1)
WBC # BLD AUTO: 6.7 K/UL (ref 4.3–11.1)
WBC # BLD AUTO: 7.9 K/UL (ref 4.3–11.1)
WBC MORPH BLD: ABNORMAL

## 2019-01-01 PROCEDURE — 85379 FIBRIN DEGRADATION QUANT: CPT

## 2019-01-01 PROCEDURE — 0W9930Z DRAINAGE OF RIGHT PLEURAL CAVITY WITH DRAINAGE DEVICE, PERCUTANEOUS APPROACH: ICD-10-PCS | Performed by: SURGERY

## 2019-01-01 PROCEDURE — 85384 FIBRINOGEN ACTIVITY: CPT

## 2019-01-01 PROCEDURE — 88342 IMHCHEM/IMCYTCHM 1ST ANTB: CPT

## 2019-01-01 PROCEDURE — 77030008756 HC TU IRR SUC STRY -B: Performed by: SURGERY

## 2019-01-01 PROCEDURE — 85025 COMPLETE CBC W/AUTO DIFF WBC: CPT

## 2019-01-01 PROCEDURE — 3331090001 HH PPS REVENUE CREDIT

## 2019-01-01 PROCEDURE — 94760 N-INVAS EAR/PLS OXIMETRY 1: CPT

## 2019-01-01 PROCEDURE — 99218 HC RM OBSERVATION: CPT

## 2019-01-01 PROCEDURE — 3331090002 HH PPS REVENUE DEBIT

## 2019-01-01 PROCEDURE — 84478 ASSAY OF TRIGLYCERIDES: CPT

## 2019-01-01 PROCEDURE — 82947 ASSAY GLUCOSE BLOOD QUANT: CPT

## 2019-01-01 PROCEDURE — 74011250636 HC RX REV CODE- 250/636: Performed by: NURSE PRACTITIONER

## 2019-01-01 PROCEDURE — 80053 COMPREHEN METABOLIC PANEL: CPT

## 2019-01-01 PROCEDURE — 77030020263 HC SOL INJ SOD CL0.9% LFCR 1000ML

## 2019-01-01 PROCEDURE — 94640 AIRWAY INHALATION TREATMENT: CPT

## 2019-01-01 PROCEDURE — 77030035048 HC TRCR ENDOSC OPTCL COVD -B: Performed by: SURGERY

## 2019-01-01 PROCEDURE — 88305 TISSUE EXAM BY PATHOLOGIST: CPT

## 2019-01-01 PROCEDURE — 96402 CHEMO HORMON ANTINEOPL SQ/IM: CPT

## 2019-01-01 PROCEDURE — 65270000029 HC RM PRIVATE

## 2019-01-01 PROCEDURE — 74011250637 HC RX REV CODE- 250/637: Performed by: FAMILY MEDICINE

## 2019-01-01 PROCEDURE — 80048 BASIC METABOLIC PNL TOTAL CA: CPT

## 2019-01-01 PROCEDURE — 74011000250 HC RX REV CODE- 250: Performed by: INTERNAL MEDICINE

## 2019-01-01 PROCEDURE — 84311 SPECTROPHOTOMETRY: CPT

## 2019-01-01 PROCEDURE — 36600 WITHDRAWAL OF ARTERIAL BLOOD: CPT

## 2019-01-01 PROCEDURE — 77030014147 HC TY THORCENT PARA TELE -B: Performed by: INTERNAL MEDICINE

## 2019-01-01 PROCEDURE — 74011000258 HC RX REV CODE- 258: Performed by: FAMILY MEDICINE

## 2019-01-01 PROCEDURE — 83880 ASSAY OF NATRIURETIC PEPTIDE: CPT

## 2019-01-01 PROCEDURE — 88112 CYTOPATH CELL ENHANCE TECH: CPT

## 2019-01-01 PROCEDURE — 32555 ASPIRATE PLEURA W/ IMAGING: CPT | Performed by: INTERNAL MEDICINE

## 2019-01-01 PROCEDURE — 71045 X-RAY EXAM CHEST 1 VIEW: CPT

## 2019-01-01 PROCEDURE — 97530 THERAPEUTIC ACTIVITIES: CPT

## 2019-01-01 PROCEDURE — 84100 ASSAY OF PHOSPHORUS: CPT

## 2019-01-01 PROCEDURE — 77030037378 HC BRONCHOSCOPE DISP TRAN -D: Performed by: ANESTHESIOLOGY

## 2019-01-01 PROCEDURE — 74011250637 HC RX REV CODE- 250/637: Performed by: INTERNAL MEDICINE

## 2019-01-01 PROCEDURE — 83605 ASSAY OF LACTIC ACID: CPT

## 2019-01-01 PROCEDURE — G0299 HHS/HOSPICE OF RN EA 15 MIN: HCPCS

## 2019-01-01 PROCEDURE — 77030002996 HC SUT SLK J&J -A: Performed by: SURGERY

## 2019-01-01 PROCEDURE — 77010033678 HC OXYGEN DAILY

## 2019-01-01 PROCEDURE — 85610 PROTHROMBIN TIME: CPT

## 2019-01-01 PROCEDURE — 76060000031 HC ANESTHESIA FIRST 0.5 HR: Performed by: INTERNAL MEDICINE

## 2019-01-01 PROCEDURE — 99223 1ST HOSP IP/OBS HIGH 75: CPT | Performed by: INTERNAL MEDICINE

## 2019-01-01 PROCEDURE — 77030008522 HC TBNG INSUF LAPRO STRY -B: Performed by: SURGERY

## 2019-01-01 PROCEDURE — 99232 SBSQ HOSP IP/OBS MODERATE 35: CPT | Performed by: INTERNAL MEDICINE

## 2019-01-01 PROCEDURE — 85730 THROMBOPLASTIN TIME PARTIAL: CPT

## 2019-01-01 PROCEDURE — 96368 THER/DIAG CONCURRENT INF: CPT | Performed by: STUDENT IN AN ORGANIZED HEALTH CARE EDUCATION/TRAINING PROGRAM

## 2019-01-01 PROCEDURE — 77030019908 HC STETH ESOPH SIMS -A: Performed by: NURSE ANESTHETIST, CERTIFIED REGISTERED

## 2019-01-01 PROCEDURE — 74011250637 HC RX REV CODE- 250/637: Performed by: NURSE PRACTITIONER

## 2019-01-01 PROCEDURE — 77030019908 HC STETH ESOPH SIMS -A: Performed by: ANESTHESIOLOGY

## 2019-01-01 PROCEDURE — 88341 IMHCHEM/IMCYTCHM EA ADD ANTB: CPT

## 2019-01-01 PROCEDURE — 74011250636 HC RX REV CODE- 250/636: Performed by: SURGERY

## 2019-01-01 PROCEDURE — 77030010349 HC TRCR ENDOSC THOR COVD -B: Performed by: SURGERY

## 2019-01-01 PROCEDURE — 83615 LACTATE (LD) (LDH) ENZYME: CPT

## 2019-01-01 PROCEDURE — 74011000250 HC RX REV CODE- 250: Performed by: FAMILY MEDICINE

## 2019-01-01 PROCEDURE — 36415 COLL VENOUS BLD VENIPUNCTURE: CPT

## 2019-01-01 PROCEDURE — 99285 EMERGENCY DEPT VISIT HI MDM: CPT | Performed by: STUDENT IN AN ORGANIZED HEALTH CARE EDUCATION/TRAINING PROGRAM

## 2019-01-01 PROCEDURE — 71046 X-RAY EXAM CHEST 2 VIEWS: CPT

## 2019-01-01 PROCEDURE — 74011250636 HC RX REV CODE- 250/636: Performed by: FAMILY MEDICINE

## 2019-01-01 PROCEDURE — 74011000250 HC RX REV CODE- 250: Performed by: SURGERY

## 2019-01-01 PROCEDURE — 74011250636 HC RX REV CODE- 250/636: Performed by: INTERNAL MEDICINE

## 2019-01-01 PROCEDURE — 03HY32Z INSERTION OF MONITORING DEVICE INTO UPPER ARTERY, PERCUTANEOUS APPROACH: ICD-10-PCS | Performed by: ANESTHESIOLOGY

## 2019-01-01 PROCEDURE — 83735 ASSAY OF MAGNESIUM: CPT

## 2019-01-01 PROCEDURE — 4A133J1 MONITORING OF ARTERIAL PULSE, PERIPHERAL, PERCUTANEOUS APPROACH: ICD-10-PCS | Performed by: ANESTHESIOLOGY

## 2019-01-01 PROCEDURE — 86900 BLOOD TYPING SEROLOGIC ABO: CPT

## 2019-01-01 PROCEDURE — 76040000007: Performed by: INTERNAL MEDICINE

## 2019-01-01 PROCEDURE — 80076 HEPATIC FUNCTION PANEL: CPT

## 2019-01-01 PROCEDURE — 76604 US EXAM CHEST: CPT | Performed by: INTERNAL MEDICINE

## 2019-01-01 PROCEDURE — 87205 SMEAR GRAM STAIN: CPT

## 2019-01-01 PROCEDURE — 0BBN4ZX EXCISION OF RIGHT PLEURA, PERCUTANEOUS ENDOSCOPIC APPROACH, DIAGNOSTIC: ICD-10-PCS | Performed by: SURGERY

## 2019-01-01 PROCEDURE — 93005 ELECTROCARDIOGRAM TRACING: CPT | Performed by: EMERGENCY MEDICINE

## 2019-01-01 PROCEDURE — 71260 CT THORAX DX C+: CPT

## 2019-01-01 PROCEDURE — P9047 ALBUMIN (HUMAN), 25%, 50ML: HCPCS | Performed by: NURSE PRACTITIONER

## 2019-01-01 PROCEDURE — 77030019940 HC BLNKT HYPOTHRM STRY -B: Performed by: ANESTHESIOLOGY

## 2019-01-01 PROCEDURE — 77030022401 HC DRN WND TU/BULB PLUERX KT BD -B

## 2019-01-01 PROCEDURE — 74011250636 HC RX REV CODE- 250/636: Performed by: ANESTHESIOLOGY

## 2019-01-01 PROCEDURE — 77030018832 HC SOL IRR H20 ICUM -A: Performed by: SURGERY

## 2019-01-01 PROCEDURE — 74011250636 HC RX REV CODE- 250/636: Performed by: STUDENT IN AN ORGANIZED HEALTH CARE EDUCATION/TRAINING PROGRAM

## 2019-01-01 PROCEDURE — 82803 BLOOD GASES ANY COMBINATION: CPT

## 2019-01-01 PROCEDURE — 74011250637 HC RX REV CODE- 250/637: Performed by: HOSPITALIST

## 2019-01-01 PROCEDURE — 77030013794 HC KT TRNSDUC BLD EDWD -B: Performed by: ANESTHESIOLOGY

## 2019-01-01 PROCEDURE — 77030009403 HC ELECTRD ENDO MEGA -B: Performed by: SURGERY

## 2019-01-01 PROCEDURE — 77030005401 HC CATH RAD ARRO -A: Performed by: NURSE ANESTHETIST, CERTIFIED REGISTERED

## 2019-01-01 PROCEDURE — 81001 URINALYSIS AUTO W/SCOPE: CPT

## 2019-01-01 PROCEDURE — 74011250637 HC RX REV CODE- 250/637: Performed by: PHYSICIAN ASSISTANT

## 2019-01-01 PROCEDURE — 96365 THER/PROPH/DIAG IV INF INIT: CPT | Performed by: STUDENT IN AN ORGANIZED HEALTH CARE EDUCATION/TRAINING PROGRAM

## 2019-01-01 PROCEDURE — 400013 HH SOC

## 2019-01-01 PROCEDURE — 76040000025: Performed by: INTERNAL MEDICINE

## 2019-01-01 PROCEDURE — 86300 IMMUNOASSAY TUMOR CA 15-3: CPT

## 2019-01-01 PROCEDURE — 77030035051: Performed by: SURGERY

## 2019-01-01 PROCEDURE — 77030037088 HC TUBE ENDOTRACH ORAL NSL COVD-A: Performed by: NURSE ANESTHETIST, CERTIFIED REGISTERED

## 2019-01-01 PROCEDURE — 77030008462 HC STPLR SKN PROX J&J -A: Performed by: SURGERY

## 2019-01-01 PROCEDURE — 89050 BODY FLUID CELL COUNT: CPT

## 2019-01-01 PROCEDURE — C8929 TTE W OR WO FOL WCON,DOPPLER: HCPCS

## 2019-01-01 PROCEDURE — 77030020255 HC SOL INJ LR 1000ML BG

## 2019-01-01 PROCEDURE — 74011000258 HC RX REV CODE- 258: Performed by: STUDENT IN AN ORGANIZED HEALTH CARE EDUCATION/TRAINING PROGRAM

## 2019-01-01 PROCEDURE — A6258 TRANSPARENT FILM >16<=48 IN: HCPCS

## 2019-01-01 PROCEDURE — A7048 VACUUM DRAIN BOTTLE/TUBE KIT: HCPCS

## 2019-01-01 PROCEDURE — 99222 1ST HOSP IP/OBS MODERATE 55: CPT | Performed by: NURSE PRACTITIONER

## 2019-01-01 PROCEDURE — 0D968ZX DRAINAGE OF STOMACH, VIA NATURAL OR ARTIFICIAL OPENING ENDOSCOPIC, DIAGNOSTIC: ICD-10-PCS | Performed by: INTERNAL MEDICINE

## 2019-01-01 PROCEDURE — 77030031139 HC SUT VCRL2 J&J -A: Performed by: SURGERY

## 2019-01-01 PROCEDURE — 74011000250 HC RX REV CODE- 250

## 2019-01-01 PROCEDURE — 76060000035 HC ANESTHESIA 2 TO 2.5 HR: Performed by: SURGERY

## 2019-01-01 PROCEDURE — 3331090003 HH PPS REVENUE ADJ

## 2019-01-01 PROCEDURE — 77030039425 HC BLD LARYNG TRULITE DISP TELE -A: Performed by: ANESTHESIOLOGY

## 2019-01-01 PROCEDURE — 77030012407 HC DRN WND BARD -B: Performed by: SURGERY

## 2019-01-01 PROCEDURE — 88312 SPECIAL STAINS GROUP 1: CPT

## 2019-01-01 PROCEDURE — 74011250636 HC RX REV CODE- 250/636

## 2019-01-01 PROCEDURE — 82945 GLUCOSE OTHER FLUID: CPT

## 2019-01-01 PROCEDURE — 76210000000 HC OR PH I REC 2 TO 2.5 HR: Performed by: SURGERY

## 2019-01-01 PROCEDURE — 77030019605

## 2019-01-01 PROCEDURE — 93005 ELECTROCARDIOGRAM TRACING: CPT | Performed by: STUDENT IN AN ORGANIZED HEALTH CARE EDUCATION/TRAINING PROGRAM

## 2019-01-01 PROCEDURE — 77030039425 HC BLD LARYNG TRULITE DISP TELE -A: Performed by: NURSE ANESTHETIST, CERTIFIED REGISTERED

## 2019-01-01 PROCEDURE — 87102 FUNGUS ISOLATION CULTURE: CPT

## 2019-01-01 PROCEDURE — 88381 MICRODISSECTION MANUAL: CPT

## 2019-01-01 PROCEDURE — 77030003560 HC NDL HUBR BARD -A

## 2019-01-01 PROCEDURE — 77030034849: Performed by: SURGERY

## 2019-01-01 PROCEDURE — 77030013292 HC BOWL MX PRSM J&J -A: Performed by: NURSE ANESTHETIST, CERTIFIED REGISTERED

## 2019-01-01 PROCEDURE — 77030013292 HC BOWL MX PRSM J&J -A: Performed by: ANESTHESIOLOGY

## 2019-01-01 PROCEDURE — 99285 EMERGENCY DEPT VISIT HI MDM: CPT | Performed by: EMERGENCY MEDICINE

## 2019-01-01 PROCEDURE — 76040000019: Performed by: INTERNAL MEDICINE

## 2019-01-01 PROCEDURE — 96360 HYDRATION IV INFUSION INIT: CPT | Performed by: EMERGENCY MEDICINE

## 2019-01-01 PROCEDURE — 83690 ASSAY OF LIPASE: CPT

## 2019-01-01 PROCEDURE — 99231 SBSQ HOSP IP/OBS SF/LOW 25: CPT | Performed by: NURSE PRACTITIONER

## 2019-01-01 PROCEDURE — 74011000250 HC RX REV CODE- 250: Performed by: EMERGENCY MEDICINE

## 2019-01-01 PROCEDURE — 84157 ASSAY OF PROTEIN OTHER: CPT

## 2019-01-01 PROCEDURE — 77030016151 HC PROTCTR LNS DFOG COVD -B: Performed by: SURGERY

## 2019-01-01 PROCEDURE — 88360 TUMOR IMMUNOHISTOCHEM/MANUAL: CPT

## 2019-01-01 PROCEDURE — 77030018846 HC SOL IRR STRL H20 ICUM -A: Performed by: SURGERY

## 2019-01-01 PROCEDURE — 74011250637 HC RX REV CODE- 250/637: Performed by: SURGERY

## 2019-01-01 PROCEDURE — 74011250636 HC RX REV CODE- 250/636: Performed by: EMERGENCY MEDICINE

## 2019-01-01 PROCEDURE — 97162 PT EVAL MOD COMPLEX 30 MIN: CPT

## 2019-01-01 PROCEDURE — 77030021593 HC FCPS BIOP ENDOSC BSC -A: Performed by: INTERNAL MEDICINE

## 2019-01-01 PROCEDURE — 4A133B1 MONITORING OF ARTERIAL PRESSURE, PERIPHERAL, PERCUTANEOUS APPROACH: ICD-10-PCS | Performed by: ANESTHESIOLOGY

## 2019-01-01 PROCEDURE — 76010000162 HC OR TIME 1.5 TO 2 HR INTENSV-TIER 1: Performed by: SURGERY

## 2019-01-01 PROCEDURE — 84145 PROCALCITONIN (PCT): CPT

## 2019-01-01 PROCEDURE — 77030012390 HC DRN CHST BTL GTNG -B: Performed by: SURGERY

## 2019-01-01 PROCEDURE — 82664 ELECTROPHORETIC TEST: CPT

## 2019-01-01 PROCEDURE — 77030008671 HC TU ENDO/BRNC CUF COVD -B: Performed by: ANESTHESIOLOGY

## 2019-01-01 PROCEDURE — 93005 ELECTROCARDIOGRAM TRACING: CPT | Performed by: SURGERY

## 2019-01-01 PROCEDURE — 76450000000

## 2019-01-01 PROCEDURE — 77030018673: Performed by: SURGERY

## 2019-01-01 PROCEDURE — 88377 M/PHMTRC ALYS ISHQUANT/SEMIQ: CPT

## 2019-01-01 PROCEDURE — 87040 BLOOD CULTURE FOR BACTERIA: CPT

## 2019-01-01 PROCEDURE — 77030032490 HC SLV COMPR SCD KNE COVD -B: Performed by: SURGERY

## 2019-01-01 PROCEDURE — 74011000250 HC RX REV CODE- 250: Performed by: NURSE PRACTITIONER

## 2019-01-01 PROCEDURE — 96372 THER/PROPH/DIAG INJ SC/IM: CPT

## 2019-01-01 PROCEDURE — 87116 MYCOBACTERIA CULTURE: CPT

## 2019-01-01 PROCEDURE — 77030008031

## 2019-01-01 PROCEDURE — 74011636320 HC RX REV CODE- 636/320: Performed by: FAMILY MEDICINE

## 2019-01-01 PROCEDURE — 76937 US GUIDE VASCULAR ACCESS: CPT

## 2019-01-01 PROCEDURE — 87106 FUNGI IDENTIFICATION YEAST: CPT

## 2019-01-01 RX ORDER — ACETAMINOPHEN 650 MG/1
975 SUPPOSITORY RECTAL
Status: DISCONTINUED | OUTPATIENT
Start: 2019-01-01 | End: 2019-01-01 | Stop reason: HOSPADM

## 2019-01-01 RX ORDER — MIDAZOLAM HYDROCHLORIDE 1 MG/ML
2 INJECTION, SOLUTION INTRAMUSCULAR; INTRAVENOUS
Status: DISCONTINUED | OUTPATIENT
Start: 2019-01-01 | End: 2019-01-01 | Stop reason: HOSPADM

## 2019-01-01 RX ORDER — NALOXONE HYDROCHLORIDE 0.4 MG/ML
0.2 INJECTION, SOLUTION INTRAMUSCULAR; INTRAVENOUS; SUBCUTANEOUS AS NEEDED
Status: DISCONTINUED | OUTPATIENT
Start: 2019-01-01 | End: 2019-01-01 | Stop reason: HOSPADM

## 2019-01-01 RX ORDER — SUCRALFATE 1 G/1
1 TABLET ORAL
Status: DISCONTINUED | OUTPATIENT
Start: 2019-01-01 | End: 2019-01-01 | Stop reason: HOSPADM

## 2019-01-01 RX ORDER — POTASSIUM CHLORIDE 20 MEQ/1
40 TABLET, EXTENDED RELEASE ORAL DAILY
Status: DISCONTINUED | OUTPATIENT
Start: 2019-01-01 | End: 2019-01-01

## 2019-01-01 RX ORDER — LORAZEPAM 2 MG/ML
1 INJECTION INTRAMUSCULAR
Status: DISCONTINUED | OUTPATIENT
Start: 2019-01-01 | End: 2019-01-01 | Stop reason: HOSPADM

## 2019-01-01 RX ORDER — SODIUM CHLORIDE, SODIUM LACTATE, POTASSIUM CHLORIDE, CALCIUM CHLORIDE 600; 310; 30; 20 MG/100ML; MG/100ML; MG/100ML; MG/100ML
25 INJECTION, SOLUTION INTRAVENOUS CONTINUOUS
Status: DISCONTINUED | OUTPATIENT
Start: 2019-01-01 | End: 2019-01-01 | Stop reason: HOSPADM

## 2019-01-01 RX ORDER — HYDROCODONE BITARTRATE AND ACETAMINOPHEN 5; 325 MG/1; MG/1
TABLET ORAL
Status: ACTIVE
Start: 2019-01-01 | End: 2019-01-01

## 2019-01-01 RX ORDER — SENNOSIDES 8.6 MG/1
2 TABLET ORAL
Status: DISCONTINUED | OUTPATIENT
Start: 2019-01-01 | End: 2019-01-01 | Stop reason: HOSPADM

## 2019-01-01 RX ORDER — CIPROFLOXACIN 2 MG/ML
400 INJECTION, SOLUTION INTRAVENOUS EVERY 8 HOURS
Status: DISCONTINUED | OUTPATIENT
Start: 2019-01-01 | End: 2019-01-01

## 2019-01-01 RX ORDER — ENOXAPARIN SODIUM 100 MG/ML
40 INJECTION SUBCUTANEOUS EVERY 24 HOURS
Status: DISCONTINUED | OUTPATIENT
Start: 2019-01-01 | End: 2019-01-01

## 2019-01-01 RX ORDER — ALPRAZOLAM 0.5 MG/1
0.5 TABLET ORAL
Status: DISCONTINUED | OUTPATIENT
Start: 2019-01-01 | End: 2019-01-01 | Stop reason: HOSPADM

## 2019-01-01 RX ORDER — POLYETHYLENE GLYCOL 3350 17 G/17G
17 POWDER, FOR SOLUTION ORAL DAILY
Status: DISCONTINUED | OUTPATIENT
Start: 2019-01-01 | End: 2019-01-01 | Stop reason: HOSPADM

## 2019-01-01 RX ORDER — SCOLOPAMINE TRANSDERMAL SYSTEM 1 MG/1
1 PATCH, EXTENDED RELEASE TRANSDERMAL
Status: DISCONTINUED | OUTPATIENT
Start: 2019-01-01 | End: 2019-01-01 | Stop reason: HOSPADM

## 2019-01-01 RX ORDER — DULOXETIN HYDROCHLORIDE 60 MG/1
60 CAPSULE, DELAYED RELEASE ORAL
Status: DISCONTINUED | OUTPATIENT
Start: 2019-01-01 | End: 2019-01-01

## 2019-01-01 RX ORDER — LEVOTHYROXINE SODIUM 75 UG/1
75 TABLET ORAL
Status: DISCONTINUED | OUTPATIENT
Start: 2019-01-01 | End: 2019-01-01 | Stop reason: HOSPADM

## 2019-01-01 RX ORDER — ALBUMIN HUMAN 250 G/1000ML
25 SOLUTION INTRAVENOUS EVERY 12 HOURS
Status: COMPLETED | OUTPATIENT
Start: 2019-01-01 | End: 2019-01-01

## 2019-01-01 RX ORDER — ENOXAPARIN SODIUM 100 MG/ML
40 INJECTION SUBCUTANEOUS EVERY 24 HOURS
Status: DISCONTINUED | OUTPATIENT
Start: 2019-01-01 | End: 2019-01-01 | Stop reason: HOSPADM

## 2019-01-01 RX ORDER — LAMOTRIGINE 25 MG/1
500 TABLET ORAL ONCE
Status: COMPLETED | OUTPATIENT
Start: 2019-01-01 | End: 2019-01-01

## 2019-01-01 RX ORDER — POLYETHYLENE GLYCOL 3350 17 G/17G
17 POWDER, FOR SOLUTION ORAL
Status: DISCONTINUED | OUTPATIENT
Start: 2019-01-01 | End: 2019-01-01 | Stop reason: HOSPADM

## 2019-01-01 RX ORDER — POTASSIUM CHLORIDE 14.9 MG/ML
40 INJECTION INTRAVENOUS
Status: DISCONTINUED | OUTPATIENT
Start: 2019-01-01 | End: 2019-01-01

## 2019-01-01 RX ORDER — ALBUTEROL SULFATE 0.83 MG/ML
2.5 SOLUTION RESPIRATORY (INHALATION)
Status: DISCONTINUED | OUTPATIENT
Start: 2019-01-01 | End: 2019-01-01 | Stop reason: HOSPADM

## 2019-01-01 RX ORDER — HYDROMORPHONE HYDROCHLORIDE 2 MG/ML
0.5 INJECTION, SOLUTION INTRAMUSCULAR; INTRAVENOUS; SUBCUTANEOUS
Status: COMPLETED | OUTPATIENT
Start: 2019-01-01 | End: 2019-01-01

## 2019-01-01 RX ORDER — SODIUM CHLORIDE 9 MG/ML
125 INJECTION, SOLUTION INTRAVENOUS CONTINUOUS
Status: DISCONTINUED | OUTPATIENT
Start: 2019-01-01 | End: 2019-01-01

## 2019-01-01 RX ORDER — NALOXONE HYDROCHLORIDE 0.4 MG/ML
0.2 INJECTION, SOLUTION INTRAMUSCULAR; INTRAVENOUS; SUBCUTANEOUS
Status: DISCONTINUED | OUTPATIENT
Start: 2019-01-01 | End: 2019-01-01 | Stop reason: HOSPADM

## 2019-01-01 RX ORDER — NEOSTIGMINE METHYLSULFATE 1 MG/ML
INJECTION INTRAVENOUS AS NEEDED
Status: DISCONTINUED | OUTPATIENT
Start: 2019-01-01 | End: 2019-01-01 | Stop reason: HOSPADM

## 2019-01-01 RX ORDER — BUDESONIDE 0.5 MG/2ML
500 INHALANT ORAL
Status: DISCONTINUED | OUTPATIENT
Start: 2019-01-01 | End: 2019-01-01 | Stop reason: HOSPADM

## 2019-01-01 RX ORDER — SODIUM CHLORIDE 0.9 % (FLUSH) 0.9 %
5-40 SYRINGE (ML) INJECTION EVERY 8 HOURS
Status: DISCONTINUED | OUTPATIENT
Start: 2019-01-01 | End: 2019-01-01 | Stop reason: HOSPADM

## 2019-01-01 RX ORDER — HYDROCODONE BITARTRATE AND ACETAMINOPHEN 5; 325 MG/1; MG/1
1-2 TABLET ORAL
Status: DISCONTINUED | OUTPATIENT
Start: 2019-01-01 | End: 2019-01-01 | Stop reason: HOSPADM

## 2019-01-01 RX ORDER — SODIUM CHLORIDE, SODIUM LACTATE, POTASSIUM CHLORIDE, CALCIUM CHLORIDE 600; 310; 30; 20 MG/100ML; MG/100ML; MG/100ML; MG/100ML
125 INJECTION, SOLUTION INTRAVENOUS CONTINUOUS
Status: DISCONTINUED | OUTPATIENT
Start: 2019-01-01 | End: 2019-01-01

## 2019-01-01 RX ORDER — HYDROCODONE BITARTRATE AND ACETAMINOPHEN 5; 325 MG/1; MG/1
1-2 TABLET ORAL
Qty: 30 TAB | Refills: 0 | Status: SHIPPED
Start: 2019-01-01 | End: 2019-01-01

## 2019-01-01 RX ORDER — SODIUM CHLORIDE 9 MG/ML
50 INJECTION, SOLUTION INTRAVENOUS CONTINUOUS
Status: DISCONTINUED | OUTPATIENT
Start: 2019-01-01 | End: 2019-01-01

## 2019-01-01 RX ORDER — CEFAZOLIN SODIUM/WATER 2 G/20 ML
2 SYRINGE (ML) INTRAVENOUS ONCE
Status: COMPLETED | OUTPATIENT
Start: 2019-01-01 | End: 2019-01-01

## 2019-01-01 RX ORDER — SODIUM CHLORIDE 0.9 % (FLUSH) 0.9 %
5-40 SYRINGE (ML) INJECTION AS NEEDED
Status: DISCONTINUED | OUTPATIENT
Start: 2019-01-01 | End: 2019-01-01 | Stop reason: HOSPADM

## 2019-01-01 RX ORDER — DEXTROSE 50 % IN WATER (D50W) INTRAVENOUS SYRINGE
50
Status: COMPLETED | OUTPATIENT
Start: 2019-01-01 | End: 2019-01-01

## 2019-01-01 RX ORDER — SUCRALFATE 1 G/1
1 TABLET ORAL
Qty: 120 TAB | Refills: 0 | Status: SHIPPED | OUTPATIENT
Start: 2019-01-01

## 2019-01-01 RX ORDER — MAG HYDROX/ALUMINUM HYD/SIMETH 200-200-20
30 SUSPENSION, ORAL (FINAL DOSE FORM) ORAL
Status: DISCONTINUED | OUTPATIENT
Start: 2019-01-01 | End: 2019-01-01 | Stop reason: HOSPADM

## 2019-01-01 RX ORDER — SODIUM CHLORIDE 0.9 % (FLUSH) 0.9 %
5-40 SYRINGE (ML) INJECTION EVERY 8 HOURS
Status: CANCELLED | OUTPATIENT
Start: 2019-01-01

## 2019-01-01 RX ORDER — ACETAMINOPHEN 325 MG/1
650 TABLET ORAL
Status: DISCONTINUED | OUTPATIENT
Start: 2019-01-01 | End: 2019-01-01 | Stop reason: HOSPADM

## 2019-01-01 RX ORDER — SODIUM CHLORIDE 9 MG/ML
100 INJECTION, SOLUTION INTRAVENOUS CONTINUOUS
Status: CANCELLED | OUTPATIENT
Start: 2019-01-01

## 2019-01-01 RX ORDER — VANCOMYCIN 1.75 GRAM/500 ML IN 0.9 % SODIUM CHLORIDE INTRAVENOUS
1750 ONCE
Status: COMPLETED | OUTPATIENT
Start: 2019-01-01 | End: 2019-01-01

## 2019-01-01 RX ORDER — LIDOCAINE HYDROCHLORIDE 10 MG/ML
0.1 INJECTION INFILTRATION; PERINEURAL AS NEEDED
Status: DISCONTINUED | OUTPATIENT
Start: 2019-01-01 | End: 2019-01-01 | Stop reason: HOSPADM

## 2019-01-01 RX ORDER — LIDOCAINE HYDROCHLORIDE AND EPINEPHRINE 10; 10 MG/ML; UG/ML
1.5 INJECTION, SOLUTION INFILTRATION; PERINEURAL ONCE
Status: ACTIVE | OUTPATIENT
Start: 2019-01-01 | End: 2019-01-01

## 2019-01-01 RX ORDER — DULOXETIN HYDROCHLORIDE 60 MG/1
60 CAPSULE, DELAYED RELEASE ORAL
Status: DISCONTINUED | OUTPATIENT
Start: 2019-01-01 | End: 2019-01-01 | Stop reason: HOSPADM

## 2019-01-01 RX ORDER — ALBUTEROL SULFATE 0.83 MG/ML
2.5 SOLUTION RESPIRATORY (INHALATION)
Status: DISCONTINUED | OUTPATIENT
Start: 2019-01-01 | End: 2019-01-01

## 2019-01-01 RX ORDER — LIDOCAINE HYDROCHLORIDE 20 MG/ML
INJECTION, SOLUTION EPIDURAL; INFILTRATION; INTRACAUDAL; PERINEURAL AS NEEDED
Status: DISCONTINUED | OUTPATIENT
Start: 2019-01-01 | End: 2019-01-01 | Stop reason: HOSPADM

## 2019-01-01 RX ORDER — PANTOPRAZOLE SODIUM 40 MG/1
40 TABLET, DELAYED RELEASE ORAL
Status: DISCONTINUED | OUTPATIENT
Start: 2019-01-01 | End: 2019-01-01

## 2019-01-01 RX ORDER — FENTANYL CITRATE 50 UG/ML
INJECTION, SOLUTION INTRAMUSCULAR; INTRAVENOUS AS NEEDED
Status: DISCONTINUED | OUTPATIENT
Start: 2019-01-01 | End: 2019-01-01 | Stop reason: HOSPADM

## 2019-01-01 RX ORDER — FUROSEMIDE 10 MG/ML
40 INJECTION INTRAMUSCULAR; INTRAVENOUS ONCE
Status: COMPLETED | OUTPATIENT
Start: 2019-01-01 | End: 2019-01-01

## 2019-01-01 RX ORDER — DEXTROSE MONOHYDRATE AND SODIUM CHLORIDE 5; .45 G/100ML; G/100ML
100 INJECTION, SOLUTION INTRAVENOUS CONTINUOUS
Status: DISCONTINUED | OUTPATIENT
Start: 2019-01-01 | End: 2019-01-01

## 2019-01-01 RX ORDER — GLYCOPYRROLATE 0.2 MG/ML
0.4 INJECTION INTRAMUSCULAR; INTRAVENOUS
Status: DISCONTINUED | OUTPATIENT
Start: 2019-01-01 | End: 2019-01-01 | Stop reason: HOSPADM

## 2019-01-01 RX ORDER — OXYCODONE HYDROCHLORIDE 5 MG/1
5 TABLET ORAL
Status: DISCONTINUED | OUTPATIENT
Start: 2019-01-01 | End: 2019-01-01 | Stop reason: HOSPADM

## 2019-01-01 RX ORDER — KETAMINE HYDROCHLORIDE 100 MG/ML
INJECTION, SOLUTION INTRAMUSCULAR; INTRAVENOUS AS NEEDED
Status: DISCONTINUED | OUTPATIENT
Start: 2019-01-01 | End: 2019-01-01 | Stop reason: HOSPADM

## 2019-01-01 RX ORDER — GLYCOPYRROLATE 0.2 MG/ML
0.1 INJECTION INTRAMUSCULAR; INTRAVENOUS ONCE
Status: COMPLETED | OUTPATIENT
Start: 2019-01-01 | End: 2019-01-01

## 2019-01-01 RX ORDER — ROCURONIUM BROMIDE 10 MG/ML
INJECTION, SOLUTION INTRAVENOUS AS NEEDED
Status: DISCONTINUED | OUTPATIENT
Start: 2019-01-01 | End: 2019-01-01 | Stop reason: HOSPADM

## 2019-01-01 RX ORDER — DEXTROSE 50 % IN WATER (D50W) INTRAVENOUS SYRINGE
Status: ACTIVE
Start: 2019-01-01 | End: 2019-01-01

## 2019-01-01 RX ORDER — ONDANSETRON 2 MG/ML
4 INJECTION INTRAMUSCULAR; INTRAVENOUS
Status: COMPLETED | OUTPATIENT
Start: 2019-01-01 | End: 2019-01-01

## 2019-01-01 RX ORDER — SODIUM CHLORIDE 0.9 % (FLUSH) 0.9 %
5-40 SYRINGE (ML) INJECTION AS NEEDED
Status: CANCELLED | OUTPATIENT
Start: 2019-01-01

## 2019-01-01 RX ORDER — BUDESONIDE 0.5 MG/2ML
500 INHALANT ORAL
Status: DISCONTINUED | OUTPATIENT
Start: 2019-01-01 | End: 2019-01-01

## 2019-01-01 RX ORDER — DOCUSATE SODIUM 100 MG/1
100 CAPSULE, LIQUID FILLED ORAL 2 TIMES DAILY
Status: DISCONTINUED | OUTPATIENT
Start: 2019-01-01 | End: 2019-01-01 | Stop reason: HOSPADM

## 2019-01-01 RX ORDER — ACETAMINOPHEN 500 MG
1000 TABLET ORAL
Status: DISCONTINUED | OUTPATIENT
Start: 2019-01-01 | End: 2019-01-01 | Stop reason: HOSPADM

## 2019-01-01 RX ORDER — NALOXONE HYDROCHLORIDE 0.4 MG/ML
0.4 INJECTION, SOLUTION INTRAMUSCULAR; INTRAVENOUS; SUBCUTANEOUS AS NEEDED
Status: DISCONTINUED | OUTPATIENT
Start: 2019-01-01 | End: 2019-01-01 | Stop reason: HOSPADM

## 2019-01-01 RX ORDER — BACLOFEN 10 MG/1
10 TABLET ORAL
Status: DISCONTINUED | OUTPATIENT
Start: 2019-01-01 | End: 2019-01-01 | Stop reason: HOSPADM

## 2019-01-01 RX ORDER — ACETAMINOPHEN 500 MG
1000 TABLET ORAL
COMMUNITY

## 2019-01-01 RX ORDER — PANTOPRAZOLE SODIUM 40 MG/1
40 TABLET, DELAYED RELEASE ORAL
Status: DISCONTINUED | OUTPATIENT
Start: 2019-01-01 | End: 2019-01-01 | Stop reason: HOSPADM

## 2019-01-01 RX ORDER — GLYCOPYRROLATE 0.2 MG/ML
INJECTION INTRAMUSCULAR; INTRAVENOUS AS NEEDED
Status: DISCONTINUED | OUTPATIENT
Start: 2019-01-01 | End: 2019-01-01 | Stop reason: HOSPADM

## 2019-01-01 RX ORDER — FUROSEMIDE 10 MG/ML
60 INJECTION INTRAMUSCULAR; INTRAVENOUS DAILY
Status: DISCONTINUED | OUTPATIENT
Start: 2019-01-01 | End: 2019-01-01 | Stop reason: SDUPTHER

## 2019-01-01 RX ORDER — PROPOFOL 10 MG/ML
INJECTION, EMULSION INTRAVENOUS AS NEEDED
Status: DISCONTINUED | OUTPATIENT
Start: 2019-01-01 | End: 2019-01-01 | Stop reason: HOSPADM

## 2019-01-01 RX ORDER — POLYETHYLENE GLYCOL 3350 17 G/17G
17 POWDER, FOR SOLUTION ORAL DAILY
Status: DISCONTINUED | OUTPATIENT
Start: 2019-01-01 | End: 2019-01-01

## 2019-01-01 RX ORDER — ONDANSETRON 2 MG/ML
4 INJECTION INTRAMUSCULAR; INTRAVENOUS
Status: DISCONTINUED | OUTPATIENT
Start: 2019-01-01 | End: 2019-01-01 | Stop reason: HOSPADM

## 2019-01-01 RX ORDER — MORPHINE SULFATE 2 MG/ML
2 INJECTION, SOLUTION INTRAMUSCULAR; INTRAVENOUS
Status: DISCONTINUED | OUTPATIENT
Start: 2019-01-01 | End: 2019-01-01 | Stop reason: HOSPADM

## 2019-01-01 RX ORDER — FENTANYL CITRATE 50 UG/ML
100 INJECTION, SOLUTION INTRAMUSCULAR; INTRAVENOUS ONCE
Status: DISCONTINUED | OUTPATIENT
Start: 2019-01-01 | End: 2019-01-01 | Stop reason: HOSPADM

## 2019-01-01 RX ORDER — VANCOMYCIN HYDROCHLORIDE
1250 EVERY 24 HOURS
Status: DISCONTINUED | OUTPATIENT
Start: 2019-01-01 | End: 2019-01-01

## 2019-01-01 RX ORDER — MORPHINE SULFATE 2 MG/ML
2 INJECTION, SOLUTION INTRAMUSCULAR; INTRAVENOUS
Status: DISCONTINUED | OUTPATIENT
Start: 2019-01-01 | End: 2019-01-01

## 2019-01-01 RX ORDER — SENNOSIDES 8.6 MG/1
2 TABLET ORAL DAILY
Status: DISCONTINUED | OUTPATIENT
Start: 2019-01-01 | End: 2019-01-01

## 2019-01-01 RX ORDER — IPRATROPIUM BROMIDE AND ALBUTEROL SULFATE 2.5; .5 MG/3ML; MG/3ML
3 SOLUTION RESPIRATORY (INHALATION)
Status: COMPLETED | OUTPATIENT
Start: 2019-01-01 | End: 2019-01-01

## 2019-01-01 RX ORDER — SUCRALFATE 1 G/1
1 TABLET ORAL
Status: DISCONTINUED | OUTPATIENT
Start: 2019-01-01 | End: 2019-01-01

## 2019-01-01 RX ORDER — MORPHINE SULFATE 2 MG/ML
1 INJECTION, SOLUTION INTRAMUSCULAR; INTRAVENOUS
Status: DISCONTINUED | OUTPATIENT
Start: 2019-01-01 | End: 2019-01-01 | Stop reason: HOSPADM

## 2019-01-01 RX ORDER — SODIUM CHLORIDE, SODIUM LACTATE, POTASSIUM CHLORIDE, CALCIUM CHLORIDE 600; 310; 30; 20 MG/100ML; MG/100ML; MG/100ML; MG/100ML
75 INJECTION, SOLUTION INTRAVENOUS CONTINUOUS
Status: DISCONTINUED | OUTPATIENT
Start: 2019-01-01 | End: 2019-01-01 | Stop reason: HOSPADM

## 2019-01-01 RX ORDER — LEVOTHYROXINE SODIUM 75 UG/1
75 TABLET ORAL
Status: DISCONTINUED | OUTPATIENT
Start: 2019-01-01 | End: 2019-01-01

## 2019-01-01 RX ORDER — IPRATROPIUM BROMIDE AND ALBUTEROL SULFATE 2.5; .5 MG/3ML; MG/3ML
3 SOLUTION RESPIRATORY (INHALATION)
Status: DISCONTINUED | OUTPATIENT
Start: 2019-01-01 | End: 2019-01-01 | Stop reason: HOSPADM

## 2019-01-01 RX ORDER — MIDAZOLAM HYDROCHLORIDE 1 MG/ML
2 INJECTION, SOLUTION INTRAMUSCULAR; INTRAVENOUS ONCE
Status: DISCONTINUED | OUTPATIENT
Start: 2019-01-01 | End: 2019-01-01 | Stop reason: HOSPADM

## 2019-01-01 RX ORDER — SODIUM CHLORIDE 0.9 % (FLUSH) 0.9 %
10 SYRINGE (ML) INJECTION
Status: COMPLETED | OUTPATIENT
Start: 2019-01-01 | End: 2019-01-01

## 2019-01-01 RX ORDER — POTASSIUM CHLORIDE 29.8 MG/ML
40 INJECTION INTRAVENOUS ONCE
Status: DISCONTINUED | OUTPATIENT
Start: 2019-01-01 | End: 2019-01-01

## 2019-01-01 RX ORDER — HYDROMORPHONE HYDROCHLORIDE 1 MG/ML
1 INJECTION, SOLUTION INTRAMUSCULAR; INTRAVENOUS; SUBCUTANEOUS
Status: DISCONTINUED | OUTPATIENT
Start: 2019-01-01 | End: 2019-01-01 | Stop reason: HOSPADM

## 2019-01-01 RX ORDER — MORPHINE SULFATE 2 MG/ML
1 INJECTION, SOLUTION INTRAMUSCULAR; INTRAVENOUS
Status: DISCONTINUED | OUTPATIENT
Start: 2019-01-01 | End: 2019-01-01

## 2019-01-01 RX ORDER — GABAPENTIN 300 MG/1
300 CAPSULE ORAL 3 TIMES DAILY
Status: DISCONTINUED | OUTPATIENT
Start: 2019-01-01 | End: 2019-01-01 | Stop reason: HOSPADM

## 2019-01-01 RX ORDER — ALBUTEROL SULFATE 0.83 MG/ML
1.25 SOLUTION RESPIRATORY (INHALATION)
Status: DISCONTINUED | OUTPATIENT
Start: 2019-01-01 | End: 2019-01-01 | Stop reason: HOSPADM

## 2019-01-01 RX ORDER — SODIUM CHLORIDE, SODIUM LACTATE, POTASSIUM CHLORIDE, CALCIUM CHLORIDE 600; 310; 30; 20 MG/100ML; MG/100ML; MG/100ML; MG/100ML
1000 INJECTION, SOLUTION INTRAVENOUS CONTINUOUS
Status: DISCONTINUED | OUTPATIENT
Start: 2019-01-01 | End: 2019-01-01 | Stop reason: HOSPADM

## 2019-01-01 RX ORDER — CIPROFLOXACIN 2 MG/ML
400 INJECTION, SOLUTION INTRAVENOUS ONCE
Status: DISCONTINUED | OUTPATIENT
Start: 2019-01-01 | End: 2019-01-01 | Stop reason: SDUPTHER

## 2019-01-01 RX ADMIN — PIPERACILLIN, TAZOBACTAM 4.5 G: 4; .5 INJECTION, POWDER, LYOPHILIZED, FOR SOLUTION INTRAVENOUS at 06:28

## 2019-01-01 RX ADMIN — Medication 2 G: at 11:00

## 2019-01-01 RX ADMIN — Medication 10 ML: at 02:51

## 2019-01-01 RX ADMIN — GABAPENTIN 300 MG: 300 CAPSULE ORAL at 09:35

## 2019-01-01 RX ADMIN — SODIUM CHLORIDE, SODIUM LACTATE, POTASSIUM CHLORIDE, AND CALCIUM CHLORIDE 125 ML/HR: 600; 310; 30; 20 INJECTION, SOLUTION INTRAVENOUS at 17:44

## 2019-01-01 RX ADMIN — SUCRALFATE 1 G: 1 TABLET ORAL at 12:10

## 2019-01-01 RX ADMIN — Medication 10 ML: at 06:06

## 2019-01-01 RX ADMIN — PROPOFOL 30 MG: 10 INJECTION, EMULSION INTRAVENOUS at 15:06

## 2019-01-01 RX ADMIN — DOCUSATE SODIUM 100 MG: 100 CAPSULE, LIQUID FILLED ORAL at 10:16

## 2019-01-01 RX ADMIN — Medication 5 ML: at 05:09

## 2019-01-01 RX ADMIN — BUDESONIDE 500 MCG: 0.5 INHALANT RESPIRATORY (INHALATION) at 20:44

## 2019-01-01 RX ADMIN — SUCRALFATE 1 G: 1 TABLET ORAL at 09:10

## 2019-01-01 RX ADMIN — Medication 10 ML: at 06:17

## 2019-01-01 RX ADMIN — GABAPENTIN 300 MG: 300 CAPSULE ORAL at 21:53

## 2019-01-01 RX ADMIN — MORPHINE SULFATE 1 MG: 2 INJECTION, SOLUTION INTRAMUSCULAR; INTRAVENOUS at 13:25

## 2019-01-01 RX ADMIN — DULOXETINE HYDROCHLORIDE 60 MG: 60 CAPSULE, DELAYED RELEASE ORAL at 21:53

## 2019-01-01 RX ADMIN — ALBUTEROL SULFATE 2.5 MG: 2.5 SOLUTION RESPIRATORY (INHALATION) at 15:35

## 2019-01-01 RX ADMIN — ACETAMINOPHEN 1000 MG: 500 TABLET, FILM COATED ORAL at 09:50

## 2019-01-01 RX ADMIN — SODIUM CHLORIDE, SODIUM LACTATE, POTASSIUM CHLORIDE, AND CALCIUM CHLORIDE 50 ML/HR: 600; 310; 30; 20 INJECTION, SOLUTION INTRAVENOUS at 15:20

## 2019-01-01 RX ADMIN — ONDANSETRON 4 MG: 2 INJECTION INTRAMUSCULAR; INTRAVENOUS at 20:28

## 2019-01-01 RX ADMIN — MORPHINE SULFATE 1 MG: 2 INJECTION, SOLUTION INTRAMUSCULAR; INTRAVENOUS at 09:41

## 2019-01-01 RX ADMIN — SUCRALFATE 1 G: 1 TABLET ORAL at 21:56

## 2019-01-01 RX ADMIN — VANCOMYCIN HYDROCHLORIDE 1750 MG: 10 INJECTION, POWDER, LYOPHILIZED, FOR SOLUTION INTRAVENOUS at 00:11

## 2019-01-01 RX ADMIN — SODIUM CHLORIDE, SODIUM LACTATE, POTASSIUM CHLORIDE, AND CALCIUM CHLORIDE 125 ML/HR: 600; 310; 30; 20 INJECTION, SOLUTION INTRAVENOUS at 00:06

## 2019-01-01 RX ADMIN — MORPHINE SULFATE 2 MG: 2 INJECTION, SOLUTION INTRAMUSCULAR; INTRAVENOUS at 04:14

## 2019-01-01 RX ADMIN — SUCRALFATE 1 G: 1 TABLET ORAL at 16:19

## 2019-01-01 RX ADMIN — SODIUM CHLORIDE 1000 ML: 900 INJECTION, SOLUTION INTRAVENOUS at 23:50

## 2019-01-01 RX ADMIN — BUDESONIDE 500 MCG: 0.5 INHALANT RESPIRATORY (INHALATION) at 07:38

## 2019-01-01 RX ADMIN — SODIUM CHLORIDE 125 ML/HR: 900 INJECTION, SOLUTION INTRAVENOUS at 22:13

## 2019-01-01 RX ADMIN — FUROSEMIDE 40 MG: 10 INJECTION, SOLUTION INTRAMUSCULAR; INTRAVENOUS at 13:14

## 2019-01-01 RX ADMIN — MORPHINE SULFATE 1 MG: 2 INJECTION, SOLUTION INTRAMUSCULAR; INTRAVENOUS at 13:00

## 2019-01-01 RX ADMIN — DULOXETINE HYDROCHLORIDE 60 MG: 60 CAPSULE, DELAYED RELEASE ORAL at 21:13

## 2019-01-01 RX ADMIN — DOCUSATE SODIUM 100 MG: 100 CAPSULE, LIQUID FILLED ORAL at 17:41

## 2019-01-01 RX ADMIN — SODIUM CHLORIDE 125 ML/HR: 900 INJECTION, SOLUTION INTRAVENOUS at 21:15

## 2019-01-01 RX ADMIN — ALBUMIN (HUMAN) 25 G: 0.25 INJECTION, SOLUTION INTRAVENOUS at 12:55

## 2019-01-01 RX ADMIN — FENTANYL CITRATE 100 MCG: 50 INJECTION, SOLUTION INTRAMUSCULAR; INTRAVENOUS at 10:28

## 2019-01-01 RX ADMIN — MORPHINE SULFATE 1 MG: 2 INJECTION, SOLUTION INTRAMUSCULAR; INTRAVENOUS at 07:53

## 2019-01-01 RX ADMIN — ONDANSETRON 4 MG: 2 INJECTION INTRAMUSCULAR; INTRAVENOUS at 22:41

## 2019-01-01 RX ADMIN — DOCUSATE SODIUM 100 MG: 100 CAPSULE, LIQUID FILLED ORAL at 17:29

## 2019-01-01 RX ADMIN — ALBUTEROL SULFATE 2.5 MG: 2.5 SOLUTION RESPIRATORY (INHALATION) at 19:18

## 2019-01-01 RX ADMIN — POLYETHYLENE GLYCOL 3350 17 G: 17 POWDER, FOR SOLUTION ORAL at 09:10

## 2019-01-01 RX ADMIN — ALBUTEROL SULFATE 2.5 MG: 2.5 SOLUTION RESPIRATORY (INHALATION) at 07:39

## 2019-01-01 RX ADMIN — SUCRALFATE 1 G: 1 TABLET ORAL at 21:32

## 2019-01-01 RX ADMIN — BUDESONIDE 500 MCG: 0.5 INHALANT RESPIRATORY (INHALATION) at 19:36

## 2019-01-01 RX ADMIN — FULVESTRANT 500 MG: 50 INJECTION INTRAMUSCULAR at 15:22

## 2019-01-01 RX ADMIN — ALBUTEROL SULFATE 2.5 MG: 2.5 SOLUTION RESPIRATORY (INHALATION) at 14:41

## 2019-01-01 RX ADMIN — BUDESONIDE 500 MCG: 0.5 INHALANT RESPIRATORY (INHALATION) at 07:09

## 2019-01-01 RX ADMIN — DULOXETINE HYDROCHLORIDE 60 MG: 60 CAPSULE, DELAYED RELEASE ORAL at 02:55

## 2019-01-01 RX ADMIN — Medication 10 ML: at 21:37

## 2019-01-01 RX ADMIN — SENNOSIDES 17.2 MG: 8.6 TABLET, FILM COATED ORAL at 09:10

## 2019-01-01 RX ADMIN — ROCURONIUM BROMIDE 40 MG: 10 INJECTION, SOLUTION INTRAVENOUS at 10:28

## 2019-01-01 RX ADMIN — Medication 5 ML: at 08:42

## 2019-01-01 RX ADMIN — POLYETHYLENE GLYCOL 3350 17 G: 17 POWDER, FOR SOLUTION ORAL at 09:11

## 2019-01-01 RX ADMIN — HYDROMORPHONE HYDROCHLORIDE 1 MG: 1 INJECTION, SOLUTION INTRAMUSCULAR; INTRAVENOUS; SUBCUTANEOUS at 19:38

## 2019-01-01 RX ADMIN — MORPHINE SULFATE 2 MG: 2 INJECTION, SOLUTION INTRAMUSCULAR; INTRAVENOUS at 21:25

## 2019-01-01 RX ADMIN — ALBUMIN (HUMAN) 25 G: 0.25 INJECTION, SOLUTION INTRAVENOUS at 00:46

## 2019-01-01 RX ADMIN — ALUMINUM HYDROXIDE, MAGNESIUM HYDROXIDE, AND SIMETHICONE 30 ML: 200; 200; 20 SUSPENSION ORAL at 16:18

## 2019-01-01 RX ADMIN — ALBUTEROL SULFATE 2.5 MG: 2.5 SOLUTION RESPIRATORY (INHALATION) at 14:02

## 2019-01-01 RX ADMIN — BUDESONIDE 500 MCG: 0.5 INHALANT RESPIRATORY (INHALATION) at 07:39

## 2019-01-01 RX ADMIN — ENOXAPARIN SODIUM 40 MG: 40 INJECTION SUBCUTANEOUS at 21:14

## 2019-01-01 RX ADMIN — SUCRALFATE 1 G: 1 TABLET ORAL at 11:58

## 2019-01-01 RX ADMIN — IOPAMIDOL 100 ML: 755 INJECTION, SOLUTION INTRAVENOUS at 17:33

## 2019-01-01 RX ADMIN — LEVOTHYROXINE SODIUM 75 MCG: 75 TABLET ORAL at 05:08

## 2019-01-01 RX ADMIN — SODIUM CHLORIDE, SODIUM LACTATE, POTASSIUM CHLORIDE, AND CALCIUM CHLORIDE 25 ML/HR: 600; 310; 30; 20 INJECTION, SOLUTION INTRAVENOUS at 08:10

## 2019-01-01 RX ADMIN — PIPERACILLIN, TAZOBACTAM 4.5 G: 4; .5 INJECTION, POWDER, LYOPHILIZED, FOR SOLUTION INTRAVENOUS at 14:48

## 2019-01-01 RX ADMIN — PERFLUTREN 1 ML: 6.52 INJECTION, SUSPENSION INTRAVENOUS at 14:53

## 2019-01-01 RX ADMIN — HYDROCODONE BITARTRATE AND ACETAMINOPHEN 2 TABLET: 5; 325 TABLET ORAL at 02:06

## 2019-01-01 RX ADMIN — CIPROFLOXACIN 400 MG: 2 INJECTION, SOLUTION INTRAVENOUS at 03:40

## 2019-01-01 RX ADMIN — LEVOTHYROXINE SODIUM 75 MCG: 75 TABLET ORAL at 06:57

## 2019-01-01 RX ADMIN — SODIUM CHLORIDE 1000 ML: 900 INJECTION, SOLUTION INTRAVENOUS at 23:40

## 2019-01-01 RX ADMIN — ALBUTEROL SULFATE 2.5 MG: 2.5 SOLUTION RESPIRATORY (INHALATION) at 20:30

## 2019-01-01 RX ADMIN — BACLOFEN 10 MG: 10 TABLET ORAL at 12:11

## 2019-01-01 RX ADMIN — MORPHINE SULFATE 1 MG: 2 INJECTION, SOLUTION INTRAMUSCULAR; INTRAVENOUS at 03:41

## 2019-01-01 RX ADMIN — ONDANSETRON 4 MG: 2 INJECTION INTRAMUSCULAR; INTRAVENOUS at 06:58

## 2019-01-01 RX ADMIN — GABAPENTIN 300 MG: 300 CAPSULE ORAL at 10:16

## 2019-01-01 RX ADMIN — LEVOTHYROXINE SODIUM 75 MCG: 75 TABLET ORAL at 07:00

## 2019-01-01 RX ADMIN — DOCUSATE SODIUM 100 MG: 100 CAPSULE, LIQUID FILLED ORAL at 09:07

## 2019-01-01 RX ADMIN — PIPERACILLIN, TAZOBACTAM 4.5 G: 4; .5 INJECTION, POWDER, LYOPHILIZED, FOR SOLUTION INTRAVENOUS at 21:32

## 2019-01-01 RX ADMIN — GABAPENTIN 300 MG: 300 CAPSULE ORAL at 21:13

## 2019-01-01 RX ADMIN — GLYCOPYRROLATE 0.4 MG: 0.2 INJECTION INTRAMUSCULAR; INTRAVENOUS at 11:48

## 2019-01-01 RX ADMIN — Medication 5 ML: at 11:57

## 2019-01-01 RX ADMIN — SODIUM CHLORIDE, SODIUM LACTATE, POTASSIUM CHLORIDE, AND CALCIUM CHLORIDE 125 ML/HR: 600; 310; 30; 20 INJECTION, SOLUTION INTRAVENOUS at 10:00

## 2019-01-01 RX ADMIN — SUCRALFATE 1 G: 1 TABLET ORAL at 12:41

## 2019-01-01 RX ADMIN — LEVOTHYROXINE SODIUM 75 MCG: 75 TABLET ORAL at 06:11

## 2019-01-01 RX ADMIN — NEOSTIGMINE METHYLSULFATE 3 MG: 1 INJECTION INTRAVENOUS at 11:48

## 2019-01-01 RX ADMIN — GLYCOPYRROLATE 0.4 MG: 0.2 INJECTION, SOLUTION INTRAMUSCULAR; INTRAVENOUS at 20:13

## 2019-01-01 RX ADMIN — ALBUTEROL SULFATE 2.5 MG: 2.5 SOLUTION RESPIRATORY (INHALATION) at 07:27

## 2019-01-01 RX ADMIN — Medication 10 ML: at 15:21

## 2019-01-01 RX ADMIN — SODIUM CHLORIDE, SODIUM LACTATE, POTASSIUM CHLORIDE, AND CALCIUM CHLORIDE 1000 ML: 600; 310; 30; 20 INJECTION, SOLUTION INTRAVENOUS at 13:59

## 2019-01-01 RX ADMIN — Medication 10 ML: at 21:22

## 2019-01-01 RX ADMIN — Medication 10 ML: at 05:03

## 2019-01-01 RX ADMIN — PROPOFOL 130 MG: 10 INJECTION, EMULSION INTRAVENOUS at 10:28

## 2019-01-01 RX ADMIN — GABAPENTIN 300 MG: 300 CAPSULE ORAL at 21:42

## 2019-01-01 RX ADMIN — LIDOCAINE HYDROCHLORIDE 60 MG: 20 INJECTION, SOLUTION EPIDURAL; INFILTRATION; INTRACAUDAL; PERINEURAL at 10:28

## 2019-01-01 RX ADMIN — DULOXETINE HYDROCHLORIDE 60 MG: 60 CAPSULE, DELAYED RELEASE ORAL at 21:42

## 2019-01-01 RX ADMIN — PIPERACILLIN, TAZOBACTAM 4.5 G: 4; .5 INJECTION, POWDER, LYOPHILIZED, FOR SOLUTION INTRAVENOUS at 23:10

## 2019-01-01 RX ADMIN — PROPOFOL 30 MG: 10 INJECTION, EMULSION INTRAVENOUS at 15:04

## 2019-01-01 RX ADMIN — ONDANSETRON 4 MG: 2 INJECTION INTRAMUSCULAR; INTRAVENOUS at 16:16

## 2019-01-01 RX ADMIN — SUCRALFATE 1 G: 1 TABLET ORAL at 06:10

## 2019-01-01 RX ADMIN — DULOXETINE HYDROCHLORIDE 60 MG: 60 CAPSULE, DELAYED RELEASE ORAL at 21:32

## 2019-01-01 RX ADMIN — ONDANSETRON 4 MG: 2 INJECTION INTRAMUSCULAR; INTRAVENOUS at 11:48

## 2019-01-01 RX ADMIN — ALPRAZOLAM 0.5 MG: 0.5 TABLET ORAL at 03:02

## 2019-01-01 RX ADMIN — SODIUM CHLORIDE 100 ML: 900 INJECTION, SOLUTION INTRAVENOUS at 17:33

## 2019-01-01 RX ADMIN — MORPHINE SULFATE 2 MG: 2 INJECTION, SOLUTION INTRAMUSCULAR; INTRAVENOUS at 17:54

## 2019-01-01 RX ADMIN — FULVESTRANT 500 MG: 50 INJECTION INTRAMUSCULAR at 14:05

## 2019-01-01 RX ADMIN — LEVOTHYROXINE SODIUM 75 MCG: 75 TABLET ORAL at 06:16

## 2019-01-01 RX ADMIN — HYDROMORPHONE HYDROCHLORIDE 0.5 MG: 2 INJECTION INTRAMUSCULAR; INTRAVENOUS; SUBCUTANEOUS at 13:35

## 2019-01-01 RX ADMIN — VANCOMYCIN HYDROCHLORIDE 1250 MG: 10 INJECTION, POWDER, LYOPHILIZED, FOR SOLUTION INTRAVENOUS at 23:14

## 2019-01-01 RX ADMIN — HYDROMORPHONE HYDROCHLORIDE 1 MG: 1 INJECTION, SOLUTION INTRAMUSCULAR; INTRAVENOUS; SUBCUTANEOUS at 03:40

## 2019-01-01 RX ADMIN — BUDESONIDE 500 MCG: 0.5 INHALANT RESPIRATORY (INHALATION) at 07:26

## 2019-01-01 RX ADMIN — SODIUM CHLORIDE, SODIUM LACTATE, POTASSIUM CHLORIDE, AND CALCIUM CHLORIDE 125 ML/HR: 600; 310; 30; 20 INJECTION, SOLUTION INTRAVENOUS at 00:13

## 2019-01-01 RX ADMIN — Medication 5 ML: at 17:55

## 2019-01-01 RX ADMIN — PIPERACILLIN, TAZOBACTAM 4.5 G: 4; .5 INJECTION, POWDER, LYOPHILIZED, FOR SOLUTION INTRAVENOUS at 23:39

## 2019-01-01 RX ADMIN — MORPHINE SULFATE 1 MG: 2 INJECTION, SOLUTION INTRAMUSCULAR; INTRAVENOUS at 07:07

## 2019-01-01 RX ADMIN — FUROSEMIDE 60 MG: 10 INJECTION, SOLUTION INTRAMUSCULAR; INTRAVENOUS at 09:10

## 2019-01-01 RX ADMIN — GABAPENTIN 300 MG: 300 CAPSULE ORAL at 17:41

## 2019-01-01 RX ADMIN — DULOXETINE HYDROCHLORIDE 60 MG: 60 CAPSULE, DELAYED RELEASE ORAL at 21:31

## 2019-01-01 RX ADMIN — SODIUM CHLORIDE 125 ML/HR: 900 INJECTION, SOLUTION INTRAVENOUS at 06:17

## 2019-01-01 RX ADMIN — ALUMINUM HYDROXIDE, MAGNESIUM HYDROXIDE, AND SIMETHICONE 30 ML: 200; 200; 20 SUSPENSION ORAL at 00:27

## 2019-01-01 RX ADMIN — ALBUTEROL SULFATE 2.5 MG: 2.5 SOLUTION RESPIRATORY (INHALATION) at 19:33

## 2019-01-01 RX ADMIN — ALBUTEROL SULFATE 2.5 MG: 2.5 SOLUTION RESPIRATORY (INHALATION) at 09:06

## 2019-01-01 RX ADMIN — MORPHINE SULFATE 2 MG: 2 INJECTION, SOLUTION INTRAMUSCULAR; INTRAVENOUS at 01:35

## 2019-01-01 RX ADMIN — GABAPENTIN 300 MG: 300 CAPSULE ORAL at 09:10

## 2019-01-01 RX ADMIN — FULVESTRANT 500 MG: 50 INJECTION INTRAMUSCULAR at 13:39

## 2019-01-01 RX ADMIN — CIPROFLOXACIN 400 MG: 2 INJECTION, SOLUTION INTRAVENOUS at 21:32

## 2019-01-01 RX ADMIN — GABAPENTIN 300 MG: 300 CAPSULE ORAL at 09:08

## 2019-01-01 RX ADMIN — GLYCOPYRROLATE 0.1 MG: 0.2 INJECTION, SOLUTION INTRAMUSCULAR; INTRAVENOUS at 08:40

## 2019-01-01 RX ADMIN — CIPROFLOXACIN 400 MG: 2 INJECTION, SOLUTION INTRAVENOUS at 12:11

## 2019-01-01 RX ADMIN — Medication 5 ML: at 21:56

## 2019-01-01 RX ADMIN — PIPERACILLIN, TAZOBACTAM 4.5 G: 4; .5 INJECTION, POWDER, LYOPHILIZED, FOR SOLUTION INTRAVENOUS at 06:06

## 2019-01-01 RX ADMIN — PROPOFOL 30 MG: 10 INJECTION, EMULSION INTRAVENOUS at 11:53

## 2019-01-01 RX ADMIN — BUDESONIDE 500 MCG: 0.5 INHALANT RESPIRATORY (INHALATION) at 09:06

## 2019-01-01 RX ADMIN — ALBUTEROL SULFATE 2.5 MG: 2.5 SOLUTION RESPIRATORY (INHALATION) at 08:27

## 2019-01-01 RX ADMIN — LEVOTHYROXINE SODIUM 75 MCG: 75 TABLET ORAL at 06:07

## 2019-01-01 RX ADMIN — PANTOPRAZOLE SODIUM 40 MG: 40 TABLET, DELAYED RELEASE ORAL at 18:15

## 2019-01-01 RX ADMIN — CIPROFLOXACIN 400 MG: 2 INJECTION, SOLUTION INTRAVENOUS at 03:16

## 2019-01-01 RX ADMIN — BUMETANIDE 0.5 MG/HR: 0.25 INJECTION INTRAMUSCULAR; INTRAVENOUS at 14:38

## 2019-01-01 RX ADMIN — PIPERACILLIN, TAZOBACTAM 4.5 G: 4; .5 INJECTION, POWDER, LYOPHILIZED, FOR SOLUTION INTRAVENOUS at 09:10

## 2019-01-01 RX ADMIN — PIPERACILLIN, TAZOBACTAM 4.5 G: 4; .5 INJECTION, POWDER, LYOPHILIZED, FOR SOLUTION INTRAVENOUS at 14:50

## 2019-01-01 RX ADMIN — GLYCOPYRROLATE 0.4 MG: 0.2 INJECTION, SOLUTION INTRAMUSCULAR; INTRAVENOUS at 13:20

## 2019-01-01 RX ADMIN — PANTOPRAZOLE SODIUM 40 MG: 40 TABLET, DELAYED RELEASE ORAL at 06:57

## 2019-01-01 RX ADMIN — DIATRIZOATE MEGLUMINE AND DIATRIZOATE SODIUM 15 ML: 660; 100 LIQUID ORAL; RECTAL at 14:48

## 2019-01-01 RX ADMIN — BUDESONIDE 500 MCG: 0.5 INHALANT RESPIRATORY (INHALATION) at 07:28

## 2019-01-01 RX ADMIN — BUDESONIDE 500 MCG: 0.5 INHALANT RESPIRATORY (INHALATION) at 08:27

## 2019-01-01 RX ADMIN — SODIUM CHLORIDE 125 ML/HR: 900 INJECTION, SOLUTION INTRAVENOUS at 13:30

## 2019-01-01 RX ADMIN — SUCRALFATE 1 G: 1 TABLET ORAL at 05:08

## 2019-01-01 RX ADMIN — Medication 5 ML: at 09:43

## 2019-01-01 RX ADMIN — KETAMINE HYDROCHLORIDE 15 MG: 100 INJECTION, SOLUTION INTRAMUSCULAR; INTRAVENOUS at 11:30

## 2019-01-01 RX ADMIN — MORPHINE SULFATE 1 MG: 2 INJECTION, SOLUTION INTRAMUSCULAR; INTRAVENOUS at 11:56

## 2019-01-01 RX ADMIN — Medication 5 ML: at 14:42

## 2019-01-01 RX ADMIN — ENOXAPARIN SODIUM 40 MG: 40 INJECTION SUBCUTANEOUS at 08:54

## 2019-01-01 RX ADMIN — Medication 10 ML: at 17:33

## 2019-01-01 RX ADMIN — PANTOPRAZOLE SODIUM 40 MG: 40 TABLET, DELAYED RELEASE ORAL at 06:16

## 2019-01-01 RX ADMIN — BUDESONIDE 500 MCG: 0.5 INHALANT RESPIRATORY (INHALATION) at 19:18

## 2019-01-01 RX ADMIN — SODIUM CHLORIDE, SODIUM LACTATE, POTASSIUM CHLORIDE, AND CALCIUM CHLORIDE 125 ML/HR: 600; 310; 30; 20 INJECTION, SOLUTION INTRAVENOUS at 10:21

## 2019-01-01 RX ADMIN — CIPROFLOXACIN 400 MG: 2 INJECTION, SOLUTION INTRAVENOUS at 20:32

## 2019-01-01 RX ADMIN — PANTOPRAZOLE SODIUM 40 MG: 40 TABLET, DELAYED RELEASE ORAL at 07:00

## 2019-01-01 RX ADMIN — PANTOPRAZOLE SODIUM 40 MG: 40 TABLET, DELAYED RELEASE ORAL at 06:15

## 2019-01-01 RX ADMIN — ALBUTEROL SULFATE 2.5 MG: 2.5 SOLUTION RESPIRATORY (INHALATION) at 13:26

## 2019-01-01 RX ADMIN — ACETAMINOPHEN 1000 MG: 500 TABLET, FILM COATED ORAL at 14:50

## 2019-01-01 RX ADMIN — FULVESTRANT 500 MG: 50 INJECTION INTRAMUSCULAR at 15:50

## 2019-01-01 RX ADMIN — Medication 10 ML: at 13:31

## 2019-01-01 RX ADMIN — SUCRALFATE 1 G: 1 TABLET ORAL at 18:15

## 2019-01-01 RX ADMIN — SUCRALFATE 1 G: 1 TABLET ORAL at 12:18

## 2019-01-01 RX ADMIN — IPRATROPIUM BROMIDE AND ALBUTEROL SULFATE 3 ML: .5; 3 SOLUTION RESPIRATORY (INHALATION) at 23:32

## 2019-01-01 RX ADMIN — GABAPENTIN 300 MG: 300 CAPSULE ORAL at 18:16

## 2019-01-01 RX ADMIN — ALBUTEROL SULFATE 2.5 MG: 2.5 SOLUTION RESPIRATORY (INHALATION) at 03:05

## 2019-01-01 RX ADMIN — ALBUTEROL SULFATE 2.5 MG: 2.5 SOLUTION RESPIRATORY (INHALATION) at 13:56

## 2019-01-01 RX ADMIN — BUMETANIDE 0.5 MG/HR: 0.25 INJECTION INTRAMUSCULAR; INTRAVENOUS at 05:34

## 2019-01-01 RX ADMIN — ENOXAPARIN SODIUM 40 MG: 40 INJECTION SUBCUTANEOUS at 20:30

## 2019-01-01 RX ADMIN — KETAMINE HYDROCHLORIDE 25 MG: 100 INJECTION, SOLUTION INTRAMUSCULAR; INTRAVENOUS at 10:28

## 2019-01-01 RX ADMIN — DOCUSATE SODIUM 100 MG: 100 CAPSULE, LIQUID FILLED ORAL at 09:11

## 2019-01-01 RX ADMIN — Medication 10 ML: at 06:30

## 2019-01-01 RX ADMIN — ALBUTEROL SULFATE 2.5 MG: 2.5 SOLUTION RESPIRATORY (INHALATION) at 07:38

## 2019-01-01 RX ADMIN — CIPROFLOXACIN 400 MG: 2 INJECTION, SOLUTION INTRAVENOUS at 11:59

## 2019-01-01 RX ADMIN — Medication 5 ML: at 07:55

## 2019-01-01 RX ADMIN — HYDROMORPHONE HYDROCHLORIDE 0.5 MG: 2 INJECTION INTRAMUSCULAR; INTRAVENOUS; SUBCUTANEOUS at 13:40

## 2019-01-01 RX ADMIN — ALTEPLASE 1 MG: KIT at 14:35

## 2019-01-01 RX ADMIN — ALBUTEROL SULFATE 2.5 MG: 2.5 SOLUTION RESPIRATORY (INHALATION) at 19:36

## 2019-01-01 RX ADMIN — ONDANSETRON 4 MG: 2 INJECTION INTRAMUSCULAR; INTRAVENOUS at 10:12

## 2019-01-01 RX ADMIN — GABAPENTIN 300 MG: 300 CAPSULE ORAL at 17:29

## 2019-01-01 RX ADMIN — ENOXAPARIN SODIUM 40 MG: 40 INJECTION SUBCUTANEOUS at 21:53

## 2019-01-01 RX ADMIN — Medication 10 ML: at 14:00

## 2019-01-01 RX ADMIN — MORPHINE SULFATE 1 MG: 2 INJECTION, SOLUTION INTRAMUSCULAR; INTRAVENOUS at 23:04

## 2019-01-01 RX ADMIN — ENOXAPARIN SODIUM 40 MG: 40 INJECTION SUBCUTANEOUS at 21:31

## 2019-01-01 RX ADMIN — SUCRALFATE 1 G: 1 TABLET ORAL at 17:50

## 2019-01-01 RX ADMIN — SUCRALFATE 1 G: 1 TABLET ORAL at 21:13

## 2019-01-01 RX ADMIN — MORPHINE SULFATE 2 MG: 2 INJECTION, SOLUTION INTRAMUSCULAR; INTRAVENOUS at 14:41

## 2019-01-01 RX ADMIN — BUDESONIDE 500 MCG: 0.5 INHALANT RESPIRATORY (INHALATION) at 19:33

## 2019-01-01 RX ADMIN — Medication 5 ML: at 10:54

## 2019-01-01 RX ADMIN — ALUMINUM HYDROXIDE, MAGNESIUM HYDROXIDE, AND SIMETHICONE 30 ML: 200; 200; 20 SUSPENSION ORAL at 12:47

## 2019-01-01 RX ADMIN — ALBUTEROL SULFATE 2.5 MG: 2.5 SOLUTION RESPIRATORY (INHALATION) at 07:09

## 2019-01-01 RX ADMIN — Medication 10 ML: at 20:14

## 2019-01-01 RX ADMIN — VANCOMYCIN HYDROCHLORIDE 1250 MG: 10 INJECTION, POWDER, LYOPHILIZED, FOR SOLUTION INTRAVENOUS at 00:50

## 2019-01-01 RX ADMIN — BUMETANIDE 0.5 MG/HR: 0.25 INJECTION INTRAMUSCULAR; INTRAVENOUS at 02:57

## 2019-01-01 RX ADMIN — BUDESONIDE 500 MCG: 0.5 INHALANT RESPIRATORY (INHALATION) at 20:30

## 2019-01-01 RX ADMIN — SUCRALFATE 1 G: 1 TABLET ORAL at 06:07

## 2019-01-01 RX ADMIN — Medication 10 ML: at 07:10

## 2019-01-01 RX ADMIN — ONDANSETRON 4 MG: 2 INJECTION INTRAMUSCULAR; INTRAVENOUS at 03:31

## 2019-01-01 RX ADMIN — DOCUSATE SODIUM 100 MG: 100 CAPSULE, LIQUID FILLED ORAL at 18:16

## 2019-01-01 RX ADMIN — ALPRAZOLAM 0.5 MG: 0.5 TABLET ORAL at 21:56

## 2019-01-01 RX ADMIN — IPRATROPIUM BROMIDE AND ALBUTEROL SULFATE 3 ML: .5; 3 SOLUTION RESPIRATORY (INHALATION) at 08:31

## 2019-01-01 RX ADMIN — DOCUSATE SODIUM 100 MG: 100 CAPSULE, LIQUID FILLED ORAL at 18:36

## 2019-01-01 RX ADMIN — DEXTROSE 50 % IN WATER (D50W) INTRAVENOUS SYRINGE 25 G: at 01:55

## 2019-01-01 RX ADMIN — LIDOCAINE HYDROCHLORIDE 40 MG: 20 INJECTION, SOLUTION EPIDURAL; INFILTRATION; INTRACAUDAL; PERINEURAL at 15:04

## 2019-01-01 RX ADMIN — CIPROFLOXACIN 400 MG: 2 INJECTION, SOLUTION INTRAVENOUS at 03:32

## 2019-01-01 RX ADMIN — GABAPENTIN 300 MG: 300 CAPSULE ORAL at 21:31

## 2019-01-01 RX ADMIN — Medication 5 ML: at 22:04

## 2019-01-01 RX ADMIN — ENOXAPARIN SODIUM 40 MG: 40 INJECTION SUBCUTANEOUS at 09:14

## 2019-01-01 RX ADMIN — ALBUTEROL SULFATE 2.5 MG: 2.5 SOLUTION RESPIRATORY (INHALATION) at 07:28

## 2019-01-01 RX ADMIN — ENOXAPARIN SODIUM 40 MG: 40 INJECTION SUBCUTANEOUS at 09:10

## 2019-01-01 RX ADMIN — DEXTROSE MONOHYDRATE AND SODIUM CHLORIDE 100 ML/HR: 5; .45 INJECTION, SOLUTION INTRAVENOUS at 03:16

## 2019-01-01 RX ADMIN — Medication 10 ML: at 14:56

## 2019-01-01 RX ADMIN — FULVESTRANT 500 MG: 50 INJECTION INTRAMUSCULAR at 14:56

## 2019-01-01 RX ADMIN — ALBUTEROL SULFATE 2.5 MG: 2.5 SOLUTION RESPIRATORY (INHALATION) at 20:44

## 2019-01-01 RX ADMIN — MORPHINE SULFATE 1 MG: 2 INJECTION, SOLUTION INTRAMUSCULAR; INTRAVENOUS at 10:54

## 2019-01-01 RX ADMIN — MORPHINE SULFATE 1 MG: 2 INJECTION, SOLUTION INTRAMUSCULAR; INTRAVENOUS at 21:44

## 2019-01-15 NOTE — PROGRESS NOTES
Arrived to the [facility]. Faslodex completed. Patient tolerated well. Any issues or concerns during appointment: none. Patient aware of next infusion appointment on 2/12/19 at 3pm.  Discharged ambulatory.

## 2019-02-12 NOTE — PROGRESS NOTES
Pt arrived ambulatory to [facility], faslodex given in two separate injections, pt tolerated well, discharged home ambulatory

## 2019-02-12 NOTE — PROGRESS NOTES
2/12/2019 Saw the patient with Dr Dedrick Presley. She is scheduled for PET scan on 2/21 but we will cancel this for now. She has three more days of Ibrance left in this cycle. She has had the flu and pneumonia. We will see her in a month to determine when to reschedule PET. Will continue to follow.

## 2019-02-19 PROBLEM — J90 PLEURAL EFFUSION, RIGHT: Status: ACTIVE | Noted: 2019-01-01

## 2019-02-19 NOTE — H&P
HISTORY AND PHYSICAL Cassi Pedroza 2/19/2019 Date of Admission:  2/19/2019 The patient's chart is reviewed and the patient is discussed with the staff. Subjective:  
 
Patient is a 77 y.o.  female [facility] with enlarging right pleural effusion. Saw PCM and reported increasing shortness of breath. On oral chemotherapy for breast cancer in Rem[place] Dec 2018 with concern for cancer on PET scan 10/2018. Was supposed to have repeat PET/CT Feb 2019 when found to have pleural effusion and PET scan was delayed, currently not resheduled. Reported a low grade fever, 98.6 reportedly at highest. Had influenza 3 weeks ago and then pneumonia and treated with oral antibiotics just 3 days ago. Has not noted any bleeding. Review of Systems A comprehensive review of systems was negative except for that written in the HPI. Patient Active Problem List  
Diagnosis Code  Acquired hypothyroidism E03.9  Asthma J45.909  Depression F32.9  Malignant neoplasm of right female breast ([address] C50.911  Mediastinal adenopathy R59.0  Status post right mastectomy Z90.11  
 Tachycardia R00.0  Pancytopenia due to chemotherapy [facility]) D61.810  
 Malignant neoplasm of upper-inner quadrant of right female breast ([address] C50.211  Lump of skin of right upper extremity R22.31  
 Breast cancer ([address] C50.919  S/P breast reconstruction, right Z98.890  
 Recurrent major depressive disorder, in partial remission (HCC) F33.41  
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 Taking? ALPRAZolam (XANAX) 0.5 mg tablet 2019 at Unknown time  No Yes Si bid-tid prn anxiety DULoxetine (CYMBALTA) 60 mg capsule 2019 at Unknown time  No Yes Si every day for depression  
albuterol (PROVENTIL HFA, VENTOLIN HFA, PROAIR HFA) 90 mcg/actuation inhaler 2019 at Unknown time  No Yes Si puffs q6h prn wheezing. May use brand per formulary  
cefdinir (OMNICEF) 300 mg capsule 2019  Yes No  
Sig: TAKE 1 CAPSULE BY MOUTH TWICE A DAY  
fluticasone-salmeterol (ADVAIR DISKUS) 250-50 mcg/dose diskus inhaler 2019 at Unknown time  No Yes Si inhalation bid for asthma control  
heparin, porcine, pf (HEPARIN LOCKFLUSH,PORCINE,,PF,) 100 unit/mL injection 2019  No No  
Sig: Access port, flush with 20ml Normal Saline, 500 units of Heparin Flush and de access port every 4 weeks. levoFLOXacin (LEVAQUIN) 500 mg tablet 2019  No No  
Sig: Take 1 Tab by mouth daily. levothyroxine (SYNTHROID) 75 mcg tablet 2019 at Unknown time  No Yes Sig: CANCEL THIS SCRIPT  1 every day for thyroid  
palbociclib (IBRANCE) 75 mg cap 2019 at Unknown time  No Yes Sig: Take 1 Cap by mouth daily. 1 cap by mouth daily for 21 days, then off for 7 days. varicella-zoster recombinant, PF, (SHINGRIX, PF,) 50 mcg/0.5 mL susr injection Unknown at Unknown time  No No  
Sig: Give in 2 doses, 2-6 months apart    DX Z23 Facility-Administered Medications: None Past Medical History:  
Diagnosis Date  Abnormal CT of the chest   
 Adverse effect of anesthesia   
 bp dropped with dual lung/gallbladder surgery  Asthma    
 no acute attacks recently. uses advair daily  Breast cancer ([facility] [address]  R breast--infiltrating ductal ca,  ER/ND positive and HER2 negative, s/p mastectomy, chemo, reconstruction, on chronic Ibrance therapy  Depression  Hypercholesteremia Controlled with diet  Hypothyroidism  Ill-defined condition granular lung disease resolved 2002 Past Surgical History:  
Procedure Laterality Date  CHEST SURGERY PROCEDURE UNLISTED    
 R lung biopsy  HX BLADDER REPAIR    
 HX BREAST AUGMENTATION Right 10/20/2017 BREAST IMPLANT REMOVAL  performed by James St MD at [address] [place] [address] HX BREAST BIOPSY Right 2/5/2016 SKIN BIOPSY RIGHT BREAST performed by Balta Correa MD at [facility] MAIN OR  
 HX BREAST RECONSTRUCTION Bilateral 9/14/2016 BILATERAL BREAST RECONSTRUCTION performed by James St MD at [facility] MAIN OR  
 HX BREAST RECONSTRUCTION Bilateral 9/14/2016 BILATERAL BREAST TISSUE EXPANDER INSERTION performed by James St MD at [facility] MAIN OR  
 HX BREAST RECONSTRUCTION Right 9/18/2017 RIGHT BREAST RECONSTRUCTION performed by James St MD at [address] [place] [address] HX BREAST RECONSTRUCTION Right 9/18/2017 Right  PLACEMENT OF PERMANENT IMPLANTS performed by James St MD at [address] [place] [address] HX BREAST REDUCTION Left 9/18/2017 LEFT BREAST MASTOPEXY FOR SYMMETRY performed by James St MD at [address] [place] [address] HX COLONOSCOPY    
 2006  HX CYST INCISION AND DRAINAGE Right 12/15/2017 INCISION AND DRAINAGE BREAST  performed by James St MD at [address] [place] [address] HX CYSTOCELE REPAIR    
 HX HYSTERECTOMY    
 fibroids  HX LAP CHOLECYSTECTOMY  2000  HX MASTECTOMY Right  HX RECTOCELE REPAIR    
 HX VASCULAR ACCESS    
 port left chest wall Social History Socioeconomic History  Marital status:  Spouse name: Not on file  Number of children: Not on file  Years of education: Not on file  Highest education level: Not on file Social Needs  Financial resource strain: Not on file  Food insecurity - worry: Not on file  Food insecurity - inability: Not on file  Transportation needs - medical: Not on file  Transportation needs - non-medical: Not on file Occupational History  Not on file Tobacco Use  Smoking status: Never Smoker  Smokeless tobacco: Never Used Substance and Sexual Activity  Alcohol use: No  
  Alcohol/week: 0.0 oz  Drug use: No  
 Sexual activity: Not on file Other Topics Concern  Not on file Social History Narrative  Not on file Family History Problem Relation Age of Onset  Hypertension Mother Jayden Apa Mother 79 Breast cancer at age 79  Diabetes Mother Type 2  
 Other Father   
     alzheimers  Cancer Maternal Aunt Breast cancer after age 72  Cancer Maternal Aunt Breast cancer at age 39  Allergic Rhinitis Neg Hx  Allergy-severe Neg Hx  Amblyopia Neg Hx  Anemia Neg Hx  Anesth Problems Neg Hx  Angioedema Neg Hx  Anxiety Neg Hx  Arrhythmia Neg Hx  Arthritis-rheumatoid Neg Hx  Ataxia Neg Hx  Atopy Neg Hx  Bipolar Disorder Neg Hx  Blindness Neg Hx  Breast Cancer Neg Hx  Breast Problems Neg Hx  Broken Bones Neg Hx  Cataract Neg Hx  Celiac Disease Neg Hx  Childhood heart surgery Neg Hx  Childhood resp disease Neg Hx  Chorea Neg Hx  Clotting Disorder Neg Hx  Collagen Dis Neg Hx  Colon Cancer Neg Hx  Colon Polyps Neg Hx  COPD Neg Hx  Coronary Artery Disease Neg Hx  Crohn's Disease Neg Hx  Cystic Fibrosis Neg Hx  Delayed Awakening Neg Hx  Dementia Neg Hx  Depression Neg Hx  Dislocations Neg Hx  Downs Syndrome Neg Hx  Drug Abuse Neg Hx  Eclampsia Neg Hx  Eczema Neg Hx  Emergence Delirium Neg Hx  Emphysema Neg Hx  Fainting Neg Hx  Genitourinary () Neg Hx  Glaucoma Neg Hx   
 Heart Attack Neg Hx   
 Heart defect Neg Hx   
 Heart Failure Neg Hx   
 Heart Surgery Neg Hx  Hemachromatosis Neg Hx  Herpes Neg Hx  High Cholesterol Neg Hx   
 HIV/AIDS Neg Hx  Immunodeficiency Neg Hx  Infertility Neg Hx  Inflammatory Bowel Dz Neg Hx  Kidney Disease Neg Hx  Liver Disease Neg Hx  Macular Degen Neg Hx  Malignant Hyperthermia Neg Hx  MS Neg Hx  Neuropathy Neg Hx  NF Neg Hx  Osteoporosis Neg Hx   
 Ovarian Cancer Neg Hx  Pacemaker Neg Hx  Panic disorder Neg Hx  Parkinsonism Neg Hx  Post-op Cognitive Dysfunction Neg Hx  Post-op Nausea/Vomiting Neg Hx  Prematurity Neg Hx   Labor Neg Hx  Pseudocholinesterase Deficiency Neg Hx  Psoriasis Neg Hx  Psychotic Disorder Neg Hx  Rashes/Skin Problems Neg Hx  Retinal Detachment Neg Hx  Rh Incompatibility Neg Hx  Schizophrenia Neg Hx  Seizures Neg Hx  Severe Sprains Neg Hx  Sickle Cell Anemia Neg Hx  Sickle Cell Trait Neg Hx  SIDS Neg Hx  SLE Neg Hx  Spont Ab Neg Hx  Stomach Cancer Neg Hx  Strabismus Neg Hx  Substance Abuse Neg Hx  Sudden Death Neg Hx  Suicide Neg Hx  Thyroid Disease Neg Hx  Tuberculosis Neg Hx  Ulcerative Colitis Neg Hx  Urticaria Neg Hx  Lang's Disease Neg Hx Allergies Allergen Reactions  Prednisone Unknown (comments) \"psychotic reaction\"  To oral and parenteral steroids No current facility-administered medications for this encounter. Objective:  
 
Vitals:  
 19 1254 19 1321 19 1326 BP:  123/59 Pulse:  (!) 104 Resp:   18 Temp: 98.1 °F (36.7 °C) SpO2:  95% Weight: 126 lb (57.2 kg) Height: 5' 1.5\" (1.562 m) PHYSICAL EXAM  
 
Constitutional:  the patient is well developed and in no acute distress EENMT:  Sclera clear, pupils equal, oral mucosa moist 
Respiratory: diminished R base Cardiovascular:  RRR without M,G,R 
Gastrointestinal: soft and non-tender; with positive bowel sounds. Musculoskeletal: warm without cyanosis. There is no lower leg edema. Skin:  no jaundice or rashes, no wounds Neurologic: no gross neuro deficits Psychiatric:  alert and oriented x 4 CXR: 
 
 
No results for input(s): WBC, HGB, HCT, PLT, INR, HGBEXT, HCTEXT, PLTEXT, HGBEXT, HCTEXT, PLTEXT in the last 72 hours. No lab exists for component: INREXT, INREXT No results for input(s): NA, K, CL, GLU, CO2, BUN, CREA, MG, PHOS, CA, TROIQ, ALB, TBIL, TBILI, GPT, ALT, SGOT, BNPP in the last 72 hours. No lab exists for component: TROIP No results for input(s): PH, PCO2, PO2, HCO3 in the last 72 hours. No results for input(s): LCAD, LAC in the last 72 hours. Assessment:  (Medical Decision Making) Hospital Problems  Date Reviewed: 2/18/2019 Codes Class Noted POA Pleural effusion, right ICD-10-CM: J90 ICD-9-CM: 511.9  2/19/2019 Unknown Plan:  (Medical Decision Making) --Thoracentesis U/S guided for evaluation of malignancy vs transudative vs parapneumonic. More than 50% of the time documented was spent in face-to-face contact with the patient and in the care of the patient on the floor/unit where the patient is located.  
 
Abimbola Lay MD

## 2019-02-19 NOTE — PROCEDURES
PROCEDURE: 
DIAGNOSTIC THORACENTESIS, THERAPEUTIC THORACENTESIS  
 
 
PRE-OP DIAGNOSIS: 
Right PLEURAL EFFUSION POST-OP DIAGNOSIS: 
Right PLEURAL EFFUSION 
 
VOLUME REMOVED: 
 
1200cc ANESTHESIA: 
 
LOCAL ANESTHESIA WITH 1% LIDOCAINE 10 CC TOTAL. CHEST ULTRASOUND FINDINGS: 
 
A Turbo-M, Sonosite ultrasound with a 5-16 mHz probe was used to image the chest and localize the pleural effusion on the right chest. 
 
A moderate anechoic space was seen on the right consistent with an uncomplicated pleural effusion. DESCRIPTION OF PROCEDURE: 
 
After obtaining informed consent and localizing the safest location for thoracentesis, the 8th intercostal space was marked with a blunt, plastic needle cap in the mid scapular line. An [employer] AK-0100 Pleral-Seal thoracentesis kit was used to perform the procedure. The skin was cleansed with the supplied chlorhexidine swab and then draped in the usual fashion. Using the previously marked location as a guide, a 22 G 1.5 inch needle was used to inject 1% lidocaine into the skin and subcutaneous tissue, as well as onto the underlying rib and inter-costal muscles. Pleural fluid was aspirated to assure proper location and additional lidocaine was injected into the pleural space prior to removing the anesthesia needle. A 3mm incision was then made with the supplied scalpel in the usual fashion to facilitate the insertion of the thoracentesis needle. The needle with an 8 Azeri thoracentesis catheter was then introduced into the chest through the previously made incision in the usual fashion, the rib localized with the needle, and the catheter then marched over the rib into the pleural space. After aspirating fluid, the thoracentesis catheter was then placed into the chest using the needle itself as a trocar. The needle was then removed and the catheter was attached to the supplied tubing without complication. 1200 cc of serosanginous fluid was aspirated and sent for analysis. Fluid was sent for the following tests: LDH Total Protein Glucose Cell count with differential 
Routine culture and Gram stain Cytology Post procedure US confirmed incomplete drainage of the effusion and presence of lung sliding, ruling out pneumothorax. (16500) EBL:  
 
2cc COMPLICATIONS: 
 
None severe, but drainage was limited by symptoms. Moderate chest pain with removal of fluid. Stopped at 1200cc removed with small effusion remaining so as not to worsen symptoms. Symptoms resolved after just a couple minutes with stopping additional fluid drainage.   
 
 
Juli Rocha MD

## 2019-02-19 NOTE — PROGRESS NOTES
Pt sat up on side of bed for thoracentesis. Consent obtained. Time out performed. Pts vitals monitored throughout procedure. Bilateral ultrasound done and pic taken of pleural fluid.  ~1200 ml blood tinged pleural fluid from R.  Pt tolerated procedure well with no adverse rxn. Specimens sent to the lab x 3 and labeled appropriately. Site dressed appropriately and discharge instructions reviewed with pt and family. R Lung sliding done and ultrasound findings reviewed by MD. Pts IV d/c'ed and dressed appropriately. Pt given written discharge instructions including , potential side effects and physical changes to be aware of, and physician contact number. Pt d/c'ed via WC to 's care. Pt and family verbalized understanding of discharge instructions. MD spoke with pts family.

## 2019-02-19 NOTE — INTERVAL H&P NOTE
H&P Update: 
Katia Garland was seen and examined. History and physical has been reviewed. The patient has been examined.  There have been no significant clinical changes since the completion of the originally dated History and Physical. 
 
Signed By: Vesta Jiménez MD   
 February 19, 2019 2:03 PM

## 2019-02-27 NOTE — PROGRESS NOTES
Please let the patient know that there were a few atypical cells on the pleural fluid, but nothing that was clearly cancer. I see her PET CT is pending, but not read yet. Looks like we have her planned for follow up 3/21. If she has worsening shortness of breath before then it is possible that the fluid could return (hopefully not) and we could reattempt drainage. Sometimes we get additional answers if we drain the fluid 2 or even a 3rd time. Thanks, Sine

## 2019-03-12 NOTE — PROGRESS NOTES
Arrived to the [facility]. Faslodex completed. Patient tolerated well. Any issues or concerns during appointment: none. Patient aware of next infusion appointment on 4/9/19 at 2pm.  Discharged ambulatory.

## 2019-03-12 NOTE — PROGRESS NOTES
3/12/2019 Saw the patient with Padmini Carlos NP. She continues to run fever off .1 nightly and is still quite short of breath. Her cough has improved. She will follow up with [facility] next week. She is still fatigued. She denies any nausea/vomiting. She is in the middle of her Ibrance cycle. We will have her return for CBC prior to start of next cycle and then see Dr Benoit Ramirez prior to cycle after that. Will continue to follow.

## 2019-03-21 PROBLEM — J45.20 MILD INTERMITTENT ASTHMA WITHOUT COMPLICATION: Chronic | Status: RESOLVED | Noted: 2017-11-16 | Resolved: 2019-01-01

## 2019-03-21 NOTE — H&P (VIEW-ONLY)
[address] [facility] [address], [facility] 
[phone] Patient Name:  Bee Servin YOB: 1950 Office Visit 3/21/2019 CHIEF COMPLAINT:   
Chief Complaint Patient [facility] with  Asthma  Pleural Effusion  Follow-up HISTORY OF PRESENT ILLNESS:   
I had the pleasure of seeing Ms. Jaiden Gutierrez in our clinic today. I met Ms. Radhika Linda last month for pleural effusion which we drained to near resolution, but was limited by mild chest pain. The pleural fluid was not though did have some atypical cells and remained concerning to me for spread of known breast cancer. She has been started on chemotherapy per oncology and her PET/CT follow-up has been delayed to likely June. I scheduled her for this follow-up so that we could repeat her x-ray and determine if fluid was returning and a repeat thoracentesis would be possible for further diagnostic and therapeutic purposes. However, she has also noted worsening shortness of breath over the last week although she believes it is not near as bad as it was 1 month ago when her pleural effusion was initially diagnosed and then drained. She has noted some night sweats with low-grade temperatures of .1. She has had shortness of breath for the past week, but denies cough or sputum production. She continues on Advair daily and has used albuterol about 3 times over the last 2 weeks and has had no recent wheezing or chest tightness. She does feel more full and believes that the fluid is back. Past Medical History:  
Diagnosis Date  Abnormal CT of the chest   
 Adverse effect of anesthesia   
 bp dropped with dual lung/gallbladder surgery  Asthma    
 no acute attacks recently. uses advair daily  Breast cancer ([facility] [address] 2016 R breast--infiltrating ductal ca,  ER/SD positive and HER2 negative, s/p mastectomy, chemo, reconstruction, on chronic Ibrance therapy  Depression  Hypercholesteremia Controlled with diet  Hypothyroidism  Ill-defined condition   
 granular lung disease resolved 2002 Past Surgical History:  
Procedure Laterality Date  CHEST SURGERY PROCEDURE UNLISTED    
 R lung biopsy  HX BLADDER REPAIR    
 HX BREAST AUGMENTATION Right 10/20/2017 BREAST IMPLANT REMOVAL  performed by Eric Mcfadden MD at [address] [place] [address] HX BREAST BIOPSY Right 2/5/2016 SKIN BIOPSY RIGHT BREAST performed by Rach Espinosa MD at [facility] MAIN OR  
 HX BREAST RECONSTRUCTION Bilateral 9/14/2016 BILATERAL BREAST RECONSTRUCTION performed by Eric Mcfadden MD at [facility] MAIN OR  
 HX BREAST RECONSTRUCTION Bilateral 9/14/2016 BILATERAL BREAST TISSUE EXPANDER INSERTION performed by Eric Mcfadden MD at [facility] MAIN OR  
 HX BREAST RECONSTRUCTION Right 9/18/2017 RIGHT BREAST RECONSTRUCTION performed by Eric Mcfadden MD at [address] [place] [address] HX BREAST RECONSTRUCTION Right 9/18/2017 Right  PLACEMENT OF PERMANENT IMPLANTS performed by Eric Mcfadden MD at [address] [place] [address] HX BREAST REDUCTION Left 9/18/2017 LEFT BREAST MASTOPEXY FOR SYMMETRY performed by Eric Mcfadden MD at [address] [place] [address] HX COLONOSCOPY    
 2006  HX CYST INCISION AND DRAINAGE Right 12/15/2017 INCISION AND DRAINAGE BREAST  performed by Eric Mcfadden MD at [address] [place] [address] HX CYSTOCELE REPAIR    
 HX HYSTERECTOMY    
 fibroids  HX LAP CHOLECYSTECTOMY  2000  HX MASTECTOMY Right  HX RECTOCELE REPAIR    
 HX VASCULAR ACCESS    
 port left chest wall No flowsheet data found. Social History Socioeconomic History  Marital status:  Spouse name: Not on file  Number of children: Not on file  Years of education: Not on file  Highest education level: Not on file Occupational History  Not on file Social Needs  Financial resource strain: Not on file  Food insecurity:  
  Worry: Not on file Inability: Not on file  Transportation needs:  
  Medical: Not on file Non-medical: Not on file Tobacco Use  Smoking status: Never Smoker  Smokeless tobacco: Never Used Substance and Sexual Activity  Alcohol use: No  
  Alcohol/week: 0.0 oz  Drug use: No  
 Sexual activity: Not on file Lifestyle  Physical activity:  
  Days per week: Not on file Minutes per session: Not on file  Stress: Not on file Relationships  Social connections:  
  Talks on phone: Not on file Gets together: Not on file Attends Yarsanism service: Not on file Active member of club or organization: Not on file Attends meetings of clubs or organizations: Not on file Relationship status: Not on file  Intimate partner violence:  
  Fear of current or ex partner: Not on file Emotionally abused: Not on file Physically abused: Not on file Forced sexual activity: Not on file Other Topics Concern  Not on file Social History Narrative  Not on file Exposure History: 
Second Hand Smoke Exposure: NO Birds: NO 
Asbestos: NO 
TB: NO Hot Tubs/Humidifier: NO Organic/Inorganic Dusts: NO 
Molds: NO Occupation/Hobbies: Retired Family History Problem Relation Age of Onset  Hypertension Mother Bryn Mean Mother 79 Breast cancer at age 79  Diabetes Mother Type 2  
 Other Father   
     alzheimers  Cancer Maternal Aunt Breast cancer after age 72  Cancer Maternal Aunt Breast cancer at age 39  Allergic Rhinitis Neg Hx  Allergy-severe Neg Hx  Amblyopia Neg Hx  Anemia Neg Hx  Anesth Problems Neg Hx  Angioedema Neg Hx  Anxiety Neg Hx  Arrhythmia Neg Hx  Arthritis-rheumatoid Neg Hx  Ataxia Neg Hx  Atopy Neg Hx  Bipolar Disorder Neg Hx  Blindness Neg Hx  Breast Cancer Neg Hx  Breast Problems Neg Hx  Broken Bones Neg Hx  Cataract Neg Hx  Celiac Disease Neg Hx  Childhood heart surgery Neg Hx  Childhood resp disease Neg Hx  Chorea Neg Hx  Clotting Disorder Neg Hx  Collagen Dis Neg Hx  Colon Cancer Neg Hx  Colon Polyps Neg Hx  COPD Neg Hx  Coronary Artery Disease Neg Hx  Crohn's Disease Neg Hx  Cystic Fibrosis Neg Hx  Delayed Awakening Neg Hx  Dementia Neg Hx  Depression Neg Hx  Dislocations Neg Hx  Downs Syndrome Neg Hx  Drug Abuse Neg Hx  Eclampsia Neg Hx  Eczema Neg Hx  Emergence Delirium Neg Hx  Emphysema Neg Hx  Fainting Neg Hx  Genitourinary () Neg Hx  Glaucoma Neg Hx   
 Heart Attack Neg Hx   
 Heart defect Neg Hx   
 Heart Failure Neg Hx   
 Heart Surgery Neg Hx  Hemachromatosis Neg Hx  Herpes Neg Hx  High Cholesterol Neg Hx   
 HIV/AIDS Neg Hx  Immunodeficiency Neg Hx  Infertility Neg Hx  Inflammatory Bowel Dz Neg Hx  Kidney Disease Neg Hx  Liver Disease Neg Hx  Macular Degen Neg Hx  Malignant Hyperthermia Neg Hx  MS Neg Hx  Neuropathy Neg Hx  NF Neg Hx  Osteoporosis Neg Hx   
 Ovarian Cancer Neg Hx  Pacemaker Neg Hx  Panic disorder Neg Hx  Parkinsonism Neg Hx  Post-op Cognitive Dysfunction Neg Hx  Post-op Nausea/Vomiting Neg Hx  Prematurity Neg Hx   Labor Neg Hx  Pseudocholinesterase Deficiency Neg Hx  Psoriasis Neg Hx  Psychotic Disorder Neg Hx  Rashes/Skin Problems Neg Hx  Retinal Detachment Neg Hx  Rh Incompatibility Neg Hx  Schizophrenia Neg Hx  Seizures Neg Hx  Severe Sprains Neg Hx  Sickle Cell Anemia Neg Hx  Sickle Cell Trait Neg Hx  SIDS Neg Hx  SLE Neg Hx  Spont Ab Neg Hx  Stomach Cancer Neg Hx  Strabismus Neg Hx  Substance Abuse Neg Hx  Sudden Death Neg Hx  Suicide Neg Hx  Thyroid Disease Neg Hx  Tuberculosis Neg Hx  Ulcerative Colitis Neg Hx  Urticaria Neg Hx  Lang's Disease Neg Hx Allergies Allergen Reactions  Prednisone Unknown (comments) \"psychotic reaction\"  To oral and parenteral steroids Current Outpatient Medications Medication Sig  palbociclib (IBRANCE) 75 mg cap Take 1 Cap by mouth daily. 1 cap by mouth daily for 21 days, then off for 7 days.  albuterol (PROVENTIL HFA, VENTOLIN HFA, PROAIR HFA) 90 mcg/actuation inhaler 2 puffs q6h prn wheezing. May use brand per formulary  varicella-zoster recombinant, PF, (SHINGRIX, PF,) 50 mcg/0.5 mL susr injection Give in 2 doses, 2-6 months apart    DX Z23  levothyroxine (SYNTHROID) 75 mcg tablet CANCEL THIS SCRIPT  1 every day for thyroid  ALPRAZolam (XANAX) 0.5 mg tablet 1 bid-tid prn anxiety  DULoxetine (CYMBALTA) 60 mg capsule 1 every day for depression  fluticasone-salmeterol (ADVAIR DISKUS) 250-50 mcg/dose diskus inhaler 1 inhalation bid for asthma control  heparin, porcine, pf (HEPARIN LOCKFLUSH,PORCINE,,PF,) 100 unit/mL injection Access port, flush with 20ml Normal Saline, 500 units of Heparin Flush and de access port every 4 weeks. No current facility-administered medications for this visit. Review of Systems Constitutional: Positive for fever. Negative for chills, diaphoresis, malaise/fatigue and weight loss. Cardiovascular: Positive for chest pain. Negative for palpitations, claudication, leg swelling and PND. Gastrointestinal: Positive for heartburn. Negative for abdominal pain, constipation, diarrhea, nausea and vomiting. Neurological: Negative for dizziness, tremors, seizures, weakness and headaches. DIAGNOSTIC TESTS: 
CT of chest:  10/18:  
 
Results from Hospital Encounter encounter on 02/07/18 CT CHEST W CONT Impression IMPRESSION: 
1. Resolution of the previous right chest wall abscess with now only soft tissue 
inflammation in keeping with the patient's history of cellulitis noted along the 
right anterior chest wall. 2. Interstitial thickening and small airways type nodularity primarily in a 
subpleural lung distribution involving the bilateral upper lobes with a 
characteristic 5 mm pulmonary nodule noted in the left upper lobe. Could 
consider follow-up CT imaging in three months to reevaluate. 3. The previous 11 mm pulmonary nodule in the right upper lobe is no longer 
appreciated. No results found for this or any previous visit. Results for orders placed during the hospital encounter of 02/07/18 CT CHEST W CONT Narrative CT of the chest with contrast. 
 
CLINICAL INDICATION: Cellulitis of the chest wall, prior breast reconstruction 
surgery PROCEDURE: Serial thin section axial images obtained from the thoracic inlet 
through the upper abdomen following the administration of 80 cc of Optiray-350 
intravenous contrast.  Radiation dose reduction techniques were used for this 
study. Our CT scanners use one or all of the following: Automated exposure 
control, adjusted of the mA and/or kV according to patient size, iterative 
reconstruction COMPARISON: Similar chest CT dated 12/13/2017 FINDINGS: Soft tissue inflammation along the right anterior chest wall. The 
previous right chest wall abscess is no longer apparent. No mediastinal or axillary adenopathy evident. The great vessels are 
unremarkable. No pleural or pericardial effusion noted. There is mild 
interstitial thickening in the subpleural lungs with bronchiectasis. No 
suspicious pulmonary nodules appreciated. No dense airspace consolidation 
identified. The previous 11 mm pulmonary nodule in the right upper lobe is no 
longer appreciated. Bilateral scattered small pulmonary nodules measuring from 2 
to 4 mm appreciated. There is a characteristic pulmonary nodule in the left upper lobe peripherally 
that measures 5 mm on image 22. These nodules have an infectious or inflammatory 
appearance. Limited evaluation of the upper abdomen is unremarkable. No aggressive osseous lesions identified. Impression IMPRESSION: 
1. Resolution of the previous right chest wall abscess with now only soft tissue 
inflammation in keeping with the patient's history of cellulitis noted along the 
right anterior chest wall. 2. Interstitial thickening and small airways type nodularity primarily in a 
subpleural lung distribution involving the bilateral upper lobes with a 
characteristic 5 mm pulmonary nodule noted in the left upper lobe. Could 
consider follow-up CT imaging in three months to reevaluate. 3. The previous 11 mm pulmonary nodule in the right upper lobe is no longer 
appreciated. No results found for this or any previous visit. CXR:  03/21/2019: return of moderate to large right pleural effusion Results for orders placed during the hospital encounter of 12/11/17 XR CHEST PA LAT Impression IMPRESSION: No new confluent infiltrate that suggests a pneumonia. Chronic post 
inflammatory scarring persists at the bases. Otherwise stable chest as above. PET/CT:  
Results from Hospital Encounter encounter on 10/18/18 PET/CT TUMOR IMAGE SKULL THIGH (SUB) Impression IMPRESSION:  
1. In the chest right chest wall/breast treatment changes are similar. The 
findings elsewhere in the chest are improved-The patchy pneumonitis findings 
that were clearly FDG avid are either stable or less pronounced and without 
significant increased FDG uptake now. The chest adenopathy appears smaller and less FDG avid as well. 2. No evidence for disease progression outside of the chest. Tracer activity at 
the central bony skeleton marrow normalized now. Results for orders placed during the hospital encounter of 02/04/16 PET/CT TUMOR IMAGE SKULL THIGH W (INI) Narrative NUCLEAR MEDICINE WHOLE BODY CT / PET- FDG Study. INDICATION: Malignant neoplasm of the right breast, extensive infiltrating 
ductal carcinoma, initial staging exam. 
 
COMPARISONS: None. TECHNIQUE:   Radiopharmaceutical: 17.1 mCi F18-FDG IV. This is a whole-body PET- FDG study performed on a dedicated full- ring scanner. Low dose, 
nondiagnostic CT axial source images are also acquired for PET attenuation 
correction and are utilized for PET-CT fusion. The patient underwent adequate 
fasting of at least 4 hours. Blood glucose at the time of tracer administration 
is 88 mg/dl, which is adequate for imaging. Approximately 90 minutes after 
administration of the radiopharmaceutical, imaging was initiated covering the 
skull to the thighs. FINDINGS:   
HEAD AND NECK: No abnormal hypermetabolic activity. Normal physiologic activity 
in the brain and muscular activity in the tongue and symmetric activity in the 
larynx, probably from combination. CHEST: Small hypermetabolic right axillary lymph nodes are present. Ill-defined 
stranding mass in the right breast is hypermetabolic. This extends to the 
subareolar focus. There are hypermetabolic subpectoral nodes. The larger mass SUV max is 6.5. There is a hypermetabolic right paratracheal lymph node. No 
hypermetabolic pulmonary nodules. There is some scarring and micronodules in the 
lungs which are not hypermetabolic. ABDOMEN: Normal physiologic activity in the GI tract and  tract. No abnormal 
hypermetabolic activity. Cholecystectomy clips are present. PELVIS: No hypermetabolic adenopathy. No hypermetabolic bony lesions; mild DJD. Patient is status post hysterectomy. Impression IMPRESSION: Hypermetabolic right breast mass which includes a hypermetabolic 
subareolar focus, hypermetabolic right axillary and subpectoral lymph nodes. There is a hypermetabolic paratracheal node on the right. No hypermetabolic 
disease outside the chest. 
  
 
ASSESSMENT:  (Medical Decision Making) Diagnoses and all orders for this visit: 1. Pleural effusion 
-     SCHEDULE PROCEDURE 2. Pleural effusion, right Assessment & Plan: Suspect this is malignancy related, however initial thoracentesis was nondiagnostic. Will plan for repeat thoracentesis tomorrow for diagnostic and therapeutic purposes. Will expand with pleural fluid evaluation including cultures, flow, cytology and chylous effusion. 3. Mild persistent asthma without complication Assessment & Plan: 
Well-controlled on current daily Advair and as needed albuterol. Well-controlled by symptom report and ACT score. Continue current therapy. 4. Malignant neoplasm of breast in female, estrogen receptor positive, unspecified laterality, unspecified site of breast ([facility] [address] Assessment & Plan: Suspect pleural effusion is secondary to advanced breast cancer, but as yet unproven. PET CT has been delayed per oncology's request and is now likely for after May. Key Oncology Meds   
    
  
 palbociclib (IBRANCE) 75 mg cap (Taking) Take 1 Cap by mouth daily. 1 cap by mouth daily for 21 days, then off for 7 days. Key Pain Meds The patient is on no pain meds. Lab Results Component Value Date/Time WBC 6.6 03/12/2019 12:07 PM  
 ABS. NEUTROPHILS 5.6 03/12/2019 12:07 PM  
 HGB 11.8 03/12/2019 12:07 PM  
 HCT 35.2 03/12/2019 12:07 PM  
 PLATELET 831 65/78/9832 12:07 PM  
 Creatinine 0.96 03/12/2019 12:07 PM  
 Creatinine (POC) 0.9 02/07/2018 11:33 AM  
 BUN 14 03/12/2019 12:07 PM  
 ALT (SGPT) 20 03/12/2019 12:07 PM  
 AST (SGOT) 22 03/12/2019 12:07 PM  
 Albumin 2.9 03/12/2019 12:07 PM  
 
 
 
 
PLAN: 
See above Orders Placed This Encounter  SCHEDULE PROCEDURE Thoracentesis with Sine tomorrow or Wednesday, Thursday or Friday of next week Radha Pendleton MD 
Electronically signed

## 2019-03-22 NOTE — PROCEDURES
PROCEDURE: 
DIAGNOSTIC THORACENTESIS, THERAPEUTIC THORACENTESIS  
 
 
PRE-OP DIAGNOSIS: 
Right PLEURAL EFFUSION POST-OP DIAGNOSIS: 
Right PLEURAL EFFUSION 
 
VOLUME REMOVED: 
 
600cc ANESTHESIA: 
 
LOCAL ANESTHESIA WITH 1% LIDOCAINE 10 CC TOTAL. CHEST ULTRASOUND FINDINGS: 
 
A Turbo-M, Sonosite ultrasound with a 5-16 mHz probe was used to image the chest and localize the pleural effusion on the right chest. 
 
A moderate anechoic space was seen on the right consistent with an uncomplicated pleural effusion. DESCRIPTION OF PROCEDURE: 
 
After obtaining informed consent and localizing the safest location for thoracentesis, the  8th  intercostal space was marked with a blunt, plastic needle cap in the mid scapular line. An [employer] AK-0100 Pleral-Seal thoracentesis kit was used to perform the procedure. The skin was cleansed with the supplied chlorhexidine swab and then draped in the usual fashion. Using the previously marked location as a guide, a 22 G 1.5 inch needle was used to inject 1% lidocaine into the skin and subcutaneous tissue, as well as onto the underlying rib and inter-costal muscles. Pleural fluid was aspirated to assure proper location and additional lidocaine was injected into the pleural space prior to removing the anesthesia needle. A 3mm incision was then made with the supplied scalpel in the usual fashion to facilitate the insertion of the thoracentesis needle. The needle with an 8 Kinyarwanda thoracentesis catheter was then introduced into the chest through the previously made incision in the usual fashion, the rib localized with the needle, and the catheter then marched over the rib into the pleural space. After aspirating fluid, the thoracentesis catheter was then placed into the chest using the needle itself as a trocar. The needle was then removed and the catheter was attached to the supplied tubing without complication. 600 cc of shay fluid was aspirated and sent for analysis. Fluid was sent for the following tests: LDH Total Protein Glucose Cell count with differential 
Routine culture and Gram stain Cytology AFB Fungus Chylomicrons Triglycerides Cholesterol Post procedure US confirmed incomplete drainage of the small remaining effusion and presence of lung sliding, ruling out pneumothorax. ([id number]) EBL:  
 
2cc COMPLICATIONS: 
 
None, limited by pain secondary to entrapped lung physiology Reba Duvall MD

## 2019-03-22 NOTE — PROGRESS NOTES
Pt sat up on side of bed for thoracentesis. Consent obtained. Time out performed. Pts vitals monitored throughout procedure. Right ultrasound done and pic taken of pleural fluid.  ~600 ml serosanguinous pleural fluid from right. Pt tolerated procedure well with no adverse rxn. Specimens sent to the lab x 3 and labeled appropriately. Site dressed appropriately. Lung sliding done and ultrasound findings reviewed by MD. Discharge instructions reviewed and pt discharged into spouse's care via wheel chair by Liza RRT/RCP.

## 2019-03-22 NOTE — DISCHARGE INSTRUCTIONS
Patient Education        Thoracentesis: What to Expect at Home  Your Recovery  Thoracentesis (say \"fuqt-bq-xxy-MADELIN-sis\") is a procedure to remove fluid from the space between the lungs and the chest wall (pleural space). This procedure may also be called a \"chest tap. \" It is normal to have a small amount of fluid in the pleural space. But too much fluid can build up because of problems such as infection, heart failure, or lung cancer. The procedure may have been done to help with shortness of breath and pain caused by the fluid buildup. Or you may have had this procedure so the doctor could test the fluid to find the cause of the buildup. Your chest may be sore where the doctor put the needle or catheter into your skin (the puncture site). This usually gets better after a day or two. You can go back to work or your normal activities as soon as you feel up to it. If the doctor sent the fluid to a lab for testing, it may take several days to get the results. The doctor or nurse will discuss the results with you. This care sheet gives you a general idea about how long it will take for you to recover. But each person recovers at a different pace. Follow the steps below to feel better as quickly as possible. How can you care for yourself at home? Activity    · Rest when you feel tired. Getting enough sleep will help you recover.     · Avoid strenuous activities, such as bicycle riding, jogging, weight lifting, or aerobic exercise, until your doctor says it is okay.     · You may shower. Do not take a bath until the puncture site has healed, or until your doctor tells you it is okay.     · Ask your doctor when you can drive again.     · You may need to take 1 or 2 days off from work. It depends on the type of work you do and how you feel. Diet    · You can eat your normal diet.     · Drink plenty of fluids (unless your doctor tells you not to).    Medicines    · Your doctor will tell you if and when you can restart your medicines. He or she will also give you instructions about taking any new medicines.     · If you take blood thinners, such as warfarin (Coumadin), clopidogrel (Plavix), or aspirin, be sure to talk to your doctor. He or she will tell you if and when to start taking those medicines again. Make sure that you understand exactly what your doctor wants you to do.     · Be safe with medicines. Take pain medicines exactly as directed. ? If the doctor gave you a prescription medicine for pain, take it as prescribed. ? If you are not taking a prescription pain medicine, ask your doctor if you can take an over-the-counter medicine. ? Do not take two or more pain medicines at the same time unless the doctor told you to. Many pain medicines have acetaminophen, which is Tylenol. Too much acetaminophen (Tylenol) can be harmful.     · If you think your pain medicine is making you sick to your stomach:  ? Take your medicine after meals (unless your doctor has told you not to). ? Ask your doctor for a different pain medicine.     · If your doctor prescribed antibiotics, take them as directed. Do not stop taking them just because you feel better. You need to take the full course of antibiotics.    Care of the puncture site    · Wash the area daily with warm, soapy water, and pat it dry. Don't use hydrogen peroxide or alcohol, which may delay healing. You may cover the area with a gauze bandage if it weeps or rubs against clothing. Change the bandage every day.     · Keep the area clean and dry. Follow-up care is a key part of your treatment and safety. Be sure to make and go to all appointments, and call your doctor if you are having problems. It's also a good idea to know your test results and keep a list of the medicines you take. Try and keep site dry for next 24 hours.  Keep all follow-up appointments and if you become short of breath before your next visit [facility] please call [phone] for possible thoracentesis. When should you call for help? Call 911 anytime you think you may need emergency care. For example, call if:    · You passed out (lost consciousness).     · You have severe trouble breathing.     · You have sudden chest pain and shortness of breath, or you cough up blood.    Call your doctor now or seek immediate medical care if:    · You have shortness of breath that is new or getting worse.     · You have new or worse pain in your chest, especially when you take a deep breath.     · You are sick to your stomach or cannot keep fluids down.     · You have a fever over 100°F.     · Bright red blood has soaked through the bandage over your puncture site.     · You have signs of infection, such as:  ? Increased pain, swelling, warmth, or redness. ? Red streaks leading from the puncture site. ? Pus draining from the puncture site. ? Swollen lymph nodes in your neck, armpits, or groin. ? A fever.     · You cough up a lot more mucus than normal, or your mucus changes color.    Watch closely for changes in your health, and be sure to contact your doctor if you have any problems. Where can you learn more? Go to http://edyta-tremaine.info/. Enter Z406 in the search box to learn more about \"Thoracentesis: What to Expect at Home. \"  Current as of: September 5, 2018  Content Version: 11.9  © 1698-2913 [employer]. Care instructions adapted under license by [employer] (which disclaims liability or warranty for this information). If you have questions about a medical condition or this instruction, always ask your healthcare professional. [place] [address] any warranty or liability for your use of this information.

## 2019-03-22 NOTE — INTERVAL H&P NOTE
H&P Update: 
Leesa Nelson was seen and examined. History and physical has been reviewed. The patient has been examined.  There have been no significant clinical changes since the completion of the originally dated History and Physical. 
 
Signed By: Willy Jones MD   
 March 22, 2019 11:36 AM

## 2019-03-26 NOTE — PROGRESS NOTES
Pleural fluid is thus far negative with just some cultures that will be watched for a few more weeks still pending. Cytology for cancer was negative. We could follow up in 1 month with a chest Xray and repeat thoracentesis if reaccumulation at that time (or sooner if shortness of breath worsens.) Alternative option would be for surgical procedure to biopsy the lining of the lung as we discussed. Thanks, Sine

## 2019-04-09 NOTE — PROGRESS NOTES
Arrived to infusion. Faslodex completed in 2 injections. Denies new concerns. Next appointment is 5/7/19 at 3pm. \  Discharged ambulatory with self.

## 2019-04-09 NOTE — PROGRESS NOTES
4/9/2019 Saw the patient with Dr Jeramy Spring. She has been seeing pulmonary regarding her recurrent effusion. This has been drained a couple of times and they are willing to drain one more time and then surgery would be the next option. She will continue on Ibrance 75 mg and Faslodex. Will continue to follow.

## 2019-04-11 NOTE — PROCEDURES
INDICATION/ Site:    PLEURAL EFFUSION --- right:      Patient identification: done by Dr. Funmi Deras. All agreed    ANESTHESIA:    LOCAL ANESTHESIA WITH 1% LIDOCAINE 10CC TOTAL. CHEST ULTRASOUND FINDINGS:    A Turbo-M, Sonosite ultrasound with a 5-16 mHz probe was used to image the chest and localize the pleural effusion on the right  chest.    A moderate anechoic space was seen on the right consistent with an uncomplicated pleural effusion. See image for review. DESCRIPTION OF PROCEDURE:    After obtaining informed consent and localizing the safest location for thoracentesis, the  space was marked with a blunt, plastic needle cap in the mid scapular line. An [employer] AK-0100 Pleral-Seal thoracentesis kit was used to perform the procedure. The skin was  cleansed with the supplied  chlorhexididne swab and then draped in the usual fasion. Using the previously marked location as a giude, a 22 G 1.5 inch needle was used to inject 10 cc of 1% lidocaine into the skin and subcutaneous tissue, as well as onto the underlying rib and inter-costal muscles, pleural fluid was aspirated to assure proper location, prior to removing the anesthesia needle. A 3mm  incision was then made, with the supplied scalpel in the usual fashion to facilitate the insertiopn of the thoracentesis needle. The needle with an 8French thoracentesis catheter was then introduced into the chest through the previously made incision in the usual fashio,. The rib licalized with the needle, and the catheter then marched over the rib into the pleural space. After aspirating fluid, the thoracentesis catheter was then placed into the chest using the needle itself as a trocar. The needle was then removed and the catheter was attached to the supplied tubing without complication. A total of 600 cc of shay  fluid was removed without complication. Follow up U/S was performed and revealed no residual effusion.     The patient was stable post procedure.      EBL : 12 ml    STUDIES ORDERED:    Cytology    Krzysztof Harrison MD

## 2019-04-11 NOTE — PROGRESS NOTES
Pt sat up on side of bed for thoracentesis. Consent obtained. Time out performed. Pts vitals monitored throughout procedure. Rightt ultrasound done and pic taken of pleural fluid.  ~600 ml dark yellow pleural fluid from right. Pt tolerated procedure well with no adverse rxn. Specimens sent to the lab x 1 (cytology only) and labeled appropriately. Site dressed appropriately.    Lung sliding done and ultrasound findings reviewed by MD.      Discharge instructions reviewed with pt and spouse and pt discharged into 's care via transport chair out of department by Liza RRT/RCP

## 2019-04-11 NOTE — H&P (VIEW-ONLY)
[facility]: FOLLOW-UP Patient Office Visit Note 
[address] [address] [address] 
[phone] Patient Name:  Dallin Amanda YOB: 1950 Date of Service:  4/11/2019 Chief Complaint Patient [facility] with  Pleural Effusion History of Present Illness: This is a 68-year-old white female with a history of stage IV breast cancer diagnosed initially in 2016 on chemotherapy per Dr. Monalisa Schroeder. She has had thoracentesis due to a right pleural effusion on 2 occasions with the initial thoracentesis done in February and then repeated again in March. 600 cc was removed from her last thoracentesis and fluid was noted to be an exudate. On both occasions pleural fluid cytology has been negative for malignancy although the first thoracentesis did reveal some atypical cells. Over the past 1-2 weeks she has noted increasing dyspnea and was recently seen by Dr. Monalisa Schroeder our office was contacted and she has come in today with a chest x-ray. Past Medical History:  
Diagnosis Date  Abnormal CT of the chest   
 Adverse effect of anesthesia   
 bp dropped with dual lung/gallbladder surgery  Asthma    
 no acute attacks recently. uses advair daily  Breast cancer ([facility] [facility] 2016 R breast--infiltrating ductal ca,  ER/NC positive and HER2 negative, s/p mastectomy, chemo, reconstruction, on chronic Ibrance therapy  Depression  Hypercholesteremia Controlled with diet  Hypothyroidism  Ill-defined condition   
 granular lung disease resolved 2002 Problem List  Date Reviewed: 4/11/2019 Codes Class Noted Pleural effusion, right ICD-10-CM: J90 ICD-9-CM: 511.9  2/19/2019 Age-related osteoporosis without current pathological fracture ICD-10-CM: M81.0 ICD-9-CM: 733.01  12/17/2018 Overview Signed 12/17/2018  7:31 AM by Abhishek Ca MD  
  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 
ICD-9-CM: 296.35  11/16/2017 S/P breast reconstruction, right ICD-10-CM: F20.698 ICD-9-CM: V43.82  9/19/2017 Breast cancer ([facility] (Chronic) ICD-10-CM: C50.919 ICD-9-CM: 174.9  9/18/2017 Overview Signed 11/15/2018 10:05 AM by Ayala Escobar MD  
  2016: R breast--infiltrating ductal ca,  ER/MI positive and HER2 negative, s/p mastectomy, chemo, reconstruction, on chronic Ibrance therapy Lump of skin of right upper extremity ICD-10-CM: R22.31 
ICD-9-CM: 782.2  6/13/2017 Malignant neoplasm of upper-inner quadrant of right female breast ([facility] ICD-10-CM: K84.805 ICD-9-CM: 174.2  6/12/2017 Tachycardia ICD-10-CM: R00.0 ICD-9-CM: 785.0  4/7/2017 Pancytopenia due to chemotherapy [facility]) ICD-10-CM: U11.303 ICD-9-CM: 284.11  4/7/2017 Status post right mastectomy ICD-10-CM: Z90.11 ICD-9-CM: V45.71  9/14/2016 Mediastinal adenopathy ICD-10-CM: R59.0 ICD-9-CM: 785.6  2/18/2016 Malignant neoplasm of right female breast ([facility] (Chronic) ICD-10-CM: C50.911 ICD-9-CM: 174.9  1/25/2016 Overview Addendum 11/15/2018 10:05 AM by Ayala Escobar MD  
  2016: R breast--infiltrating ductal ca,  ER/MI positive and HER2 negative, metastatic to nodes, s/p mastectomy, chemo, reconstruction, on chronic Ibrance therapy Acquired hypothyroidism (Chronic) ICD-10-CM: E03.9 ICD-9-CM: 244.9  11/4/2015 Mild persistent asthma without complication UIP-33-KL: O25.58 ICD-9-CM: 493.90  11/4/2015 Depression (Chronic) ICD-10-CM: F32.9 ICD-9-CM: 850  11/4/2015 Past Surgical History:  
Procedure Laterality Date  CHEST SURGERY PROCEDURE UNLISTED    
 R lung biopsy  HX BLADDER REPAIR    
 HX BREAST AUGMENTATION Right 10/20/2017 BREAST IMPLANT REMOVAL  performed by Alejandra Murray MD at [address] [place] [address] HX BREAST BIOPSY Right 2/5/2016 SKIN BIOPSY RIGHT BREAST performed by Elisa Gifford MD at [facility] MAIN OR  
 HX BREAST RECONSTRUCTION Bilateral 9/14/2016 BILATERAL BREAST RECONSTRUCTION performed by Alejandra Murray MD at [facility] MAIN OR  
 HX BREAST RECONSTRUCTION Bilateral 9/14/2016 BILATERAL BREAST TISSUE EXPANDER INSERTION performed by Alejandra Murray MD at [facility] MAIN OR  
 HX BREAST RECONSTRUCTION Right 9/18/2017 RIGHT BREAST RECONSTRUCTION performed by Alejandra Murray MD at [address] [place] [address] HX BREAST RECONSTRUCTION Right 9/18/2017 Right  PLACEMENT OF PERMANENT IMPLANTS performed by Alejandra Murray MD at [address] [place] [address] HX BREAST REDUCTION Left 9/18/2017 LEFT BREAST MASTOPEXY FOR SYMMETRY performed by Alejandra Murray MD at [address] [place] [address] HX COLONOSCOPY    
 2006  HX CYST INCISION AND DRAINAGE Right 12/15/2017 INCISION AND DRAINAGE BREAST  performed by Alejandra Murray MD at [address] [place] [address] HX CYSTOCELE REPAIR    
 HX HYSTERECTOMY    
 fibroids  HX LAP CHOLECYSTECTOMY  2000  HX MASTECTOMY Right  HX RECTOCELE REPAIR    
 HX VASCULAR ACCESS    
 port left chest wall Social History Socioeconomic History  Marital status:  Spouse name: Not on file  Number of children: Not on file  Years of education: Not on file  Highest education level: Not on file Occupational History  Not on file Social Needs  Financial resource strain: Not on file  Food insecurity:  
  Worry: Not on file Inability: Not on file  Transportation needs:  
  Medical: Not on file Non-medical: Not on file Tobacco Use  Smoking status: Never Smoker  Smokeless tobacco: Never Used Substance and Sexual Activity  Alcohol use: No  
  Alcohol/week: 0.0 oz  Drug use: No  
 Sexual activity: Not on file Lifestyle  Physical activity:  
  Days per week: Not on file Minutes per session: Not on file  Stress: Not on file Relationships  Social connections:  
  Talks on phone: Not on file Gets together: Not on file Attends [employer] service: Not on file Active member of club or organization: Not on file Attends meetings of clubs or organizations: Not on file Relationship status: Not on file  Intimate partner violence:  
  Fear of current or ex partner: Not on file Emotionally abused: Not on file Physically abused: Not on file Forced sexual activity: Not on file Other Topics Concern  Not on file Social History Narrative  Not on file Family History Problem Relation Age of Onset  Hypertension Mother Sita Altamirano Mother 79 Breast cancer at age 79  Diabetes Mother Type 2  
 Other Father   
     alzheimers  Cancer Maternal Aunt Breast cancer after age 72  Cancer Maternal Aunt Breast cancer at age 39  Allergic Rhinitis Neg Hx  Allergy-severe Neg Hx  Amblyopia Neg Hx  Anemia Neg Hx  Anesth Problems Neg Hx  Angioedema Neg Hx  Anxiety Neg Hx  Arrhythmia Neg Hx  Arthritis-rheumatoid Neg Hx  Ataxia Neg Hx  Atopy Neg Hx  Bipolar Disorder Neg Hx  Blindness Neg Hx  Breast Cancer Neg Hx  Breast Problems Neg Hx  Broken Bones Neg Hx  Cataract Neg Hx  Celiac Disease Neg Hx  Childhood heart surgery Neg Hx  Childhood resp disease Neg Hx  Chorea Neg Hx  Clotting Disorder Neg Hx  Collagen Dis Neg Hx  Colon Cancer Neg Hx  Colon Polyps Neg Hx  COPD Neg Hx  Coronary Artery Disease Neg Hx  Crohn's Disease Neg Hx  Cystic Fibrosis Neg Hx  Delayed Awakening Neg Hx  Dementia Neg Hx  Depression Neg Hx  Dislocations Neg Hx  Downs Syndrome Neg Hx  Drug Abuse Neg Hx  Eclampsia Neg Hx  Eczema Neg Hx  Emergence Delirium Neg Hx  Emphysema Neg Hx  Fainting Neg Hx  Genitourinary () Neg Hx  Glaucoma Neg Hx   
 Heart Attack Neg Hx   
 Heart defect Neg Hx   
 Heart Failure Neg Hx   
 Heart Surgery Neg Hx  Hemachromatosis Neg Hx  Herpes Neg Hx  High Cholesterol Neg Hx   
 HIV/AIDS Neg Hx  Immunodeficiency Neg Hx  Infertility Neg Hx  Inflammatory Bowel Dz Neg Hx  Kidney Disease Neg Hx  Liver Disease Neg Hx  Macular Degen Neg Hx  Malignant Hyperthermia Neg Hx  MS Neg Hx  Neuropathy Neg Hx  NF Neg Hx  Osteoporosis Neg Hx   
 Ovarian Cancer Neg Hx  Pacemaker Neg Hx  Panic disorder Neg Hx  Parkinsonism Neg Hx  Post-op Cognitive Dysfunction Neg Hx  Post-op Nausea/Vomiting Neg Hx  Prematurity Neg Hx   Labor Neg Hx  Pseudocholinesterase Deficiency Neg Hx  Psoriasis Neg Hx  Psychotic Disorder Neg Hx  Rashes/Skin Problems Neg Hx  Retinal Detachment Neg Hx  Rh Incompatibility Neg Hx  Schizophrenia Neg Hx  Seizures Neg Hx  Severe Sprains Neg Hx  Sickle Cell Anemia Neg Hx  Sickle Cell Trait Neg Hx  SIDS Neg Hx  SLE Neg Hx  Spont Ab Neg Hx  Stomach Cancer Neg Hx  Strabismus Neg Hx  Substance Abuse Neg Hx  Sudden Death Neg Hx  Suicide Neg Hx  Thyroid Disease Neg Hx  Tuberculosis Neg Hx  Ulcerative Colitis Neg Hx  Urticaria Neg Hx  Lang's Disease Neg Hx Allergies Allergen Reactions  Prednisone Unknown (comments) \"psychotic reaction\"  To oral and parenteral steroids Review of Systems Constitutional: Negative for chills, diaphoresis, fever, malaise/fatigue and weight loss. Respiratory: Positive for shortness of breath. Cardiovascular: Negative for chest pain, palpitations, claudication, leg swelling and PND. Gastrointestinal: Negative for abdominal pain, constipation, diarrhea, heartburn, nausea and vomiting. Neurological: Negative for dizziness, tremors, seizures, weakness and headaches. OBJECTIVE: 
Physical Exam: 
Visit Vitals /66 (BP 1 Location: Left arm, BP Patient Position: Sitting) Pulse (!) 101 Resp 14 Ht 5' 2\" (1.575 m) Wt 121 lb (54.9 kg) SpO2 100% BMI 22.13 kg/m² GENERAL APPEARANCE: 
 The patient is normal weight and in no respiratory distress. HEENT: 
 PERRL. Conjunctivae unremarkable. Nasal mucosa is without epistaxis, exudate, or polyps. Gums and dentition are unremarkable. There is no oropharyngeal narrowing. TMs are clear. NECK/LYMPHATIC: 
 Symmetrical with no elevation of jugular venous pulsation. Trachea midline. No thyroid enlargement. No cervical adenopathy. LUNGS: 
 Normal respiratory effort with symmetrical lung expansion. Breath sounds dull to percussion on the right and diminished breath sounds on the right. HEART: 
 There is a regular rate and rhythm. No murmur, rub, or gallop. There is no edema in the lower extremities. ABDOMEN: 
 Soft and non-tender. No hepatosplenomegaly. Bowel sounds are normal.   
 
 NEURO: 
 The patient is alert and oriented to person, place, and time. Memory appears intact and mood is normal.  No gross sensorimotor deficits are present. DIAGNOSTIC TESTS: 
 CT of chest: CXR:  04/11/2019 PCXR:  
Results for orders placed during the hospital encounter of 10/11/17 XR CHEST PORT Narrative Chest portable CLINICAL INDICATION: Sepsis; history of breast cancer COMPARISON: PET 8/11/2017, chest radiography 4/7/2017 TECHNIQUE: single AP portable view chest at 12:20 PM upright FINDINGS:  There are mild linear and groundglass opacities in the bases, left slightly greater than right. There is no evidence of dense consolidation, 
pneumothorax, pleural effusion or pulmonary edema. The mediastinal and hilar 
contours are within normal limits given technique. Again noted are right 
axillary surgical clips and right breast implant. The left portacatheter remains 
in place. Impression IMPRESSION: Mild bibasilar infiltrates. No dense consolidation. CXR PA and lateral:   
Results for orders placed during the hospital encounter of 04/11/19 XR CHEST PA LAT Narrative Two view chest 
 
History: HX pleural effusion//SOB. Comparison: 03/21/2019 Findings: Left subclavian Mediport catheter is unchanged. The heart is obscured 
due to moderately extensive opacity within the right hemithorax suggesting 
pleural effusion and atelectasis/infiltrate. A nodule overlying the right 
midlung zone could represent loculated fluid within the minor fissure. There is 
mild left basilar streaky opacity, unchanged. Impression IMPRESSION: No significant change. PET/CT:  
Results for orders placed during the hospital encounter of 02/04/16 PET/CT TUMOR IMAGE SKULL THIGH W (INI) Narrative NUCLEAR MEDICINE WHOLE BODY CT / PET- FDG Study. INDICATION: Malignant neoplasm of the right breast, extensive infiltrating 
ductal carcinoma, initial staging exam. 
 
COMPARISONS: None. TECHNIQUE:   Radiopharmaceutical: 17.1 mCi F18-FDG IV. This is a whole-body PET- FDG study performed on a dedicated full- ring scanner. Low dose, 
nondiagnostic CT axial source images are also acquired for PET attenuation 
correction and are utilized for PET-CT fusion. The patient underwent adequate 
fasting of at least 4 hours. Blood glucose at the time of tracer administration 
is 88 mg/dl, which is adequate for imaging. Approximately 90 minutes after 
administration of the radiopharmaceutical, imaging was initiated covering the 
skull to the thighs.      
 
FINDINGS:   
 HEAD AND NECK: No abnormal hypermetabolic activity. Normal physiologic activity 
in the brain and muscular activity in the tongue and symmetric activity in the 
larynx, probably from combination. CHEST: Small hypermetabolic right axillary lymph nodes are present. Ill-defined 
stranding mass in the right breast is hypermetabolic. This extends to the 
subareolar focus. There are hypermetabolic subpectoral nodes. The larger mass SUV max is 6.5. There is a hypermetabolic right paratracheal lymph node. No 
hypermetabolic pulmonary nodules. There is some scarring and micronodules in the 
lungs which are not hypermetabolic. ABDOMEN: Normal physiologic activity in the GI tract and  tract. No abnormal 
hypermetabolic activity. Cholecystectomy clips are present. PELVIS: No hypermetabolic adenopathy. No hypermetabolic bony lesions; mild DJD. Patient is status post hysterectomy. Impression IMPRESSION: Hypermetabolic right breast mass which includes a hypermetabolic 
subareolar focus, hypermetabolic right axillary and subpectoral lymph nodes. There is a hypermetabolic paratracheal node on the right. No hypermetabolic 
disease outside the chest. 
  
 
 
CT of chest w contrast:   
Results for orders placed during the hospital encounter of 02/07/18 CT CHEST W CONT Narrative CT of the chest with contrast. 
 
CLINICAL INDICATION: Cellulitis of the chest wall, prior breast reconstruction 
surgery PROCEDURE: Serial thin section axial images obtained from the thoracic inlet 
through the upper abdomen following the administration of 80 cc of Optiray-350 
intravenous contrast.  Radiation dose reduction techniques were used for this 
study. Our CT scanners use one or all of the following: Automated exposure 
control, adjusted of the mA and/or kV according to patient size, iterative 
reconstruction COMPARISON: Similar chest CT dated 12/13/2017 FINDINGS: Soft tissue inflammation along the right anterior chest wall. The 
previous right chest wall abscess is no longer apparent. No mediastinal or axillary adenopathy evident. The great vessels are 
unremarkable. No pleural or pericardial effusion noted. There is mild 
interstitial thickening in the subpleural lungs with bronchiectasis. No 
suspicious pulmonary nodules appreciated. No dense airspace consolidation 
identified. The previous 11 mm pulmonary nodule in the right upper lobe is no 
longer appreciated. Bilateral scattered small pulmonary nodules measuring from 2 
to 4 mm appreciated. There is a characteristic pulmonary nodule in the left upper lobe peripherally 
that measures 5 mm on image 22. These nodules have an infectious or inflammatory 
appearance. Limited evaluation of the upper abdomen is unremarkable. No aggressive osseous lesions identified. Impression IMPRESSION: 
1. Resolution of the previous right chest wall abscess with now only soft tissue 
inflammation in keeping with the patient's history of cellulitis noted along the 
right anterior chest wall. 2. Interstitial thickening and small airways type nodularity primarily in a 
subpleural lung distribution involving the bilateral upper lobes with a 
characteristic 5 mm pulmonary nodule noted in the left upper lobe. Could 
consider follow-up CT imaging in three months to reevaluate. 3. The previous 11 mm pulmonary nodule in the right upper lobe is no longer 
appreciated. Screening chest CT: No results found for this or any previous visit. CT of chest w/out contrast:   No results found for this or any previous visit. ASSESSMENT: 
  ICD-10-CM ICD-9-CM 1. Pleural effusion J90 511.9 SCHEDULE PROCEDURE  
2. SOB (shortness of breath) R06.02 786.05   
3. Malignant neoplasm of breast in female, estrogen receptor positive, unspecified laterality, unspecified site of breast ([facility] C50.919 174.9   
 Z17.0 V86.0 PLAN: 
· Proceed with thoracentesis · Patient may be ready to see surgery regarding vats with pleurodesis · Follow-up here in 2 months but we may want to schedule her to come back to the lab in 2-3 weeks for repeat thoracentesis if surgery has not been scheduled Orders Placed This Encounter  SCHEDULE PROCEDURE Thoracentesis Electronically signed by  Lydia Tucker MD

## 2019-04-11 NOTE — DISCHARGE INSTRUCTIONS
Patient Education        Thoracentesis: What to Expect at Home  Your Recovery  Thoracentesis (say \"plxc-qt-asq-MADELIN-sis\") is a procedure to remove fluid from the space between the lungs and the chest wall (pleural space). This procedure may also be called a \"chest tap. \" It is normal to have a small amount of fluid in the pleural space. But too much fluid can build up because of problems such as infection, heart failure, or lung cancer. The procedure may have been done to help with shortness of breath and pain caused by the fluid buildup. Or you may have had this procedure so the doctor could test the fluid to find the cause of the buildup. Your chest may be sore where the doctor put the needle or catheter into your skin (the puncture site). This usually gets better after a day or two. You can go back to work or your normal activities as soon as you feel up to it. If the doctor sent the fluid to a lab for testing, it may take several days to get the results. The doctor or nurse will discuss the results with you. This care sheet gives you a general idea about how long it will take for you to recover. But each person recovers at a different pace. Follow the steps below to feel better as quickly as possible. How can you care for yourself at home? Activity    · Rest when you feel tired. Getting enough sleep will help you recover.     · Avoid strenuous activities, such as bicycle riding, jogging, weight lifting, or aerobic exercise, until your doctor says it is okay.     · You may shower. Do not take a bath until the puncture site has healed, or until your doctor tells you it is okay.     · Ask your doctor when you can drive again.     · You may need to take 1 or 2 days off from work. It depends on the type of work you do and how you feel. Diet    · You can eat your normal diet.     · Drink plenty of fluids (unless your doctor tells you not to).    Medicines    · Your doctor will tell you if and when you can restart your medicines. He or she will also give you instructions about taking any new medicines.     · If you take blood thinners, such as warfarin (Coumadin), clopidogrel (Plavix), or aspirin, be sure to talk to your doctor. He or she will tell you if and when to start taking those medicines again. Make sure that you understand exactly what your doctor wants you to do.     · Be safe with medicines. Take pain medicines exactly as directed. ? If the doctor gave you a prescription medicine for pain, take it as prescribed. ? If you are not taking a prescription pain medicine, ask your doctor if you can take an over-the-counter medicine. ? Do not take two or more pain medicines at the same time unless the doctor told you to. Many pain medicines have acetaminophen, which is Tylenol. Too much acetaminophen (Tylenol) can be harmful.     · If you think your pain medicine is making you sick to your stomach:  ? Take your medicine after meals (unless your doctor has told you not to). ? Ask your doctor for a different pain medicine.     · If your doctor prescribed antibiotics, take them as directed. Do not stop taking them just because you feel better. You need to take the full course of antibiotics.    Care of the puncture site    · Wash the area daily with warm, soapy water, and pat it dry. Don't use hydrogen peroxide or alcohol, which may delay healing. You may cover the area with a gauze bandage if it weeps or rubs against clothing. Change the bandage every day.     · Keep the area clean and dry. Follow-up care is a key part of your treatment and safety. Be sure to make and go to all appointments, and call your doctor if you are having problems. It's also a good idea to know your test results and keep a list of the medicines you take. Please try and keep puncture site for next 24 hrs.  Please try and keep all follow-up appointments and if you become short of breath before your next [facility] visit please call them [phone] for possible Thoracentesis. When should you call for help? Call 911 anytime you think you may need emergency care. For example, call if:    · You passed out (lost consciousness).     · You have severe trouble breathing.     · You have sudden chest pain and shortness of breath, or you cough up blood.    Call your doctor now or seek immediate medical care if:    · You have shortness of breath that is new or getting worse.     · You have new or worse pain in your chest, especially when you take a deep breath.     · You are sick to your stomach or cannot keep fluids down.     · You have a fever over 100°F.     · Bright red blood has soaked through the bandage over your puncture site.     · You have signs of infection, such as:  ? Increased pain, swelling, warmth, or redness. ? Red streaks leading from the puncture site. ? Pus draining from the puncture site. ? Swollen lymph nodes in your neck, armpits, or groin. ? A fever.     · You cough up a lot more mucus than normal, or your mucus changes color.    Watch closely for changes in your health, and be sure to contact your doctor if you have any problems. Where can you learn more? Go to http://deyta-tremaine.info/. Enter B713 in the search box to learn more about \"Thoracentesis: What to Expect at Home. \"  Current as of: September 5, 2018  Content Version: 11.9  © 6117-7247 [employer]. Care instructions adapted under license by [employer] (which disclaims liability or warranty for this information). If you have questions about a medical condition or this instruction, always ask your healthcare professional. [address] any warranty or liability for your use of this information.

## 2019-04-11 NOTE — INTERVAL H&P NOTE
H&P Update:  Malathi Haynes was seen and examined. History and physical has been reviewed. The patient has been examined.  There have been no significant clinical changes since the completion of the originally dated History and Physical.

## 2019-05-07 NOTE — PROGRESS NOTES
5/7/2019 Saw the patient with Dr Sung Iglesias. She feels she is not doing well. She continues to struggle with pleural effusion. We will ask pulmonary to do a thoracentesis and also refer her to surgery to discuss VATS procedure. She will continue for now with Ibrance and Faslodex but will hold the Ibrance when surgery date is determined. Will continue to follow.

## 2019-05-07 NOTE — PROGRESS NOTES
Arrived to the [facility]. Assessment and Faslodex completed. Patient tolerated well. Any issues or concerns during appointment: No.  Patient aware of next infusion appointment on 6/4/19 at 2pm.  Discharged ambulatory.

## 2019-05-08 NOTE — PROGRESS NOTES
Pt sat up on side of bed for thoracentesis. Consent obtained. Time out performed. Pts vitals monitored throughout procedure. Right ultrasound done and pic taken of pleural fluid.  ~600 ml serasangenious pleural fluid from R.  Pt tolerated procedure well with no adverse rxn. Specimens sent to the lab x 1 for cytology only per md order and labeled appropriately. Site dressed appropriately and discharge instructions reviewed with pt and family. R Lung sliding done and ultrasound findings reviewed by MD.   Pt given written discharge instructions including follow-up appointment,  potential side effects and physical changes to be aware of, and physician contact number. Pt d/c'ed via WC to 's care. Pt and family verbalized understanding of discharge instructions. MD spoke with pt and family.

## 2019-05-08 NOTE — H&P (VIEW-ONLY)
[address] Heydi Grissom. [address], [address] [place] [address] 
[phone] Patient Name:  Adelso Gonzalez YOB: 1950 Office Visit 5/8/2019 CHIEF COMPLAINT:   
Chief Complaint Patient [facility] with  Shortness of Breath HISTORY OF PRESENT ILLNESS:    
The patient is a pleasant 77year old white female who is seen as an urgent work in appointment for complaints of worsening shortness of breath. She is accompanied by her . She has stage 4 breast cancer, diagnosed initially in 2016 and is on chemotherapy per Dr. Karen Farrell. She has had thoracentesis due to a right pleural effusion on 3 occasions with the initial thoracentesis done in February and then repeated again in March and then again in April. Fluid was excudative and has been negative for malignancy. On the initial pleural fluid, cytology revealed atypical cells. She reports worsening dyspnea for the past week associated with pain and minimal cough. She is scheduled to see Dr. Flavia Chen next week for possible pleurodesis. Reports intermittent fever, usually in the evenings, temperature max 100.7. No chills. No purulent sputum production. Past Medical History:  
Diagnosis Date  Abnormal CT of the chest   
 Adverse effect of anesthesia   
 bp dropped with dual lung/gallbladder surgery  Asthma    
 no acute attacks recently. uses advair daily  Breast cancer ([facility] 2016 R breast--infiltrating ductal ca,  ER/GA positive and HER2 negative, s/p mastectomy, chemo, reconstruction, on chronic Ibrance therapy  Depression  Hypercholesteremia Controlled with diet  Hypothyroidism  Ill-defined condition   
 granular lung disease resolved 2002 Problem List  Date Reviewed: 5/8/2019 Codes Class Noted Pleural effusion, right ICD-10-CM: J90 ICD-9-CM: 511.9  2/19/2019 Age-related osteoporosis without current pathological fracture ICD-10-CM: M81.0 ICD-9-CM: 733.01  12/17/2018 Overview Signed 12/17/2018  7:31 AM by Jimi Zacarias MD  
  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 
ICD-9-CM: 296.35  11/16/2017 S/P breast reconstruction, right ICD-10-CM: W25.363 ICD-9-CM: V43.82  9/19/2017 Breast cancer ([facility] (Chronic) ICD-10-CM: C50.919 ICD-9-CM: 174.9  9/18/2017 Overview Signed 11/15/2018 10:05 AM by Jimi Zacarias MD  
  2016: R breast--infiltrating ductal ca,  ER/KY positive and HER2 negative, s/p mastectomy, chemo, reconstruction, on chronic Ibrance therapy Lump of skin of right upper extremity ICD-10-CM: R22.31 
ICD-9-CM: 782.2  6/13/2017 Malignant neoplasm of upper-inner quadrant of right female breast ([facility] ICD-10-CM: J03.993 ICD-9-CM: 174.2  6/12/2017 Tachycardia ICD-10-CM: R00.0 ICD-9-CM: 785.0  4/7/2017 Pancytopenia due to chemotherapy [facility]) ICD-10-CM: P31.433 ICD-9-CM: 284.11  4/7/2017 Status post right mastectomy ICD-10-CM: Z90.11 ICD-9-CM: V45.71  9/14/2016 Mediastinal adenopathy ICD-10-CM: R59.0 ICD-9-CM: 785.6  2/18/2016 Malignant neoplasm of right female breast ([facility] (Chronic) ICD-10-CM: C50.911 ICD-9-CM: 174.9  1/25/2016 Overview Addendum 11/15/2018 10:05 AM by Jimi Zacarias MD  
  2016: R breast--infiltrating ductal ca,  ER/KY positive and HER2 negative, metastatic to nodes, s/p mastectomy, chemo, reconstruction, on chronic Ibrance therapy Acquired hypothyroidism (Chronic) ICD-10-CM: E03.9 ICD-9-CM: 244.9  11/4/2015 Mild persistent asthma without complication IRN-08-JJ: M19.70 ICD-9-CM: 493.90  11/4/2015 Depression (Chronic) ICD-10-CM: F32.9 ICD-9-CM: 572  11/4/2015 Past Surgical History:  
Procedure Laterality Date  CHEST SURGERY PROCEDURE UNLISTED    
 R lung biopsy  HX BLADDER REPAIR    
 HX BREAST AUGMENTATION Right 10/20/2017 BREAST IMPLANT REMOVAL  performed by Radha Henley MD at [facility] HX BREAST BIOPSY Right 2/5/2016 SKIN BIOPSY RIGHT BREAST performed by Mari Díaz MD at [facility] MAIN OR  
 HX BREAST RECONSTRUCTION Bilateral 9/14/2016 BILATERAL BREAST RECONSTRUCTION performed by Radha Henley MD at [facility] MAIN OR  
 HX BREAST RECONSTRUCTION Bilateral 9/14/2016 BILATERAL BREAST TISSUE EXPANDER INSERTION performed by Radha Henley MD at [facility] MAIN OR  
 HX BREAST RECONSTRUCTION Right 9/18/2017 RIGHT BREAST RECONSTRUCTION performed by Radha Henley MD at [facility] HX BREAST RECONSTRUCTION Right 9/18/2017 Right  PLACEMENT OF PERMANENT IMPLANTS performed by Radha Henley MD at [facility] HX BREAST REDUCTION Left 9/18/2017 LEFT BREAST MASTOPEXY FOR SYMMETRY performed by Radha Henley MD at [facility] HX COLONOSCOPY    
 2006  HX CYST INCISION AND DRAINAGE Right 12/15/2017 INCISION AND DRAINAGE BREAST  performed by Radha Henley MD at [facility] HX CYSTOCELE REPAIR    
 HX HYSTERECTOMY    
 fibroids  HX LAP CHOLECYSTECTOMY  2000  HX MASTECTOMY Right  HX RECTOCELE REPAIR    
 HX VASCULAR ACCESS    
 port left chest wall No flowsheet data found. Social History Socioeconomic History  Marital status:  Spouse name: Not on file  Number of children: Not on file  Years of education: Not on file  Highest education level: Not on file Occupational History  Not on file Social Needs  Financial resource strain: Not on file  Food insecurity:  
  Worry: Not on file Inability: Not on file  Transportation needs:  
  Medical: Not on file Non-medical: Not on file Tobacco Use  Smoking status: Never Smoker  Smokeless tobacco: Never Used Substance and Sexual Activity  Alcohol use: No  
  Alcohol/week: 0.0 oz  Drug use: No  
 Sexual activity: Not on file Lifestyle  Physical activity:  
  Days per week: Not on file Minutes per session: Not on file  Stress: Not on file Relationships  Social connections:  
  Talks on phone: Not on file Gets together: Not on file Attends Jain service: Not on file Active member of club or organization: Not on file Attends meetings of clubs or organizations: Not on file Relationship status: Not on file  Intimate partner violence:  
  Fear of current or ex partner: Not on file Emotionally abused: Not on file Physically abused: Not on file Forced sexual activity: Not on file Other Topics Concern  Not on file Social History Narrative  Not on file Family History Problem Relation Age of Onset  Hypertension Mother Nataliia James Mother 79 Breast cancer at age 79  Diabetes Mother Type 2  
 Other Father   
     alzheimers  Cancer Maternal Aunt Breast cancer after age 72  Cancer Maternal Aunt Breast cancer at age 39  Allergic Rhinitis Neg Hx  Allergy-severe Neg Hx  Amblyopia Neg Hx  Anemia Neg Hx  Anesth Problems Neg Hx  Angioedema Neg Hx  Anxiety Neg Hx  Arrhythmia Neg Hx  Arthritis-rheumatoid Neg Hx  Ataxia Neg Hx  Atopy Neg Hx  Bipolar Disorder Neg Hx  Blindness Neg Hx  Breast Cancer Neg Hx  Breast Problems Neg Hx  Broken Bones Neg Hx  Cataract Neg Hx  Celiac Disease Neg Hx  Childhood heart surgery Neg Hx  Childhood resp disease Neg Hx  Chorea Neg Hx  Clotting Disorder Neg Hx  Collagen Dis Neg Hx  Colon Cancer Neg Hx  Colon Polyps Neg Hx  COPD Neg Hx  Coronary Artery Disease Neg Hx  Crohn's Disease Neg Hx  Cystic Fibrosis Neg Hx  Delayed Awakening Neg Hx  Dementia Neg Hx  Depression Neg Hx  Dislocations Neg Hx  Downs Syndrome Neg Hx  Drug Abuse Neg Hx  Eclampsia Neg Hx  Eczema Neg Hx  Emergence Delirium Neg Hx  Emphysema Neg Hx  Fainting Neg Hx  Genitourinary () Neg Hx  Glaucoma Neg Hx   
 Heart Attack Neg Hx   
 Heart defect Neg Hx   
 Heart Failure Neg Hx   
 Heart Surgery Neg Hx  Hemachromatosis Neg Hx  Herpes Neg Hx  High Cholesterol Neg Hx   
 HIV/AIDS Neg Hx  Immunodeficiency Neg Hx  Infertility Neg Hx  Inflammatory Bowel Dz Neg Hx  Kidney Disease Neg Hx  Liver Disease Neg Hx  Macular Degen Neg Hx  Malignant Hyperthermia Neg Hx  MS Neg Hx  Neuropathy Neg Hx  NF Neg Hx  Osteoporosis Neg Hx   
 Ovarian Cancer Neg Hx  Pacemaker Neg Hx  Panic disorder Neg Hx  Parkinsonism Neg Hx  Post-op Cognitive Dysfunction Neg Hx  Post-op Nausea/Vomiting Neg Hx  Prematurity Neg Hx   Labor Neg Hx  Pseudocholinesterase Deficiency Neg Hx  Psoriasis Neg Hx  Psychotic Disorder Neg Hx  Rashes/Skin Problems Neg Hx  Retinal Detachment Neg Hx  Rh Incompatibility Neg Hx  Schizophrenia Neg Hx  Seizures Neg Hx  Severe Sprains Neg Hx  Sickle Cell Anemia Neg Hx  Sickle Cell Trait Neg Hx  SIDS Neg Hx  SLE Neg Hx  Spont Ab Neg Hx  Stomach Cancer Neg Hx  Strabismus Neg Hx  Substance Abuse Neg Hx  Sudden Death Neg Hx  Suicide Neg Hx  Thyroid Disease Neg Hx  Tuberculosis Neg Hx  Ulcerative Colitis Neg Hx  Urticaria Neg Hx  Lang's Disease Neg Hx Allergies Allergen Reactions  Prednisone Unknown (comments) \"psychotic reaction\"  To oral and parenteral steroids Current Outpatient Medications Medication Sig  palbociclib (IBRANCE) 75 mg cap Take 1 Cap by mouth daily. 1 cap by mouth daily for 21 days, then off for 7 days.  albuterol (PROVENTIL HFA, VENTOLIN HFA, PROAIR HFA) 90 mcg/actuation inhaler 2 puffs q6h prn wheezing. May use brand per formulary  varicella-zoster recombinant, PF, (SHINGRIX, PF,) 50 mcg/0.5 mL susr injection Give in 2 doses, 2-6 months apart    DX Z23  levothyroxine (SYNTHROID) 75 mcg tablet CANCEL THIS SCRIPT  1 every day for thyroid  ALPRAZolam (XANAX) 0.5 mg tablet 1 bid-tid prn anxiety  DULoxetine (CYMBALTA) 60 mg capsule 1 every day for depression  fluticasone-salmeterol (ADVAIR DISKUS) 250-50 mcg/dose diskus inhaler 1 inhalation bid for asthma control  heparin, porcine, pf (HEPARIN LOCKFLUSH,PORCINE,,PF,) 100 unit/mL injection Access port, flush with 20ml Normal Saline, 500 units of Heparin Flush and de access port every 4 weeks. No current facility-administered medications for this visit. Review of Systems Constitutional: Positive for fever and malaise/fatigue. Negative for chills, diaphoresis and weight loss. Respiratory: Positive for shortness of breath. Cardiovascular: Negative for chest pain, palpitations, claudication, leg swelling and PND. Gastrointestinal: Positive for vomiting. Negative for abdominal pain, blood in stool, constipation, diarrhea, heartburn and nausea. Neurological: Negative for dizziness, tremors, seizures, weakness and headaches. PHYSICAL EXAM: 
 
Vitals:  
 05/08/19 1224 BP: 108/60 BP 1 Location: Right arm BP Patient Position: Sitting Pulse: (!) 110 Resp: 18 Temp: 96.8 °F (36 °C) TempSrc: Temporal  
SpO2: 99% Comment: r/a Weight: 119 lb (54 kg) Height: 5' 2\" (1.575 m) Body mass index is 21.77 kg/m².  
 
 GENERAL APPEARANCE: 
 The patient is normal weight and in no respiratory distress. HEENT: 
 PERRL. Conjunctivae unremarkable. Nasal mucosa is without epistaxis, exudate, or polyps. Gums and dentition are unremarkable. There is no oropharyngeal narrowing. TMs are clear. NECK/LYMPHATIC: 
 Symmetrical with no elevation of jugular venous pulsation. Trachea midline. No thyroid enlargement. No cervical adenopathy. LUNGS: 
 Normal respiratory effort with symmetrical lung expansion. Breath sounds clear on left, decreased on right from scapula down. HEART: 
 There is a regular rate and rhythm. No murmur, rub, or gallop. There is no edema in the lower extremities. ABDOMEN: 
 Soft and non-tender. No hepatosplenomegaly. Bowel sounds are normal.   
 NEURO: 
 The patient is alert and oriented to person, place, and time. Memory appears intact and mood is normal.  No gross sensorimotor deficits are present. DIAGNOSTIC TESTS: 
 PCXR:  
Results for orders placed during the hospital encounter of 10/11/17 XR CHEST PORT Narrative Chest portable CLINICAL INDICATION: Sepsis; history of breast cancer COMPARISON: PET 8/11/2017, chest radiography 4/7/2017 TECHNIQUE: single AP portable view chest at 12:20 PM upright FINDINGS:  There are mild linear and groundglass opacities in the bases, left 
slightly greater than right. There is no evidence of dense consolidation, 
pneumothorax, pleural effusion or pulmonary edema. The mediastinal and hilar 
contours are within normal limits given technique. Again noted are right 
axillary surgical clips and right breast implant. The left portacatheter remains 
in place. Impression IMPRESSION: Mild bibasilar infiltrates. No dense consolidation. CXR PA and lateral:    Formal report pending, but has large effusion on right. XR CHEST PA LAT Screening chest CT: No results found for this or any previous visit. CT of chest without contrast:   No results found for this or any previous visit. CT of chest with contrast:   
Results for orders placed during the hospital encounter of 02/07/18 CT CHEST W CONT Narrative CT of the chest with contrast. 
 
CLINICAL INDICATION: Cellulitis of the chest wall, prior breast reconstruction 
surgery PROCEDURE: Serial thin section axial images obtained from the thoracic inlet 
through the upper abdomen following the administration of 80 cc of Optiray-350 
intravenous contrast.  Radiation dose reduction techniques were used for this 
study. Our CT scanners use one or all of the following: Automated exposure 
control, adjusted of the mA and/or kV according to patient size, iterative 
reconstruction COMPARISON: Similar chest CT dated 12/13/2017 FINDINGS: Soft tissue inflammation along the right anterior chest wall. The 
previous right chest wall abscess is no longer apparent. No mediastinal or axillary adenopathy evident. The great vessels are 
unremarkable. No pleural or pericardial effusion noted. There is mild 
interstitial thickening in the subpleural lungs with bronchiectasis. No 
suspicious pulmonary nodules appreciated. No dense airspace consolidation 
identified. The previous 11 mm pulmonary nodule in the right upper lobe is no 
longer appreciated. Bilateral scattered small pulmonary nodules measuring from 2 
to 4 mm appreciated. There is a characteristic pulmonary nodule in the left upper lobe peripherally 
that measures 5 mm on image 22. These nodules have an infectious or inflammatory 
appearance. Limited evaluation of the upper abdomen is unremarkable. No aggressive osseous lesions identified. Impression IMPRESSION: 
1. Resolution of the previous right chest wall abscess with now only soft tissue 
inflammation in keeping with the patient's history of cellulitis noted along the 
right anterior chest wall. 2. Interstitial thickening and small airways type nodularity primarily in a 
subpleural lung distribution involving the bilateral upper lobes with a 
characteristic 5 mm pulmonary nodule noted in the left upper lobe. Could 
consider follow-up CT imaging in three months to reevaluate. 3. The previous 11 mm pulmonary nodule in the right upper lobe is no longer 
appreciated. PET/CT:  
Results for orders placed during the hospital encounter of 02/04/16 PET/CT TUMOR IMAGE SKULL THIGH W (INI) Narrative NUCLEAR MEDICINE WHOLE BODY CT / PET- FDG Study. INDICATION: Malignant neoplasm of the right breast, extensive infiltrating 
ductal carcinoma, initial staging exam. 
 
COMPARISONS: None. TECHNIQUE:   Radiopharmaceutical: 17.1 mCi F18-FDG IV. This is a whole-body PET- FDG study performed on a dedicated full- ring scanner. Low dose, 
nondiagnostic CT axial source images are also acquired for PET attenuation 
correction and are utilized for PET-CT fusion. The patient underwent adequate 
fasting of at least 4 hours. Blood glucose at the time of tracer administration 
is 88 mg/dl, which is adequate for imaging. Approximately 90 minutes after 
administration of the radiopharmaceutical, imaging was initiated covering the 
skull to the thighs. FINDINGS:   
HEAD AND NECK: No abnormal hypermetabolic activity. Normal physiologic activity 
in the brain and muscular activity in the tongue and symmetric activity in the 
larynx, probably from combination. CHEST: Small hypermetabolic right axillary lymph nodes are present. Ill-defined 
stranding mass in the right breast is hypermetabolic. This extends to the 
subareolar focus. There are hypermetabolic subpectoral nodes. The larger mass SUV max is 6.5. There is a hypermetabolic right paratracheal lymph node. No 
hypermetabolic pulmonary nodules. There is some scarring and micronodules in the 
lungs which are not hypermetabolic. ABDOMEN: Normal physiologic activity in the GI tract and  tract. No abnormal 
hypermetabolic activity. Cholecystectomy clips are present. PELVIS: No hypermetabolic adenopathy. No hypermetabolic bony lesions; mild DJD. Patient is status post hysterectomy. Impression IMPRESSION: Hypermetabolic right breast mass which includes a hypermetabolic 
subareolar focus, hypermetabolic right axillary and subpectoral lymph nodes. There is a hypermetabolic paratracheal node on the right. No hypermetabolic 
disease outside the chest. 
  
 
 
 
ASSESSMENT:  (Medical Decision Making) ICD-10-CM ICD-9-CM 1. Pleural effusion, right Recurrent J90 511.9 2. SOB (shortness of breath) 
worse R06.02 786.05   
3. Malignant neoplasm of breast in female, estrogen receptor positive, unspecified laterality, unspecified site of breast [facility] C50.919 174.9   
 Z17.0 V86.0 PLAN: 
Will arrange for right t-cent this afternoon. Keep appointment with Dr. Vernell Dias next week. Orders Placed This Encounter  SCHEDULE PROCEDURE Right t-cent Follow up with us after surgery with CXR. Collaborating physician is Dr. Elvia Martinez. Precious Hinson NP Electronically signed Dictated using voice recognition software.   Proof read but unrecognized errors may exist.

## 2019-05-08 NOTE — INTERVAL H&P NOTE
H&P Update:  Duane Roan was seen and examined. History and physical has been reviewed. The patient has been examined.  There have been no significant clinical changes since the completion of the originally dated History and Physical.

## 2019-05-08 NOTE — DISCHARGE INSTRUCTIONS
Thoracentesis: What to Expect at Home  Your Recovery  Thoracentesis (say \"dpri-rn-tgc-MADELIN-sis\") is a procedure to remove fluid from the space between the lungs and the chest wall (pleural space). This procedure may also be called a \"chest tap. \" It is normal to have a small amount of fluid in the pleural space. But too much fluid can build up because of problems such as infection, heart failure, or lung cancer. The procedure may have been done to help with shortness of breath and pain caused by the fluid buildup. Or you may have had this procedure so the doctor could test the fluid to find the cause of the buildup. Your chest may be sore where the doctor put the needle or catheter into your skin (the puncture site). This usually gets better after a day or two. You can go back to work or your normal activities as soon as you feel up to it. If the doctor sent the fluid to a lab for testing, it may take several days to get the results. The doctor or nurse will discuss the results with you. This care sheet gives you a general idea about how long it will take for you to recover. But each person recovers at a different pace. Follow the steps below to feel better as quickly as possible. How can you care for yourself at home? Activity    · Rest when you feel tired. Getting enough sleep will help you recover.     · Avoid strenuous activities, such as bicycle riding, jogging, weight lifting, or aerobic exercise, until your doctor says it is okay.     · You may shower. Do not take a bath until the puncture site has healed, or until your doctor tells you it is okay.     · Ask your doctor when you can drive again.     · You may need to take 1 or 2 days off from work. It depends on the type of work you do and how you feel. Diet    · You can eat your normal diet.     · Drink plenty of fluids (unless your doctor tells you not to). Medicines    · Your doctor will tell you if and when you can restart your medicines.  He or she will also give you instructions about taking any new medicines.     · If you take blood thinners, such as warfarin (Coumadin), clopidogrel (Plavix), or aspirin, be sure to talk to your doctor. He or she will tell you if and when to start taking those medicines again. Make sure that you understand exactly what your doctor wants you to do.     · Be safe with medicines. Take pain medicines exactly as directed. ? If the doctor gave you a prescription medicine for pain, take it as prescribed. ? If you are not taking a prescription pain medicine, ask your doctor if you can take an over-the-counter medicine. ? Do not take two or more pain medicines at the same time unless the doctor told you to. Many pain medicines have acetaminophen, which is Tylenol. Too much acetaminophen (Tylenol) can be harmful.     · If you think your pain medicine is making you sick to your stomach:  ? Take your medicine after meals (unless your doctor has told you not to). ? Ask your doctor for a different pain medicine.     · If your doctor prescribed antibiotics, take them as directed. Do not stop taking them just because you feel better. You need to take the full course of antibiotics.    Care of the puncture site    · Wash the area daily with warm, soapy water, and pat it dry. Don't use hydrogen peroxide or alcohol, which may delay healing. You may cover the area with a gauze bandage if it weeps or rubs against clothing. Change the bandage every day.     · Keep the area clean and dry. Follow-up care is a key part of your treatment and safety. Be sure to make and go to all appointments, and call your doctor if you are having problems. It's also a good idea to know your test results and keep a list of the medicines you take. When should you call for help? Call 911 anytime you think you may need emergency care.  For example, call if:    · You passed out (lost consciousness).     · You have severe trouble breathing.     · You have sudden chest pain and shortness of breath, or you cough up blood.    Call your doctor now or seek immediate medical care if:    · You have shortness of breath that is new or getting worse.     · You have new or worse pain in your chest, especially when you take a deep breath.     · You are sick to your stomach or cannot keep fluids down.     · You have a fever over 100°F.     · Bright red blood has soaked through the bandage over your puncture site.     · You have signs of infection, such as:  ? Increased pain, swelling, warmth, or redness. ? Red streaks leading from the puncture site. ? Pus draining from the puncture site. ? Swollen lymph nodes in your neck, armpits, or groin. ? A fever.     · You cough up a lot more mucus than normal, or your mucus changes color.    Watch closely for changes in your health, and be sure to contact your doctor if you have any problems. Where can you learn more? Go to http://edyta-tremaine.info/. Enter H095 in the search box to learn more about \"Thoracentesis: What to Expect at Home. \"  Current as of: September 5, 2018  Content Version: 11.9  © 1435-1059 [employer]. Care instructions adapted under license by [employer] (which disclaims liability or warranty for this information). If you have questions about a medical condition or this instruction, always ask your healthcare professional. [address] any warranty or liability for your use of this information. Call [facility] at [phone] for any questions or problems that may occur. Resume all medication as before procedure. Keep your previously scheduled follow up appointment.

## 2019-05-09 NOTE — INTERVAL H&P NOTE
H&P Update:  Marguerite Guy was seen and examined. History and physical has been reviewed. The patient has been examined.  There have been no significant clinical changes since the completion of the originally dated History and Physical.

## 2019-05-09 NOTE — PROCEDURES
PROCEDURE:    DIAGNOSTIC/THERAPEUTIC       PRE-OP DIAGNOSIS:    R PLEURAL EFFUSION    POST-OP DIAGNOSIS:    R PLEURAL EFFUSION  TRAPPED LUNG    ASSISTANT:    None    ANESTHESIA:    LOCAL ANESTHESIA WITH 1% LIDOCAINE 10 CC TOTAL. CHEST ULTRASOUND FINDINGS:    A Turbo-M, Sonosite ultrasound with a 5-16 mHz probe was used to image the chest and localize the pleural effusion on the Left/and/Right chest.    A large anechoic space was seen on the Right consistent with an uncomplicated pleural effusion. DESCRIPTION OF PROCEDURE:    After obtaining informed consent and localizing the safest location for thoracentesis, the  9th intercostal space was marked with a blunt, plastic needle cap in the mid scapular line. An [employer] AK-0100 Pleral-Seal thoracentesis kit was used to perform the procedure. The skin was  cleansed with the supplied  chlorhexididne swab and then draped in the usual fasion. Using the previously marked location as a giude, a 22 G 1.5 inch needle was used to inject 10 cc of 1% lidocaine into the skin and subcutaneous tissue, as well as onto the underlying rib and inter-costal muscles, pleural fluid was aspirated to assure proper location, prior to removing the anesthesia needle. A 3mm  incision was then made, with the supplied scalpel in the usual fashion to facilitate the insertiopn of the thoracentesis needle. The needle with an 8French thoracentesis catheter was then introduced into the chest through the previously made incision in the usual fashion, the rib localized with the needle, and the catheter then marched over the rib into the pleural space. After aspirating fluid, the thoracentesis catheter was then placed into the chest using the needle itself as a trocar. The needle was then removed and the catheter was attached to the supplied tubing without complication.     A total of 600 cc of clear dark yellow was removed, when the patient started experiencing severe dull chest pain on the side of thoracentesis. Further drainage was then stopped   This is consistent with trapped lung physiology. Fluid was sent for the following tests:      Cytology    Post procedure US confirmed incomplete drainage of the effusion.     EBL:     1 drop      COMPLICATIONS:    Trapped lung      Daija Davis MD.

## 2019-05-15 NOTE — PERIOP NOTES
Recent Results (from the past 12 hour(s)) URINALYSIS W/ RFLX MICROSCOPIC Collection Time: 05/15/19 10:03 AM  
Result Value Ref Range Color RISHI Appearance CLOUDY Specific gravity 1.023 1.001 - 1.023    
 pH (UA) 5.5 5.0 - 9.0 Protein 30 (A) NEG mg/dL Glucose NEGATIVE  mg/dL Ketone TRACE (A) NEG mg/dL Bilirubin SMALL (A) NEG Blood NEGATIVE  NEG Urobilinogen 1.0 0.2 - 1.0 EU/dL Nitrites NEGATIVE  NEG Leukocyte Esterase SMALL (A) NEG Bacteria 0 0 /hpf  
CBC WITH AUTOMATED DIFF Collection Time: 05/15/19 10:32 AM  
Result Value Ref Range WBC 3.1 (L) 4.3 - 11.1 K/uL  
 RBC 2.94 (L) 4.05 - 5.2 M/uL  
 HGB 10.3 (L) 11.7 - 15.4 g/dL HCT 31.6 (L) 35.8 - 46.3 % .5 (H) 79.6 - 97.8 FL  
 MCH 35.0 (H) 26.1 - 32.9 PG  
 MCHC 32.6 31.4 - 35.0 g/dL  
 RDW 17.9 (H) 11.9 - 14.6 % PLATELET 137 843 - 616 K/uL MPV 11.4 9.4 - 12.3 FL ABSOLUTE NRBC 0.00 0.0 - 0.2 K/uL NEUTROPHILS 75 43 - 78 % LYMPHOCYTES 17 13 - 44 % MONOCYTES 4 4.0 - 12.0 % EOSINOPHILS 1 0.5 - 7.8 % BASOPHILS 2 0.0 - 2.0 % IMMATURE GRANULOCYTES 1 0.0 - 5.0 %  
 ABS. NEUTROPHILS 2.4 1.7 - 8.2 K/UL  
 ABS. LYMPHOCYTES 0.5 0.5 - 4.6 K/UL  
 ABS. MONOCYTES 0.1 0.1 - 1.3 K/UL  
 ABS. EOSINOPHILS 0.0 0.0 - 0.8 K/UL  
 ABS. BASOPHILS 0.1 0.0 - 0.2 K/UL  
 ABS. IMM. GRANS. 0.0 0.0 - 0.5 K/UL  
 DF AUTOMATED    
 RBC COMMENTS SLIGHT 
ANISOCYTOSIS + POIKILOCYTOSIS 
    
 RBC COMMENTS SLIGHT 
MACROCYTOSIS 
    
 WBC COMMENTS Result Confirmed By Smear PLATELET COMMENTS ADEQUATE METABOLIC PANEL, COMPREHENSIVE Collection Time: 05/15/19 10:32 AM  
Result Value Ref Range Sodium 138 136 - 145 mmol/L Potassium 4.0 3.5 - 5.1 mmol/L Chloride 102 98 - 107 mmol/L  
 CO2 31 21 - 32 mmol/L Anion gap 5 (L) 7 - 16 mmol/L Glucose 129 (H) 65 - 100 mg/dL BUN 15 8 - 23 MG/DL Creatinine 0.87 0.6 - 1.0 MG/DL  
 GFR est AA >60 >60 ml/min/1.73m2 GFR est non-AA >60 >60 ml/min/1.73m2 Calcium 8.7 8.3 - 10.4 MG/DL Bilirubin, total 0.4 0.2 - 1.1 MG/DL  
 ALT (SGPT) 30 12 - 65 U/L  
 AST (SGOT) 56 (H) 15 - 37 U/L Alk. phosphatase 162 (H) 50 - 136 U/L Protein, total 7.0 6.3 - 8.2 g/dL Albumin 2.4 (L) 3.2 - 4.6 g/dL Globulin 4.6 (H) 2.3 - 3.5 g/dL A-G Ratio 0.5 (L) 1.2 - 3.5 PROTHROMBIN TIME + INR Collection Time: 05/15/19 10:32 AM  
Result Value Ref Range Prothrombin time 15.6 (H) 11.7 - 14.5 sec INR 1.3    
labs reviewed ,  Labs routed to [facility], Pt 15.6 with INR 1.3, patient not on any anticoagulants/, reported to Dr Yoel Cervantes,, no new orders

## 2019-05-15 NOTE — ANESTHESIA PREPROCEDURE EVALUATION
Relevant Problems No relevant active problems Anesthetic History Review of Systems / Medical History Patient summary reviewed, nursing notes reviewed and pertinent labs reviewed Pulmonary Asthma : well controlled Neuro/Psych Psychiatric history (Depression) Cardiovascular Exercise tolerance: <4 METS Comments: Denies CP or CV history Baseline tachycardia per pt. Records also indicate this GI/Hepatic/Renal 
Within defined limits Endo/Other Hypothyroidism: well controlled Other Findings Comments: Metastatic breast cancer Physical Exam 
 
Airway Mallampati: II 
TM Distance: 4 - 6 cm Neck ROM: decreased range of motion Mouth opening: Normal 
 
 Cardiovascular Rhythm: regular Rate: abnormal 
 
 
 
Comments: Tachy Dental 
 
Dentition: Caps/crowns Pulmonary Decreased breath sounds (R lower lung field) Abdominal 
GI exam deferred Other Findings Anesthetic Plan ASA: 3 Anesthesia type: general 
 
Monitoring Plan: Arterial line Induction: Intravenous Anesthetic plan and risks discussed with: Patient Double lumen tube.

## 2019-05-15 NOTE — PERIOP NOTES
Patient verified name and . Patient provided medical/health information and PTA medications to the best of their ability. TYPE  CASE:lll Order for consent were found in EHR and matches case posting. Labs per surgeon:cbc, cmp, pt, urinalysis. Labs per anesthesia protocol: T&S on arrival.   
EKG  :  ekg done today, NSR, Patient provided with and instructed on education handouts including Guide to Surgery, blood transfusions, pain management, and hand hygiene for the family and community, and [facility] brochure. Soap with hibiclens and instructions given per hospital policy. Instructed patient to continue previous medications as prescribed prior to surgery unless otherwise directed and to take the following medications the day of surgery according to anesthesia guidelines : albuterol and bring, advair, xanax if needed, levothyroxine . Instructed patient to hold  the following medications: Paris Saenz per Dr Ida Mendez instruction to patient. Original medication prescription bottles were not visualized during patient appointment. Patient teach back successful and patient demonstrates knowledge of instruction.

## 2019-05-21 PROBLEM — J90 PLEURAL EFFUSION: Status: ACTIVE | Noted: 2019-01-01

## 2019-05-21 NOTE — PROGRESS NOTES
END OF SHIFT NOTE: 
 
INTAKE/OUTPUT No intake/output data recorded. Voiding: YES Catheter: YES Drain:  
Pleural Catheter/Drain 05/21/19 15.5 fr x 66 cm Pleural (Active) Drainage Description Other (comment) 5/21/2019 12:20 PM  
Site Assessment Clean, dry, & intact 5/21/2019 12:20 PM  
Dressing Status Clean, dry, & intact 5/21/2019 12:20 PM  
Dressing Type 4 X 4;Tape 5/21/2019 12:20 PM  
Output (ml) 75 ml 5/21/2019  7:27 PM  
   
Brown Dates #1 05/21/19 Right Chest (Active) Site Assessment Clean, dry, & intact 5/21/2019  1:53 PM  
Dressing Status Clean, dry, & intact 5/21/2019  1:53 PM  
Drainage Description Other (Comment) 5/21/2019 12:20 PM  
Status Suction (specify 5/21/2019 12:20 PM  
Output (ml) 0 ml 5/21/2019  1:53 PM  
 
 
 
 
 
 
Flatus: Patient does not have flatus present. Stool:  0 occurrences. Characteristics: 
  
Emesis: 0 occurrences. Characteristics: VITAL SIGNS Patient Vitals for the past 12 hrs: 
 Temp Pulse Resp BP SpO2  
05/21/19 1920     94 % 05/21/19 1535     91 % 05/21/19 1526     95 % 05/21/19 1457 98.4 °F (36.9 °C) 84 17 107/68 94 % 05/21/19 1423  (!) 128  100/55 97 % 05/21/19 1408  (!) 127  108/59 97 % 05/21/19 1353 98.2 °F (36.8 °C) (!) 123 14 114/55 100 % 05/21/19 1338  (!) 118 14 106/55 99 % 05/21/19 1323  (!) 115 14 107/56 99 % 05/21/19 1308  (!) 110 14 111/57 98 % 05/21/19 1258  (!) 107 14 115/58 97 % 05/21/19 1248  (!) 107 14 107/52 97 % 05/21/19 1243  (!) 108 14 106/55 97 % 05/21/19 1238  (!) 108 14 103/57 98 % 05/21/19 1233  (!) 110 16 94/52 97 % 05/21/19 1228  (!) 109 16 98/54 95 % 05/21/19 1223  (!) 108 14 103/55 97 % 05/21/19 1220 98.9 °F (37.2 °C) (!) 112 12 120/58 99 % 05/21/19 1218  (!) 111 12 103/54 99 % Pain Assessment Pain Intensity 1: 4 (05/21/19 1353) Pain Location 1: Chest 
Pain Intervention(s) 1: Medication (see MAR) Patient Stated Pain Goal: 0 Ambulating No 
 
 Shift report given to oncoming nurse at the bedside.  
 
Flor Carrillo RN

## 2019-05-21 NOTE — PROGRESS NOTES
Utilization Review Physician has recommended this patient is most appropriate as Outpatient Status receiving Observation Services. The Attending provider agreed and an outpatient order was placed on the chart as the Attending Provider requested. The patient was provided the documentation for a Condition Code 44, conversion from Inpatient to Outpatient Status, and questions answered. The MOON letter explaining the outpatient/observation services was given to patient/family representative with verbal explanation and verbal understanding was received about information given. Letter signed by patient with date and time. Signed copy placed in the patient's chart for scanning by HIS and copy left at bedside for patient information.

## 2019-05-21 NOTE — ANESTHESIA POSTPROCEDURE EVALUATION
Procedure(s): RIGHT THORACOSCOPY PLEURAL BIOPSY PLEURODESIS PLACEMENT OF PLEUREX CATHETER. general 
 
Anesthesia Post Evaluation Multimodal analgesia: multimodal analgesia used between 6 hours prior to anesthesia start to PACU discharge Patient location during evaluation: bedside Patient participation: complete - patient participated Level of consciousness: awake and alert Pain score: 1 Pain management: adequate Airway patency: patent Anesthetic complications: no 
Cardiovascular status: acceptable Respiratory status: acceptable Hydration status: acceptable Comments: Patient doing well. Continue care on floor. Post anesthesia nausea and vomiting:  none Vitals Value Taken Time /55 5/21/2019  1:03 PM  
Temp 37.2 °C (98.9 °F) 5/21/2019 12:20 PM  
Pulse 109 5/21/2019  1:03 PM  
Resp 16 5/21/2019 12:33 PM  
SpO2 98 % 5/21/2019  1:03 PM  
Vitals shown include unvalidated device data.

## 2019-05-21 NOTE — PROGRESS NOTES
TRANSFER - OUT REPORT: 
 
Verbal report given to ROSETTA Bagley on Rohit Batista  being transferred to 2nd floor surgical for routine post - op Report consisted of patients Situation, Background, Assessment and Recommendations(SBAR). Information from the following report(s) SBAR, OR Summary, Intake/Output and MAR was reviewed with the receiving nurse. Opportunity for questions and clarification was provided. Pt's  has been updated by phone.

## 2019-05-21 NOTE — PROGRESS NOTES
's pre-op visit and prayer with patient as requested. Cherise Bautista MDiv, BS Board Certified [employer]

## 2019-05-21 NOTE — ANESTHESIA PROCEDURE NOTES
Arterial Line Placement Start time: 5/21/2019 10:35 AM 
End time: 5/21/2019 10:40 AM 
Performed by: Stan Nesbitt MD 
Authorized by: Stan Nesbitt MD  
 
Pre-Procedure Indications:  Arterial pressure monitoring Preanesthetic Checklist: patient identified, risks and benefits discussed, anesthesia consent, site marked, patient being monitored, timeout performed and patient being monitored Timeout Time: 10:35 Procedure:  
Prep:  Chlorhexidine Seldinger Technique?: Yes Orientation:  Left Location:  Radial artery Catheter size:  20 G Number of attempts:  2 Assessment:  
Post-procedure:  Line secured

## 2019-05-21 NOTE — BRIEF OP NOTE
BRIEF OPERATIVE NOTE Date of Procedure: 5/21/2019 Preoperative Diagnosis: Pleural effusion [J90] Postoperative Diagnosis: malignant Pleural effusion [J90] Procedure(s): RIGHT THORACOSCOPY PLEURAL BIOPSY PLEUREX CATHETER Surgeon(s) and Role: 
   Emily Queen MD - Primary Surgical Assistant: Jasmina Centeno NP Surgical Staff: 
Circ-1: Allyn Fisher RN 
Circ-2: Remedios Davis RN 
Circ-Relief: Maco Moreno RN Scrub Tech-1: Kylah Sinha Scrub Tech-Relief: Carrie Salguero Nurse Practitioner: Brigid Jackson NP Event Time In Time Out Incision Start 11:07 AM   
Incision Close 11:48 AM   
 
Anesthesia: General  
Estimated Blood Loss: minimal 
Specimens:  
ID Type Source Tests Collected by Time Destination 1 : Pleural biopsy Preservative Lung Biopsy  Bib Patel MD 5/21/2019 11:23 AM Pathology Findings: diffuse pleural disease, clearly malignant Complications: none Implants: PleurX and a jamshid

## 2019-05-21 NOTE — PROGRESS NOTES
05/21/19 1525 Dual Skin Pressure Injury Assessment Dual Skin Pressure Injury Assessment WDL Second Care Provider (Based on [facility]) [facility], [address] [facility]

## 2019-05-21 NOTE — PROGRESS NOTES
TRANSFER - IN REPORT: 
 
Verbal report received from Savannah(name) on Timur Alcantar  being received from [facility]) for routine progression of care Report consisted of patients Situation, Background, Assessment and  
Recommendations(SBAR). Information from the following report(s) SBAR was reviewed with the receiving nurse. Opportunity for questions and clarification was provided. Assessment completed upon patients arrival to unit and care assumed.

## 2019-05-22 NOTE — PROGRESS NOTES
Spoke to Ms. Bartolo Jacob and her  yesterday about CC 40 and MOON notifications. Also discussed that discharge plan will be home with [facility] RN for management (drainage) of pleurX cath for pleural effusion. Anticipate discharge tomorrow. Will order home health after we have the exact orders for home health duties. Care Management Interventions PCP Verified by CM: Yes Transition of Care Consult (CM Consult): Home Health [address]: Yes Current Support Network: Lives with Spouse, Own Home Plan discussed with Pt/Family/Caregiver:  Yes

## 2019-05-22 NOTE — PROGRESS NOTES
Complaints of indigestion and urinary output low. 50 ml out since 2000 for total output of 250. /78 (BP 1 Location: Left arm, BP Patient Position: At rest)   Pulse (!) 110   Temp 98.6 °F (37 °C)   Resp 16   Ht 5' 2\" (1.575 m)   Wt 55.2 kg (121 lb 9.6 oz)   SpO2 92%   BMI 22.24 kg/m² 140 ml out put from drain. Placed call to Dr. Lennox Cooks, order received for mylanta, no additional fluid bolus orders received.

## 2019-05-22 NOTE — PROGRESS NOTES
Problem: Mobility Impaired (Adult and Pediatric) Goal: *Acute Goals and Plan of Care (Insert Text) Description STG: 
(1.)Ms. Abigail Varghese will move from supine to sit and sit to supine , scoot up and down and roll side to side with SUPERVISION within 3 treatment day(s). (2.)Ms. Abigail Varghese will transfer from bed to chair and chair to bed with SUPERVISION using the least restrictive device within 3 treatment day(s). (3.)Ms. Abigail Varghese will ambulate with STAND BY ASSIST for 250 feet with the least restrictive device within 3 treatment day(s). LTG: 
(1.)Ms. Abigail Varghese will move from supine to sit and sit to supine , scoot up and down and roll side to side in bed with INDEPENDENT within 7 treatment day(s). (2.)Ms. Abigail Varghese will transfer from bed to chair and chair to bed with INDEPENDENT using the least restrictive device within 7 treatment day(s). (3.)Ms. Abigail Varghese will ambulate with MODIFIED INDEPENDENCE for 500 feet with the least restrictive device within 7 treatment day(s). ________________________________________________________________________________________________ Outcome: Progressing Towards Goal 
  
PHYSICAL THERAPY: Initial Assessment and PM 5/22/2019 OBSERVATION: PT Visit Days : 1 [place] / [place] / Product Type: Medicare /   
  
NAME/AGE/GENDER: Constantino Choi is a 77 y.o. female PRIMARY DIAGNOSIS: Pleural effusion [J90] Pleural effusion [J90] Pleural effusion [J90] Pleural effusion Pleural effusion Procedure(s) (LRB): 
RIGHT THORACOSCOPY PLEURAL BIOPSY PLEURODESIS PLACEMENT OF PLEUREX CATHETER (Right) 1 Day Post-Op ICD-10: Treatment Diagnosis:  
 · Generalized Muscle Weakness (M62.81) · Other lack of cordination (R27.8) · Other abnormalities of gait and mobility (R26.89) Precaution/Allergies: 
Prednisone ASSESSMENT:  
Ms. Abigail Varghese [facility] supine in bed with family at bedside upon contact and is alert and agreeable to PT evaluation and treatment this afternoon. Pt lives with her  in a single level home with 3 steps to enter. Pt reports she is independent with ADLs and mobility at baseline. Pt denies any pain prior to mobility. Pt is not on O2 at home. Pt is currently on 3 L O2 NC with O2 sat reading 92% prior to mobility. Pt transitioned from supine to sitting EOB with CGA requiring additional time and cues for proper technique. Pt demonstrated good sitting balance. Pt completed STS transfer and donned pajama pants with CGA requiring cues for safety of transfer. Pt amb 10 ft in room with CGA requiring cues for safety of ambulation. Pt amb at a slow pace with shuffled steps, narrow base of support, and minimal path deviations. Pt demonstrated fair static and dynamic standing balance. Pt transitioned from standing to sitting EOB with CGA. Pt transitioned from sitting EOB to supine with CGA. Pt scooted up in bed with CGA. Pt's O2 sat read 91% on 3L O2 following mobility. Pt was left supine in bed with all needs met and within reach. Low Mao will benefit from skilled PT (medically necessary) to address decreased strength, decreased balance, decreased functional tolerance, decreased cardiopulmonary endurance affecting participation in basic ADLs and functional tasks. This section established at most recent assessment PROBLEM LIST (Impairments causing functional limitations): 1. Decreased Strength 2. Decreased Ambulation Ability/Technique 3. Decreased Balance 4. Increased Shortness of Breath INTERVENTIONS PLANNED: (Benefits and precautions of physical therapy have been discussed with the patient.) 1. Balance Exercise 2. Bed Mobility 3. Gait Training 4. Home Exercise Program (HEP) 5. Neuromuscular Re-education/Strengthening 6. Therapeutic Activites 7. Therapeutic Exercise/Strengthening 8. Transfer Training TREATMENT PLAN: Frequency/Duration: 3 times a week for duration of hospital stay Rehabilitation Potential For Stated Goals: Good REHAB RECOMMENDATIONS (at time of discharge pending progress):   
Placement: It is my opinion, based on this patient's performance to date, that Ms. Carisa Bernard may benefit from [address] after discharge due to the functional deficits listed above that are likely to improve with skilled rehabilitation because he/she has multiple medical issues that affect his/her functional mobility in the community. Equipment: ? To be determined. HISTORY:  
History of Present Injury/Illness (Reason for Referral): 
See H&P below. RIGHT THORACOSCOPY PLEURAL BIOPSY PLEURODESIS PLACEMENT OF PLEUREX CATHETER Past Medical History/Comorbidities:  
Ms. Carisa Bernard  has a past medical history of Abnormal CT of the chest, Adverse effect of anesthesia, Asthma ( ), Breast cancer ([facility] [address] (2016), Depression ( ), Hypercholesteremia, Hypothyroidism, and Ill-defined condition. She also has no past medical history of Difficult intubation, Malignant hyperthermia due to anesthesia, Nausea & vomiting, Other ill-defined conditions(799.89), or Pseudocholinesterase deficiency. Ms. Carisa Bernard  has a past surgical history that includes hx bladder repair; hx rectocele repair; hx cystocele repair; hx breast biopsy (Right, 2/5/2016); hx breast reconstruction (Bilateral, 9/14/2016); hx breast reconstruction (Bilateral, 9/14/2016); hx mastectomy (Right); hx colonoscopy; hx breast reconstruction (Right, 9/18/2017); hx breast reduction (Left, 9/18/2017); hx breast reconstruction (Right, 9/18/2017); hx breast augmentation (Right, 10/20/2017); hx cyst incision and drainage (Right, 12/15/2017); hx vascular access (2016); hx lap cholecystectomy (2000); hx hysterectomy; hx urological; pr chest surgery procedure unlisted (2018); and pr chest surgery procedure unlisted. Social History/Living Environment: Home Environment: Private residence # Steps to Enter: 3 Rails to Enter: No 
One/Two Story Residence: One story Living Alone: No 
Support Systems: Spouse/Significant Other/Partner Patient Expects to be Discharged to[de-identified] Private residence Current DME Used/Available at Home: None Prior Level of Function/Work/Activity: 
Independent with mobility and ADLs at baseline Number of Personal Factors/Comorbidities that affect the Plan of Care: 1-2: MODERATE COMPLEXITY EXAMINATION:  
Most Recent Physical Functioning:  
Gross Assessment: 
AROM: Generally decreased, functional 
Strength: Generally decreased, functional 
         
  
Posture: 
  
Balance: 
Sitting: Intact Standing: Impaired Standing - Static: Fair Standing - Dynamic : Fair Bed Mobility: 
Supine to Sit: Contact guard assistance; Additional time Sit to Supine: Contact guard assistance Wheelchair Mobility: 
  
Transfers: 
Sit to Stand: Contact guard assistance Stand to Sit: Contact guard assistance Gait: 
  
Base of Support: Narrowed Speed/Benita: Slow;Shuffled Step Length: Right shortened;Left shortened Gait Abnormalities: Decreased step clearance; Path deviations Distance (ft): 10 Feet (ft) Ambulation - Level of Assistance: Contact guard assistance Interventions: Visual/Demos; Verbal cues; Safety awareness training Body Structures Involved: 1. Lungs 2. Thoracic Cage 3. Muscles Body Functions Affected: 1. Respiratory 2. Neuromusculoskeletal 
3. Movement Related Activities and Participation Affected: 1. Mobility 2. Domestic Life 3. Community, Social and [place] Number of elements that affect the Plan of Care: 3: MODERATE COMPLEXITY CLINICAL PRESENTATION:  
Presentation: Evolving clinical presentation with changing clinical characteristics: MODERATE COMPLEXITY CLINICAL DECISION MAKIN [facility] [address] [address] AM-PAC 6 Clicks Basic Mobility Inpatient Short Form How much difficulty does the patient currently have. .. Unable A Lot A Little None 1. Turning over in bed (including adjusting bedclothes, sheets and blankets)? ? 1   ? 2   ? 3   ? 4  
2. Sitting down on and standing up from a chair with arms ( e.g., wheelchair, bedside commode, etc.)   ? 1   ? 2   ? 3   ? 4  
3. Moving from lying on back to sitting on the side of the bed?   ? 1   ? 2   ? 3   ? 4 How much help from another person does the patient currently need. .. Total A Lot A Little None 4. Moving to and from a bed to a chair (including a wheelchair)? ? 1   ? 2   ? 3   ? 4  
5. Need to walk in hospital room? ? 1   ? 2   ? 3   ? 4  
6. Climbing 3-5 steps with a railing? ? 1   ? 2   ? 3   ? 4  
© 2007, Trustees of [address] [place], [place] [address], under license to [employer]. All rights reserved Score:  Initial: 21 Most Recent: X (Date: -- ) Interpretation of Tool:  Re[facility] activities that are increasingly more difficult (i.e. Bed mobility, Transfers, Gait). Medical Necessity:    
· Patient is expected to demonstrate progress in strength, balance, coordination and functional technique ·  to increase independence with bed mobility, transfers, ambulation, balance, and overall functional mobility. · . Reason for Services/Other Comments: 
· Patient continues to demonstrate capacity to improve independence with overall functional mobility, balance, gait, transfers, and bed mobility which will increase independence and increase safety · . Use of outcome tool(s) and clinical judgement create a POC that gives a: Questionable prediction of patient's progress: MODERATE COMPLEXITY  
  
 
 
 
TREATMENT:  
(In addition to Assessment/Re-Assessment sessions the following treatments were rendered) Pre-treatment Symptoms/Complaints:  Acid reflux Pain: Initial:  
Pain Intensity 1: 0  Post Session:  0/10 Therapeutic Activity: (    8 minutes):   Therapeutic activities including Bed transfers, Ambulation on level ground, ADL training, standing balance training, and patient education regarding safety  to improve mobility, strength, balance and coordination. Required minimal Visual/Demos; Verbal cues; Safety awareness training to promote static and dynamic balance in standing and promote coordination of bilateral, lower extremity(s). Braces/Orthotics/Lines/Etc:  
· IV 
· sage catheter · Chest tubes · O2 Device: Nasal cannula Treatment/Session Assessment:   
· Response to Treatment:  See above. · Interdisciplinary Collaboration:  
o Physical Therapist 
o Registered Nurse 
o SPT · After treatment position/precautions:  
o Supine in bed 
o Bed/Chair-wheels locked 
o Bed in low position 
o Call light within reach 
o Family at bedside · Compliance with Program/Exercises: Will assess as treatment progresses · Recommendations/Intent for next treatment session: \"Next visit will focus on advancements to more challenging activities and reduction in assistance provided\". Total Treatment Duration: PT Patient Time In/Time Out Time In: 5128 Time Out: 2896 [facility]

## 2019-05-22 NOTE — PROGRESS NOTES
5/22/2019 PLAN: 
Diet- As tolerated DVT Prophylactics- SCD/Lovenox Pain control- Dilaudid/Norco 
SUP prophylaxis- Protonix Encourage C/DB/IS Chest tube to 20 cmsx MIVF at 125ml/hour Physical therapy ASSESSMENT:  Admit Date: 5/21/2019  
1 Day Post-Op  Procedure(s): RIGHT THORACOSCOPY PLEURAL BIOPSY PLEURODESIS PLACEMENT OF PLEUREX CATHETER SUBJECTIVE: States nausea and vomiting last night. No nausea this morning. Pain controlled, feels like she is breathing better. Chest tube with air leak noted. 180ml/24 hours. U/O decreased, clear yellow creatinine 1.24- MIVF increased to 125ml/hour. AF, tachy 100-120's. OBJECTIVE: 
Patient Vitals for the past 24 hrs: 
 Temp Pulse Resp BP SpO2  
05/22/19 0738     (!) 89 % 05/22/19 0733 97.6 °F (36.4 °C) (!) 119 16 148/84 91 % 05/22/19 0344 98 °F (36.7 °C) (!) 125 16 116/64 90 % 05/21/19 2324 98.6 °F (37 °C) (!) 110 16 123/78 92 % 05/21/19 2012 97.8 °F (36.6 °C) (!) 124 16 147/81 91 % 05/21/19 1920     94 % 05/21/19 1535     91 % 05/21/19 1526     95 % 05/21/19 1457 98.4 °F (36.9 °C) 84 17 107/68 94 % 05/21/19 1423  (!) 128  100/55 97 % 05/21/19 1408  (!) 127  108/59 97 % 05/21/19 1353 98.2 °F (36.8 °C) (!) 123 14 114/55 100 % 05/21/19 1338  (!) 118 14 106/55 99 % 05/21/19 1323  (!) 115 14 107/56 99 % 05/21/19 1308  (!) 110 14 111/57 98 % 05/21/19 1258  (!) 107 14 115/58 97 % 05/21/19 1248  (!) 107 14 107/52 97 % 05/21/19 1243  (!) 108 14 106/55 97 % 05/21/19 1238  (!) 108 14 103/57 98 % 05/21/19 1233  (!) 110 16 94/52 97 % 05/21/19 1228  (!) 109 16 98/54 95 % 05/21/19 1223  (!) 108 14 103/55 97 % 05/21/19 1220 98.9 °F (37.2 °C) (!) 112 12 120/58 99 % 05/21/19 1218  (!) 111 12 103/54 99 % Date 05/21/19 0700 - 05/22/19 4955 05/22/19 0700 - 05/23/19 8363 Shift 8884-7947 1207-5230 24 Hour Total 7734-4869 4361-7752 24 Hour Total  
INTAKE  
P.O. 60  60     
  P. O. 60  60     
I. V.(mL/kg/hr) 500(0.8) 1287(1.9) 1787(1.3) I.V. 100 1287 1387 Volume (lactated Ringers infusion) 400  400 Shift Total(mL/kg) [id number]) 8675(19.2) [id number]) OUTPUT Urine(mL/kg/hr) 100(0.2) 375(0.6) 475(0.4) Urine Output 0  0 Urine Output (mL) (Urinary Catheter 05/21/19 2- way) 100 375 475 Emesis/NG output  290 290 Emesis  290 290 Drains 0 180 180 Output (ml) (Pleural Catheter/Drain 05/21/19 15.5 fr x 66 cm Pleural) 0  0 Output (ml) Latasha Bimler Drain #1 05/21/19 Right Chest) 0 180 180 Blood 100  100 Estimated Blood Loss 100  100 Shift Total(mL/kg) 200(3.6) [id number]) [id number])  926 956 Weight (kg) 55.2 55.2 55.2 55.2 55.2 55.2 General:          No acute distress   
Lungs:             Clear left with rhonchi to right with chest tube 20cmsx, +airleak. Dressing C/D/I Heart:              RRR Abdomen:        Soft, Non distended, Non tender, BS+ Extremities:     No cyanosis, clubbing or edema Neurologic:      No focal deficits 
  
 
 
 
Labs:   
Recent Labs  
  05/22/19 
0522 WBC 4.8 HGB 10.2*  * K 4.8  
CL 95* CO2 30 BUN 25* CREA 1.24* * Nancy Sebastian NP

## 2019-05-22 NOTE — PROGRESS NOTES
END OF SHIFT NOTE: 
 
INTAKE/OUTPUT 
05/21 0701 - 05/22 0700 In: 1847 [P.O.:60; I.V.:1787] Out: 1045 [Urine:475; Drains:180] Voiding: YES Catheter: YES Drain:  
Pleural Catheter/Drain 05/21/19 15.5 fr x 66 cm Pleural (Active) Drainage Description Serosanguinous 5/21/2019  8:08 PM  
Site Assessment Clean, dry, & intact 5/21/2019  8:08 PM  
Dressing Status Clean, dry, & intact 5/21/2019  8:08 PM  
Dressing Type 4 X 4;Tape 5/21/2019 12:20 PM  
Output (ml) 0 ml 5/21/2019  1:53 PM  
   
Kike Drain #1 05/21/19 Right Chest (Active) Site Assessment Clean, dry, & intact 5/22/2019  3:41 AM  
Dressing Status Clean, dry, & intact 5/22/2019  3:41 AM  
Drainage Description Serosanguinous 5/22/2019  3:41 AM  
Kike Drain Airleak Yes 5/22/2019  3:41 AM  
Status Suction (specify 5/21/2019  7:27 PM  
Y Connector Used No 5/22/2019  3:41 AM  
Drainage Chamber Level (ml) 250 ml 5/22/2019  3:41 AM  
Output (ml) 110 ml 5/22/2019  3:41 AM  
 
 
 
 
 
 
Flatus: Patient does have flatus present. Stool:  0 occurrences. Characteristics: 
  
Emesis: 2 occurrences. Characteristics:  
Emesis Assessment Appearance: Jillyn Nunnery, Coffee ground Emesis Amount: Small VITAL SIGNS Patient Vitals for the past 12 hrs: 
 Temp Pulse Resp BP SpO2  
05/22/19 0738     (!) 89 % 05/22/19 0344 98 °F (36.7 °C) (!) 125 16 116/64 90 % 05/21/19 2324 98.6 °F (37 °C) (!) 110 16 123/78 92 % 05/21/19 2012 97.8 °F (36.6 °C) (!) 124 16 147/81 91 % Pain Assessment Pain Intensity 1: 7 (05/22/19 0341) Pain Location 1: Chest 
Pain Intervention(s) 1: Medication (see MAR) Patient Stated Pain Goal: 0 Ambulating No 
 
Shift report given to oncoming nurse at the bedside.  
 
Carin Killian RN

## 2019-05-22 NOTE — PROGRESS NOTES
Zofran 4 mg IV given for nausea with 50 ml brown coffee ground emesis. Dilaudid 1 mg IV given for c/o rt lateral chest pain.

## 2019-05-23 NOTE — PROGRESS NOTES
5/23/2019 PLAN: 
Diet- As tolerated DVT Prophylactics- SCD/Lovenox Pain control- Dilaudid/Norco 
SUP prophylaxis- Protonix Encourage C/DB/IS Chest tube to 20 cmsx- will DC today MIVF at 125ml/hour changed to NS related Na 129 Physical therapy Consult GI for nausea and vomiting RIVAS Fountain ASSESSMENT:  Admit Date: 5/21/2019 2 Day Post-Op  Procedure(s): RIGHT THORACOSCOPY PLEURAL BIOPSY PLEURODESIS PLACEMENT OF PLEUREX CATHETER SUBJECTIVE: Patient with continued nausea and now vomiting. Consulting GI, Chest tube without air leak noted today will DC. Up with physical therapy. Na 129 will change MIVF. Pain controlled. OBJECTIVE: 
Patient Vitals for the past 24 hrs: 
 Temp Pulse Resp BP SpO2  
05/23/19 1102 98.2 °F (36.8 °C) (!) 105 16 129/63 95 % 05/23/19 0700 98.6 °F (37 °C) (!) 111 16 145/70 99 % 05/23/19 0433 98 °F (36.7 °C) (!) 112 16 135/78 93 % 05/22/19 2342 98 °F (36.7 °C) (!) 114 17 141/77 93 % 05/22/19 1940 98.7 °F (37.1 °C) (!) 107 16 138/81 94 % 05/22/19 1532 98.3 °F (36.8 °C) (!) 113 16 125/79 93 % 05/22/19 1300     92 % Date 05/22/19 0700 - 05/23/19 7113 05/23/19 0700 - 05/24/19 0703 Shift 9510-1168 3096-3543 24 Hour Total 8282-9854 1076-0104 24 Hour Total  
INTAKE  
I.V.(mL/kg/hr)  1107(1.7) 1107(0.8) I.V.  1107 1107 Shift Total(mL/kg)  1107(20.1) 1107(20.1) OUTPUT Urine(mL/kg/hr)  675(1) 675(0.5) Urine Output (mL) (Urinary Catheter 05/21/19 2- way)  675 675 Emesis/NG output 50  50 50  50 Emesis 50  50 50  50 Drains 0 480 480 Output (ml) Fairchild Nestor Drain #1 05/21/19 Right Chest) 0 480 480 Shift Total(mL/kg) 50(0.9) 1155(20.9) 1205(21.8) 50(0.9)  50(0.9) NET -50 -48 -98 -50  -50 Weight (kg) 55.2 55.2 55.2 55.2 55.2 55.2 General:          No acute distress   
Lungs:             Clear left with rhonchi to right with chest tube 20cmsx, Dressing C/D/I Heart:              RRR Abdomen:        Soft, Non distended, Non tender, BS+ Extremities:     No cyanosis, clubbing or edema Neurologic:      No focal deficits 
  
 
 
 
Labs:   
Recent Labs  
  05/23/19 
0519 WBC 4.9 HGB 8.6*  
* *  
K 5.4* CL 92* CO2 30 BUN 38* CREA 0.82 GLU 99 Luther Galeazzi, NP

## 2019-05-23 NOTE — PROGRESS NOTES
END OF SHIFT NOTE: 
 
INTAKE/OUTPUT 
05/22 0701 - 05/23 0700 In: 1107 [I.V.:1107] Out: 8858 [Urine:675; Drains:480] Voiding: YES Catheter: YES Drain:  
Pleural Catheter/Drain 05/21/19 15.5 fr x 66 cm Pleural (Active) Drainage Description Serosanguinous 5/22/2019  7:20 AM  
Site Assessment Clean, dry, & intact 5/22/2019  7:20 AM  
Dressing Status Clean, dry, & intact 5/22/2019  7:20 AM  
Dressing Type 4 X 4;Tape 5/22/2019  7:20 AM  
Output (ml) 0 ml 5/21/2019  1:53 PM  
   
Kike Drain #1 05/21/19 Right Chest (Active) Site Assessment Clean, dry, & intact 5/23/2019  4:34 AM  
Dressing Status Clean, dry, & intact 5/23/2019  4:34 AM  
Drainage Description Serosanguinous 5/23/2019  4:34 AM  
Kike Drain Airleak Yes 5/23/2019  4:34 AM  
Status Draining 5/23/2019  4:34 AM  
Y Connector Used No 5/23/2019  4:34 AM  
Drainage Chamber Level (ml) 730 ml 5/23/2019  4:34 AM  
Output (ml) 60 ml 5/23/2019  4:34 AM  
 
 
 
 
 
 
Flatus: Patient does have flatus present. Stool:  0 occurrences. Characteristics: 
  
Emesis: 1 occurrences. Characteristics:  
Emesis Assessment Appearance: Jackquelyn Capuchin, Coffee ground Emesis Amount: Small VITAL SIGNS Patient Vitals for the past 12 hrs: 
 Temp Pulse Resp BP SpO2  
05/23/19 0700 98.6 °F (37 °C) (!) 111 16 145/70 99 % 05/23/19 0433 98 °F (36.7 °C) (!) 112 16 135/78 93 % 05/22/19 2342 98 °F (36.7 °C) (!) 114 17 141/77 93 % Pain Assessment Pain Intensity 1: 0 (05/23/19 0100) Pain Location 1: Chest 
Pain Intervention(s) 1: Rest 
Patient Stated Pain Goal: 0 Ambulating No 
 
Shift report given to oncoming nurse at the bedside.  
 
Queyn Solorio RN

## 2019-05-23 NOTE — H&P (VIEW-ONLY)
[facility] Consult Note Consulting GI Physician: Dr. Mark Baeza Referring Provider:  Ivis Price NP/Dr. Shavon Bone 
 
Consult Date:  5/23/2019 Admit Date:  5/21/2019 Chief Complaint:  Nausea/Vomiting Subjective:  
 
History of Present Illness:  Patient is a 77 y.o. female with PMH HLD, Hypothyroidism, Asthma, Breast cancer stage 4 s/p R mastectomy 2016 with radiation/reconstruction with latissimus flap, being seen in GI consultation at the request of Ivis Price NP/Dr. Shavon Bone for nausea and vomiting. On 5/15 she presented to Dr. Barber Govea office for c/o pleural effusion that required 4 thoracentesis since 2/2019 with negative cytology. Then also c/o night time fever with temp 100.7, loss of appetite, 10lb weight loss, SOB with exertion all since 1/2019. She underwent R thoracoscopy with pleural biopsy and placement of pleurx catheter with post op diagnosis of malignant pleural effusion. She had chest tube with air leak noted. Per surgery NP, no air leak was noted today 5/23 and chest tube was removed. She has experienced new onset of nausea with vomiting since after her thoracoscopy. Her RN describes coffee ground appearing emesis and she describes appearance of she thought appeared to be \"old blood. \" N/V is occurring following meals and evening after drinking only water. She tends to feel ok when she is not eating or drinking. Additionally she has had heartburn over the past few months for which she was taking an OTC acid reducer. During this admission she has c/o indigestion and mid chest/esophageal tightening. No abdominal pain. Appetite has been decreased. She is currently on Pantoprazole 40mg qAM with Mylanta. Denies anticoagulation, NSAIDs, tobacco, ETOH. She is having difficulty having BMs and is receiving stool softeners. No blood in stools that she had prior to admission. No prior EGD. She reports prior normal colonoscopy. Have reviewed labs which included Hgb of 8.6 which decreased from Hgb 10.2, .9, plt 149. BUN is increased at 38 with Cr 0.82. LFTs 5/15/19 revealed elevated Alk phos 162, AST 56 with normal ALT, Tbili. PT/CT 10/2018 reported normal physiologic FDG tracer activity and reported no suspicious liver, adrenal lesion. PMH: 
Past Medical History:  
Diagnosis Date  Abnormal CT of the chest   
 Adverse effect of anesthesia   
 bp dropped with dual lung/gallbladder surgery  Asthma    
 no acute attacks recently. uses advair daily  Breast cancer ([facility] [address] 2016 R breast--infiltrating ductal ca,  ER/[place] positive and HER2 negative, s/p mastectomy, chemo, reconstruction, on chronic Ibrance therapy  Depression  Hypercholesteremia Controlled with diet  Hypothyroidism  Ill-defined condition   
 granular lung disease resolved 2002 PSH: 
Past Surgical History:  
Procedure Laterality Date  CHEST SURGERY PROCEDURE UNLISTED  2018 R lung biopsy  CHEST SURGERY PROCEDURE UNLISTED    
 multiple thoracentis to right lung all 2019  HX BLADDER REPAIR    
 HX BREAST AUGMENTATION Right 10/20/2017 BREAST IMPLANT REMOVAL  performed by Iris Cohen MD at [address] [place] [address] HX BREAST BIOPSY Right 2/5/2016 SKIN BIOPSY RIGHT BREAST performed by Stevie Green MD at [facility] MAIN OR  
 HX BREAST RECONSTRUCTION Bilateral 9/14/2016 BILATERAL BREAST RECONSTRUCTION performed by Iris Cohen MD at [facility] MAIN OR  
 HX BREAST RECONSTRUCTION Bilateral 9/14/2016 BILATERAL BREAST TISSUE EXPANDER INSERTION performed by Iris Cohen MD at [facility] MAIN OR  
 HX BREAST RECONSTRUCTION Right 9/18/2017 RIGHT BREAST RECONSTRUCTION performed by Iris Cohen MD at [address] [place] [address] HX BREAST RECONSTRUCTION Right 9/18/2017 Right  PLACEMENT OF PERMANENT IMPLANTS performed by Iris Cohen MD at [address] [place] [address] HX BREAST REDUCTION Left 9/18/2017 LEFT BREAST MASTOPEXY FOR SYMMETRY performed by Mauri Yi MD at [address] [place] [address] HX COLONOSCOPY    
 2006  HX CYST INCISION AND DRAINAGE Right 12/15/2017 INCISION AND DRAINAGE BREAST  performed by Mauri Yi MD at [address] [place] [address] HX CYSTOCELE REPAIR    
 HX HYSTERECTOMY    
 fibroids  HX LAP CHOLECYSTECTOMY  2000  HX MASTECTOMY Right  HX RECTOCELE REPAIR    
 HX UROLOGICAL    
 blader and rectocele repair  HX VASCULAR ACCESS  2016  
 port left chest wall Allergies: Allergies Allergen Reactions  Prednisone Unknown (comments) \"psychotic reaction\"  To oral and parenteral steroids Home Medications: 
Prior to Admission medications Medication Sig Start Date End Date Taking? Authorizing Provider  
acetaminophen (TYLENOL) 500 mg tablet Take 1,000 mg by mouth every six (6) hours as needed for Pain. Yes Provider, Historical  
palbociclib (IBRANCE) 75 mg cap Take 1 Cap by mouth daily. 1 cap by mouth daily for 21 days, then off for 7 days. Patient taking differently: Take 75 mg by mouth daily. To stop 5/16/2019 per Dr Araiza Persons 2/28/19  Yes Sunny Sweeney MD  
albuterol (PROVENTIL HFA, VENTOLIN HFA, PROAIR HFA) 90 mcg/actuation inhaler 2 puffs q6h prn wheezing. May use brand per formulary 2/11/19  Yes Alexander Alcaraz MD  
levothyroxine (SYNTHROID) 75 mcg tablet CANCEL THIS SCRIPT  1 every day for thyroid 11/15/18  Yes Alexander Alcaraz MD  
ALPRAZolam Gloria Hearn) 0.5 mg tablet 1 bid-tid prn anxiety 11/15/18  Yes Alexander Alcaraz MD  
DULoxetine (CYMBALTA) 60 mg capsule 1 every day for depression Patient taking differently: 60 mg nightly.  1 every day for depression 11/15/18  Yes Alexander Alcaraz MD  
varicella-zoster recombinant, PF, (SHINGRIX, PF,) 50 mcg/0.5 mL susr injection Give in 2 doses, 2-6 months apart    DX Z23 11/15/18   Alexander Alcaraz MD  
fluticasone-salmeterol (ADVAIR DISKUS) 250-50 mcg/dose diskus inhaler 1 inhalation bid for asthma control 11/15/18   Abigail Duff MD  
heparin, porcine, pf (HEPARIN LOCKFLUSH,PORCINE,,PF,) 100 unit/mL injection Access port, flush with 20ml Normal Saline, 500 units of Heparin Flush and de access port every 4 weeks. 10/26/17   Alethia Seip, NP [facility] Medications: 
Current Facility-Administered Medications Medication Dose Route Frequency  0.9% sodium chloride infusion  125 mL/hr IntraVENous CONTINUOUS  
 alum-mag hydroxide-simeth (MYLANTA) oral suspension 30 mL  30 mL Oral Q4H PRN  
 albuterol (PROVENTIL VENTOLIN) nebulizer solution 1.25 mg  1.25 mg Nebulization Q6H PRN  
 ALPRAZolam (XANAX) tablet 0.5 mg  0.5 mg Oral QHS PRN  
 DULoxetine (CYMBALTA) capsule 60 mg  60 mg Oral QHS  levothyroxine (SYNTHROID) tablet 75 mcg  75 mcg Oral ACB  gabapentin (NEURONTIN) capsule 300 mg  300 mg Oral TID  docusate sodium (COLACE) capsule 100 mg  100 mg Oral BID  pantoprazole (PROTONIX) tablet 40 mg  40 mg Oral ACB  sodium chloride (NS) flush 5-40 mL  5-40 mL IntraVENous Q8H  
 sodium chloride (NS) flush 5-40 mL  5-40 mL IntraVENous PRN  
 HYDROmorphone (PF) (DILAUDID) injection 1 mg  1 mg IntraVENous Q4H PRN  
 naloxone (NARCAN) injection 0.4 mg  0.4 mg IntraVENous PRN  
 ondansetron (ZOFRAN) injection 4 mg  4 mg IntraVENous Q4H PRN  
 enoxaparin (LOVENOX) injection 40 mg  40 mg SubCUTAneous Q24H  
 HYDROcodone-acetaminophen (NORCO) 5-325 mg per tablet 1-2 Tab  1-2 Tab Oral Q4H PRN  
 budesonide (PULMICORT) 500 mcg/2 ml nebulizer suspension  500 mcg Nebulization BID RT And  
 albuterol (PROVENTIL VENTOLIN) nebulizer solution 2.5 mg  2.5 mg Nebulization Q6HWA RT Social History: 
Social History Tobacco Use  Smoking status: Never Smoker  Smokeless tobacco: Never Used Substance Use Topics  Alcohol use: No  
  Alcohol/week: 0.0 oz Family History: 
No known Fhx GI malignancy. Family History Problem Relation Age of Onset  Hypertension Mother Tish Hurt Mother 79 Breast cancer at age 79  Diabetes Mother Type 2  
 Other Father   
     alzheimers  Cancer Maternal Aunt Breast cancer after age 72  Cancer Maternal Aunt Breast cancer at age 39  Allergic Rhinitis Neg Hx  Allergy-severe Neg Hx  Amblyopia Neg Hx  Anemia Neg Hx  Anesth Problems Neg Hx  Angioedema Neg Hx  Anxiety Neg Hx  Arrhythmia Neg Hx  Arthritis-rheumatoid Neg Hx  Ataxia Neg Hx  Atopy Neg Hx  Bipolar Disorder Neg Hx  Blindness Neg Hx  Breast Cancer Neg Hx  Breast Problems Neg Hx  Broken Bones Neg Hx  Cataract Neg Hx  Celiac Disease Neg Hx  Childhood heart surgery Neg Hx  Childhood resp disease Neg Hx  Ch[place] Neg Hx  Clotting Disorder Neg Hx  Collagen Dis Neg Hx  Colon Cancer Neg Hx  Colon Polyps Neg Hx  COPD Neg Hx  Coronary Artery Disease Neg Hx  Crohn's Disease Neg Hx  Cystic Fibrosis Neg Hx  Delayed Awakening Neg Hx  Dementia Neg Hx  Depression Neg Hx  Dislocations Neg Hx  Downs Syndrome Neg Hx  Drug Abuse Neg Hx  Eclampsia Neg Hx  Eczema Neg Hx  Emergence Delirium Neg Hx  Emphysema Neg Hx  Fainting Neg Hx  Genitourinary () Neg Hx  Glaucoma Neg Hx   
 Heart Attack Neg Hx   
 Heart defect Neg Hx   
 Heart Failure Neg Hx   
 Heart Surgery Neg Hx  Hemachromatosis Neg Hx  Herpes Neg Hx  High Cholesterol Neg Hx   
 HIV/AIDS Neg Hx  Immunodeficiency Neg Hx  Infertility Neg Hx  Inflammatory Bowel Dz Neg Hx  Kidney Disease Neg Hx  Liver Disease Neg Hx  Macular Degen Neg Hx  Malignant Hyperthermia Neg Hx  MS Neg Hx  Neuropathy Neg Hx  NF Neg Hx  Osteoporosis Neg Hx   
 Ovarian Cancer Neg Hx  Pacemaker Neg Hx  Panic disorder Neg Hx  Parkinsonism Neg Hx  Post-op Cognitive Dysfunction Neg Hx  Post-op Nausea/Vomiting Neg Hx  Prematurity Neg Hx   Labor Neg Hx  Pseudocholinesterase Deficiency Neg Hx  Psoriasis Neg Hx  Psychotic Disorder Neg Hx  Rashes/Skin Problems Neg Hx  Retinal Detachment Neg Hx  Rh Incompatibility Neg Hx  Schizophrenia Neg Hx  Seizures Neg Hx  Severe Sprains Neg Hx  Sickle Cell Anemia Neg Hx  Sickle Cell Trait Neg Hx  SIDS Neg Hx  SLE Neg Hx  Spont Ab Neg Hx  Stomach Cancer Neg Hx  Strabismus Neg Hx  Substance Abuse Neg Hx  Sudden Death Neg Hx  Suicide Neg Hx  Thyroid Disease Neg Hx  Tuberculosis Neg Hx  Ulcerative Colitis Neg Hx  Urticaria Neg Hx  Lang's Disease Neg Hx Review of Systems: A detailed 10 system ROS is obtained, with pertinent positives as listed above. All others are negative. Diet:  Regular Objective:  
 
Physical Exam: 
Vitals: 
Visit Vitals /63 Pulse (!) 105 Temp 98.2 °F (36.8 °C) Resp 16 Ht 5' 2\" (1.575 m) Wt 55.2 kg (121 lb 9.6 oz) SpO2 94% Breastfeeding? No  
BMI 22.24 kg/m² Gen:  Pt is alert, cooperative, no acute distress Skin:  Face reveal no rashes. HEENT: Sclerae anicteric. Cardiovascular: Regular rate and rhythm. Respiratory:  Comfortable breathing with no accessory muscle use. Some coarse breath sounds anteriorly. No wheezing. GI:  Abdomen nondistended, soft, and nontender. Normal active bowel sounds. Rectal:  Deferred Musculoskeletal:  No pitting edema of the lower legs. Neurological:  Gross memory appears intact. Patient is alert and oriented. Psychiatric:  Mood appears appropriate with judgement intact. Laboratory:   
Recent Labs  
  19 
0519 19 
0522 WBC 4.9 4.8 HGB 8.6* 10.2* HCT 26.5* 31.3*  
* 164 .9* 107.6* * 135*  
K 5.4* 4.8  
CL 92* 95* CO2 30 30 BUN 38* 25* CREA 0.82 1.24* CA 8.0* 8.5 GLU 99 123* CXR 5/23/19 FINDINGS: Diffuse right and left lung base infiltrates or edema are unchanged. A small loculated pneumothorax along the lateral right lung base is also unchanged. Cardiac size is stable. The right-sided chest tubes and a left chest wall infusion port catheter remain in place. IMPRESSION: No interval change. CXR 5/22/19 FINDINGS: Diffuse right and basilar left lung edema or infiltrates are unchanged. A small loculated pneumothorax along the right lung base is slightly decreased in size. Two right-sided chest tubes remain in place. Again noted is a left chest wall infusion port catheter. IMPRESSION: Slightly decreased loculated right basilar pneumothorax, otherwise, no change. CXR 5/21/19 IMPRESSION: There is interval decrease in degree of opacification throughout the right hemithorax with 2 chest tubes in place. There still remains small bilateral pleural effusions and there is suggestion of a loculated pneumothorax, versus non reexpansion of the right lung parenchyma in the right lung base. General interstitial edema is noted throughout the left hemithorax. Assessment:  
 
Principal Problem: 
  Pleural effusion (5/21/2019) 77 y.o. female with PMH HLD, Hypothyroidism, Asthma, Breast cancer stage 4 s/p R mastectomy 2016 with radiation/reconstruction with latissimus flap, admitted for pleural effusion s/p thoracoscopy 5/21 being seen in GI consultation at the request of Sherryle Solomons NP/Dr. Alec Kamara for nausea and vomiting (concern for old blood per pt and RN describes cge) occurring with meals and water alone, new in onset since 5/21 and also c/o heartburn for a few months, chest/esophageal tightening, constipation. No prior EGD.   Per pt prior colonoscopy was normal. On 5/15 she presented to Dr. Dianna Galvez office for c/o pleural effusion that required 4 thoracentesis since 2/2019 with negative cytology along with c/o night time fever, loss of appetite, 10lb weight loss, SOB with exertion all since 1/2019. She underwent R thoracoscopy with pleural biopsy and placement of pleurx catheter with post op diagnosis of malignant pleural effusion. She had chest tube with air leak noted. Per surgery NP, no air leak was noted today 5/23 and chest tube was removed. Labs today revealed Hgb of 8.6 which decreased from Hgb 10.2, .9, plt 149. BUN is increased at 38 with Cr 0.82. LFTs 5/15/19 revealed elevated Alk phos 162, AST 56 with normal AL, Tbili. Other than CXRs on this admission most recent imaging that I have includes PT/CT 10/2018 that reported normal physiologic FDG tracer activity and reported no suspicious liver, adrenal lesion. Plan:  
 
-Check LFTs, Lipase 
-Follow up CBC in AM.  Specifically- follow trend of H/H and monitor for evidence of overt GI bleeding 
-Continue Protonix 40mg po qAM 
-Continue Colace every day. Will add MiraLax 17g capful daily for improved bowel regularity 
-Supportive care, anti-emetics, analgesics prn 
-Plan EGD tomorrow with Dr. Marcin Hoover to evaluate for PUD. Discussed risks vs benefits especially in setting of pleural effusions. She tolerated anesthesia with recent  thoracoscopy. Further recommendations will be based upon findings of EGD and pt clinical course. Katherine Garland PA-C [facility] Patient is seen and examined in collaboration with Dr. Can Cardenas. Assessment and plan as per Dr. Can Cardenas.

## 2019-05-23 NOTE — PROGRESS NOTES
PT note:  Treatment deferred a this pm as another staff member is currently in the room with the patient. Will return as time permits.   Genevieve Hong, PTA

## 2019-05-23 NOTE — CONSULTS
[facility] Consult Note Consulting GI Physician: Dr. Caroline Navarro Referring Provider:  Luis Alcala NP/Dr. Gallito Parada 
 
Consult Date:  5/23/2019 Admit Date:  5/21/2019 Chief Complaint:  Nausea/Vomiting Subjective:  
 
History of Present Illness:  Patient is a 77 y.o. female with PMH HLD, Hypothyroidism, Asthma, Breast cancer stage 4 s/p R mastectomy 2016 with radiation/reconstruction with latissimus flap, being seen in GI consultation at the request of Luis Alcala NP/Dr. Gallito Parada for nausea and vomiting. On 5/15 she presented to Dr. Salud Nobles office for c/o pleural effusion that required 4 thoracentesis since 2/2019 with negative cytology. Then also c/o night time fever with temp 100.7, loss of appetite, 10lb weight loss, SOB with exertion all since 1/2019. She underwent R thoracoscopy with pleural biopsy and placement of pleurx catheter with post op diagnosis of malignant pleural effusion. She had chest tube with air leak noted. Per surgery NP, no air leak was noted today 5/23 and chest tube was removed. She has experienced new onset of nausea with vomiting since after her thoracoscopy. Her RN describes coffee ground appearing emesis and she describes appearance of she thought appeared to be \"old blood. \" N/V is occurring following meals and evening after drinking only water. She tends to feel ok when she is not eating or drinking. Additionally she has had heartburn over the past few months for which she was taking an OTC acid reducer. During this admission she has c/o indigestion and mid chest/esophageal tightening. No abdominal pain. Appetite has been decreased. She is currently on Pantoprazole 40mg qAM with Mylanta. Denies anticoagulation, NSAIDs, tobacco, ETOH. She is having difficulty having BMs and is receiving stool softeners. No blood in stools that she had prior to admission. No prior EGD. She reports prior normal colonoscopy. Have reviewed labs which included Hgb of 8.6 which decreased from Hgb 10.2, .9, plt 149. BUN is increased at 38 with Cr 0.82. LFTs 5/15/19 revealed elevated Alk phos 162, AST 56 with normal ALT, Tbili. PT/CT 10/2018 reported normal physiologic FDG tracer activity and reported no suspicious liver, adrenal lesion. PMH: 
Past Medical History:  
Diagnosis Date  Abnormal CT of the chest   
 Adverse effect of anesthesia   
 bp dropped with dual lung/gallbladder surgery  Asthma    
 no acute attacks recently. uses advair daily  Breast cancer ([facility] [address] 2016 R breast--infiltrating ductal ca,  ER/ID positive and HER2 negative, s/p mastectomy, chemo, reconstruction, on chronic Ibrance therapy  Depression  Hypercholesteremia Controlled with diet  Hypothyroidism  Ill-defined condition   
 granular lung disease resolved 2002 PSH: 
Past Surgical History:  
Procedure Laterality Date  CHEST SURGERY PROCEDURE UNLISTED  2018 R lung biopsy  CHEST SURGERY PROCEDURE UNLISTED    
 multiple thoracentis to right lung all 2019  HX BLADDER REPAIR    
 HX BREAST AUGMENTATION Right 10/20/2017 BREAST IMPLANT REMOVAL  performed by Courtney Jay MD at [address] [place] [address] HX BREAST BIOPSY Right 2/5/2016 SKIN BIOPSY RIGHT BREAST performed by Fauzia Mao MD at [facility] MAIN OR  
 HX BREAST RECONSTRUCTION Bilateral 9/14/2016 BILATERAL BREAST RECONSTRUCTION performed by Courtney Jay MD at [facility] MAIN OR  
 HX BREAST RECONSTRUCTION Bilateral 9/14/2016 BILATERAL BREAST TISSUE EXPANDER INSERTION performed by Courtney Jay MD at [facility] MAIN OR  
 HX BREAST RECONSTRUCTION Right 9/18/2017 RIGHT BREAST RECONSTRUCTION performed by Courtney Jay MD at [address] [place] [address] HX BREAST RECONSTRUCTION Right 9/18/2017 Right  PLACEMENT OF PERMANENT IMPLANTS performed by Courtney Jay MD at [address] [place] [address] HX BREAST REDUCTION Left 9/18/2017 LEFT BREAST MASTOPEXY FOR SYMMETRY performed by Jaskaran Niño MD at [facility] HX COLONOSCOPY    
 2006  HX CYST INCISION AND DRAINAGE Right 12/15/2017 INCISION AND DRAINAGE BREAST  performed by Jaskaran Niño MD at [address] [place] [address] HX CYSTOCELE REPAIR    
 HX HYSTERECTOMY    
 fibroids  HX LAP CHOLECYSTECTOMY  2000  HX MASTECTOMY Right  HX RECTOCELE REPAIR    
 HX UROLOGICAL    
 blader and rectocele repair  HX VASCULAR ACCESS  2016  
 port left chest wall Allergies: Allergies Allergen Reactions  Prednisone Unknown (comments) \"psychotic reaction\"  To oral and parenteral steroids Home Medications: 
Prior to Admission medications Medication Sig Start Date End Date Taking? Authorizing Provider  
acetaminophen (TYLENOL) 500 mg tablet Take 1,000 mg by mouth every six (6) hours as needed for Pain. Yes Provider, Historical  
palbociclib (IBRANCE) 75 mg cap Take 1 Cap by mouth daily. 1 cap by mouth daily for 21 days, then off for 7 days. Patient taking differently: Take 75 mg by mouth daily. To stop 5/16/2019 per Dr Jeramy Spring 2/28/19  Yes Hari Garcia MD  
albuterol (PROVENTIL HFA, VENTOLIN HFA, PROAIR HFA) 90 mcg/actuation inhaler 2 puffs q6h prn wheezing. May use brand per formulary 2/11/19  Yes Evelin Mathews MD  
levothyroxine (SYNTHROID) 75 mcg tablet CANCEL THIS SCRIPT  1 every day for thyroid 11/15/18  Yes Evelin Mathews MD  
ALPRAZolam [place]) 0.5 mg tablet 1 bid-tid prn anxiety 11/15/18  Yes Evelin Mathews MD  
DULoxetine (CYMBALTA) 60 mg capsule 1 every day for depression Patient taking differently: 60 mg nightly.  1 every day for depression 11/15/18  Yes Evelin Mathews MD  
varicella-zoster recombinant, PF, (SHINGRIX, PF,) 50 mcg/0.5 mL susr injection Give in 2 doses, 2-6 months apart    DX Z23 11/15/18   Evelin Mathews MD  
fluticasone-salmeterol (ADVAIR DISKUS) 250-50 mcg/dose diskus inhaler 1 inhalation bid for asthma control 11/15/18   Kandis Ruffin MD  
heparin, porcine, pf (HEPARIN LOCKFLUSH,PORCINE,,PF,) 100 unit/mL injection Access port, flush with 20ml Normal Saline, 500 units of Heparin Flush and de access port every 4 weeks. 10/26/17   Susie Winkler NP [facility] Medications: 
Current Facility-Administered Medications Medication Dose Route Frequency  0.9% sodium chloride infusion  125 mL/hr IntraVENous CONTINUOUS  
 alum-mag hydroxide-simeth (MYLANTA) oral suspension 30 mL  30 mL Oral Q4H PRN  
 albuterol (PROVENTIL VENTOLIN) nebulizer solution 1.25 mg  1.25 mg Nebulization Q6H PRN  
 ALPRAZolam (XANAX) tablet 0.5 mg  0.5 mg Oral QHS PRN  
 DULoxetine (CYMBALTA) capsule 60 mg  60 mg Oral QHS  levothyroxine (SYNTHROID) tablet 75 mcg  75 mcg Oral ACB  gabapentin (NEURONTIN) capsule 300 mg  300 mg Oral TID  docusate sodium (COLACE) capsule 100 mg  100 mg Oral BID  pantoprazole (PROTONIX) tablet 40 mg  40 mg Oral ACB  sodium chloride (NS) flush 5-40 mL  5-40 mL IntraVENous Q8H  
 sodium chloride (NS) flush 5-40 mL  5-40 mL IntraVENous PRN  
 HYDROmorphone (PF) (DILAUDID) injection 1 mg  1 mg IntraVENous Q4H PRN  
 naloxone (NARCAN) injection 0.4 mg  0.4 mg IntraVENous PRN  
 ondansetron (ZOFRAN) injection 4 mg  4 mg IntraVENous Q4H PRN  
 enoxaparin (LOVENOX) injection 40 mg  40 mg SubCUTAneous Q24H  
 HYDROcodone-acetaminophen (NORCO) 5-325 mg per tablet 1-2 Tab  1-2 Tab Oral Q4H PRN  
 budesonide (PULMICORT) 500 mcg/2 ml nebulizer suspension  500 mcg Nebulization BID RT And  
 albuterol (PROVENTIL VENTOLIN) nebulizer solution 2.5 mg  2.5 mg Nebulization Q6HWA RT Social History: 
Social History Tobacco Use  Smoking status: Never Smoker  Smokeless tobacco: Never Used Substance Use Topics  Alcohol use: No  
  Alcohol/week: 0.0 oz Family History: 
No known Fhx GI malignancy. Family History Problem Relation Age of Onset  Hypertension Mother Dimitrios Barragan Mother 79 Breast cancer at age 79  Diabetes Mother Type 2  
 Other Father   
     alzheimers  Cancer Maternal Aunt Breast cancer after age 72  Cancer Maternal Aunt Breast cancer at age 39  Allergic Rhinitis Neg Hx  Allergy-severe Neg Hx  Amblyopia Neg Hx  Anemia Neg Hx  Anesth Problems Neg Hx  Angioedema Neg Hx  Anxiety Neg Hx  Arrhythmia Neg Hx  Arthritis-rheumatoid Neg Hx  Ataxia Neg Hx  Atopy Neg Hx  Bipolar Disorder Neg Hx  Blindness Neg Hx  Breast Cancer Neg Hx  Breast Problems Neg Hx  Broken Bones Neg Hx  Cataract Neg Hx  Celiac Disease Neg Hx  Childhood heart surgery Neg Hx  Childhood resp disease Neg Hx  [place] Neg Hx  Clotting Disorder Neg Hx  Collagen Dis Neg Hx  Colon Cancer Neg Hx  Colon Polyps Neg Hx  COPD Neg Hx  Coronary Artery Disease Neg Hx  Crohn's Disease Neg Hx  Cystic Fibrosis Neg Hx  Delayed Awakening Neg Hx  Dementia Neg Hx  Depression Neg Hx  Dislocations Neg Hx  Downs Syndrome Neg Hx  Drug Abuse Neg Hx  Eclampsia Neg Hx  Eczema Neg Hx  Emergence Delirium Neg Hx  Emphysema Neg Hx  Fainting Neg Hx  Genitourinary () Neg Hx  Glaucoma Neg Hx   
 Heart Attack Neg Hx   
 Heart defect Neg Hx   
 Heart Failure Neg Hx   
 Heart Surgery Neg Hx  Hemachromatosis Neg Hx  Herpes Neg Hx  High Cholesterol Neg Hx   
 HIV/AIDS Neg Hx  Immunodeficiency Neg Hx  Infertility Neg Hx  Inflammatory Bowel Dz Neg Hx  Kidney Disease Neg Hx  Liver Disease Neg Hx  Macular Degen Neg Hx  Malignant Hyperthermia Neg Hx  MS Neg Hx  Neuropathy Neg Hx  NF Neg Hx  Osteoporosis Neg Hx   
 Ovarian Cancer Neg Hx  Pacemaker Neg Hx  Panic disorder Neg Hx  Parkinsonism Neg Hx  Post-op Cognitive Dysfunction Neg Hx  Post-op Nausea/Vomiting Neg Hx  Prematurity Neg Hx   Labor Neg Hx  Pseudocholinesterase Deficiency Neg Hx  Psoriasis Neg Hx  Psychotic Disorder Neg Hx  Rashes/Skin Problems Neg Hx  Retinal Detachment Neg Hx  Rh Incompatibility Neg Hx  Schizophrenia Neg Hx  Seizures Neg Hx  Severe Sprains Neg Hx  Sickle Cell Anemia Neg Hx  Sickle Cell Trait Neg Hx  SIDS Neg Hx  SLE Neg Hx  Spont Ab Neg Hx  Stomach Cancer Neg Hx  Strabismus Neg Hx  Substance Abuse Neg Hx  Sudden Death Neg Hx  Suicide Neg Hx  Thyroid Disease Neg Hx  Tuberculosis Neg Hx  Ulcerative Colitis Neg Hx  Urticaria Neg Hx  Lang's Disease Neg Hx Review of Systems: A detailed 10 system ROS is obtained, with pertinent positives as listed above. All others are negative. Diet:  Regular Objective:  
 
Physical Exam: 
Vitals: 
Visit Vitals /63 Pulse (!) 105 Temp 98.2 °F (36.8 °C) Resp 16 Ht 5' 2\" (1.575 m) Wt 55.2 kg (121 lb 9.6 oz) SpO2 94% Breastfeeding? No  
BMI 22.24 kg/m² Gen:  Pt is alert, cooperative, no acute distress Skin:  Face reveal no rashes. HEENT: Sclerae anicteric. Cardiovascular: Regular rate and rhythm. Respiratory:  Comfortable breathing with no accessory muscle use. Some coarse breath sounds anteriorly. No wheezing. GI:  Abdomen nondistended, soft, and nontender. Normal active bowel sounds. Rectal:  Deferred Musculoskeletal:  No pitting edema of the lower legs. Neurological:  Gross memory appears intact. Patient is alert and oriented. Psychiatric:  Mood appears appropriate with judgement intact. Laboratory:   
Recent Labs  
  19 
0519 19 
0522 WBC 4.9 4.8 HGB 8.6* 10.2* HCT 26.5* 31.3*  
* 164 .9* 107.6* * 135*  
K 5.4* 4.8  
CL 92* 95* CO2 30 30 BUN 38* 25* CREA 0.82 1.24* CA 8.0* 8.5 GLU 99 123* CXR 5/23/19 FINDINGS: Diffuse right and left lung base infiltrates or edema are unchanged. A small loculated pneumothorax along the lateral right lung base is also unchanged. Cardiac size is stable. The right-sided chest tubes and a left chest wall infusion port catheter remain in place. IMPRESSION: No interval change. CXR 5/22/19 FINDINGS: Diffuse right and basilar left lung edema or infiltrates are unchanged. A small loculated pneumothorax along the right lung base is slightly decreased in size. Two right-sided chest tubes remain in place. Again noted is a left chest wall infusion port catheter. IMPRESSION: Slightly decreased loculated right basilar pneumothorax, otherwise, no change. CXR 5/21/19 IMPRESSION: There is interval decrease in degree of opacification throughout the right hemithorax with 2 chest tubes in place. There still remains small bilateral pleural effusions and there is suggestion of a loculated pneumothorax, versus non reexpansion of the right lung parenchyma in the right lung base. General interstitial edema is noted throughout the left hemithorax. Assessment:  
 
Principal Problem: 
  Pleural effusion (5/21/2019) 77 y.o. female with PMH HLD, Hypothyroidism, Asthma, Breast cancer stage 4 s/p R mastectomy 2016 with radiation/reconstruction with latissimus flap, admitted for pleural effusion s/p thoracoscopy 5/21 being seen in GI consultation at the request of Selin Griffin NP/Dr. Fletcher Doherty for nausea and vomiting (concern for old blood per pt and RN describes cge) occurring with meals and water alone, new in onset since 5/21 and also c/o heartburn for a few months, chest/esophageal tightening, constipation. No prior EGD.   Per pt prior colonoscopy was normal. On 5/15 she presented to Dr. Susan Vargas office for c/o pleural effusion that required 4 thoracentesis since 2/2019 with negative cytology along with c/o night time fever, loss of appetite, 10lb weight loss, SOB with exertion all since 1/2019. She underwent R thoracoscopy with pleural biopsy and placement of pleurx catheter with post op diagnosis of malignant pleural effusion. She had chest tube with air leak noted. Per surgery NP, no air leak was noted today 5/23 and chest tube was removed. Labs today revealed Hgb of 8.6 which decreased from Hgb 10.2, .9, plt 149. BUN is increased at 38 with Cr 0.82. LFTs 5/15/19 revealed elevated Alk phos 162, AST 56 with normal AL, Tbili. Other than CXRs on this admission most recent imaging that I have includes PT/CT 10/2018 that reported normal physiologic FDG tracer activity and reported no suspicious liver, adrenal lesion. Plan:  
 
-Check LFTs, Lipase 
-Follow up CBC in AM.  Specifically- follow trend of H/H and monitor for evidence of overt GI bleeding 
-Continue Protonix 40mg po qAM 
-Continue Colace every day. Will add MiraLax 17g capful daily for improved bowel regularity 
-Supportive care, anti-emetics, analgesics prn 
-Plan EGD tomorrow with Dr. Rea Perales to evaluate for PUD. Discussed risks vs benefits especially in setting of pleural effusions. She tolerated anesthesia with recent  thoracoscopy. Further recommendations will be based upon findings of EGD and pt clinical course. Noe Almodovar PA-C [facility] Patient is seen and examined in collaboration with Dr. Steven Mendoza. Assessment and plan as per Dr. Steven Mendoza.

## 2019-05-23 NOTE — PROGRESS NOTES
Chest tube removed at end inspiration. Dressing applied. Patient tolerated well. CXR ordered for BONNY Corado NP

## 2019-05-23 NOTE — PHYSICIAN ADVISORY
Letter of Determination: Upgrade from Observation to Inpatient Status This patient was originally hospitalized as Outpatient Status with Observation Services on 5/21/2019 for scheduled thoracoscopy with pleural bipsy. This patient now meets for Inpatient Admission in accordance with CMS regulation Section 43 .3. Specifically, patient's stay is now over Two Midnights and was medically necessary. The patient's stay was medically necessary based on post operative complication of persistent tachycardia, with pulse to 122 beats per minute, and oxygen saturation to 89 % on room air, serum sodium of 129 mmol/L, and hemoglobin decreasing from 10.2 mg/dl to 8.6 mg/dl. Consistent with CMS guidelines, patient meets for inpatient status. It is our recommendation that this patient's hospitalization status should be upgraded from OBSERVATION to INPATIENT status.  
  
The final decision regarding the patient's hospitalization status depends on the attending physician's judgement. Yordan Naqvi MD, SHABBIR, Physician Advisor [address] [place] [address].

## 2019-05-24 NOTE — ANESTHESIA PREPROCEDURE EVALUATION
Relevant Problems No relevant active problems Anesthetic History Review of Systems / Medical History Patient summary reviewed and pertinent labs reviewed Pulmonary Shortness of breath Asthma : well controlled Comments: Recurrent pleural effusions. Had VATs pleurodesis 3 days ago. CT removed yesterday and Pleurex catheter placed. Currently no output per patient. Says her breathing is better than it has been past few days. Given Lasix and breathing treatment this morning. Neuro/Psych Psychiatric history (Depression) Cardiovascular Exercise tolerance: <4 METS Comments: Denies CP or CV history Baseline tachycardia per pt. Records also indicate this. GI/Hepatic/Renal 
Within defined limits Endo/Other Hypothyroidism: well controlled Other Findings Comments: Metastatic breast cancer Physical Exam 
 
Airway Mallampati: II 
TM Distance: 4 - 6 cm Neck ROM: decreased range of motion Mouth opening: Normal 
 
 Cardiovascular Rhythm: regular Rate: abnormal 
 
 
 
Comments: Tachy Dental 
 
Dentition: Caps/crowns Pulmonary Decreased breath sounds (R lower lung field) Abdominal 
GI exam deferred Other Findings Anesthetic Plan ASA: 4 Anesthesia type: total IV anesthesia Induction: Intravenous Anesthetic plan and risks discussed with: Patient and Spouse Pt is frail and discussed increased risks of pulmonary problems with anesthesia.

## 2019-05-24 NOTE — OP NOTES
Esophagogastroduodenoscopy DATE of PROCEDURE: 5/24/2019 INDICATION: nausea, vomiting POSTPROCEDURE DIAGNOSIS: 
 
MEDICATIONS ADMINISTERED: MAC anesthesia (see anesthesia report) INSTRUMENT: [id number] PROCEDURE:  After obtaining informed consent, the patient was placed in the left lateral position and sedated. The endoscope was advanced under direct vision without difficulty. The esophagus, stomach (including retroflexed views) and duodenum were evaluated. The patient was taken to the recovery area in stable condition. FINDINGS: 
ESOPHAGUS: Severe distal LA class STOMACH: Diffuse gastritis with several small ulcerations (biopsied) DUODENUM: Normal 
 
Estimated blood loss: 0-minimal  
Specimens obtained during procedure: gastric PLAN: 
  PPI, + Carafate

## 2019-05-24 NOTE — PROGRESS NOTES
TRANSFER - IN REPORT: 
 
Verbal report received from Mike Hwang RN on Malathi Haynes  being received from GI Lab for ordered procedure Report consisted of patients Situation, Background, Assessment and  
Recommendations(SBAR). Information from the following report(s) SBAR, Procedure Summary, MAR and Recent Results was reviewed with the receiving nurse. Opportunity for questions and clarification was provided. Assessment completed upon patients arrival to unit and care assumed.

## 2019-05-24 NOTE — INTERVAL H&P NOTE
H&P Update: 
Josemanuel Body was seen and examined. Post surgical N/V, she remains appropriate for EGD

## 2019-05-24 NOTE — ANESTHESIA POSTPROCEDURE EVALUATION
Procedure(s): ESOPHAGOGASTRODUODENOSCOPY (EGD)/BMI 22 ROOM 204 ESOPHAGOGASTRODUODENAL (EGD) BIOPSY. total IV anesthesia Anesthesia Post Evaluation Patient location during evaluation: PACU Patient participation: complete - patient participated Level of consciousness: awake Pain management: adequate Airway patency: patent Anesthetic complications: no 
Cardiovascular status: acceptable Respiratory status: spontaneous ventilation and acceptable Hydration status: acceptable Vitals Value Taken Time /57 5/24/2019  3:19 PM  
Temp 36.7 °C (98 °F) 5/24/2019  3:19 PM  
Pulse 112 5/24/2019  3:27 PM  
Resp 14 5/24/2019  3:19 PM  
SpO2 95 % 5/24/2019  3:27 PM  
Vitals shown include unvalidated device data.

## 2019-05-24 NOTE — ROUTINE PROCESS
TRANSFER - OUT REPORT: 
 
Verbal report given to [address] [place] [address] on Adolfo Cole  being transferred to [facility] for routine post - op Report consisted of patients Situation, Background, Assessment and  
Recommendations(SBAR). Information from the following report(s) SBAR and MAR was reviewed with the receiving nurse. Lines:  
Peripheral IV 05/21/19 Left Antecubital (Active) Site Assessment Clean, dry, & intact 5/24/2019  3:21 PM  
Phlebitis Assessment 0 5/24/2019  3:21 PM  
Infiltration Assessment 0 5/24/2019  3:21 PM  
Dressing Status Clean, dry, & intact 5/24/2019  3:21 PM  
Dressing Type Tape;Transparent 5/24/2019  3:21 PM  
Hub Color/Line Status Flushed 5/24/2019  3:21 PM  
Alcohol Cap Used No 5/21/2019 12:20 PM  
  
 
Opportunity for questions and clarification was provided. Patient transported with: 
 [employer]

## 2019-05-24 NOTE — PROGRESS NOTES
5/24/2019 PLAN: 
Diet- As tolerated DVT Prophylactics- SCD/Lovenox Pain control- Dilaudid/Norco 
SUP prophylaxis- Protonix Encourage C/DB/IS Chest tube DC 5/23/19 IVF Physical therapy GI Following DC [place] 5/23/19 ASSESSMENT:  Admit Date: 5/21/2019  
3 Day Post-Op  Procedure(s): ESOPHAGOGASTRODUODENOSCOPY (EGD)/BMI 22 ROOM 204 ESOPHAGOGASTRODUODENAL (EGD) BIOPSY SUBJECTIVE: Patient awake in bed. GI planning EGD today due to nausea/ vomiting. AF, NAD. OBJECTIVE: 
Patient Vitals for the past 24 hrs: 
 Temp Pulse Resp BP SpO2  
05/24/19 1625 97.3 °F (36.3 °C) (!) 115 20 124/63 96 % 05/24/19 1532  (!) 110 16 124/58 94 % 05/24/19 1519 98 °F (36.7 °C) (!) 112 14 122/57 98 % 05/24/19 1517  (!) 113 14 122/57 97 % 05/24/19 1500  (!) 124 24 131/63 97 % 05/24/19 1351  (!) 126 24 136/68 95 % 05/24/19 1230     91 % 05/24/19 1219 98.2 °F (36.8 °C) (!) 110 18 109/66 91 % 05/24/19 0730     96 % 05/24/19 0710 98.7 °F (37.1 °C) (!) 105 15 123/75 93 % 05/24/19 0353 98.1 °F (36.7 °C) (!) 103 16 127/81 96 % 05/23/19 2325 98.5 °F (36.9 °C) (!) 109 16 122/73 94 % 05/23/19 2044     98 % 05/23/19 1947 98.5 °F (36.9 °C) (!) 110 17 143/69 95 % Date 05/23/19 0700 - 05/24/19 7085 05/24/19 0700 - 05/25/19 8399 Shift 2214-0870 1467-1348 24 Hour Total 6606-8538 3735-5774 24 Hour Total  
INTAKE  
I.V.(mL/kg/hr)  1057(1.6) 1057(0.8) 1031  1031 I.V.  [phone] Volume (lactated Ringers infusion 1,000 mL)    100  100 Shift Total(mL/kg)  [id number]) 1073(75.7) 4306(10.5)  8901(57.7) OUTPUT Urine(mL/kg/hr) 550(0.8) 100(0.2) 650(0.5) 500  500 Urine Voided  100 100 500  500 Urine Occurrence(s)  2 x 2 x Urine Output    0  0 Urine Output (mL) ([REMOVED] Urinary Catheter 05/21/19 2- way) 550  550 Emesis/NG output 50  50 Emesis 50  50 Blood    0  0  
 Estimated Blood Loss    0  0 Shift Total(mL/kg) 600(10.9) 100(1.8) 700(12.7) 500(9.1)  500(9.1) NET -074 747 964 668  207 Weight (kg) 55.2 55.2 55.2 55.2 55.2 55.2 General:          No acute distress   
Lungs:             No audible wheezing Heart:              Tachy Abdomen:        Soft, Non distended, Non tender, BS+ Extremities:     No cyanosis, clubbing or edema Neurologic:      No focal deficits 
  
 
 
 
Labs:   
Recent Labs  
  05/24/19 
0427 05/23/19 
1703 WBC 4.6  --   
HGB 7.8*  --   
*  --   
  --   
K 4.5  --   
  --   
CO2 28  --   
BUN 23  --   
CREA 0.72  --   
*  --   
TBILI  --  0.4 CBIL  --  0.2 SGOT  --  129* ALT  --  33  
AP  --  232* LPSE  --  99 Philip Lassiter NP

## 2019-05-24 NOTE — PROGRESS NOTES
END OF SHIFT NOTE: 
 
INTAKE/OUTPUT 
05/23 0701 - 05/24 0700 In: 9117 [I.V.:1057] Out: 650 [Urine:650] Voiding: YES Catheter: NO 
Drain:  
Pleural Catheter/Drain 05/21/19 15.5 fr x 66 cm Pleural (Active) Drainage Description Serosanguinous 5/22/2019  7:20 AM  
Site Assessment Clean, dry, & intact 5/22/2019  7:20 AM  
Dressing Status Clean, dry, & intact 5/22/2019  7:20 AM  
Dressing Type 4 X 4;Tape 5/22/2019  7:20 AM  
Output (ml) 0 ml 5/21/2019  1:53 PM  
   
Kike Drain #1 05/21/19 Right Chest (Active) Site Assessment Clean, dry, & intact 5/23/2019  7:30 AM  
Dressing Status Clean, dry, & intact 5/23/2019  7:30 AM  
Drainage Description Serosanguinous 5/23/2019  7:30 AM  
Kike Drain Airleak No 5/23/2019  7:30 AM  
Status Patent;Draining;Suction (specify 5/23/2019  7:30 AM  
Y Connector Used No 5/23/2019  7:30 AM  
Drainage Chamber Level (ml) 730 ml 5/23/2019  4:34 AM  
Output (ml) 60 ml 5/23/2019  4:34 AM  
 
 
 
 
 
 
Flatus: Patient does have flatus present. Stool:  0 occurrences. Characteristics: 
  
Emesis: 0 occurrences. Characteristics:  
Emesis Assessment Appearance: Adrienne Poet, Coffee ground Emesis Amount: Small VITAL SIGNS Patient Vitals for the past 12 hrs: 
 Temp Pulse Resp BP SpO2  
05/24/19 0730     96 % 05/24/19 0353 98.1 °F (36.7 °C) (!) 103 16 127/81 96 % 05/23/19 2325 98.5 °F (36.9 °C) (!) 109 16 122/73 94 % 05/23/19 2044     98 % Pain Assessment Pain Intensity 1: 0 (05/24/19 0124) Pain Location 1: Chest 
Pain Intervention(s) 1: Rest 
Patient Stated Pain Goal: 0 Ambulating No 
 
Shift report given to oncoming nurse at the bedside.  
 
Yaakov José RN

## 2019-05-24 NOTE — PROGRESS NOTES
PT note:  Treatment deferred as the patient is currently off the floor. Will return as time permits.   Darren Gold, PTA

## 2019-05-25 NOTE — PROGRESS NOTES
Patient and  instructed with teachback on PleurX drain kit.  able to demonstrate proper drainage of PleurX with kit, 200ml serous fluid drained. Pt tolerated well. All questions solicited and answered. Discharge instructions given to patient and  with verbalized understanding of all. All medications discussed. All appointment/follow up instructions discussed[facility] pt and  aware. D/C from unit with belongings and RX. Accompanied by family and hospital personnel. No distress at time of D/C. Transported via w/c.

## 2019-05-25 NOTE — PROGRESS NOTES
Pt is for discharge home today with spouse and [facility] for follow up home care. Care Management Interventions PCP Verified by CM: Yes Transition of Care Consult (CM Consult): Home Health [facility] - INPATIENT: Yes Current Support Network: Lives with Spouse, Own Home Plan discussed with Pt/Family/Caregiver: Yes Discharge Location Discharge Placement: Home with home health

## 2019-05-25 NOTE — PROGRESS NOTES
[address] Face to Face Encounter Patients Name: Kristin Suarez    YOB: 1950 Ordering Physician: Dr Kamron Mobley Primary Diagnosis: Pleural Effusion (ICD 10: J90) with Pleurx drain placed Date of Face to Face:   5/25/2019 Face to Face Encounter findings are related to primary reason for home care:   yes. 1. I certify that the patient needs intermittent care as follows: skilled nursing care:  teaching/training of pleurx drain and skilled observation/assessment, patient education 2. I certify that this patient is homebound, that is: 1) patient requires the use of a unknonw device, special transportation, or assistance of another to leave the home; or 2) patient's condition makes leaving the home medically contraindicated; and 3) patient has a normal inability to leave the home and leaving the home requires considerable and taxing effort. Patient may leave the home for infrequent and short duration for medical reasons, and occasional absences for non-medical reasons. Homebound status is due to the following functional limitations: Patient currently under activity restrictions secondary to recent surgical procedure, this hinders their ability to safely leave the home. 3. I certify that this patient is under my care and that I, or a nurse practitioner or [facility], or clinical nurse specialist, or certified nurse midwife, working with me, had a Face-to-Face Encounter that meets the physician Face-to-Face Encounter requirements. The following are the clinical findings from the [address] [place] [address] encounter that support the need for skilled services and is a summary of the encounter: see hospital medical record See discharge summary Tesfaye Richardson LMSW 
5/25/2019 THE FOLLOWING TO BE COMPLETED BY THE COMMUNITY PHYSICIAN: 
 
I concur with the findings described above from the [facility] encounter that this patient is homebound and in need of a skilled service. Certifying Physician: _____________________________________ Printed Certifying Physician Name: _____________________________________ Date: _________________

## 2019-05-25 NOTE — PROGRESS NOTES
Pt c/o difficulty breathing. VSS, O2 94 on RA. Lung sounds coarse. Dr Cynthia Rowe notified, orders received to stop IVF.

## 2019-05-25 NOTE — DISCHARGE SUMMARY
Physician Discharge Summary Patient ID: 
Eva Connell [id number] 
4 y.o. 
1950 Allergies: Prednisone HPI: Eva Connell is a 77 y.o. female referred to this office by Dr. Rekha Ann. She originally had Right Mastectomy in 3849 by Dr. Marina Alberts for breast cancer- stage 4. She underwent radiation and had reconstruction with a latissimus flap. She currently is taking Faslodex and Ibrance. She was sent to this office with complaints of pleural effusion. She has had 4 thoracentesis since February. Cytology has been negative. Complains of fever at night 100.7 since January when this initially started. Decreased appetite with weight loss of 10 pounds since January. She states she can only walk length of hallway at her home before she becomes short of breath and has to stop. She denies any chest pain. Denies smoking or drinking. 
  
Family history of cancer- Mother and 3 Aunts with breast cancer. Medical history includes as below. ,  Surgery includes- right breast mastectomy 2016, with latissimus flap reconstruction, History of MRSA with reconstruction flap, percutaneous right lung biopsy 2000, cholecystectomy. Does not take blood thinners. Pt underwent Right thoracoscopy with pleural biopsy and PleurX catheter placement by Dr. Curt Booker on 5/21/19. Admit Date: 5/21/2019 Discharge Date: 5/25/2019 * Admission Diagnoses: Pleural effusion [J90] Pleural effusion [J90] Pleural effusion [J90] Pleural effusion [J90] * Discharge Diagnoses:   
Hospital Problems as of 5/25/2019 Date Reviewed: 5/21/2019 Codes Class Noted - Resolved POA * (Principal) Pleural effusion ICD-10-CM: J90 ICD-9-CM: 511.9  5/21/2019 - Present Unknown Admission Condition: Stable * Discharge Condition: good * Procedures: Procedure(s): ESOPHAGOGASTRODUODENOSCOPY (EGD)/BMI 22 ROOM 204 ESOPHAGOGASTRODUODENAL (EGD) BIOPSY [facility] Course: Hospital course was complicated by nausea and vomiting, which added 1 day to the patient's length of stay. Consults: Gastroenterology Significant Diagnostic Studies: labs and radiology * Disposition: Home Discharge Medications:  
Current Discharge Medication List  
  
 
 
* Follow-up Care/Patient Instructions: Activity: Activity as tolerated Diet: Soft Diet Wound Care: Keep wound clean and dry Follow-up Information None Discharge Instructions/Follow-up Plans: MD Instructions: 
  
Follow-up with Dr. Christine Delgado in 1 week. Call office on Tuesday to schedule appt time. Keep incisions clean and dry, may remain uncovered. Do not apply lotions, creams or ointments to incisions. 
  
Diet - as tolerated - Soft foods diet. Activity - ambulate - as tolerated - no heavy lifting >10lb. May shower - no tub baths or soaking/submerging. 
  
No driving while taking narcotics. Do not drink alcohol while taking narcotics. Resume other home medications.  
  
If problems or questions arise, please call our office at [phone]. 
  
Greater than 30 minutes were spent discharging the patient 
  
  
Signed: 
Scherrie Gilford, NP 
5/25/2019 12:57 PM

## 2019-05-25 NOTE — PROGRESS NOTES
[facility] Progress Note Admit Date:  5/21/2019 Today's Date:  5/25/2019 CC:  Nausea/Vomiting Subjective:  
 
Patient states she is feeling better and has not had further nausea or vomiting. She denies any abd pain. She has not had a BM or bleeding. She is starting a clear liquid breakfast. 
 
Medications:  
Current Facility-Administered Medications Medication Dose Route Frequency  pantoprazole (PROTONIX) tablet 40 mg  40 mg Oral ACB&D  
 sucralfate (CARAFATE) tablet 1 g  1 g Oral AC&HS  polyethylene glycol (MIRALAX) packet 17 g  17 g Oral DAILY  alum-mag hydroxide-simeth (MYLANTA) oral suspension 30 mL  30 mL Oral Q4H PRN  
 albuterol (PROVENTIL VENTOLIN) nebulizer solution 1.25 mg  1.25 mg Nebulization Q6H PRN  
 ALPRAZolam (XANAX) tablet 0.5 mg  0.5 mg Oral QHS PRN  
 DULoxetine (CYMBALTA) capsule 60 mg  60 mg Oral QHS  levothyroxine (SYNTHROID) tablet 75 mcg  75 mcg Oral ACB  gabapentin (NEURONTIN) capsule 300 mg  300 mg Oral TID  docusate sodium (COLACE) capsule 100 mg  100 mg Oral BID  sodium chloride (NS) flush 5-40 mL  5-40 mL IntraVENous Q8H  
 sodium chloride (NS) flush 5-40 mL  5-40 mL IntraVENous PRN  
 HYDROmorphone (PF) (DILAUDID) injection 1 mg  1 mg IntraVENous Q4H PRN  
 naloxone (NARCAN) injection 0.4 mg  0.4 mg IntraVENous PRN  
 ondansetron (ZOFRAN) injection 4 mg  4 mg IntraVENous Q4H PRN  
 enoxaparin (LOVENOX) injection 40 mg  40 mg SubCUTAneous Q24H  
 HYDROcodone-acetaminophen (NORCO) 5-325 mg per tablet 1-2 Tab  1-2 Tab Oral Q4H PRN  
 budesonide (PULMICORT) 500 mcg/2 ml nebulizer suspension  500 mcg Nebulization BID RT And  
 albuterol (PROVENTIL VENTOLIN) nebulizer solution 2.5 mg  2.5 mg Nebulization Q6HWA RT Review of Systems: ROS was obtained, with pertinent positives as listed above. No chest pain. SOB has improved since IVF stopped. Diet:  Clear liquids Objective:  
Vitals: 
Visit Vitals /66 Pulse 97 Temp 98.3 °F (36.8 °C) Resp 18 Ht 5' 2\" (1.575 m) Wt 55.2 kg (121 lb 9.6 oz) SpO2 97% Breastfeeding? No  
BMI 22.24 kg/m² Intake/Output: 
No intake/output data recorded. 05/23 1901 - 05/25 0700 In: 2088 [I.V.:2088] Out: 1300 [Urine:1000; LKJUKO:772] Exam: 
General appearance: alert, cooperative, no distress, sitting up in bed 
Lungs: coarse BS to auscultation bilaterally anteriorly, crackles right > left Heart: regular rate and rhythm Abdomen: soft, non-tender. Bowel sounds normal. No masses, no organomegaly Neuro:  alert and oriented Data Review (Labs):   
Recent Labs  
  05/24/19 
4720 05/23/19 
1703 05/23/19 
1923 WBC 4.6  --  4.9 HGB 7.8*  --  8.6* HCT 24.4*  --  26.5*  
*  --  149* .0*  --  106.9*   --  129*  
K 4.5  --  5.4*  
  --  92* CO2 28  --  30  
BUN 23  --  38* CREA 0.72  --  0.82 CA 7.8*  --  8.0*  
*  --  99  
AP  --  232*  --   
SGOT  --  129*  --   
ALT  --  33  --   
TBILI  --  0.4  --   
CBIL  --  0.2  --   
ALB  --  1.9*  --   
TP  --  5.5*  --   
LPSE  --  99  --   
 
CXR 5/23/19 FINDINGS: Diffuse right and left lung base infiltrates or edema are unchanged. A small loculated pneumothorax along the lateral right lung base is also unchanged. Cardiac size is stable. The right-sided chest tubes and a left chest wall infusion port catheter remain in place. IMPRESSION: No interval change. 
  
CXR 5/22/19 FINDINGS: Diffuse right and basilar left lung edema or infiltrates are unchanged. A small loculated pneumothorax along the right lung base is slightly decreased in size. Two right-sided chest tubes remain in place. Again noted is a left chest wall infusion port catheter. IMPRESSION: Slightly decreased loculated right basilar pneumothorax, otherwise, no change. 
  
CXR 5/21/19 IMPRESSION: There is interval decrease in degree of opacification throughout the right hemithorax with 2 chest tubes in place. There still remains small bilateral pleural effusions and there is suggestion of a loculated pneumothorax, versus non reexpansion of the right lung parenchyma in the right lung base. General interstitial edema is noted throughout the left hemithorax. EGD 24 May 2019 (Dr. Gen Bazzi) with POSTPROCEDURE DIAGNOSIS: FINDINGS: 
ESOPHAGUS: Severe distal LA class STOMACH: Diffuse gastritis with several small ulcerations (biopsied) DUODENUM: Normal PLAN: PPI, + Carafate. Assessment:  
 
Principal Problem: 
  Pleural effusion (5/21/2019) 78 yo female patient of Dr. Kenny Kuo with PMH HLD, Hypothyroidism, Asthma, Breast cancer stage 4 s/p R mastectomy 2016 with radiation/reconstruction with latissimus flap, who was seen in consultation 23 May 2019 for n/v (concern for old blood per pt and RN describes cge) occurring with meals and water alone, new in onset since 5/21 and also c/o heartburn for a few months, chest/esophageal tightening, and constipation, who was admitted for pleural effusion s/p thoracoscopy 5/21. Per pt prior colonoscopy was normal. She underwent R thoracoscopy with pleural biopsy and placement of pleurx catheter with post op diagnosis of malignant pleural effusion, and had chest tube with air leak noted, but per surgery NP, no air leak was noted 5/23 and chest tube was removed. Labs at time of consult noted Hgb of 8.6 which decreased from Hgb 10.2, .9, plt 149, BUN increased at 38, Cr 0.82, LFTs 5/15/19 revealed elevated Alk phos 162, AST 56 with normal ALT and Tbili. PT/CT 10/2018 that reported normal physiologic FDG tracer activity and reported no suspicious liver, adrenal lesion. SHe underwent EGD on 24 May 2019 with Dr. Gen Bazzi with severe distal esophagitis and diffuse gastritis with small ulcerations. Bxs pending. Hgb has decreased slightly to 7.8, but no active bleeding. Plan:  
 
-Supportive care. -Continue Protonix 40mg po BID. Carafate 1 gm po QAC and QHS added. -Continue Colace every day and MiraLax 17g capful daily for improved bowel regularity 
-Anti-emetics, analgesics PRN. -Advance diet as tolerated to GI soft diet. -Bxs pending. 
-Follow up in our office in 3-4 weeks. Our office will contact pt to make the appt. Patient is seen and examined in collaboration with Dr. Kendra Mims. Assessment and plan as per Dr. Yumi Jorgensen. Ila Hernandez PA-C [facility]

## 2019-05-25 NOTE — PROGRESS NOTES
5/25/2019 PLAN: 
GI Soft DVT Prophylactics- SCD/Lovenox Pain control- Dilaudid/Norco 
SUP prophylaxis- Protonix Encourage C/DB/IS Chest tube DC 5/23/19 DC IVF Physical therapy GI Following DC [place] 5/23/19 DC home today ASSESSMENT:  Admit Date: 5/21/2019  
4 Day Post-Op  Procedure(s): ESOPHAGOGASTRODUODENOSCOPY (EGD)/BMI 22 ROOM 204 ESOPHAGOGASTRODUODENAL (EGD) BIOPSY SUBJECTIVE: Patient awake in bed. Feeling much better and wants to go home. GI Denies nausea/ vomiting. AF, NAD. OBJECTIVE: 
Patient Vitals for the past 24 hrs: 
 Temp Pulse Resp BP SpO2  
05/25/19 1121 98.3 °F (36.8 °C) 91 18 123/72 98 % 05/25/19 0737 98.3 °F (36.8 °C) 97 18 106/66 97 % 05/25/19 0710     97 % 05/25/19 0335  (!) 108  148/74 94 % 05/24/19 2343 98.4 °F (36.9 °C) (!) 106 19 109/54 95 % 05/24/19 1947 98.3 °F (36.8 °C) (!) 117 20 106/68 95 % 05/24/19 [address]  97 % 05/24/19 1625 97.3 °F (36.3 °C) (!) 115 20 124/63 96 % 05/24/19 1532  (!) 110 16 124/58 94 % 05/24/19 1519 98 °F (36.7 °C) (!) 112 14 122/57 98 % 05/24/19 1517  (!) 113 14 122/57 97 % 05/24/19 1500  (!) 124 24 131/63 97 % 05/24/19 1351  (!) 126 24 136/68 95 % 05/24/19 1230     91 % Date 05/24/19 0700 - 05/25/19 3472 05/25/19 0700 - 05/26/19 9082 Shift 4089-3691 9547-0411 24 Hour Total 8021-0867 6536-6086 24 Hour Total  
INTAKE  
P.O.    120  120  
  P. O.    120  120  
I. V.(mL/kg/hr) 1031(1.6)  1031(0.8) I.V. 931  931 Volume (lactated Ringers infusion 1,000 mL) 100  100 Shift Total(mL/kg) 3997(56.7)  1031(18.7) 120(2.2)  120(2.2) OUTPUT Urine(mL/kg/hr) 500(0.8) 400(0.6) 900(0.7) 0  0 Urine Voided 500 400 900 0  0 Urine Output 0  0 Drains  300 300 Output (ml) (Joshua Saunders #1 05/21/19 Right Chest)  300 300 Blood 0  0   Estimated Blood Loss 0  0     
 Shift Total(mL/kg) 500(9.1) 700(12.7) 1200(21.8) 0(0)  0(0)  -700 -169 120  120 Weight (kg) 55.2 55.2 55.2 55.2 55.2 55.2 General:          No acute distress   
Lungs:             No audible wheezing Heart:              RRR Abdomen:        Soft, Non distended, Non tender, BS+ Extremities:     No cyanosis, clubbing or edema Neurologic:      No focal deficits 
  
 
 
 
Labs:   
Recent Labs  
  05/24/19 
0427 05/23/19 
1703 WBC 4.6  --   
HGB 7.8*  --   
*  --   
  --   
K 4.5  --   
  --   
CO2 28  --   
BUN 23  --   
CREA 0.72  --   
*  --   
TBILI  --  0.4 CBIL  --  0.2 SGOT  --  129* ALT  --  33  
AP  --  232* LPSE  --  99 Emily Winter NP

## 2019-05-25 NOTE — PROGRESS NOTES
END OF SHIFT NOTE: 
 
INTAKE/OUTPUT 
05/24 0701 - 05/25 0700 In: 1031 [I.V.:1031] Out: 1200 [Urine:900; QHTYRU:790] Voiding: YES Catheter: NO 
Drain:  
Pleural Catheter/Drain 05/21/19 15.5 fr x 66 cm Pleural (Active) Drainage Description Serosanguinous 5/22/2019  7:20 AM  
Site Assessment Clean, dry, & intact 5/22/2019  7:20 AM  
Dressing Status Clean, dry, & intact 5/22/2019  7:20 AM  
Dressing Type 4 X 4;Tape 5/22/2019  7:20 AM  
Output (ml) 0 ml 5/21/2019  1:53 PM  
   
Kike Drain #1 05/21/19 Right Chest (Active) Site Assessment Clean, dry, & intact 5/24/2019  8:55 PM  
Dressing Status Clean, dry, & intact 5/24/2019  8:55 PM  
Drainage Description Serosanguinous 5/23/2019  7:30 AM  
Kike Drain Airleak No 5/23/2019  7:30 AM  
Status Patent;Draining;Suction (specify 5/23/2019  7:30 AM  
Y Connector Used No 5/23/2019  7:30 AM  
Drainage Chamber Level (ml) 730 ml 5/23/2019  4:34 AM  
Output (ml) 300 ml 5/24/2019  7:37 PM  
 
 
 
 
 
 
Flatus: Patient does have flatus present. Stool:  0 occurrences. Characteristics: 
  
Emesis: 0 occurrences. Characteristics:  
Emesis Assessment Appearance: Catherine Scriver, Coffee ground Emesis Amount: Small VITAL SIGNS Patient Vitals for the past 12 hrs: 
 Temp Pulse Resp BP SpO2  
05/25/19 0710     97 % 05/25/19 0335  (!) 108  148/74 94 % 05/24/19 2343 98.4 °F (36.9 °C) (!) 106 19 109/54 95 % 05/24/19 1947 98.3 °F (36.8 °C) (!) 117 20 106/68 95 % 05/24/19 [address]  97 % Pain Assessment Pain Intensity 1: 0 (05/24/19 2055) Pain Location 1: Chest 
Pain Intervention(s) 1: Rest 
Patient Stated Pain Goal: 0 Ambulating Yes Shift report given to oncoming nurse at the bedside.  
 
Luciano Crouch RN

## 2019-05-25 NOTE — DISCHARGE INSTRUCTIONS
Discharge Instructions/Follow-up Plans:   MD Instructions:     Follow-up with Dr. Rosemary Miranda in 1 week. Call office on Tuesday to schedule appt time. Keep incisions clean and dry  Do not apply lotions, creams or ointments to incisions.     Diet - as tolerated - Soft foods diet. Patient Education        Indwelling Catheter Drainage for Chest or Abdomen: Care Instructions  Your Care Instructions    An indwelling chest or belly (abdominal) catheter is a soft, flexible tube that runs under your skin to an area next to your lungs or in your belly. One end of the tube stays outside your body. When fluid builds up around your lung (pleural effusion), it can cause discomfort and make it hard for you to breathe. Other symptoms can include fever or cough. When fluid collects in the space between the organs in your belly and the belly wall (ascites), you may feel bloated or overly full. Other symptoms of ascites (say \"uh-SY-teez\") include belly pain and weight gain. Draining the fluid through a tube helps relieve these symptoms. \"Indwelling\" means the tube stays in place for as long as you need it. You don't have to get a new catheter put in every time fluid builds up. You can drain the fluid at home--or have someone else do it--whenever you need to. The doctor may use stitches to hold the catheter in place where it exits your body. The site may be sore for a day or two. Follow-up care is a key part of your treatment and safety. Be sure to make and go to all appointments, and call your doctor if you are having problems. It's also a good idea to know your test results and keep a list of the medicines you take. How can you care for yourself at home? · Follow the instructions for taking care of your catheter. Your doctor or nurse will teach you or your caregivers what to do. How to drain fluid  1. Wash your hands well. Clean your work area with a disinfecting wipe or alcohol.  Place everything you will need on your work area, including the drain tube and the bag or bottle that will collect the fluid. 2. Wear disposable gloves if they are part of your kit or if your doctor told you to.  3. Make sure the drainage tube is closed. It may have a clip on it. 4. Remove the cap at the end of your catheter. Wipe the end of the catheter and the end of the drain tube with alcohol. 5. Attach the catheter to the drain tube. 6. Make sure the bottle or bag is at least an arm's length below your chest or belly. If your system is vacuum-sealed or uses a pump, you may not need to lower the container. 7. Depending on what type of drainage kit you have, push the plunger or squeeze the pump to start the drainage. Some kits include a clamp you need to release before the drainage starts. 8. Your doctor will tell you how much fluid to drain at one time. In general, don't drain more than 1,000 milliliters (mL) from your chest or more than 2,000 ml from your belly in a 24-hour period. 9. If you feel pain during drainage, you may be able to ease the pain by slowing down the drainage rate. Do this by raising the bag or bottle. Some drainage kits include a clamp you can pinch to slow the drainage. How to empty the drainage bag or bottle  1. Disconnect the drain tube from your catheter. Let all the fluid in the drain tube flow into the bag or bottle. 2. Wipe the end of your catheter with alcohol. Put a new valve cap on the catheter, and make sure it's tight. 3. Tape the end of the catheter to your skin wherever it's most comfortable. 4. Empty the bag or bottle into the toilet or sink. 5. If you spill some fluid on clothes or skin, clean it up with soap and warm water. You may want to use bleach for spills on some surfaces. 6. Keep a drainage record sheet so that your doctor can see how much fluid is coming from the tube. Your doctor or nurse will give you a record sheet. Or you can just write down the date, time, amount, and color of the drainage. Save the record sheet to show to your doctor. How to change the bandage (dressing)  1. Follow your doctor's instructions for how often to change the bandage. 2. Carefully remove the bandage. 3. Check your skin for signs of infection. 4. Clean the skin with an alcohol pad.  5. Open a new dressing kit. 6. Put the first layer of gauze or foam on the skin, under the catheter. Put the second layer on top of that. Put the clear sticky bandage over everything. When should you call for help? Call 911 anytime you think you may need emergency care. For example, call if:    · You passed out (lost consciousness).     · You have severe trouble breathing.    Call your doctor now or seek immediate medical care if:    · You have new or worse trouble breathing.     · You have symptoms of infection, such as:  ? Increased pain, swelling, warmth, or redness. ? Red streaks leading from the area. ? Pus draining from the area. ? A fever.     · You have new or worse pain.     · The fluid looks different from what you normally see.     · The tube comes out of your body.    Watch closely for changes in your health, and be sure to contact your doctor if:    · There is little or no fluid draining from the catheter.     · You do not get better as expected. Where can you learn more? Go to http://edyta-tremaine.info/. Enter I567 in the search box to learn more about \"Indwelling Catheter Drainage for Chest or Abdomen: Care Instructions. \"  Current as of: September 5, 2018  Content Version: 11.9  © 4976-8800 [employer]. Care instructions adapted under license by [employer] (which disclaims liability or warranty for this information). If you have questions about a medical condition or this instruction, always ask your healthcare professional. [place] [address] any warranty or liability for your use of this information.          Activity - ambulate - as tolerated - no heavy lifting >10lb. May shower - no tub baths or soaking/submerging.     No driving while taking narcotics. Do not drink alcohol while taking narcotics. Resume other home medications.      If problems or questions arise, please call our office at [phone]. DISCHARGE SUMMARY from Nurse    PATIENT INSTRUCTIONS:    After general anesthesia or intravenous sedation, for 24 hours or while taking prescription Narcotics:  · Limit your activities  · Do not drive and operate hazardous machinery  · Do not make important personal or business decisions  · Do  not drink alcoholic beverages  · If you have not urinated within 8 hours after discharge, please contact your surgeon on call. Report the following to your surgeon:  · Excessive pain, swelling, redness or odor of or around the surgical area  · Temperature over 100.5  · Nausea and vomiting lasting longer than 4 hours or if unable to take medications  · Any signs of decreased circulation or nerve impairment to extremity: change in color, persistent  numbness, tingling, coldness or increase pain  · Any questions    What to do at Home:  Recommended activity: See surgical instructions, per Dr. Liborio Magaña.    If you experience any of the following symptoms Increased shortness of breath, drainage or redness at incision site, fever, increased pain, please follow up with *Dr. Liborio Magaña or go to emergency department. *  Please give a list of your current medications to your Primary Care Provider. *  Please update this list whenever your medications are discontinued, doses are      changed, or new medications (including over-the-counter products) are added. *  Please carry medication information at all times in case of emergency situations. These are general instructions for a healthy lifestyle:    No smoking/ No tobacco products/ Avoid exposure to second hand smoke  Surgeon General's Warning:  Quitting smoking now greatly reduces serious risk to your health.     Obesity, smoking, and sedentary lifestyle greatly increases your risk for illness    A healthy diet, regular physical exercise & weight monitoring are important for maintaining a healthy lifestyle    You may be retaining fluid if you have a history of heart failure or if you experience any of the following symptoms:  Weight gain of 3 pounds or more overnight or 5 pounds in a week, increased swelling in our hands or feet or shortness of breath while lying flat in bed. Please call your doctor as soon as you notice any of these symptoms; do not wait until your next office visit. Recognize signs and symptoms of STROKE:    F-face looks uneven    A-arms unable to move or move unevenly    S-speech slurred or non-existent    T-time-call 911 as soon as signs and symptoms begin-DO NOT go       Back to bed or wait to see if you get better-TIME IS BRAIN. Warning Signs of HEART ATTACK     Call 911 if you have these symptoms:   Chest discomfort. Most heart attacks involve discomfort in the center of the chest that lasts more than a few minutes, or that goes away and comes back. It can feel like uncomfortable pressure, squeezing, fullness, or pain.  Discomfort in other areas of the upper body. Symptoms can include pain or discomfort in one or both arms, the back, neck, jaw, or stomach.  Shortness of breath with or without chest discomfort.  Other signs may include breaking out in a cold sweat, nausea, or lightheadedness. Don't wait more than five minutes to call 911 - MINUTES MATTER! Fast action can save your life. Calling 911 is almost always the fastest way to get lifesaving treatment. Emergency Medical Services staff can begin treatment when they arrive -- up to an hour sooner than if someone gets to the hospital by car. The discharge information has been reviewed with the {PATIENT PARENT GUARDIAN:11631}. The {PATIENT PARENT GUARDIAN:50160} verbalized understanding.   Discharge medications reviewed with the {Dishcarge meds reviewed ITHJ:90214} and appropriate educational materials and side effects teaching were provided.   ___________________________________________________________________________________________________________________________________

## 2019-05-25 NOTE — PROGRESS NOTES
END OF SHIFT NOTE: 
 
INTAKE/OUTPUT 
05/23 0701 - 05/24 0700 In: 6044 [I.V.:1057] Out: 650 [Urine:650] Voiding: YES Catheter: NO 
Drain:  
Pleural Catheter/Drain 05/21/19 15.5 fr x 66 cm Pleural (Active) Drainage Description Serosanguinous 5/22/2019  7:20 AM  
Site Assessment Clean, dry, & intact 5/22/2019  7:20 AM  
Dressing Status Clean, dry, & intact 5/22/2019  7:20 AM  
Dressing Type 4 X 4;Tape 5/22/2019  7:20 AM  
Output (ml) 0 ml 5/21/2019  1:53 PM  
   
Kike Drain #1 05/21/19 Right Chest (Active) Site Assessment Clean, dry, & intact 5/23/2019  7:30 AM  
Dressing Status Clean, dry, & intact 5/23/2019  7:30 AM  
Drainage Description Serosanguinous 5/23/2019  7:30 AM  
Kike Drain Airleak No 5/23/2019  7:30 AM  
Status Patent;Draining;Suction (specify 5/23/2019  7:30 AM  
Y Connector Used No 5/23/2019  7:30 AM  
Drainage Chamber Level (ml) 730 ml 5/23/2019  4:34 AM  
Output (ml) 300 ml 5/24/2019  7:37 PM  
 
 
 
 
 
 
Flatus: Patient does have flatus present. Stool:  0 occurrences. Characteristics: 
  
Emesis: 0 occurrences. Characteristics:  
Emesis Assessment Appearance: Willye Swanzey, Coffee ground Emesis Amount: Small VITAL SIGNS Patient Vitals for the past 12 hrs: 
 Temp Pulse Resp BP SpO2  
05/24/19 1947 98.3 °F (36.8 °C) (!) 117 20 106/68 95 % 05/24/19 [address]  97 % 05/24/19 1625 97.3 °F (36.3 °C) (!) 115 20 124/63 96 % 05/24/19 1532  (!) 110 16 124/58 94 % 05/24/19 1519 98 °F (36.7 °C) (!) 112 14 122/57 98 % 05/24/19 1517  (!) 113 14 122/57 97 % 05/24/19 1500  (!) 124 24 131/63 97 % 05/24/19 1351  (!) 126 24 136/68 95 % 05/24/19 1230     91 % 05/24/19 1219 98.2 °F (36.8 °C) (!) 110 18 109/66 91 % Pain Assessment Pain Intensity 1: 0 (05/24/19 1532) Pain Location 1: Chest 
Pain Intervention(s) 1: Rest 
Patient Stated Pain Goal: 0 Ambulating Yes Shift report given to oncoming nurse at the bedside.  
 
Suzanne Costa RN

## 2019-05-26 PROBLEM — R09.02 HYPOXIA: Status: ACTIVE | Noted: 2019-01-01

## 2019-05-26 PROBLEM — J96.11 CHRONIC HYPOXEMIC RESPIRATORY FAILURE (HCC): Status: ACTIVE | Noted: 2019-01-01

## 2019-05-26 NOTE — ED TRIAGE NOTES
Pt arrived from home with family via [facility] called out for shortness of breath. Pt states she has been having shortness of breath since coming home today from this facility at 4pm today. Sat at scene 85% on room air. FD placed pt on O2 and O2 sat raised to 96%. Pt was admitted on last Tuesday for pleural effusion, Also has ulcer and Ca of breast and lungs.

## 2019-05-26 NOTE — DISCHARGE SUMMARY
Hospitalist Discharge Summary Patient ID: 
Gabriel Davis [id number] 
89 y.o. 
1950 Admit date: 5/25/2019 10:08 PM 
Discharge date and time: 5/26/2019 Attending: Ta Conner MD 
PCP:  Evelin Mathews MD 
Treatment Team: Attending Provider: Ta Conner MD; Utilization Review: Kenney Sims RN 
 
Principal Diagnosis Hypoxia Principal Problem: Hypoxia (5/26/2019) Active Problems: 
  Depression (11/4/2015) Malignant neoplasm of right female breast ([facility] [address] (1/25/2016) Overview: 2016: R breast--infiltrating ductal ca,  ER/TX positive and HER2 negative,  
    metastatic to nodes, s/p mastectomy, chemo, reconstruction, on chronic Ibrance therapy Anxiety (11/15/2018) Pleural effusion (5/21/2019) Hospital Course: 
Please refer to the admission H&P for details of presentation. In summary, the patient is a 67yo F with breast cancer discharged yesterday after having pleurx catheter placed for recurrent R pleural effusion. She was not sent home with supplemental oxygen, but was short of breath after leaving without oxygen. She came back to the ER, found to have chronic hypoxic respiratory failure due to malignant pleural effusion. She was qualified for home oxygen, this was arranged by CM, and she is discharged back home in the care of home health with follow-up as previously arranged. Breathing is comfortable and improved from yesterday on 2L NC. See surgery discharge summary from 5/25 for further details. Significant Diagnostic Studies: XR CHEST PORT Final Result 1. Right pleural effusion. Diffuse opacity in the right lung, likely infection  
or asymmetric edema. Underlying mass lesion is possible. 2. Stable right chest tube. Questionable small right basilar pneumothorax. 2. No significant change compared to prior exam.  
  
 
 
 
Labs: Results:  
   
Chemistry Recent Labs  
  05/26/19 
0729 05/25/19 
2214 05/24/19 2140 05/23/19 
1703 * 120* 128*  --   
 133* 137  --   
K 3.9 4.1 4.5  --   
 97* 102  --   
CO2 25 29 28  --   
BUN 11 13 23  --   
CREA 0.65 0.84 0.72  --   
CA 7.2* 7.4* 7.8*  --   
AGAP 9 7 7  --   
AP  --  207*  --  232* TP  --  5.4*  --  5.5* ALB  --  1.8*  --  1.9*  
GLOB  --  3.6*  --  3.6* AGRAT  --  0.5*  --  0.5* CBC w/Diff Recent Labs  
  05/26/19 
0729 05/25/19 2214 05/24/19 
0427 WBC 6.7 6.7 4.6  
RBC 2.38* 2.27* 2.26* HGB 8.3* 8.1* 7.8* HCT 26.6* 24.9* 24.4*  
 167 139* GRANS 66 66 61 LYMPH 12* 14 16 EOS 0* 0* 0* Cardiac Enzymes No results for input(s): CPK, CKND1, KIERSTEN in the last 72 hours. No lab exists for component: Sandor Minium Coagulation No results for input(s): PTP, INR, APTT in the last 72 hours. No lab exists for component: INREXT Lipid Panel Lab Results Component Value Date/Time Cholesterol, total 185 10/27/2016 08:51 AM  
 HDL Cholesterol 36 (L) 10/27/2016 08:51 AM  
 LDL, calculated 119 (H) 10/27/2016 08:51 AM  
 VLDL, calculated 30 10/27/2016 08:51 AM  
 Triglyceride 152 (H) 10/27/2016 08:51 AM  
  
BNP No results for input(s): BNPP in the last 72 hours. Liver Enzymes Recent Labs  
  05/25/19 2214  
TP 5.4* ALB 1.8* * SGOT 140* Thyroid Studies Lab Results Component Value Date/Time TSH 1.060 12/06/2017 04:31 PM  
    
 
 
[facility]: 
[facility]) 107 Temp 98.3 °F (36.8 °C) Resp 20 Ht 5' 2\" (1.575 m) Wt 54 kg (119 lb) SpO2 99% BMI 21.77 kg/m² General:          Alert, cooperative, no distress, appears stated age. Head:               Normocephalic, without obvious abnormality, atraumatic. Nose:               Nares normal. No drainage or sinus tenderness. Lungs:             Clear to auscultation bilaterally in upper lobes. Diminished with coarse BS in R mid/lower lung. PleurX in place on R side. Bandage is clean and dry. Heart:              Regular rate and rhythm,  no murmur, rub or gallop. Abdomen:        Soft, non-tender. Not distended. Bowel sounds normal.  
Extremities:     No cyanosis. No edema. No clubbing Skin:                Texture, turgor normal. No rashes or lesions. Not Jaundiced Neurologic:      Alert and oriented x 3, no focal deficits Disposition: home Discharge Condition: stable Patient Instructions:  
Current Discharge Medication List  
  
CONTINUE these medications which have NOT CHANGED Details  
sucralfate (CARAFATE) 1 gram tablet Take 1 Tab by mouth Before breakfast, lunch, dinner and at bedtime. Take as slurry 
Qty: 120 Tab, Refills: 0 HYDROcodone-acetaminophen (NORCO) 5-325 mg per tablet Take 1-2 Tabs by mouth every four (4) hours as needed for Pain for up to 3 days. Max Daily Amount: 12 Tabs. Qty: 30 Tab, Refills: 0 Associated Diagnoses: Pleural effusion  
  
acetaminophen (TYLENOL) 500 mg tablet Take 1,000 mg by mouth every six (6) hours as needed for Pain. palbociclib (IBRANCE) 75 mg cap Take 1 Cap by mouth daily. 1 cap by mouth daily for 21 days, then off for 7 days. Qty: 21 Cap, Refills: 5 Associated Diagnoses: Malignant neoplasm of breast in female, estrogen receptor positive, unspecified laterality, unspecified site of breast (HCC)  
  
albuterol (PROVENTIL HFA, VENTOLIN HFA, PROAIR HFA) 90 mcg/actuation inhaler 2 puffs q6h prn wheezing. May use brand per formulary Qty: 1 Inhaler, Refills: 3 Associated Diagnoses: Mild intermittent asthma without complication  
  
varicella-zoster recombinant, PF, (SHINGRIX, PF,) 50 mcg/0.5 mL susr injection Give in 2 doses, 2-6 months apart    DX Z23 Qty: 0.5 mL, Refills: 1 Associated Diagnoses: Encounter for immunization  
  
levothyroxine (SYNTHROID) 75 mcg tablet CANCEL THIS SCRIPT  1 every day for thyroid 
Qty: 90 Tab, Refills: 12  
 Associated Diagnoses: Acquired hypothyroidism ALPRAZolam (XANAX) 0.5 mg tablet 1 bid-tid prn anxiety Qty: 60 Tab, Refills: 5 Associated Diagnoses: Anxiety DULoxetine (CYMBALTA) 60 mg capsule 1 every day for depression 
Qty: 30 Cap, Refills: 12  
 Associated Diagnoses: Recurrent major depressive disorder, in partial remission (HCC)  
  
fluticasone-salmeterol (ADVAIR DISKUS) 250-50 mcg/dose diskus inhaler 1 inhalation bid for asthma control 
Qty: 3 Inhaler, Refills: 12  
 Associated Diagnoses: Mild intermittent asthma without complication  
  
heparin, porcine, pf (HEPARIN LOCKFLUSH,PORCINE,,PF,) 100 unit/mL injection Access port, flush with 20ml Normal Saline, 500 units of Heparin Flush and de access port every 4 weeks. Qty: 1 Syringe, Refills: 5 Activity: Activity as tolerated Diet: Resume previous diet Follow-up: 
 Follow-up as previously arranged Time spent to discharge patient 35 minutes Signed: 
Antonia Arriaga MD 
5/26/2019 
2:36 PM

## 2019-05-26 NOTE — PROGRESS NOTES
Oxygen Qualifier Room air: SpO2 with O2 and liter flow Resting SpO2  90% 95% on 3L Ambulating SpO2  86% 92% 2L Completed by: 
 
Sejal Cruz RT

## 2019-05-26 NOTE — PROGRESS NOTES
Pt is known to LM from her discharge home yesterday. She is now returning home with home O2 set up with [facility] alerted to readmission as observation (they were going to see her today). They will continue previous care orders and see pt either Monday or Tuesday as their schedule will allow. Portable O2 tank has been delivered to pt for her transport home. No additional needs at this time. Care Management Interventions PCP Verified by CM: (Dr Miryam Max) Mode of Transport at Discharge: (spouse) Transition of Care Consult (CM Consult): Home Health [address]: Yes Discharge Durable Medical Equipment: Yes([facility] Physical Therapy Consult: No 
Occupational Therapy Consult: No 
Speech Therapy Consult: No 
Current Support Network: Lives with Spouse Confirm Follow Up Transport: Family Plan discussed with Pt/Family/Caregiver: Yes Freedom of Choice Offered: Yes [employer] Information Provided?: No 
Discharge Location Discharge Placement: Home with home health

## 2019-05-26 NOTE — H&P
HOSPITALIST H&P/CONSULTNAME:  Domenica Nicolas Age:  77 y.o. 
:   1950 MRN:   [id number] PCP: Jimi Zacarias MD 
Consulting MD: Treatment Team: Attending Provider: Adolfo Gan MD; Primary Nurse: Asia Morton RN 
HPI:  
Patient is a 69yoF with PMH significant for R breast cancer, R pleural effusion with current PleurX catheter in place who was discharged from the hospital on 19 after thoracoscopy who [facility] tonight with dyspnea. Patient reports that she had been on oxygen during her hospitalization, but she was not discharged with oxygen. She has already had [facility] set up on discharge who are scheduled to visit tomorrow. Patient was found to be significantly hypoxic by EMS. Was placed on 6L O2. Patient feels much improved on oxygen. She denies fevers, chills, nausea, chest pain. Complete ROS done and is as stated in HPI or otherwise negative Past Medical History:  
Diagnosis Date  Abnormal CT of the chest   
 Adverse effect of anesthesia   
 bp dropped with dual lung/gallbladder surgery  Asthma    
 no acute attacks recently. uses advair daily  Breast cancer ([facility] [address]  R breast--infiltrating ductal ca,  ER/[place] positive and HER2 negative, s/p mastectomy, chemo, reconstruction, on chronic Ibrance therapy  Depression  Hypercholesteremia Controlled with diet  Hypothyroidism  Ill-defined condition   
 granular lung disease resolved  Past Surgical History:  
Procedure Laterality Date  CHEST SURGERY PROCEDURE UNLISTED   R lung biopsy  CHEST SURGERY PROCEDURE UNLISTED    
 multiple thoracentis to right lung all   HX BLADDER REPAIR    
 HX BREAST AUGMENTATION Right 10/20/2017 BREAST IMPLANT REMOVAL  performed by Peterson Rodriguez MD at [address] [place] [address] HX BREAST BIOPSY Right 2016  SKIN BIOPSY RIGHT BREAST performed by Henrietta Box MD at [facility] MAIN OR  
  HX BREAST RECONSTRUCTION Bilateral 2016 BILATERAL BREAST RECONSTRUCTION performed by Teresa Gonzalez MD at [facility] MAIN OR  
 HX BREAST RECONSTRUCTION Bilateral 2016 BILATERAL BREAST TISSUE EXPANDER INSERTION performed by Teresa Gonzalez MD at [facility] MAIN OR  
 HX BREAST RECONSTRUCTION Right 2017 RIGHT BREAST RECONSTRUCTION performed by Teresa Gonzalez MD at [address] [place] [address] HX BREAST RECONSTRUCTION Right 2017 Right  PLACEMENT OF PERMANENT IMPLANTS performed by Teresa Gonzalez MD at [address] [place] [address] HX BREAST REDUCTION Left 2017 LEFT BREAST MASTOPEXY FOR SYMMETRY performed by Teresa Gonzalez MD at [address] [place] [address] HX COLONOSCOPY    
 2006  HX CYST INCISION AND DRAINAGE Right 12/15/2017 INCISION AND DRAINAGE BREAST  performed by Teresa Gonzalez MD at [address] [place] [address] HX CYSTOCELE REPAIR    
 HX HYSTERECTOMY    
 fibroids  HX LAP CHOLECYSTECTOMY  2000  HX MASTECTOMY Right  HX RECTOCELE REPAIR    
 HX UROLOGICAL    
 blader and rectocele repair  HX VASCULAR ACCESS  2016  
 port left chest wall Prior to Admission Medications Prescriptions Last Dose Informant Patient Reported? Taking? ALPRAZolam (XANAX) 0.5 mg tablet   No No  
Si bid-tid prn anxiety DULoxetine (CYMBALTA) 60 mg capsule   No No  
Si every day for depression Patient taking differently: 60 mg nightly. 1 every day for depression HYDROcodone-acetaminophen (NORCO) 5-325 mg per tablet   No No  
Sig: Take 1-2 Tabs by mouth every four (4) hours as needed for Pain for up to 3 days. Max Daily Amount: 12 Tabs. acetaminophen (TYLENOL) 500 mg tablet   Yes No  
Sig: Take 1,000 mg by mouth every six (6) hours as needed for Pain. albuterol (PROVENTIL HFA, VENTOLIN HFA, PROAIR HFA) 90 mcg/actuation inhaler   No No  
Si puffs q6h prn wheezing.  May use brand per formulary  
fluticasone-salmeterol (ADVAIR DISKUS) 250-50 mcg/dose diskus inhaler   No No  
Si inhalation bid for asthma control  
heparin, porcine, pf (HEPARIN LOCKFLUSH,PORCINE,,PF,) 100 unit/mL injection   No No  
Sig: Access port, flush with 20ml Normal Saline, 500 units of Heparin Flush and de access port every 4 weeks. levothyroxine (SYNTHROID) 75 mcg tablet   No No  
Sig: CANCEL THIS SCRIPT  1 every day for thyroid  
palbociclib (IBRANCE) 75 mg cap   No No  
Sig: Take 1 Cap by mouth daily. 1 cap by mouth daily for 21 days, then off for 7 days. Patient taking differently: Take 75 mg by mouth daily. To stop 2019 per Dr Tatyana Hernadez  
sucralfate (CARAFATE) 1 gram tablet   No No  
Sig: Take 1 Tab by mouth Before breakfast, lunch, dinner and at bedtime. Take as slurry  
varicella-zoster recombinant, PF, (SHINGRIX, PF,) 50 mcg/0.5 mL susr injection   No No  
Sig: Give in 2 doses, 2-6 months apart    DX Z23 Facility-Administered Medications: None Allergies Allergen Reactions  Prednisone Unknown (comments) \"psychotic reaction\"  To oral and parenteral steroids Social History Tobacco Use  Smoking status: Never Smoker  Smokeless tobacco: Never Used Substance Use Topics  Alcohol use: No  
  Alcohol/week: 0.0 oz Family History Problem Relation Age of Onset  Hypertension Mother Sita Altamirano Mother 79 Breast cancer at age 79  Diabetes Mother Type 2  
 Other Father   
     alzheimers  Cancer Maternal Aunt Breast cancer after age 72  Cancer Maternal Aunt Breast cancer at age 39  Allergic Rhinitis Neg Hx  Allergy-severe Neg Hx  Amblyopia Neg Hx  Anemia Neg Hx  Anesth Problems Neg Hx  Angioedema Neg Hx  Anxiety Neg Hx  Arrhythmia Neg Hx  Arthritis-rheumatoid Neg Hx  Ataxia Neg Hx  Atopy Neg Hx  Bipolar Disorder Neg Hx  Blindness Neg Hx  Breast Cancer Neg Hx  Breast Problems Neg Hx  Broken Bones Neg Hx  Cataract Neg Hx  Celiac Disease Neg Hx  Childhood heart surgery Neg Hx  Childhood resp disease Neg Hx  Chorea Neg Hx  Clotting Disorder Neg Hx  Collagen Dis Neg Hx  Colon Cancer Neg Hx  Colon Polyps Neg Hx  COPD Neg Hx  Coronary Artery Disease Neg Hx  Crohn's Disease Neg Hx  Cystic Fibrosis Neg Hx  Delayed Awakening Neg Hx  Dementia Neg Hx  Depression Neg Hx  Dislocations Neg Hx  Downs Syndrome Neg Hx  Drug Abuse Neg Hx  Eclampsia Neg Hx  Eczema Neg Hx  Emergence Delirium Neg Hx  Emphysema Neg Hx  Fainting Neg Hx  Genitourinary () Neg Hx  Glaucoma Neg Hx   
 Heart Attack Neg Hx   
 Heart defect Neg Hx   
 Heart Failure Neg Hx   
 Heart Surgery Neg Hx  Hemachromatosis Neg Hx  Herpes Neg Hx  High Cholesterol Neg Hx   
 HIV/AIDS Neg Hx  Immunodeficiency Neg Hx  Infertility Neg Hx  Inflammatory Bowel Dz Neg Hx  Kidney Disease Neg Hx  Liver Disease Neg Hx  Macular Degen Neg Hx  Malignant Hyperthermia Neg Hx  MS Neg Hx  Neuropathy Neg Hx  NF Neg Hx  Osteoporosis Neg Hx   
 Ovarian Cancer Neg Hx  Pacemaker Neg Hx  Panic disorder Neg Hx  Parkinsonism Neg Hx  Post-op Cognitive Dysfunction Neg Hx  Post-op Nausea/Vomiting Neg Hx  Prematurity Neg Hx   Labor Neg Hx  Pseudocholinesterase Deficiency Neg Hx  Psoriasis Neg Hx  Psychotic Disorder Neg Hx  Rashes/Skin Problems Neg Hx  Retinal Detachment Neg Hx  Rh Incompatibility Neg Hx  Schizophrenia Neg Hx  Seizures Neg Hx  Severe Sprains Neg Hx  Sickle Cell Anemia Neg Hx  Sickle Cell Trait Neg Hx  SIDS Neg Hx  SLE Neg Hx  Spont Ab Neg Hx  Stomach Cancer Neg Hx  Strabismus Neg Hx  Substance Abuse Neg Hx  Sudden Death Neg Hx  Suicide Neg Hx  Thyroid Disease Neg Hx  Tuberculosis Neg Hx  Ulcerative Colitis Neg Hx  Urticaria Neg Hx  Lang's Disease Neg Hx Objective:  
 
Visit Vitals /59 (BP 1 Location: Left arm, BP Patient Position: At rest) Pulse (!) 102 Temp 98.4 °F (36.9 °C) Resp 20 Ht 5' 2\" (1.575 m) Wt 54 kg (119 lb) SpO2 98% BMI 21.77 kg/m² Temp (24hrs), Av.2 °F (36.8 °C), Min:97.9 °F (36.6 °C), Max:98.4 °F (36.9 °C) Oxygen Therapy O2 Sat (%): 98 % (19) Pulse via Oximetry: 100 beats per minute (19) O2 Device: Nasal cannula (19) O2 Flow Rate (L/min): 5 l/min (19) Physical Exam: 
General:    Alert, cooperative, no distress, appears stated age. Head:   Normocephalic, without obvious abnormality, atraumatic. Nose:  Nares normal. No drainage or sinus tenderness. Lungs:   Clear to auscultation bilaterally in upper lobes. Diminished with coarse BS in R mid/lower lung. PleurX in place on R side. Bandage is clean and dry. Heart:   Regular rate and rhythm,  no murmur, rub or gallop. Abdomen:   Soft, non-tender. Not distended. Bowel sounds normal.  
Extremities: No cyanosis. No edema. No clubbing Skin:     Texture, turgor normal. No rashes or lesions. Not Jaundiced Neurologic: Alert and oriented x 3, no focal deficits Data Review:  
Recent Results (from the past 24 hour(s)) CBC WITH AUTOMATED DIFF Collection Time: 19 10:14 PM  
Result Value Ref Range WBC 6.7 4.3 - 11.1 K/uL  
 RBC 2.27 (L) 4.05 - 5.2 M/uL HGB 8.1 (L) 11.7 - 15.4 g/dL HCT 24.9 (L) 35.8 - 46.3 % .7 (H) 79.6 - 97.8 FL  
 MCH 35.7 (H) 26.1 - 32.9 PG  
 MCHC 32.5 31.4 - 35.0 g/dL RDW 20.1 (H) 11.9 - 14.6 % PLATELET 683 163 - 945 K/uL MPV 11.3 9.4 - 12.3 FL ABSOLUTE NRBC 0.57 (H) 0.0 - 0.2 K/uL  
 DF AUTOMATED NEUTROPHILS 66 43 - 78 % LYMPHOCYTES 14 13 - 44 % MONOCYTES 16 (H) 4.0 - 12.0 % EOSINOPHILS 0 (L) 0.5 - 7.8 % BASOPHILS 1 0.0 - 2.0 % IMMATURE GRANULOCYTES 3 0.0 - 5.0 %  
 ABS. NEUTROPHILS 4.4 1.7 - 8.2 K/UL ABS. LYMPHOCYTES 1.0 0.5 - 4.6 K/UL  
 ABS. MONOCYTES 1.1 0.1 - 1.3 K/UL  
 ABS. EOSINOPHILS 0.0 0.0 - 0.8 K/UL  
 ABS. BASOPHILS 0.0 0.0 - 0.2 K/UL  
 ABS. IMM. GRANS. 0.2 0.0 - 0.5 K/UL METABOLIC PANEL, COMPREHENSIVE Collection Time: 05/25/19 10:14 PM  
Result Value Ref Range Sodium 133 (L) 136 - 145 mmol/L Potassium 4.1 3.5 - 5.1 mmol/L Chloride 97 (L) 98 - 107 mmol/L  
 CO2 29 21 - 32 mmol/L Anion gap 7 7 - 16 mmol/L Glucose 120 (H) 65 - 100 mg/dL BUN 13 8 - 23 MG/DL Creatinine 0.84 0.6 - 1.0 MG/DL  
 GFR est AA >60 >60 ml/min/1.73m2 GFR est non-AA >60 >60 ml/min/1.73m2 Calcium 7.4 (L) 8.3 - 10.4 MG/DL Bilirubin, total 0.3 0.2 - 1.1 MG/DL  
 ALT (SGPT) 40 12 - 65 U/L  
 AST (SGOT) 140 (H) 15 - 37 U/L Alk. phosphatase 207 (H) 50 - 136 U/L Protein, total 5.4 (L) 6.3 - 8.2 g/dL Albumin 1.8 (L) 3.2 - 4.6 g/dL Globulin 3.6 (H) 2.3 - 3.5 g/dL A-G Ratio 0.5 (L) 1.2 - 3.5 POC LACTIC ACID Collection Time: 05/25/19 10:20 PM  
Result Value Ref Range Lactic Acid (POC) 3.19 (H) 0.5 - 1.9 mmol/L Imaging Cyndi Allen Burn Assessment and Plan: Active Hospital Problems Diagnosis Date Noted  Hypoxia 05/26/2019  Pleural effusion 05/21/2019  Anxiety 11/15/2018  Malignant neoplasm of right female breast ([facility] [address] 01/25/2016  
  2016: R breast--infiltrating ductal ca,  ER/PA positive and HER2 negative, metastatic to nodes, s/p mastectomy, chemo, reconstruction, on chronic Ibrance therapy  Depression 11/04/2015 PLAN Hypoxia - Patient will need to be qualified for home oxygen - She should be able to be weaned off by [address] as her pleural effusion continues to drain - Will consult with Case Management for help with setting this up Pleural Effusion - PleurX in place - F/u with Surgery scheduled Depression/Anxiety 
- Continue home meds - Currently stable R Breast Cancer - Follow up as scheduled Code Status: full Anticipated discharge: 1-2 days, pending availability for oxygen qualification Signed By: Dhara Patel MD   
 May 26, 2019

## 2019-05-26 NOTE — PROGRESS NOTES
Patient becoming increasingly anxious, states she is having more difficulty breathing. Increased oxygen to 3 liters for comfort and medicated with  Xanax 0.5 mg po per prn order for anxiety. RT notified for prn breathing tx.

## 2019-05-26 NOTE — DISCHARGE INSTRUCTIONS
Patient Education        Shortness of Breath: Care Instructions  Your Care Instructions  Shortness of breath has many causes. Sometimes conditions such as anxiety can lead to shortness of breath. Some people get mild shortness of breath when they exercise. Trouble breathing also can be a symptom of a serious problem, such as asthma, lung disease, emphysema, heart problems, and pneumonia. If your shortness of breath continues, you may need tests and treatment. Watch for any changes in your breathing and other symptoms. Follow-up care is a key part of your treatment and safety. Be sure to make and go to all appointments, and call your doctor if you are having problems. It's also a good idea to know your test results and keep a list of the medicines you take. How can you care for yourself at home? · Do not smoke or allow others to smoke around you. If you need help quitting, talk to your doctor about stop-smoking programs and medicines. These can increase your chances of quitting for good. · Get plenty of rest and sleep. · Take your medicines exactly as prescribed. Call your doctor if you think you are having a problem with your medicine. · Find healthy ways to deal with stress. ? Exercise daily. ? Get plenty of sleep. ? Eat regularly and well. When should you call for help? Call 911 anytime you think you may need emergency care. For example, call if:    · You have severe shortness of breath.     · You have symptoms of a heart attack. These may include:  ? Chest pain or pressure, or a strange feeling in the chest.  ? Sweating. ? Shortness of breath. ? Nausea or vomiting. ? Pain, pressure, or a strange feeling in the back, neck, jaw, or upper belly or in one or both shoulders or arms. ? Lightheadedness or sudden weakness. ? A fast or irregular heartbeat. After you call 911, the  may tell you to chew 1 adult-strength or 2 to 4 low-dose aspirin. Wait for an ambulance.  Do not try to drive yourself.    Call your doctor now or seek immediate medical care if:    · Your shortness of breath gets worse or you start to wheeze. Wheezing is a high-pitched sound when you breathe.     · You wake up at night out of breath or have to prop your head up on several pillows to breathe.     · You are short of breath after only light activity or while at rest.    Watch closely for changes in your health, and be sure to contact your doctor if:    · You do not get better over the next 1 to 2 days. Where can you learn more? Go to http://edyta-tremaine.info/. Enter S780 in the search box to learn more about \"Shortness of Breath: Care Instructions. \"  Current as of: September 5, 2018  Content Version: 11.9  © 4421-2639 [employer]. Care instructions adapted under license by [employer] (which disclaims liability or warranty for this information). If you have questions about a medical condition or this instruction, always ask your healthcare professional. [place] [address] any warranty or liability for your use of this information. DISCHARGE SUMMARY from Nurse    PATIENT INSTRUCTIONS:    After general anesthesia or intravenous sedation, for 24 hours or while taking prescription Narcotics:  · Limit your activities  · Do not drive and operate hazardous machinery  · Do not make important personal or business decisions  · Do  not drink alcoholic beverages  · If you have not urinated within 8 hours after discharge, please contact your surgeon on call.     Report the following to your surgeon:  · Excessive pain, swelling, redness or odor of or around the surgical area  · Temperature over 100.5  · Nausea and vomiting lasting longer than 4 hours or if unable to take medications  · Any signs of decreased circulation or nerve impairment to extremity: change in color, persistent  numbness, tingling, coldness or increase pain  · Any questions    What to do at Home:  Recommended activity: Activity as tolerated,     If you experience any of the following symptoms fever greater then 100.5, pain unrelieved by medication, increase in shortness of breath, please follow up with primary care doctor. *  Please give a list of your current medications to your Primary Care Provider. *  Please update this list whenever your medications are discontinued, doses are      changed, or new medications (including over-the-counter products) are added. *  Please carry medication information at all times in case of emergency situations. These are general instructions for a healthy lifestyle:    No smoking/ No tobacco products/ Avoid exposure to second hand smoke  Surgeon General's Warning:  Quitting smoking now greatly reduces serious risk to your health. Obesity, smoking, and sedentary lifestyle greatly increases your risk for illness    A healthy diet, regular physical exercise & weight monitoring are important for maintaining a healthy lifestyle    You may be retaining fluid if you have a history of heart failure or if you experience any of the following symptoms:  Weight gain of 3 pounds or more overnight or 5 pounds in a week, increased swelling in our hands or feet or shortness of breath while lying flat in bed. Please call your doctor as soon as you notice any of these symptoms; do not wait until your next office visit. Recognize signs and symptoms of STROKE:    F-face looks uneven    A-arms unable to move or move unevenly    S-speech slurred or non-existent    T-time-call 911 as soon as signs and symptoms begin-DO NOT go       Back to bed or wait to see if you get better-TIME IS BRAIN. Warning Signs of HEART ATTACK     Call 911 if you have these symptoms:   Chest discomfort. Most heart attacks involve discomfort in the center of the chest that lasts more than a few minutes, or that goes away and comes back.  It can feel like uncomfortable pressure, squeezing, fullness, or pain.  Discomfort in other areas of the upper body. Symptoms can include pain or discomfort in one or both arms, the back, neck, jaw, or stomach.  Shortness of breath with or without chest discomfort.  Other signs may include breaking out in a cold sweat, nausea, or lightheadedness. Don't wait more than five minutes to call 911 - MINUTES MATTER! Fast action can save your life. Calling 911 is almost always the fastest way to get lifesaving treatment. Emergency Medical Services staff can begin treatment when they arrive -- up to an hour sooner than if someone gets to the hospital by car. The discharge information has been reviewed with the patient. The patient verbalized understanding. Discharge medications reviewed with the patient and appropriate educational materials and side effects teaching were provided.   ___________________________________________________________________________________________________________________________________

## 2019-05-26 NOTE — PROGRESS NOTES
Patient requested pain medication for right chest area. Norco 2 tabs given for moderate pain per prn order. Resting quietly with head of bed elevated, oxygen at 2 liters.  remains at bedside. Will monitor.

## 2019-05-26 NOTE — PROGRESS NOTES
Patient arrived by stretcher from ED. Dyspnea on exertion, moist productive cough with small amount of thick, yellow sputum. Oriented to room and staff,  at bedside.

## 2019-05-26 NOTE — ED PROVIDER NOTES
70-year-old female presenting for worsening shortness of breath. She just got discharged this afternoon after being admitted for shortness of breath and having a pleural effusion drained. States she was on nasal cannula oxygen throughout her entire stay in the hospital when she was discharged she was sent home without oxygen. Her  transport her home and she felt somewhat short of breath throughout the evening she states short of breath and was unable to perform any normal tasks. When she tried to rest she became increasingly short of breath was able to sleep she called EMS and had herself brought back to the emergency department. They placed her on nasal cannula because her O2 sats were in the mid 80s and she immediately started to feel better. She denies any other events. She's had no fevers, nausea, vomiting. The history is provided by the patient. Shortness of Breath This is a recurrent problem. The problem occurs rarely. The current episode started 3 to 5 hours ago. The problem has been gradually improving. Associated symptoms include orthopnea. Pertinent negatives include no fever, no headaches, no coryza, no rhinorrhea, no sore throat, no swollen glands, no ear pain, no neck pain, no cough, no sputum production, no hemoptysis, no wheezing, no PND, no chest pain, no syncope, no vomiting, no abdominal pain, no rash, no leg pain, no leg swelling and no claudication. It is unknown what precipitated the problem. Past Medical History:  
Diagnosis Date  Abnormal CT of the chest   
 Adverse effect of anesthesia   
 bp dropped with dual lung/gallbladder surgery  Asthma    
 no acute attacks recently. uses advair daily  [facility]--infiltrating ductal ca,  ER/MO positive and HER2 negative, s/p mastectomy, chemo, reconstruction, on chronic Ibrance therapy  Depression  Hypercholesteremia Controlled with diet  Hypothyroidism  Ill-defined condition   
 granular lung disease resolved 2002 Past Surgical History:  
Procedure Laterality Date  CHEST SURGERY PROCEDURE UNLISTED  2018 R lung biopsy  CHEST SURGERY PROCEDURE UNLISTED    
 multiple thoracentis to right lung all 2019  HX BLADDER REPAIR    
 HX BREAST AUGMENTATION Right 10/20/2017 BREAST IMPLANT REMOVAL  performed by Desirae Jay MD at [address] [place] [address] HX BREAST BIOPSY Right 2/5/2016 SKIN BIOPSY RIGHT BREAST performed by Shari Gr MD at [facility] OR  
 HX BREAST RECONSTRUCTION Bilateral 9/14/2016 BILATERAL BREAST RECONSTRUCTION performed by Desirae Jay MD at [facility] OR  
 HX BREAST RECONSTRUCTION Bilateral 9/14/2016 BILATERAL BREAST TISSUE EXPANDER INSERTION performed by Desirae Jay MD at [facility] OR  
 HX BREAST RECONSTRUCTION Right 9/18/2017 RIGHT BREAST RECONSTRUCTION performed by Dseirae Jay MD at [address] [place] [address] HX BREAST RECONSTRUCTION Right 9/18/2017 Right  PLACEMENT OF PERMANENT IMPLANTS performed by Desirae Jay MD at [address] [place] [address] HX BREAST REDUCTION Left 9/18/2017 LEFT BREAST MASTOPEXY FOR SYMMETRY performed by Desirae Jay MD at [address] [place] [address] HX COLONOSCOPY    
 2006  HX CYST INCISION AND DRAINAGE Right 12/15/2017 INCISION AND DRAINAGE BREAST  performed by Desirae Jay MD at [address] [place] [address] HX CYSTOCELE REPAIR    
 HX HYSTERECTOMY    
 fibroids  HX LAP CHOLECYSTECTOMY  2000  HX MASTECTOMY Right  HX RECTOCELE REPAIR    
 HX UROLOGICAL    
 blader and rectocele repair  HX VASCULAR ACCESS  2016  
 port left chest wall Family History:  
Problem Relation Age of Onset  Hypertension Mother Ivett Pear Mother 79 Breast cancer at age 79  Diabetes Mother Type 2  
 Other Father   
     alzheimers  Cancer Maternal Aunt Breast cancer after age 72  Cancer Maternal Aunt Breast cancer at age 39  Allergic Rhinitis Neg Hx  Allergy-severe Neg Hx  Amblyopia Neg Hx  Anemia Neg Hx  Anesth Problems Neg Hx  Angioedema Neg Hx  Anxiety Neg Hx  Arrhythmia Neg Hx  Arthritis-rheumatoid Neg Hx  Ataxia Neg Hx  Atopy Neg Hx  Bipolar Disorder Neg Hx  Blindness Neg Hx  Breast Cancer Neg Hx  Breast Problems Neg Hx  Broken Bones Neg Hx  Cataract Neg Hx  Celiac Disease Neg Hx  Childhood heart surgery Neg Hx  Childhood resp disease Neg Hx  Chorea Neg Hx  Clotting Disorder Neg Hx  Collagen Dis Neg Hx  Colon Cancer Neg Hx  Colon Polyps Neg Hx  COPD Neg Hx  Coronary Artery Disease Neg Hx  Crohn's Disease Neg Hx  Cystic Fibrosis Neg Hx  Delayed Awakening Neg Hx  Dementia Neg Hx  Depression Neg Hx  Dislocations Neg Hx  Downs Syndrome Neg Hx  Drug Abuse Neg Hx  Eclampsia Neg Hx  Eczema Neg Hx  Emergence Delirium Neg Hx  Emphysema Neg Hx  Fainting Neg Hx  Genitourinary () Neg Hx  Glaucoma Neg Hx   
 Heart Attack Neg Hx   
 Heart defect Neg Hx   
 Heart Failure Neg Hx   
 Heart Surgery Neg Hx  Hemachromatosis Neg Hx  Herpes Neg Hx  High Cholesterol Neg Hx   
 HIV/AIDS Neg Hx  Immunodeficiency Neg Hx  Infertility Neg Hx  Inflammatory Bowel Dz Neg Hx  Kidney Disease Neg Hx  Liver Disease Neg Hx  Macular Degen Neg Hx  Malignant Hyperthermia Neg Hx  MS Neg Hx  Neuropathy Neg Hx  NF Neg Hx  Osteoporosis Neg Hx   
 Ovarian Cancer Neg Hx  Pacemaker Neg Hx  Panic disorder Neg Hx  Parkinsonism Neg Hx  Post-op Cognitive Dysfunction Neg Hx  Post-op Nausea/Vomiting Neg Hx  Prematurity Neg Hx   Labor Neg Hx  Pseudocholinesterase Deficiency Neg Hx  Psoriasis Neg Hx  Psychotic Disorder Neg Hx  Rashes/Skin Problems Neg Hx  Retinal Detachment Neg Hx  Rh Incompatibility Neg Hx  Schizophrenia Neg Hx  Seizures Neg Hx  Severe Sprains Neg Hx  Sickle Cell Anemia Neg Hx  Sickle Cell Trait Neg Hx  SIDS Neg Hx  SLE Neg Hx  Spont Ab Neg Hx  Stomach Cancer Neg Hx  Strabismus Neg Hx  Substance Abuse Neg Hx  Sudden Death Neg Hx  Suicide Neg Hx  Thyroid Disease Neg Hx  Tuberculosis Neg Hx  Ulcerative Colitis Neg Hx  Urticaria Neg Hx  Lang's Disease Neg Hx Social History Socioeconomic History  Marital status:  Spouse name: Not on file  Number of children: Not on file  Years of education: Not on file  Highest education level: Not on file Occupational History  Not on file Social Needs  Financial resource strain: Not on file  Food insecurity:  
  Worry: Not on file Inability: Not on file  Transportation needs:  
  Medical: Not on file Non-medical: Not on file Tobacco Use  Smoking status: Never Smoker  Smokeless tobacco: Never Used Substance and Sexual Activity  Alcohol use: No  
  Alcohol/week: 0.0 oz  Drug use: No  
 Sexual activity: Not on file Lifestyle  Physical activity:  
  Days per week: Not on file Minutes per session: Not on file  Stress: Not on file Relationships  Social connections:  
  Talks on phone: Not on file Gets together: Not on file Attends Yarsani service: Not on file Active member of club or organization: Not on file Attends meetings of clubs or organizations: Not on file Relationship status: Not on file  Intimate partner violence:  
  Fear of current or ex partner: Not on file Emotionally abused: Not on file Physically abused: Not on file Forced sexual activity: Not on file Other Topics Concern  Not on file Social History Narrative  Not on file ALLERGIES: Prednisone Review of Systems Constitutional: Negative for fever. HENT: Negative for ear pain, rhinorrhea and sore throat. Respiratory: Positive for shortness of breath. Negative for cough, hemoptysis, sputum production and wheezing. Cardiovascular: Positive for orthopnea. Negative for chest pain, claudication, leg swelling, syncope and PND. Gastrointestinal: Negative for abdominal pain and vomiting. Musculoskeletal: Negative for neck pain. Skin: Negative for rash. Neurological: Negative for headaches. All other systems reviewed and are negative. Vitals:  
 05/25/19 2211 05/25/19 2333 BP: 135/59 Pulse: (!) 102 Resp: 20 Temp: 98.4 °F (36.9 °C) SpO2: 98% 98% Weight: 54 kg (119 lb) Height: 5' 2\" (1.575 m) Physical Exam  
Constitutional: She is oriented to person, place, and time. She appears well-developed. Ill appearing and pale HENT:  
Head: Normocephalic and atraumatic. Eyes: Pupils are equal, round, and reactive to light. Conjunctivae are normal.  
Neck: Neck supple. Cardiovascular: Regular rhythm. tachycardic Pulmonary/Chest: She is in respiratory distress. She has wheezes. Somewhat tachypneic, diminished expansion, coarse breath sounds in all lung fields Abdominal: Soft. Bowel sounds are normal.  
Musculoskeletal: Normal range of motion. Neurological: She is alert and oriented to person, place, and time. Skin: Skin is warm and dry. There is pallor. Nursing note and vitals reviewed. MDM Number of Diagnoses or Management Options Chronic respiratory failure with hypoxia [facility]):  
Diagnosis management comments: 17-year-old female presenting for worsening shortness of breath. It sounds like she should've been discharged with oxygen but was not. That being said, we will get a repeat chest x-ray and basic labs. Amount and/or Complexity of Data Reviewed Clinical lab tests: ordered and reviewed (Results for orders placed or performed during the hospital encounter of 05/25/19 
-CBC WITH AUTOMATED DIFF Result                      Value             Ref Range WBC                         6.7               4.3 - 11.1 K* 
     RBC                         2.27 (L)          4.05 - 5.2 M* HGB                         8.1 (L)           11.7 - 15.4 * HCT                         24.9 (L)          35.8 - 46.3 % MCV                         109.7 (H)         79.6 - 97.8 * MCH                         35.7 (H)          26.1 - 32.9 * MCHC                        32.5              31.4 - 35.0 * RDW                         20.1 (H)          11.9 - 14.6 % PLATELET                    167               150 - 450 K/* MPV                         11.3              9.4 - 12.3 FL ABSOLUTE NRBC               0.57 (H)          0.0 - 0.2 K/* DF                          AUTOMATED NEUTROPHILS                 66                43 - 78 % LYMPHOCYTES                 14                13 - 44 % MONOCYTES                   16 (H)            4.0 - 12.0 % EOSINOPHILS                 0 (L)             0.5 - 7.8 % BASOPHILS                   1                 0.0 - 2.0 % IMMATURE GRANULOCYTES       3                 0.0 - 5.0 %   
     ABS. NEUTROPHILS            4.4               1.7 - 8.2 K/* ABS. LYMPHOCYTES            1.0               0.5 - 4.6 K/* ABS. MONOCYTES              1.1               0.1 - 1.3 K/* ABS. EOSINOPHILS            0.0               0.0 - 0.8 K/* ABS. BASOPHILS              0.0               0.0 - 0.2 K/* ABS. IMM. GRANS.            0.2               0.0 - 0.5 K/* 
-METABOLIC PANEL, COMPREHENSIVE Result                      Value             Ref Range      Sodium                      133 (L)           136 - 145 mm* 
 Potassium                   4.1               3.5 - 5.1 mm* Chloride                    97 (L)            98 - 107 mmo* CO2                         29                21 - 32 mmol* Anion gap                   7                 7 - 16 mmol/L Glucose                     120 (H)           65 - 100 mg/* BUN                         13                8 - 23 MG/DL Creatinine                  0.84              0.6 - 1.0 MG* 
     GFR est AA                  >60               >60 ml/min/1* GFR est non-AA              >60               >60 ml/min/1* Calcium                     7.4 (L)           8.3 - 10.4 M* Bilirubin, total            0.3               0.2 - 1.1 MG* ALT (SGPT)                  40                12 - 65 U/L   
     AST (SGOT)                  140 (H)           15 - 37 U/L Alk. phosphatase            207 (H)           50 - 136 U/L Protein, total              5.4 (L)           6.3 - 8.2 g/* Albumin                     1.8 (L)           3.2 - 4.6 g/* Globulin                    3.6 (H)           2.3 - 3.5 g/* A-G Ratio                   0.5 (L)           1.2 - 3.5     
-POC LACTIC ACID Result                      Value             Ref Range Lactic Acid (POC)           3.19 (H)          0.5 - 1.9 mm* 
) Tests in the radiology section of CPT®: reviewed and ordered (Xr Chest Sngl V Result Date: 5/24/2019 EXAM: Chest x-ray. INDICATION: Dyspnea. COMPARISON: Yesterday's chest x-ray. TECHNIQUE: Frontal view chest x-ray. FINDINGS: There is mildly progressed diffuse right lung edema or infiltrate. A small loculated pneumothorax along the lateral right lung base is also mildly progressed. The left lung is clear except for mild basilar atelectasis. A right-sided chest tube and left chest wall infusion port catheter remain in place.   
 
Mildly progressed right lung edema or infiltrate and small loculated basilar pneumothorax. Xr Chest Sngl V Result Date: 5/23/2019 EXAM: Chest x-ray. INDICATION: Dyspnea. COMPARISON: Yesterday's chest x-ray. TECHNIQUE: Frontal view chest x-ray. FINDINGS: Diffuse right and left lung base infiltrates or edema are unchanged. A small loculated pneumothorax along the lateral right lung base is also unchanged. Cardiac size is stable. The right-sided chest tubes and a left chest wall infusion port catheter remain in place. No interval change. Xr Chest Sngl V Result Date: 5/22/2019 EXAM: Chest x-ray. INDICATION: Dyspnea. COMPARISON: Yesterday's chest x-ray. TECHNIQUE: Frontal view chest x-ray. FINDINGS: Diffuse right and basilar left lung edema or infiltrates are unchanged. A small loculated pneumothorax along the right lung base is slightly decreased in size. Two right-sided chest tubes remain in place. Again noted is a left chest wall infusion port catheter. Slightly decreased loculated right basilar pneumothorax, otherwise, no change. Xr Chest Sngl V Result Date: 5/21/2019 Chest X-ray INDICATION: 57-year-old female evaluated status post chest tube placement Comparison exams: 5/8/2019 A portable AP view of the chest was obtained. FINDINGS: Upright portable view the chest. Trachea midline. Cardiac contours are normal limits. Small left pleural effusion and interstitial edema is noted throughout the left hemithorax Comparison previous examination. MediPort tip entering from a left-sided approach terminates in mid SVC stable There are 2 chest tubes overlying the right hemithorax. There has been interval decrease in the degree of pleural effusion, smaller remains layering in the right lung base as well as at the right apex. . There is suggestion of a loculated pneumothorax right lung base. The bony thorax is intact. IMPRESSION:  There is interval decrease in degree of opacification throughout the right hemithorax with 2 chest tubes in place.  There still remains small bilateral pleural effusions and there is suggestion of a loculated pneumothorax, versus non reexpansion of the right lung parenchyma in the right lung base. General interstitial edema is noted throughout the left hemithorax. These results were given to PACU on 05/21/19 at 14:16 Xr Chest Pa Lat Result Date: 5/8/2019 Two view chest History: Follow-up pleural effusion, shortness of breath, breast carcinoma. Comparison: 04/11/2019 Findings: A left subclavian Mediport catheter is unchanged. The heart and mediastinal silhouette are normal in size and configuration. Moderate to large right pleural effusion persists without significant change. Ovoid nodular opacity within the right midlung zone most likely re[facility] a small component of loculated pleural fluid within the minor fissure. Mild streaky opacities at the left lung base are unchanged. The pulmonary vascularity is within normal limits. The visualized osseous structures are unremarkable. Impression: No significant change. Xr Chest Saqib Jose Angelmichele Result Date: 5/25/2019 Portable chest xray  COMPARISON: May 24, 2019 CLINICAL HISTORY: Shortness of breath. FINDINGS: There is a stable right-sided chest tube. Left-sided chest port is stable. There is right pleural effusion. There is diffuse opacity in the right lung. There is questionable small right basilar pneumothorax. Cardiac mediastinal contour and the surrounding bones are stable. 1. Right pleural effusion. Diffuse opacity in the right lung, likely infection or asymmetric edema. Underlying mass lesion is possible. 2. Stable right chest tube. Questionable small right basilar pneumothorax. 2. No significant change compared to prior exam. ) Tests in the medicine section of CPT®: ordered and reviewed Decide to obtain previous medical records or to obtain history from someone other than the patient: yes Discuss the patient with other providers: yes (Discussed with hospitalist) Risk of Complications, Morbidity, and/or Mortality Presenting problems: moderate Diagnostic procedures: moderate Management options: moderate General comments: Patient reportedly cannot be discharged home due to her oxygen demand. I personally reviewed the patient's vital signs, laboratory tests, and/or radiological findings. I discussed these findings with the patient and their significance. I answered all questions and explained that given these findings there is significant concern for increased morbidity and/or mortality without immediate intervention. As a result, I recommended admission to the hospital, consulted the appropriate service, and transitioned care to that service in improved condition Patient Progress Patient progress: improved ED Course as of May 25 2357 Sat May 25, 2019  
2259 Lactic acid was ordered from triage and is elevated. I think this is from hypoxia. [JS] ED Course User Index [JS] Yuri Samson MD  
 
 
Procedures

## 2019-05-26 NOTE — PROGRESS NOTES
Pt resting in the bed with family present. Pt is stable. Pt is axo. Respirations are even and unlabored with nasal cannula, 3 L/min. Pt states that it has been a week since her last BM. Pt states the reason is because she has been on a clear liquid diet. Pleurx dressing on R side is clean dry and intact. No needs are expressed at this time. Safety measures are in place. Will continue to monitor.

## 2019-05-28 NOTE — PROGRESS NOTES
5/28/2019 Saw the patient with Dr Valery Hernandez. She had an attempted VATS but ended up with biopsy and pluerex placement. The biopsy does show breast cancer. We will order a PET and will also order Caris. She will return in a couple of weeks for results and to make a plan for treatment. Will continue to follow.

## 2019-06-03 PROBLEM — J91.0 PLEURAL EFFUSION, MALIGNANT: Status: ACTIVE | Noted: 2019-01-01

## 2019-06-03 PROBLEM — J90 PLEURAL EFFUSION: Status: RESOLVED | Noted: 2019-01-01 | Resolved: 2019-01-01

## 2019-06-03 PROBLEM — A41.9 SYSTEMIC INFLAMMATORY RESPONSE SYNDROME (SIRS) OF INFECTIOUS ORIGIN WITH ACUTE ORGAN FAILURE (HCC): Status: ACTIVE | Noted: 2019-01-01

## 2019-06-03 PROBLEM — J18.9 COMMUNITY ACQUIRED PNEUMONIA OF RIGHT LUNG: Status: ACTIVE | Noted: 2019-01-01

## 2019-06-03 PROBLEM — F41.9 ANXIETY: Status: RESOLVED | Noted: 2018-01-01 | Resolved: 2019-01-01

## 2019-06-03 PROBLEM — F33.41 RECURRENT MAJOR DEPRESSIVE DISORDER, IN PARTIAL REMISSION (HCC): Chronic | Status: RESOLVED | Noted: 2017-11-16 | Resolved: 2019-01-01

## 2019-06-03 PROBLEM — D61.810 PANCYTOPENIA DUE TO CHEMOTHERAPY (HCC): Status: RESOLVED | Noted: 2017-04-07 | Resolved: 2019-01-01

## 2019-06-03 PROBLEM — A41.9 SEPSIS (HCC): Status: ACTIVE | Noted: 2019-01-01

## 2019-06-03 PROBLEM — J96.11 CHRONIC HYPOXEMIC RESPIRATORY FAILURE (HCC): Chronic | Status: ACTIVE | Noted: 2019-01-01

## 2019-06-03 PROBLEM — R00.0 TACHYCARDIA: Status: RESOLVED | Noted: 2017-04-07 | Resolved: 2019-01-01

## 2019-06-03 PROBLEM — Z98.890 S/P BREAST RECONSTRUCTION, RIGHT: Status: RESOLVED | Noted: 2017-09-19 | Resolved: 2019-01-01

## 2019-06-03 PROBLEM — E87.1 HYPONATREMIA: Status: ACTIVE | Noted: 2019-01-01

## 2019-06-03 PROBLEM — J91.0 PLEURAL EFFUSION, MALIGNANT: Chronic | Status: ACTIVE | Noted: 2019-01-01

## 2019-06-03 PROBLEM — R22.31 LUMP OF SKIN OF RIGHT UPPER EXTREMITY: Status: RESOLVED | Noted: 2017-06-13 | Resolved: 2019-01-01

## 2019-06-03 PROBLEM — R65.20 SYSTEMIC INFLAMMATORY RESPONSE SYNDROME (SIRS) OF INFECTIOUS ORIGIN WITH ACUTE ORGAN FAILURE (HCC): Status: ACTIVE | Noted: 2019-01-01

## 2019-06-03 PROBLEM — E87.20 LACTIC ACIDOSIS: Status: ACTIVE | Noted: 2019-01-01

## 2019-06-03 PROBLEM — J96.21 ACUTE ON CHRONIC RESPIRATORY FAILURE WITH HYPOXIA (HCC): Status: ACTIVE | Noted: 2019-01-01

## 2019-06-03 PROBLEM — J18.9 HEALTHCARE-ASSOCIATED PNEUMONIA: Status: ACTIVE | Noted: 2019-01-01

## 2019-06-03 PROBLEM — K72.00 ACUTE LIVER FAILURE: Status: ACTIVE | Noted: 2019-01-01

## 2019-06-03 NOTE — PROGRESS NOTES
Patient resting quietly in bed, no c/o pain, states very tired. Respirations course and tachy, pale color. Will continue to monitor.  at bedside.

## 2019-06-03 NOTE — PROGRESS NOTES
Echo in progress. R pluerix catheter drained till empty. 400 cc. Pt. tolerated well.  Sterile technique

## 2019-06-03 NOTE — PROGRESS NOTES
TRANSFER - IN REPORT:    Verbal report received from Jose (name) on Omar Lujan  being received from ED (unit) for routine progression of care      Report consisted of patients Situation, Background, Assessment and   Recommendations(SBAR). Information from the following report(s) SBAR, Kardex, ED Summary, [facility] and Recent Results was reviewed with the receiving nurse. Opportunity for questions and clarification was provided. Assessment completed upon patients arrival to unit and care assumed.

## 2019-06-03 NOTE — PROGRESS NOTES
Pt resting in bed.  at bedside. Respirations are even and unlabored on 4L NC. Pt still has wheeze. Pt is still running tachycardic. IV dextrose 5% NS 0.45% infusing at 100/hr. Pt is stable. Call light within reach, bed low and locked. Will give report to oncoming RN.

## 2019-06-03 NOTE — PROGRESS NOTES
Pt arrived to floor via stretcher accompanied by . Pt is oriented to room and surroundings. Pt is alert and oriented x4, but appears tired. Pt is on 4L NC. Pt has expiratory wheeze and labored breathing with exercertion. Pt sounds congested. Lung sounds are coarse and crackles are heard. Pt had dark soft and formed BM. Pt is tachycardic and respirations are 32. Dual skin assessment performed with Jude Villagomez RN. Pt has scar from right mastectomy. Pt has bruising on both arms and hands from previous IV sticks. Pt has clean dry and intact bandage covering right plueral drain. Pt has port but states it doesn't work. Call light is within reach, bed is low and locked,  is at bedside.

## 2019-06-03 NOTE — PROGRESS NOTES
"Physical Therapy Progress Note     Name: Claudio Hickey [facility] Number: [id number]  Call to Eben Sun NP to report D Dimer "  Michelle Alanis, PT  08/29/2018

## 2019-06-03 NOTE — CONSULTS
PULMONARY/CCM CONSULT :  6/3/2019    Date of Admission:  6/2/2019    The patient's chart has been reviewed and the chart has been discussed with nursing staff. Subjective: This patient has been seen and evaluated at the request of Dr. Pelon Miner. Patient is a 77 y.o.  female [facility] with dyspnea. She has metastatic breast cancer (dxed in 2016, status post mastectomy and chemotherapy, followed by Dr. Anders Gill), recurrent pleural effusion with chronic pleurx drain inserted on 5/8 by us, and chronic hypoxic respiratory failure. She was started on triple coverage antibiotics for sepsis by hospitalist. Her pleural fluid has been negative for malignant cells in the past and her cultures have also been negative. CXR with right effusion/infiltrate. Her PCT is 2.5 with elevated WBC and LA of 4.9. We were asked to see her for right pleural effusion with sepsis in the setting of metastatic breast cancer. She is currently on 3 lpm with oxygen saturation of 96%. She last drained with pleurex on 5/27 and 5/29. She was told to drain only twice weekly and home health RN confirmed. She was due to drain pleurex today but decompensated over the weekend and was admitted. She has significant volume overload/edema with third spacing. Lasix 60 mg daily ordered by the hospitalist. Her albumin is 1.5 with malnutrition. Past Medical History:   Diagnosis Date    Abnormal CT of the chest     Acquired hypothyroidism 11/4/2015    Adverse effect of anesthesia     bp dropped with dual lung/gallbladder surgery    Age-related osteoporosis without current pathological fracture 12/17/2018    T score -2.6, 2018    Asthma      no acute attacks recently.  uses advair daily    Breast cancer ([facility] [address] 2016    R breast--infiltrating ductal ca,  ER/KS positive and HER2 negative, s/p mastectomy, chemo, reconstruction, on chronic Ibrance therapy    Depression      Hypercholesteremia     Controlled with diet     Hypothyroidism  Ill-defined condition     granular lung disease resolved 2002    Mediastinal adenopathy 2/18/2016    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 ([facility] [address] 6/4/2018    Pancytopenia due to chemotherapy ([facility] [address] 4/7/2017    Recurrent major depressive disorder, in partial remission ([facility] [address] 11/16/2017    S/P breast reconstruction, right 9/19/2017    Status post right mastectomy 9/14/2016      Past Surgical History:   Procedure Laterality Date    CHEST SURGERY PROCEDURE UNLISTED  2018    R lung biopsy     CHEST SURGERY PROCEDURE UNLISTED      multiple thoracentis to right lung all 2019    HX BLADDER REPAIR      HX BREAST AUGMENTATION Right 10/20/2017    BREAST IMPLANT REMOVAL  performed by Carlyn Scott MD at [address] [place] [address] HX BREAST BIOPSY Right 2/5/2016    SKIN BIOPSY RIGHT BREAST performed by Dallas Bernstein MD at [address] [place] [address] HX BREAST RECONSTRUCTION Bilateral 9/14/2016    BILATERAL BREAST RECONSTRUCTION performed by Carlyn Scott MD at [address] [place] [address] HX BREAST RECONSTRUCTION Bilateral 9/14/2016    BILATERAL BREAST TISSUE EXPANDER INSERTION performed by Calryn Scott MD at [address] [place] [address] HX BREAST RECONSTRUCTION Right 9/18/2017    RIGHT BREAST RECONSTRUCTION performed by Carlyn Scott MD at [address] [place] [address] HX BREAST RECONSTRUCTION Right 9/18/2017    Right  PLACEMENT OF PERMANENT IMPLANTS performed by Carlyn Scott MD at [address] [place] [address] HX BREAST REDUCTION Left 9/18/2017    LEFT BREAST MASTOPEXY FOR SYMMETRY performed by Carlyn Scott MD at [address] [place] [address] HX COLONOSCOPY      2006    HX CYST INCISION AND DRAINAGE Right 12/15/2017    INCISION AND DRAINAGE BREAST  performed by Carlyn Scott MD at [address] [place] [address] HX CYSTOCELE REPAIR      HX HYSTERECTOMY      fibroids    HX LAP CHOLECYSTECTOMY  2000    HX MASTECTOMY Right     HX RECTOCELE REPAIR      HX UROLOGICAL      blader and rectocele repair    HX VASCULAR ACCESS  2016    port left chest wall      Social History     Tobacco Use    Smoking status: Never Smoker    Smokeless tobacco: Never Used   Substance Use Topics    Alcohol use: No     Alcohol/week: 0.0 oz      Family History   Problem Relation Age of Onset    Hypertension Mother     Cancer Mother 79        Breast cancer at age 79    Diabetes Mother         Type 2    Other Father         alzheimers    Cancer Maternal Aunt         Breast cancer after age 72    Cancer Maternal Aunt         Breast cancer at age 39      Allergies   Allergen Reactions    Prednisone Unknown (comments)     \"psychotic reaction\"  To oral and parenteral steroids      Prior to Admission Medications   Prescriptions Last Dose Informant Patient Reported? Taking? ALPRAZolam (XANAX) 0.5 mg tablet   No No   Si bid-tid prn anxiety   DULoxetine (CYMBALTA) 60 mg capsule   No No   Si every day for depression   Patient taking differently: 60 mg nightly. 1 every day for depression   OXYGEN-AIR DELIVERY SYSTEMS   Yes No   Sig: Take 2 L by inhalation continuous. acetaminophen (TYLENOL) 500 mg tablet   Yes No   Sig: Take 1,000 mg by mouth every six (6) hours as needed for Pain. albuterol (PROVENTIL HFA, VENTOLIN HFA, PROAIR HFA) 90 mcg/actuation inhaler   No No   Si puffs q6h prn wheezing. May use brand per formulary   fluticasone-salmeterol (ADVAIR DISKUS) 250-50 mcg/dose diskus inhaler   No No   Si inhalation bid for asthma control   heparin, porcine, pf (HEPARIN LOCKFLUSH,PORCINE,,PF,) 100 unit/mL injection   No No   Sig: Access port, flush with 20ml Normal Saline, 500 units of Heparin Flush and de access port every 4 weeks. levothyroxine (SYNTHROID) 75 mcg tablet   No No   Sig: CANCEL THIS SCRIPT  1 every day for thyroid   palbociclib (IBRANCE) 75 mg cap   No No   Sig: Take 1 Cap by mouth daily. 1 cap by mouth daily for 21 days, then off for 7 days. Patient taking differently: Take 75 mg by mouth daily.  To stop 2019 per Dr Leona Guerra polyethylene glycol (MIRALAX) 17 gram packet   Yes No   Sig: Take 17 g by mouth daily. senna (SENOKOT) 8.6 mg tablet   Yes No   Sig: Take 2 Tabs by mouth daily. sucralfate (CARAFATE) 1 gram tablet   No No   Sig: Take 1 Tab by mouth Before breakfast, lunch, dinner and at bedtime.  Take as slurry   varicella-zoster recombinant, PF, (SHINGRIX, PF,) 50 mcg/0.5 mL susr injection   No No   Sig: Give in 2 doses, 2-6 months apart    DX Z23      Facility-Administered Medications: None       MEDS SCHEDULED:    Current Facility-Administered Medications   Medication Dose Route Frequency    acetaminophen (TYLENOL) tablet 1,000 mg  1,000 mg Oral Q6H PRN    ALPRAZolam (XANAX) tablet 0.5 mg  0.5 mg Oral QID PRN    DULoxetine (CYMBALTA) capsule 60 mg  60 mg Oral QHS    levothyroxine (SYNTHROID) tablet 75 mcg  75 mcg Oral ACB    polyethylene glycol (MIRALAX) packet 17 g  17 g Oral DAILY    senna (SENOKOT) tablet 17.2 mg  2 Tab Oral DAILY    sucralfate (CARAFATE) tablet 1 g  1 g Oral AC&HS    sodium chloride (NS) flush 5-40 mL  5-40 mL IntraVENous Q8H    sodium chloride (NS) flush 5-40 mL  5-40 mL IntraVENous PRN    ciprofloxacin (CIPRO) 400 mg in D5W IVPB (premix)  400 mg IntraVENous Q8H    enoxaparin (LOVENOX) injection 40 mg  40 mg SubCUTAneous Q24H    [START ON 6/4/2019] vancomycin (VANCOCIN) 1250 mg in  ml infusion  1,250 mg IntraVENous Q24H    dextrose 5 % - 0.45% NaCl infusion  100 mL/hr IntraVENous CONTINUOUS    budesonide (PULMICORT) 500 mcg/2 ml nebulizer suspension  500 mcg Nebulization BID RT    And    albuterol (PROVENTIL VENTOLIN) nebulizer solution 2.5 mg  2.5 mg Nebulization Q6HWA RT    piperacillin-tazobactam (ZOSYN) 4.5 g in 0.9% sodium chloride (MBP/ADV) 100 mL  4.5 g IntraVENous Q8H    albuterol-ipratropium (DUO-NEB) 2.5 MG-0.5 MG/3 ML  3 mL Nebulization Q4H PRN    furosemide (LASIX) injection 60 mg  60 mg IntraVENous DAILY         Review of Systems  Constitutional: positive for fevers, chills, fatigue, malaise and anorexia  Respiratory: positive for cough or dyspnea on exertion  Cardiovascular: negative for chest pressure/discomfort, irregular heart beats, near-syncope  Gastrointestinal: negative for dyspepsia, reflux symptoms, nausea, vomiting, change in bowel habits, melena, diarrhea, constipation, abdominal pain and jaundice  Musculoskeletal:negative for stiff joints, neck pain, back pain, muscle weakness and bone pain    Objective:     Vitals:    06/03/19 0401 06/03/19 0726 06/03/19 0743 06/03/19 0831   BP: 106/57  161/69    Pulse: (!) 109  (!) 119    Resp: 28  25    Temp: 97.6 °F (36.4 °C)  97.5 °F (36.4 °C)    SpO2: 96% 97% 94% 96%   Weight:       Height:         No intake/output data recorded. No intake/output data recorded. PHYSICAL EXAM     Physical Exam:   General:  Alert, cooperative, ill appearing. Eyes:  Conjunctivae/corneas clear. Nose: Nares patent and moist.    Mouth/Throat: Lips, mucosa, and tongue pink and intact. Neck: Supple, symmetrical.   Respiratory:   rales to auscultation bilaterally on 3 lpm   Cardiovascular:  Regular rate and rhythm, S1, S2, no murmur, click, rub or gallop. GI:   Abdomen soft, non-tender. Bowel sounds active X 4 Q. Musculoskeletal: Extremities symmetrical, atraumatic, no cyanosis, 3+  Edema throughout. Skin: Skin color, texture, turgor normal.        Neurologic: 2+ strength bilateral upper and lower extremities, sensation throughout appropriate. Alert and oriented.      PORT  Activity: limited   Nutrition: regular diet    CULTURES: Negative    LABS    Recent Labs     06/03/19  0631 06/02/19  2315   WBC 14.3* 14.5*   HGB 9.8* 10.6*   HCT 31.8* 33.0*    227     Recent Labs     06/03/19  0631 06/03/19  0059 06/02/19  2157   * 132* 126*   K 5.5* 5.6* 7.5*   CL 96* 98 93*   * 55* 62*   CO2 20* 25 24   BUN 34* 34* 34*   CREA 0.81 0.71 0.75            DIAGNOSIS   2R(B) Lymph Node, Fine Needle Aspiration:   MALIGNANT CELLS PRESENT. Cell block: Malignant cells. Assessment:     Hospital Problems  Date Reviewed: 5/28/2019          Codes Class Noted POA    * (Principal) Pleural effusion, malignant (Chronic) ICD-10-CM: J91.0  ICD-9-CM: 511.81  6/3/2019 Yes        Community acquired pneumonia of right lung ICD-10-CM: J18.9  ICD-9-CM: 001  6/3/2019 Yes        Sepsis ([facility] [address] ICD-10-CM: A41.9  ICD-9-CM: 038.9, 995.91  6/3/2019 Yes        Acute on chronic respiratory failure with hypoxia (HCC) ICD-10-CM: J96.21  ICD-9-CM: 518.84, 799.02  6/3/2019 Yes        Healthcare-associated pneumonia ICD-10-CM: J18.9  ICD-9-CM: 528  6/3/2019 Yes        Acute liver failure ICD-10-CM: K72.00  ICD-9-CM: 046  6/3/2019 Yes        Hyponatremia ICD-10-CM: E87.1  ICD-9-CM: 276.1  6/3/2019 Yes        Lactic acidosis ICD-10-CM: E87.2  ICD-9-CM: 276.2  6/3/2019 Yes        Systemic inflammatory response syndrome (SIRS) of infectious origin with acute organ failure (HCC) ICD-10-CM: A41.9, R65.20  ICD-9-CM: 038.9, 995.91  6/3/2019 Yes        Hypoxia ICD-10-CM: R09.02  ICD-9-CM: 799.02  5/26/2019 Yes        Chronic hypoxemic respiratory failure (HCC) (Chronic) ICD-10-CM: J96.11  ICD-9-CM: 518.83, 799.02  5/26/2019 Yes        Pleural effusion, right ICD-10-CM: J90  ICD-9-CM: 511.9  2/19/2019 Yes        Malignant neoplasm of right female breast ([facility] [address] (Chronic) ICD-10-CM: C50.911  ICD-9-CM: 174.9  1/25/2016 Yes    Overview Addendum 11/15/2018 10:05 AM by Noman Saldaña MD     2016: R breast--infiltrating ductal ca,  ER/MI positive and HER2 negative, metastatic to nodes, s/p mastectomy, chemo, reconstruction, on chronic Ibrance therapy                   Plan:     Pleural effusion without malignant cells, cultures were negative previously but exudative   On zosyn, vancomycin and cipro for sepsis   --order sputum culture and  U/A. PCT 2.5. LA 4.9  Drain pleurex and send for cultures/cytology  Echocardiogram with new, significant edema, third spacing.  Lasix ordered 60 mg daily, ? Tolerate a drip- hyponatremia with Na+ 130. Albumin with significant 3rd spacing. Chronic malnutrition. Oxygen as needed    [employer], NP-C    More than 50% of time documented was spent in face-to-face contact with the patient and in the care of the patient on the floor/unit where the patient is located. The patient has been seen and examined by me personally, the chart,labs, and radiographic studies have been reviewed. Chest: coarse bilaterally  Extremities: generalized 3+ anasarca. I agree with the above assessment and plan.     Jessica Fuller MD.

## 2019-06-03 NOTE — PROGRESS NOTES
Pharmacokinetic Consult to Pharmacist    Batsheva Min is a 77 y.o. female being treated for HAP with vancomycin. Height: 5' 2\" (157.5 cm)  Weight: 62.6 kg (138 lb)  Lab Results   Component Value Date/Time    BUN 34 (H) 06/03/2019 12:59 AM    Creatinine 0.71 06/03/2019 12:59 AM    WBC 14.5 (H) 06/02/2019 11:15 PM    Procalcitonin 0.1 12/12/2017 06:03 AM    Lactic acid 1.8 10/14/2017 06:24 AM    Lactic Acid (POC) 4.97 (H) 06/03/2019 01:20 AM      Estimated Creatinine Clearance: 64.1 mL/min (based on SCr of 0.71 mg/dL). CULTURES:  All Micro Results     Procedure Component Value Units Date/Time    CULTURE, BLOOD [[id number]] Collected:  06/02/19 2342    Order Status:  Completed Specimen:  Whole Blood Updated:  06/02/19 2353    CULTURE, BLOOD [[id number]] Collected:  06/02/19 2322    Order Status:  Completed Specimen:  Whole Blood Updated:  06/02/19 2353            Day 1 of vancomycin. Goal trough is 15-20. Vancomycin dose initiated at 1,750 mg load, followed by 1,250mg q24h. Will continue to follow patient.       Thank you,  Yoni Guzman, BossmanD

## 2019-06-03 NOTE — PROGRESS NOTES
Care Management Interventions  PCP Verified by CM: Yes  Physical Therapy Consult: No  Occupational Therapy Consult: No  Current Support Network: Lives with Spouse  Confirm Follow Up Transport: Family  Plan discussed with Pt/Family/Caregiver: Yes  Freedom of Choice Offered: Yes  Discharge Location  Discharge Placement: Unable to determine at this time  Patient was recently discharged and readmitted for sob. She has home 02 through [facility] (received home 02 the end of May). Spouse at bedside. Spouse reports that the patient's health has declined since she had surgery and he's concerned that she's not eating. Patient was discharged with [facility] but then was readmitted. CM will resume HH at discharge. CM following.

## 2019-06-03 NOTE — PROGRESS NOTES
Asked to start a PIV. Using U/S assistance, placed a 20g 1 3/4\" PIV in patients left upper arm for CT procedure.   Italo Mejia RN, VAT

## 2019-06-03 NOTE — ED PROVIDER NOTES
68-year-old female patient [facility] to the emergency department with reports of worsening shortness of breath. The shortness of breath is been present progressively the past several days. Patient has a history of recurrent pleural effusion requiring Pleurx drainage catheter. She had her lung drained at least 8 times over the past month. She is currently on a scheduled to receive drainage of this area twice weekly. Last drainage occurred on 5/28/2019. Patient states she does not normally R oxygen. Denies fever, chills, pain. Reports some abdominal discomfort secondary to a need to use the restroom at this time. Denies significant pain in her abdomen. Past Medical History:   Diagnosis Date    Abnormal CT of the chest     Adverse effect of anesthesia     bp dropped with dual lung/gallbladder surgery    Asthma      no acute attacks recently.  uses advair daily    Breast cancer ([facility] [address] 2016    R breast--infiltrating ductal ca,  ER/CA positive and HER2 negative, s/p mastectomy, chemo, reconstruction, on chronic Ibrance therapy    Depression      Hypercholesteremia     Controlled with diet     Hypothyroidism     Ill-defined condition     granular lung disease resolved 2002       Past Surgical History:   Procedure Laterality Date    CHEST SURGERY PROCEDURE UNLISTED  2018    R lung biopsy     CHEST SURGERY PROCEDURE UNLISTED      multiple thoracentis to right lung all 2019    HX BLADDER REPAIR      HX BREAST AUGMENTATION Right 10/20/2017    BREAST IMPLANT REMOVAL  performed by Nerissa Colbert MD at [address] HX BREAST BIOPSY Right 2/5/2016    SKIN BIOPSY RIGHT BREAST performed by Ronit Subramanian MD at [address] HX BREAST RECONSTRUCTION Bilateral 9/14/2016    BILATERAL BREAST RECONSTRUCTION performed by Nerissa Colbert MD at [address] HX BREAST RECONSTRUCTION Bilateral 9/14/2016    BILATERAL BREAST TISSUE EXPANDER INSERTION performed by Nerissa Colbert MD at [facility]    HX BREAST RECONSTRUCTION Right 9/18/2017    RIGHT BREAST RECONSTRUCTION performed by Mauri Yi MD at [address] HX BREAST RECONSTRUCTION Right 9/18/2017    Right  PLACEMENT OF PERMANENT IMPLANTS performed by Mauri Yi MD at [address] HX BREAST REDUCTION Left 9/18/2017    LEFT BREAST MASTOPEXY FOR SYMMETRY [facility]OS[facility]      2006    HX CYST INCISION AND DRAINAGE Right 12/15/2017    INCISION AND DRAINAGE BREAST  performed by Mauri Yi MD at [address]      HX HYSTERECTOMY      fibroids    HX LAP CHOLECYSTECTOMY  2000    HX MASTECTOMY Right     HX RECTOCELE REPAIR      HX UROLOGICAL      blader and rectocele repair    HX VASCULAR ACCESS  2016    port left chest wall         Family History:   Problem Relation Age of Onset    Hypertension Mother     Cancer Mother 79        Breast cancer at age 79    Diabetes Mother         Type 2    Other Father         alzheimers    Cancer Maternal Aunt         Breast cancer after age 72    Cancer Maternal Aunt         Breast cancer at age 39   [facility] Allergic Rhinitis Neg Hx     Allergy-severe Neg Hx     Amblyopia Neg Hx     Anemia Neg Hx     Anesth Problems Neg Hx     Angioedema Neg Hx     Anxiety Neg Hx     Arrhythmia Neg Hx     Arthritis-rheumatoid Neg Hx     Ataxia Neg Hx     Atopy Neg Hx     Bipolar Disorder Neg Hx     Blindness Neg Hx     Breast Cancer Neg Hx     Breast Problems Neg Hx     Broken Bones Neg Hx     Cataract Neg Hx     Celiac Disease Neg Hx     Childhood heart surgery Neg Hx     Childhood resp disease Neg Hx     Chorea Neg Hx     Clotting Disorder Neg Hx     Collagen Dis Neg Hx     Colon Cancer Neg Hx     Colon Polyps Neg Hx     COPD Neg Hx     Coronary Artery Disease Neg Hx     Crohn's Disease Neg Hx     Cystic Fibrosis Neg Hx     Delayed Awakening Neg Hx     Dementia Neg Hx     Depression Neg Hx     Dislocations Neg Hx     Downs Syndrome Neg Hx     Drug Abuse Neg Hx     Eclampsia Neg Hx     Eczema Neg Hx     Emergence Delirium Neg Hx     Emphysema Neg Hx     Fainting Neg Hx     Genitourinary () Neg Hx     Glaucoma Neg Hx     Heart Attack Neg Hx     Heart defect Neg Hx     Heart Failure Neg Hx     Heart Surgery Neg Hx     Hemachromatosis Neg Hx     Herpes Neg Hx     High Cholesterol Neg Hx     HIV/AIDS Neg Hx     Immunodeficiency Neg Hx     Infertility Neg Hx     Inflammatory Bowel Dz Neg Hx     Kidney Disease Neg Hx     Liver Disease Neg Hx     Macular Degen Neg Hx     Malignant Hyperthermia Neg Hx     MS Neg Hx     Neuropathy Neg Hx     NF Neg Hx     Osteoporosis Neg Hx     Ovarian Cancer Neg Hx     Pacemaker Neg Hx     Panic disorder Neg Hx     Parkinsonism Neg Hx     Post-op Cognitive Dysfunction Neg Hx     Post-op Nausea/Vomiting Neg Hx     Prematurity Neg Hx      Labor Neg Hx     Pseudocholinesterase Deficiency Neg Hx     Psoriasis Neg Hx     Psychotic Disorder Neg Hx     Rashes/Skin Problems Neg Hx     Retinal Detachment Neg Hx     Rh Incompatibility Neg Hx     Schizophrenia Neg Hx     Seizures Neg Hx     Severe Sprains Neg Hx     Sickle Cell Anemia Neg Hx     Sickle Cell Trait Neg Hx     SIDS Neg Hx     SLE Neg Hx     Spont Ab Neg Hx     Stomach Cancer Neg Hx     Strabismus Neg Hx     Substance Abuse Neg Hx     Sudden Death Neg Hx     Suicide Neg Hx     Thyroid Disease Neg Hx     Tuberculosis Neg Hx     Ulcerative Colitis Neg Hx     Urticaria Neg Hx     Lang's Disease Neg Hx        Social History     Socioeconomic History    Marital status:      Spouse name: Not on file    Number of children: Not on file    Years of education: Not on file    Highest education level: Not on file   Occupational History    Not on file   Social Needs    Financial resource strain: Not on file    Food insecurity: Worry: Not on file     Inability: Not on file    Transportation needs:     Medical: Not on file     Non-medical: Not on file   Tobacco Use    Smoking status: Never Smoker    Smokeless tobacco: Never Used   Substance and Sexual Activity    Alcohol use: No     Alcohol/week: 0.0 oz    Drug use: No    Sexual activity: Not on file   Lifestyle    Physical activity:     Days per week: Not on file     Minutes per session: Not on file    Stress: Not on file   Relationships    Social connections:     Talks on phone: Not on file     Gets together: Not on file     Attends [employer] service: Not on file     Active member of club or organization: Not on file     Attends meetings of clubs or organizations: Not on file     Relationship status: Not on file    Intimate partner violence:     Fear of current or ex partner: Not on file     Emotionally abused: Not on file     Physically abused: Not on file     Forced sexual activity: Not on file   Other Topics Concern    Not on file   Social History Narrative    Not on file         ALLERGIES: Prednisone    Review of Systems   Constitutional: Negative for chills, diaphoresis and fever. HENT: Negative for congestion, sneezing and sore throat. Eyes: Negative for visual disturbance. Respiratory: Positive for cough and shortness of breath. Negative for chest tightness and wheezing. Cardiovascular: Negative for chest pain and leg swelling. Gastrointestinal: Negative for abdominal pain, blood in stool, diarrhea, nausea and vomiting. Endocrine: Negative for polyuria. Genitourinary: Negative for difficulty urinating, dysuria, flank pain, hematuria and urgency. Musculoskeletal: Negative for back pain, myalgias, neck pain and neck stiffness. Skin: Negative for color change and rash. Neurological: Positive for weakness. Negative for dizziness, syncope, speech difficulty, light-headedness, numbness and headaches.    Psychiatric/Behavioral: Negative for behavioral problems. All other systems reviewed and are negative. Vitals:    06/02/19 2131   BP: 147/70   Pulse: (!) 112   Resp: 20   Temp: 98.3 °F (36.8 °C)   SpO2: 100%   Weight: 62.6 kg (138 lb)   Height: 5' 2\" (1.575 m)            Physical Exam   Constitutional: She is oriented to person, place, and time. She appears well-developed and well-nourished. No distress. Ill appearing patient, tachypnea. Alert and oriented to person place and time. Mild acute distress, speaks in clear, fluid sentences. HENT:   Head: Normocephalic and atraumatic. Right Ear: External ear normal.   Left Ear: External ear normal.   Nose: Nose normal.   Eyes: Pupils are equal, round, and reactive to light. EOM are normal.   Neck: Normal range of motion. Cardiovascular: Normal rate, regular rhythm, normal heart sounds and intact distal pulses. Exam reveals no gallop and no friction rub. No murmur heard. Pulmonary/Chest: Effort normal. No stridor. Tachypnea noted. No respiratory distress. She has decreased breath sounds in the right upper field, the right middle field, the right lower field and the left lower field. She has no wheezes. She has no rhonchi. She has no rales. She exhibits no tenderness. Diffusely diminished on the right side. Catheter placed to the right chest wall. Diminished on left as well. No other focal findings. Slight tachypnea on exam.   Abdominal: Soft. She exhibits no distension and no mass. There is no tenderness. There is no rebound and no guarding. No hernia. Musculoskeletal: Normal range of motion. She exhibits no edema, tenderness or deformity. Neurological: She is alert and oriented to person, place, and time. No cranial nerve deficit. Skin: Skin is warm and dry. She is not diaphoretic. Nursing note and vitals reviewed.        MDM  Number of Diagnoses or Management Options  Diagnosis management comments: X-ray imaging shows worsening pleural effusion and opacification of the right hemithorax. Labs pending  EKG obtained on arrival has underlying respiratory artifact present. Sinus tachycardia of 1:15. No obvious ischemic change. Difficulty obtaining IV access secondary to poor vasculature. Attempted ultrasound-guided peripheral IV unsuccessfully. Charge nurse at bedside to perform EJ. Initial blood draw severely hemolyzed, hyperkalemia noted, will recollect    Lactic of over 5, concern for sepsis given the appearance of chest x-ray, we will move forward with treatment of Hospital acquired pneumonia, blood culture collection of fluid hydration. 11:22 PM    aBG performed with electrolytes reveal slight hyperkalemia of 5.9, no changes associated with hyperkalemia noted on EKG.        Amount and/or Complexity of Data Reviewed  Clinical lab tests: ordered and reviewed  Tests in the radiology section of CPT®: ordered and reviewed  Tests in the medicine section of CPT®: ordered and reviewed  Independent visualization of images, tracings, or specimens: yes    Risk of Complications, Morbidity, and/or Mortality  Presenting problems: high  Diagnostic procedures: high  Management options: high    Patient Progress  Patient progress: stable         Procedures

## 2019-06-03 NOTE — PROGRESS NOTES
Spiritual Care Visit, initial visit. Visited with patient and family at bedside. Patient's code status has been changed to DNR, although she will continue to be treated at the same level of care. Her prognosis does not appear to be promising. Chaplains to follow according to patient's need. Prayed for patient's healing and health. Visit by Mimi Juarez, Staff .  M.Ed., Th.B., B.A.

## 2019-06-03 NOTE — ED NOTES
TRANSFER - OUT REPORT:    Verbal report given to 8th floor nurse(name) on Rohit Batista  being transferred to 822(unit) for routine progression of care       Report consisted of patients Situation, Background, Assessment and   Recommendations(SBAR). Information from the following report(s) SBAR and ED Summary was reviewed with the receiving nurse. Lines:   Venous Access Device Power Port (Active)       Peripheral IV 06/03/19 Left External jugular (Active)   Site Assessment Clean, dry, & intact 6/3/2019 12:28 AM   Phlebitis Assessment 0 6/3/2019 12:28 AM   Infiltration Assessment 0 6/3/2019 12:28 AM   Dressing Status Clean, dry, & intact 6/3/2019 12:28 AM   Dressing Type 4 X 4 6/3/2019 12:28 AM   Hub Color/Line Status Pink 6/3/2019 12:28 AM   Alcohol Cap Used Yes 6/3/2019 12:28 AM        Opportunity for questions and clarification was provided.       Patient transported with:   O2 @ 4 liters

## 2019-06-03 NOTE — PROGRESS NOTES
Nutrition  Reason for assessment: Consult received for general nutrition management and supplementation, electrolyte replacement, \"severe malnutrition\" Freddie Wagner NP)  Assessment:   Diet: DIET REGULAR    Food/Nutrition Patient History:  Pt lethargic, does not respond to questions.  at bedside states that the patients appetite/meal intake has been really poor for quite some time. He states that she had one bite of grits at breakfast and refused lunch tray to enter the room. Pt's  states that pt had a hamburger on Memorial Day and that is the most she has eaten in awhile. He states \"she eats in one week what I eat at one meal.\"  Per , she had 1/2 of a chicken thigh yesterday. He says she has c/o fullness and abdominal discomfort in setting of fluid retention but no c/o n/v/d, constipation, dysgeusia, mucositis, etc.  Pt's  states that she has tried CIB shakes in the past but does not seem to care for anything at this time. H/O stage IV breast cancer with last chemotherapy treatment on 5/7. She has a Pleurx drain in place for recurrent pleural effusions. Labs: Na 130, K+ 5.5, Albumin 1.5. Anthropometrics:Height: 5' 2\" (157.5 cm),  Weight: 62.6 kg (138 lb),  , Body mass index is 25.24 kg/m². BMI class of normal weight. Current weight is skewed in setting of fluid accumulation. WT / BMI 6/2/2019 5/28/2019 5/25/2019 5/21/2019 5/15/2019   WEIGHT 138 lb 138 lb 119 lb 121 lb 9.6 oz 121 lb     WT / BMI 5/14/2019 5/8/2019 5/8/2019 5/7/2019 4/11/2019 4/11/2019   WEIGHT 119 lb 119 lb 119 lb 119 lb 129 lb 121 lb   Weight fluctuations are expected with recurrent pleural effusions and pleurx drain. Noted weight fluctuation between 119 pounds to her current weight of 138 pounds within 1 month. Macronutrient needs:  EER:  9151-2125 kcal /day (25-30 kcal/kg I BW)-current wt is not a dry weight.   EPR:  60-75 grams protein/day (1.2-1.5 grams/kg I BW)(GFR >60)  Intake/Comparative Standards: Average intake for past 1 day(s)/1 recorded meal(s): 0%. Consuming 0% of lunch and bites of breakfast, meeting <25% EEN and est. PRO needs. Nutrition Diagnosis: Inadequate oral intake r/t decreased ability to consume sufficient oral intake as evidenced by pt meeting <25% EEN and est. PRO needs with report of poor PO intake PTA. Intervention:  Meals and snacks: Continue current diet. Nutrition Supplement Therapy: add nepro shakes TID   May want to consider an appetite stimulant for this pt. Discharge nutrition plan: Too soon to determine.     Juan Pappas [address] [place] [address], [phone]

## 2019-06-03 NOTE — H&P
HOSPITALIST H&P  NAME:  Kristin Suarez   Age:  77 y.o.  :   1950   MRN:   [id number]  PCP: Jasiel Burgos MD  Treatment Team: Attending Provider: Raj Church; Primary Nurse: Miquel Monday    Prior     CC: Reason for admission is: malig pleural effusion; suspected pneumonia    HPI:   Patient history was obtained from the ER provider prior to seeing the patient. Patient is a 77 y.o. female who [facility] to the ER due to worsening SOB. She has known recurrent rt malignant pleural effusion and pleurx drain. She has had it drained about 8 times in the last month. Last time was . She is seen by [facility]. She has breast cancer, denies any known metastasis; has had treatments and due to follow up soon to discuss next step. She is requiring O2 in the ER, but never has in the past.  She denies fever/chills, n/v/d, chest pain. Does have a chronic cough, no sputum. ROS:  New LE edema  All systems have been reviewed and are negative except as stated in HPI or elsewhere. Past Medical History:   Diagnosis Date    Abnormal CT of the chest     Acquired hypothyroidism 2015    Adverse effect of anesthesia     bp dropped with dual lung/gallbladder surgery    Age-related osteoporosis without current pathological fracture 2018    T score -2.6, 2018    Asthma      no acute attacks recently.  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 ([address] 2018    Pancytopenia due to chemotherapy ([facility] [address] 2017    Recurrent major depressive disorder, in partial remission ([address] 11/16/2017    S/P breast reconstruction, right 9/19/2017    Status post right mastectomy 9/14/2016      Past Surgical History:   Procedure Laterality Date    CHEST SURGERY PROCEDURE UNLISTED  2018    R lung biopsy     CHEST SURGERY PROCEDURE UNLISTED      multiple thoracentis to right lung all 2019    HX BLADDER REPAIR      HX BREAST AUGMENTATION Right 10/20/2017    BREAST IMPLANT REMOVAL  performed by Iris Cohen MD at [address] HX BREAST BIOPSY Right 2/5/2016    SKIN BIOPSY RIGHT BREAST performed by Stevie Green [facility] at [facility] MAIN OR    HX BREAST RECONSTRUCTION Bilateral 9/14/2016    BILATERAL BREAST RECONSTRUCTION performed by Iris Cohen MD at [address] HX BREAST RECONSTRUCTION Bilateral 9/14/2016    BILATERAL BREAST TISSUE EXPANDER INSERTION performed by Iris Cohen MD at [address] HX BREAST RECONSTRUCTION Right 9/18/2017    RIGHT BREAST RECONSTRUCTION performed by Iris Cohen MD at [address] HX BREAST RECONSTRUCTION Right 9/18/2017    Right  PLACEMENT OF PERMANENT IMPLANTS performed by Iris Cohen MD at [address] HX BREAST REDUCTION Left 9/18/2017    LEFT BREAST MASTOPEXY FOR SYMMETRY performed by Iris Cohen MD at [address] HX COLONOSCOPY      2006    HX CYST INCISION AND DRAINAGE Right 12/15/2017    INCISION AND DRAINAGE BREAST  performed by Iris Cohen MD at [address] HX CYSTOCELE REPAIR      HX HYSTERECTOMY      fibroids    HX LAP CHOLECYSTECTOMY  2000    HX MASTECTOMY Right     HX RECTOCELE REPAIR      HX UROLOGICAL      blader and rectocele repair    HX VASCULAR ACCESS  2016    port left chest wall      Social History     Tobacco Use    Smoking status: Never Smoker    Smokeless tobacco: Never Used   Substance Use Topics    Alcohol use: No     Alcohol/week: 0.0 oz      Family History   Problem Relation Age of Onset    Hypertension Mother     Cancer Mother 79 Breast cancer at age 79    Diabetes Mother         Type 2    Other Father         alzheimers    Cancer Maternal Aunt         Breast cancer after age 72    Cancer Maternal Aunt         Breast cancer at age 39   [facility] Allergic Rhinitis Neg Hx     Allergy-severe Neg Hx     Amblyopia Neg Hx     Anemia Neg Hx     Anesth Problems Neg Hx     Angioedema Neg Hx     Anxiety Neg Hx     Arrhythmia Neg Hx     Arthritis-rheumatoid Neg Hx     Ataxia Neg Hx     Atopy Neg Hx     Bipolar Disorder Neg Hx     Blindness Neg Hx     Breast Cancer Neg Hx     Breast Problems Neg Hx     Broken Bones Neg Hx     Cataract Neg Hx     Celiac Disease Neg Hx     Childhood heart surgery Neg Hx     Childhood resp disease Neg Hx     Chorea Neg Hx     Clotting Disorder Neg Hx     Collagen Dis Neg Hx     Colon Cancer Neg Hx     Colon Polyps Neg Hx     COPD Neg Hx     Coronary Artery Disease Neg Hx     Crohn's Disease Neg Hx     Cystic Fibrosis Neg Hx     Delayed Awakening Neg Hx     Dementia Neg Hx     Depression Neg Hx     Dislocations Neg Hx     Downs Syndrome Neg Hx     Drug Abuse Neg Hx     Eclampsia Neg Hx     Eczema Neg Hx     Emergence Delirium Neg Hx     Emphysema Neg Hx     Fainting Neg Hx     Genitourinary () Neg Hx     Glaucoma Neg Hx     Heart Attack Neg Hx     Heart defect Neg Hx     Heart Failure Neg Hx     Heart Surgery Neg Hx     Hemachromatosis Neg Hx     Herpes Neg Hx     High Cholesterol Neg Hx     HIV/AIDS Neg Hx     Immunodeficiency Neg Hx     Infertility Neg Hx     Inflammatory Bowel Dz Neg Hx     Kidney Disease Neg Hx     Liver Disease Neg Hx     Macular Degen Neg Hx     Malignant Hyperthermia Neg Hx     MS Neg Hx     Neuropathy Neg Hx     NF Neg Hx     Osteoporosis Neg Hx     Ovarian Cancer Neg Hx     Pacemaker Neg Hx     Panic disorder Neg Hx     Parkinsonism Neg Hx     Post-op Cognitive Dysfunction Neg Hx     Post-op Nausea/Vomiting Neg Hx     Prematurity Neg Hx      Labor Neg Hx     Pseudocholinesterase Deficiency Neg Hx     Psoriasis Neg Hx     Psychotic Disorder Neg Hx     Rashes/Skin Problems Neg Hx     Retinal Detachment Neg Hx     Rh Incompatibility Neg Hx     Schizophrenia Neg Hx     Seizures Neg Hx     Severe Sprains Neg Hx     Sickle Cell Anemia Neg Hx     Sickle Cell Trait Neg Hx     SIDS Neg Hx     SLE Neg Hx     Spont Ab Neg Hx     Stomach Cancer Neg Hx     Strabismus Neg Hx     Substance Abuse Neg Hx     Sudden Death Neg Hx     Suicide Neg Hx     Thyroid Disease Neg Hx     Tuberculosis Neg Hx     Ulcerative Colitis Neg Hx     Urticaria Neg Hx     Lang's Disease Neg Hx        FH Reviewed and non-contributory to admitting diagnosis    Allergies   Allergen Reactions    Prednisone Unknown (comments)     \"psychotic reaction\"  To oral and parenteral steroids      Prior to Admission Medications   Prescriptions Last Dose Informant Patient Reported? Taking? ALPRAZolam (XANAX) 0.5 mg tablet   No No   Si bid-tid prn anxiety   DULoxetine (CYMBALTA) 60 mg capsule   No No   Si every day for depression   Patient taking differently: 60 mg nightly. 1 every day for depression   OXYGEN-AIR DELIVERY SYSTEMS   Yes No   Sig: Take 2 L by inhalation continuous. acetaminophen (TYLENOL) 500 mg tablet   Yes No   Sig: Take 1,000 mg by mouth every six (6) hours as needed for Pain. albuterol (PROVENTIL HFA, VENTOLIN HFA, PROAIR HFA) 90 mcg/actuation inhaler   No No   Si puffs q6h prn wheezing. May use brand per formulary   fluticasone-salmeterol (ADVAIR DISKUS) 250-50 mcg/dose diskus inhaler   No No   Si inhalation bid for asthma control   heparin, porcine, pf (HEPARIN LOCKFLUSH,PORCINE,,PF,) 100 unit/mL injection   No No   Sig: Access port, flush with 20ml Normal Saline, 500 units of Heparin Flush and de access port every 4 weeks.    levothyroxine (SYNTHROID) 75 mcg tablet   No No   Sig: CANCEL THIS SCRIPT  1 every day for thyroid   palbociclib (IBRANCE) 75 mg cap   No No   Sig: Take 1 Cap by mouth daily. 1 cap by mouth daily for 21 days, then off for 7 days. Patient taking differently: Take 75 mg by mouth daily. To stop 2019 per Dr Rekha Ann   polyethylene glycol [facility]) 17 gram packet   Yes No   Sig: Take 17 g by mouth daily. senna (SENOKOT) 8.6 mg tablet   Yes No   Sig: Take 2 Tabs by mouth daily. sucralfate (CARAFATE) 1 gram tablet   No No   Sig: Take 1 Tab by mouth Before breakfast, lunch, dinner and at bedtime. Take as slurry   varicella-zoster recombinant, PF, (SHINGRIX, PF,) 50 mcg/0.5 mL susr injection   No No   Sig: Give in 2 doses, 2-6 months apart    DX Z23      Facility-Administered Medications: None         Objective:     No intake or output data in the 24 hours ending 19 0130   Temp (24hrs), Av.3 °F (36.8 °C), Min:98.3 °F (36.8 °C), Max:98.3 °F (36.8 °C)    Oxygen Therapy  O2 Sat (%): 99 % (19)  Pulse via Oximetry: 121 beats per minute (19)  O2 Device: Nasal cannula (19)  O2 Flow Rate (L/min): 4 l/min (19)   Body mass index is 25.24 kg/m². Patient Vitals for the past 24 hrs:   Temp Pulse Resp BP SpO2   19     99 %   19     99 %   19 2131 98.3 °F (36.8 °C) (!) 112 20 147/70 100 %     Physical Exam:    General:    WD and WN, chronically ill appearing  Head:   Normocephalic, without obvious abnormality, atraumatic. Eyes:  PERRL; EOMI; sclera normal/non-icteric  ENT:  Hearing is normal.  Oropharynx is clear with tacky mucous membranes   Resp:    Diffuse rhonchi. Weak, congested cough Resp are even and unlabored on oxygen  Heart[de-identified]  Regular rate and rhythm,  no murmur,   2+ LE edema  Abdomen:   Soft, non-tender. Not distended. Bowel sounds normal.  hepato-splenomegaly -none  Musc/SK: Muscle strength is good and tone normal; No cyanosis. No clubbing  Skin:     Texture, turgor normal. No significant rashes or lesions. Capillary refill < 2 sec  Neurologic: CN II - XII are grossly intact - Eye exam as noted above  Psych: Drowsy/fatigued and oriented x 4;  Judgement and insight are normal     Data Review:   Recent Results (from the past 24 hour(s))   METABOLIC PANEL, COMPREHENSIVE    Collection Time: 06/02/19  9:57 PM   Result Value Ref Range    Sodium 126 (L) 136 - 145 mmol/L    Potassium 7.5 (HH) 3.5 - 5.1 mmol/L    Chloride 93 (L) 98 - 107 mmol/L    CO2 24 21 - 32 mmol/L    Anion gap 9 7 - 16 mmol/L    Glucose 62 (L) 65 - 100 mg/dL    BUN 34 (H) 8 - 23 MG/DL    Creatinine 0.75 0.6 - 1.0 MG/DL    GFR est AA >60 >60 ml/min/1.73m2    GFR est non-AA >60 >60 ml/min/1.73m2    Calcium 9.1 8.3 - 10.4 MG/DL    Bilirubin, total 1.5 (H) 0.2 - 1.1 MG/DL    ALT (SGPT) 105 (H) 12 - 65 U/L    AST (SGOT) 542 (H) 15 - 37 U/L    Alk. phosphatase 1,012 (H) 50 - 136 U/L    Protein, total 6.7 6.3 - 8.2 g/dL    Albumin 1.9 (L) 3.2 - 4.6 g/dL    Globulin 4.8 (H) 2.3 - 3.5 g/dL    A-G Ratio 0.4 (L) 1.2 - 3.5     POC CG8I    Collection Time: 06/02/19 10:48 PM   Result Value Ref Range    Device: Non rebreather      pH (POC) 7.403 7.35 - 7.45      pCO2 (POC) 38.2 35 - 45 MMHG    pO2 (POC) 147 (H) 75 - 100 MMHG    HCO3 (POC) 23.8 22 - 26 MMOL/L    sO2 (POC) 99 (H) 95 - 98 %    Base deficit (POC) 1 mmol/L    Allens test (POC) NOT APPLICABLE      Site LEFT BRACHIAL      Patient temp.  98.6      Specimen type (POC) ARTERIAL      Sodium,  (L) 136 - 145 MMOL/L    Potassium, POC 5.9 (H) 3.5 - 5.1 MMOL/L    Glucose, POC 77 65 - 100 MG/DL    Calcium, ionized (POC) 1.10 (L) 1.12 - 1.32 mmol/L    Performed by [facility]     Flow rate (POC) 10.000 L/min    COLLECT TIME 2,245     POC LACTIC ACID    Collection Time: 06/02/19 11:14 PM   Result Value Ref Range    Lactic Acid (POC) 5.38 (H) 0.5 - 1.9 mmol/L   CBC WITH AUTOMATED DIFF    Collection Time: 06/02/19 11:15 PM   Result Value Ref Range    WBC 14.5 (H) 4.3 - 11.1 K/uL    RBC 2.97 (L) 4.05 - 5.2 M/uL HGB 10.6 (L) 11.7 - 15.4 g/dL    HCT 33.0 (L) 35.8 - 46.3 %    .1 (H) 79.6 - 97.8 FL    MCH 35.7 (H) 26.1 - 32.9 PG    MCHC 32.1 31.4 - 35.0 g/dL    RDW 21.2 (H) 11.9 - 14.6 %    PLATELET 765 055 - 195 K/uL    MPV 11.7 9.4 - 12.3 FL    ABSOLUTE NRBC 0.97 (H) 0.0 - 0.2 K/uL    NEUTROPHILS 78 43 - 78 %    LYMPHOCYTES 7 (L) 13 - 44 %    MONOCYTES 8 4.0 - 12.0 %    EOSINOPHILS 0 (L) 0.5 - 7.8 %    BASOPHILS 1 0.0 - 2.0 %    IMMATURE GRANULOCYTES 6 (H) 0.0 - 5.0 %    ABS. NEUTROPHILS 11.3 (H) 1.7 - 8.2 K/UL    ABS. LYMPHOCYTES 1.0 0.5 - 4.6 K/UL    ABS. MONOCYTES 1.2 0.1 - 1.3 K/UL    ABS. EOSINOPHILS 0.0 0.0 - 0.8 K/UL    ABS. BASOPHILS 0.1 0.0 - 0.2 K/UL    ABS. IMM. GRANS. 0.9 (H) 0.0 - 0.5 K/UL    RBC COMMENTS NORMOCYTIC/NORMOCHROMIC      RBC COMMENTS OCCASIONAL  POLYCHROMASIA        WBC COMMENTS Result Confirmed By Smear      PLATELET COMMENTS ADEQUATE      DF AUTOMATED     BNP    Collection Time: 06/02/19 11:15 PM   Result Value Ref Range    BNP 41 (H) 0 pg/mL   POC LACTIC ACID    Collection Time: 06/03/19  1:20 AM   Result Value Ref Range    Lactic Acid (POC) 4.97 (H) 0.5 - 1.9 mmol/L     CXR Results  (Last 48 hours)               06/02/19 2215  XR CHEST PORT Final result    Narrative:  History: Shortness of breath       Exam: portable chest       Comparison: 5/25/2019       Findings: There is a right-sided chest tube present. There is airspace   consolidation in the right mid and lower lung with a large right pleural   effusion. A port overlies the left chest wall. The left lung is clear. No change   in the appearance of the mediastinal contour or osseous structures. Impressions: Findings similar to those seen previously with larger right pleural   effusion and more dense consolidation in the right mid and lower lung. CT Results  (Last 48 hours)    None              Assessment and Plan:      Active Hospital Problems    Diagnosis Date Noted    Pleural effusion, malignant 06/03/2019  Community acquired pneumonia of right lung 06/03/2019    Sepsis ([facility] [address] 06/03/2019    Chronic hypoxemic respiratory failure ([facility] [address] 05/26/2019    Hypoxia 05/26/2019    Pleural effusion, right 02/19/2019    Malignant neoplasm of right female breast ([facility] [address] 01/25/2016     2016: R breast--infiltrating ductal ca,  ER/[place] positive and HER2 negative, metastatic to nodes, s/p mastectomy, chemo, reconstruction, on chronic Ibrance therapy       Principal Problem:    Pleural effusion, malignant (6/3/2019)    Consult Pulmonology    Active Problems:    Malignant neoplasm of right female breast ([facility] [address] (1/25/2016)    Consult oncology      Hypoxia (5/26/2019)    Cont O2      Chronic hypoxemic respiratory failure ([facility] [address] (5/26/2019)    As above      Community acquired pneumonia of right lung (6/3/2019)    ER started treatment for HCAP and I agree; will continue with vanc/zosyn/cipro      Sepsis ([address] (6/3/2019)    As above; IVF, IV abx, monitor labs    New elevation of LFTs  ?malignancy/mets, sepsis, monitor, IVF      · PLAN General  ·   · Cont appropriate home meds (see MAR)  · Control symptoms (pain, n/v, fever, etc)  · Monitor appropriate labs   · DVT prophylaxis:  Lovenox  · Code status: Full;  HCPOA:   · Risk: high - sepsis, malignancy  · Anticipated DC needs:  · Estimated LOS:  Greater than 2 midnights  · Plans discussed with patient and/or caregiver; questions answered.       Med records reviewed if applicable; findings:     Critical care time if applicable:      Signed By: Tigist Armstrong MD     Neha 3, 2019

## 2019-06-03 NOTE — PROGRESS NOTES
Spiritual Care Visit, initial visit. Staff was attending patient. [employer] will return at a later time. Visit by Ty Cowart, Staff .  Lynn., Shaina., B.A.

## 2019-06-03 NOTE — PROGRESS NOTES
Accessed left chest life port using 3/4\" hess needle. Flushed well but would not aspirate blood. Suggested to Primary RN, Nadia Alaniz, to declot with cathflo per policy. Site dressed per policy.   Leonardo Johnston RN, VAT

## 2019-06-03 NOTE — CONSULTS
[facility] Hematology & Oncology        Inpatient Hematology / Oncology Consult Note    Reason for Consult:  Pleural effusion, malignant [J91.0]  Referring Physician:  Silvina Abdi MD    History of Present Illness:  Ms. Vivienne Hu is a 77 y.o. female admitted on 6/2/2019 with a primary diagnosis of The primary encounter diagnosis was Pneumonia of right lung due to infectious organism, unspecified part of lung. Diagnoses of Pleural effusion, Hypoxia, and Sepsis, due to unspecified organism [facility]) were also pertinent to this visit. .      Patient is a 77 y.o.  female presented to ER 6/3 with dyspnea. She has metastatic breast cancer (dxed in 2016, status post mastectomy, XRT and chemotherapy, followed by Dr. Yvon Antoine), recurrent pleural effusion with chronic pleurx drain inserted on 5/21 and chronic hypoxic respiratory failure. She was started on triple coverage antibiotics for sepsis by hospitalist. CXR with right effusion/infiltrate. Her PCT is 2.5 with elevated LA of 4.9. Also note were elevated Bili and LFT.      She last drained with pleurex on 5/27 and 5/29. She was told to drain only twice weekly by home health RN. She was due to drain pleurex today but decompensated over the weekend and was admitted. She has significant volume overload/edema with third spacing. She is Dr. Figueroa Corporal patient followed for stage IV breast cancer most recently treated with cycle 12 Faslodex 5/7/2019. We are consulted for recommendations. Review of Systems:  Constitutional Denies fever, chills, +weight loss, appetite changes, fatigue   HEENT Denies trauma, blurry vision, hearing loss, ear pain, nosebleeds, sore throat, neck pain    Skin Denies lesions or rashes. Lungs +dyspnea, cough, sputum production    Cardiovascular Denies chest pain, palpitations, +upper and lower extremity edema   Gastrointestinal Denies nausea, vomiting, changes in bowel habits   Neuro Denies headaches, visual changes or ataxia.  Denies dizziness, tingling, tremors, sensory change, speech change, focal weakness or headaches. MSK Denies back pain, arthralgias, myalgias or frequent falls. Psychiatric/Behavioral  The patient is not nervous/anxious. Allergies   Allergen Reactions    Prednisone Unknown (comments)     \"psychotic reaction\"  To oral and parenteral steroids     Past Medical History:   Diagnosis Date    Abnormal CT of the chest     Acquired hypothyroidism 11/4/2015    Adverse effect of anesthesia     bp dropped with dual lung/gallbladder surgery    Age-related osteoporosis without current pathological fracture 12/17/2018    T score -2.6, 2018    Asthma      no acute attacks recently.  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 ([facility] [address] 6/4/2018    Pancytopenia due to chemotherapy ([facility] [facility] 4/7/2017    Recurrent major depressive disorder, in partial remission ([address] 11/16/2017    S/P breast reconstruction, right 9/19/2017    Status post right mastectomy 9/14/2016     Past Surgical History:   Procedure Laterality Date    CHEST SURGERY PROCEDURE UNLISTED  2018    R lung biopsy     CHEST SURGERY PROCEDURE UNLISTED      multiple thoracentis to right lung all 2019    HX BLADDER REPAIR      HX BREAST AUGMENTATION Right 10/20/2017    BREAST IMPLANT REMOVAL  performed by Alejandra Murray MD at [address] HX BREAST BIOPSY Right 2/5/2016    SKIN BIOPSY RIGHT BREAST performed by Elisa Gifford MD at [address] HX BREAST RECONSTRUCTION Bilateral 9/14/2016    BILATERAL BREAST RECONSTRUCTION performed by Alejandra Murray MD at [facility] MAIN OR    HX BREAST RECONSTRUCTION Bilateral 9/14/2016    BILATERAL BREAST TISSUE EXPANDER INSERTION performed by Christopher Zarate MD at [facility] MAIN OR    HX BREAST RECONSTRUCTION Right 9/18/2017    RIGHT BREAST RECONSTRUCTION performed by Christopher Zarate MD at [address] HX BREAST RECONSTRUCTION Right 9/18/2017    Right  PLACEMENT OF PERMANENT IMPLANTS performed by Christopher Zarate MD at [address] HX BREAST REDUCTION Left 9/18/2017    LEFT BREAST MASTOPEXY FOR SYMMETRY performed by Christopher Zarate MD at [address] HX COLONOSCOPY      2006    HX CYST INCISION AND DRAINAGE Right 12/15/2017    INCISION AND DRAINAGE BREAST  performed by Christopher Zarate MD at [address] HX [address]      HX HYSTERECTOMY      fibroids    HX LAP CHOLECYSTECTOMY  2000    HX MASTECTOMY Right     HX RECTOCELE REPAIR      HX UROLOGICAL      blader and rectocele repair    HX VASCULAR ACCESS  2016    port left chest wall     Family History   Problem Relation Age of Onset    Hypertension Mother     Cancer Mother 79        Breast cancer at age 79    Diabetes Mother         Type 2    Other Father         alzheimers    Cancer Maternal Aunt         Breast cancer after age 72    Cancer Maternal Aunt         Breast cancer at age 39     Social History     Socioeconomic History    Marital status:      Spouse name: Not on file    Number of children: Not on file    Years of education: Not on file    Highest education level: Not on file   Occupational History    Not on file   Social Needs    Financial resource strain: Not on file    Food insecurity:     Worry: Not on file     Inability: Not on file    Transportation needs:     Medical: Not on file     Non-medical: Not on file   Tobacco Use    Smoking status: Never Smoker    Smokeless tobacco: Never Used   Substance and Sexual Activity    Alcohol use: No     Alcohol/week: 0.0 oz    Drug use: No    Sexual activity: Not on file   Lifestyle    Physical activity:     Days per week: Not on file     Minutes per session: Not on file    Stress: Not on file   Relationships    Social connections:     Talks on phone: Not on file     Gets together: Not on file     Attends [employer] service: Not on file     Active member of club or organization: Not on file     Attends meetings of clubs or organizations: Not on file     Relationship status: Not on file    Intimate partner violence:     Fear of current or ex partner: Not on file     Emotionally abused: Not on file     Physically abused: Not on file     Forced sexual activity: Not on file   Other Topics Concern    Not on file   Social History Narrative    Not on file     Current Facility-Administered Medications   Medication Dose Route Frequency Provider Last Rate Last Dose    acetaminophen (TYLENOL) tablet 1,000 mg  1,000 mg Oral Q6H PRN Windsor Holstein, MD        ALPRAZolam Gloria Hearn) tablet 0.5 mg  0.5 mg Oral QID PRN Windsor Holstein, MD        DULoxetine (CYMBALTA) capsule 60 mg  60 mg Oral QHS Windsor Holstein, MD        levothyroxine (SYNTHROID) tablet 75 mcg  75 mcg Oral ACB Windsor Holstein, MD   75 mcg at 06/03/19 1265    polyethylene glycol (MIRALAX) packet 17 g  17 g Oral DAILY Windsor Holstein, MD   17 g at 06/03/19 0910    senna (SENOKOT) tablet 17.2 mg  2 Tab Oral DAILY Windsor Holstein, MD   17.2 mg at 06/03/19 0910    sucralfate (CARAFATE) tablet 1 g  1 g Oral AC&HS Windsor Holstein, MD   1 g at 06/03/19 0508    sodium chloride (NS) flush 5-40 mL  5-40 mL IntraVENous Q8H Mariam Dupree MD   5 mL at 06/03/19 0509    sodium chloride (NS) flush 5-40 mL  5-40 mL IntraVENous PRN Windsor Holstein, MD        ciprofloxacin (CIPRO) 400 mg in D5W IVPB (premix)  400 mg IntraVENous Q8H Mariam Hills  mL/hr at 06/03/19 0316 400 mg at 06/03/19 0316    enoxaparin (LOVENOX) injection 40 mg  40 mg SubCUTAneous Q24H Jeana Bryant Tobar MD   40 mg at 19 0910    [START ON 2019] vancomycin (VANCOCIN) 1250 mg in  ml infusion  1,250 mg IntraVENous Q24H Bryant Hills MD        budesonide (PULMICORT) 500 mcg/2 ml nebulizer suspension  500 mcg Nebulization BID RT Yaahira Pollard MD   500 mcg at 19 0726    And    albuterol (PROVENTIL VENTOLIN) nebulizer solution 2.5 mg  2.5 mg Nebulization Q6HWA RT Yahaira Pollard MD   2.5 mg at 19 0727    piperacillin-tazobactam (ZOSYN) 4.5 g in 0.9% sodium chloride (MBP/ADV) 100 mL  4.5 g IntraVENous Q8H Bryant Hills MD 25 mL/hr at 19 0910 4.5 g at 19 0910    albuterol-ipratropium (DUO-NEB) 2.5 MG-0.5 MG/3 ML  3 mL Nebulization Q4H PRN Dl Tobar MD   3 mL at 19 0831    furosemide (LASIX) injection 60 mg  60 mg IntraVENous DAILY Maryfrances Dakins, MD   60 mg at 19 0910    albumin human 5% (BUMINATE) solution 25 g  25 g IntraVENous Q12H Merry HARLEY, NP           OBJECTIVE:  Patient Vitals for the past 8 hrs:   BP Temp Pulse Resp SpO2   19 0831     96 %   19 0743 161/69 97.5 °F (36.4 °C) (!) 119 25 94 %   19 0726     97 %   19 0401 106/57 97.6 °F (36.4 °C) (!) 109 28 96 %     Temp (24hrs), Av.7 °F (36.5 °C), Min:97.5 °F (36.4 °C), Max:98.3 °F (36.8 °C)    No intake/output data recorded. Physical Exam:  Constitutional: Critically ill appearing female in no acute distress, sitting comfortably in the hospital bed. HEENT: Normocephalic and atraumatic. Oropharynx is clear, mucous membranes are moist.     Skin Warm and dry. No bruising and no rash noted. No erythema. No pallor. Respiratory Rales bilaterally on 3 liters oxygen    CVS Tachy rate, regular rhythm and normal S1 and S2. No murmurs, gallops, or rubs. Abdomen Soft, nontender and nondistended, normoactive bowel sounds. Neuro Grossly nonfocal with no obvious sensory or motor deficits.    MSK Normal range of motion in general.  +upper and lower extremity edema   Psych Appropriate mood and affect. Labs:    Recent Results (from the past 24 hour(s))   EKG, 12 LEAD, INITIAL    Collection Time: 06/02/19  9:36 PM   Result Value Ref Range    Ventricular Rate 115 BPM    Atrial Rate 122 BPM    P-R Interval 150 ms    QRS Duration 68 ms    Q-T Interval 294 ms    QTC Calculation (Bezet) 406 ms    Calculated P Axis 58 degrees    Calculated R Axis 4 degrees    Calculated T Axis 60 degrees    Diagnosis       !! AGE AND GENDER SPECIFIC ECG ANALYSIS !! Sinus tachycardia  Low voltage QRS  Cannot rule out Anterior infarct , age undetermined  Abnormal ECG  When compared with ECG of 25-MAY-2019 22:13,  No significant change was found     METABOLIC PANEL, COMPREHENSIVE    Collection Time: 06/02/19  9:57 PM   Result Value Ref Range    Sodium 126 (L) 136 - 145 mmol/L    Potassium 7.5 (HH) 3.5 - 5.1 mmol/L    Chloride 93 (L) 98 - 107 mmol/L    CO2 24 21 - 32 mmol/L    Anion gap 9 7 - 16 mmol/L    Glucose 62 (L) 65 - 100 mg/dL    BUN 34 (H) 8 - 23 MG/DL    Creatinine 0.75 0.6 - 1.0 MG/DL    GFR est AA >60 >60 ml/min/1.73m2    GFR est non-AA >60 >60 ml/min/1.73m2    Calcium 9.1 8.3 - 10.4 MG/DL    Bilirubin, total 1.5 (H) 0.2 - 1.1 MG/DL    ALT (SGPT) 105 (H) 12 - 65 U/L    AST (SGOT) 542 (H) 15 - 37 U/L    Alk. phosphatase 1,012 (H) 50 - 136 U/L    Protein, total 6.7 6.3 - 8.2 g/dL    Albumin 1.9 (L) 3.2 - 4.6 g/dL    Globulin 4.8 (H) 2.3 - 3.5 g/dL    A-G Ratio 0.4 (L) 1.2 - 3.5     POC CG8I    Collection Time: 06/02/19 10:48 PM   Result Value Ref Range    Device: Non rebreather      pH (POC) 7.403 7.35 - 7.45      pCO2 (POC) 38.2 35 - 45 MMHG    pO2 (POC) 147 (H) 75 - 100 MMHG    HCO3 (POC) 23.8 22 - 26 MMOL/L    sO2 (POC) 99 (H) 95 - 98 %    Base deficit (POC) 1 mmol/L    Allens test (POC) NOT APPLICABLE      Site LEFT BRACHIAL      Patient temp.  98.6      Specimen type (POC) ARTERIAL      Sodium,  (L) 136 - 145 MMOL/L Potassium, POC 5.9 (H) 3.5 - 5.1 MMOL/L    Glucose, POC 77 65 - 100 MG/DL    Calcium, ionized (POC) 1.10 (L) 1.12 - 1.32 mmol/L    Performed by [facility]     Flow rate (POC) 10.000 L/min    COLLECT TIME 2,245     POC LACTIC ACID    Collection Time: 06/02/19 11:14 PM   Result Value Ref Range    Lactic Acid (POC) 5.38 (H) 0.5 - 1.9 mmol/L   CBC WITH AUTOMATED DIFF    Collection Time: 06/02/19 11:15 PM   Result Value Ref Range    WBC 14.5 (H) 4.3 - 11.1 K/uL    RBC 2.97 (L) 4.05 - 5.2 M/uL    HGB 10.6 (L) 11.7 - 15.4 g/dL    HCT 33.0 (L) 35.8 - 46.3 %    .1 (H) 79.6 - 97.8 FL    MCH 35.7 (H) 26.1 - 32.9 PG    MCHC 32.1 31.4 - 35.0 g/dL    RDW 21.2 (H) 11.9 - 14.6 %    PLATELET 136 374 - 374 K/uL    MPV 11.7 9.4 - 12.3 FL    ABSOLUTE NRBC 0.97 (H) 0.0 - 0.2 K/uL    NEUTROPHILS 78 43 - 78 %    LYMPHOCYTES 7 (L) 13 - 44 %    MONOCYTES 8 4.0 - 12.0 %    EOSINOPHILS 0 (L) 0.5 - 7.8 %    BASOPHILS 1 0.0 - 2.0 %    IMMATURE GRANULOCYTES 6 (H) 0.0 - 5.0 %    ABS. NEUTROPHILS 11.3 (H) 1.7 - 8.2 K/UL    ABS. LYMPHOCYTES 1.0 0.5 - 4.6 K/UL    ABS. MONOCYTES 1.2 0.1 - 1.3 K/UL    ABS. EOSINOPHILS 0.0 0.0 - 0.8 K/UL    ABS. BASOPHILS 0.1 0.0 - 0.2 K/UL    ABS. IMM.  GRANS. 0.9 (H) 0.0 - 0.5 K/UL    RBC COMMENTS NORMOCYTIC/NORMOCHROMIC      RBC COMMENTS OCCASIONAL  POLYCHROMASIA        WBC COMMENTS Result Confirmed By Smear      PLATELET COMMENTS ADEQUATE      DF AUTOMATED     BNP    Collection Time: 06/02/19 11:15 PM   Result Value Ref Range    BNP 41 (H) 0 pg/mL   CULTURE, BLOOD    Collection Time: 06/02/19 11:22 PM   Result Value Ref Range    Special Requests: NO SPECIAL REQUESTS  JUGULAR VEIN        Culture result: NO GROWTH AFTER 6 HOURS     CULTURE, BLOOD    Collection Time: 06/02/19 11:42 PM   Result Value Ref Range    Special Requests: NO SPECIAL REQUESTS  LEFT  HAND        Culture result: NO GROWTH AFTER 6 HOURS     METABOLIC PANEL, COMPREHENSIVE    Collection Time: 06/03/19 12:59 AM   Result Value Ref Range Sodium 132 (L) 136 - 145 mmol/L    Potassium 5.6 (H) 3.5 - 5.1 mmol/L    Chloride 98 98 - 107 mmol/L    CO2 25 21 - 32 mmol/L    Anion gap 9 7 - 16 mmol/L    Glucose 55 (L) 65 - 100 mg/dL    BUN 34 (H) 8 - 23 MG/DL    Creatinine 0.71 0.6 - 1.0 MG/DL    GFR est AA >60 >60 ml/min/1.73m2    GFR est non-AA >60 >60 ml/min/1.73m2    Calcium 7.8 (L) 8.3 - 10.4 MG/DL    Bilirubin, total 1.3 (H) 0.2 - 1.1 MG/DL    ALT (SGPT) 73 (H) 12 - 65 U/L    AST (SGOT) 343 (H) 15 - 37 U/L    Alk. phosphatase 842 (H) 50 - 136 U/L    Protein, total 4.7 (L) 6.3 - 8.2 g/dL    Albumin 1.4 (L) 3.2 - 4.6 g/dL    Globulin 3.3 2.3 - 3.5 g/dL    A-G Ratio 0.4 (L) 1.2 - 3.5     PROCALCITONIN    Collection Time: 06/03/19 12:59 AM   Result Value Ref Range    Procalcitonin 2.5 ng/mL   POC LACTIC ACID    Collection Time: 06/03/19  1:20 AM   Result Value Ref Range    Lactic Acid (POC) 4.97 (H) 0.5 - 1.9 mmol/L   METABOLIC PANEL, COMPREHENSIVE    Collection Time: 06/03/19  6:31 AM   Result Value Ref Range    Sodium 130 (L) 136 - 145 mmol/L    Potassium 5.5 (H) 3.5 - 5.1 mmol/L    Chloride 96 (L) 98 - 107 mmol/L    CO2 20 (L) 21 - 32 mmol/L    Anion gap 14 7 - 16 mmol/L    Glucose 112 (H) 65 - 100 mg/dL    BUN 34 (H) 8 - 23 MG/DL    Creatinine 0.81 0.6 - 1.0 MG/DL    GFR est AA >60 >60 ml/min/1.73m2    GFR est non-AA >60 >60 ml/min/1.73m2    Calcium 7.9 (L) 8.3 - 10.4 MG/DL    Bilirubin, total 1.2 (H) 0.2 - 1.1 MG/DL    ALT (SGPT) 85 (H) 12 - 65 U/L    AST (SGOT) 406 (H) 15 - 37 U/L    Alk.  phosphatase 912 (H) 50 - 136 U/L    Protein, total 5.1 (L) 6.3 - 8.2 g/dL    Albumin 1.5 (L) 3.2 - 4.6 g/dL    Globulin 3.6 (H) 2.3 - 3.5 g/dL    A-G Ratio 0.4 (L) 1.2 - 3.5     CBC WITH AUTOMATED DIFF    Collection Time: 06/03/19  6:31 AM   Result Value Ref Range    WBC 14.3 (H) 4.3 - 11.1 K/uL    RBC 2.81 (L) 4.05 - 5.2 M/uL    HGB 9.8 (L) 11.7 - 15.4 g/dL    HCT 31.8 (L) 35.8 - 46.3 %    .2 (H) 79.6 - 97.8 FL    MCH 34.9 (H) 26.1 - 32.9 PG    MCHC 30.8 (L) 31.4 - 35.0 g/dL    RDW 21.2 (H) 11.9 - 14.6 %    PLATELET 545 703 - 087 K/uL    MPV 11.4 9.4 - 12.3 FL    ABSOLUTE NRBC 0.98 (H) 0.0 - 0.2 K/uL    NEUTROPHILS 79 (H) 43 - 78 %    LYMPHOCYTES 6 (L) 13 - 44 %    MONOCYTES 9 4.0 - 12.0 %    EOSINOPHILS 0 (L) 0.5 - 7.8 %    BASOPHILS 0 0.0 - 2.0 %    IMMATURE GRANULOCYTES 6 (H) 0.0 - 5.0 %    ABS. NEUTROPHILS 11.2 (H) 1.7 - 8.2 K/UL    ABS. LYMPHOCYTES 0.9 0.5 - 4.6 K/UL    ABS. MONOCYTES 1.3 0.1 - 1.3 K/UL    ABS. EOSINOPHILS 0.0 0.0 - 0.8 K/UL    ABS. BASOPHILS 0.0 0.0 - 0.2 K/UL    ABS. IMM. GRANS. 0.9 (H) 0.0 - 0.5 K/UL    RBC COMMENTS OCCASIONAL  POLYCHROMASIA        RBC COMMENTS NORMOCYTIC/NORMOCHROMIC      WBC COMMENTS Result Confirmed By Smear      PLATELET COMMENTS ADEQUATE      DF AUTOMATED     LACTIC ACID    Collection Time: 06/03/19  6:31 AM   Result Value Ref Range    Lactic acid 6.2 (HH) 0.4 - 2.0 MMOL/L   BNP    Collection Time: 06/03/19  6:31 AM   Result Value Ref Range    BNP 49 (H) 0 pg/mL   POC G3    Collection Time: 06/03/19  8:53 AM   Result Value Ref Range    Device: NASAL CANNULA      pH (POC) 7.463 (H) 7.35 - 7.45      pCO2 (POC) 32.4 (L) 35 - 45 MMHG    pO2 (POC) 134 (H) 75 - 100 MMHG    HCO3 (POC) 23.2 22 - 26 MMOL/L    sO2 (POC) 99 (H) 95 - 98 %    Base excess (POC) 0 mmol/L    Allens test (POC) YES      Site RIGHT RADIAL      Patient temp.  98.6      Specimen type (POC) ARTERIAL      Performed by [facility]     CO2, POC 24 MMOL/L    Flow rate (POC) 4.000 L/min    Critical value read back 00:01     COLLECT TIME 842             ASSESSMENT:  Problem List  Date Reviewed: 5/28/2019          Codes Class Noted    * (Principal) Pleural effusion, malignant (Chronic) ICD-10-CM: J91.0  ICD-9-CM: 511.81  6/3/2019        Community acquired pneumonia of right lung ICD-10-CM: J18.9  ICD-9-CM: 572  6/3/2019        Sepsis ([facility] ICD-10-CM: A41.9  ICD-9-CM: 038.9, 995.91  6/3/2019        Acute on chronic respiratory failure with hypoxia ([facility] ICD-10-CM: J96.21  ICD-9-CM: 518.84, 799.02  6/3/2019        Healthcare-associated pneumonia ICD-10-CM: J18.9  ICD-9-CM: 486  6/3/2019        Acute liver failure ICD-10-CM: K72.00  ICD-9-CM: 570  6/3/2019        Hyponatremia ICD-10-CM: E87.1  ICD-9-CM: 276.1  6/3/2019        Lactic acidosis ICD-10-CM: E87.2  ICD-9-CM: 276.2  6/3/2019        Systemic inflammatory response syndrome (SIRS) of infectious origin with acute organ failure [facility]) ICD-10-CM: A41.9, R65.20  ICD-9-CM: 038.9, 995.91  6/3/2019        Hypoxia ICD-10-CM: R09.02  ICD-9-CM: 799.02  5/26/2019        Chronic hypoxemic respiratory failure (HCC) (Chronic) ICD-10-CM: J96.11  ICD-9-CM: 518.83, 799.02  5/26/2019        Pleural effusion, right ICD-10-CM: J90  ICD-9-CM: 511.9  2/19/2019        Age-related osteoporosis without current pathological fracture ICD-10-CM: M81.0  ICD-9-CM: 733.01  12/17/2018    Overview Signed 12/17/2018  7:31 AM by Hanh Colón MD     T score -2.6, 2018             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 [facility]) ICD-10-CM: C78.1  ICD-9-CM: 197.1  6/4/2018        Malignant neoplasm of breast in female, estrogen receptor positive ([facility] (Chronic) ICD-10-CM: C50.919, Z17.0  ICD-9-CM: 174.9, V86.0  2/27/2018        Breast cancer ([facility] [address] (Chronic) ICD-10-CM: C50.919  ICD-9-CM: 174.9  9/18/2017    Overview Signed 11/15/2018 10:05 AM by Hanh Colón MD     2016: R breast--infiltrating ductal ca,  ER/NV positive and HER2 negative, s/p mastectomy, chemo, reconstruction, on chronic Ibrance therapy             Malignant neoplasm of upper-inner quadrant of right female breast ([facility] ICD-10-CM: C50.211  ICD-9-CM: 174.2  6/12/2017        Malignant neoplasm of right female breast ([facility] (Chronic) ICD-10-CM: C50.911  ICD-9-CM: 174.9  1/25/2016    Overview Addendum 11/15/2018 10:05 AM by Hanh Colón MD     2016: R breast--infiltrating ductal ca,  ER/NV positive and HER2 negative, metastatic to nodes, s/p mastectomy, chemo, reconstruction, on chronic Ibrance therapy                     RECOMMENDATIONS:  SIRS  6/3 vanc/zosyn/cipro    Chronic pleural effusion right  6/3 per pulm    Metastatic breast cancer  6/3 sp cycle 12 Faslodex 5/7/2019. NGS sent-follow for results. Per Dr. Terrell Pérez note plan was likely to switch  treatment once NGS resulted. CT CAP for staging after pleurex drained today. Check tumor markers. Lab studies and imaging studies were personally reviewed. Pertinent old records were reviewed. Thank you for allowing us to participate in the care of Ms. Carisa Bernard. We will follow along. Atilio Medellin NP   [facility] Hematology & Oncology  [address] [place] [address] Avenue  Office : [phone]  Fax : [phone]     Attending Addendum:  I have personally performed a face to face diagnostic evaluation on this patient. I have reviewed and agree with the care plan as documented above by Atilio Medellin, ILYA.J LUIS.  My findings are as follows: Patient appears lethargic, heart rate regular without murmurs, abdomen is non-tender, bowel sounds are positive. Elderly lady h/o breast cancer ER +ve & Her2/jin -ve, metastatic, more recently w progressive disease s/p VATS w likely trapped lung, malignant effusion and pleurex catheter placement, now admitted w worsening SOB, likely underdraining of her pleurex catheter and concerns for SIRS: she is now on broad spectrum abx. She has opted for DNR. Pulmonology is following. Will re-stage disease after pleural effusion drainage w CT CAP, as well as tumor markers. Will likely need change in therapy going forward but will need to clinically improve for this. NGS is pending. Thank you for allowing us to participate in care of this pleasant patient. We will follow along.            Bright Mena MD  [facility] Hematology/Oncology  [id number] [facility] [address]  Office : [phone]  Fax : [phone]

## 2019-06-03 NOTE — PROGRESS NOTES
Hospitalist Progress Note    Subjective:   Daily Progress Note: 6/3/2019 9:38 AM    Mrs. Naveen Yo is a very ill appearing 76 yo WF with metastatic breast cancer (dxed in 2016, status post mastectomy and chemotherapy, followed by Dr. Teodora Gil) to her lung for which she has a chronic pleurx drain, who presented last night for worsened shortness of breath and is in acute on chronic hypoxic respiratory failure. She meets sepsis criteria due to possible bacterial pneumonia and is on cipro/vanc/zosyn. CXR shows signficant right sided effusion and cannot ascertain if there is also an infiltrate in right lobes. Her lactic acid has climbed from 4 to 6 despite IVFs and she has a transaminitis in the setting of tachycardia, tachypnea. Received an amp of D50 this morning for BS in 50s. She is very pale and frail appearing and is working to breath. Long discussion with her and her  at bedside in regards to goals of care and she elects to be a DNR status right now knowing that she will naturally pass if she quits breathing or having a heartbeat on her own.     Current Facility-Administered Medications   Medication Dose Route Frequency    acetaminophen (TYLENOL) tablet 1,000 mg  1,000 mg Oral Q6H PRN    ALPRAZolam (XANAX) tablet 0.5 mg  0.5 mg Oral QID PRN    DULoxetine (CYMBALTA) capsule 60 mg  60 mg Oral QHS    levothyroxine (SYNTHROID) tablet 75 mcg  75 mcg Oral ACB    polyethylene glycol (MIRALAX) packet 17 g  17 g Oral DAILY    senna (SENOKOT) tablet 17.2 mg  2 Tab Oral DAILY    sucralfate (CARAFATE) tablet 1 g  1 g Oral AC&HS    sodium chloride (NS) flush 5-40 mL  5-40 mL IntraVENous Q8H    sodium chloride (NS) flush 5-40 mL  5-40 mL IntraVENous PRN    ciprofloxacin (CIPRO) 400 mg in D5W IVPB (premix)  400 mg IntraVENous Q8H    enoxaparin (LOVENOX) injection 40 mg  40 mg SubCUTAneous Q24H    [START ON 6/4/2019] vancomycin (VANCOCIN) 1250 mg in  ml infusion  1,250 mg IntraVENous Q24H    dextrose 5 % - 0.45% NaCl infusion  100 mL/hr IntraVENous CONTINUOUS    budesonide (PULMICORT) 500 mcg/2 ml nebulizer suspension  500 mcg Nebulization BID RT    And    albuterol (PROVENTIL VENTOLIN) nebulizer solution 2.5 mg  2.5 mg Nebulization Q6HWA RT    piperacillin-tazobactam (ZOSYN) 4.5 g in 0.9% sodium chloride (MBP/ADV) 100 mL  4.5 g IntraVENous Q8H    albuterol-ipratropium (DUO-NEB) 2.5 MG-0.5 MG/3 ML  3 mL Nebulization Q4H PRN    furosemide (LASIX) injection 60 mg  60 mg IntraVENous DAILY        Review of Systems  A comprehensive review of systems was negative except for that written in the HPI. Objective:     Visit Vitals  /69   Pulse (!) 119   Temp 97.5 °F (36.4 °C)   Resp 25   Ht 5' 2\" (1.575 m)   Wt 62.6 kg (138 lb)   SpO2 96%   Breastfeeding? No   BMI 25.24 kg/m²    O2 Flow Rate (L/min): 3 l/min O2 Device: Nasal cannula    Temp (24hrs), Av.7 °F (36.5 °C), Min:97.5 °F (36.4 °C), Max:98.3 °F (36.8 °C)      No intake/output data recorded. No intake/output data recorded. General: frail, ill appearing, distressed, oriented, pale  Eyes; non icteric, EOMI  Neck; supple, left IJ in place  CV: regular, tachycardic  Pulm; labored, diffused crackles throughout, diminished in right base  Abd; soft, non tender, active BS  Chest: mastectomy scars  Ext: 2+ lower ext pitting edema, diffuse bruising to exposed skin    Additional comments:I reviewed the patient's new clinical lab test results. CXR, ABG    Data Review    Recent Results (from the past 24 hour(s))   EKG, 12 LEAD, INITIAL    Collection Time: 19  9:36 PM   Result Value Ref Range    Ventricular Rate 115 BPM    Atrial Rate 122 BPM    P-R Interval 150 ms    QRS Duration 68 ms    Q-T Interval 294 ms    QTC Calculation (Bezet) 406 ms    Calculated P Axis 58 degrees    Calculated R Axis 4 degrees    Calculated T Axis 60 degrees    Diagnosis       !! AGE AND GENDER SPECIFIC ECG ANALYSIS !!   Sinus tachycardia  Low voltage QRS  Cannot rule out Anterior infarct , age undetermined  Abnormal ECG  When compared with ECG of 25-MAY-2019 22:13,  No significant change was found     METABOLIC PANEL, COMPREHENSIVE    Collection Time: 06/02/19  9:57 PM   Result Value Ref Range    Sodium 126 (L) 136 - 145 mmol/L    Potassium 7.5 (HH) 3.5 - 5.1 mmol/L    Chloride 93 (L) 98 - 107 mmol/L    CO2 24 21 - 32 mmol/L    Anion gap 9 7 - 16 mmol/L    Glucose 62 (L) 65 - 100 mg/dL    BUN 34 (H) 8 - 23 MG/DL    Creatinine 0.75 0.6 - 1.0 MG/DL    GFR est AA >60 >60 ml/min/1.73m2    GFR est non-AA >60 >60 ml/min/1.73m2    Calcium 9.1 8.3 - 10.4 MG/DL    Bilirubin, total 1.5 (H) 0.2 - 1.1 MG/DL    ALT (SGPT) 105 (H) 12 - 65 U/L    AST (SGOT) 542 (H) 15 - 37 U/L    Alk. phosphatase 1,012 (H) 50 - 136 U/L    Protein, total 6.7 6.3 - 8.2 g/dL    Albumin 1.9 (L) 3.2 - 4.6 g/dL    Globulin 4.8 (H) 2.3 - 3.5 g/dL    A-G Ratio 0.4 (L) 1.2 - 3.5     POC CG8I    Collection Time: 06/02/19 10:48 PM   Result Value Ref Range    Device: Non rebreather      pH (POC) 7.403 7.35 - 7.45      pCO2 (POC) 38.2 35 - 45 MMHG    pO2 (POC) 147 (H) 75 - 100 MMHG    HCO3 (POC) 23.8 22 - 26 MMOL/L    sO2 (POC) 99 (H) 95 - 98 %    Base deficit (POC) 1 mmol/L    Allens test (POC) NOT APPLICABLE      Site LEFT BRACHIAL      Patient temp.  98.6      Specimen type (POC) ARTERIAL      Sodium,  (L) 136 - 145 MMOL/L    Potassium, POC 5.9 (H) 3.5 - 5.1 MMOL/L    Glucose, POC 77 65 - 100 MG/DL    Calcium, ionized (POC) 1.10 (L) 1.12 - 1.32 mmol/L    Performed by [facility]     Flow rate (POC) 10.000 L/min    COLLECT TIME 2,245     POC LACTIC ACID    Collection Time: 06/02/19 11:14 PM   Result Value Ref Range    Lactic Acid (POC) 5.38 (H) 0.5 - 1.9 mmol/L   CBC WITH AUTOMATED DIFF    Collection Time: 06/02/19 11:15 PM   Result Value Ref Range    WBC 14.5 (H) 4.3 - 11.1 K/uL    RBC 2.97 (L) 4.05 - 5.2 M/uL    HGB 10.6 (L) 11.7 - 15.4 g/dL    HCT 33.0 (L) 35.8 - 46.3 %    .1 (H) 79.6 - 97.8 FL    MCH 35.7 (H) 26.1 - 32.9 PG    MCHC 32.1 31.4 - 35.0 g/dL    RDW 21.2 (H) 11.9 - 14.6 %    PLATELET 878 831 - 840 K/uL    MPV 11.7 9.4 - 12.3 FL    ABSOLUTE NRBC 0.97 (H) 0.0 - 0.2 K/uL    NEUTROPHILS 78 43 - 78 %    LYMPHOCYTES 7 (L) 13 - 44 %    MONOCYTES 8 4.0 - 12.0 %    EOSINOPHILS 0 (L) 0.5 - 7.8 %    BASOPHILS 1 0.0 - 2.0 %    IMMATURE GRANULOCYTES 6 (H) 0.0 - 5.0 %    ABS. NEUTROPHILS 11.3 (H) 1.7 - 8.2 K/UL    ABS. LYMPHOCYTES 1.0 0.5 - 4.6 K/UL    ABS. MONOCYTES 1.2 0.1 - 1.3 K/UL    ABS. EOSINOPHILS 0.0 0.0 - 0.8 K/UL    ABS. BASOPHILS 0.1 0.0 - 0.2 K/UL    ABS. IMM. GRANS. 0.9 (H) 0.0 - 0.5 K/UL    RBC COMMENTS NORMOCYTIC/NORMOCHROMIC      RBC COMMENTS OCCASIONAL  POLYCHROMASIA        WBC COMMENTS Result Confirmed By Smear      PLATELET COMMENTS ADEQUATE      DF AUTOMATED     BNP    Collection Time: 06/02/19 11:15 PM   Result Value Ref Range    BNP 41 (H) 0 pg/mL   CULTURE, BLOOD    Collection Time: 06/02/19 11:22 PM   Result Value Ref Range    Special Requests: NO SPECIAL REQUESTS  JUGULAR VEIN        Culture result: NO GROWTH AFTER 6 HOURS     CULTURE, BLOOD    Collection Time: 06/02/19 11:42 PM   Result Value Ref Range    Special Requests: NO SPECIAL REQUESTS  LEFT  HAND        Culture result: NO GROWTH AFTER 6 HOURS     METABOLIC PANEL, COMPREHENSIVE    Collection Time: 06/03/19 12:59 AM   Result Value Ref Range    Sodium 132 (L) 136 - 145 mmol/L    Potassium 5.6 (H) 3.5 - 5.1 mmol/L    Chloride 98 98 - 107 mmol/L    CO2 25 21 - 32 mmol/L    Anion gap 9 7 - 16 mmol/L    Glucose 55 (L) 65 - 100 mg/dL    BUN 34 (H) 8 - 23 MG/DL    Creatinine 0.71 0.6 - 1.0 MG/DL    GFR est AA >60 >60 ml/min/1.73m2    GFR est non-AA >60 >60 ml/min/1.73m2    Calcium 7.8 (L) 8.3 - 10.4 MG/DL    Bilirubin, total 1.3 (H) 0.2 - 1.1 MG/DL    ALT (SGPT) 73 (H) 12 - 65 U/L    AST (SGOT) 343 (H) 15 - 37 U/L    Alk.  phosphatase 842 (H) 50 - 136 U/L    Protein, total 4.7 (L) 6.3 - 8.2 g/dL    Albumin 1.4 (L) 3.2 - 4.6 g/dL    Globulin 3.3 2.3 - 3.5 g/dL    A-G Ratio 0.4 (L) 1.2 - 3.5     PROCALCITONIN    Collection Time: 06/03/19 12:59 AM   Result Value Ref Range    Procalcitonin 2.5 ng/mL   POC LACTIC ACID    Collection Time: 06/03/19  1:20 AM   Result Value Ref Range    Lactic Acid (POC) 4.97 (H) 0.5 - 1.9 mmol/L   METABOLIC PANEL, COMPREHENSIVE    Collection Time: 06/03/19  6:31 AM   Result Value Ref Range    Sodium 130 (L) 136 - 145 mmol/L    Potassium 5.5 (H) 3.5 - 5.1 mmol/L    Chloride 96 (L) 98 - 107 mmol/L    CO2 20 (L) 21 - 32 mmol/L    Anion gap 14 7 - 16 mmol/L    Glucose 112 (H) 65 - 100 mg/dL    BUN 34 (H) 8 - 23 MG/DL    Creatinine 0.81 0.6 - 1.0 MG/DL    GFR est AA >60 >60 ml/min/1.73m2    GFR est non-AA >60 >60 ml/min/1.73m2    Calcium 7.9 (L) 8.3 - 10.4 MG/DL    Bilirubin, total 1.2 (H) 0.2 - 1.1 MG/DL    ALT (SGPT) 85 (H) 12 - 65 U/L    AST (SGOT) 406 (H) 15 - 37 U/L    Alk.  phosphatase 912 (H) 50 - 136 U/L    Protein, total 5.1 (L) 6.3 - 8.2 g/dL    Albumin 1.5 (L) 3.2 - 4.6 g/dL    Globulin 3.6 (H) 2.3 - 3.5 g/dL    A-G Ratio 0.4 (L) 1.2 - 3.5     CBC WITH AUTOMATED DIFF    Collection Time: 06/03/19  6:31 AM   Result Value Ref Range    WBC 14.3 (H) 4.3 - 11.1 K/uL    RBC 2.81 (L) 4.05 - 5.2 M/uL    HGB 9.8 (L) 11.7 - 15.4 g/dL    HCT 31.8 (L) 35.8 - 46.3 %    .2 (H) 79.6 - 97.8 FL    MCH 34.9 (H) 26.1 - 32.9 PG    MCHC 30.8 (L) 31.4 - 35.0 g/dL    RDW 21.2 (H) 11.9 - 14.6 %    PLATELET 731 176 - 953 K/uL    MPV 11.4 9.4 - 12.3 FL    ABSOLUTE NRBC 0.98 (H) 0.0 - 0.2 K/uL    DF PENDING    LACTIC ACID    Collection Time: 06/03/19  6:31 AM   Result Value Ref Range    Lactic acid 6.2 (HH) 0.4 - 2.0 MMOL/L   POC G3    Collection Time: 06/03/19  8:53 AM   Result Value Ref Range    Device: NASAL CANNULA      pH (POC) 7.463 (H) 7.35 - 7.45      pCO2 (POC) 32.4 (L) 35 - 45 MMHG    pO2 (POC) 134 (H) 75 - 100 MMHG    HCO3 (POC) 23.2 22 - 26 MMOL/L    sO2 (POC) 99 (H) 95 - 98 %    Base excess (POC) 0 mmol/L    Allens test (POC) YES      Site RIGHT RADIAL      Patient temp. 98.6      Specimen type (POC) ARTERIAL      Performed by [facility]     CO2, POC 24 MMOL/L    Flow rate (POC) 4.000 L/min    Critical value read back 00:01     COLLECT TIME 842           Assessment/Plan:     Principal Problem:    Pleural effusion, malignant (6/3/2019)    Active Problems:    Malignant neoplasm of right female breast ([address] (1/25/2016)      Overview: 2016: R breast--infiltrating ductal ca,  ER/TN positive and HER2 negative,       metastatic to nodes, s/p mastectomy, chemo, reconstruction, on chronic       Ibrance therapy      Pleural effusion, right (2/19/2019)      Hypoxia (5/26/2019)      Chronic hypoxemic respiratory failure ([address] (5/26/2019)      Community acquired pneumonia of right lung (6/3/2019)      Sepsis ([address] (6/3/2019)      Acute on chronic respiratory failure with hypoxia ([address] (6/3/2019)      Healthcare-associated pneumonia (6/3/2019)      Acute liver failure (6/3/2019)      Hyponatremia (6/3/2019)      Lactic acidosis (6/3/2019)      Systemic inflammatory response syndrome (SIRS) of infectious origin with acute organ failure ([address] (6/3/2019)    continue vancomycin, zosyn and ciprofloxacin for now for possible healthcare acquired bacterial pneumonia. STAT ABG and repeat CXR obtained showing worsening right sided effusion and vascular congestion. IVFs stopped and lasix 60 mg iv now. Obtain daily weights. Pulmonary and oncology consulted overnight. Trend LFts and lactic acid. High risk for decompensation.   Patient has elected to be a [address] discussed with: Patient/Family, Nurse and Consultant Pulmonary    35 minutes of critical care time with > 50% of time being involved in counseling and coordination of care and discussion of code status

## 2019-06-03 NOTE — PROGRESS NOTES
Pt resting in bed with family visitors at bedside. NC O2 at 4L. Respirations present. Breath sounds ronchi and rales. Alert and Ox4. Swelling in feet and ankles. Call light within reach. Bed low and locked.

## 2019-06-03 NOTE — PROGRESS NOTES
Do not get blood return after cathflo dwell of 30min   Dr Isacc Lantigua notified and says not to use port

## 2019-06-03 NOTE — PROGRESS NOTES
Patient is increasingly labored  Complains of dyspnea  Respiratory at bedside  Breath sounds are wet  Patient received breathing treatment at 0830  Dr Alanna Luna notified

## 2019-06-03 NOTE — ED TRIAGE NOTES
Patient called out for breathing problems. Patient has a drainage port in right lung. Home nurse is suppose to drain tomorrow. Patient on 3L at home 93%. With EMS patient on 10L at 99%. Absent lung sounds on right lower lung per EMS. Wheezing in upper airways. Nothing given in route with EMS. No IV at this time. Tachycardic 110-120. BGL 63. Other VSS.

## 2019-06-04 NOTE — HOSPICE
Met with the patient's spouse, Juanita Petersen, at the patients bedside. The patient was in the hospital bed with her eyes partially open and spoke only at the end of the hospice presentation. Juanita Petersen stated that his wife said that she wanted to be moved to the [facility] with General In Patient (GIP) care. I reviewed the patient's EMR and treatment's with Dr. Dianne Lewis MD the [id number] [place] [address]) Medical Director. Dr. Preethi Palafox agreed that the patient meets GIP criteria. Informed the patient's son who was in the patient's room as Juanita Petersen had gone to his home quickly to retrieve some paperwork. The patient will be moved when a bed becomes available at the [facility]. A copy of the DNR will be given to the  and [facility] will request a time tomorrow for the patient to be transported via ambulance to the [facility].     Thank you for this referral,    Leonard Castro RN, BSN  Community Nurse Liaison  [facility]  [phone]

## 2019-06-04 NOTE — PROGRESS NOTES
Aziza Cohen  Admission Date: 6/2/2019             Daily Progress Note: 6/4/2019     Patient is a 77 y.o.  female [facility] with dyspnea. She has metastatic breast cancer (dxed in 2016, status post mastectomy and chemotherapy, followed by Dr. Tatyana Hernadez), recurrent pleural effusion with chronic pleurx drain inserted on 5/8 by us, and chronic hypoxic respiratory failure. She was started on triple coverage antibiotics for sepsis by hospitalist. Her pleural fluid has been negative for malignant cells in the past and her cultures have also been negative. CXR with right effusion/infiltrate. Her PCT is 2.5 with elevated WBC and LA of 4.9. We were asked to see her for right pleural effusion with sepsis in the setting of metastatic breast cancer. She is currently on 3 lpm with oxygen saturation of 96%.     She last drained with pleurex on 5/27 and 5/29. She was told to drain only twice weekly and home health RN confirmed. She was due to drain pleurex today but decompensated over the weekend and was admitted. She has significant volume overload/edema with third spacing. Lasix 60 mg daily ordered by the hospitalist. Her albumin is 1.5 with malnutrition.     Subjective:     bumex drip started on 6/4  pleurex drained 400 cc on 6/3 and plans to drain again today  On 3 lpm  LA is 9.8.  Long discussion with family about disposition and findings    Review of Systems  Respiratory: positive for dyspnea on exertion    Current Facility-Administered Medications   Medication Dose Route Frequency    senna (SENOKOT) tablet 17.2 mg  2 Tab Oral BID PRN    polyethylene glycol (MIRALAX) packet 17 g  17 g Oral DAILY PRN    acetaminophen (TYLENOL) tablet 1,000 mg  1,000 mg Oral Q6H PRN    ALPRAZolam (XANAX) tablet 0.5 mg  0.5 mg Oral QID PRN    DULoxetine (CYMBALTA) capsule 60 mg  60 mg Oral QHS    levothyroxine (SYNTHROID) tablet 75 mcg  75 mcg Oral ACB    sucralfate (CARAFATE) tablet 1 g  1 g Oral AC&HS    sodium chloride (NS) flush 5-40 mL  5-40 mL IntraVENous Q8H    sodium chloride (NS) flush 5-40 mL  5-40 mL IntraVENous PRN    ciprofloxacin (CIPRO) 400 mg in D5W IVPB (premix)  400 mg IntraVENous Q8H    enoxaparin (LOVENOX) injection 40 mg  40 mg SubCUTAneous Q24H    vancomycin (VANCOCIN) 1250 mg in  ml infusion  1,250 mg IntraVENous Q24H    budesonide (PULMICORT) 500 mcg/2 ml nebulizer suspension  500 mcg Nebulization BID RT    And    albuterol (PROVENTIL VENTOLIN) nebulizer solution 2.5 mg  2.5 mg Nebulization Q6HWA RT    piperacillin-tazobactam (ZOSYN) 4.5 g in 0.9% sodium chloride (MBP/ADV) 100 mL  4.5 g IntraVENous Q8H    albuterol-ipratropium (DUO-NEB) 2.5 MG-0.5 MG/3 ML  3 mL Nebulization Q4H PRN    bumetanide (BUMEX) 0.25 mg/mL infusion  0.5 mg/hr IntraVENous CONTINUOUS         Objective:     Vitals:    06/04/19 0249 06/04/19 0705 06/04/19 0719 06/04/19 0828   BP: 151/88  124/64    Pulse: (!) 114  (!) 103    Resp: 24  23    Temp: 98.1 °F (36.7 °C)  98.4 °F (36.9 °C)    SpO2: 95%  99% 98%   Weight:  143 lb 12.8 oz (65.2 kg)     Height:         Intake and Output:   06/02 1901 - 06/04 0700  In: 180 [P.O.:180]  Out: 1200 [Urine:800; Drains:400]  No intake/output data recorded. Physical Exam:          Constitutional: the patient is weak  HEENT: Sclera clear, pupils equal, oral mucosa moist  Lungs: decreased bases, SOB with minimal exertion  Cardiovascular: RRR without M,G,R  Abd/GI: soft and non-tender; with positive bowel sounds. Rounded   Ext: warm without cyanosis. There is  lower leg edema. Musculoskeletal: moves all four extremities with equal strength  Skin: no jaundice or rashes, no wounds   Neuro: no gross neuro deficits       Lines/Drains: port  Nutrition: as desired    CHEST CT  IMPRESSION:      1. New diffuse hepatic metastases with new small volume ascites, and probable  new small left adrenal metastasis.   2.  Worsening multifocal pneumonia, although underlying pulmonary metastases cannot be excluded. 3.  New right chest complicated pleural effusion which could represent empyema.   4.  Other chronic and postsurgical findings as above.       LAB  Recent Labs     06/04/19  0535 06/03/19  0631 06/02/19  2315   WBC 11.1 14.3* 14.5*   HGB 7.9* 9.8* 10.6*   HCT 25.7* 31.8* 33.0*    198 227     Recent Labs     06/04/19  0535 06/03/19  1209 06/03/19  0631 06/03/19  0059   *  --  130* 132*   K 3.9  --  5.5* 5.6*   CL 93*  --  96* 98   CO2 20*  --  20* 25   *  --  112* 55*   BUN 37*  --  34* 34*   CREA 1.12*  --  0.81 0.71   MG 2.1  --   --   --    PHOS 1.5*  --   --   --    INR  --  1.7  --   --          Assessment:     Patient Active Problem List   Diagnosis Code    Malignant neoplasm of right female breast ([address] C50.911    Malignant neoplasm of upper-inner quadrant of right female breast ([address] C50.211    Breast cancer (HCC) C50.919    Malignant neoplasm of breast in female, estrogen receptor positive (HCC) C50.919, Z17.0    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    Age-related osteoporosis without current pathological fracture M81.0    Pleural effusion, right J90    Chronic hypoxemic respiratory failure (HCC) J96.11    Pleural effusion, malignant J91.0    Community acquired pneumonia of right lung J18.9    Sepsis (HCC) A41.9    Acute on chronic respiratory failure with hypoxia (HCC) J96.21    Acute liver failure K72.00    Hyponatremia E87.1    Lactic acidosis E87.2    Systemic inflammatory response syndrome (SIRS) of infectious origin with acute organ failure (HCC) A41.9, R65.20          Principal Problem:    Pleural effusion, malignant (6/3/2019)      Malignant neoplasm of right female breast ([address] (1/25/2016)      Overview: 2016: R breast--infiltrating ductal ca,  ER/AZ positive and HER2 negative,       metastatic to nodes, s/p mastectomy, chemo, reconstruction, on chronic -    Ibrance therapy      Pleural effusion, right (2/19/2019)  With effusion, complicated on Chest ct with pneumonia      Chronic hypoxemic respiratory failure ([facility] (5/26/2019)      Community acquired pneumonia of right lung (6/3/2019)      Sepsis ([address] (6/3/2019)      Acute on chronic respiratory failure with hypoxia ([address] (6/3/2019)  On 3 lpm      Acute liver failure (6/3/2019)  With new metastasis      Hyponatremia (6/3/2019)  131      Lactic acidosis (6/3/2019)  Worse at 9.8      Systemic inflammatory response syndrome (SIRS) of infectious origin with acute organ failure ([address] (6/3/2019)        PLAN:  -- new metastasis to the liver, overall decline. Ask PC to see to discuss symptoms and hospice options  -- drain pleurex for symptom relief up to 500 cc/day  -- on zosyn, vancomycin, cipro. Pleural fluid culture sent  -- bumex drip for third spacing, overall prognosis poor. Marcello Hendrickson, NP-C    More than 50% of time documented was spent in face-to-face contact with the patient and in the care of the patient on the floor/unit where the patient is located. The patient has been seen and examined by me personally, the chart,labs, and radiographic studies have been reviewed. Chest: ill appearing- comfortable BS are coarse  Extremities: generalized anasarca- unchanged. Patient is hospice appropriate and family is in agreement    I agree with the above assessment and plan.     Tammie Meyers MD.

## 2019-06-04 NOTE — PROGRESS NOTES
Lactic acid level of 9.8 called to Dr. Rahul Metz, sepsis bundle orders to be discontinued. Patient requested medication for anxiety at hs,  Xanax 0.5 mg po given per prn order. Resting quietly, respirations course throughout.  at bedside.

## 2019-06-04 NOTE — PROGRESS NOTES
Patient given tylenol 2 tabs po per request for back ache  Doesn't rate pain  Says \"All I want is tylenol. \"

## 2019-06-04 NOTE — PROGRESS NOTES
Quiet in bed  Sleeping at intervals  Denies pain Voiding large amounts of urine  Family staying at bedside

## 2019-06-04 NOTE — PROGRESS NOTES
Patient says back feels  some better  Request morphine be given before draining pleurix  Morphine 1mg slow iv

## 2019-06-04 NOTE — PROGRESS NOTES
Hospitalist Progress Note    Subjective:   Daily Progress Note: 6/4/2019 9:38 AM    Mrs. Rambo Phillips is a very ill appearing 76 yo WF with metastatic breast cancer (dxed in 2016, status post mastectomy and chemotherapy, followed by Dr. Haleigh Mcgee) to her lung for which she has a chronic pleurx drain, who presented last night for worsened shortness of breath and is in acute on chronic hypoxic respiratory failure. She meets sepsis criteria due to possible bacterial pneumonia and is on cipro/vanc/zosyn. CXR shows signficant right sided effusion and cannot ascertain if there is also an infiltrate in right lobes. Her lactic acid has climbed from 4 to 6 despite IVFs and she has a transaminitis in the setting of tachycardia, tachypnea. Received an amp of D50 this morning for BS in 50s. She is very pale and frail appearing and is working to breath. Long discussion with her and her  at bedside in regards to goals of care and she elects to be a DNR status right now knowing that she will naturally pass if she quits breathing or having a heartbeat on her own.     Today, no ac events, no fever    Current Facility-Administered Medications   Medication Dose Route Frequency    senna (SENOKOT) tablet 17.2 mg  2 Tab Oral BID PRN    polyethylene glycol (MIRALAX) packet 17 g  17 g Oral DAILY PRN    baclofen (LIORESAL) tablet 10 mg  10 mg Oral Q6H PRN    morphine injection 1 mg  1 mg IntraVENous Q3H PRN    Or    morphine injection 2 mg  2 mg IntraVENous Q3H PRN    acetaminophen (TYLENOL) tablet 1,000 mg  1,000 mg Oral Q6H PRN    ALPRAZolam (XANAX) tablet 0.5 mg  0.5 mg Oral QID PRN    DULoxetine (CYMBALTA) capsule 60 mg  60 mg Oral QHS    levothyroxine (SYNTHROID) tablet 75 mcg  75 mcg Oral ACB    sucralfate (CARAFATE) tablet 1 g  1 g Oral AC&HS    sodium chloride (NS) flush 5-40 mL  5-40 mL IntraVENous Q8H    sodium chloride (NS) flush 5-40 mL  5-40 mL IntraVENous PRN    ciprofloxacin (CIPRO) 400 mg in D5W IVPB (premix) 400 mg IntraVENous Q8H    enoxaparin (LOVENOX) injection 40 mg  40 mg SubCUTAneous Q24H    vancomycin (VANCOCIN) 1250 mg in  ml infusion  1,250 mg IntraVENous Q24H    budesonide (PULMICORT) 500 mcg/2 ml nebulizer suspension  500 mcg Nebulization BID RT    And    albuterol (PROVENTIL VENTOLIN) nebulizer solution 2.5 mg  2.5 mg Nebulization Q6HWA RT    piperacillin-tazobactam (ZOSYN) 4.5 g in 0.9% sodium chloride (MBP/ADV) 100 mL  4.5 g IntraVENous Q8H    albuterol-ipratropium (DUO-NEB) 2.5 MG-0.5 MG/3 ML  3 mL Nebulization Q4H PRN    bumetanide (BUMEX) 0.25 mg/mL infusion  0.5 mg/hr IntraVENous CONTINUOUS        Objective:     Visit Vitals  /53   Pulse (!) 113   Temp 97.7 °F (36.5 °C)   Resp 20   Ht 5' 2\" (1.575 m)   Wt 65.2 kg (143 lb 12.8 oz)   SpO2 97%   Breastfeeding? No   BMI 26.30 kg/m²    O2 Flow Rate (L/min): 3 l/min O2 Device: Nasal cannula    Temp (24hrs), Av.9 °F (36.6 °C), Min:97.5 °F (36.4 °C), Max:98.4 °F (36.9 °C)      701 -  190  In: 30 [P.O.:30]  Out: -   1901 -  0700  In: 180 [P.O.:180]  Out: 1200 [Urine:800; Drains:400]    General: frail, ill appearing, distressed, oriented, pale  Eyes; non icteric, EOMI  Neck; supple, left IJ in place  CV: regular, tachycardic  Pulm; labored, diffused crackles throughout, diminished bilaterally more in right base  Abd; soft, non tender, active BS  Chest: mastectomy scars  Ext: 2+ lower ext pitting edema, diffuse bruising to exposed skin    Additional comments:I reviewed the patient's new clinical lab test results.  CXR, ABG    Data Review    Recent Results (from the past 24 hour(s))   LACTIC ACID    Collection Time: 19 10:27 PM   Result Value Ref Range    Lactic acid 9.8 (HH) 0.4 - 2.0 MMOL/L   METABOLIC PANEL, COMPREHENSIVE    Collection Time: 19  5:35 AM   Result Value Ref Range    Sodium 131 (L) 136 - 145 mmol/L    Potassium 3.9 3.5 - 5.1 mmol/L    Chloride 93 (L) 98 - 107 mmol/L    CO2 20 (L) 21 - 32 mmol/L    Anion gap 18 (H) 7 - 16 mmol/L    Glucose 108 (H) 65 - 100 mg/dL    BUN 37 (H) 8 - 23 MG/DL    Creatinine 1.12 (H) 0.6 - 1.0 MG/DL    GFR est AA >60 >60 ml/min/1.73m2    GFR est non-AA 51 (L) >60 ml/min/1.73m2    Calcium 7.7 (L) 8.3 - 10.4 MG/DL    Bilirubin, total 1.5 (H) 0.2 - 1.1 MG/DL    ALT (SGPT) 92 (H) 12 - 65 U/L    AST (SGOT) 519 (H) 15 - 37 U/L    Alk. phosphatase 790 (H) 50 - 136 U/L    Protein, total 5.2 (L) 6.3 - 8.2 g/dL    Albumin 2.5 (L) 3.2 - 4.6 g/dL    Globulin 2.7 2.3 - 3.5 g/dL    A-G Ratio 0.9 (L) 1.2 - 3.5     MAGNESIUM    Collection Time: 06/04/19  5:35 AM   Result Value Ref Range    Magnesium 2.1 1.8 - 2.4 mg/dL   PHOSPHORUS    Collection Time: 06/04/19  5:35 AM   Result Value Ref Range    Phosphorus 1.5 (L) 2.3 - 3.7 MG/DL   CBC WITH AUTOMATED DIFF    Collection Time: 06/04/19  5:35 AM   Result Value Ref Range    WBC 11.1 4.3 - 11.1 K/uL    RBC 2.30 (L) 4.05 - 5.2 M/uL    HGB 7.9 (L) 11.7 - 15.4 g/dL    HCT 25.7 (L) 35.8 - 46.3 %    .7 (H) 79.6 - 97.8 FL    MCH 34.3 (H) 26.1 - 32.9 PG    MCHC 30.7 (L) 31.4 - 35.0 g/dL    RDW 21.4 (H) 11.9 - 14.6 %    PLATELET 383 162 - 449 K/uL    MPV 11.9 9.4 - 12.3 FL    ABSOLUTE NRBC 0.78 (H) 0.0 - 0.2 K/uL    NEUTROPHILS 77 43 - 78 %    LYMPHOCYTES 7 (L) 13 - 44 %    MONOCYTES 10 4.0 - 12.0 %    EOSINOPHILS 0 (L) 0.5 - 7.8 %    BASOPHILS 0 0.0 - 2.0 %    IMMATURE GRANULOCYTES 6 (H) 0.0 - 5.0 %    ABS. NEUTROPHILS 8.5 (H) 1.7 - 8.2 K/UL    ABS. LYMPHOCYTES 0.8 0.5 - 4.6 K/UL    ABS. MONOCYTES 1.1 0.1 - 1.3 K/UL    ABS. EOSINOPHILS 0.0 0.0 - 0.8 K/UL    ABS. BASOPHILS 0.0 0.0 - 0.2 K/UL    ABS. IMM. GRANS. 0.7 (H) 0.0 - 0.5 K/UL    RBC COMMENTS SLIGHT  HYPOCHROMIA        RBC COMMENTS SLIGHT  ANISOCYTOSIS + POIKILOCYTOSIS        RBC COMMENTS OCCASIONAL  POLYCHROMASIA        WBC COMMENTS Result Confirmed By Smear      PLATELET COMMENTS ADEQUATE      DF AUTOMATED           Assessment/Plan:      On empiric abx for hcap  followed by pulm and oncology  Seen by palliative care  Monitor na and Hb  May consult Hospice for DC planning, d/w CM    Active Problems:    Malignant neoplasm of right female breast ([facility] [address] (1/25/2016)      Overview: 2016: R breast--infiltrating ductal ca,  ER/NH positive and HER2 negative,       metastatic to nodes, s/p mastectomy, chemo, reconstruction, on chronic       Ibrance therapy      Pleural effusion, right (2/19/2019)      Chronic hypoxemic respiratory failure ([address] (5/26/2019)      Community acquired pneumonia of right lung (6/3/2019)      Sepsis ([address] (6/3/2019)      Acute on chronic respiratory failure with hypoxia ([address] (6/3/2019)      Acute liver failure (6/3/2019)      Hyponatremia (6/3/2019)      Lactic acidosis (6/3/2019)      Systemic inflammatory response syndrome (SIRS) of infectious origin with acute organ failure ([address] (6/3/2019)    continue vancomycin, zosyn and ciprofloxacin for now for possible healthcare acquired bacterial pneumonia. STAT ABG and repeat CXR obtained showing worsening right sided effusion and vascular congestion. IVFs stopped and lasix 60 mg iv now. Obtain daily weights. Pulmonary and oncology consulted overnight. Trend LFts and lactic acid. High risk for decompensation.   Patient has elected to be a [address] discussed with: Patient/Family, Nurse and Consultant Pulmonary    35 minutes of critical care time with > 50% of time being involved in counseling and coordination of care and discussion of code status

## 2019-06-04 NOTE — PROGRESS NOTES
Patient continues resting quietly in bed, color gray/pale, respirations tachy. Appears uncomfortable but denies pain, refuses comfort measures. Unable to tolerate pure wick, using briefs.  at bedside, will monitor.

## 2019-06-04 NOTE — CONSULTS
Palliative Care    Patient: Eva Connell MRN: [id number]  SSN: [id number]    YOB: 1950  Age: 77 y.o. Sex: female       Date of Request: 6/4/19  Date of Consult:  6/4/2019  Reason for Consult:  advanced care plan planning/end of life  Requesting Physician: LOVE Rosen     Assessment/Plan:     Principal Diagnosis:    Respiratory Failure, Acute on Chronic  J96.20    Additional Diagnoses:   · Anxiety  F41.1  · Cachexia  R64  · Cough  R05  · Dyspnea  R06.00  · Edema  R60.9  · Pain, back  M54.9  · Counseling, Encounter for Medical Advice  Z71.9  · Encounter for Palliative Care  Z51.5    Palliative Performance Scale (PPS):  PPS: 10    Medical Decision Making:   Reviewed and summarized notes from admission to present. Discussed case with appropriate providers: [facility] laboratory and x-ray data from admission to present. Pt resting in bed,  at bedside. Ms. Giovanna Hernandez endorses labored breathing, despite good O2 sats on NC. She also endorses lower back pain. Have added IV morphine for both. ROSETTA Ríos is aware; she will give morphine 15 minutes before draining PleurX, as pt said that the procedure is uncomfortable. Pt endorses lack of appetite; talked about eating for pleasure now, rather than nutrition. Pt endorses occassional anxiety. Reminded her that she has Xanax available; she is also on Cymbalta. Pt and  confirmed that they are interested in hospice care, and their first choice is [facility]. Dr. Star Love has placed the order, and BRAULIO Orozco has followed up with the liaison.           Will discuss findings with members of the interdisciplinary team.      Thank you for this referral.          .    Subjective:     History obtained from:  Patient, Family, Care Provider and Chart    Chief Complaint: Dyspnea    History of Present Illness:  Ms. Giovanna Hernandez is a 78 yo F with a PMH of breast cancer with mets to lung (followed by  Joyce, status post mastectomy, XRT and chemotherapy), osteoporosis, and other conditions listed below, who presented to the ER on 6/3/19 with worsening SOB. She has a PleurX, which has been drained about 8 times in the last month. Admitted for HCAP, malignant pleural effusion. Advance Directive: No       Code Status:  DNR            Health Care Power of : No - Patient does not have a [address]  is health care surrogate. Past Medical History:   Diagnosis Date    Abnormal CT of the chest     Acquired hypothyroidism 11/4/2015    Adverse effect of anesthesia     bp dropped with dual lung/gallbladder surgery    Age-related osteoporosis without current pathological fracture 12/17/2018    T score -2.6, 2018    Asthma      no acute attacks recently.  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 ([address] 6/4/2018    Pancytopenia due to chemotherapy ([address] 4/7/2017    Recurrent major depressive disorder, in partial remission ([address] 11/16/2017    S/P breast reconstruction, right 9/19/2017    Status post right mastectomy 9/14/2016      Past Surgical History:   Procedure Laterality Date    CHEST SURGERY PROCEDURE UNLISTED  2018    R lung biopsy     CHEST SURGERY PROCEDURE UNLISTED      multiple thoracentis to right lung all 2019    HX BLADDER REPAIR      HX BREAST AUGMENTATION Right 10/20/2017    BREAST IMPLANT REMOVAL  performed by Teresa Gonzalez MD at [address] HX BREAST BIOPSY Right 2/5/2016    SKIN BIOPSY RIGHT BREAST performed by Oh Corley MD at [address] HX BREAST RECONSTRUCTION Bilateral 9/14/2016    BILATERAL BREAST RECONSTRUCTION performed by Marge Garibay MD at [place] [facility] OR    HX BREAST RECONSTRUCTION Bilateral 9/14/2016    BILATERAL BREAST TISSUE EXPANDER INSERTION performed by Marge Garibay MD at [place] MAIN OR    HX BREAST RECONSTRUCTION Right 9/18/2017    RIGHT BREAST RECONSTRUCTION performed by Marge Garibay MD at [address] HX BREAST RECONSTRUCTION Right 9/18/2017    Right  PLACEMENT OF PERMANENT IMPLANTS performed by Marge Garibay MD at [address] HX BREAST REDUCTION Left 9/18/2017    LEFT BREAST MASTOPEXY FOR SYMMETRY performed by Marge Garibay MD at [address] HX COLONOSCOPY      2006    HX CYST INCISION AND DRAINAGE Right 12/15/2017    INCISION AND DRAINAGE BREAST  performed by Marge Garibay MD at [address] HX [address]      HX HYSTERECTOMY      fibroids    HX LAP CHOLECYSTECTOMY  2000    HX MASTECTOMY Right     HX RECTOCELE REPAIR      HX UROLOGICAL      blader and rectocele repair    HX VASCULAR ACCESS  2016    port left chest wall     Family History   Problem Relation Age of Onset    Hypertension Mother     Cancer Mother 79        Breast cancer at age 79    Diabetes Mother         Type 2    Other Father         alzheimers    Cancer Maternal Aunt         Breast cancer after age 72    Cancer Maternal Aunt         Breast cancer at age 39      Social History     Tobacco Use    Smoking status: Never Smoker    Smokeless tobacco: Never Used   Substance Use Topics    Alcohol use: No     Alcohol/week: 0.0 oz     Prior to Admission medications    Medication Sig Start Date End Date Taking? Authorizing Provider   polyethylene glycol (MIRALAX) 17 gram packet Take 17 g by mouth daily. Provider, Historical   senna (SENOKOT) 8.6 mg tablet Take 2 Tabs by mouth daily. Provider, Historical   OXYGEN-AIR DELIVERY SYSTEMS Take 2 L by inhalation continuous.     Provider, Historical sucralfate (CARAFATE) 1 gram tablet Take 1 Tab by mouth Before breakfast, lunch, dinner and at bedtime. Take as slurry 5/25/19   Javier Menon NP   acetaminophen (TYLENOL) 500 mg tablet Take 1,000 mg by mouth every six (6) hours as needed for Pain. Provider, Historical   palbociclib (IBRANCE) 75 mg cap Take 1 Cap by mouth daily. 1 cap by mouth daily for 21 days, then off for 7 days. Patient taking differently: Take 75 mg by mouth daily. To stop 5/16/2019 per Dr Marcelo Chacon 2/28/19   Elin Burger MD   albuterol (PROVENTIL HFA, VENTOLIN HFA, PROAIR HFA) 90 mcg/actuation inhaler 2 puffs q6h prn wheezing. May use brand per formulary 2/11/19   Marina Ray MD   varicella-zoster recombinant, PF, ([address] [place], PF,) 50 mcg/0.5 mL susr injection Give in 2 doses, 2-6 months apart    DX Z23 11/15/18   Marina Ray MD   levothyroxine (SYNTHROID) 75 mcg tablet CANCEL THIS SCRIPT  1 every day for thyroid 11/15/18   Marina Ray MD   ALPRAZolam Cherlynn Sharp) 0.5 mg tablet 1 bid-tid prn anxiety 11/15/18   Marina Ray MD   DULoxetine (CYMBALTA) 60 mg capsule 1 every day for depression  Patient taking differently: 60 mg nightly. 1 every day for depression 11/15/18   Marina Ray MD   fluticasone-salmeterol (ADVAIR DISKUS) 250-50 mcg/dose diskus inhaler 1 inhalation bid for asthma control 11/15/18   Marina Ray MD   heparin, porcine, pf (HEPARIN LOCKFLUSH,PORCINE,,PF,) 100 unit/mL injection Access port, flush with 20ml Normal Saline, 500 units of Heparin Flush and de access port every 4 weeks. 10/26/17   Dalia Jha NP       Allergies   Allergen Reactions    Prednisone Unknown (comments)     \"psychotic reaction\"  To oral and parenteral steroids        Review of Systems:  A comprehensive review of systems was negative except for: Constitutional: Positive for malaise. Respiratory: Positive for dyspnea, on O2  Musculoskeletal: Positive for lower back pain.      Objective:     Visit Vitals  /58   Pulse (!) 108   Temp 97.6 °F (36.4 °C)   Resp 23   Ht 5' 2\" (1.575 m)   Wt 143 lb 12.8 oz (65.2 kg)   SpO2 100%   Breastfeeding? No   BMI 26.30 kg/m²        Physical Exam:    General:  Cooperative. No acute distress. Eyes:  Conjunctivae/corneas clear    Nose: Nares normal. Septum midline. Neck: Supple, symmetrical, trachea midline, no JVD   Lungs:   Diminished bilaterally, increased work of breathing   Heart:  Regular rate and rhythm, no murmur    Abdomen:   Soft, non-tender, non-distended   Extremities: Normal, atraumatic, +1 BLE edema   Skin: Skin color, texture, turgor normal. No rash or lesions.    Neurologic: Nonfocal   Psych: Somnolent, oriented      Assessment:     Hospital Problems  Date Reviewed: 5/28/2019          Codes Class Noted POA    Community acquired pneumonia of right lung ICD-10-CM: J18.9  ICD-9-CM: 856  6/3/2019 Yes        Sepsis ([address] ICD-10-CM: A41.9  ICD-9-CM: 038.9, 995.91  6/3/2019 Yes        Acute on chronic respiratory failure with hypoxia (HCC) ICD-10-CM: J96.21  ICD-9-CM: 518.84, 799.02  6/3/2019 Yes        Acute liver failure ICD-10-CM: K72.00  ICD-9-CM: 788  6/3/2019 Yes        Hyponatremia ICD-10-CM: E87.1  ICD-9-CM: 276.1  6/3/2019 Yes        Lactic acidosis ICD-10-CM: E87.2  ICD-9-CM: 276.2  6/3/2019 Yes        Systemic inflammatory response syndrome (SIRS) of infectious origin with acute organ failure [facility]) ICD-10-CM: A41.9, R65.20  ICD-9-CM: 038.9, 995.91  6/3/2019 Yes        Chronic hypoxemic respiratory failure (HCC) (Chronic) ICD-10-CM: J96.11  ICD-9-CM: 518.83, 799.02  5/26/2019 Yes        Pleural effusion, right ICD-10-CM: J90  ICD-9-CM: 511.9  2/19/2019 Yes        Malignant neoplasm of right female breast ([address] (Chronic) ICD-10-CM: C50.911  ICD-9-CM: 174.9  1/25/2016 Yes    Overview Addendum 11/15/2018 10:05 AM by Caitlin Forrester MD     2016: R breast--infiltrating ductal ca,  ER/NE positive and HER2 negative, metastatic to nodes, s/p mastectomy, chemo, reconstruction, on chronic Ibrance therapy                   Signed By: Lily Holt NP     June 4, 2019

## 2019-06-05 NOTE — PROGRESS NOTES
Palliative Care Progress Note    Patient: Nicole Shin MRN: [id number]  SSN: [id number]    YOB: 1950  Age: 77 y.o. Sex: female       Assessment/Plan:     Chief Complaint/Interval History: unresponsive, actively dying. Appears comfortable       Principal Diagnosis:    · Hypersecretions, Respiratory  J39.9    Additional Diagnoses:   · Altered Mental Status R41.82  · Debility, Unspecified  R53.81  · Failure to Thrive  R62.7  · Fatigue, Lethargy  R53.83  · Frailty  R54  · Pain, general  R52  · Counseling, Encounter for Medical Advice  Z71.9  · Encounter for Palliative Care  Z51.5    Palliative Performance Scale (PPS)  PPS: 10    Medical Decision Making:   Reviewed and summarized notes over last 24 hours   Discussed case with appropriate providers- CM  Reviewed laboratory and x-ray data- CBC, CMP    Pt unresponsive, appears comfortable. Multiple family members at bedside, including her . Pt has declined significantly since yesterday, and appears to be actively dying. She is comfort measures only. Pt has been evaluated and accepted for [address] [place] [address], however, discharge is being held due to her acute decline. If she is stable tomorrow, will transport to [place]. Pt noted to have excessive secretions- Robinul and Scopolamine patch ordered. Encouraged family to ask for medications if they feel pt needs it. Will continue to follow. Will discuss findings with members of the interdisciplinary team.         More than 50% of this 15 minute visit was spent counseling and coordination of care as outlined above. Subjective:     Review of Systems:  Review of systems not obtained due to patient factors- unresponsive      Objective:     Visit Vitals  /55   Pulse (!) 121   Temp 97 °F (36.1 °C)   Resp 18   Ht 5' 2\" (1.575 m)   Wt 143 lb 12.8 oz (65.2 kg)   SpO2 96%   Breastfeeding? No   BMI 26.30 kg/m²       Physical Exam:    General:  Unresponsive. No acute distress.    Eyes: Conjunctivae/corneas clear    Nose: Nares normal. Septum midline.  O2 via NC    Neck: Supple, symmetrical, trachea midline   Lungs:   Coarse bilaterally, unlabored   Heart:  Regular rate and rhythm   Abdomen:   Soft, non-tender, non-distended   Extremities: Normal, atraumatic, no cyanosis or edema   Skin: Skin color-pale, texture, turgor normal.   Neurologic: Nonfocal   Psych: Unresponsive      Signed By: Kristin Matthew NP     June 5, 2019

## 2019-06-05 NOTE — PROGRESS NOTES
Patient is moaning  Agonal breathing  Denies pain  Denies dyspnea  Agrees to morphine  Morphine 1mg slow iv  Spouse crying at bedside

## 2019-06-05 NOTE — PROGRESS NOTES
BSR given to RN patient is in bed with  present  Labored breathing on 3L NC  Oncoming RN gave patient pain medication to help with labored breathing

## 2019-06-05 NOTE — PROGRESS NOTES
More relaxed  Respirations 18 and less labored  Occasional moans  Patient has expressed she would like to see her grandson before she dies  His [email or web address]

## 2019-06-05 NOTE — PROGRESS NOTES
Juan C Stringer  Admission Date: 6/2/2019             Daily Progress Note: 6/5/2019     Patient is a 77 y.o.  female [facility] with dyspnea. She has metastatic breast cancer (dxed in 2016, status post mastectomy and chemotherapy, followed by Dr. Tevin Wadsworth), recurrent pleural effusion with chronic pleurx drain inserted on 5/8 by us, and chronic hypoxic respiratory failure. She was started on triple coverage antibiotics for sepsis by hospitalist. Her pleural fluid has been negative for malignant cells in the past and her cultures have also been negative. CXR with right effusion/infiltrate. Her PCT is 2.5 with elevated WBC and LA of 4.9. We were asked to see her for right pleural effusion with sepsis in the setting of metastatic breast cancer. She is currently on 3 lpm with oxygen saturation of 96%.     She last drained with pleurex on 5/27 and 5/29. She was told to drain only twice weekly and home health RN confirmed. She was due to drain pleurex today but decompensated over the weekend and was admitted. She has significant volume overload/edema with third spacing. Lasix 60 mg daily ordered by the hospitalist. Her albumin is 1.5 with malnutrition. Bumex drip started on 6/4. Pleurex drained 400 cc on 6/3 a     Subjective: On 3 lpm with 96%. Met with hospice yesterday and accepted to the hospice house. Awaiting her grandson's arrival. Declined significantly since yesterday and giving IV morphine around the clock now.  is at bedside and is very tearful.      Review of Systems  Respiratory: positive for dyspnea on exertion    Current Facility-Administered Medications   Medication Dose Route Frequency    senna (SENOKOT) tablet 17.2 mg  2 Tab Oral BID PRN    polyethylene glycol (MIRALAX) packet 17 g  17 g Oral DAILY PRN    baclofen (LIORESAL) tablet 10 mg  10 mg Oral Q6H PRN    morphine injection 1 mg  1 mg IntraVENous Q3H PRN    Or    morphine injection 2 mg  2 mg IntraVENous Q3H PRN    acetaminophen (TYLENOL) tablet 1,000 mg  1,000 mg Oral Q6H PRN    ALPRAZolam (XANAX) tablet 0.5 mg  0.5 mg Oral QID PRN    DULoxetine (CYMBALTA) capsule 60 mg  60 mg Oral QHS    levothyroxine (SYNTHROID) tablet 75 mcg  75 mcg Oral ACB    sucralfate (CARAFATE) tablet 1 g  1 g Oral AC&HS    sodium chloride (NS) flush 5-40 mL  5-40 mL IntraVENous Q8H    sodium chloride (NS) flush 5-40 mL  5-40 mL IntraVENous PRN    ciprofloxacin (CIPRO) 400 mg in D5W IVPB (premix)  400 mg IntraVENous Q8H    enoxaparin (LOVENOX) injection 40 mg  40 mg SubCUTAneous Q24H    vancomycin (VANCOCIN) 1250 mg in  ml infusion  1,250 mg IntraVENous Q24H    budesonide (PULMICORT) 500 mcg/2 ml nebulizer suspension  500 mcg Nebulization BID RT    And    albuterol (PROVENTIL VENTOLIN) nebulizer solution 2.5 mg  2.5 mg Nebulization Q6HWA RT    piperacillin-tazobactam (ZOSYN) 4.5 g in 0.9% sodium chloride (MBP/ADV) 100 mL  4.5 g IntraVENous Q8H    albuterol-ipratropium (DUO-NEB) 2.5 MG-0.5 MG/3 ML  3 mL Nebulization Q4H PRN    bumetanide (BUMEX) 0.25 mg/mL infusion  0.5 mg/hr IntraVENous CONTINUOUS         Objective:     Vitals:    06/04/19 1522 06/04/19 1936 06/04/19 2010 06/05/19 0327   BP: 102/53  91/56 119/72   Pulse: (!) 113  (!) 117 (!) 117   Resp: 20  20 22   Temp: 97.7 °F (36.5 °C)  97.2 °F (36.2 °C) 98.1 °F (36.7 °C)   SpO2: 97% 95% 94% 96%   Weight:       Height:         Intake and Output:   06/03 1901 - 06/05 0700  In: 100 [P.O.:100]  Out: 800 [Urine:800]  No intake/output data recorded. Physical Exam:          Constitutional: the patient is weak  HEENT: Sclera clear, pupils equal, oral mucosa dry  Lungs:coarse rhonchi, rales. Weak cough  Cardiovascular: RRR without M,G,R  Abd/GI: soft and non-tender; with positive bowel sounds. Rounded   Ext: warm without cyanosis. There is  lower leg edema.   Musculoskeletal: moves all four extremities with equal strength  Skin: no jaundice or rashes, no wounds   Neuro: alert, actively dying       Lines/Drains: port  Nutrition: as desired    CHEST CT  IMPRESSION:      1. New diffuse hepatic metastases with new small volume ascites, and probable  new small left adrenal metastasis. 2.  Worsening multifocal pneumonia, although underlying pulmonary metastases cannot be excluded. 3.  New right chest complicated pleural effusion which could represent empyema.   4.  Other chronic and postsurgical findings as above.       LAB  Recent Labs     06/05/19  0602 06/04/19  0535 06/03/19  0631   WBC 14.4* 11.1 14.3*   HGB 9.1* 7.9* 9.8*   HCT 28.6* 25.7* 31.8*    160 198     Recent Labs     06/05/19  0602 06/04/19  0535 06/03/19  1209 06/03/19  0631   * 131*  --  130*   K 2.9* 3.9  --  5.5*   CL 91* 93*  --  96*   CO2 24 20*  --  20*   GLU 87 108*  --  112*   BUN 35* 37*  --  34*   CREA 1.18* 1.12*  --  0.81   MG  --  2.1  --   --    PHOS  --  1.5*  --   --    INR  --   --  1.7  --          Assessment:     Patient Active Problem List   Diagnosis Code    Malignant neoplasm of right female breast (HCC) C50.911    Malignant neoplasm of upper-inner quadrant of right female breast (HCC) C50.211    Breast cancer (HCC) C50.919    Malignant neoplasm of breast in female, estrogen receptor positive (HCC) C50.919, Z17.0    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    Age-related osteoporosis without current pathological fracture M81.0    Pleural effusion, right J90    Chronic hypoxemic respiratory failure (HCC) J96.11    Pleural effusion, malignant J91.0    Community acquired pneumonia of right lung J18.9    Sepsis (HCC) A41.9    Acute on chronic respiratory failure with hypoxia (HCC) J96.21    Acute liver failure K72.00    Hyponatremia E87.1    Lactic acidosis E87.2    Systemic inflammatory response syndrome (SIRS) of infectious origin with acute organ failure (HCC) A41.9, R65.20                 Malignant neoplasm of right female breast ([facility] [address] (1/25/2016) Overview: 2016: R breast--infiltrating ductal ca,  ER/MO positive and HER2 negative, metastatic to nodes, s/p mastectomy, chemo, reconstruction, on chronic -    Ibrance therapy      Pleural effusion, right (2/19/2019)  With effusion, complicated on Chest ct with pneumonia      Chronic hypoxemic respiratory failure ([address] (5/26/2019)      Community acquired pneumonia of right lung (6/3/2019)      Sepsis ([address] (6/3/2019)      Acute on chronic respiratory failure with hypoxia ([address] (6/3/2019)  On 3 lpm      Acute liver failure (6/3/2019)  With new metastasis      Hyponatremia (6/3/2019)  131      Lactic acidosis (6/3/2019)  Worse at 9.8      Systemic inflammatory response syndrome (SIRS) of infectious origin with acute organ failure ([address] (6/3/2019)        PLAN:  -- new metastasis to the liver, overall decline. Ask PC to see to discuss symptoms and hospice options  -- pleurex drained to try and alleviate SOB until her family arrives  -- on zosyn, vancomycin, cipro. Pleural fluid culture sent  -- stop bumex drip, replete K+  -- has meds for comfort care/symptoms. Morphine frequency adjusted  -- accepted at the [facility] but will keep her here today as she has rapidly declined. If she survives today, can re-evaluate for transport to [facility] tomorrow. Family appreciative. Walden Nageotte, NP-C      Lungs: Audible upper airway secretions. Heart:  RRR with no Murmur/Rubs/Gallops    Additional Comments:    Patient appears to be actively dying. Family on the way in. WIll give robinul x 1 and assess response. May benefit from scop patch. D/c lovenox, abx, and potassium replacements. I have spoken with and examined the patient. I agree with the above assessment and plan as documented.     Sruthi Guerra MD

## 2019-06-05 NOTE — PROGRESS NOTES
Hospitalist Progress Note Subjective:  
Daily Progress Note: 6/5/2019 9:38 AM 
 
Mrs. Tiny Morrison is a very ill appearing 76 yo WF with metastatic breast cancer (dxed in 2016, status post mastectomy and chemotherapy, followed by Dr. Yemi Nuñez) to her lung for which she has a chronic pleurx drain, who presented last night for worsened shortness of breath and is in acute on chronic hypoxic respiratory failure. She meets sepsis criteria due to possible bacterial pneumonia and is on cipro/vanc/zosyn. CXR shows signficant right sided effusion and cannot ascertain if there is also an infiltrate in right lobes. Her lactic acid has climbed from 4 to 6 despite IVFs and she has a transaminitis in the setting of tachycardia, tachypnea. Received an amp of D50 this morning for BS in 50s. She is very pale and frail appearing and is working to breath. Long discussion with her and her  at bedside in regards to goals of care and she elects to be a DNR status right now knowing that she will naturally pass if she quits breathing or having a heartbeat on her own. Today, doing poorly Current Facility-Administered Medications Medication Dose Route Frequency  morphine injection 1 mg  1 mg IntraVENous Q1H PRN Or  
 morphine injection 2 mg  2 mg IntraVENous Q3H PRN  
 glycopyrrolate (ROBINUL) injection 0.4 mg  0.4 mg IntraVENous Q6H PRN  
 scopolamine (TRANSDERM-SCOP) 1 mg over 3 days 1 Patch  1 Patch TransDERmal Q72H  LORazepam (ATIVAN) injection 1 mg  1 mg IntraVENous Q2H PRN  
 acetaminophen (TYLENOL) suppository 975 mg  975 mg Rectal Q4H PRN  
 senna (SENOKOT) tablet 17.2 mg  2 Tab Oral BID PRN  polyethylene glycol (MIRALAX) packet 17 g  17 g Oral DAILY PRN  
 baclofen (LIORESAL) tablet 10 mg  10 mg Oral Q6H PRN  
 acetaminophen (TYLENOL) tablet 1,000 mg  1,000 mg Oral Q6H PRN  
 ALPRAZolam (XANAX) tablet 0.5 mg  0.5 mg Oral QID PRN  
 sodium chloride (NS) flush 5-40 mL  5-40 mL IntraVENous Q8H  
  sodium chloride (NS) flush 5-40 mL  5-40 mL IntraVENous PRN  
 albuterol-ipratropium (DUO-NEB) 2.5 MG-0.5 MG/3 ML  3 mL Nebulization Q4H PRN Objective:  
 
Visit Vitals /55 Pulse (!) 121 Temp 97 °F (36.1 °C) Resp 18 Ht 5' 2\" (1.575 m) Wt 65.2 kg (143 lb 12.8 oz) SpO2 96% Breastfeeding? No  
BMI 26.30 kg/m² O2 Flow Rate (L/min): 3 l/min O2 Device: Nasal cannula Temp (24hrs), Av.4 °F (36.3 °C), Min:97 °F (36.1 °C), Max:98.1 °F (36.7 °C) 
 
 
701 - 1900 In: 10 [P.O.:10] Out: 0  
1901 -  07 In: 100 [P.O.:100] Out: 800 [Urine:800] General: frail, ill appearing, distressed, pale, cachectic CV: regular, tachycardic 
Pulm; labored, diffused crackles throughout, diminished bilaterally more in right base Abd; soft, non tender, active BS Chest: mastectomy scars Ext: 2+ lower ext pitting edema, diffuse bruising to exposed skin Additional comments:I reviewed the patient's new clinical lab test results. CXR, ABG Data Review Recent Results (from the past 24 hour(s)) METABOLIC PANEL, COMPREHENSIVE Collection Time: 19  6:02 AM  
Result Value Ref Range Sodium 132 (L) 136 - 145 mmol/L Potassium 2.9 (LL) 3.5 - 5.1 mmol/L Chloride 91 (L) 98 - 107 mmol/L  
 CO2 24 21 - 32 mmol/L Anion gap 17 (H) 7 - 16 mmol/L Glucose 87 65 - 100 mg/dL BUN 35 (H) 8 - 23 MG/DL Creatinine 1.18 (H) 0.6 - 1.0 MG/DL  
 GFR est AA 58 (L) >60 ml/min/1.73m2 GFR est non-AA 48 (L) >60 ml/min/1.73m2 Calcium 7.7 (L) 8.3 - 10.4 MG/DL Bilirubin, total 1.9 (H) 0.2 - 1.1 MG/DL  
 ALT (SGPT) 93 (H) 12 - 65 U/L  
 AST (SGOT) 452 (H) 15 - 37 U/L Alk. phosphatase 972 (H) 50 - 136 U/L Protein, total 5.3 (L) 6.3 - 8.2 g/dL Albumin 2.1 (L) 3.2 - 4.6 g/dL Globulin 3.2 2.3 - 3.5 g/dL A-G Ratio 0.7 (L) 1.2 - 3.5    
CBC WITH AUTOMATED DIFF Collection Time: 19  6:02 AM  
Result Value Ref Range WBC 14.4 (H) 4.3 - 11.1 K/uL RBC 2.67 (L) 4.05 - 5.2 M/uL HGB 9.1 (L) 11.7 - 15.4 g/dL HCT 28.6 (L) 35.8 - 46.3 % .1 (H) 79.6 - 97.8 FL  
 MCH 34.1 (H) 26.1 - 32.9 PG  
 MCHC 31.8 31.4 - 35.0 g/dL RDW 21.3 (H) 11.9 - 14.6 % PLATELET 243 654 - 483 K/uL MPV 11.7 9.4 - 12.3 FL ABSOLUTE NRBC 1.12 (H) 0.0 - 0.2 K/uL NEUTROPHILS 77 43 - 78 % LYMPHOCYTES 8 (L) 13 - 44 % MONOCYTES 8 4.0 - 12.0 % EOSINOPHILS 0 (L) 0.5 - 7.8 % BASOPHILS 1 0.0 - 2.0 % IMMATURE GRANULOCYTES 6 (H) 0.0 - 5.0 %  
 ABS. NEUTROPHILS 11.0 (H) 1.7 - 8.2 K/UL  
 ABS. LYMPHOCYTES 1.2 0.5 - 4.6 K/UL  
 ABS. MONOCYTES 1.2 0.1 - 1.3 K/UL  
 ABS. EOSINOPHILS 0.0 0.0 - 0.8 K/UL  
 ABS. BASOPHILS 0.1 0.0 - 0.2 K/UL  
 ABS. IMM. GRANS. 0.9 (H) 0.0 - 0.5 K/UL  
 RBC COMMENTS SLIGHT 
POLYCHROMASIA 
    
 RBC COMMENTS ANISOCYTOSIS + POIKILOCYTOSIS    
 WBC COMMENTS Result Confirmed By Smear PLATELET COMMENTS ADEQUATE    
 DF AUTOMATED Assessment/Plan:  
 
COMFORT CARE, likely will  in hospital 
 
Principal Problem: 
  Systemic inflammatory response syndrome (SIRS) of infectious origin with acute organ failure ([facility] [address] (6/3/2019) Active Problems: 
  Malignant neoplasm of right female breast ([facility] [address] (2016) Overview: 2016: R breast--infiltrating ductal ca,  ER/GA positive and HER2 negative,  
    metastatic to nodes, s/p mastectomy, chemo, reconstruction, on chronic Ibrance therapy Pleural effusion, right (2019) Chronic hypoxemic respiratory failure ([address] (2019) Community acquired pneumonia of right lung (6/3/2019) Sepsis ([facility] [address] (6/3/2019) Acute on chronic respiratory failure with hypoxia ([facility] [address] (6/3/2019) Acute liver failure (6/3/2019) Hyponatremia (6/3/2019) Lactic acidosis (6/3/2019) continue vancomycin, zosyn and ciprofloxacin for now for possible healthcare acquired bacterial pneumonia.   STAT ABG and repeat CXR obtained showing worsening right sided effusion and vascular congestion. IVFs stopped and lasix 60 mg iv now. Obtain daily weights. Pulmonary and oncology consulted overnight. Trend LFts and lactic acid. High risk for decompensation. Patient has elected to be a dNR Care Plan discussed with: Patient/Family, Nurse and Consultant Pulmonary 35 minutes of critical care time with > 50% of time being involved in counseling and coordination of care and discussion of code status

## 2019-06-05 NOTE — PROGRESS NOTES
Respirations 28/min and labored  Moaning  Morphine 1mg slo iv push  Spouse says he doesn't think she should be moved to [facility]

## 2019-06-05 NOTE — PROGRESS NOTES
Family does not wish to transfer to [facility].  CM made MD aware that patient's family wishes to stay in the hospital.

## 2019-06-05 NOTE — PROGRESS NOTES
BSR received  Patient is resting in bed with family present there are no signs of distress at this time

## 2019-06-05 NOTE — PROGRESS NOTES
Interdisciplinary team rounds were held 6/5/2019 with the following team members:Care Management, Nursing, Physical Therapy, Physician and Clinical Coordinator and the patient. Plan of care discussed. See clinical pathway and/or care plan for interventions and desired outcomes.

## 2019-06-06 NOTE — PROGRESS NOTES
Morphine 2 mg IV given. 06/06/19 0135 Pain 1 Pain Scale 1 FLACC Pain Intensity 1 4 FLACC Pain Scale 1 Face 1 1 Legs 1 1 Activity 1 0 Cry 1 1 Consolability 1 1 FLACC Score 1 4 Pain Intervention(s) 1 Medication (see MAR)

## 2019-06-06 NOTE — PROGRESS NOTES
called me to the room to say his wife had passed. No pulse, no heart beat or lung sounds heard. Charge nurse, house supervisor, MD, and  notified. Assisted spouse in notifying family. Dr. Homero Iraheta arrived to pronounce TOD.

## 2019-06-06 NOTE — PROGRESS NOTES
Morphine 1 mg IV given. 06/05/19 2304 Pain 1 Pain Scale 1 Visual  
FLACC Pain Scale 1 Face 1 1 Legs 1 1 Activity 1 0 Cry 1 1 Consolability 1 1 FLACC Score 1 4 Pain Intervention(s) 1 Medication (see MAR)

## 2019-06-06 NOTE — DISCHARGE SUMMARY
Hospitalist Discharge Summary for  Patient Name:  Adolfo Cole Age:  77 y.o. 
:   1950 MRN:   [id number] PCP:   Sabine Mcfadden MD 
Admit date:  2019  9:55 PM 
Treatment Team: Attending Provider: Vianey Hua MD; Consulting Provider: Wali Villar MD; Utilization Review: Thania Echavarria RN; Care Manager: Sandra Aponte.; Consulting Provider: Liban Hughes NP Problem List for this Hospitalization: 
Hospital Problems as of 2019 Date Reviewed: 2019 Codes Class Noted - Resolved POA Pleural effusion, malignant (Chronic) ICD-10-CM: J91.0 ICD-9-CM: 511.81  6/3/2019 - Present Community acquired pneumonia of right lung ICD-10-CM: J18.9 ICD-9-CM: 386  6/3/2019 - Present Yes Sepsis ([facility] ICD-10-CM: A41.9 ICD-9-CM: 038.9, 995.91  6/3/2019 - Present Yes Acute on chronic respiratory failure with hypoxia [facility]) ICD-10-CM: J96.21 
ICD-9-CM: 518.84, 799.02  6/3/2019 - Present Yes Acute liver failure ICD-10-CM: K72.00 ICD-9-CM: 362  6/3/2019 - Present Yes Hyponatremia ICD-10-CM: E87.1 ICD-9-CM: 276.1  6/3/2019 - Present Yes Lactic acidosis ICD-10-CM: E87.2 ICD-9-CM: 276.2  6/3/2019 - Present Yes * (Principal) Systemic inflammatory response syndrome (SIRS) of infectious origin with acute organ failure ([facility] ICD-10-CM: A41.9, R65.20 ICD-9-CM: 038.9, 995.91  6/3/2019 - Present Yes Chronic hypoxemic respiratory failure (HCC) (Chronic) ICD-10-CM: J96.11 
ICD-9-CM: 518.83, 799.02  2019 - Present Yes Pleural effusion, right ICD-10-CM: J90 ICD-9-CM: 511.9  2019 - Present Yes Breast cancer ([facility] [address] (Chronic) ICD-10-CM: C50.919 ICD-9-CM: 174.9  2017 - Present  Overview Signed 11/15/2018 10:05 AM by Sabine Mcfadden MD  
  2016: R breast--infiltrating ductal ca,  ER/KS positive and HER2 negative, s/p mastectomy, chemo, reconstruction, on chronic Ibrance therapy Malignant neoplasm of right female breast ([facility] [address] (Chronic) ICD-10-CM: C50.911 ICD-9-CM: 174.9  1/25/2016 - Present Yes Overview Addendum 11/15/2018 10:05 AM by Fermin Frank MD  
  2016: R breast--infiltrating ductal ca,  ER/FL positive and HER2 negative, metastatic to nodes, s/p mastectomy, chemo, reconstruction, on chronic Ibrance therapy Admission HPI from 6/2/2019:   
\" Please refer to HPI \" Hospital Course: 
 
78 yo WF with metastatic breast cancer (dxed in 2016, status post mastectomy and chemotherapy, followed by Dr. Leona Guerra) to her lung for which she has a chronic pleurx drain, who presented last night for worsened shortness of breath and it was in acute on chronic hypoxic respiratory failure. She met sepsis criteria due to possible bacterial pneumonia and she was on  cipro/vanc/zosyn. CXR shows signficant right sided effusion and cannot ascertain if there is also an infiltrate in right lobes. Her lactic acid has climbed from 4 to 6 despite IVFs. .  She is very pale and frail appearing and on respiratory distress. Long discussion with her and her  at bedside in regards to goals of care and she elected to be a DNR status and comfort care. Patient passed at 5:15 AM on 06/06/2019.  at bedside all questions answered. Time of Death: 5:15 AM 
 
 
 
 
All Micro Results Procedure Component Value Units Date/Time CULTURE, BODY FLUID Orlene Blades [[id number]] Collected:  06/03/19 1325 Order Status:  Completed Specimen:  Pleural Fluid Updated:  06/05/19 0825 Special Requests: NO SPECIAL REQUESTS     
  GRAM STAIN 4 TO 38 WBC'S SEEN PER OIF  
   NO DEFINITE ORGANISM SEEN Culture result: LIGHT YEAST SUBCULTURE IN PROGRESS     
 CULTURE, BLOOD [[id number]] Collected:  06/02/19 2322 Order Status:  Completed Specimen:  Whole Blood Updated:  06/05/19 1248 Special Requests: --     
  NO SPECIAL REQUESTS JUGULAR VEIN 
  
 Culture result: NO GROWTH 3 DAYS     
 CULTURE, BLOOD [[id number]] Collected:  06/02/19 4112 Order Status:  Completed Specimen:  Whole Blood Updated:  06/05/19 5073 Special Requests: --     
  NO SPECIAL REQUESTS 
LEFT 
HAND Culture result: NO GROWTH 3 DAYS     
 CULTURE, RESPIRATORY/SPUTUM/BRONCH Karina Danvers STAIN [[id number]] Order Status:  Canceled Specimen:  Sputum Labs: Results: BMP Recent Labs  
  06/05/19 
0602 06/04/19 
0535 06/03/19 
0631 * 131* 130*  
K 2.9* 3.9 5.5* CL 91* 93* 96* CO2 24 20* 20* AGAP 17* 18* 14 BUN 35* 37* 34* CREA 1.18* 1.12* 0.81 CA 7.7* 7.7* 7.9*  
GLU 87 108* 112* CBC w/Diff Recent Labs  
  06/05/19 
0602 06/04/19 
0535 06/03/19 
0631 WBC 14.4* 11.1 14.3*  
RBC 2.67* 2.30* 2.81* HGB 9.1* 7.9* 9.8* HCT 28.6* 25.7* 31.8*  
 160 198 GRANS 77 77 79* LYMPH 8* 7* 6*  
EOS 0* 0* 0* LFTs Recent Labs  
  06/05/19 
0602 06/04/19 
0535 06/03/19 
0631 SGOT 452* 519* 406* ALT 93* 92* 85* * 790* 912* TP 5.3* 5.2* 5.1* ALB 2.1* 2.5* 1.5*  
GLOB 3.2 2.7 3.6* AGRAT 0.7* 0.9* 0.4* Cardiac Markers Lab Results Component Value Date/Time Troponin-I, Qt. <0.04 10/19/2017 09:12 AM  
 Troponin-I, Qt. <0.04 10/19/2017 03:18 AM  
 BNP 51 (H) 06/03/2019 12:09 PM  
 BNP 49 (H) 06/03/2019 06:31 AM  
 BNP 41 (H) 06/02/2019 11:15 PM  
  
Coagulation Recent Labs  
  06/03/19 
1209 PTP 20.0* INR 1.7 APTT 48.8* Last A1c Lab Results Component Value Date/Time Hemoglobin A1c 5.3 10/27/2016 08:44 AM  
  
Lipid Panel Lab Results Component Value Date/Time Cholesterol, total 185 10/27/2016 08:51 AM  
 HDL Cholesterol 36 (L) 10/27/2016 08:51 AM  
 LDL, calculated 119 (H) 10/27/2016 08:51 AM  
 VLDL, calculated 30 10/27/2016 08:51 AM  
 Triglyceride 152 (H) 10/27/2016 08:51 AM  
  
Thyroid Studies Lab Results Component Value Date/Time TSH 1.060 12/06/2017 04:31 PM  
    
Urinalysis Lab Results Component Value Date/Time Color RISHI 05/15/2019 10:03 AM  
 Appearance CLOUDY 05/15/2019 10:03 AM  
 Specific gravity 1.023 05/15/2019 10:03 AM  
 pH (UA) 5.5 05/15/2019 10:03 AM  
 Protein 30 (A) 05/15/2019 10:03 AM  
 Glucose NEGATIVE  05/15/2019 10:03 AM  
 Ketone TRACE (A) 05/15/2019 10:03 AM  
 Bilirubin SMALL (A) 05/15/2019 10:03 AM  
 Blood NEGATIVE  05/15/2019 10:03 AM  
 Urobilinogen 1.0 05/15/2019 10:03 AM  
 Nitrites NEGATIVE  05/15/2019 10:03 AM  
 Leukocyte Esterase SMALL (A) 05/15/2019 10:03 AM  
  
 
All Labs from Last 24 Hrs: 
Recent Results (from the past 24 hour(s)) METABOLIC PANEL, COMPREHENSIVE Collection Time: 06/05/19  6:02 AM  
Result Value Ref Range Sodium 132 (L) 136 - 145 mmol/L Potassium 2.9 (LL) 3.5 - 5.1 mmol/L Chloride 91 (L) 98 - 107 mmol/L  
 CO2 24 21 - 32 mmol/L Anion gap 17 (H) 7 - 16 mmol/L Glucose 87 65 - 100 mg/dL BUN 35 (H) 8 - 23 MG/DL Creatinine 1.18 (H) 0.6 - 1.0 MG/DL  
 GFR est AA 58 (L) >60 ml/min/1.73m2 GFR est non-AA 48 (L) >60 ml/min/1.73m2 Calcium 7.7 (L) 8.3 - 10.4 MG/DL Bilirubin, total 1.9 (H) 0.2 - 1.1 MG/DL  
 ALT (SGPT) 93 (H) 12 - 65 U/L  
 AST (SGOT) 452 (H) 15 - 37 U/L Alk. phosphatase 972 (H) 50 - 136 U/L Protein, total 5.3 (L) 6.3 - 8.2 g/dL Albumin 2.1 (L) 3.2 - 4.6 g/dL Globulin 3.2 2.3 - 3.5 g/dL A-G Ratio 0.7 (L) 1.2 - 3.5    
CBC WITH AUTOMATED DIFF Collection Time: 06/05/19  6:02 AM  
Result Value Ref Range WBC 14.4 (H) 4.3 - 11.1 K/uL  
 RBC 2.67 (L) 4.05 - 5.2 M/uL HGB 9.1 (L) 11.7 - 15.4 g/dL HCT 28.6 (L) 35.8 - 46.3 % .1 (H) 79.6 - 97.8 FL  
 MCH 34.1 (H) 26.1 - 32.9 PG  
 MCHC 31.8 31.4 - 35.0 g/dL RDW 21.3 (H) 11.9 - 14.6 % PLATELET 234 657 - 367 K/uL MPV 11.7 9.4 - 12.3 FL ABSOLUTE NRBC 1.12 (H) 0.0 - 0.2 K/uL NEUTROPHILS 77 43 - 78 % LYMPHOCYTES 8 (L) 13 - 44 % MONOCYTES 8 4.0 - 12.0 % EOSINOPHILS 0 (L) 0.5 - 7.8 %  BASOPHILS 1 0.0 - 2.0 %  
 IMMATURE GRANULOCYTES 6 (H) 0.0 - 5.0 %  
 ABS. NEUTROPHILS 11.0 (H) 1.7 - 8.2 K/UL  
 ABS. LYMPHOCYTES 1.2 0.5 - 4.6 K/UL  
 ABS. MONOCYTES 1.2 0.1 - 1.3 K/UL  
 ABS. EOSINOPHILS 0.0 0.0 - 0.8 K/UL  
 ABS. BASOPHILS 0.1 0.0 - 0.2 K/UL  
 ABS. IMM. GRANS. 0.9 (H) 0.0 - 0.5 K/UL  
 RBC COMMENTS SLIGHT 
POLYCHROMASIA 
    
 RBC COMMENTS ANISOCYTOSIS + POIKILOCYTOSIS    
 WBC COMMENTS Result Confirmed By Smear PLATELET COMMENTS ADEQUATE    
 DF AUTOMATED Discharge Exam: 
Patient Vitals for the past 24 hrs: 
 Temp Pulse Resp BP SpO2  
19 0739     96 % 19 0700 97 °F (36.1 °C) (!) 121 18 104/55 92 % Eyes: Pupils fixed/nonreactive Lungs: No breath sounds. Heart:  No heart sounds Neurologic: unresponsive Disposition:  Time spent in patient in pronouncing patient, speaking with family, and discharge arrangements 35 minutes. Signed: Edgar Sanz MD

## 2019-06-06 NOTE — PROGRESS NOTES
Received bedside shift report from [employer], RN. Pt on comfort measures. Family at bedside.

## 2019-06-07 PROCEDURE — 3331090001 HH PPS REVENUE CREDIT

## 2019-06-07 PROCEDURE — 3331090002 HH PPS REVENUE DEBIT

## 2019-06-08 PROCEDURE — 3331090001 HH PPS REVENUE CREDIT

## 2019-06-08 PROCEDURE — 3331090002 HH PPS REVENUE DEBIT

## 2019-06-09 PROCEDURE — 3331090001 HH PPS REVENUE CREDIT

## 2019-06-09 PROCEDURE — 3331090002 HH PPS REVENUE DEBIT

## 2019-06-10 LAB
RESULT 1, 080227: NORMAL
SPECIMEN SOURCE: NORMAL
YEAST SPEC QL CULT: NORMAL

## 2019-06-10 PROCEDURE — 3331090002 HH PPS REVENUE DEBIT

## 2019-06-10 PROCEDURE — 3331090001 HH PPS REVENUE CREDIT

## 2019-06-11 LAB
BACTERIA SPEC CULT: ABNORMAL
GRAM STN SPEC: ABNORMAL
GRAM STN SPEC: ABNORMAL
SERVICE CMNT-IMP: ABNORMAL

## 2019-06-11 PROCEDURE — 3331090002 HH PPS REVENUE DEBIT

## 2019-06-11 PROCEDURE — 3331090001 HH PPS REVENUE CREDIT

## 2019-06-12 PROCEDURE — 3331090002 HH PPS REVENUE DEBIT

## 2019-06-12 PROCEDURE — 3331090001 HH PPS REVENUE CREDIT

## 2019-06-13 ENCOUNTER — HOME CARE VISIT (OUTPATIENT)
Dept: HOME HEALTH SERVICES | Facility: HOME HEALTH | Age: 69
End: 2019-06-13
Payer: MEDICARE

## 2019-06-13 PROCEDURE — 3331090001 HH PPS REVENUE CREDIT

## 2019-06-13 PROCEDURE — 3331090002 HH PPS REVENUE DEBIT

## 2019-06-14 PROCEDURE — 3331090001 HH PPS REVENUE CREDIT

## 2019-06-14 PROCEDURE — 3331090002 HH PPS REVENUE DEBIT

## 2019-06-15 PROCEDURE — 3331090001 HH PPS REVENUE CREDIT

## 2019-06-15 PROCEDURE — 3331090002 HH PPS REVENUE DEBIT

## 2019-06-16 PROCEDURE — 3331090002 HH PPS REVENUE DEBIT

## 2019-06-16 PROCEDURE — 3331090001 HH PPS REVENUE CREDIT

## 2022-11-14 NOTE — PROGRESS NOTES
Inpatient Hematology / Oncology Progress Note      Admission Date: 2017  2:55 PM  Reason for Admission/Hospital Course: breast cancer  pneumonia  SIRS  SIRS (systemic inflammatory response syndrome) (HCC)      24 Hour Events:  SOB improving  Afebrile, VSS  Cough continues-nonproductive      ROS:  Constitutional: egative for fever, chills, +weakness, +malaise, +fatigue. CV: negative for chest pain, palpitations, edema. Respiratory: Positive for dyspnea, cough, wheezing. GI: Negative for nausea, abdominal pain, diarrhea. 10 point review of systems is otherwise negative with the exception of the elements mentioned above in the HPI. Allergies   Allergen Reactions    Prednisone Unknown (comments)     \"psychological reaction\"       OBJECTIVE:  Patient Vitals for the past 8 hrs:   BP Temp Pulse Resp SpO2   17 1137 108/48 98.1 °F (36.7 °C) 98 18 92 %   17 0913 - - - - 96 %   17 0818 119/55 98.6 °F (37 °C) 93 18 93 %     Temp (24hrs), Av.7 °F (37.1 °C), Min:98.1 °F (36.7 °C), Max:99.2 °F (37.3 °C)     0701 -  1900  In: 304 [I.V.:304]  Out: 700 [Urine:700]    Physical Exam:  Constitutional: Well developed, well nourished female in no acute distress, sitting comfortably in the hospital bed. HEENT: Normocephalic and atraumatic. Oropharynx is clear, mucous membranes are moist.. Extraocular muscles are intact. Sclerae anicteric. Neck supple. Lymph node   No palpable submandibular, cervical, supraclavicular, axillary or inguinal lymph nodes. Skin Warm and dry. No bruising and no rash noted. No erythema. No pallor. Respiratory Lungs are congested bilaterally. No use of accessory muscle use. CVS Normal rate, regular rhythm and normal S1 and S2. No murmurs, gallops, or rubs. Abdomen Soft, nontender and nondistended, normoactive bowel sounds. No palpable mass. No hepatosplenomegaly. Neuro Grossly nonfocal with no obvious sensory or motor deficits.    MSK Normal range of motion in general.  No edema and no tenderness. Psych Appropriate mood and affect.         Labs:      Recent Labs      04/09/17   0342  04/08/17   0340  04/07/17   1251   WBC  1.7*  1.7*  2.1*   RBC  2.65*  2.70*  3.27*   HGB  8.0*  8.3*  10.0*   HCT  24.2*  24.3*  29.4*   MCV  91.3  90.0  89.9   MCH  30.2  30.7  30.6   MCHC  33.1  34.2  34.0   RDW  17.6*  17.1*  16.7*   PLT  52*  40*  49*   GRANS  50  51  38*   LYMPH  26  31  50*   MONOS  22*  15*  10   EOS  1  1  1   BASOS  1  1  1   IG  0.0  0.6   --    DF  AUTOMATED  AUTOMATED  AUTOMATED   ANEU  0.9*  0.9*  0.8*   ABL  0.4*  0.5  1.1   ABM  0.4  0.3  0.2   EMI  0.0  0.0  0.0   ABB  0.0  0.0  0.0   AIG  0.0  0.0   --         Recent Labs      04/08/17   0340 04/07/17   1251   NA   --   136   K  3.9  3.3*   CL   --   100   CO2   --   26   AGAP   --   10   GLU   --   113*   BUN   --   9   CREA   --   0.90   GFRAA   --   >60   GFRNA   --   >60   CA   --   8.5   SGOT   --   36   AP   --   180*   TP   --   6.7   ALB   --   2.4*   GLOB   --   4.3*   AGRAT   --   0.6*         Imaging:      ASSESSMENT:    Problem List  Date Reviewed: 4/4/2017          Codes Class Noted    * (Principal)SIRS (systemic inflammatory response syndrome) (HCC) ICD-10-CM: R65.10  ICD-9-CM: 995.90  4/7/2017        Idiopathic hypotension ICD-10-CM: I95.0  ICD-9-CM: 458.9  4/7/2017        Tachycardia ICD-10-CM: R00.0  ICD-9-CM: 785.0  4/7/2017        Shortness of breath ICD-10-CM: R06.02  ICD-9-CM: 786.05  4/7/2017        Hypokalemia ICD-10-CM: E87.6  ICD-9-CM: 276.8  4/7/2017        Cough ICD-10-CM: R05  ICD-9-CM: 786.2  4/7/2017        Pancytopenia due to chemotherapy [facility]) ICD-10-CM: E51.384  ICD-9-CM: 284.11  4/7/2017        Status post right mastectomy ICD-10-CM: Z90.11  ICD-9-CM: V45.71  9/14/2016        S/P breast reconstruction, bilateral ICD-10-CM: Z98.890  ICD-9-CM: V43.82  9/14/2016        Mediastinal adenopathy ICD-10-CM: R59.0  ICD-9-CM: 785.6  2/18/2016 Malignant neoplasm of right female breast [facility]) ICD-10-CM: C50.911  ICD-9-CM: 174.9  1/25/2016        Acquired hypothyroidism ICD-10-CM: E03.9  ICD-9-CM: 244.9  11/4/2015        Asthma ICD-10-CM: J45.909  ICD-9-CM: 493.90  11/4/2015        Insomnia ICD-10-CM: G47.00  ICD-9-CM: 780.52  11/4/2015        Depression ICD-10-CM: F32.9  ICD-9-CM: 325  11/4/2015                PLAN:    - Metastatic Breast Cancer  * Hold Ibrance. Continue Femara.      - SIRS (likely PNA)  * Procalcitonin, lactic acid, pan-culture, chest xray  * Zosyn and Vancomycin   * Duo-nebs every 4 hours for wheezing/cough   4/8 SOB improving  4/9 add mucinex for cough, continue zosyn, vanc, nebs     - Pancytopenia related to Chemotherapy  * Hold off on G-CSF. Counts should recover on it's own. 4/8 ANC 0.9  4/9 ANC 0.9, Hgb 8.0, Plt 53K     - Hypokalemia  * Give 40 meq potassium chloride IV   4/8 K+ 3.9        Kathy Benítez, LOVE   [facility]  [address] [place] [address]  Office : [phone]  Fax : [phone]         Attending Addendum:  I personally evaluated the patient with Kathy Benítez, N.P.,  and agree with the assessment, findings and plan as documented. Appears stable, heart regular without murmur, lungs clear, abdomen benign. Clinically doing better. Continue current abx. ANC adequate. Likely discharge in 2-3 days.                Claude Gan MD  [address]  Office : [phone]  Fax : [phone] Nostril Rim Text: The closure involved the nostril rim.

## 2025-01-07 NOTE — PROGRESS NOTES
pleurix drained for 200ml pink fluid Detail Level: Zone Patient Specific Counseling (Will Not Stick From Patient To Patient): - Advised to moisturize hair using LCO method: use [employer] White Peony Superior, then Rola Yen Curl Love Moisture Milk, and top off with Jamican Black Castor Oil or Shea Oil for better hair moisturization\\n- Advised pt of [employer] Honey Creme Moisture Retention Shampoo and [employer] Rosemary & Mint Stimulating Super Moisturizing Conditioner

## (undated) DEVICE — TRAY PREP DRY W/ PREM GLV 2 APPL 6 SPNG 2 UNDPD 1 OVERWRAP

## (undated) DEVICE — SUTURE ETHLN SZ 4-0 L18IN NONABSORBABLE BLK L19MM PS-2 3/8 1667H

## (undated) DEVICE — GEL MEDC ULTRASOUND 5L -- REPLACED BY 326862

## (undated) DEVICE — Device

## (undated) DEVICE — OCCLUSIVE GAUZE STRIP,3% BISMUTH TRIBROMOPHENATE IN PETROLATUM BLEND: Brand: XEROFORM

## (undated) DEVICE — BLADE ELECTRODE: Brand: VALLEYLAB

## (undated) DEVICE — DRAPE,TOP,102X53,STERILE: Brand: MEDLINE

## (undated) DEVICE — AMD ANTIMICROBIAL BANDAGE ROLL,6 PLY: Brand: KERLIX

## (undated) DEVICE — INTENDED FOR TISSUE SEPARATION, AND OTHER PROCEDURES THAT REQUIRE A SHARP SURGICAL BLADE TO PUNCTURE OR CUT.: Brand: BARD-PARKER SAFETY BLADES SIZE 11, STERILE

## (undated) DEVICE — (D)SYR 10ML 1/5ML GRAD NSAF -- PKGING CHANGE USE ITEM 338027

## (undated) DEVICE — SYR 10ML LUER LOK 1/5ML GRAD --

## (undated) DEVICE — MEDI-VAC NON-CONDUCTIVE SUCTION TUBING: Brand: CARDINAL HEALTH

## (undated) DEVICE — DRAPE TWL SURG 16X26IN BLU ORB04] ALLCARE INC]

## (undated) DEVICE — SINGLE USE BIOPSY VALVE MAJ-210: Brand: SINGLE USE BIOPSY VALVE (STERILE)

## (undated) DEVICE — GOWN,BREATHABLE SLV,AURORA,LG,STRL: Brand: MEDLINE

## (undated) DEVICE — KIT THORCENT 8FR L5IN POLYUR W/ 18/22/25GA NDL 3 W STPCOCK

## (undated) DEVICE — SKIN STAPLER: Brand: SIGNET

## (undated) DEVICE — STERILE POLYISOPRENE POWDER-FREE SURGICAL GLOVES: Brand: PROTEXIS

## (undated) DEVICE — BRA SURG POST-OP XL 46IN --

## (undated) DEVICE — DRAIN SURG L3/8-1/2IN DIA3/16IN SIL CARD CONN 1:1 BLAK

## (undated) DEVICE — TIP CLEANR ELECSURG 1.75X1.75 --

## (undated) DEVICE — CONTAINER PREFIL FRMLN 40ML --

## (undated) DEVICE — UNIVERSAL FIXATION CANNULA: Brand: VERSAONE

## (undated) DEVICE — DRAIN CHN 24FR L8MM SIL RND HUBLESS FULL FLUT DEPTH MRK

## (undated) DEVICE — SYSTEM IMPL DEL FOR BRST IMPL FUN (SEE COMMENT)

## (undated) DEVICE — SOL INJ SOD CL0.9% 10ML PREFIL --

## (undated) DEVICE — SINGLE USE ASPIRATION NEEDLE: Brand: SINGLE USE ASPIRATION NEEDLE

## (undated) DEVICE — SUTURE ABSORBABLE BRAIDED 2-0 CT-1 27 IN UD VICRYL J259H

## (undated) DEVICE — Z DISCONTINUED NO SUB IDED JR BANDAGE COMPR ELASTIC 6 IN ACE

## (undated) DEVICE — DRAIN SURG W10MMXL20CM SIL FLAT HUBLESS W/ RADPQ STRP 3/4

## (undated) DEVICE — BUTTON SWITCH PENCIL BLADE ELECTRODE, HOLSTER: Brand: EDGE

## (undated) DEVICE — SURGICAL PROCEDURE PACK BASIC ST FRANCIS

## (undated) DEVICE — PUMP PAIN MGMT 400ML 4ML/HR 2 W/ SIL SOAK CATH FOR ABD

## (undated) DEVICE — STANDARD HYPODERMIC NEEDLE,POLYPROPYLENE HUB: Brand: MONOJECT

## (undated) DEVICE — GOWN,REINF,POLY,ECL,PP SLV,XL: Brand: MEDLINE

## (undated) DEVICE — SPONGE LAP 18X18IN STRL -- 5/PK

## (undated) DEVICE — ABDOMINAL PAD: Brand: DERMACEA

## (undated) DEVICE — SOLUTION IRRIG 3000ML H2O STRL BAG

## (undated) DEVICE — HEX-LOCKING BLADE ELECTRODE: Brand: EDGE

## (undated) DEVICE — KENDALL RADIOLUCENT FOAM MONITORING ELECTRODE RECTANGULAR SHAPE: Brand: KENDALL

## (undated) DEVICE — 2000CC GUARDIAN II: Brand: GUARDIAN

## (undated) DEVICE — NEEDLE HYPO 21GA L1.5IN INTRAMUSCULAR S STL LATCH BVL UP

## (undated) DEVICE — SYR LR LCK 1ML GRAD NSAF 30ML --

## (undated) DEVICE — SINGLE BASIN: Brand: CARDINAL HEALTH

## (undated) DEVICE — BLADELESS OPTICAL TROCAR WITH FIXATION CANNULA: Brand: VERSAPORT

## (undated) DEVICE — REM POLYHESIVE ADULT PATIENT RETURN ELECTRODE: Brand: VALLEYLAB

## (undated) DEVICE — (D)STRIP SKN CLSR 0.5X4IN WHT --

## (undated) DEVICE — UTILITY MARKER,BLACK WITH LABELS: Brand: DEVON

## (undated) DEVICE — MEDI-VAC YANKAUER SUCTION HANDLE W/BULBOUS TIP: Brand: CARDINAL HEALTH

## (undated) DEVICE — Device: Brand: BALLOON

## (undated) DEVICE — PAD,ABDOMINAL,5"X9",STERILE,LF,1/PK: Brand: MEDLINE INDUSTRIES, INC.

## (undated) DEVICE — SINGLE USE SUCTION VALVE MAJ-209: Brand: SINGLE USE SUCTION VALVE (STERILE)

## (undated) DEVICE — SUTURE MCRYL SZ 5-0 L18IN ABSRB UD L19MM PS-2 3/8 CIR PRIM Y495G

## (undated) DEVICE — E-Z CLEAN, PTFE COATED, ELECTROSURGICAL LAPAROSCOPIC ELECTRODE, J-HOOK, 33 CM., SINGLE-USE, FOR USE WITH HAND CONTROL PENCIL: Brand: MEGADYNE

## (undated) DEVICE — GOWN,SIRUS,NONRNF,SETINSLV,XL,20/CS: Brand: MEDLINE

## (undated) DEVICE — 1200 GUARD II KIT W/5MM TUBE W/O VAC TUBE: Brand: GUARDIAN

## (undated) DEVICE — SUTURE ABSRB X-1 REV CUT 1/2 CIR 22MM UD BRAID 27IN SZ 3-0 J458H

## (undated) DEVICE — SYR 50ML LR LCK 1ML GRAD NSAF --

## (undated) DEVICE — SUTURE VCRL SZ 3-0 L27IN ABSRB UD L26MM SH 1/2 CIR J416H

## (undated) DEVICE — INTENDED FOR TISSUE SEPARATION, AND OTHER PROCEDURES THAT REQUIRE A SHARP SURGICAL BLADE TO PUNCTURE OR CUT.: Brand: BARD-PARKER SAFETY BLADES SIZE 15, STERILE

## (undated) DEVICE — SUTURE VCRL SZ 5-0 L18IN ABSRB UD L13MM P-3 3/8 CIR PRIM J493G

## (undated) DEVICE — OINTMENT BACITRACIN 1 OZ TUBE --

## (undated) DEVICE — BLOCK BITE AD 60FR W/ VELC STRP ADDRESSES MOST PT AND

## (undated) DEVICE — SWAB CULT DBL W/O CHAR RAYON TIP AMIES GEL CLMN FOR COLL

## (undated) DEVICE — SUT ETHLN 3-0 18IN PS2 BLK --

## (undated) DEVICE — KENDALL SCD EXPRESS SLEEVES, KNEE LENGTH, MEDIUM: Brand: KENDALL SCD

## (undated) DEVICE — NEEDLE HYPO 18GA L1.5IN PNK S STL HUB POLYPR SHLD REG BVL

## (undated) DEVICE — TRAY CATH 16F DRN BG LTX -- CONVERT TO ITEM 363158

## (undated) DEVICE — SUTURE VCRL SZ 4-0 L18IN ABSRB UD L19MM PS-2 3/8 CIR PRIM J496H

## (undated) DEVICE — MOUTHPIECE ENDOSCP 20X27MM --

## (undated) DEVICE — AMD ANTIMICROBIAL GAUZE SPONGES,12 PLY USP TYPE VII, 0.2% POLYHEXAMETHYLENE BIGUANIDE HCI (PHMB): Brand: CURITY

## (undated) DEVICE — LAP CHOLE: Brand: MEDLINE INDUSTRIES, INC.

## (undated) DEVICE — 3M™ IOBAN™ 2 ANTIMICROBIAL INCISE DRAPE 6650EZ: Brand: IOBAN™ 2

## (undated) DEVICE — INTENDED FOR TISSUE SEPARATION, AND OTHER PROCEDURES THAT REQUIRE A SHARP SURGICAL BLADE TO PUNCTURE OR CUT.: Brand: BARD-PARKER SAFETY BLADES SIZE 10, STERILE

## (undated) DEVICE — DRAPE SHT 3 QTR PROXIMA 53X77 --

## (undated) DEVICE — BRONCHOSCOPY PACK: Brand: MEDLINE INDUSTRIES, INC.

## (undated) DEVICE — UNIVERSAL DRAPES: Brand: MEDLINE INDUSTRIES, INC.

## (undated) DEVICE — (D)PREP SKN CHLRAPRP APPL 26ML -- CONVERT TO ITEM 371833

## (undated) DEVICE — NEEDLE ELECTRODE: Brand: VALLEYLAB

## (undated) DEVICE — SOLUTION IV 1000ML 0.9% SOD CHL

## (undated) DEVICE — SET EXTN L6IN W/ S STL CLMP

## (undated) DEVICE — TROCAR: Brand: THORACOPORT

## (undated) DEVICE — CONNECTOR TBNG OD5-7MM O2 END DISP

## (undated) DEVICE — 2, DISPOSABLE SUCTION/IRRIGATOR WITHOUT DISPOSABLE TIP: Brand: STRYKEFLOW

## (undated) DEVICE — SOLUTION IRRIG 1000ML H2O STRL BLT

## (undated) DEVICE — VISUALIZATION SYSTEM: Brand: CLEARIFY

## (undated) DEVICE — SUT ETHLN 4-0 18IN P3 GRN --

## (undated) DEVICE — BASIC SINGLE BASIN-LF: Brand: MEDLINE INDUSTRIES, INC.

## (undated) DEVICE — MASTISOL ADHESIVE LIQ 2/3ML

## (undated) DEVICE — GEL US 20GM NONIRRITATING OVERWRAPPED FILE PCH TRNSMIT

## (undated) DEVICE — SUT ETHLN 4-0 18IN PS2 BLK --

## (undated) DEVICE — ULTRA HIGH PROFILE, UH 375CC GEL SIZER: Brand: MENTOR MEMORYGEL RESTERILIZABLE GEL SIZER

## (undated) DEVICE — DRSG GZ PETROLATM CURAD 1/2X72 --

## (undated) DEVICE — NEEDLE HYPO 25GA L1.5IN BLU POLYPR HUB S STL REG BVL STR

## (undated) DEVICE — CANNULA NSL ORAL AD FOR CAPNOFLEX CO2 O2 AIRLFE

## (undated) DEVICE — TELFA NON-ADHERENT ABSORBENT DRESSING: Brand: TELFA

## (undated) DEVICE — SUTURE MCRYL SZ 4-0 L18IN ABSRB VLT PS-2 L19MM 3/8 CIR REV Y513G

## (undated) DEVICE — FORCEPS BX L240CM JAW DIA2.8MM L CAP W/ NDL MIC MESH TOOTH

## (undated) DEVICE — SUTURE VCRL SZ 2-0 L36IN ABSRB UD L36MM CT-1 1/2 CIR J945H

## (undated) DEVICE — BLADE ELECTRODE;CAUTION: FOR MANUFACTURING, PROCESSING, OR REPACKING.: Brand: VALLEYLAB

## (undated) DEVICE — [HIGH FLOW INSUFFLATOR,  DO NOT USE IF PACKAGE IS DAMAGED,  KEEP DRY,  KEEP AWAY FROM SUNLIGHT,  PROTECT FROM HEAT AND RADIOACTIVE SOURCES.]: Brand: PNEUMOSURE

## (undated) DEVICE — SHEET, DRAPE, SPLIT, STERILE: Brand: MEDLINE

## (undated) DEVICE — SUTURE PERMAHAND SZ 0 L30IN NONABSORBABLE BLK L30MM PSL 3/8 590H

## (undated) DEVICE — PROTECTOR CUSHION ULNAR NERVE --